# Patient Record
Sex: FEMALE | Race: WHITE | Employment: UNEMPLOYED | URBAN - METROPOLITAN AREA
[De-identification: names, ages, dates, MRNs, and addresses within clinical notes are randomized per-mention and may not be internally consistent; named-entity substitution may affect disease eponyms.]

---

## 2017-11-12 ENCOUNTER — ED HISTORICAL/CONVERTED ENCOUNTER (OUTPATIENT)
Dept: OTHER | Age: 52
End: 2017-11-12

## 2019-12-27 ENCOUNTER — ED HISTORICAL/CONVERTED ENCOUNTER (OUTPATIENT)
Dept: OTHER | Age: 54
End: 2019-12-27

## 2020-01-07 ENCOUNTER — OP HISTORICAL/CONVERTED ENCOUNTER (OUTPATIENT)
Dept: OTHER | Age: 55
End: 2020-01-07

## 2020-03-11 ENCOUNTER — OP HISTORICAL/CONVERTED ENCOUNTER (OUTPATIENT)
Dept: OTHER | Age: 55
End: 2020-03-11

## 2020-07-24 ENCOUNTER — IP HISTORICAL/CONVERTED ENCOUNTER (OUTPATIENT)
Dept: OTHER | Age: 55
End: 2020-07-24

## 2020-07-28 ENCOUNTER — IP HISTORICAL/CONVERTED ENCOUNTER (OUTPATIENT)
Dept: OTHER | Age: 55
End: 2020-07-28

## 2020-09-17 ENCOUNTER — HOSPITAL ENCOUNTER (OUTPATIENT)
Dept: NUCLEAR MEDICINE | Age: 55
Discharge: HOME | End: 2020-09-17
Attending: INTERNAL MEDICINE
Payer: COMMERCIAL

## 2020-09-17 ENCOUNTER — HOSPITAL ENCOUNTER (OUTPATIENT)
Dept: NON INVASIVE DIAGNOSTICS | Age: 55
Discharge: HOME | End: 2020-09-17
Attending: INTERNAL MEDICINE
Payer: COMMERCIAL

## 2020-09-17 VITALS
BODY MASS INDEX: 20.02 KG/M2 | HEIGHT: 65 IN | SYSTOLIC BLOOD PRESSURE: 184 MMHG | WEIGHT: 120.15 LBS | DIASTOLIC BLOOD PRESSURE: 111 MMHG

## 2020-09-17 DIAGNOSIS — I34.89 MYXOID TRANSFORMATION OF MITRAL VALVE: ICD-10-CM

## 2020-09-17 LAB
STRESS BASELINE DIAS BP: 111 MMHG
STRESS BASELINE HR: 80 BPM
STRESS BASELINE SYS BP: 184 MMHG
STRESS PERCENT HR ACHIEVED: 85 %
STRESS POST PEAK HR: 141 BPM
STRESS ST DEPRESSION: 0 MM
STRESS ST ELEVATION: 0 MM
STRESS STAGE 1 DURATION: 1 MIN:SEC
STRESS STAGE 1 HR: 88 BPM
STRESS STAGE 2 BP: NORMAL MMHG
STRESS STAGE 2 DURATION: 2 MIN:SEC
STRESS STAGE 2 HR: 101 BPM
STRESS STAGE 3 BP: NORMAL MMHG
STRESS STAGE 3 COMMENTS: NORMAL
STRESS STAGE 3 DURATION: 3 MIN:SEC
STRESS STAGE 3 HR: 104 BPM
STRESS STAGE 4 BP: NORMAL MMHG
STRESS STAGE 4 DURATION: 4 MIN:SEC
STRESS STAGE 4 HR: 103 BPM
STRESS STAGE 5 BP: NORMAL MMHG
STRESS STAGE 5 COMMENTS: NORMAL
STRESS STAGE 5 DURATION: 5 MIN:SEC
STRESS STAGE 5 HR: 98 BPM
STRESS TARGET HR: 166 BPM

## 2020-09-17 PROCEDURE — 74011250636 HC RX REV CODE- 250/636: Performed by: INTERNAL MEDICINE

## 2020-09-17 PROCEDURE — 78452 HT MUSCLE IMAGE SPECT MULT: CPT

## 2020-09-17 RX ORDER — NIFEDIPINE 60 MG/1
60 TABLET, EXTENDED RELEASE ORAL DAILY
COMMUNITY
End: 2020-10-02

## 2020-09-17 RX ORDER — ISOSORBIDE DINITRATE 20 MG/1
40 TABLET ORAL 2 TIMES DAILY
COMMUNITY
End: 2020-10-02

## 2020-09-17 RX ORDER — POTASSIUM CHLORIDE 20 MEQ/1
20 TABLET, EXTENDED RELEASE ORAL 2 TIMES DAILY
COMMUNITY
End: 2020-10-27

## 2020-09-17 RX ORDER — LANOLIN ALCOHOL/MO/W.PET/CERES
400 CREAM (GRAM) TOPICAL DAILY
COMMUNITY
End: 2021-04-14 | Stop reason: DRUGHIGH

## 2020-09-17 RX ORDER — ASPIRIN 81 MG/1
81 TABLET ORAL DAILY
COMMUNITY

## 2020-09-17 RX ORDER — AMINOPHYLLINE 25 MG/ML
125 INJECTION, SOLUTION INTRAVENOUS ONCE
Status: COMPLETED | OUTPATIENT
Start: 2020-09-17 | End: 2020-09-17

## 2020-09-17 RX ORDER — METOPROLOL SUCCINATE 100 MG/1
150 TABLET, EXTENDED RELEASE ORAL DAILY
COMMUNITY
End: 2020-10-02

## 2020-09-17 RX ORDER — LABETALOL 200 MG/1
TABLET, FILM COATED ORAL 2 TIMES DAILY
COMMUNITY
End: 2020-10-02

## 2020-09-17 RX ADMIN — REGADENOSON 0.4 MG: 0.08 INJECTION, SOLUTION INTRAVENOUS at 10:03

## 2020-09-17 RX ADMIN — AMINOPHYLLINE 125 MG: 25 INJECTION, SOLUTION INTRAVENOUS at 10:12

## 2020-09-21 ENCOUNTER — APPOINTMENT (OUTPATIENT)
Dept: CT IMAGING | Age: 55
DRG: 194 | End: 2020-09-21
Attending: HOSPITALIST
Payer: COMMERCIAL

## 2020-09-21 ENCOUNTER — HOSPITAL ENCOUNTER (INPATIENT)
Age: 55
LOS: 11 days | Discharge: HOME HEALTH | DRG: 194 | End: 2020-10-02
Attending: EMERGENCY MEDICINE | Admitting: HOSPITALIST
Payer: COMMERCIAL

## 2020-09-21 ENCOUNTER — APPOINTMENT (OUTPATIENT)
Dept: GENERAL RADIOLOGY | Age: 55
DRG: 194 | End: 2020-09-21
Attending: EMERGENCY MEDICINE
Payer: COMMERCIAL

## 2020-09-21 ENCOUNTER — ANESTHESIA EVENT (OUTPATIENT)
Dept: EMERGENCY DEPT | Age: 55
DRG: 194 | End: 2020-09-21
Payer: COMMERCIAL

## 2020-09-21 ENCOUNTER — ANESTHESIA (OUTPATIENT)
Dept: EMERGENCY DEPT | Age: 55
DRG: 194 | End: 2020-09-21
Payer: COMMERCIAL

## 2020-09-21 DIAGNOSIS — J96.02 ACUTE HYPERCAPNIC RESPIRATORY FAILURE (HCC): Primary | ICD-10-CM

## 2020-09-21 DIAGNOSIS — I50.9 ACUTE CONGESTIVE HEART FAILURE, UNSPECIFIED HEART FAILURE TYPE (HCC): ICD-10-CM

## 2020-09-21 DIAGNOSIS — J44.1 COPD EXACERBATION (HCC): ICD-10-CM

## 2020-09-21 PROBLEM — I10 HYPERTENSION: Status: ACTIVE | Noted: 2020-09-21

## 2020-09-21 PROBLEM — I70.1 RENAL ARTERY STENOSIS (HCC): Status: ACTIVE | Noted: 2020-09-21

## 2020-09-21 PROBLEM — J96.00 ACUTE RESPIRATORY FAILURE (HCC): Status: ACTIVE | Noted: 2020-09-21

## 2020-09-21 LAB
ALBUMIN SERPL-MCNC: 3.3 G/DL (ref 3.5–5)
ALBUMIN/GLOB SERPL: 0.8 {RATIO} (ref 1.1–2.2)
ALP SERPL-CCNC: 114 U/L (ref 45–117)
ALT SERPL-CCNC: 48 U/L (ref 12–78)
ANION GAP SERPL CALC-SCNC: 7 MMOL/L (ref 5–15)
AST SERPL W P-5'-P-CCNC: 54 U/L (ref 15–37)
BASE DEFICIT BLDV-SCNC: 3.3 MMOL/L (ref 0–2)
BASOPHILS # BLD: 0 K/UL (ref 0–0.1)
BASOPHILS NFR BLD: 0 % (ref 0–1)
BILIRUB SERPL-MCNC: 0.3 MG/DL (ref 0.2–1)
BNP SERPL-MCNC: 9742 PG/ML
BUN SERPL-MCNC: 21 MG/DL (ref 6–20)
BUN/CREAT SERPL: 18 (ref 12–20)
CA-I BLD-MCNC: 9.8 MG/DL (ref 8.5–10.1)
CHLORIDE SERPL-SCNC: 101 MMOL/L (ref 97–108)
CO2 SERPL-SCNC: 27 MMOL/L (ref 21–32)
CREAT SERPL-MCNC: 1.15 MG/DL (ref 0.55–1.02)
DIFFERENTIAL METHOD BLD: ABNORMAL
EOSINOPHIL # BLD: 0 K/UL (ref 0–0.4)
EOSINOPHIL NFR BLD: 0 % (ref 0–7)
ERYTHROCYTE [DISTWIDTH] IN BLOOD BY AUTOMATED COUNT: 14.8 % (ref 11.5–14.5)
GLOBULIN SER CALC-MCNC: 4.4 G/DL (ref 2–4)
GLUCOSE SERPL-MCNC: 110 MG/DL (ref 65–100)
HCO3 BLDV-SCNC: 22 MMOL/L (ref 22–26)
HCT VFR BLD AUTO: 30.9 % (ref 35–47)
HGB BLD-MCNC: 9.9 % (ref 11.5–16)
IMM GRANULOCYTES # BLD AUTO: 0 K/UL (ref 0–0.04)
IMM GRANULOCYTES NFR BLD AUTO: 0 % (ref 0–0.5)
LYMPHOCYTES # BLD: 0.6 K/UL (ref 0.8–3.5)
LYMPHOCYTES NFR BLD: 7 % (ref 12–49)
MCH RBC QN AUTO: 32.5 PG (ref 26–34)
MCHC RBC AUTO-ENTMCNC: 32 G/DL (ref 30–36.5)
MCV RBC AUTO: 101.3 FL (ref 80–99)
MONOCYTES # BLD: 0.2 K/UL (ref 0–1)
MONOCYTES NFR BLD: 3 % (ref 5–13)
NEUTS SEG # BLD: 7.2 K/UL (ref 1.8–8)
NEUTS SEG NFR BLD: 90 % (ref 32–75)
PCO2 BLDV: 63 MMHG (ref 40–50)
PH BLDV: 7.21 [PH] (ref 7.31–7.41)
PLATELET # BLD AUTO: 352 K/UL (ref 150–400)
PMV BLD AUTO: 9.4 FL (ref 8.9–12.9)
PO2 BLDV: 78 MMHG (ref 36–42)
POTASSIUM SERPL-SCNC: 4.5 MMOL/L (ref 3.5–5.1)
PROT SERPL-MCNC: 7.7 G/DL (ref 6.4–8.2)
RBC # BLD AUTO: 3.05 M/UL (ref 3.8–5.2)
SAO2 % BLDV: 92 % (ref 60–80)
SERVICE CMNT-IMP: ABNORMAL
SODIUM SERPL-SCNC: 135 MMOL/L (ref 136–145)
TROPONIN I SERPL-MCNC: <0.05 NG/ML
WBC # BLD AUTO: 8 K/UL (ref 3.6–11)

## 2020-09-21 PROCEDURE — 99285 EMERGENCY DEPT VISIT HI MDM: CPT

## 2020-09-21 PROCEDURE — 74011250636 HC RX REV CODE- 250/636: Performed by: EMERGENCY MEDICINE

## 2020-09-21 PROCEDURE — 36415 COLL VENOUS BLD VENIPUNCTURE: CPT

## 2020-09-21 PROCEDURE — 71045 X-RAY EXAM CHEST 1 VIEW: CPT

## 2020-09-21 PROCEDURE — 83880 ASSAY OF NATRIURETIC PEPTIDE: CPT

## 2020-09-21 PROCEDURE — 94002 VENT MGMT INPAT INIT DAY: CPT

## 2020-09-21 PROCEDURE — 94640 AIRWAY INHALATION TREATMENT: CPT

## 2020-09-21 PROCEDURE — 94762 N-INVAS EAR/PLS OXIMTRY CONT: CPT

## 2020-09-21 PROCEDURE — 85025 COMPLETE CBC W/AUTO DIFF WBC: CPT

## 2020-09-21 PROCEDURE — 80053 COMPREHEN METABOLIC PANEL: CPT

## 2020-09-21 PROCEDURE — 74011000250 HC RX REV CODE- 250: Performed by: HOSPITALIST

## 2020-09-21 PROCEDURE — 93005 ELECTROCARDIOGRAM TRACING: CPT

## 2020-09-21 PROCEDURE — 82803 BLOOD GASES ANY COMBINATION: CPT

## 2020-09-21 PROCEDURE — 74011000250 HC RX REV CODE- 250: Performed by: EMERGENCY MEDICINE

## 2020-09-21 PROCEDURE — 65270000029 HC RM PRIVATE

## 2020-09-21 PROCEDURE — 74011250637 HC RX REV CODE- 250/637: Performed by: HOSPITALIST

## 2020-09-21 PROCEDURE — 84484 ASSAY OF TROPONIN QUANT: CPT

## 2020-09-21 PROCEDURE — 74011250636 HC RX REV CODE- 250/636: Performed by: HOSPITALIST

## 2020-09-21 PROCEDURE — 74011250636 HC RX REV CODE- 250/636

## 2020-09-21 PROCEDURE — 0BH17EZ INSERTION OF ENDOTRACHEAL AIRWAY INTO TRACHEA, VIA NATURAL OR ARTIFICIAL OPENING: ICD-10-PCS | Performed by: INTERNAL MEDICINE

## 2020-09-21 PROCEDURE — 65610000006 HC RM INTENSIVE CARE

## 2020-09-21 PROCEDURE — 5A1945Z RESPIRATORY VENTILATION, 24-96 CONSECUTIVE HOURS: ICD-10-PCS | Performed by: INTERNAL MEDICINE

## 2020-09-21 RX ORDER — CARBAMAZEPINE 200 MG/1
200 TABLET ORAL 2 TIMES DAILY
Status: DISCONTINUED | OUTPATIENT
Start: 2020-09-21 | End: 2020-10-02 | Stop reason: HOSPADM

## 2020-09-21 RX ORDER — LABETALOL 100 MG/1
100 TABLET, FILM COATED ORAL 2 TIMES DAILY
Status: DISCONTINUED | OUTPATIENT
Start: 2020-09-21 | End: 2020-09-23

## 2020-09-21 RX ORDER — ACETAMINOPHEN 650 MG/1
650 SUPPOSITORY RECTAL
Status: DISCONTINUED | OUTPATIENT
Start: 2020-09-21 | End: 2020-10-02 | Stop reason: HOSPADM

## 2020-09-21 RX ORDER — FOLIC ACID 1 MG/1
1 TABLET ORAL DAILY
COMMUNITY
End: 2022-03-20

## 2020-09-21 RX ORDER — ISOSORBIDE DINITRATE 20 MG/1
40 TABLET ORAL 2 TIMES DAILY
Status: DISCONTINUED | OUTPATIENT
Start: 2020-09-21 | End: 2020-09-29

## 2020-09-21 RX ORDER — POTASSIUM CHLORIDE 20 MEQ/1
20 TABLET, EXTENDED RELEASE ORAL 2 TIMES DAILY
Status: DISCONTINUED | OUTPATIENT
Start: 2020-09-21 | End: 2020-09-21

## 2020-09-21 RX ORDER — METOPROLOL TARTRATE 5 MG/5ML
5 INJECTION INTRAVENOUS EVERY 4 HOURS
Status: DISCONTINUED | OUTPATIENT
Start: 2020-09-21 | End: 2020-09-23

## 2020-09-21 RX ORDER — SODIUM CHLORIDE 0.9 % (FLUSH) 0.9 %
5-40 SYRINGE (ML) INJECTION AS NEEDED
Status: DISCONTINUED | OUTPATIENT
Start: 2020-09-21 | End: 2020-10-02 | Stop reason: HOSPADM

## 2020-09-21 RX ORDER — PREDNISONE 5 MG/1
5 TABLET ORAL DAILY
COMMUNITY
End: 2020-10-27

## 2020-09-21 RX ORDER — ENOXAPARIN SODIUM 100 MG/ML
40 INJECTION SUBCUTANEOUS DAILY
Status: DISCONTINUED | OUTPATIENT
Start: 2020-09-22 | End: 2020-10-02 | Stop reason: HOSPADM

## 2020-09-21 RX ORDER — ETHACRYNIC ACID 25 MG/1
50 TABLET ORAL 2 TIMES DAILY
COMMUNITY
End: 2020-10-02

## 2020-09-21 RX ORDER — POLYETHYLENE GLYCOL 3350 17 G/17G
17 POWDER, FOR SOLUTION ORAL DAILY PRN
Status: DISCONTINUED | OUTPATIENT
Start: 2020-09-21 | End: 2020-10-02 | Stop reason: HOSPADM

## 2020-09-21 RX ORDER — LANOLIN ALCOHOL/MO/W.PET/CERES
325 CREAM (GRAM) TOPICAL
Status: DISCONTINUED | OUTPATIENT
Start: 2020-09-22 | End: 2020-10-02 | Stop reason: HOSPADM

## 2020-09-21 RX ORDER — SERTRALINE HYDROCHLORIDE 50 MG/1
25 TABLET, FILM COATED ORAL DAILY
Status: DISCONTINUED | OUTPATIENT
Start: 2020-09-22 | End: 2020-10-02 | Stop reason: HOSPADM

## 2020-09-21 RX ORDER — HYDRALAZINE HYDROCHLORIDE 20 MG/ML
20 INJECTION INTRAMUSCULAR; INTRAVENOUS EVERY 6 HOURS
Status: DISCONTINUED | OUTPATIENT
Start: 2020-09-21 | End: 2020-09-22

## 2020-09-21 RX ORDER — SUCCINYLCHOLINE CHLORIDE 20 MG/ML INJECTION SOLUTION
100 SOLUTION
Status: COMPLETED | OUTPATIENT
Start: 2020-09-21 | End: 2020-09-21

## 2020-09-21 RX ORDER — METOPROLOL SUCCINATE 50 MG/1
150 TABLET, EXTENDED RELEASE ORAL DAILY
Status: DISCONTINUED | OUTPATIENT
Start: 2020-09-22 | End: 2020-09-24

## 2020-09-21 RX ORDER — ALPRAZOLAM 0.25 MG/1
0.25 TABLET ORAL
Status: DISCONTINUED | OUTPATIENT
Start: 2020-09-21 | End: 2020-10-02 | Stop reason: HOSPADM

## 2020-09-21 RX ORDER — NIFEDIPINE 60 MG/1
60 TABLET, EXTENDED RELEASE ORAL DAILY
Status: DISCONTINUED | OUTPATIENT
Start: 2020-09-22 | End: 2020-09-25

## 2020-09-21 RX ORDER — SODIUM CHLORIDE 0.9 % (FLUSH) 0.9 %
5-40 SYRINGE (ML) INJECTION EVERY 8 HOURS
Status: DISCONTINUED | OUTPATIENT
Start: 2020-09-21 | End: 2020-10-02 | Stop reason: HOSPADM

## 2020-09-21 RX ORDER — SPIRONOLACTONE 25 MG/1
100 TABLET ORAL DAILY
COMMUNITY
End: 2020-10-27

## 2020-09-21 RX ORDER — LEVOTHYROXINE SODIUM 25 UG/1
25 TABLET ORAL
Status: DISCONTINUED | OUTPATIENT
Start: 2020-09-22 | End: 2020-10-02 | Stop reason: HOSPADM

## 2020-09-21 RX ORDER — PROPOFOL 10 MG/ML
INJECTION, EMULSION INTRAVENOUS
Status: COMPLETED
Start: 2020-09-21 | End: 2020-09-21

## 2020-09-21 RX ORDER — LANOLIN ALCOHOL/MO/W.PET/CERES
325 CREAM (GRAM) TOPICAL
COMMUNITY

## 2020-09-21 RX ORDER — HYDRALAZINE HYDROCHLORIDE 50 MG/1
100 TABLET, FILM COATED ORAL 3 TIMES DAILY
Status: ON HOLD | COMMUNITY
End: 2020-11-19 | Stop reason: ALTCHOICE

## 2020-09-21 RX ORDER — ONDANSETRON 2 MG/ML
4 INJECTION INTRAMUSCULAR; INTRAVENOUS
Status: DISCONTINUED | OUTPATIENT
Start: 2020-09-21 | End: 2020-10-02 | Stop reason: HOSPADM

## 2020-09-21 RX ORDER — KETAMINE HYDROCHLORIDE 50 MG/ML
80 INJECTION, SOLUTION INTRAMUSCULAR; INTRAVENOUS
Status: COMPLETED | OUTPATIENT
Start: 2020-09-21 | End: 2020-09-21

## 2020-09-21 RX ORDER — SERTRALINE HYDROCHLORIDE 25 MG/1
50 TABLET, FILM COATED ORAL DAILY
COMMUNITY
End: 2021-04-14 | Stop reason: DRUGHIGH

## 2020-09-21 RX ORDER — FUROSEMIDE 10 MG/ML
60 INJECTION INTRAMUSCULAR; INTRAVENOUS ONCE
Status: DISCONTINUED | OUTPATIENT
Start: 2020-09-21 | End: 2020-09-21

## 2020-09-21 RX ORDER — ALPRAZOLAM 0.25 MG/1
0.25 TABLET ORAL
COMMUNITY
End: 2022-03-17

## 2020-09-21 RX ORDER — ASPIRIN 81 MG/1
81 TABLET ORAL DAILY
Status: DISCONTINUED | OUTPATIENT
Start: 2020-09-22 | End: 2020-10-02 | Stop reason: HOSPADM

## 2020-09-21 RX ORDER — LEVOTHYROXINE SODIUM 25 UG/1
25 TABLET ORAL
COMMUNITY
End: 2021-03-15 | Stop reason: DRUGHIGH

## 2020-09-21 RX ORDER — LEVETIRACETAM 250 MG/1
500 TABLET ORAL 2 TIMES DAILY
Status: DISCONTINUED | OUTPATIENT
Start: 2020-09-21 | End: 2020-09-22

## 2020-09-21 RX ORDER — IPRATROPIUM BROMIDE AND ALBUTEROL SULFATE 2.5; .5 MG/3ML; MG/3ML
3 SOLUTION RESPIRATORY (INHALATION)
Status: COMPLETED | OUTPATIENT
Start: 2020-09-21 | End: 2020-09-21

## 2020-09-21 RX ORDER — LEVETIRACETAM 5 MG/ML
500 INJECTION INTRAVASCULAR EVERY 12 HOURS
Status: DISCONTINUED | OUTPATIENT
Start: 2020-09-21 | End: 2020-09-22

## 2020-09-21 RX ORDER — KETAMINE HYDROCHLORIDE 50 MG/ML
100 INJECTION, SOLUTION INTRAMUSCULAR; INTRAVENOUS
Status: COMPLETED | OUTPATIENT
Start: 2020-09-21 | End: 2020-09-21

## 2020-09-21 RX ORDER — ETHACRYNIC ACID 25 MG/1
50 TABLET ORAL 2 TIMES DAILY
Status: DISCONTINUED | OUTPATIENT
Start: 2020-09-21 | End: 2020-09-22

## 2020-09-21 RX ORDER — PREDNISONE 5 MG/1
5 TABLET ORAL DAILY
Status: DISCONTINUED | OUTPATIENT
Start: 2020-09-22 | End: 2020-09-21

## 2020-09-21 RX ORDER — LANOLIN ALCOHOL/MO/W.PET/CERES
400 CREAM (GRAM) TOPICAL DAILY
Status: DISCONTINUED | OUTPATIENT
Start: 2020-09-22 | End: 2020-10-02 | Stop reason: HOSPADM

## 2020-09-21 RX ORDER — ACETAMINOPHEN 325 MG/1
650 TABLET ORAL
Status: DISCONTINUED | OUTPATIENT
Start: 2020-09-21 | End: 2020-10-02 | Stop reason: HOSPADM

## 2020-09-21 RX ORDER — FOLIC ACID 1 MG/1
1 TABLET ORAL DAILY
Status: DISCONTINUED | OUTPATIENT
Start: 2020-09-22 | End: 2020-10-02 | Stop reason: HOSPADM

## 2020-09-21 RX ORDER — PROPOFOL 10 MG/ML
10 VIAL (ML) INTRAVENOUS
Status: DISCONTINUED | OUTPATIENT
Start: 2020-09-21 | End: 2020-09-25

## 2020-09-21 RX ORDER — LEVETIRACETAM 500 MG/1
500 TABLET ORAL 2 TIMES DAILY
COMMUNITY
End: 2022-03-20

## 2020-09-21 RX ORDER — HYDRALAZINE HYDROCHLORIDE 50 MG/1
100 TABLET, FILM COATED ORAL 3 TIMES DAILY
Status: DISCONTINUED | OUTPATIENT
Start: 2020-09-21 | End: 2020-09-23

## 2020-09-21 RX ORDER — SPIRONOLACTONE 100 MG/1
100 TABLET, FILM COATED ORAL DAILY
Status: DISCONTINUED | OUTPATIENT
Start: 2020-09-22 | End: 2020-09-23

## 2020-09-21 RX ADMIN — NITROGLYCERIN 0.5 INCH: 20 OINTMENT TOPICAL at 20:54

## 2020-09-21 RX ADMIN — METOPROLOL TARTRATE 5 MG: 5 INJECTION INTRAVENOUS at 20:54

## 2020-09-21 RX ADMIN — PROPOFOL 10 MCG/KG/MIN: 10 INJECTION, EMULSION INTRAVENOUS at 21:30

## 2020-09-21 RX ADMIN — Medication 100 MG: at 21:28

## 2020-09-21 RX ADMIN — POTASSIUM CHLORIDE 20 MEQ: 1500 TABLET, EXTENDED RELEASE ORAL at 19:15

## 2020-09-21 RX ADMIN — Medication 100 MG: at 21:34

## 2020-09-21 RX ADMIN — Medication 10 ML: at 19:18

## 2020-09-21 RX ADMIN — LABETALOL HYDROCHLORIDE 100 MG: 100 TABLET, FILM COATED ORAL at 19:15

## 2020-09-21 RX ADMIN — KETAMINE HYDROCHLORIDE 80 MG: 50 INJECTION, SOLUTION INTRAMUSCULAR; INTRAVENOUS at 21:48

## 2020-09-21 RX ADMIN — KETAMINE HYDROCHLORIDE 100 MG: 50 INJECTION, SOLUTION INTRAMUSCULAR; INTRAVENOUS at 21:28

## 2020-09-21 RX ADMIN — IPRATROPIUM BROMIDE AND ALBUTEROL SULFATE 3 ML: .5; 3 SOLUTION RESPIRATORY (INHALATION) at 14:38

## 2020-09-21 RX ADMIN — METHYLPREDNISOLONE SODIUM SUCCINATE 40 MG: 40 INJECTION, POWDER, FOR SOLUTION INTRAMUSCULAR; INTRAVENOUS at 19:15

## 2020-09-21 RX ADMIN — LEVETIRACETAM 500 MG: 250 TABLET ORAL at 19:15

## 2020-09-21 RX ADMIN — Medication 100 MG: at 21:57

## 2020-09-21 NOTE — H&P
Hospitalist Admission Note    NAME: Damian Garcia   :  1965   MRN:  [id number]     Date/Time:  2020 5:52 PM    Patient PCP: Ponce Solis  ________________________________________________________________________     Subjective:   CHIEF COMPLAINT: Shortness of breath    HISTORY OF PRESENT ILLNESS:      47 y.o.  female with past medical history of COPD, chronic respiratory failure on home oxygen, CVA, congestive heart failure, recurrent pleural effusions, mitral valve regurgitation, seizure disorder, hypothyroidism came to the hospital with a complaint of worsening shortness of breath since today morning. Patient states that she checked her oxygen saturations which were in the 80s even though she was using her regular 5 L oxygen at home. On evaluation in the ER patient was found to be hypoxic with oxygen saturation in 58% at 6 L and had to be placed on BiPAP. Patient denies any fever rigors or chills. Key medical history: COPD, chronic respiratory failure on home oxygen, congestive heart failure, renal artery stenosis    Recent admissions: COPD exacerbation on 2020    Pertinent ER work up: BNP level is not 9742, VBG was done in ER. Patient was found to be hypoxic and was placed on BiPAP    We were asked to admit for work up and evaluation of the above problems. Past Medical History:   Diagnosis Date    Chronic obstructive pulmonary disease (HCC)     Chronic respiratory failure (HCC)     Congestive heart failure (CHF) (HCC)     Hypertension     Hypertension     Renal artery stenosis (HCC)     Seizure disorder (HCC)       No past surgical history on file.   Social History     Tobacco Use    Smoking status: Heavy Tobacco Smoker     Types: Cigarettes    Tobacco comment: former quit only 1 month ago during recent admission to the hospital   Substance Use Topics    Alcohol use: Not Currently      Family History   Problem Relation Age of Onset    Hypertension Mother     Stroke Mother      Allergies   Allergen Reactions    Sulfa (Sulfonamide Antibiotics) Anaphylaxis        Prior to Admission medications    Medication Sig Start Date End Date Taking? Authorizing Provider   ALPRAZolam Pipo Lash) 0.25 mg tablet Take 0.25 mg by mouth three (3) times daily as needed for Anxiety. Yes Provider, Historical   ethacrynic acid (EDECRIN) 25 mg tablet Take 50 mg by mouth two (2) times a day. Yes Provider, Historical   predniSONE (DELTASONE) 5 mg tablet Take 5 mg by mouth daily. Yes Provider, Historical   sertraline (Zoloft) 25 mg tablet Take 25 mg by mouth daily. Yes Provider, Historical   hydrALAZINE (APRESOLINE) 50 mg tablet Take 100 mg by mouth three (3) times daily. Yes Provider, Historical   levothyroxine (SYNTHROID) 25 mcg tablet Take 25 mcg by mouth Daily (before breakfast). Yes Provider, Historical   levETIRAcetam (Keppra) 500 mg tablet Take 500 mg by mouth two (2) times a day. Yes Provider, Historical   carBAMazepine, mood stabiliz, 200 mg CM12 Take 200 mg by mouth two (2) times a day. Yes Provider, Historical   ferrous sulfate 325 mg (65 mg iron) tablet Take 325 mg by mouth Daily (before breakfast). Yes Provider, Historical   folic acid (FOLVITE) 1 mg tablet Take 1 mg by mouth daily. Yes Provider, Historical   spironolactone (ALDACTONE) 25 mg tablet Take 100 mg by mouth daily. Yes Provider, Historical   metoprolol succinate (TOPROL-XL) 100 mg tablet Take 150 mg by mouth daily. Provider, Historical   NIFEdipine ER (PROCARDIA XL) 60 mg ER tablet Take 60 mg by mouth daily. Provider, Historical   aspirin delayed-release 81 mg tablet Take  by mouth daily. Provider, Historical   isosorbide dinitrate (ISORDIL) 20 mg tablet Take 40 mg by mouth two (2) times a day. Provider, Historical   labetaloL (NORMODYNE) 200 mg tablet Take  by mouth two (2) times a day.     Provider, Historical   magnesium oxide (MAG-OX) 400 mg tablet Take 400 mg by mouth daily. Provider, Historical   potassium chloride (K-DUR, KLOR-CON) 20 mEq tablet Take 20 mEq by mouth two (2) times a day. Provider, Historical     REVIEW OF SYSTEMS:    General: negative for fever, chills, sweats, weakness  Eyes: negative for blurred vision, eye pain, loss of vision, diplopia  Ear Nose and Throat: negative for rhinorrhea, pharyngitis, otalgia, tinnitus, speech or swallowing difficulties  Respiratory:  negative for cough, sputum production, +++SOB, ++++wheezing,++++ TRAN, pleuritic pain  Cardiology:  negative for chest pain, palpitations, orthopnea, PND, edema, syncope   Gastrointestinal: negative for abdominal pain, N/V, dysphagia, change in bowel habits, bleeding  Genitourinary: negative for frequency, urgency, dysuria, hematuria, incontinence  Muskuloskeletal : negative for arthralgia, myalgia  Hematology: negative for easy bruising, bleeding, lymphadenopathy  Dermatological: negative for rash, ulceration, mole change, new lesion  Endocrine: negative for hot flashes or polydipsia  Neurological: negative for headache, dizziness, confusion, focal weakness, paresthesia, memory loss, gait disturbance  Psychological: negative for anxiety, depression, agitation    Objective:   VITALS:    Visit Vitals  BP (!) 168/94 (BP Patient Position: At rest)   Resp (!) 34   Ht 5' 5\" (1.651 m)   Wt 56.7 kg (125 lb)   SpO2 94%   BMI 20.80 kg/m²     PHYSICAL EXAM:  General: A&O x3  Skin: Extremities and face reveal no rashes. No martines erythema. No telangiectasias on the chest wall. HEENT: Sclerae anicteric. Extra-occular muscles are intact. No oral ulcers. No ENT discharge. The neck is supple. Cardiovascular: Regular rate and rhythm. No murmurs, gallops, or rubs. PMI nondisplaced. Carotids without bruits. Respiratory: Bilateral coarse crackles are present, expiratory wheezing is present, patient is on BiPAP. GI: Abdomen nondistended, soft, and nontender. Normal active bowel sounds.   Rectal: Deferred Musculoskeletal: No pitting edema of the lower legs. Extremities have good range of motion. No costovertebral tenderness. Neurological: Gross memory appears intact. Patient is alert and oriented. Power 5/5, Cranial nerves II-XII grossly intact  Psychiatric: Mood appears appropriate with judgement intact. Lymphatic: No cervical or supraclavicular adenopathy. Assessment :    Plan: Active Problems:    Acute respiratory failure (HCC) (9/21/2020)      COPD exacerbation (HCC) (9/21/2020)      CHF (congestive heart failure) ([facility] [address] (9/21/2020)      Hypertension (9/21/2020)      Renal artery stenosis ([facility] [address] (9/21/2020)       59-year-old female came to the hospital with a complaint of worsening shortness of breath  1. Acute on chronic hypoxic respiratory failure: Continue patient on BiPAP. Pulmonary evaluation of the patient has been requested. Will give DuoNeb nebulizers to the patient. We will give steroids. No obvious signs of infection noted at this time, hold off on restarting antibiotics for now. 2.  Congestive heart failure exacerbation: Patient is allergic to sulfa drugs, will continue patient on ethacrynic acid and spironolactone. Monitor patient's intake and output. 3.  Hypertension: Continue patient's current antihypertensive medications. 4.  Anxiety disorder: Continue patient on Xanax  5. Seizure disorder: Continue patient on Keppra and carbamazepine. 6.  Iron deficiency anemia: Continue patient on iron supplement  7. Renal artery stenosis: Cardiology closely following the patient will follow up with them for further recommendations.    Code Status: Full code  Surrogate decision maker-   DVT Prophylaxis: Lovenox  GI Prophylaxis: pepcid       ________________________________________________________________________  Care Plan discussed with:    Comments   Patient x    Family  x    RN x    Care Manager                    Consultant:  jhoana Benavides, card ________________________________________________________________________  Recommended Disposition:   Home with Family    HH/PT/OT/RN x   SNF/LTC    WHIT[facility]    ________________________________________________________________________  Code Status:  Full Code x   DNR/DNI    ________________________________________________________________________  TOTAL TIME:  70    Comments   >50% of visit spent in counseling and coordination of care       ________________________________________________________________________  Pastor Garcia MD      Procedures: see electronic medical records for all procedures/Xrays and details which were not copied into this note but were reviewed prior to creation of Plan. LAB DATA REVIEWED:    Recent Results (from the past 24 hour(s))   VENOUS BLOOD GAS    Collection Time: 09/21/20  2:45 PM   Result Value Ref Range    VENOUS PH 7.21 (L) 7.31 - 7.41      VENOUS PCO2 63 (H) 40 - 50 mmHg    VENOUS PO2 78 (H) 36 - 42 mmHg    VENOUS O2 SATURATION 92 (H) 60 - 80 %    VENOUS BICARBONATE 22 22 - 26 mmol/L    VENOUS BASE DEFICIT 3.3 (H) 0 - 2 mmol/L    Critical value read back [facility] JAYCEE WILLIAMSON CRT    TROPONIN I    Collection Time: 09/21/20  2:45 PM   Result Value Ref Range    Troponin-I, Qt. <0.05 <0.05 ng/mL   CBC WITH AUTOMATED DIFF    Collection Time: 09/21/20  2:45 PM   Result Value Ref Range    WBC 8.0 3.6 - 11.0 K/uL    RBC 3.05 (L) 3.80 - 5.20 M/uL    HGB 9.9 (L) 11.5 - 16.0 %    HCT 30.9 (L) 35.0 - 47.0 %    .3 (H) 80.0 - 99.0 FL    MCH 32.5 26.0 - 34.0 PG    MCHC 32.0 30.0 - 36.5 g/dL    RDW 14.8 (H) 11.5 - 14.5 %    PLATELET 025 026 - 800 K/uL    MPV 9.4 8.9 - 12.9 FL    NEUTROPHILS 90 (H) 32 - 75 %    LYMPHOCYTES 7 (L) 12 - 49 %    MONOCYTES 3 (L) 5 - 13 %    EOSINOPHILS 0 0 - 7 %    BASOPHILS 0 0 - 1 %    IMMATURE GRANULOCYTES 0 0.0 - 0.5 %    ABS. NEUTROPHILS 7.2 1.8 - 8.0 K/UL    ABS. LYMPHOCYTES 0.6 (L) 0.8 - 3.5 K/UL    ABS. MONOCYTES 0.2 0.0 - 1.0 K/UL    ABS.  EOSINOPHILS 0.0 0.0 - 0.4 K/UL    ABS. BASOPHILS 0.0 0.0 - 0.1 K/UL    ABS. IMM. GRANS. 0.0 0.00 - 0.04 K/UL    DF AUTOMATED     METABOLIC PANEL, COMPREHENSIVE    Collection Time: 09/21/20  2:45 PM   Result Value Ref Range    Sodium 135 (L) 136 - 145 mmol/L    Potassium 4.5 3.5 - 5.1 mmol/L    Chloride 101 97 - 108 mmol/L    CO2 27 21 - 32 mmol/L    Anion gap 7 5 - 15 mmol/L    Glucose 110 (H) 65 - 100 mg/dL    BUN 21 (H) 6 - 20 mg/dL    Creatinine 1.15 (H) 0.55 - 1.02 mg/dL    BUN/Creatinine ratio 18 12 - 20      GFR est AA 60 (L) >60 ml/min/1.73m2    GFR est non-AA 49 (L) >60 ml/min/1.73m2    Calcium 9.8 8.5 - 10.1 mg/dL    Bilirubin, total 0.3 0.2 - 1.0 mg/dL    AST (SGOT) 54 (H) 15 - 37 U/L    ALT (SGPT) 48 12 - 78 U/L    Alk. phosphatase 114 45 - 117 U/L    Protein, total 7.7 6.4 - 8.2 g/dL    Albumin 3.3 (L) 3.5 - 5.0 g/dL    Globulin 4.4 (H) 2.0 - 4.0 g/dL    A-G Ratio 0.8 (L) 1.1 - 2.2     BNP    Collection Time: 09/21/20  2:45 PM   Result Value Ref Range    NT pro-BNP 9,742 (H) <125 pg/mL     XR CHEST SNGL V   Final Result   Impression: Congestive heart failure with interstitial and alveolar pulmonary   edema with bilateral pleural effusions.

## 2020-09-21 NOTE — ED PROVIDER NOTES
HPI   Chief Complaint   Patient presents with    Respiratory Distress     54yoF presents with SOB. She is unable to give clear history or complete ROS due to SOB and being on BIPAP. Per EMS she was found to desat to 58% on 6L and she has been out of her home meds for 3 weeks. En route she was given neb, 10 decadron, and  2gm mag. Pt denies sick contacts. Past Medical History:   Diagnosis Date    Chronic obstructive pulmonary disease ([facility] [address]     Hypertension        No past surgical history on file. No family history on file.     Social History     Socioeconomic History    Marital status: SINGLE     Spouse name: Not on file    Number of children: Not on file    Years of education: Not on file    Highest education level: Not on file   Occupational History    Not on file   Social Needs    Financial resource strain: Not on file    Food insecurity     Worry: Not on file     Inability: Not on file    Transportation needs     Medical: Not on file     Non-medical: Not on file   Tobacco Use    Smoking status: Not on file    Tobacco comment: former   Substance and Sexual Activity    Alcohol use: Not on file    Drug use: Not on file    Sexual activity: Not on file   Lifestyle    Physical activity     Days per week: Not on file     Minutes per session: Not on file    Stress: Not on file   Relationships    Social connections     Talks on phone: Not on file     Gets together: Not on file     Attends Mandaeism service: Not on file     Active member of club or organization: Not on file     Attends meetings of clubs or organizations: Not on file     Relationship status: Not on file    Intimate partner violence     Fear of current or ex partner: Not on file     Emotionally abused: Not on file     Physically abused: Not on file     Forced sexual activity: Not on file   Other Topics Concern    Not on file   Social History Narrative    Not on file         ALLERGIES: Sulfa (sulfonamide antibiotics)    Review of Systems   Unable to perform ROS: Acuity of condition       Vitals:    09/21/20 1435 09/21/20 1443 09/21/20 1519   BP: (!) 168/94     Resp: (!) 34     SpO2: (!) 67% 90% 94%   Weight: 56.7 kg (125 lb)     Height: 5' 5\" (1.651 m)              Physical Exam   Patient Vitals for the past 8 hrs:   Resp BP SpO2   09/21/20 1519   94 %   09/21/20 1443   90 %   09/21/20 1435 (!) 34 (!) 168/94 (!) 67 %        Nursing note and vitals reviewed. Constitutional: Severe distress. Chronically ill appearing. Head: Normocephalic and atraumatic. Mouth/Throat: Airway patent. Moist mucous membranes. Eyes: EOMI. No scleral icterus. Neck: Neck supple. Cardiovascular: Normal rate, regular rhythm. Very distant heart sounds. Good pulses throughout. Pulmonary/Chest: Severe respiratory distress, tachypneic, tripoding. Poor air entry b/l with wheezing. Abdominal/GI: BS normal, Soft, non-tender, non-distended. No rebound or guarding. Musculoskeletal: No gross injuries or deformities. 3+ pitting symmetric leg edema up to b/l knee. Neurological: Alert and oriented to person, place, and time. Moving all extremities. Psych: Unable to assess due to acuity. Skin: Skin is warm and dry. No rash noted. MDM   DDx = flash pulmonary edema, CHF exacerbation, COPD exacerbation, CAP, ACS, PE, lower suspicion covid-19. Placed on BIPAP immediately upon ED arrival. On re-assessment on BIPAP pt appears improving. VBG showing acute respiratory acidosis. CXR showing pulmonary edema. Given duoneb and lasix 60mg IV. Admitting to hospitalist ICU. While bed boarding in ED around 9pm pt appearing to tire out on BIPAP, sat 90% with 100% FiO2. Had discussion with pt about intubation, she would like to be intubated. ED ran out of Glidescope stylet which would be my first choice for intubating this pt. Attempted DL twice without success.  Called anesthesia who attempted DL once without success, anesthesia then brought Glidescope stylet and was able to intubate the pt (total 4 tries between two providers). Labs Reviewed   VENOUS BLOOD GAS - Abnormal; Notable for the following components:       Result Value    VENOUS PH 7.21 (*)     VENOUS PCO2 63 (*)     VENOUS PO2 78 (*)     VENOUS O2 SATURATION 92 (*)     VENOUS BASE DEFICIT 3.3 (*)     All other components within normal limits   CBC WITH AUTOMATED DIFF - Abnormal; Notable for the following components:    RBC 3.05 (*)     HGB 9.9 (*)     HCT 30.9 (*)     .3 (*)     RDW 14.8 (*)     NEUTROPHILS 90 (*)     LYMPHOCYTES 7 (*)     MONOCYTES 3 (*)     ABS. LYMPHOCYTES 0.6 (*)     All other components within normal limits   METABOLIC PANEL, COMPREHENSIVE - Abnormal; Notable for the following components:    Sodium 135 (*)     Glucose 110 (*)     BUN 21 (*)     Creatinine 1.15 (*)     GFR est AA 60 (*)     GFR est non-AA 49 (*)     AST (SGOT) 54 (*)     Albumin 3.3 (*)     Globulin 4.4 (*)     A-G Ratio 0.8 (*)     All other components within normal limits   BNP - Abnormal; Notable for the following components:    NT pro-BNP 9,742 (*)     All other components within normal limits   TROPONIN I     XR CHEST SNGL V   Final Result   Impression: Congestive heart failure with interstitial and alveolar pulmonary   edema with bilateral pleural effusions. Medications   furosemide (LASIX) injection 60 mg (has no administration in time range)   albuterol-ipratropium (DUO-NEB) 2.5 MG-0.5 MG/3 ML (3 mL Nebulization Given 9/21/20 6568)            Critical Care  Performed by: Lora Piedra MD  Authorized by:  Lora Piedra MD     Critical care provider statement:     Critical care time (minutes):  45    Critical care was necessary to treat or prevent imminent or life-threatening deterioration of the following conditions:  Respiratory failure    Critical care was time spent personally by me on the following activities:  Discussions with consultants, pulse oximetry, re-evaluation of patient's condition, ordering and review of radiographic studies, ordering and review of laboratory studies, ordering and performing treatments and interventions, development of treatment plan with patient or surrogate, evaluation of patient's response to treatment, examination of patient and obtaining history from patient or surrogate (interpreting VBG, managing BIPAP)    Intubation    Date/Time: 9/21/2020 9:59 PM  Performed by: Lora Piedra MD  Authorized by: Lora Piedra MD     Consent:     Consent obtained:  Verbal and emergent situation  Pre-procedure details:     Pretreatment meds: ketamine. Paralytics:  Succinylcholine  Procedure details:     Preoxygenation:  Bag valve mask    Laryngoscope blade: Mac 4    Tube size (mm):  7.0    Number of attempts:  2    Cricoid pressure: yes      Cords visualized: Grade 2b view. Comments:      Unable to achieve intubation. Anesthesia was called who was also not able to achieve intubation with DL, eventually obtained Glidescope stylet from anesthesia and was able to intubate.

## 2020-09-22 ENCOUNTER — APPOINTMENT (OUTPATIENT)
Dept: CT IMAGING | Age: 55
DRG: 194 | End: 2020-09-22
Attending: HOSPITALIST
Payer: COMMERCIAL

## 2020-09-22 LAB
ANION GAP SERPL CALC-SCNC: 11 MMOL/L (ref 5–15)
ARTERIAL PATENCY WRIST A: ABNORMAL
ARTERIAL PATENCY WRIST A: POSITIVE
BASE DEFICIT BLDA-SCNC: 1.9 MMOL/L (ref 0–2)
BASE DEFICIT BLDA-SCNC: 4.7 MMOL/L (ref 0–2)
BASOPHILS # BLD: 0 K/UL (ref 0–0.1)
BASOPHILS NFR BLD: 0 % (ref 0–1)
BDY SITE: ABNORMAL
BDY SITE: ABNORMAL
BUN SERPL-MCNC: 21 MG/DL (ref 6–20)
BUN/CREAT SERPL: 23 (ref 12–20)
CA-I BLD-MCNC: 8.5 MG/DL (ref 8.5–10.1)
CHLORIDE SERPL-SCNC: 106 MMOL/L (ref 97–108)
CO2 SERPL-SCNC: 22 MMOL/L (ref 21–32)
COHGB MFR BLD: 0.8 % (ref 0–3)
CREAT SERPL-MCNC: 0.92 MG/DL (ref 0.55–1.02)
CULTURE,CULT: NORMAL
DIFFERENTIAL METHOD BLD: ABNORMAL
EOSINOPHIL # BLD: 0 K/UL (ref 0–0.4)
EOSINOPHIL NFR BLD: 0 % (ref 0–7)
EPAP/CPAP/PEEP, PAPEEP: 0
ERYTHROCYTE [DISTWIDTH] IN BLOOD BY AUTOMATED COUNT: 14.7 % (ref 11.5–14.5)
FIO2 ON VENT: 100 %
FIO2 ON VENT: 100 %
GAS FLOW.O2 SETTING OXYMISER: 16 L/MIN
GLUCOSE SERPL-MCNC: 101 MG/DL (ref 65–100)
HCO3 BLDA-SCNC: 21 MMOL/L (ref 22–26)
HCO3 BLDA-SCNC: 23 MMOL/L (ref 22–26)
HCT VFR BLD AUTO: 28.8 % (ref 35–47)
HGB BLD OXIMETRY-MCNC: 12.2 G/DL (ref 12–16)
HGB BLD-MCNC: 9.4 % (ref 11.5–16)
IMM GRANULOCYTES # BLD AUTO: 0 K/UL
IMM GRANULOCYTES NFR BLD AUTO: 0 %
IPAP/PIP, IPAPIP: 21
LYMPHOCYTES # BLD: 0.5 K/UL (ref 0.8–3.5)
LYMPHOCYTES NFR BLD: 6 % (ref 12–49)
MCH RBC QN AUTO: 33 PG (ref 26–34)
MCHC RBC AUTO-ENTMCNC: 32.6 G/DL (ref 30–36.5)
MCV RBC AUTO: 101.1 FL (ref 80–99)
METHGB MFR BLD: 0.5 % (ref 0–3)
MONOCYTES # BLD: 0.5 K/UL (ref 0–1)
MONOCYTES NFR BLD: 6 % (ref 5–13)
NEUTS SEG # BLD: 6.9 K/UL (ref 1.8–8)
NEUTS SEG NFR BLD: 88 % (ref 32–75)
OXYHGB MFR BLD: 97.2 % (ref 0–3)
PCO2 BLDA: 37 MMHG (ref 35–45)
PCO2 BLDA: 62 MMHG (ref 35–45)
PH BLDA: 7.2 [PH] (ref 7.35–7.45)
PH BLDA: 7.39 [PH] (ref 7.35–7.45)
PLATELET # BLD AUTO: 294 K/UL (ref 150–400)
PMV BLD AUTO: 10 FL (ref 8.9–12.9)
PO2 BLDA: 101 MMHG (ref 75–100)
PO2 BLDA: 83 MMHG (ref 75–100)
POTASSIUM SERPL-SCNC: 3.7 MMOL/L (ref 3.5–5.1)
RBC # BLD AUTO: 2.85 M/UL (ref 3.8–5.2)
RBC MORPH BLD: ABNORMAL
SAO2 % BLD: 94 %
SAO2 % BLD: 99 %
SAO2% DEVICE SAO2% SENSOR NAME: ABNORMAL
SERVICE CMNT-IMP: ABNORMAL
SODIUM SERPL-SCNC: 139 MMOL/L (ref 136–145)
SPECIAL REQUESTS,SREQ: NORMAL
SPECIMEN SITE: ABNORMAL
VENTILATION MODE VENT: ABNORMAL
WBC # BLD AUTO: 7.9 K/UL (ref 3.6–11)

## 2020-09-22 PROCEDURE — 77010033678 HC OXYGEN DAILY

## 2020-09-22 PROCEDURE — 74011000250 HC RX REV CODE- 250: Performed by: HOSPITALIST

## 2020-09-22 PROCEDURE — 94400 HC END TIDAL CO2 RESPONSE CURVE: CPT

## 2020-09-22 PROCEDURE — 74011250637 HC RX REV CODE- 250/637: Performed by: HOSPITALIST

## 2020-09-22 PROCEDURE — 85025 COMPLETE CBC W/AUTO DIFF WBC: CPT

## 2020-09-22 PROCEDURE — 74011250636 HC RX REV CODE- 250/636: Performed by: INTERNAL MEDICINE

## 2020-09-22 PROCEDURE — 94640 AIRWAY INHALATION TREATMENT: CPT

## 2020-09-22 PROCEDURE — 74011250636 HC RX REV CODE- 250/636: Performed by: HOSPITALIST

## 2020-09-22 PROCEDURE — 65610000006 HC RM INTENSIVE CARE

## 2020-09-22 PROCEDURE — 74011000250 HC RX REV CODE- 250: Performed by: INTERNAL MEDICINE

## 2020-09-22 PROCEDURE — 74011250636 HC RX REV CODE- 250/636: Performed by: EMERGENCY MEDICINE

## 2020-09-22 PROCEDURE — 94003 VENT MGMT INPAT SUBQ DAY: CPT

## 2020-09-22 PROCEDURE — 36600 WITHDRAWAL OF ARTERIAL BLOOD: CPT

## 2020-09-22 PROCEDURE — 74011250637 HC RX REV CODE- 250/637: Performed by: INTERNAL MEDICINE

## 2020-09-22 PROCEDURE — 82803 BLOOD GASES ANY COMBINATION: CPT

## 2020-09-22 PROCEDURE — 74011250636 HC RX REV CODE- 250/636

## 2020-09-22 PROCEDURE — 80048 BASIC METABOLIC PNL TOTAL CA: CPT

## 2020-09-22 PROCEDURE — 74011000258 HC RX REV CODE- 258: Performed by: HOSPITALIST

## 2020-09-22 PROCEDURE — 74011000250 HC RX REV CODE- 250

## 2020-09-22 RX ORDER — HYDRALAZINE HYDROCHLORIDE 20 MG/ML
10 INJECTION INTRAMUSCULAR; INTRAVENOUS
Status: DISCONTINUED | OUTPATIENT
Start: 2020-09-22 | End: 2020-09-22

## 2020-09-22 RX ORDER — LEVETIRACETAM 5 MG/ML
500 INJECTION INTRAVASCULAR EVERY 12 HOURS
Status: DISCONTINUED | OUTPATIENT
Start: 2020-09-22 | End: 2020-10-02 | Stop reason: HOSPADM

## 2020-09-22 RX ORDER — LEVETIRACETAM 500 MG/1
500 TABLET ORAL 2 TIMES DAILY
Status: DISCONTINUED | OUTPATIENT
Start: 2020-09-22 | End: 2020-09-22

## 2020-09-22 RX ORDER — IPRATROPIUM BROMIDE AND ALBUTEROL SULFATE 2.5; .5 MG/3ML; MG/3ML
3 SOLUTION RESPIRATORY (INHALATION)
Status: DISCONTINUED | OUTPATIENT
Start: 2020-09-22 | End: 2020-10-02 | Stop reason: HOSPADM

## 2020-09-22 RX ORDER — ETHACRYNIC ACID 25 MG/1
100 TABLET ORAL 2 TIMES DAILY
Status: DISCONTINUED | OUTPATIENT
Start: 2020-09-22 | End: 2020-09-23

## 2020-09-22 RX ORDER — PROPOFOL 10 MG/ML
INJECTION, EMULSION INTRAVENOUS
Status: COMPLETED
Start: 2020-09-22 | End: 2020-09-22

## 2020-09-22 RX ORDER — BUMETANIDE 0.25 MG/ML
INJECTION INTRAMUSCULAR; INTRAVENOUS
Status: COMPLETED
Start: 2020-09-22 | End: 2020-09-22

## 2020-09-22 RX ORDER — PREDNISONE 5 MG/1
5 TABLET ORAL
Status: DISCONTINUED | OUTPATIENT
Start: 2020-09-23 | End: 2020-09-28

## 2020-09-22 RX ORDER — LABETALOL HCL 20 MG/4 ML
20 SYRINGE (ML) INTRAVENOUS ONCE
Status: COMPLETED | OUTPATIENT
Start: 2020-09-22 | End: 2020-09-22

## 2020-09-22 RX ORDER — BUMETANIDE 0.25 MG/ML
2 INJECTION INTRAMUSCULAR; INTRAVENOUS ONCE
Status: COMPLETED | OUTPATIENT
Start: 2020-09-22 | End: 2020-09-22

## 2020-09-22 RX ORDER — LEVETIRACETAM 500 MG/1
500 TABLET ORAL EVERY 12 HOURS
Status: DISCONTINUED | OUTPATIENT
Start: 2020-09-22 | End: 2020-10-02 | Stop reason: HOSPADM

## 2020-09-22 RX ORDER — LABETALOL HYDROCHLORIDE 5 MG/ML
INJECTION, SOLUTION INTRAVENOUS
Status: COMPLETED
Start: 2020-09-22 | End: 2020-09-22

## 2020-09-22 RX ORDER — MIDAZOLAM HYDROCHLORIDE 1 MG/ML
INJECTION, SOLUTION INTRAMUSCULAR; INTRAVENOUS
Status: COMPLETED
Start: 2020-09-22 | End: 2020-09-22

## 2020-09-22 RX ORDER — KETAMINE HYDROCHLORIDE 10 MG/ML
INJECTION, SOLUTION INTRAMUSCULAR; INTRAVENOUS
Status: DISPENSED
Start: 2020-09-22 | End: 2020-09-22

## 2020-09-22 RX ORDER — SUCCINYLCHOLINE CHLORIDE 20 MG/ML INJECTION SOLUTION
SOLUTION
Status: DISPENSED
Start: 2020-09-22 | End: 2020-09-22

## 2020-09-22 RX ORDER — MIDAZOLAM HYDROCHLORIDE 1 MG/ML
5 INJECTION, SOLUTION INTRAMUSCULAR; INTRAVENOUS ONCE
Status: ACTIVE | OUTPATIENT
Start: 2020-09-22 | End: 2020-09-22

## 2020-09-22 RX ORDER — SUCCINYLCHOLINE CHLORIDE 20 MG/ML
INJECTION INTRAMUSCULAR; INTRAVENOUS
Status: DISPENSED
Start: 2020-09-22 | End: 2020-09-22

## 2020-09-22 RX ADMIN — METHYLPREDNISOLONE SODIUM SUCCINATE 40 MG: 40 INJECTION, POWDER, FOR SOLUTION INTRAMUSCULAR; INTRAVENOUS at 13:40

## 2020-09-22 RX ADMIN — DEXMEDETOMIDINE HYDROCHLORIDE 0.2 MCG/KG/HR: 100 INJECTION, SOLUTION, CONCENTRATE INTRAVENOUS at 02:28

## 2020-09-22 RX ADMIN — PROPOFOL 45 MCG/KG/MIN: 10 INJECTION, EMULSION INTRAVENOUS at 05:30

## 2020-09-22 RX ADMIN — METHYLPREDNISOLONE SODIUM SUCCINATE 40 MG: 40 INJECTION, POWDER, FOR SOLUTION INTRAMUSCULAR; INTRAVENOUS at 06:56

## 2020-09-22 RX ADMIN — BUMETANIDE 2 MG: 0.25 INJECTION INTRAMUSCULAR; INTRAVENOUS at 04:34

## 2020-09-22 RX ADMIN — Medication 20 MG: at 04:50

## 2020-09-22 RX ADMIN — Medication 5 ML: at 05:30

## 2020-09-22 RX ADMIN — MIDAZOLAM 5 MG: 1 INJECTION INTRAMUSCULAR; INTRAVENOUS at 01:20

## 2020-09-22 RX ADMIN — SERTRALINE HYDROCHLORIDE 25 MG: 50 TABLET ORAL at 08:08

## 2020-09-22 RX ADMIN — LABETALOL HYDROCHLORIDE 20 MG: 5 INJECTION INTRAVENOUS at 04:50

## 2020-09-22 RX ADMIN — PROPOFOL 50 MCG/KG/MIN: 10 INJECTION, EMULSION INTRAVENOUS at 11:24

## 2020-09-22 RX ADMIN — PROPOFOL 30 MCG/KG/MIN: 10 INJECTION, EMULSION INTRAVENOUS at 19:20

## 2020-09-22 RX ADMIN — IPRATROPIUM BROMIDE AND ALBUTEROL SULFATE 3 ML: .5; 3 SOLUTION RESPIRATORY (INHALATION) at 19:48

## 2020-09-22 RX ADMIN — NITROGLYCERIN 0.5 INCH: 20 OINTMENT TOPICAL at 06:55

## 2020-09-22 RX ADMIN — PROPOFOL 30 MCG/KG/MIN: 10 INJECTION, EMULSION INTRAVENOUS at 14:41

## 2020-09-22 RX ADMIN — FERROUS SULFATE TAB 325 MG (65 MG ELEMENTAL FE) 325 MG: 325 (65 FE) TAB at 07:53

## 2020-09-22 RX ADMIN — PROPOFOL 50 MCG/KG/MIN: 10 INJECTION, EMULSION INTRAVENOUS at 05:40

## 2020-09-22 RX ADMIN — PROPOFOL 30 MCG/KG/MIN: 10 INJECTION, EMULSION INTRAVENOUS at 02:57

## 2020-09-22 RX ADMIN — Medication 10 ML: at 01:49

## 2020-09-22 RX ADMIN — CARBAMAZEPINE 200 MG: 200 TABLET ORAL at 08:07

## 2020-09-22 RX ADMIN — FAMOTIDINE 20 MG: 10 INJECTION INTRAVENOUS at 21:19

## 2020-09-22 RX ADMIN — DEXMEDETOMIDINE HYDROCHLORIDE 0.7 MCG/KG/HR: 100 INJECTION, SOLUTION, CONCENTRATE INTRAVENOUS at 19:20

## 2020-09-22 RX ADMIN — LEVOTHYROXINE SODIUM 25 MCG: 0.03 TABLET ORAL at 07:54

## 2020-09-22 RX ADMIN — ASPIRIN 81 MG: 81 TABLET, COATED ORAL at 08:08

## 2020-09-22 RX ADMIN — SPIRONOLACTONE 100 MG: 100 TABLET, FILM COATED ORAL at 09:15

## 2020-09-22 RX ADMIN — PROPOFOL 10 MCG/KG/MIN: 10 INJECTION, EMULSION INTRAVENOUS at 22:20

## 2020-09-22 RX ADMIN — METOPROLOL TARTRATE 5 MG: 5 INJECTION INTRAVENOUS at 01:47

## 2020-09-22 RX ADMIN — NITROGLYCERIN 0.5 INCH: 20 OINTMENT TOPICAL at 01:56

## 2020-09-22 RX ADMIN — Medication 400 MG: at 08:07

## 2020-09-22 RX ADMIN — PROPOFOL 30 MCG/KG/MIN: 10 INJECTION, EMULSION INTRAVENOUS at 12:43

## 2020-09-22 RX ADMIN — LEVETIRACETAM 500 MG: 5 INJECTION INTRAVENOUS at 06:50

## 2020-09-22 RX ADMIN — FOLIC ACID 1 MG: 1 TABLET ORAL at 08:07

## 2020-09-22 RX ADMIN — METHYLPREDNISOLONE SODIUM SUCCINATE 40 MG: 40 INJECTION, POWDER, FOR SOLUTION INTRAMUSCULAR; INTRAVENOUS at 01:30

## 2020-09-22 RX ADMIN — CARBAMAZEPINE 200 MG: 200 TABLET ORAL at 17:24

## 2020-09-22 RX ADMIN — Medication 10 ML: at 21:28

## 2020-09-22 RX ADMIN — HYDRALAZINE HYDROCHLORIDE 100 MG: 50 TABLET, FILM COATED ORAL at 21:20

## 2020-09-22 RX ADMIN — LEVETIRACETAM 500 MG: 500 TABLET ORAL at 11:43

## 2020-09-22 RX ADMIN — ETHACRYNIC ACID 50 MG: 25 TABLET ORAL at 09:00

## 2020-09-22 RX ADMIN — FAMOTIDINE 20 MG: 10 INJECTION INTRAVENOUS at 14:59

## 2020-09-22 RX ADMIN — ETHACRYNIC ACID 100 MG: 25 TABLET ORAL at 18:00

## 2020-09-22 RX ADMIN — MIDAZOLAM 1 MG/HR: 5 INJECTION INTRAMUSCULAR; INTRAVENOUS at 01:24

## 2020-09-22 RX ADMIN — ENOXAPARIN SODIUM 40 MG: 40 INJECTION SUBCUTANEOUS at 08:07

## 2020-09-22 NOTE — ANESTHESIA PREPROCEDURE EVALUATION
Relevant Problems   No relevant active problems       Anesthetic History     Increased risk of difficult airway          Review of Systems / Medical History      Pulmonary    COPD: moderate      Shortness of breath         Neuro/Psych   Within defined limits           Cardiovascular    Hypertension: poorly controlled      CHF        Exercise tolerance: <4 METS     GI/Hepatic/Renal  Within defined limits              Endo/Other  Within defined limits           Other Findings                   Anesthesia Plan

## 2020-09-22 NOTE — ROUTINE PROCESS
TRANSFER - OUT REPORT: 
 
Verbal report given to Adia on Umesh Ramesh  being transferred to ICU Report consisted of patients Situation, Background, Assessment and  
Recommendations(SBAR). Lines:  
Peripheral IV 09/21/20 Left Antecubital (Active) Site Assessment Clean, dry, & intact 09/21/20 1440 Phlebitis Assessment 0 09/21/20 1440 Dressing Status Clean, dry, & intact 09/21/20 1440 Alcohol Cap Used Yes 09/21/20 1440 Peripheral IV 09/21/20 Right Forearm (Active) Site Assessment Clean, dry, & intact 09/21/20 2146 Phlebitis Assessment 0 09/21/20 2146 Infiltration Assessment 0 09/21/20 2146 Dressing Status Clean, dry, & intact 09/21/20 2146 Dressing Type Transparent 09/21/20 2146 Hub Color/Line Status Green 09/21/20 2146 Opportunity for questions and clarification was provided. Patient transported with: 
Respiratory and monitor

## 2020-09-22 NOTE — PROGRESS NOTES
Comprehensive Nutrition Assessment    Type and Reason for Visit: Initial    Nutrition Recommendations/Plan:  Rec'd initiation of TF via NG of Promote to goal of 35mL/hr  Add 2 pkt Prosource daily  Flush with 125mL free water q4hr  Goal feeds provide 960kcal, 83g protein, 1455mL fluid (meeting 71%kcal, 112%pro, 100% fluid)     Propofol providing additional 452kcal (105% kcal needs with TF)      Nutrition Assessment:  SOB on BiPAP, intubated yesterday. Remains intubated, sedated. No pressors. Cardiac diet ordered on admit; no intakes. NG in place. Labs: . Meds: Propofol, Precedex, Midazolam, solumedrol, spironlactone, FeSu, folic acid, MgOx. Malnutrition Assessment:  Malnutrition Status: Moderate malnutrition    Context:  Acute illness     Findings of the 6 clinical characteristics of malnutrition:   Energy Intake:  Unable to assess  Weight Loss:  Unable to assess     Body Fat Loss:  1 - Mild body fat loss, Triceps   Muscle Mass Loss:  1 - Mild muscle mass loss, Temples (temporalis), Scapula (trapezius), Thigh (quadraceps)  Fluid Accumulation:  Unable to assess      Estimated Daily Nutrient Needs:  Energy (kcal):  1350kcal/day  Protein (g):  74g/day (1.3g/kg)       Fluid (ml/day):  1425mL/day (24mL/kg)    Nutrition Related Findings:  NFPE finding mild muscle, mild fat wasting. Adequate dentition noted. No BM documented since admit, +BS. No edema. Wounds:    None       Current Nutrition Therapies:   DIET NPO  Current Tube Feeding (TF) Orders:   · Feeding Route: Nasogastric  · Goal TF & Flush Orders Provides: Rec'd initiation of Promote to goal of 35mL/hr, 2 pkt Prosource daily, Flush with 125mL free water q4hr; providing 960kcal, 83g protein, 1455mL fluid. Anthropometric Measures:  · Height:  5' 5\" (165.1 cm)  · Current Body Wt:  57.1 kg (125 lb 14.1 oz)   · Ideal Body Wt:  125 lbs:  100.7 %   · Usual BW: NAA  · BMI Category:  Normal weight (BMI 18.5-24. 9)       Nutrition Diagnosis:   · Increased nutrient needs related to impaired respiratory function as evidenced by intubation      Nutrition Interventions:   Food and/or Nutrient Delivery: Continue NPO, Start tube feeding  Nutrition Education and Counseling: No recommendations at this time  Coordination of Nutrition Care: No recommendation at this time    Goals:  Meet >75% EENs via TF vs PO, Wt maintenance +/- 0.5kg per week, Lytes wnl. Nutrition Monitoring and Evaluation:   Behavioral-Environmental Outcomes:    Food/Nutrient Intake Outcomes: Enteral nutrition intake/tolerance  Physical Signs/Symptoms Outcomes:      Discharge Planning:     Too soon to determine     Electronically signed by Jorge A Flores on 9/22/2020 at 10:25 AM    Contact:

## 2020-09-22 NOTE — BH NOTES
Admission packet created to send to [employer]. [facility] not set up in computer. Will send clinicals when [employer] set up.   ME

## 2020-09-22 NOTE — PROGRESS NOTES
Problem: Ventilator Management  Goal: *Adequate oxygenation and ventilation  9/22/2020 0727 by Deejay Parker RN  Outcome: Not Progressing Towards Goal  9/22/2020 0726 by Deejay Parker RN  Outcome: Progressing Towards Goal

## 2020-09-22 NOTE — CONSULTS
[facility] CARDIOLOGY  CARDIOLOGY CONSULTATION    REASON FOR CONSULT:CHF exacerbation    REQUESTING PROVIDER: Dr. Obdulio Jimenez: Shortness of breath     HISTORY OF PRESENT ILLNESS:  Aby Salazar is a 47y.o. year-old female with past medical history significant for hypertension, tobacco use, COPD, GERD, moderate mitral valve regurgitation, and seziure disorder who was evaluated today due to shortness of breath. Presented to the ED with dyspnea and was found to be hypoxia. Was trialed on BIPAP, but failed after a couple of hours so she was intubated. CXR on admission shows pulmonary edema. Records from hospital admission course thus far reviewed. Telemetry reviewed. No events overnight. INPATIENT MEDICATIONS:  Home medications reviewed.     Current Facility-Administered Medications:     midazolam (VERSED) 100 mg in 0.9% sodium chloride 100 mL infusion, 0-10 mg/hr, IntraVENous, TITRATE, Merlin Gate, MD, Stopped at 09/22/20 0051    midazolam (PF) (VERSED) injection 5 mg, 5 mg, IntraVENous, ONCE, Merlin Gate, MD, Stopped at 09/22/20 0051    dexmedeTOMidine (PRECEDEX) 400 mcg in 0.9% sodium chloride 104 mL infusion, 0.2-0.7 mcg/kg/hr, IntraVENous, TITRATE, Merlin Gate, MD, Last Rate: 10.3 mL/hr at 09/22/20 0500, 0.7 mcg/kg/hr at 09/22/20 0500    midazolam (VERSED) 50 mg in 0.9% sodium chloride 50 mL infusion, 0-10 mg/hr, IntraVENous, TITRATE, Merlin Gate, MD, Last Rate: 1 mL/hr at 09/22/20 0124, 1 mg/hr at 09/22/20 0124    ketamine (KETALAR) 10 mg/mL injection, , , ,     succinylcholine chloride 200 mg/10 mL (20 mg/mL) 200 mg/10 ml syringe, , , ,     succinylcholine (ANECTINE) 20 mg/mL injection, , , ,     levETIRAcetam (KEPPRA) 500 mg in saline (iso-osm) 100 mL IVPB (premix), 500 mg, IntraVENous, Q12H **OR** levETIRAcetam (KEPPRA) tablet 500 mg, 500 mg, Oral, Q12H, Yadiel Tatum MD    metoprolol succinate (TOPROL-XL) XL tablet 150 mg, 150 mg, Oral, DAILY, Barrera De La Cruz MD    NIFEdipine ER (PROCARDIA XL) tablet 60 mg, 60 mg, Oral, DAILY, Barrera De La Cruz MD    aspirin delayed-release tablet 81 mg, 81 mg, Oral, DAILY, Barrera De La Cruz MD, 81 mg at 09/22/20 0808    isosorbide dinitrate (ISORDIL) tablet 40 mg, 40 mg, Oral, BID, Barrera De La Cruz MD, Stopped at 09/21/20 1837    labetaloL (NORMODYNE) tablet 100 mg, 100 mg, Oral, BID, Barrera De La Cruz MD, 100 mg at 09/21/20 1915    magnesium oxide (MAG-OX) tablet 400 mg, 400 mg, Oral, DAILY, Barrera De La Cruz MD, 400 mg at 09/22/20 5796    ALPRAZolam (XANAX) tablet 0.25 mg, 0.25 mg, Oral, TID PRN, Barrera De La Cruz MD    ethacrynic acid (EDECRIN) tablet 50 mg, 50 mg, Oral, BID, Barrera De La Cruz MD, Stopped at 09/21/20 1837    sertraline (ZOLOFT) tablet 25 mg, 25 mg, Oral, DAILY, Barrera De La Cruz MD, 25 mg at 09/22/20 3850    hydrALAZINE (APRESOLINE) tablet 100 mg, 100 mg, Oral, TID, Barrera De La Cruz MD, Stopped at 09/21/20 2200    levothyroxine (SYNTHROID) tablet 25 mcg, 25 mcg, Oral, ACB, Barrera De La Cruz MD, 25 mcg at 09/22/20 0754    carBAMazepine (TEGretol) tablet 200 mg, 200 mg, Oral, BID, Barrera De La Cruz MD, 200 mg at 09/22/20 8524    ferrous sulfate tablet 325 mg, 325 mg, Oral, ACB, Barrera De La Cruz MD, 325 mg at 13/13/66 4310    folic acid (FOLVITE) tablet 1 mg, 1 mg, Oral, DAILY, Barrera De La Cruz MD, 1 mg at 09/22/20 0807    spironolactone (ALDACTONE) tablet 100 mg, 100 mg, Oral, DAILY, Barrera De La Cruz MD, 100 mg at 09/22/20 0915    methylPREDNISolone (PF) (SOLU-MEDROL) injection 40 mg, 40 mg, IntraVENous, Q8H, Barrera De La Cruz MD, 40 mg at 09/22/20 0656    sodium chloride (NS) flush 5-40 mL, 5-40 mL, IntraVENous, Q8H, Barrera De La Cruz MD, 5 mL at 09/22/20 0530    sodium chloride (NS) flush 5-40 mL, 5-40 mL, IntraVENous, PRN, Barrera De La Cruz MD    acetaminophen (TYLENOL) tablet 650 mg, 650 mg, Oral, Q6H PRN **OR** acetaminophen (TYLENOL) suppository 650 mg, 650 mg, Rectal, Q6H PRN, Barrera De La Cruz MD  [address] [place] [address] polyethylene glycol (MIRALAX) packet 17 g, 17 g, Oral, DAILY PRN, Elie Ridley MD    enoxaparin (LOVENOX) injection 40 mg, 40 mg, SubCUTAneous, DAILY, Elie Ridley MD, 40 mg at 09/22/20 0807    ondansetron (ZOFRAN) injection 4 mg, 4 mg, IntraVENous, Q6H PRN, Elie Ridley MD    metoprolol (LOPRESSOR) injection 5 mg, 5 mg, IntraVENous, Q4H, Pia Melvin MD, Stopped at 09/22/20 0400    nitroglycerin (NITROBID) 2 % ointment 0.5 Inch, 0.5 Inch, Topical, Q6H, Pia Melvin MD, 0.5 Inch at 09/22/20 0655    hydrALAZINE (APRESOLINE) 20 mg/mL injection 20 mg, 20 mg, IntraVENous, Q6H, Pia Melvin MD, Stopped at 09/21/20 2001    propofol (DIPRIVAN) 10 mg/mL infusion, 10 mcg/kg/min, IntraVENous, TITRATE, Gregorio Oneill MD, Last Rate: 17 mL/hr at 09/22/20 0540, 50 mcg/kg/min at 09/22/20 0540     ALLERGIES:  Allergies reviewed with the patient,  Allergies   Allergen Reactions    Sulfa (Sulfonamide Antibiotics) Anaphylaxis    . FAMILY HISTORY:  Family history reviewed. SOCIAL HISTORY:  Notable for heavy  tobacco use, but quit as of last hospital admission, no heavy alcohol or illicit drug use. REVIEW OF SYSTEMS:  Complete review of systems performed, pertinents noted above, all other systems are negative. PHYSICAL EXAMINATION:  Vital sign assessment reveal a blood pressure of  120/71  and pulse rate of 84. Cardiovascular exam has a heart with a regular rate and rhythm, normal S1 and S2. Soft  murmur present. There are no rubs or gallops. Good peripheral pulses. No jugular venous distension. No carotid bruits are present. Respiratory exam reveals clear but diminished lung fields, ventilated. Gastrointestinal exam has soft, nontender abdomen with normal bowel sounds. Lymphatic exam reveals mild edema, +1 pitting to the right leg and no varicosities. No notable skin changes. Neurologic exam is limited due to patient being intubated and sedated. Recent labs results and imaging reviewed. Notable findings include   Lab Results   Component Value Date/Time    WBC 7.9 09/22/2020 05:35 AM    HGB 9.4 (L) 09/22/2020 05:35 AM    HCT 28.8 (L) 09/22/2020 05:35 AM    PLATELET 987 37/56/1828 05:35 AM    .1 (H) 09/22/2020 05:35 AM     No results found for: TSH, TSH2, TSH3, TSHP, TSHELE, TSHEXT, TT3, T3U, T3UP, FRT3, FT3, FT4, FT4P, T4, T4P, FT4T, TT7, TSHEXT   Lab Results   Component Value Date/Time    Troponin-I, Qt. <0.05 09/21/2020 02:45 PM      Lab Results   Component Value Date/Time    NT pro-BNP 9,742 (H) 09/21/2020 02:45 PM      Lab Results   Component Value Date/Time    Sodium 139 09/22/2020 05:35 AM    Potassium 3.7 09/22/2020 05:35 AM    Chloride 106 09/22/2020 05:35 AM    CO2 22 09/22/2020 05:35 AM    Anion gap 11 09/22/2020 05:35 AM    Glucose 101 (H) 09/22/2020 05:35 AM    BUN 21 (H) 09/22/2020 05:35 AM    Creatinine 0.92 09/22/2020 05:35 AM    BUN/Creatinine ratio 23 (H) 09/22/2020 05:35 AM    GFR est AA >60 09/22/2020 05:35 AM    GFR est non-AA >60 09/22/2020 05:35 AM    Calcium 8.5 09/22/2020 05:35 AM      Lab Results   Component Value Date/Time    Sodium 139 09/22/2020 05:35 AM    Potassium 3.7 09/22/2020 05:35 AM    Chloride 106 09/22/2020 05:35 AM    CO2 22 09/22/2020 05:35 AM    Anion gap 11 09/22/2020 05:35 AM    Glucose 101 (H) 09/22/2020 05:35 AM    BUN 21 (H) 09/22/2020 05:35 AM    Creatinine 0.92 09/22/2020 05:35 AM    BUN/Creatinine ratio 23 (H) 09/22/2020 05:35 AM    GFR est AA >60 09/22/2020 05:35 AM    GFR est non-AA >60 09/22/2020 05:35 AM    Calcium 8.5 09/22/2020 05:35 AM    Bilirubin, total 0.3 09/21/2020 02:45 PM    ALT (SGPT) 48 09/21/2020 02:45 PM    Alk.  phosphatase 114 09/21/2020 02:45 PM    Protein, total 7.7 09/21/2020 02:45 PM    Albumin 3.3 (L) 09/21/2020 02:45 PM    Globulin 4.4 (H) 09/21/2020 02:45 PM    A-G Ratio 0.8 (L) 09/21/2020 02:45 PM     .       Discussed case with Dr. Ayoub Jurist and our impression and recommendations are as follows:  1. Mitral valve regurgitation: Last admission showing  Moderate to severe on [facility] admission with repeat echo showing improvement to mild to moderate following aggressive diuresis. Patient did well on ethacrynic acid 50mg BID while in hospital.Continue diuresis. 2. Heart failure with preserved EF: BNP on admission 9742. CXR shows moderate bilateral pleural effusions. On previous admission; CT 8/18 with increased pleural effusions requiring thoracentesis. Allergy to sulfa drug (Bactrium), last admission refused trying Bumex/Furosemide with benedryl. Ended up responding well to ethacrynic acid. At outpatient follow up insurance did not cover ethracrynic acid even though allergy documented. Would continue spironolactone 100mg daily. Please document strict I&Os and obtain daily weight. 3. Hypertension: Blood pressure improved since in the ED. Continue current therapy for now and monitor as sedation is decreased. Can increase labetalol if needed was on 400mg TID at home. Renal duplex positive for renal artery stenosis; she was seen by Dr. Michelle Hickman. 4.Acute on chronic hypoxic respiratory failure: With COPD. Pulmonary consulted. Thank you for involving us in the care of this patient. Please do not hesitate to call me or Dr. Ce Mcgill if additional questions arise.

## 2020-09-22 NOTE — PROGRESS NOTES
Patient's nurse called stated unable to give any oral medications as she is desaturating into 50s when tried to take off the BiPAP even for the brief time. So she is unable to give her oral medications and blood pressure is uncontrolled. Change her Keppra to IV, added hydralazine, metoprolol IV scheduled until she is able to take oral medications. Also added Nitropaste instead of oral nitro. Called and explained to the pharmacy that this IV medication and topical nitro are until she is able to take by mouth. Patient is with a significant pleural effusion recently required thoracentesis last month with 1200 mL of fluid removed. , and moderate mitral valve regurgitation and advanced COPD. Ordered for a CT of the chest if there is any new pleural effusion at this time. Currently she is not doing very well, prognosis appears guarded.

## 2020-09-22 NOTE — CONSULTS
Pulmonology and Critical Care Consult    Subjective:   Consult Note: 9/22/2020 2:30 PM    Chief Complaint:   Chief Complaint   Patient presents with    Respiratory Distress        This patient has been seen and evaluated at the request of Dr. Be Pitts. Patient is a 49-year-old  female with history of diastolic CHF, hypertension, emphysema, CVA, renal artery stenosis, mitral valve regurgitation, and chronic hypoxemic respiratory failure currently on 5 L nasal cannula at home who presented back to [facility] on 9/21/2020 with increasing respiratory distress and hypoxia. History was obtained from chart review as well as the  at the bedside. Apparently she has been noticing some increasing lower extremity swelling and increasing shortness of breath over the past 3 days at home. He denies that she had any fever/chills, sick contacts, cough. She had otherwise been doing well and recovering from her recent 30+ day hospital admission to [facility] in August for pneumonia/COPD exacerbation. Her blood pressure on admission here was significantly elevated up to 205/113 and her tachypnea/hypoxia was only improved when being placed on bilevel noninvasive positive pressure ventilation. Patient was then sent to the ICU for further management and was started on her home oral ethacrynic acid given that she has an apparent anaphylactic reaction to sulfa drugs. Additionally she was started on Solu-Medrol 40 mg IV every 8, no antibiotics were started given low suspicion for infection. While in the ICU, she could not swallow her oral pills as she would desat significantly into the 50s when the mask was removed. Eventually, the decision was made to intubate the patient last night and she is currently on assist control/pressure control 21/16/100%/8 and doing fairly well with this but she is agitated due to the fact that her propofol had run out.   Currently on propofol at 50, Precedex at 0.7, Versed at 2. ABG on admission 7.2/62/83 which is improved to 7.39/37/101 today. Rest of her laboratory findings were otherwise fairly unremarkable. Chest x-ray showing extensive interstitial and alveolar airspace disease as well as bilateral pleural effusions consistent with volume overload. Additionally, she was given 2 mg IV Bumex this morning and apparently has not had any significant hives or hypotension after receiving this medication. This is difficult to assess allergic reaction, however, given that she is intubated. I discussed the case in depth with respiratory therapy, the bedside nurse, and the patient's ; all questions/concerns were addressed today at the bedside. Past Medical History:   Diagnosis Date    Chronic obstructive pulmonary disease (HCC)     Chronic respiratory failure (HCC)     Congestive heart failure (CHF) (HCC)     Hypertension     Hypertension     Renal artery stenosis (HCC)     Seizure disorder (HCC)      No past surgical history on file.    Family History   Problem Relation Age of Onset    Hypertension Mother     Stroke Mother      Social History     Tobacco Use    Smoking status: Heavy Tobacco Smoker     Types: Cigarettes    Tobacco comment: former quit only 1 month ago during recent admission to the hospital   Substance Use Topics    Alcohol use: Not Currently      Current Facility-Administered Medications   Medication Dose Route Frequency Provider Last Rate Last Dose    dexmedeTOMidine (PRECEDEX) 400 mcg in 0.9% sodium chloride 104 mL infusion  0.2-0.7 mcg/kg/hr IntraVENous TITRATE Norris Howard MD 10.3 mL/hr at 09/22/20 0500 0.7 mcg/kg/hr at 09/22/20 0500    ketamine (KETALAR) 10 mg/mL injection             succinylcholine chloride 200 mg/10 mL (20 mg/mL) 200 mg/10 ml syringe             succinylcholine (ANECTINE) 20 mg/mL injection             levETIRAcetam (KEPPRA) 500 mg in saline (iso-osm) 100 mL IVPB (premix)  500 mg IntraVENous Q12H Eliezer De Anda MD        Or    levETIRAcetam (KEPPRA) tablet 500 mg  500 mg Oral Q12H Eliezer De Anda MD   500 mg at 09/22/20 1143    hydrALAZINE (APRESOLINE) 20 mg/mL injection 10 mg  10 mg IntraVENous Q6H PRN Vlad Obando DO        [START ON 9/23/2020] predniSONE (DELTASONE) tablet 5 mg  5 mg Oral DAILY WITH BREAKFAST Vlad Boateng DO        ethacrynic acid (EDECRIN) tablet 100 mg  100 mg Oral BID Vlad Obando DO        metoprolol succinate (TOPROL-XL) XL tablet 150 mg  150 mg Oral DAILY Deja Means MD        NIFEdipine ER (PROCARDIA XL) tablet 60 mg  60 mg Oral DAILY Deja Means MD        aspirin delayed-release tablet 81 mg  81 mg Oral DAILY Deja Means MD   81 mg at 09/22/20 2928    isosorbide dinitrate (ISORDIL) tablet 40 mg  40 mg Oral BID Deja Means MD   Stopped at 09/21/20 1837    labetaloL (NORMODYNE) tablet 100 mg  100 mg Oral BID Deja Means MD   100 mg at 09/21/20 1915    magnesium oxide (MAG-OX) tablet 400 mg  400 mg Oral DAILY Deja Means MD   400 mg at 09/22/20 8873    ALPRAZolam (XANAX) tablet 0.25 mg  0.25 mg Oral TID PRN Deja Means MD        sertraline (ZOLOFT) tablet 25 mg  25 mg Oral DAILY Deja Means MD   25 mg at 09/22/20 0626    hydrALAZINE (APRESOLINE) tablet 100 mg  100 mg Oral TID Deja Means MD   Stopped at 09/21/20 2200    levothyroxine (SYNTHROID) tablet 25 mcg  25 mcg Oral ACB Deja Means MD   25 mcg at 09/22/20 0754    carBAMazepine (TEGretol) tablet 200 mg  200 mg Oral BID Deja Means MD   200 mg at 09/22/20 3000    ferrous sulfate tablet 325 mg  325 mg Oral ACB Deja Means MD   325 mg at 47/37/08 6493    folic acid (FOLVITE) tablet 1 mg  1 mg Oral DAILY Deja Means MD   1 mg at 09/22/20 0294    spironolactone (ALDACTONE) tablet 100 mg  100 mg Oral DAILY Deja Means MD   100 mg at 09/22/20 0915    sodium chloride (NS) flush 5-40 mL  5-40 mL IntraVENous Q8H Deja Means MD 5 mL at 09/22/20 0530    sodium chloride (NS) flush 5-40 mL  5-40 mL IntraVENous PRN eRy Pelaez MD        acetaminophen (TYLENOL) tablet 650 mg  650 mg Oral Q6H PRN Rey Pelaez MD        Or   Alisha Douglas acetaminophen (TYLENOL) suppository 650 mg  650 mg Rectal Q6H PRN Rey Pelaez MD        polyethylene glycol (MIRALAX) packet 17 g  17 g Oral DAILY PRN Rey Pelaez MD        enoxaparin (LOVENOX) injection 40 mg  40 mg SubCUTAneous DAILY Rey Pelaez MD   40 mg at 09/22/20 0807    ondansetron (ZOFRAN) injection 4 mg  4 mg IntraVENous Q6H PRN Rey Pelaez MD        metoprolol (LOPRESSOR) injection 5 mg  5 mg IntraVENous Q4H Maldonado Aguilera MD   Stopped at 09/22/20 0400    nitroglycerin (NITROBID) 2 % ointment 0.5 Inch  0.5 Inch Topical Q6H Maldonado Aguilera MD   0.5 Inch at 09/22/20 0655    propofol (DIPRIVAN) 10 mg/mL infusion  10 mcg/kg/min IntraVENous TITRATE Nanette Dumont MD 10.2 mL/hr at 09/22/20 1243 30 mcg/kg/min at 09/22/20 1243        Home Meds:  Prior to Admission medications    Medication Sig Start Date End Date Taking?  Authorizing Provider   ALPRAZolam Faith Ramirez) 0.25 mg tablet Take 0.25 mg by mouth three (3) times daily as needed for Anxiety.     Yes Provider, Historical   ethacrynic acid (EDECRIN) 25 mg tablet Take 50 mg by mouth two (2) times a day.     Yes Provider, Historical   predniSONE (DELTASONE) 5 mg tablet Take 5 mg by mouth daily.     Yes Provider, Historical   sertraline (Zoloft) 25 mg tablet Take 25 mg by mouth daily.     Yes Provider, Historical   hydrALAZINE (APRESOLINE) 50 mg tablet Take 100 mg by mouth three (3) times daily.     Yes Provider, Historical   levothyroxine (SYNTHROID) 25 mcg tablet Take 25 mcg by mouth Daily (before breakfast).     Yes Provider, Historical   levETIRAcetam (Keppra) 500 mg tablet Take 500 mg by mouth two (2) times a day.     Yes Provider, Historical   carBAMazepine, mood stabiliz, 200 mg CM12 Take 200 mg by mouth two (2) times a day.     Yes Provider, Historical   ferrous sulfate 325 mg (65 mg iron) tablet Take 325 mg by mouth Daily (before breakfast).     Yes Provider, Historical   folic acid (FOLVITE) 1 mg tablet Take 1 mg by mouth daily.     Yes Provider, Historical   spironolactone (ALDACTONE) 25 mg tablet Take 100 mg by mouth daily.     Yes Provider, Historical   metoprolol succinate (TOPROL-XL) 100 mg tablet Take 150 mg by mouth daily.       Provider, Historical   NIFEdipine ER (PROCARDIA XL) 60 mg ER tablet Take 60 mg by mouth daily.       Provider, Historical   aspirin delayed-release 81 mg tablet Take  by mouth daily.       Provider, Historical   isosorbide dinitrate (ISORDIL) 20 mg tablet Take 40 mg by mouth two (2) times a day.       Provider, Historical   labetaloL (NORMODYNE) 200 mg tablet Take  by mouth two (2) times a day.       Provider, Historical   magnesium oxide (MAG-OX) 400 mg tablet Take 400 mg by mouth daily.       Provider, Historical   potassium chloride (K-DUR, KLOR-CON) 20 mEq tablet Take 20 mEq by mouth two (2) times a day.       Provider, Historical         Allergies   Allergen Reactions    Sulfa (Sulfonamide Antibiotics) Anaphylaxis       Review of Systems:  Review of systems not obtained due to patient factors. Objective:     Blood pressure 122/73, pulse 87, temperature 98.2 °F (36.8 °C), resp. rate 17, height 5' 5\" (1.651 m), weight 57.1 kg (125 lb 14.1 oz), SpO2 97 %. Temp (24hrs), Av.9 °F (36.6 °C), Min:97.6 °F (36.4 °C), Max:98.2 °F (36.8 °C)      Intake and Output:  Current Shift: No intake/output data recorded. Last 3 Shifts: No intake/output data recorded. Physical Exam: General appearance: delirious, mild distress, appears older than stated age, confused  Head: Normocephalic, without obvious abnormality, atraumatic  Eyes: conjunctivae/corneas clear. PERRL, EOM's intact.  Fundi benign  Throat: Lips, mucosa, and tongue normal. Teeth and gums normal  Neck: supple, symmetrical, trachea midline, no adenopathy, thyroid: not enlarged, symmetric, no tenderness/mass/nodules and no carotid bruit  Lungs: clear to auscultation bilaterally, intubated on mechanical ventilation  Heart: regular rate and rhythm, S1, S2 normal, no murmur, click, rub or gallop  Abdomen: soft, non-tender. Bowel sounds normal. No masses,  no organomegaly  Extremities: edema +2 bilaterally, otherwise atraumatic  Skin: Skin color, texture, turgor normal. No rashes or lesions  Lymph nodes: Cervical, supraclavicular, and axillary nodes normal.  Neurologic: Grossly normal, not following commands while sedated    Additional comments:None    Lab/Data Review: All lab results for the last 24 hours reviewed. Recent Results (from the past 24 hour(s))   VENOUS BLOOD GAS    Collection Time: 09/21/20  2:45 PM   Result Value Ref Range    VENOUS PH 7.21 (L) 7.31 - 7.41      VENOUS PCO2 63 (H) 40 - 50 mmHg    VENOUS PO2 78 (H) 36 - 42 mmHg    VENOUS O2 SATURATION 92 (H) 60 - 80 %    VENOUS BICARBONATE 22 22 - 26 mmol/L    VENOUS BASE DEFICIT 3.3 (H) 0 - 2 mmol/L    Critical value read back [facility] JAYCEE WILLIAMSON CRT    TROPONIN I    Collection Time: 09/21/20  2:45 PM   Result Value Ref Range    Troponin-I, Qt. <0.05 <0.05 ng/mL   CBC WITH AUTOMATED DIFF    Collection Time: 09/21/20  2:45 PM   Result Value Ref Range    WBC 8.0 3.6 - 11.0 K/uL    RBC 3.05 (L) 3.80 - 5.20 M/uL    HGB 9.9 (L) 11.5 - 16.0 %    HCT 30.9 (L) 35.0 - 47.0 %    .3 (H) 80.0 - 99.0 FL    MCH 32.5 26.0 - 34.0 PG    MCHC 32.0 30.0 - 36.5 g/dL    RDW 14.8 (H) 11.5 - 14.5 %    PLATELET 972 987 - 041 K/uL    MPV 9.4 8.9 - 12.9 FL    NEUTROPHILS 90 (H) 32 - 75 %    LYMPHOCYTES 7 (L) 12 - 49 %    MONOCYTES 3 (L) 5 - 13 %    EOSINOPHILS 0 0 - 7 %    BASOPHILS 0 0 - 1 %    IMMATURE GRANULOCYTES 0 0.0 - 0.5 %    ABS. NEUTROPHILS 7.2 1.8 - 8.0 K/UL    ABS. LYMPHOCYTES 0.6 (L) 0.8 - 3.5 K/UL    ABS. MONOCYTES 0.2 0.0 - 1.0 K/UL    ABS. EOSINOPHILS 0.0 0.0 - 0.4 K/UL    ABS.  BASOPHILS 0.0 0.0 - 0.1 K/UL    ABS. IMM. GRANS. 0.0 0.00 - 0.04 K/UL    DF AUTOMATED     METABOLIC PANEL, COMPREHENSIVE    Collection Time: 09/21/20  2:45 PM   Result Value Ref Range    Sodium 135 (L) 136 - 145 mmol/L    Potassium 4.5 3.5 - 5.1 mmol/L    Chloride 101 97 - 108 mmol/L    CO2 27 21 - 32 mmol/L    Anion gap 7 5 - 15 mmol/L    Glucose 110 (H) 65 - 100 mg/dL    BUN 21 (H) 6 - 20 mg/dL    Creatinine 1.15 (H) 0.55 - 1.02 mg/dL    BUN/Creatinine ratio 18 12 - 20      GFR est AA 60 (L) >60 ml/min/1.73m2    GFR est non-AA 49 (L) >60 ml/min/1.73m2    Calcium 9.8 8.5 - 10.1 mg/dL    Bilirubin, total 0.3 0.2 - 1.0 mg/dL    AST (SGOT) 54 (H) 15 - 37 U/L    ALT (SGPT) 48 12 - 78 U/L    Alk.  phosphatase 114 45 - 117 U/L    Protein, total 7.7 6.4 - 8.2 g/dL    Albumin 3.3 (L) 3.5 - 5.0 g/dL    Globulin 4.4 (H) 2.0 - 4.0 g/dL    A-G Ratio 0.8 (L) 1.1 - 2.2     BNP    Collection Time: 09/21/20  2:45 PM   Result Value Ref Range    NT pro-BNP 9,742 (H) <125 pg/mL   BLOOD GAS, ARTERIAL    Collection Time: 09/22/20 12:15 AM   Result Value Ref Range    pH 7.20 (LL) 7.35 - 7.45      PCO2 62 (H) 35 - 45 mmHg    PO2 83 75 - 100 mmHg    O2 SAT 94 (L) >95 %    BICARBONATE 21 (L) 22 - 26 mmol/L    BASE DEFICIT 4.7 (H) 0 - 2 mmol/L    FIO2 100 %    Sample source VENT      SITE Right Radial      ANGEL'S TEST Positive      Critical value read back [facility] CALLED TO KONRAD STEELE RN BY ALISON RHOADES RRT    CULTURE, MRSA    Collection Time: 09/22/20  4:55 AM    Specimen: Nares; Nasal   Result Value Ref Range    Special Requests: No Special Requests      Culture result: MRSA not present     CBC WITH AUTOMATED DIFF    Collection Time: 09/22/20  5:35 AM   Result Value Ref Range    WBC 7.9 3.6 - 11.0 K/uL    RBC 2.85 (L) 3.80 - 5.20 M/uL    HGB 9.4 (L) 11.5 - 16.0 %    HCT 28.8 (L) 35.0 - 47.0 %    .1 (H) 80.0 - 99.0 FL    MCH 33.0 26.0 - 34.0 PG    MCHC 32.6 30.0 - 36.5 g/dL    RDW 14.7 (H) 11.5 - 14.5 %    PLATELET 274 292 - 068 K/uL    MPV 10.0 8.9 - 12.9 FL    NEUTROPHILS 88 (H) 32 - 75 %    LYMPHOCYTES 6 (L) 12 - 49 %    MONOCYTES 6 5 - 13 %    EOSINOPHILS 0 0 - 7 %    BASOPHILS 0 0 - 1 %    IMMATURE GRANULOCYTES 0 %    ABS. NEUTROPHILS 6.9 1.8 - 8.0 K/UL    ABS. LYMPHOCYTES 0.5 (L) 0.8 - 3.5 K/UL    ABS. MONOCYTES 0.5 0.0 - 1.0 K/UL    ABS. EOSINOPHILS 0.0 0.0 - 0.4 K/UL    ABS. BASOPHILS 0.0 0.0 - 0.1 K/UL    ABS. IMM. GRANS. 0.0 K/UL    DF Smear Scanned      RBC COMMENTS Normocytic, Normochromic     METABOLIC PANEL, BASIC    Collection Time: 09/22/20  5:35 AM   Result Value Ref Range    Sodium 139 136 - 145 mmol/L    Potassium 3.7 3.5 - 5.1 mmol/L    Chloride 106 97 - 108 mmol/L    CO2 22 21 - 32 mmol/L    Anion gap 11 5 - 15 mmol/L    Glucose 101 (H) 65 - 100 mg/dL    BUN 21 (H) 6 - 20 mg/dL    Creatinine 0.92 0.55 - 1.02 mg/dL    BUN/Creatinine ratio 23 (H) 12 - 20      GFR est AA >60 >60 ml/min/1.73m2    GFR est non-AA >60 >60 ml/min/1.73m2    Calcium 8.5 8.5 - 10.1 mg/dL   BLOOD GAS, ARTERIAL    Collection Time: 09/22/20 10:00 AM   Result Value Ref Range    pH 7.395 7.35 - 7.45      PCO2 37 35 - 45 mmHg    PO2 101 (H) 75 - 100 mmHg    O2 SAT 99 >95 %    BICARBONATE 23 22 - 26 mmol/L    BASE DEFICIT 1.9 0 - 2 mmol/L    O2 METHOD VENT      FIO2 100.0 %    MODE AssistControl/Pressure Control      SET RATE 16      IPAP/PIP 21      EPAP/CPAP/PEEP 0      SITE Right Radial      ANGEL'S TEST PASS      Carboxy-Hgb 0.8 0 - 3 %    Methemoglobin 0.5 0 - 3 %    tHb 12.2 12.0 - 16.0 g/dL    Oxyhemoglobin 97.2 (H) 0 - 3 %         Chest X-Ray:   XR CHEST SNGL V   Final Result   Findings/impression:      Moderate bilateral pleural effusion present. There is extensive interstitial and   airspace disease seen throughout both lungs, including dense airspace disease   involving left lower lobe with air bronchograms. No interval improvement. No   pneumothorax. Endotracheal tube 5 cm above pilo. Nasogastric tube below   diaphragm. No suspicious osseous lesions.       XR CHEST SNGL V   Final Result   Impression: Congestive heart failure with interstitial and alveolar pulmonary   edema with bilateral pleural effusions. CT CHEST WO CONT    (Results Pending)   XR CHEST PORT    (Results Pending)       CT imaging:  CT Results  (Last 48 hours)    None          PFTs:   PFT Results  (Last 3 results in the past 10 years)    None            Assessment:     Patient is a 51-year-old  female with history of diastolic CHF, hypertension, emphysema, CVA, renal artery stenosis, mitral valve regurgitation, and chronic hypoxemic respiratory failure currently on 5 L nasal cannula at home who presented back to [facility] on 9/21/2020 with increasing respiratory distress and hypoxia. Initially placed on BiPAP given significant volume overload present on chest imaging, however she has since been intubated and remains in the ICU. Plan:     1.)  Acute on chronic hypoxic respiratory failure  -Secondary to acute heart failure and flash pulmonary edema. Blood pressures much better controlled now; increasing diuretics. -Currently on AC/PC 21/14/40 percent/8  -Plan for SAT/SBT in the morning  -Currently on propofol at 50, Precedex 0.7, Versed 2. Long discussion with the nurse at the bedside, first plan would be to come off of the Versed completely and then start to wean the propofol. I asked her not to titrate down the Precedex as she can be extubated on this medication if necessary.  -Continue aggressive diuresis with oral ethacrynic acid, unfortunately we do not have IV ethacrynic acid here and she has a sulfa allergy. -Very low suspicion for COPD exacerbation or pneumonia. Agree with holding antibiotics for now and I will stop IV steroids and switch to her home prednisone 5 mg daily. Continue scheduled bronchodilators.   -Repeat chest x-ray and ABG in the morning.    2.)  Acute on chronic diastolic heart failure  -Significant pulmonary edema and bilateral pleural effusions. Likely worsened by poorly controlled blood pressure, renal artery stenosis, poor nutrition.  -We have been able to wean her FiO2 from 100% to 40% this morning. Plan for SAT/SBT in the morning.  -Cardiology has been consulted. Most recent transthoracic echocardiogram 8/2020 with normal EF and mild to moderate mitral valve regurgitation.  -We will increase oral ethacrynic acid to 100 mg twice daily to assist with diuresis. Apparently this was successful the last time she was admitted in August; patient has an anaphylactic reaction to sulfa drugs. -Was apparently given IV Bumex early this morning around 4 AM and had no apparent side effects. It is quite possible that this could be an alternative for her, however, it is difficult to assess any respiratory allergic reaction while she is intubated. 3.)  Accelerated hypertension-   -Blood pressure in the 200s on admission, likely leading to flash pulmonary edema. -Appears much better controlled on continuous IV sedation while mechanically ventilated.  -All of patient's home oral antihypertensive agents have been continued.  -Cardiology has been consulted. -Diuresis as above. 4.)  Emphysema  -Per review of her records, she has never follow-up with us in the clinic and supposedly has not had PFTs. -Does not appear to be on any maintenance inhalers at home.  -She missed her follow-up appointment with us today, I will reschedule her in our office on 10/13/2020 at 10:30 AM.  -Patient is a clear lung exam, very low suspicion for acute exacerbation of COPD. -Stop high-dose systemic steroids, switch back to scheduled prednisone 5 mg daily which apparently is a chronic medication.  -Agree with q6 scheduled duo nebs. 5.)  Renal artery stenosis  -Renal artery duplex from 8/24/2020 showing right renal artery stenosis.  -Likely at least contributing to her poorly controlled hypertension. Cardiology has been consulted.     6.)  Seizure disorder  -Seems to have occurred after her CVA, continue on home Keppra/carbamazepine.  -No evidence of seizure activity at this time. Thank you for the kind consult, I will continue to follow along with her while she is admitted. CODE STATUS: Full Code    Lines/Tubes: ET tube, Sanchez catheter, NG tube, 2 peripheral IVs    Prophylaxis:  Stress Ulcer Prophylaxis: Famotidine IV  Deep Vein Thrombosis Prophylaxis: Lovenox  Nutrition: Currently on tube feeds  Activity: Bedrest while intubated      Disposition and Family: Unchanged. Updated Family at bedside  Per review of the patient's records in [place] [id number], at one point she was a DNR/DNI. This will need to be clarified with the patient's  prior to extubation. Total time spent with patient: 65 minutes of critical care time was spent with the patient reviewing extensive labs and chest imaging, discussing in detail with the bedside nurse, respiratory therapist, patient's  the care plan, multiple checks at the bedside to evaluate the patient's blood pressure as well as sedation, active ventilator management at the bedside.       Patient missed her appointment 9/22/20 in our office, will reschedule on 10/13/2020 at 10:30 AM      Aubrey Terry DO  [facility]  9/22/2020  2:30 PM

## 2020-09-22 NOTE — PROGRESS NOTES
Skin:   Four eye skin assessment completed with Jackie Toney RN. Skin is dry and intact. Metpalex to jorge alberto for prevention.

## 2020-09-22 NOTE — ANESTHESIA PROCEDURE NOTES
Emergent Intubation  Performed by: Martin Perez CRNA  Authorized by: Martin Perez CRNA     Emergent Intubation:   Location:  ED  Date/Time:  9/21/2020 9:51 PM  Indications:  Airway compromise    Spontaneous Ventilation: absent    Level of Consciousness: unresponsive  Preoxygenated:  Yes      Airway Documentation:   Airway:  ETT - Cuffed  Technique:  Direct laryngoscopy  Advanced Technique:  Glide scope  Blade Type:  Lyubov  Blade Size:  3  ETT Line Juan Manuel:  Lips  ETT Insertion depth (cm):  22

## 2020-09-22 NOTE — ED NOTES
Patient sats were dropping on BiPAP, patient could not maintain above 89%-91% spoke with patient about intubation and her wishes, she states that it is ok to intubate her she just does not want to feel pain or discomfort. Called and spoke with male partner, he was here with patient earlier in shift. He has lived with her for the last ten years and made decisions with her, patient gave permission to speak with him regarding care. He states that she has made it clear that if she was to die she does not want to be brought back. He states that if it will help her distress he feels that intubation is fine and to move forward with it.

## 2020-09-22 NOTE — PROGRESS NOTES
Skin:   Four eye skin assessment completed with Jackie Toney RN. Skin is dry and intact. Metpalex to coccyx for prevention.       Agree with above skin assessment

## 2020-09-23 ENCOUNTER — APPOINTMENT (OUTPATIENT)
Dept: GENERAL RADIOLOGY | Age: 55
DRG: 194 | End: 2020-09-23
Attending: INTERNAL MEDICINE
Payer: COMMERCIAL

## 2020-09-23 ENCOUNTER — APPOINTMENT (OUTPATIENT)
Dept: CT IMAGING | Age: 55
DRG: 194 | End: 2020-09-23
Attending: HOSPITALIST
Payer: COMMERCIAL

## 2020-09-23 LAB
ANION GAP SERPL CALC-SCNC: 11 MMOL/L (ref 5–15)
ARTERIAL PATENCY WRIST A: ABNORMAL
ATRIAL RATE: 81 BPM
BASE EXCESS BLDA CALC-SCNC: 2.5 MMOL/L (ref 0–2)
BASOPHILS # BLD: 0 K/UL (ref 0–0.1)
BASOPHILS NFR BLD: 0 % (ref 0–1)
BDY SITE: ABNORMAL
BUN SERPL-MCNC: 41 MG/DL (ref 6–20)
BUN/CREAT SERPL: 24 (ref 12–20)
CA-I BLD-MCNC: 8.8 MG/DL (ref 8.5–10.1)
CALCULATED P AXIS, ECG09: 49 DEGREES
CALCULATED R AXIS, ECG10: 56 DEGREES
CALCULATED T AXIS, ECG11: 59 DEGREES
CHLORIDE SERPL-SCNC: 100 MMOL/L (ref 97–108)
CO2 SERPL-SCNC: 24 MMOL/L (ref 21–32)
CREAT SERPL-MCNC: 1.73 MG/DL (ref 0.55–1.02)
DIAGNOSIS, 93000: NORMAL
DIFFERENTIAL METHOD BLD: ABNORMAL
EOSINOPHIL # BLD: 0 K/UL (ref 0–0.4)
EOSINOPHIL NFR BLD: 0 % (ref 0–7)
EPAP/CPAP/PEEP, PAPEEP: 8
ERYTHROCYTE [DISTWIDTH] IN BLOOD BY AUTOMATED COUNT: 15.6 % (ref 11.5–14.5)
FIO2 ON VENT: 40 %
GAS FLOW.O2 SETTING OXYMISER: 14 L/MIN
GLUCOSE SERPL-MCNC: 86 MG/DL (ref 65–100)
HCO3 BLDA-SCNC: 27 MMOL/L (ref 22–26)
HCT VFR BLD AUTO: 23.8 % (ref 35–47)
HGB BLD-MCNC: 7.9 % (ref 11.5–16)
IMM GRANULOCYTES # BLD AUTO: 0 K/UL (ref 0–0.04)
IMM GRANULOCYTES NFR BLD AUTO: 0 % (ref 0–0.5)
IPAP/PIP, IPAPIP: 21
LYMPHOCYTES # BLD: 1.2 K/UL (ref 0.8–3.5)
LYMPHOCYTES NFR BLD: 17 % (ref 12–49)
MCH RBC QN AUTO: 32.4 PG (ref 26–34)
MCHC RBC AUTO-ENTMCNC: 33.2 G/DL (ref 30–36.5)
MCV RBC AUTO: 97.5 FL (ref 80–99)
MONOCYTES # BLD: 0.5 K/UL (ref 0–1)
MONOCYTES NFR BLD: 7 % (ref 5–13)
NEUTS SEG # BLD: 5.5 K/UL (ref 1.8–8)
NEUTS SEG NFR BLD: 76 % (ref 32–75)
P-R INTERVAL, ECG05: 126 MS
PCO2 BLDA: 27 MMHG (ref 35–45)
PH BLDA: 7.56 [PH] (ref 7.35–7.45)
PLATELET # BLD AUTO: 251 K/UL (ref 150–400)
PMV BLD AUTO: 10.1 FL (ref 8.9–12.9)
PO2 BLDA: 94 MMHG (ref 75–100)
POTASSIUM SERPL-SCNC: 3.3 MMOL/L (ref 3.5–5.1)
Q-T INTERVAL, ECG07: 416 MS
QRS DURATION, ECG06: 88 MS
QTC CALCULATION (BEZET), ECG08: 483 MS
RBC # BLD AUTO: 2.44 M/UL (ref 3.8–5.2)
SAO2 % BLD: 99 %
SAO2% DEVICE SAO2% SENSOR NAME: ABNORMAL
SODIUM SERPL-SCNC: 135 MMOL/L (ref 136–145)
VENTILATION MODE VENT: ABNORMAL
VENTRICULAR RATE, ECG03: 81 BPM
WBC # BLD AUTO: 7.2 K/UL (ref 3.6–11)

## 2020-09-23 PROCEDURE — 74011636637 HC RX REV CODE- 636/637: Performed by: INTERNAL MEDICINE

## 2020-09-23 PROCEDURE — 94003 VENT MGMT INPAT SUBQ DAY: CPT

## 2020-09-23 PROCEDURE — 74011250636 HC RX REV CODE- 250/636: Performed by: HOSPITALIST

## 2020-09-23 PROCEDURE — 74011250637 HC RX REV CODE- 250/637: Performed by: HOSPITALIST

## 2020-09-23 PROCEDURE — 94640 AIRWAY INHALATION TREATMENT: CPT

## 2020-09-23 PROCEDURE — 74011250636 HC RX REV CODE- 250/636

## 2020-09-23 PROCEDURE — 77010033678 HC OXYGEN DAILY

## 2020-09-23 PROCEDURE — 74011000258 HC RX REV CODE- 258: Performed by: HOSPITALIST

## 2020-09-23 PROCEDURE — 85025 COMPLETE CBC W/AUTO DIFF WBC: CPT

## 2020-09-23 PROCEDURE — 74011250637 HC RX REV CODE- 250/637: Performed by: INTERNAL MEDICINE

## 2020-09-23 PROCEDURE — 36415 COLL VENOUS BLD VENIPUNCTURE: CPT

## 2020-09-23 PROCEDURE — 74011250636 HC RX REV CODE- 250/636: Performed by: INTERNAL MEDICINE

## 2020-09-23 PROCEDURE — 74011000250 HC RX REV CODE- 250: Performed by: HOSPITALIST

## 2020-09-23 PROCEDURE — 71045 X-RAY EXAM CHEST 1 VIEW: CPT

## 2020-09-23 PROCEDURE — 94400 HC END TIDAL CO2 RESPONSE CURVE: CPT

## 2020-09-23 PROCEDURE — 80048 BASIC METABOLIC PNL TOTAL CA: CPT

## 2020-09-23 PROCEDURE — 74011000250 HC RX REV CODE- 250: Performed by: INTERNAL MEDICINE

## 2020-09-23 PROCEDURE — 65610000006 HC RM INTENSIVE CARE

## 2020-09-23 PROCEDURE — 74011250636 HC RX REV CODE- 250/636: Performed by: EMERGENCY MEDICINE

## 2020-09-23 PROCEDURE — 74011250637 HC RX REV CODE- 250/637

## 2020-09-23 PROCEDURE — 82803 BLOOD GASES ANY COMBINATION: CPT

## 2020-09-23 PROCEDURE — 74011000250 HC RX REV CODE- 250: Performed by: NURSE PRACTITIONER

## 2020-09-23 PROCEDURE — 36600 WITHDRAWAL OF ARTERIAL BLOOD: CPT

## 2020-09-23 RX ORDER — POTASSIUM CHLORIDE 1.5 G/1.77G
40 POWDER, FOR SOLUTION ORAL
Status: COMPLETED | OUTPATIENT
Start: 2020-09-23 | End: 2020-09-23

## 2020-09-23 RX ORDER — POTASSIUM CHLORIDE 1.5 G/1.77G
POWDER, FOR SOLUTION ORAL
Status: COMPLETED
Start: 2020-09-23 | End: 2020-09-23

## 2020-09-23 RX ORDER — PROPOFOL 10 MG/ML
INJECTION, EMULSION INTRAVENOUS
Status: COMPLETED
Start: 2020-09-23 | End: 2020-09-23

## 2020-09-23 RX ORDER — BUMETANIDE 0.25 MG/ML
2 INJECTION INTRAMUSCULAR; INTRAVENOUS
Status: COMPLETED | OUTPATIENT
Start: 2020-09-23 | End: 2020-09-23

## 2020-09-23 RX ADMIN — FOLIC ACID 1 MG: 1 TABLET ORAL at 08:19

## 2020-09-23 RX ADMIN — FERROUS SULFATE TAB 325 MG (65 MG ELEMENTAL FE) 325 MG: 325 (65 FE) TAB at 07:30

## 2020-09-23 RX ADMIN — Medication 10 ML: at 14:18

## 2020-09-23 RX ADMIN — Medication 10 ML: at 21:16

## 2020-09-23 RX ADMIN — FAMOTIDINE 20 MG: 10 INJECTION INTRAVENOUS at 21:16

## 2020-09-23 RX ADMIN — Medication 10 ML: at 05:58

## 2020-09-23 RX ADMIN — DEXMEDETOMIDINE HYDROCHLORIDE 0.7 MCG/KG/HR: 100 INJECTION, SOLUTION, CONCENTRATE INTRAVENOUS at 05:51

## 2020-09-23 RX ADMIN — NITROGLYCERIN 0.5 INCH: 20 OINTMENT TOPICAL at 05:47

## 2020-09-23 RX ADMIN — IPRATROPIUM BROMIDE AND ALBUTEROL SULFATE 3 ML: .5; 3 SOLUTION RESPIRATORY (INHALATION) at 19:40

## 2020-09-23 RX ADMIN — CARBAMAZEPINE 200 MG: 200 TABLET ORAL at 08:19

## 2020-09-23 RX ADMIN — NITROGLYCERIN 0.5 INCH: 20 OINTMENT TOPICAL at 17:50

## 2020-09-23 RX ADMIN — Medication 400 MG: at 08:19

## 2020-09-23 RX ADMIN — DEXMEDETOMIDINE HYDROCHLORIDE 0.7 MCG/KG/HR: 100 INJECTION, SOLUTION, CONCENTRATE INTRAVENOUS at 19:00

## 2020-09-23 RX ADMIN — POTASSIUM CHLORIDE 40 MEQ: 1.5 POWDER, FOR SOLUTION ORAL at 10:33

## 2020-09-23 RX ADMIN — SPIRONOLACTONE 100 MG: 100 TABLET, FILM COATED ORAL at 08:18

## 2020-09-23 RX ADMIN — ISOSORBIDE DINITRATE 40 MG: 20 TABLET ORAL at 08:18

## 2020-09-23 RX ADMIN — FERROUS SULFATE TAB 325 MG (65 MG ELEMENTAL FE) 325 MG: 325 (65 FE) TAB at 05:47

## 2020-09-23 RX ADMIN — FAMOTIDINE 20 MG: 10 INJECTION INTRAVENOUS at 12:48

## 2020-09-23 RX ADMIN — LEVETIRACETAM 500 MG: 5 INJECTION INTRAVENOUS at 12:48

## 2020-09-23 RX ADMIN — PROPOFOL 10 MCG/KG/MIN: 10 INJECTION, EMULSION INTRAVENOUS at 00:36

## 2020-09-23 RX ADMIN — METOPROLOL SUCCINATE 150 MG: 50 TABLET, EXTENDED RELEASE ORAL at 08:19

## 2020-09-23 RX ADMIN — DEXMEDETOMIDINE HYDROCHLORIDE 0.1 MCG/KG/HR: 100 INJECTION, SOLUTION, CONCENTRATE INTRAVENOUS at 18:00

## 2020-09-23 RX ADMIN — PROPOFOL 40 MCG/KG/MIN: 10 INJECTION, EMULSION INTRAVENOUS at 08:46

## 2020-09-23 RX ADMIN — ASPIRIN 81 MG: 81 TABLET, COATED ORAL at 08:19

## 2020-09-23 RX ADMIN — ETHACRYNIC ACID 100 MG: 25 TABLET ORAL at 09:00

## 2020-09-23 RX ADMIN — HYDRALAZINE HYDROCHLORIDE 100 MG: 50 TABLET, FILM COATED ORAL at 09:48

## 2020-09-23 RX ADMIN — IPRATROPIUM BROMIDE AND ALBUTEROL SULFATE 3 ML: .5; 3 SOLUTION RESPIRATORY (INHALATION) at 07:41

## 2020-09-23 RX ADMIN — IPRATROPIUM BROMIDE AND ALBUTEROL SULFATE 3 ML: .5; 3 SOLUTION RESPIRATORY (INHALATION) at 13:29

## 2020-09-23 RX ADMIN — BUMETANIDE 2 MG: 0.25 INJECTION INTRAMUSCULAR; INTRAVENOUS at 13:44

## 2020-09-23 RX ADMIN — PREDNISONE 5 MG: 5 TABLET ORAL at 08:20

## 2020-09-23 RX ADMIN — PROPOFOL 30 MCG/KG/MIN: 10 INJECTION, EMULSION INTRAVENOUS at 19:20

## 2020-09-23 RX ADMIN — NITROGLYCERIN 0.5 INCH: 20 OINTMENT TOPICAL at 12:58

## 2020-09-23 RX ADMIN — NITROGLYCERIN 0.5 INCH: 20 OINTMENT TOPICAL at 00:15

## 2020-09-23 RX ADMIN — LEVETIRACETAM 500 MG: 5 INJECTION INTRAVENOUS at 00:14

## 2020-09-23 RX ADMIN — METOPROLOL TARTRATE 5 MG: 5 INJECTION INTRAVENOUS at 03:19

## 2020-09-23 RX ADMIN — LEVOTHYROXINE SODIUM 25 MCG: 0.03 TABLET ORAL at 07:30

## 2020-09-23 RX ADMIN — CARBAMAZEPINE 200 MG: 200 TABLET ORAL at 17:50

## 2020-09-23 RX ADMIN — PROPOFOL 10 MCG/KG/MIN: 10 INJECTION, EMULSION INTRAVENOUS at 05:47

## 2020-09-23 RX ADMIN — ENOXAPARIN SODIUM 40 MG: 40 INJECTION SUBCUTANEOUS at 08:18

## 2020-09-23 RX ADMIN — NIFEDIPINE 60 MG: 60 TABLET, EXTENDED RELEASE ORAL at 09:48

## 2020-09-23 RX ADMIN — ISOSORBIDE DINITRATE 40 MG: 20 TABLET ORAL at 17:49

## 2020-09-23 RX ADMIN — IPRATROPIUM BROMIDE AND ALBUTEROL SULFATE 3 ML: .5; 3 SOLUTION RESPIRATORY (INHALATION) at 01:54

## 2020-09-23 RX ADMIN — PROPOFOL 40 MCG/KG/MIN: 10 INJECTION, EMULSION INTRAVENOUS at 07:10

## 2020-09-23 RX ADMIN — SERTRALINE HYDROCHLORIDE 25 MG: 50 TABLET ORAL at 08:19

## 2020-09-23 RX ADMIN — LEVOTHYROXINE SODIUM 25 MCG: 0.03 TABLET ORAL at 05:46

## 2020-09-23 RX ADMIN — PROPOFOL 10 MCG/KG/MIN: 10 INJECTION, EMULSION INTRAVENOUS at 03:16

## 2020-09-23 NOTE — PROGRESS NOTES
Pulmonology and Critical Care Progress Note    Subjective:     Chief Complaint:   Chief Complaint   Patient presents with    Respiratory Distress        This patient has been seen and evaluated at the request of Dr. Debra Marquez. Patient seen and examined in her room this morning, no acute events overnight. I discussed the case in detail with the bedside nurse as well as respiratory therapist.  She remains on assist control/pressure control 21/14/40%/8 and her ABG this morning is 7.56/27/94. Currently being sedated with Precedex at 0.7 and propofol at 35, Versed has been turned off. She is not diuresing very well on the ethacrynic acid. I discussed the case in detail with the nurse practitioner from cardiology and we will attempt a trial of Bumex while she is continued to be intubated. The patient's  was not at the bedside this morning. Past Medical History:   Diagnosis Date    Chronic obstructive pulmonary disease (HCC)     Chronic respiratory failure (HCC)     Congestive heart failure (CHF) (HCC)     Hypertension     Hypertension     Renal artery stenosis (HCC)     Seizure disorder (HCC)      No past surgical history on file.    Family History   Problem Relation Age of Onset    Hypertension Mother     Stroke Mother      Social History     Tobacco Use    Smoking status: Heavy Tobacco Smoker     Types: Cigarettes    Tobacco comment: former quit only 1 month ago during recent admission to the hospital   Substance Use Topics    Alcohol use: Not Currently      Current Facility-Administered Medications   Medication Dose Route Frequency Provider Last Rate Last Dose    levETIRAcetam (KEPPRA) 500 mg in saline (iso-osm) 100 mL IVPB (premix)  500 mg IntraVENous Q12H Diaz Vale MD   500 mg at 09/23/20 0014    Or    levETIRAcetam (KEPPRA) tablet 500 mg  500 mg Oral Q12H Diaz Vale MD   Stopped at 09/23/20 0041    predniSONE (DELTASONE) tablet 5 mg  5 mg Oral DAILY WITH Yayo Wiseman [facility], DO   5 mg at 09/23/20 0820    ethacrynic acid (EDECRIN) tablet 100 mg  100 mg Oral BID Vlad Obando DO   100 mg at 09/23/20 0900    famotidine (PF) (PEPCID) 20 mg in 0.9% sodium chloride 10 mL injection  20 mg IntraVENous Q12H Vlad Obando DO   20 mg at 09/22/20 2119    albuterol-ipratropium (DUO-NEB) 2.5 MG-0.5 MG/3 ML  3 mL Nebulization Q6H RT Vlad Obando DO   3 mL at 09/23/20 0741    dexmedeTOMidine (PRECEDEX) 400 mcg in 0.9% sodium chloride 100 mL infusion  0.2-0.7 mcg/kg/hr IntraVENous TITRATE Landen Will MD 10 mL/hr at 09/23/20 0551 0.7 mcg/kg/hr at 09/23/20 0551    metoprolol succinate (TOPROL-XL) XL tablet 150 mg  150 mg Oral DAILY Lety Melendez MD   150 mg at 09/23/20 0819    NIFEdipine ER (PROCARDIA XL) tablet 60 mg  60 mg Oral DAILY Lety Melendez MD   60 mg at 09/23/20 9438    aspirin delayed-release tablet 81 mg  81 mg Oral DAILY Lety Melendez MD   81 mg at 09/23/20 7925    isosorbide dinitrate (ISORDIL) tablet 40 mg  40 mg Oral BID Lety Melendez MD   40 mg at 09/23/20 0818    magnesium oxide (MAG-OX) tablet 400 mg  400 mg Oral DAILY Lety Melendez MD   400 mg at 09/23/20 8769    ALPRAZolam (XANAX) tablet 0.25 mg  0.25 mg Oral TID PRN Lety Melendez MD        sertraline (ZOLOFT) tablet 25 mg  25 mg Oral DAILY Lety Melendez MD   25 mg at 09/23/20 8335    levothyroxine (SYNTHROID) tablet 25 mcg  25 mcg Oral ACB Lety Melendez MD   25 mcg at 09/23/20 0730    carBAMazepine (TEGretol) tablet 200 mg  200 mg Oral BID Lety Melendez MD   200 mg at 09/23/20 6598    ferrous sulfate tablet 325 mg  325 mg Oral ACB Lety Melendez MD   325 mg at 52/31/83 7688    folic acid (FOLVITE) tablet 1 mg  1 mg Oral DAILY Lety Melendez MD   1 mg at 09/23/20 2329    spironolactone (ALDACTONE) tablet 100 mg  100 mg Oral DAILY Lety Melendez MD   100 mg at 09/23/20 0818    sodium chloride (NS) flush 5-40 mL  5-40 mL IntraVENous Q8H Lety Melendez MD   10 mL at 09/23/20 0558    sodium chloride (NS) flush 5-40 mL  5-40 mL IntraVENous PRN Garfield Nash MD        acetaminophen (TYLENOL) tablet 650 mg  650 mg Oral Q6H PRN Garfield Nash MD        Or   [place] acetaminophen (TYLENOL) suppository 650 mg  650 mg Rectal Q6H PRN Garfield Nash MD        polyethylene glycol (MIRALAX) packet 17 g  17 g Oral DAILY PRN Garfield Nash MD        enoxaparin (LOVENOX) injection 40 mg  40 mg SubCUTAneous DAILY Garfield Nash MD   40 mg at 09/23/20 0818    ondansetron (ZOFRAN) injection 4 mg  4 mg IntraVENous Q6H PRN Garfield Nash MD        nitroglycerin (NITROBID) 2 % ointment 0.5 Inch  0.5 Inch Topical Q6H Cain Castillo MD   0.5 Inch at 09/23/20 0547    propofol (DIPRIVAN) 10 mg/mL infusion  10 mcg/kg/min IntraVENous TITRATE Bonny Patton MD 11.9 mL/hr at 09/23/20 0924 35 mcg/kg/min at 09/23/20 0924        Home Meds:  Prior to Admission medications    Medication Sig Start Date End Date Taking? Authorizing Provider   ALPRAZolam Hanny Rivera) 0.25 mg tablet Take 0.25 mg by mouth three (3) times daily as needed for Anxiety.     Yes Provider, Historical   ethacrynic acid (EDECRIN) 25 mg tablet Take 50 mg by mouth two (2) times a day.     Yes Provider, Historical   predniSONE (DELTASONE) 5 mg tablet Take 5 mg by mouth daily.     Yes Provider, Historical   sertraline (Zoloft) 25 mg tablet Take 25 mg by mouth daily.     Yes Provider, Historical   hydrALAZINE (APRESOLINE) 50 mg tablet Take 100 mg by mouth three (3) times daily.     Yes Provider, Historical   levothyroxine (SYNTHROID) 25 mcg tablet Take 25 mcg by mouth Daily (before breakfast).     Yes Provider, Historical   levETIRAcetam (Keppra) 500 mg tablet Take 500 mg by mouth two (2) times a day.     Yes Provider, Historical   carBAMazepine, mood stabiliz, 200 mg CM12 Take 200 mg by mouth two (2) times a day.     Yes Provider, Historical   ferrous sulfate 325 mg (65 mg iron) tablet Take 325 mg by mouth Daily (before breakfast).      Yes Provider, Historical   folic acid (FOLVITE) 1 mg tablet Take 1 mg by mouth daily.     Yes Provider, Historical   spironolactone (ALDACTONE) 25 mg tablet Take 100 mg by mouth daily.     Yes Provider, Historical   metoprolol succinate (TOPROL-XL) 100 mg tablet Take 150 mg by mouth daily.       Provider, Historical   NIFEdipine ER (PROCARDIA XL) 60 mg ER tablet Take 60 mg by mouth daily.       Provider, Historical   aspirin delayed-release 81 mg tablet Take  by mouth daily.       Provider, Historical   isosorbide dinitrate (ISORDIL) 20 mg tablet Take 40 mg by mouth two (2) times a day.       Provider, Historical   labetaloL (NORMODYNE) 200 mg tablet Take  by mouth two (2) times a day.       Provider, Historical   magnesium oxide (MAG-OX) 400 mg tablet Take 400 mg by mouth daily.       Provider, Historical   potassium chloride (K-DUR, KLOR-CON) 20 mEq tablet Take 20 mEq by mouth two (2) times a day.       Provider, Historical         Allergies   Allergen Reactions    Sulfa (Sulfonamide Antibiotics) Anaphylaxis       Review of Systems:  Review of systems not obtained due to patient factors. Objective:     Blood pressure 129/69, pulse 82, temperature 99.7 °F (37.6 °C), resp. rate 15, height 5' 5\" (1.651 m), weight 58.9 kg (129 lb 13.6 oz), SpO2 100 %. Temp (24hrs), Av.8 °F (37.1 °C), Min:98.4 °F (36.9 °C), Max:99.7 °F (37.6 °C)      Intake and Output:  Current Shift: 701 - 1900  In: -   Out: 100 [Urine:100]  Last 3 Shifts: 1901 -  07  In: 768 [I.V.:768]  Out: 56 [Urine:340]    Physical Exam:   General appearance: Sedated on the ventilator, no acute distress, not waking up to verbal stimulation  Head: Normocephalic, without obvious abnormality, atraumatic  Eyes: conjunctivae/corneas clear. PERRL, EOM's intact.  Fundi benign  Throat: Lips, mucosa, and tongue normal. Teeth and gums normal  Neck: supple, symmetrical, trachea midline, no adenopathy, thyroid: not enlarged, symmetric, no tenderness/mass/nodules and no carotid bruit  Lungs: clear to auscultation bilaterally but does have some mild rhonchi in the bases bilaterally, intubated on mechanical ventilation  Heart: regular rate and rhythm, S1, S2 normal, no murmur, click, rub or gallop  Abdomen: soft, non-tender. Bowel sounds normal. No masses,  no organomegaly  Extremities: edema +2 bilaterally, otherwise atraumatic  Skin: Skin color, texture, turgor normal. No rashes or lesions  Lymph nodes: Cervical, supraclavicular, and axillary nodes normal.  Neurologic: Grossly normal, not following commands while sedated    Additional comments:None    Lab/Data Review: All lab results for the last 24 hours reviewed. Recent Results (from the past 24 hour(s))   CBC WITH AUTOMATED DIFF    Collection Time: 09/23/20  5:00 AM   Result Value Ref Range    WBC 7.2 3.6 - 11.0 K/uL    RBC 2.44 (L) 3.80 - 5.20 M/uL    HGB 7.9 (L) 11.5 - 16.0 %    HCT 23.8 (L) 35.0 - 47.0 %    MCV 97.5 80.0 - 99.0 FL    MCH 32.4 26.0 - 34.0 PG    MCHC 33.2 30.0 - 36.5 g/dL    RDW 15.6 (H) 11.5 - 14.5 %    PLATELET 212 099 - 074 K/uL    MPV 10.1 8.9 - 12.9 FL    NEUTROPHILS 76 (H) 32 - 75 %    LYMPHOCYTES 17 12 - 49 %    MONOCYTES 7 5 - 13 %    EOSINOPHILS 0 0 - 7 %    BASOPHILS 0 0 - 1 %    IMMATURE GRANULOCYTES 0 0.0 - 0.5 %    ABS. NEUTROPHILS 5.5 1.8 - 8.0 K/UL    ABS. LYMPHOCYTES 1.2 0.8 - 3.5 K/UL    ABS. MONOCYTES 0.5 0.0 - 1.0 K/UL    ABS. EOSINOPHILS 0.0 0.0 - 0.4 K/UL    ABS. BASOPHILS 0.0 0.0 - 0.1 K/UL    ABS. IMM.  GRANS. 0.0 0.00 - 0.04 K/UL    DF AUTOMATED     METABOLIC PANEL, BASIC    Collection Time: 09/23/20  5:00 AM   Result Value Ref Range    Sodium 135 (L) 136 - 145 mmol/L    Potassium 3.3 (L) 3.5 - 5.1 mmol/L    Chloride 100 97 - 108 mmol/L    CO2 24 21 - 32 mmol/L    Anion gap 11 5 - 15 mmol/L    Glucose 86 65 - 100 mg/dL    BUN 41 (H) 6 - 20 mg/dL    Creatinine 1.73 (H) 0.55 - 1.02 mg/dL    BUN/Creatinine ratio 24 (H) 12 - 20      GFR est AA 37 (L) >60 ml/min/1.73m2    GFR est non-AA 31 (L) >60 ml/min/1.73m2    Calcium 8.8 8.5 - 10.1 mg/dL   BLOOD GAS, ARTERIAL    Collection Time: 09/23/20  5:00 AM   Result Value Ref Range    pH 7.56 (HH) 7.35 - 7.45      PCO2 27 (L) 35 - 45 mmHg    PO2 94 75 - 100 mmHg    O2 SAT 99 >95 %    BICARBONATE 27 (H) 22 - 26 mmol/L    BASE EXCESS 2.5 (H) 0 - 2 mmol/L    O2 METHOD VENT      FIO2 40 %    MODE AssistControl/Pressure Control      SET RATE 14      IPAP/PIP 21      EPAP/CPAP/PEEP 8      SITE Right Brachial      ANGEL'S TEST Not Performed           Chest X-Ray:   XR CHEST PORT   Final Result   IMPRESSION: Improving congestive heart failure pattern. XR CHEST SNGL V   Final Result   Findings/impression:      Moderate bilateral pleural effusion present. There is extensive interstitial and   airspace disease seen throughout both lungs, including dense airspace disease   involving left lower lobe with air bronchograms. No interval improvement. No   pneumothorax. Endotracheal tube 5 cm above pilo. Nasogastric tube below   diaphragm. No suspicious osseous lesions. XR CHEST SNGL V   Final Result   Impression: Congestive heart failure with interstitial and alveolar pulmonary   edema with bilateral pleural effusions. CT CHEST WO CONT    (Results Pending)   XR CHEST PORT    (Results Pending)       CT imaging:  CT Results  (Last 48 hours)    None          PFTs:   PFT Results  (Last 3 results in the past 10 years)    None            Assessment:     Patient is a 77-year-old  female with history of diastolic CHF, hypertension, emphysema, CVA, renal artery stenosis, mitral valve regurgitation, and chronic hypoxemic respiratory failure currently on 5 L nasal cannula at home who presented back to [facility] on 9/21/2020 with increasing respiratory distress and hypoxia.   Initially placed on BiPAP given significant volume overload present on chest imaging, however she has since been intubated and remains in the ICU. Plan:     1.)  Acute on chronic hypoxic respiratory failure  -Secondary to acute heart failure and flash pulmonary edema. Blood pressures much better controlled now; changing diuretics. -Currently on AC/PC 21/14/40%/8, will reduce the settings to AC/PC 18/10/40%/5 given alkalosis on ABG this morning.  -Plan for SAT/SBT today, we are weaning sedation now. Likely looking to extubate as early as tomorrow.  -Currently on propofol at 35, Precedex 0.7, Versed off. Long discussion with the nurse at the bedside,  I asked her not to titrate down the Precedex as she can be extubated on this medication if necessary. Titrate down the Propofol today in order to perform adequate spontaneous ventilation.  -Continue aggressive diuresis, will trial switching from ethacrynic acid to IV Bumex per cardiology. We will keep her intubated today in case she does have an allergic reaction, but she did apparently tolerate Bumex yesterday. -Very low suspicion for COPD exacerbation or pneumonia. Agree with holding antibiotics for now, continue home prednisone 5 mg daily. Continue scheduled bronchodilators. -Repeat chest x-ray and ABG in the morning.    2.)  Acute on chronic diastolic heart failure  -Significant pulmonary edema and bilateral pleural effusions. Likely worsened by poorly controlled blood pressure, renal artery stenosis, poor nutrition.  -Plan for SAT/SBT this morning.  -Cardiology has been consulted. Most recent transthoracic echocardiogram 8/2020 with normal EF and mild to moderate mitral valve regurgitation.  -After discussion with cardiology, her allergy to sulfa was apparently discovered after taking Bactrim. Supposedly, she has never had Lasix/Bumex prior to this admission. Now that she is intubated, I would feel that it is prudent to trial her on Bumex given that she did not have a bad response to this medication yesterday and she apparently cannot afford ethacrynic acid as an outpatient. Dosing of Bumex per cardiology. 3.)  Accelerated hypertension-   -Blood pressure in the 200s on admission, likely leading to flash pulmonary edema. -Appears much better controlled on continuous IV sedation while mechanically ventilated.  -All of patient's home oral antihypertensive agents have been continued.  -Cardiology has been consulted, appreciate their recommendations. Adjustments to be made to her oral antihypertensive regimen today given that she is having some soft blood pressures and the bedside nurse is having to hold some of her meds.  -Diuresis as above. 4.)  Emphysema  -Per review of her records, she has never follow-up with us in the clinic and supposedly has not had PFTs. -Does not appear to be on any maintenance inhalers at home.  -She missed her follow-up appointment with us today, I will reschedule her in our office on 10/13/2020 at 10:30 AM.  -Patient has a relatively clear lung exam, very low suspicion for acute exacerbation of COPD. -Switch back to scheduled prednisone 5 mg daily which apparently is a chronic medication.  -Agree with q6 scheduled duo nebs. 5.)  Renal artery stenosis  -Renal artery duplex from 8/24/2020 showing right renal artery stenosis.  -Likely at least contributing to her poorly controlled hypertension. Cardiology has been consulted. 6.)  Seizure disorder  -Seems to have occurred after her CVA, continue on home Keppra/carbamazepine.  -No evidence of seizure activity at this time. I will continue to follow along with her while she is admitted. CODE STATUS: Full Code    Lines/Tubes: ET tube, Sanchez catheter, NG tube, 2 peripheral IVs    Prophylaxis:  Stress Ulcer Prophylaxis: Famotidine IV  Deep Vein Thrombosis Prophylaxis: Lovenox  Nutrition: Currently on tube feeds  Activity: Bedrest while intubated      Disposition and Family: Unchanged.   Updated Family at bedside  Per review of the patient's records in [facility], at one point she was a DNR/DNI. This will need to be clarified with the patient's  prior to extubation. Total time spent with patient: 60 minutes of critical care time was spent with the patient reviewing extensive labs and chest imaging, discussing in detail with the bedside nurse, respiratory therapist, patient's  the care plan, multiple checks at the bedside to evaluate the patient's blood pressure as well as sedation, active ventilator management at the bedside.       Patient missed her appointment 9/22/20 in our office, will reschedule on 10/13/2020 at 10:30 AM      Jj Torres DO  [facility]  9/23/2020  2:30 PM

## 2020-09-23 NOTE — PROGRESS NOTES
Progress Note    Patient: Abdulaziz Lay MRN: [id number]  SSN: [id number]    YOB: 1965  Age: 47 y.o. Sex: female      Admit Date: 9/21/2020    LOS: 2 days     Subjective:     CC: shortness of breath     54F, h/o COPD with acute on chronic respiratory failure on 2L NC, with shortness of breath s/t [id number]     Objective:     Vitals:    09/23/20 1329 09/23/20 1400 09/23/20 1500 09/23/20 1632   BP:  (!) 142/84 (!) 140/80 139/79   Pulse:  90 81 85   Resp:  12 13 13   Temp:   98.2 °F (36.8 °C)    SpO2: 97% 97% 99% 100%   Weight:       Height:            Intake and Output:  Current Shift: 09/23 0701 - 09/23 1900  In: -   Out: 1200 [Urine:1200]  Last three shifts: 09/21 1901 - 09/23 0700  In: 768 [I.V.:768]  Out: 490 [Urine:340]    Physical Exam:   General: A&O x3  Skin: Extremities and face reveal no rashes. No martines erythema. No telangiectasias on the chest wall. HEENT: Sclerae anicteric. Extra-occular muscles are intact. No oral ulcers. No ENT discharge. The neck is supple. Cardiovascular: Regular rate and rhythm. No murmurs, gallops, or rubs. PMI nondisplaced. Carotids without bruits. Respiratory: Bilateral coarse crackles are present, expiratory wheezing is present, patient is on BiPAP. GI: Abdomen nondistended, soft, and nontender. Normal active bowel sounds. Rectal: Deferred   Musculoskeletal: No pitting edema of the lower legs. Extremities have good range of motion. No costovertebral tenderness. Neurological: Gross memory appears intact. Patient is alert and oriented. Power 5/5, Cranial nerves II-XII grossly intact  Psychiatric: Mood appears appropriate with judgement intact.    Lymphatic: No cervical or supraclavicular adenopathy.       Lab/Data Review:  Recent Results (from the past 24 hour(s))   CBC WITH AUTOMATED DIFF    Collection Time: 09/23/20  5:00 AM   Result Value Ref Range    WBC 7.2 3.6 - 11.0 K/uL    RBC 2.44 (L) 3.80 - 5.20 M/uL    HGB 7.9 (L) 11.5 - 16.0 %    HCT 23.8 (L) 35.0 - 47.0 %    MCV 97.5 80.0 - 99.0 FL    MCH 32.4 26.0 - 34.0 PG    MCHC 33.2 30.0 - 36.5 g/dL    RDW 15.6 (H) 11.5 - 14.5 %    PLATELET 045 022 - 557 K/uL    MPV 10.1 8.9 - 12.9 FL    NEUTROPHILS 76 (H) 32 - 75 %    LYMPHOCYTES 17 12 - 49 %    MONOCYTES 7 5 - 13 %    EOSINOPHILS 0 0 - 7 %    BASOPHILS 0 0 - 1 %    IMMATURE GRANULOCYTES 0 0.0 - 0.5 %    ABS. NEUTROPHILS 5.5 1.8 - 8.0 K/UL    ABS. LYMPHOCYTES 1.2 0.8 - 3.5 K/UL    ABS. MONOCYTES 0.5 0.0 - 1.0 K/UL    ABS. EOSINOPHILS 0.0 0.0 - 0.4 K/UL    ABS. BASOPHILS 0.0 0.0 - 0.1 K/UL    ABS. IMM. GRANS. 0.0 0.00 - 0.04 K/UL    DF AUTOMATED     METABOLIC PANEL, BASIC    Collection Time: 09/23/20  5:00 AM   Result Value Ref Range    Sodium 135 (L) 136 - 145 mmol/L    Potassium 3.3 (L) 3.5 - 5.1 mmol/L    Chloride 100 97 - 108 mmol/L    CO2 24 21 - 32 mmol/L    Anion gap 11 5 - 15 mmol/L    Glucose 86 65 - 100 mg/dL    BUN 41 (H) 6 - 20 mg/dL    Creatinine 1.73 (H) 0.55 - 1.02 mg/dL    BUN/Creatinine ratio 24 (H) 12 - 20      GFR est AA 37 (L) >60 ml/min/1.73m2    GFR est non-AA 31 (L) >60 ml/min/1.73m2    Calcium 8.8 8.5 - 10.1 mg/dL   BLOOD GAS, ARTERIAL    Collection Time: 09/23/20  5:00 AM   Result Value Ref Range    pH 7.56 (HH) 7.35 - 7.45      PCO2 27 (L) 35 - 45 mmHg    PO2 94 75 - 100 mmHg    O2 SAT 99 >95 %    BICARBONATE 27 (H) 22 - 26 mmol/L    BASE EXCESS 2.5 (H) 0 - 2 mmol/L    O2 METHOD VENT      FIO2 40 %    MODE AssistControl/Pressure Control      SET RATE 14      IPAP/PIP 21      EPAP/CPAP/PEEP 8      SITE Right Brachial      ANGEL'S TEST Not Performed         Assessment:     1. Acute on chronic respiratory failure:PC. SAT/SB today. Continue diuresis  2. Congestive heart failure exacerbation: Patient is allergic to sulfa drugs, will continue patient on ethacrynic acid and spironolactone. Monitor patient's intake and output. 3.  Hypertension: Continue patient's current antihypertensive medications.   4.  Anxiety disorder: Continue patient on Xanax  5. Seizure disorder: Continue patient on Keppra and carbamazepine. 6.  Iron deficiency anemia: Continue patient on iron supplement  7. Renal artery stenosis: Cardiology closely following the patient will follow up with them for further recommendations.    Code Status: Full code  Surrogate decision maker-   DVT Prophylaxis: Lovenox  GI Prophylaxis: pepcid         Signed By: Sarabjit Larry MD     September 23, 2020

## 2020-09-23 NOTE — PROGRESS NOTES
CARDIOLOGY PROGRESS NOTE - NP    Patient seen and examined. This is a patient who is followed for acute on cronich heart failure with reduced ejection fraction. She is intubated and sedated, reacts to pain but does not follow commands. In no distress. Urine output is not impressive. Discussed plan with nursing and pulmonary. No other complaints reported. Telemetry reviewed, there were no events noted in the past 24 hours. Sinus rhythm 70-80s with PACs. Pertinent review of systems items noted above, all other systems are negative. Current medications reviewed. Physical Examination  Vital signs are stable. Blood pressure 129/69, Pulse 82  Intubated and sedated No apparent distress. Heart is regular, rate and rhythm. Normal S1, S2, no murmurs are appreciated. Lungs are clear bilaterally, ventilated. Abdomen is soft, nontender, normal bowel sounds. Extremities have trace +1 edema bilaterally. Sanchez catheter in place drain clear yellow urine. Labs reviewed:  Recent Results (from the past 12 hour(s))   CBC WITH AUTOMATED DIFF    Collection Time: 09/23/20  5:00 AM   Result Value Ref Range    WBC 7.2 3.6 - 11.0 K/uL    RBC 2.44 (L) 3.80 - 5.20 M/uL    HGB 7.9 (L) 11.5 - 16.0 %    HCT 23.8 (L) 35.0 - 47.0 %    MCV 97.5 80.0 - 99.0 FL    MCH 32.4 26.0 - 34.0 PG    MCHC 33.2 30.0 - 36.5 g/dL    RDW 15.6 (H) 11.5 - 14.5 %    PLATELET 860 734 - 220 K/uL    MPV 10.1 8.9 - 12.9 FL    NEUTROPHILS 76 (H) 32 - 75 %    LYMPHOCYTES 17 12 - 49 %    MONOCYTES 7 5 - 13 %    EOSINOPHILS 0 0 - 7 %    BASOPHILS 0 0 - 1 %    IMMATURE GRANULOCYTES 0 0.0 - 0.5 %    ABS. NEUTROPHILS 5.5 1.8 - 8.0 K/UL    ABS. LYMPHOCYTES 1.2 0.8 - 3.5 K/UL    ABS. MONOCYTES 0.5 0.0 - 1.0 K/UL    ABS. EOSINOPHILS 0.0 0.0 - 0.4 K/UL    ABS. BASOPHILS 0.0 0.0 - 0.1 K/UL    ABS. IMM.  GRANS. 0.0 0.00 - 0.04 K/UL    DF AUTOMATED     METABOLIC PANEL, BASIC    Collection Time: 09/23/20  5:00 AM   Result Value Ref Range    Sodium 135 (L) 136 - 145 mmol/L    Potassium 3.3 (L) 3.5 - 5.1 mmol/L    Chloride 100 97 - 108 mmol/L    CO2 24 21 - 32 mmol/L    Anion gap 11 5 - 15 mmol/L    Glucose 86 65 - 100 mg/dL    BUN 41 (H) 6 - 20 mg/dL    Creatinine 1.73 (H) 0.55 - 1.02 mg/dL    BUN/Creatinine ratio 24 (H) 12 - 20      GFR est AA 37 (L) >60 ml/min/1.73m2    GFR est non-AA 31 (L) >60 ml/min/1.73m2    Calcium 8.8 8.5 - 10.1 mg/dL   BLOOD GAS, ARTERIAL    Collection Time: 09/23/20  5:00 AM   Result Value Ref Range    pH 7.56 (HH) 7.35 - 7.45      PCO2 27 (L) 35 - 45 mmHg    PO2 94 75 - 100 mmHg    O2 SAT 99 >95 %    BICARBONATE 27 (H) 22 - 26 mmol/L    BASE EXCESS 2.5 (H) 0 - 2 mmol/L    O2 METHOD VENT      FIO2 40 %    MODE AssistControl/Pressure Control      SET RATE 14      IPAP/PIP 21      EPAP/CPAP/PEEP 8      SITE Right Brachial      ANGEL'S TEST Not Performed         Case discussed with Dr. Karthikeyan Gay and our impression and recommendations are as follows:  1. Acute on Chronic Heart failure with preserved EF: BNP on admission 9742. CXR shows moderate bilateral pleural effusions. On previous admission; CT 8/18 with increased pleural effusions requiring thoracentesis. Allergy to sulfa drug. At outpatient follow up insurance did not cover ethracrynic acid even though allergy documented. Spironolactone was not enough to prevent decompensation. Currently diuresis with ethacrynic acid is not impressive, urine output under a liter. Discussed with Pulmonary, who will keep her intubated and can trial 2 mg IV Bumex and will assess for any anaphylaxis or other allergic reaction. Please document strict I&Os and obtain daily weight. 2.  Mitral valve regurgitation: Last admission showing  Moderate to severe on [facility] admission with repeat echo showing improvement to mild to moderate following aggressive diuresis. Continue diuresis as stated above.      3. Hypertension:  Blood pressure controlled.  Will discontinue hydralazine, labetalol and IV metoprolol for now will assess for resumption as sedation is decreased. Renal duplex positive for renal artery stenosis; she was seen by Dr. Maricarmen Black.     4.Acute on chronic hypoxic respiratory failure: Has COPD. Pulmonary consulted. 5. Hypokalemia: K 3.3, repletion ordered. Please keep potassium between 4-5. Please do not hesitate to call me or Dr. Jeannie Alan if additional questions arise.

## 2020-09-23 NOTE — PROGRESS NOTES
Reason for Admission:   Acute Respiratory Failure                  RUR Score:    20% Moderate             PCP: First and Last name:  Gen Ingram   Name of Practice: [facility]   Are you a current patient: Yes/No: Yes   Approximate date of last visit: 2 weeks ago   Can you participate in a virtual visit if needed: Yes    Do you (patient/family) have any concerns for transition/discharge? No concerns were voiced during assessment. Plan for utilizing home health:  No prior HH in the past.       Current Advanced Directive/Advance Care Plan:  FULL Code. No POA or Advanced Medical Directive. The emergency contact listed is the patient's shar Portillo.  SW asked if he has POA paperwork \"no, I make all the decisions for her. \"  Further asked if patient has any adult children Nirmal Salgado has a daughter; but I do everything for her- I'm her primary caregiver. \"  SW acknowledged understanding regarding him being her primary caregiver. Asked Mr. Debbie Hanley, for the daughter's name and contact information. Writer informed Mr. Debbie Hanley, legally b/c they're not  he can't make legal decisions, not having any POA paperwork,  legally all decisions are to be made by the patient's daughter in the event she's unable to make decisions for herself as it's [place]. Further explained unless the patient gives verbal consent for him to make decisions on her behalf. Mr. Debbie Hanley, verbalized \"well I've been making decisions for her. She was in the hospital last time the doctors and nurses all called me for all consents. \"  SW asked , for the daughter's information again and this information wasn't provided. Transition of Care Plan:          -d/c home vs home w/HH  -PCP follow up      Lives in a ranch style home w/her finance' w/three steps to enter the front door. Last saw PCP x 2 weeks ago. [address] ([address]).   Independent w/all ADL's & IADL's. Home O2 5L continuous. No other DME. Patient doesn't drive, and relies on finance' for transportation. No prior HH or IRF in the past.      SW to continue to follow re: discharge needs and disposition. Care Management Interventions  PCP Verified by CM: Yes(2 weeks ago)  Mode of Transport at Discharge: Other (see comment)(Finance')  Transition of Care Consult (CM Consult): Discharge Planning  Discharge Durable Medical Equipment: (O2 5L continuous.   No other DME)  Current Support Network: Own Home(Lives in ranch style home w/three steps)  Confirm Follow Up Transport: Family  Discharge Location  Discharge Placement: (TBD)      BETHANY Schmidt

## 2020-09-23 NOTE — PROGRESS NOTES
4 EYE SKIN ASSESSMENT- Weekly  Skin dry. Small scab noted to left arm. Mepilex to sacrum. Pt with generalized dependent edema.   Done with Tawnya Borges RN

## 2020-09-24 ENCOUNTER — HOSPITAL ENCOUNTER (OUTPATIENT)
Dept: CT IMAGING | Age: 55
Discharge: HOME | End: 2020-09-24
Attending: INTERNAL MEDICINE

## 2020-09-24 ENCOUNTER — APPOINTMENT (OUTPATIENT)
Dept: CT IMAGING | Age: 55
DRG: 194 | End: 2020-09-24
Attending: HOSPITALIST
Payer: COMMERCIAL

## 2020-09-24 ENCOUNTER — APPOINTMENT (OUTPATIENT)
Dept: GENERAL RADIOLOGY | Age: 55
DRG: 194 | End: 2020-09-24
Attending: INTERNAL MEDICINE
Payer: COMMERCIAL

## 2020-09-24 LAB
ANION GAP SERPL CALC-SCNC: 9 MMOL/L (ref 5–15)
ARTERIAL PATENCY WRIST A: POSITIVE
BASE EXCESS BLDA CALC-SCNC: 2.4 MMOL/L (ref 0–2)
BASOPHILS # BLD: 0 K/UL (ref 0–0.1)
BASOPHILS NFR BLD: 0 % (ref 0–1)
BDY SITE: ABNORMAL
BUN SERPL-MCNC: 38 MG/DL (ref 6–20)
BUN/CREAT SERPL: 30 (ref 12–20)
CA-I BLD-MCNC: 8.8 MG/DL (ref 8.5–10.1)
CHLORIDE SERPL-SCNC: 102 MMOL/L (ref 97–108)
CO2 SERPL-SCNC: 27 MMOL/L (ref 21–32)
CREAT SERPL-MCNC: 1.27 MG/DL (ref 0.55–1.02)
DIFFERENTIAL METHOD BLD: ABNORMAL
EOSINOPHIL # BLD: 0.1 K/UL (ref 0–0.4)
EOSINOPHIL NFR BLD: 1 % (ref 0–7)
ERYTHROCYTE [DISTWIDTH] IN BLOOD BY AUTOMATED COUNT: 15.8 % (ref 11.5–14.5)
FIO2 ON VENT: 50 %
GAS FLOW.O2 O2 DELIVERY SYS: 15 L/MIN
GLUCOSE SERPL-MCNC: 79 MG/DL (ref 65–100)
HCO3 BLDA-SCNC: 26 MMOL/L (ref 22–26)
HCT VFR BLD AUTO: 24.1 % (ref 35–47)
HGB BLD-MCNC: 7.9 % (ref 11.5–16)
IMM GRANULOCYTES # BLD AUTO: 0 K/UL (ref 0–0.04)
IMM GRANULOCYTES NFR BLD AUTO: 0 % (ref 0–0.5)
LYMPHOCYTES # BLD: 1 K/UL (ref 0.8–3.5)
LYMPHOCYTES NFR BLD: 10 % (ref 12–49)
MAGNESIUM SERPL-MCNC: 2 MG/DL (ref 1.6–2.4)
MCH RBC QN AUTO: 32.2 PG (ref 26–34)
MCHC RBC AUTO-ENTMCNC: 32.8 G/DL (ref 30–36.5)
MCV RBC AUTO: 98.4 FL (ref 80–99)
MONOCYTES # BLD: 0.9 K/UL (ref 0–1)
MONOCYTES NFR BLD: 9 % (ref 5–13)
NEUTS SEG # BLD: 8.1 K/UL (ref 1.8–8)
NEUTS SEG NFR BLD: 80 % (ref 32–75)
PCO2 BLDA: 36 MMHG (ref 35–45)
PH BLDA: 7.47 [PH] (ref 7.35–7.45)
PHOSPHATE SERPL-MCNC: 3.4 MG/DL (ref 2.6–4.7)
PLATELET # BLD AUTO: 242 K/UL (ref 150–400)
PMV BLD AUTO: 10.1 FL (ref 8.9–12.9)
PO2 BLDA: 54 MMHG (ref 75–100)
POTASSIUM SERPL-SCNC: 3.1 MMOL/L (ref 3.5–5.1)
RBC # BLD AUTO: 2.45 M/UL (ref 3.8–5.2)
SAO2 % BLD: 88 %
SODIUM SERPL-SCNC: 138 MMOL/L (ref 136–145)
WBC # BLD AUTO: 10.1 K/UL (ref 3.6–11)

## 2020-09-24 PROCEDURE — 77010033678 HC OXYGEN DAILY

## 2020-09-24 PROCEDURE — 80048 BASIC METABOLIC PNL TOTAL CA: CPT

## 2020-09-24 PROCEDURE — 71045 X-RAY EXAM CHEST 1 VIEW: CPT

## 2020-09-24 PROCEDURE — 74011250636 HC RX REV CODE- 250/636: Performed by: HOSPITALIST

## 2020-09-24 PROCEDURE — 74011250637 HC RX REV CODE- 250/637: Performed by: HOSPITALIST

## 2020-09-24 PROCEDURE — 82803 BLOOD GASES ANY COMBINATION: CPT

## 2020-09-24 PROCEDURE — 74011000258 HC RX REV CODE- 258: Performed by: HOSPITALIST

## 2020-09-24 PROCEDURE — 65610000006 HC RM INTENSIVE CARE

## 2020-09-24 PROCEDURE — 74011250636 HC RX REV CODE- 250/636: Performed by: INTERNAL MEDICINE

## 2020-09-24 PROCEDURE — 74011000250 HC RX REV CODE- 250: Performed by: NURSE PRACTITIONER

## 2020-09-24 PROCEDURE — 74011000250 HC RX REV CODE- 250: Performed by: HOSPITALIST

## 2020-09-24 PROCEDURE — 85025 COMPLETE CBC W/AUTO DIFF WBC: CPT

## 2020-09-24 PROCEDURE — 83735 ASSAY OF MAGNESIUM: CPT

## 2020-09-24 PROCEDURE — 84100 ASSAY OF PHOSPHORUS: CPT

## 2020-09-24 PROCEDURE — 94400 HC END TIDAL CO2 RESPONSE CURVE: CPT

## 2020-09-24 PROCEDURE — 36415 COLL VENOUS BLD VENIPUNCTURE: CPT

## 2020-09-24 PROCEDURE — 74011250636 HC RX REV CODE- 250/636: Performed by: EMERGENCY MEDICINE

## 2020-09-24 PROCEDURE — 94640 AIRWAY INHALATION TREATMENT: CPT

## 2020-09-24 PROCEDURE — 74011250637 HC RX REV CODE- 250/637: Performed by: NURSE PRACTITIONER

## 2020-09-24 PROCEDURE — 74011000250 HC RX REV CODE- 250: Performed by: INTERNAL MEDICINE

## 2020-09-24 PROCEDURE — 74011636637 HC RX REV CODE- 636/637: Performed by: INTERNAL MEDICINE

## 2020-09-24 PROCEDURE — 94003 VENT MGMT INPAT SUBQ DAY: CPT

## 2020-09-24 RX ORDER — LABETALOL 100 MG/1
100 TABLET, FILM COATED ORAL EVERY 12 HOURS
Status: DISCONTINUED | OUTPATIENT
Start: 2020-09-24 | End: 2020-09-25

## 2020-09-24 RX ORDER — BUMETANIDE 0.25 MG/ML
1 INJECTION INTRAMUSCULAR; INTRAVENOUS EVERY 12 HOURS
Status: DISCONTINUED | OUTPATIENT
Start: 2020-09-24 | End: 2020-09-25

## 2020-09-24 RX ORDER — METOPROLOL TARTRATE 5 MG/5ML
5 INJECTION INTRAVENOUS
Status: DISCONTINUED | OUTPATIENT
Start: 2020-09-24 | End: 2020-09-25

## 2020-09-24 RX ORDER — BUMETANIDE 0.25 MG/ML
2 INJECTION INTRAMUSCULAR; INTRAVENOUS ONCE
Status: COMPLETED | OUTPATIENT
Start: 2020-09-24 | End: 2020-09-24

## 2020-09-24 RX ORDER — SPIRONOLACTONE 25 MG/1
25 TABLET ORAL DAILY
Status: DISCONTINUED | OUTPATIENT
Start: 2020-09-25 | End: 2020-09-28

## 2020-09-24 RX ORDER — POTASSIUM CHLORIDE 1.5 G/1.77G
40 POWDER, FOR SOLUTION ORAL 2 TIMES DAILY WITH MEALS
Status: DISPENSED | OUTPATIENT
Start: 2020-09-24 | End: 2020-09-25

## 2020-09-24 RX ADMIN — PREDNISONE 5 MG: 5 TABLET ORAL at 08:35

## 2020-09-24 RX ADMIN — SERTRALINE HYDROCHLORIDE 25 MG: 50 TABLET ORAL at 08:35

## 2020-09-24 RX ADMIN — ENOXAPARIN SODIUM 40 MG: 40 INJECTION SUBCUTANEOUS at 08:36

## 2020-09-24 RX ADMIN — LABETALOL HYDROCHLORIDE 100 MG: 100 TABLET, FILM COATED ORAL at 11:13

## 2020-09-24 RX ADMIN — DEXMEDETOMIDINE HYDROCHLORIDE 0.7 MCG/KG/HR: 100 INJECTION, SOLUTION, CONCENTRATE INTRAVENOUS at 02:37

## 2020-09-24 RX ADMIN — FOLIC ACID 1 MG: 1 TABLET ORAL at 08:35

## 2020-09-24 RX ADMIN — BUMETANIDE 2 MG: 0.25 INJECTION INTRAMUSCULAR; INTRAVENOUS at 18:15

## 2020-09-24 RX ADMIN — IPRATROPIUM BROMIDE AND ALBUTEROL SULFATE 3 ML: .5; 3 SOLUTION RESPIRATORY (INHALATION) at 08:18

## 2020-09-24 RX ADMIN — NITROGLYCERIN 0.5 INCH: 20 OINTMENT TOPICAL at 23:28

## 2020-09-24 RX ADMIN — Medication 10 ML: at 06:11

## 2020-09-24 RX ADMIN — LABETALOL HYDROCHLORIDE 100 MG: 100 TABLET, FILM COATED ORAL at 20:49

## 2020-09-24 RX ADMIN — Medication 10 ML: at 22:33

## 2020-09-24 RX ADMIN — NIFEDIPINE 60 MG: 60 TABLET, EXTENDED RELEASE ORAL at 08:35

## 2020-09-24 RX ADMIN — CARBAMAZEPINE 200 MG: 200 TABLET ORAL at 08:35

## 2020-09-24 RX ADMIN — NITROGLYCERIN 0.5 INCH: 20 OINTMENT TOPICAL at 11:13

## 2020-09-24 RX ADMIN — IPRATROPIUM BROMIDE AND ALBUTEROL SULFATE 3 ML: .5; 3 SOLUTION RESPIRATORY (INHALATION) at 00:42

## 2020-09-24 RX ADMIN — PROPOFOL 10 MCG/KG/MIN: 10 INJECTION, EMULSION INTRAVENOUS at 02:37

## 2020-09-24 RX ADMIN — NITROGLYCERIN 0.5 INCH: 20 OINTMENT TOPICAL at 00:29

## 2020-09-24 RX ADMIN — FERROUS SULFATE TAB 325 MG (65 MG ELEMENTAL FE) 325 MG: 325 (65 FE) TAB at 08:35

## 2020-09-24 RX ADMIN — PROPOFOL 10 MCG/KG/MIN: 10 INJECTION, EMULSION INTRAVENOUS at 06:26

## 2020-09-24 RX ADMIN — IPRATROPIUM BROMIDE AND ALBUTEROL SULFATE 3 ML: .5; 3 SOLUTION RESPIRATORY (INHALATION) at 13:17

## 2020-09-24 RX ADMIN — FAMOTIDINE 20 MG: 10 INJECTION INTRAVENOUS at 20:49

## 2020-09-24 RX ADMIN — POTASSIUM CHLORIDE 40 MEQ: 1.5 POWDER, FOR SOLUTION ORAL at 10:08

## 2020-09-24 RX ADMIN — BUMETANIDE 1 MG: 0.25 INJECTION INTRAMUSCULAR; INTRAVENOUS at 11:13

## 2020-09-24 RX ADMIN — NITROGLYCERIN 0.5 INCH: 20 OINTMENT TOPICAL at 06:11

## 2020-09-24 RX ADMIN — LEVOTHYROXINE SODIUM 25 MCG: 0.03 TABLET ORAL at 08:35

## 2020-09-24 RX ADMIN — LEVETIRACETAM 500 MG: 5 INJECTION INTRAVENOUS at 11:13

## 2020-09-24 RX ADMIN — NITROGLYCERIN 0.5 INCH: 20 OINTMENT TOPICAL at 17:36

## 2020-09-24 RX ADMIN — ISOSORBIDE DINITRATE 40 MG: 20 TABLET ORAL at 08:35

## 2020-09-24 RX ADMIN — Medication 10 ML: at 13:04

## 2020-09-24 RX ADMIN — Medication 400 MG: at 08:35

## 2020-09-24 RX ADMIN — FAMOTIDINE 20 MG: 10 INJECTION INTRAVENOUS at 08:35

## 2020-09-24 RX ADMIN — LEVETIRACETAM 500 MG: 5 INJECTION INTRAVENOUS at 23:28

## 2020-09-24 RX ADMIN — METOPROLOL TARTRATE 5 MG: 5 INJECTION INTRAVENOUS at 22:26

## 2020-09-24 RX ADMIN — IPRATROPIUM BROMIDE AND ALBUTEROL SULFATE 3 ML: .5; 3 SOLUTION RESPIRATORY (INHALATION) at 19:22

## 2020-09-24 RX ADMIN — LEVETIRACETAM 500 MG: 5 INJECTION INTRAVENOUS at 00:28

## 2020-09-24 NOTE — PROGRESS NOTES
CARDIOLOGY PROGRESS NOTE - NP    Patient seen and examined. This is a patient who is followed for acute on chronic heart failure with reduced ejection fraction. She is intubated and sedated at time of exam, reacts to pain but does not follow commands. In no distress. Per nursing plans for SBT later this morning. Had a good urine output response to IV Bumex, and no anaphylaxis noted. No other complaints reported. Telemetry reviewed, there were no events noted in the past 24 hours. Sinus rhythm 70-80s with PACs. Pertinent review of systems items noted above, all other systems are negative. Current medications reviewed. Physical Examination  Vital signs are stable. Blood pressure 165/89, Pulse 92  Intubated and sedated No apparent distress. Heart is regular, rate and rhythm. Normal S1, S2, no murmurs are appreciated. Lungs are clear bilaterally, ventilated. Abdomen is soft, nontender, normal bowel sounds. Extremities have trace +1 edema bilaterally. Sanchez catheter in place drain clear yellow urine. Labs reviewed:  Recent Results (from the past 12 hour(s))   CBC WITH AUTOMATED DIFF    Collection Time: 09/24/20  5:30 AM   Result Value Ref Range    WBC 10.1 3.6 - 11.0 K/uL    RBC 2.45 (L) 3.80 - 5.20 M/uL    HGB 7.9 (L) 11.5 - 16.0 %    HCT 24.1 (L) 35.0 - 47.0 %    MCV 98.4 80.0 - 99.0 FL    MCH 32.2 26.0 - 34.0 PG    MCHC 32.8 30.0 - 36.5 g/dL    RDW 15.8 (H) 11.5 - 14.5 %    PLATELET 599 834 - 797 K/uL    MPV 10.1 8.9 - 12.9 FL    NEUTROPHILS 80 (H) 32 - 75 %    LYMPHOCYTES 10 (L) 12 - 49 %    MONOCYTES 9 5 - 13 %    EOSINOPHILS 1 0 - 7 %    BASOPHILS 0 0 - 1 %    IMMATURE GRANULOCYTES 0 0.0 - 0.5 %    ABS. NEUTROPHILS 8.1 (H) 1.8 - 8.0 K/UL    ABS. LYMPHOCYTES 1.0 0.8 - 3.5 K/UL    ABS. MONOCYTES 0.9 0.0 - 1.0 K/UL    ABS. EOSINOPHILS 0.1 0.0 - 0.4 K/UL    ABS. BASOPHILS 0.0 0.0 - 0.1 K/UL    ABS. IMM.  GRANS. 0.0 0.00 - 0.04 K/UL    DF AUTOMATED     METABOLIC PANEL, BASIC    Collection Time: 09/24/20  5:30 AM   Result Value Ref Range    Sodium 138 136 - 145 mmol/L    Potassium 3.1 (L) 3.5 - 5.1 mmol/L    Chloride 102 97 - 108 mmol/L    CO2 27 21 - 32 mmol/L    Anion gap 9 5 - 15 mmol/L    Glucose 79 65 - 100 mg/dL    BUN 38 (H) 6 - 20 mg/dL    Creatinine 1.27 (H) 0.55 - 1.02 mg/dL    BUN/Creatinine ratio 30 (H) 12 - 20      GFR est AA 53 (L) >60 ml/min/1.73m2    GFR est non-AA 44 (L) >60 ml/min/1.73m2    Calcium 8.8 8.5 - 10.1 mg/dL   MAGNESIUM    Collection Time: 09/24/20  5:30 AM   Result Value Ref Range    Magnesium 2.0 1.6 - 2.4 mg/dL   PHOSPHORUS    Collection Time: 09/24/20  5:30 AM   Result Value Ref Range    Phosphorus 3.4 2.6 - 4.7 mg/dL       Case discussed with Dr. Osman Escalona and our impression and recommendations are as follows:  1. Acute on Chronic Heart failure with preserved EF: BNP on admission 9742. CXR shows moderate bilateral pleural effusions. Allergy to sulfa drug. At outpatient follow up insurance did not cover ethracrynic acid even though allergy documented. Spironolactone was not enough to prevent decompensation. Good urine output response to IV Bumex she was negative 2 liters per I&Os and no anaphylaxis or other allergic reaction noted. Agree to continue 1mg IV Bumex and will start spironolactone 25mg daily. Please document strict I&Os and obtain daily weight. 2.  Mitral valve regurgitation: Last admission showing  Moderate to severe on [facility] admission with repeat echo showing improvement to mild to moderate following aggressive diuresis. Continue diuresis as stated above.      3. Hypertension:  Blood pressure controlled. Will discontinue hydralazine,and metoprolol for now will assess for resumption as sedation is decreased. Renal duplex positive for renal artery stenosis; she was seen by Dr. Norm Marie.     4.Acute on chronic hypoxic respiratory failure: Has COPD. Pulmonary consulted. 5. Hypokalemia: K 3.1, repletion ordered. Please keep potassium between 4-5.      Please do not hesitate to call me or Dr. Sabrina Muniz if additional questions arise.

## 2020-09-24 NOTE — PROGRESS NOTES
Pulmonology and Critical Care Progress Note    Subjective:     Chief Complaint:   Chief Complaint   Patient presents with    Respiratory Distress        This patient is being seen and evaluated at the request of Dr. Shalini Cooper. Patient seen and examined in her room this morning, no acute events overnight. Patient doing very well on the ventilator. Precedex currently at 0.7 and propofol at 30. Placed on spontaneous breathing trial this morning and her RSBI was 46 breathing comfortably, calm. She is answering questions and following commands. Significant improvement in her chest x-ray and her oxygenation after 1 dose of IV Bumex last night which she tolerated well without evidence of anaphylaxis. Past Medical History:   Diagnosis Date    Chronic obstructive pulmonary disease (HCC)     Chronic respiratory failure (HCC)     Congestive heart failure (CHF) (HCC)     Hypertension     Hypertension     Renal artery stenosis (HCC)     Seizure disorder (HCC)      No past surgical history on file.    Family History   Problem Relation Age of Onset    Hypertension Mother     Stroke Mother      Social History     Tobacco Use    Smoking status: Heavy Tobacco Smoker     Types: Cigarettes    Tobacco comment: former quit only 1 month ago during recent admission to the hospital   Substance Use Topics    Alcohol use: Not Currently      Current Facility-Administered Medications   Medication Dose Route Frequency Provider Last Rate Last Dose    potassium chloride (KLOR-CON) packet for solution 40 mEq  40 mEq Oral BID WITH MEALS Jaja Liriano NP   40 mEq at 09/24/20 1008    bumetanide (BUMEX) injection 1 mg  1 mg IntraVENous Q12H Vlad Obando DO   1 mg at 09/24/20 1113    labetaloL (NORMODYNE) tablet 100 mg  100 mg Oral Q12H Lula Ko NP   100 mg at 09/24/20 1113    dexmedeTOMidine (PRECEDEX) 400 mcg in 0.9% sodium chloride 104 mL infusion  0.2-0.7 mcg/kg/hr IntraVENous TITRATE Izabel Hurd MD Stopped at 09/24/20 1011    levETIRAcetam (KEPPRA) 500 mg in saline (iso-osm) 100 mL IVPB (premix)  500 mg IntraVENous Q12H Amrik Eric  mL/hr at 09/24/20 0028 500 mg at 09/24/20 1113    Or    levETIRAcetam (KEPPRA) tablet 500 mg  500 mg Oral Q12H Amrik Eric MD   Stopped at 09/23/20 0041    predniSONE (DELTASONE) tablet 5 mg  5 mg Oral DAILY WITH BREAKFAST Vlad Obando DO   5 mg at 09/24/20 0835    famotidine (PF) (PEPCID) 20 mg in 0.9% sodium chloride 10 mL injection  20 mg IntraVENous Q12H Vlad Obando DO   20 mg at 09/24/20 0835    albuterol-ipratropium (DUO-NEB) 2.5 MG-0.5 MG/3 ML  3 mL Nebulization Q6H RT Vlad Obando DO   3 mL at 09/24/20 0818    NIFEdipine ER (PROCARDIA XL) tablet 60 mg  60 mg Oral DAILY Meghan Wilkins MD   60 mg at 09/24/20 1165    aspirin delayed-release tablet 81 mg  81 mg Oral DAILY Meghan Wilkins MD   Stopped at 09/24/20 0900    isosorbide dinitrate (ISORDIL) tablet 40 mg  40 mg Oral BID Meghan Wilkins MD   40 mg at 09/24/20 0835    magnesium oxide (MAG-OX) tablet 400 mg  400 mg Oral DAILY Meghan Wilkins MD   400 mg at 09/24/20 6493    ALPRAZolam (XANAX) tablet 0.25 mg  0.25 mg Oral TID PRN Meghan Wilkins MD        sertraline (ZOLOFT) tablet 25 mg  25 mg Oral DAILY Meghan Wilkins MD   25 mg at 09/24/20 8605    levothyroxine (SYNTHROID) tablet 25 mcg  25 mcg Oral ROSE Wilkins MD   25 mcg at 09/24/20 0835    carBAMazepine (TEGretol) tablet 200 mg  200 mg Oral BID Meghan Wilkins MD   200 mg at 09/24/20 8538    ferrous sulfate tablet 325 mg  325 mg Oral ACB Meghan Wilkins MD   325 mg at 89/76/52 9115    folic acid (FOLVITE) tablet 1 mg  1 mg Oral DAILY Meghan Wilkins MD   1 mg at 09/24/20 0835    sodium chloride (NS) flush 5-40 mL  5-40 mL IntraVENous Q8H Meghan Wilkins MD   10 mL at 09/24/20 0611    sodium chloride (NS) flush 5-40 mL  5-40 mL IntraVENous PRN Meghan Wilkins MD        acetaminophen (TYLENOL) tablet 650 mg 650 mg Oral Q6H PRN Jhonathan Florence MD        Or   [facility] acetaminophen (TYLENOL) suppository 650 mg  650 mg Rectal Q6H PRN Jhonathan Florence MD        polyethylene glycol (MIRALAX) packet 17 g  17 g Oral DAILY PRN Jhonathan Florence MD        enoxaparin (LOVENOX) injection 40 mg  40 mg SubCUTAneous DAILY Jhonathan Florence MD   40 mg at 09/24/20 0836    ondansetron (ZOFRAN) injection 4 mg  4 mg IntraVENous Q6H PRN Jhonathan Florence MD        nitroglycerin (NITROBID) 2 % ointment 0.5 Inch  0.5 Inch Topical Q6H Devin Vincent MD   0.5 Inch at 09/24/20 1113    propofol (DIPRIVAN) 10 mg/mL infusion  10 mcg/kg/min IntraVENous TITRATE Jeremias Solis MD   Stopped at 09/24/20 0955        Home Meds:  Prior to Admission medications    Medication Sig Start Date End Date Taking?  Authorizing Provider   ALPRAZolam Glemichael Born) 0.25 mg tablet Take 0.25 mg by mouth three (3) times daily as needed for Anxiety.     Yes Provider, Historical   ethacrynic acid (EDECRIN) 25 mg tablet Take 50 mg by mouth two (2) times a day.     Yes Provider, Historical   predniSONE (DELTASONE) 5 mg tablet Take 5 mg by mouth daily.     Yes Provider, Historical   sertraline (Zoloft) 25 mg tablet Take 25 mg by mouth daily.     Yes Provider, Historical   hydrALAZINE (APRESOLINE) 50 mg tablet Take 100 mg by mouth three (3) times daily.     Yes Provider, Historical   levothyroxine (SYNTHROID) 25 mcg tablet Take 25 mcg by mouth Daily (before breakfast).     Yes Provider, Historical   levETIRAcetam (Keppra) 500 mg tablet Take 500 mg by mouth two (2) times a day.     Yes Provider, Historical   carBAMazepine, mood stabiliz, 200 mg CM12 Take 200 mg by mouth two (2) times a day.     Yes Provider, Historical   ferrous sulfate 325 mg (65 mg iron) tablet Take 325 mg by mouth Daily (before breakfast).     Yes Provider, Historical   folic acid (FOLVITE) 1 mg tablet Take 1 mg by mouth daily.     Yes Provider, Historical   spironolactone (ALDACTONE) 25 mg tablet Take 100 mg by mouth daily.     Yes Provider, Historical   metoprolol succinate (TOPROL-XL) 100 mg tablet Take 150 mg by mouth daily.       Provider, Historical   NIFEdipine ER (PROCARDIA XL) 60 mg ER tablet Take 60 mg by mouth daily.       Provider, Historical   aspirin delayed-release 81 mg tablet Take  by mouth daily.       Provider, Historical   isosorbide dinitrate (ISORDIL) 20 mg tablet Take 40 mg by mouth two (2) times a day.       Provider, Historical   labetaloL (NORMODYNE) 200 mg tablet Take  by mouth two (2) times a day.       Provider, Historical   magnesium oxide (MAG-OX) 400 mg tablet Take 400 mg by mouth daily.       Provider, Historical   potassium chloride (K-DUR, KLOR-CON) 20 mEq tablet Take 20 mEq by mouth two (2) times a day.       Provider, Historical         Allergies   Allergen Reactions    Sulfa (Sulfonamide Antibiotics) Anaphylaxis       Review of Systems:  Review of systems not obtained due to patient factors. Objective:     Blood pressure 138/73, pulse (!) 101, temperature 97.4 °F (36.3 °C), resp. rate 22, height 5' 5\" (1.651 m), weight 57.3 kg (126 lb 5.2 oz), SpO2 97 %. Temp (24hrs), Av.4 °F (36.9 °C), Min:97.4 °F (36.3 °C), Max:99.2 °F (37.3 °C)      Intake and Output:  Current Shift: No intake/output data recorded. Last 3 Shifts:  1901 -  0700  In: 764 [I.V.:764]  Out: 3675 [Urine:3525]    Physical Exam:   General appearance: More awake on the ventilator, no acute distress, following commands  Head: Normocephalic, without obvious abnormality, atraumatic  Eyes: conjunctivae/corneas clear. PERRL, EOM's intact.  Fundi benign  Throat: Lips, mucosa, and tongue normal. Teeth and gums normal  Neck: supple, symmetrical, trachea midline, no adenopathy, thyroid: not enlarged, symmetric, no tenderness/mass/nodules and no carotid bruit  Lungs: clear to auscultation bilaterally but does have some mild rhonchi in the bases bilaterally, intubated on mechanical ventilation  Heart: regular rate and rhythm, S1, S2 normal, no murmur, click, rub or gallop  Abdomen: soft, non-tender. Bowel sounds normal. No masses,  no organomegaly  Extremities: edema +2 bilaterally, otherwise atraumatic  Skin: Skin color, texture, turgor normal. No rashes or lesions  Lymph nodes: Cervical, supraclavicular, and axillary nodes normal.  Neurologic: Grossly normal, following commands, calm    Additional comments:None    Lab/Data Review: All lab results for the last 24 hours reviewed. Recent Results (from the past 24 hour(s))   CBC WITH AUTOMATED DIFF    Collection Time: 09/24/20  5:30 AM   Result Value Ref Range    WBC 10.1 3.6 - 11.0 K/uL    RBC 2.45 (L) 3.80 - 5.20 M/uL    HGB 7.9 (L) 11.5 - 16.0 %    HCT 24.1 (L) 35.0 - 47.0 %    MCV 98.4 80.0 - 99.0 FL    MCH 32.2 26.0 - 34.0 PG    MCHC 32.8 30.0 - 36.5 g/dL    RDW 15.8 (H) 11.5 - 14.5 %    PLATELET 221 072 - 933 K/uL    MPV 10.1 8.9 - 12.9 FL    NEUTROPHILS 80 (H) 32 - 75 %    LYMPHOCYTES 10 (L) 12 - 49 %    MONOCYTES 9 5 - 13 %    EOSINOPHILS 1 0 - 7 %    BASOPHILS 0 0 - 1 %    IMMATURE GRANULOCYTES 0 0.0 - 0.5 %    ABS. NEUTROPHILS 8.1 (H) 1.8 - 8.0 K/UL    ABS. LYMPHOCYTES 1.0 0.8 - 3.5 K/UL    ABS. MONOCYTES 0.9 0.0 - 1.0 K/UL    ABS. EOSINOPHILS 0.1 0.0 - 0.4 K/UL    ABS. BASOPHILS 0.0 0.0 - 0.1 K/UL    ABS. IMM.  GRANS. 0.0 0.00 - 0.04 K/UL    DF AUTOMATED     METABOLIC PANEL, BASIC    Collection Time: 09/24/20  5:30 AM   Result Value Ref Range    Sodium 138 136 - 145 mmol/L    Potassium 3.1 (L) 3.5 - 5.1 mmol/L    Chloride 102 97 - 108 mmol/L    CO2 27 21 - 32 mmol/L    Anion gap 9 5 - 15 mmol/L    Glucose 79 65 - 100 mg/dL    BUN 38 (H) 6 - 20 mg/dL    Creatinine 1.27 (H) 0.55 - 1.02 mg/dL    BUN/Creatinine ratio 30 (H) 12 - 20      GFR est AA 53 (L) >60 ml/min/1.73m2    GFR est non-AA 44 (L) >60 ml/min/1.73m2    Calcium 8.8 8.5 - 10.1 mg/dL   MAGNESIUM    Collection Time: 09/24/20  5:30 AM   Result Value Ref Range    Magnesium 2.0 1.6 - 2.4 mg/dL   PHOSPHORUS Collection Time: 09/24/20  5:30 AM   Result Value Ref Range    Phosphorus 3.4 2.6 - 4.7 mg/dL         Chest X-Ray:   XR CHEST PORT   Final Result   IMPRESSION: Persistent bibasilar lung findings previously described as CHF and   probably with effusion complicating visualization of the right lung base. Continued surveillance recommended      XR CHEST PORT   Final Result   IMPRESSION: Improving congestive heart failure pattern. XR CHEST SNGL V   Final Result   Findings/impression:      Moderate bilateral pleural effusion present. There is extensive interstitial and   airspace disease seen throughout both lungs, including dense airspace disease   involving left lower lobe with air bronchograms. No interval improvement. No   pneumothorax. Endotracheal tube 5 cm above pilo. Nasogastric tube below   diaphragm. No suspicious osseous lesions. XR CHEST SNGL V   Final Result   Impression: Congestive heart failure with interstitial and alveolar pulmonary   edema with bilateral pleural effusions. XR CHEST PORT    (Results Pending)       CT imaging:  CT Results  (Last 48 hours)    None          PFTs:   PFT Results  (Last 3 results in the past 10 years)    None            Assessment:     Patient is a 63-year-old  female with history of diastolic CHF, hypertension, emphysema, CVA, renal artery stenosis, mitral valve regurgitation, and chronic hypoxemic respiratory failure currently on 5 L nasal cannula at home who presented back to [facility] on 9/21/2020 with increasing respiratory distress and hypoxia. Initially placed on BiPAP given significant volume overload present on chest imaging, however she was intubated and remains in the ICU. Plan:     1.)  Acute on chronic hypoxic respiratory failure  -Secondary to acute heart failure and flash pulmonary edema.   Blood pressures much better controlled now; changing diuretics as below.  -Patient tolerated spontaneous breathing trial for 60 minutes this morning, now successfully extubated to 5 L nasal cannula. -Sedation stopped. -Chest x-ray this morning reviewed and showing improvement in bilateral airspace disease as well as pleural effusions. Secondary to great diuresis with 1 dose of Bumex 2 mg IV yesterday. She tolerated this without any evidence of anaphylaxis.  -We will switch her ethacrynic acid to Bumex 1 mg IV every 12 and continue to monitor strict I/O's.  -Very low suspicion for COPD exacerbation or pneumonia. Agree with holding antibiotics for now, continue home prednisone 5 mg daily. Continue scheduled bronchodilators. - Likely transfer to the floor tomorrow. 2.)  Acute on chronic diastolic heart failure  -Significant pulmonary edema and bilateral pleural effusions. Likely worsened by poorly controlled blood pressure, renal artery stenosis, poor nutrition.  -Extubated today successfully.  -Cardiology is following, appreciate their recommendations. Most recent transthoracic echocardiogram 8/2020 with normal EF and mild to moderate mitral valve regurgitation.  -After discussion with cardiology, her allergy to sulfa was apparently discovered after taking Bactrim. Supposedly, she has never had Lasix/Bumex prior to this admission. Patient tolerated a second dose of Bumex without evidence of anaphylaxis.  -We will start the patient on Bumex 1 mg IV every 12.  -Likely can continue on Aldactone as well, but ultimately defer to cardiology. 3.)  Accelerated hypertension-   -Blood pressure in the 200s on admission, likely leading to flash pulmonary edema. -Much better controlled today on current regimen. Management per cardiology.  -Diuresis as above. 4.)  Emphysema  -Per review of her records, she has never follow-up with us in the clinic and supposedly has not had PFTs.   -Does not appear to be on any maintenance inhalers at home.  -She missed her follow-up appointment with us this week, I will reschedule her in our office on 10/13/2020 at 10:30 AM.  -Patient has a relatively clear lung exam, very low suspicion for acute exacerbation of COPD. -Switch back to scheduled prednisone 5 mg daily which apparently is a chronic medication.  -Agree with q6 scheduled duo nebs. 5.)  Renal artery stenosis  -Renal artery duplex from 8/24/2020 showing right renal artery stenosis.  -Likely at least contributing to her poorly controlled hypertension. Cardiology has been consulted. 6.)  Seizure disorder  -Seems to have occurred after her CVA, continue on home Keppra/carbamazepine.  -No evidence of seizure activity at this time. I will continue to follow along with her while she is admitted. CODE STATUS: Full Code    Lines/Tubes: ET tube, Sanchez catheter, NG tube, 2 peripheral IVs    Prophylaxis:  Stress Ulcer Prophylaxis: Famotidine IV  Deep Vein Thrombosis Prophylaxis: Lovenox  Nutrition: Currently on tube feeds  Activity: Bedrest while intubated      Disposition and Family: Keep in the ICU today, likely transfer to the floor tomorrow. Total time spent with patient: 60 minutes of critical care time was spent with the patient reviewing extensive labs and chest imaging, discussing in detail with the bedside nurse, respiratory therapist, patient's  the care plan, multiple checks at the bedside to evaluate the patient's blood pressure as well as sedation, active ventilator management at the bedside.       Patient missed her appointment 9/22/20 in our office, will reschedule on 10/13/2020 at 10:30 AM      Suzy Dunn DO  [facility]  9/24/2020  2:30 PM

## 2020-09-24 NOTE — PROGRESS NOTES
Progress Note    Patient: Makenzie Eden MRN: [id number]  SSN: [id number]    YOB: 1965  Age: 47 y.o. Sex: female      Admit Date: 9/21/2020    LOS: 3 days     Subjective:   CC: shortness of breath      54F, h/o COPD with acute on chronic respiratory failure on 2L NC, with shortness of breath s/t [id number] was intubated s/t acute hypoxic respiratory failure. Extubated today, continue aggressive IV diuresis with IV bumex. On 5l NC. Objective:     Vitals:    09/24/20 0814 09/24/20 1000 09/24/20 1100 09/24/20 1119   BP:  111/66 138/73    Pulse: 72 85 (!) 101    Resp: 10 12 22    Temp:   98.3 °F (36.8 °C)    SpO2: 100% 100% 100% 97%   Weight:       Height:            Intake and Output:  Current Shift: No intake/output data recorded. Last three shifts: 09/22 1901 - 09/24 0700  In: 764 [I.V.:764]  Out: 3675 [Urine:3525]    Physical Exam:   General: A&O x3  Skin: Extremities and face reveal no rashes. No martines erythema. No telangiectasias on the chest wall. HEENT: Sclerae anicteric. Extra-occular muscles are intact. No oral ulcers. No ENT discharge. The neck is supple. Cardiovascular: Regular rate and rhythm. No murmurs, gallops, or rubs. PMI nondisplaced. Carotids without bruits. Respiratory: Bilateral coarse crackles are present, expiratory wheezing is present, patient is on BiPAP. GI: Abdomen nondistended, soft, and nontender. Normal active bowel sounds. Rectal: Deferred   Musculoskeletal: No pitting edema of the lower legs. Extremities have good range of motion. No costovertebral tenderness. Neurological: Gross memory appears intact. Patient is alert and oriented. Power 5/5, Cranial nerves II-XII grossly intact  Psychiatric: Mood appears appropriate with judgement intact. Lymphatic: No cervical or supraclavicular adenopathy    Lab/Data Review:        Assessment:     1.  Acute on chronic respiratory failure: extubated today.  On 5L NC    2.  Congestive heart failure exacerbation: Patient is allergic to sulfa drugs, will continue patient on ethacrynic acid and spironolactone.  Monitor patient's intake and output. 3.  Hypertension: Continue patient's current antihypertensive medications. 4.  Anxiety disorder: Continue patient on Xanax    5.  Seizure disorder: Continue patient on Keppra and carbamazepine. 6.  Iron deficiency anemia: Continue patient on iron supplement    7.  Renal artery stenosis: Cardiology closely following the patient will follow up with them for further recommendations.      Code Status: Full code  Surrogate decision maker-   DVT Prophylaxis: Lovenox  GI Prophylaxis: pepcid           Signed By: Dorota Otero MD     September 24, 2020

## 2020-09-24 NOTE — PROGRESS NOTES
Patient extubated after 100% oxygen and suction without complications and placed on oxygen at 5 lpm nc per MD order.

## 2020-09-25 ENCOUNTER — APPOINTMENT (OUTPATIENT)
Dept: GENERAL RADIOLOGY | Age: 55
DRG: 194 | End: 2020-09-25
Attending: HOSPITALIST
Payer: COMMERCIAL

## 2020-09-25 ENCOUNTER — APPOINTMENT (OUTPATIENT)
Dept: GENERAL RADIOLOGY | Age: 55
DRG: 194 | End: 2020-09-25
Attending: INTERNAL MEDICINE
Payer: COMMERCIAL

## 2020-09-25 LAB
ANION GAP SERPL CALC-SCNC: 9 MMOL/L (ref 5–15)
BASOPHILS # BLD: 0 K/UL (ref 0–0.1)
BASOPHILS NFR BLD: 0 % (ref 0–1)
BUN SERPL-MCNC: 29 MG/DL (ref 6–20)
BUN/CREAT SERPL: 30 (ref 12–20)
CA-I BLD-MCNC: 9.1 MG/DL (ref 8.5–10.1)
CHLORIDE SERPL-SCNC: 103 MMOL/L (ref 97–108)
CO2 SERPL-SCNC: 28 MMOL/L (ref 21–32)
CREAT SERPL-MCNC: 0.98 MG/DL (ref 0.55–1.02)
DIFFERENTIAL METHOD BLD: ABNORMAL
EOSINOPHIL # BLD: 0.1 K/UL (ref 0–0.4)
EOSINOPHIL NFR BLD: 1 % (ref 0–7)
ERYTHROCYTE [DISTWIDTH] IN BLOOD BY AUTOMATED COUNT: 15.4 % (ref 11.5–14.5)
GLUCOSE SERPL-MCNC: 70 MG/DL (ref 65–100)
HCT VFR BLD AUTO: 23.5 % (ref 35–47)
HGB BLD-MCNC: 7.8 % (ref 11.5–16)
IMM GRANULOCYTES # BLD AUTO: 0 K/UL (ref 0–0.04)
IMM GRANULOCYTES NFR BLD AUTO: 0 % (ref 0–0.5)
LYMPHOCYTES # BLD: 1.1 K/UL (ref 0.8–3.5)
LYMPHOCYTES NFR BLD: 10 % (ref 12–49)
MCH RBC QN AUTO: 33.2 PG (ref 26–34)
MCHC RBC AUTO-ENTMCNC: 33.2 G/DL (ref 30–36.5)
MCV RBC AUTO: 100 FL (ref 80–99)
MONOCYTES # BLD: 1.2 K/UL (ref 0–1)
MONOCYTES NFR BLD: 10 % (ref 5–13)
NEUTS SEG # BLD: 8.8 K/UL (ref 1.8–8)
NEUTS SEG NFR BLD: 79 % (ref 32–75)
PLATELET # BLD AUTO: 276 K/UL (ref 150–400)
PMV BLD AUTO: 10.1 FL (ref 8.9–12.9)
POTASSIUM SERPL-SCNC: 3.1 MMOL/L (ref 3.5–5.1)
RBC # BLD AUTO: 2.35 M/UL (ref 3.8–5.2)
SODIUM SERPL-SCNC: 140 MMOL/L (ref 136–145)
WBC # BLD AUTO: 11.2 K/UL (ref 3.6–11)

## 2020-09-25 PROCEDURE — 74011000250 HC RX REV CODE- 250: Performed by: HOSPITALIST

## 2020-09-25 PROCEDURE — 74011250637 HC RX REV CODE- 250/637: Performed by: HOSPITALIST

## 2020-09-25 PROCEDURE — 92611 MOTION FLUOROSCOPY/SWALLOW: CPT

## 2020-09-25 PROCEDURE — 74011000255 HC RX REV CODE- 255: Performed by: HOSPITALIST

## 2020-09-25 PROCEDURE — 80048 BASIC METABOLIC PNL TOTAL CA: CPT

## 2020-09-25 PROCEDURE — 74011000250 HC RX REV CODE- 250: Performed by: INTERNAL MEDICINE

## 2020-09-25 PROCEDURE — 36415 COLL VENOUS BLD VENIPUNCTURE: CPT

## 2020-09-25 PROCEDURE — 74011250636 HC RX REV CODE- 250/636: Performed by: INTERNAL MEDICINE

## 2020-09-25 PROCEDURE — 94640 AIRWAY INHALATION TREATMENT: CPT

## 2020-09-25 PROCEDURE — 74011000258 HC RX REV CODE- 258: Performed by: HOSPITALIST

## 2020-09-25 PROCEDURE — 92610 EVALUATE SWALLOWING FUNCTION: CPT

## 2020-09-25 PROCEDURE — 74011000250 HC RX REV CODE- 250: Performed by: NURSE PRACTITIONER

## 2020-09-25 PROCEDURE — 74230 X-RAY XM SWLNG FUNCJ C+: CPT

## 2020-09-25 PROCEDURE — 85025 COMPLETE CBC W/AUTO DIFF WBC: CPT

## 2020-09-25 PROCEDURE — 74011250636 HC RX REV CODE- 250/636: Performed by: HOSPITALIST

## 2020-09-25 PROCEDURE — 65610000006 HC RM INTENSIVE CARE

## 2020-09-25 PROCEDURE — 71045 X-RAY EXAM CHEST 1 VIEW: CPT

## 2020-09-25 PROCEDURE — 77010033678 HC OXYGEN DAILY

## 2020-09-25 RX ORDER — POTASSIUM CHLORIDE 20 MEQ/1
40 TABLET, EXTENDED RELEASE ORAL 2 TIMES DAILY
Status: DISCONTINUED | OUTPATIENT
Start: 2020-09-25 | End: 2020-09-26

## 2020-09-25 RX ORDER — LORAZEPAM 2 MG/ML
0.5 INJECTION INTRAMUSCULAR
Status: COMPLETED | OUTPATIENT
Start: 2020-09-25 | End: 2020-09-25

## 2020-09-25 RX ORDER — LABETALOL 200 MG/1
400 TABLET, FILM COATED ORAL EVERY 12 HOURS
Status: DISCONTINUED | OUTPATIENT
Start: 2020-09-25 | End: 2020-10-01

## 2020-09-25 RX ORDER — HYDRALAZINE HYDROCHLORIDE 50 MG/1
50 TABLET, FILM COATED ORAL 3 TIMES DAILY
Status: DISCONTINUED | OUTPATIENT
Start: 2020-09-25 | End: 2020-09-30

## 2020-09-25 RX ORDER — LABETALOL HCL 20 MG/4 ML
20 SYRINGE (ML) INTRAVENOUS
Status: DISCONTINUED | OUTPATIENT
Start: 2020-09-25 | End: 2020-10-02 | Stop reason: HOSPADM

## 2020-09-25 RX ORDER — HYDRALAZINE HYDROCHLORIDE 20 MG/ML
10 INJECTION INTRAMUSCULAR; INTRAVENOUS
Status: DISCONTINUED | OUTPATIENT
Start: 2020-09-25 | End: 2020-10-02 | Stop reason: HOSPADM

## 2020-09-25 RX ORDER — HYDRALAZINE HYDROCHLORIDE 50 MG/1
50 TABLET, FILM COATED ORAL 2 TIMES DAILY
Status: DISCONTINUED | OUTPATIENT
Start: 2020-09-25 | End: 2020-09-25

## 2020-09-25 RX ORDER — METOPROLOL TARTRATE 5 MG/5ML
5 INJECTION INTRAVENOUS
Status: DISCONTINUED | OUTPATIENT
Start: 2020-09-25 | End: 2020-09-25

## 2020-09-25 RX ORDER — BUMETANIDE 0.25 MG/ML
1 INJECTION INTRAMUSCULAR; INTRAVENOUS EVERY 8 HOURS
Status: DISCONTINUED | OUTPATIENT
Start: 2020-09-25 | End: 2020-09-29

## 2020-09-25 RX ORDER — NIFEDIPINE 60 MG/1
60 TABLET, EXTENDED RELEASE ORAL 2 TIMES DAILY
Status: DISCONTINUED | OUTPATIENT
Start: 2020-09-25 | End: 2020-10-02 | Stop reason: HOSPADM

## 2020-09-25 RX ORDER — POTASSIUM CHLORIDE 7.45 MG/ML
10 INJECTION INTRAVENOUS ONCE
Status: COMPLETED | OUTPATIENT
Start: 2020-09-25 | End: 2020-09-25

## 2020-09-25 RX ADMIN — DEXMEDETOMIDINE HYDROCHLORIDE 0.4 MCG/KG/HR: 100 INJECTION, SOLUTION, CONCENTRATE INTRAVENOUS at 01:00

## 2020-09-25 RX ADMIN — LABETALOL HYDROCHLORIDE 20 MG: 5 INJECTION, SOLUTION INTRAVENOUS at 12:59

## 2020-09-25 RX ADMIN — IPRATROPIUM BROMIDE AND ALBUTEROL SULFATE 3 ML: .5; 3 SOLUTION RESPIRATORY (INHALATION) at 13:08

## 2020-09-25 RX ADMIN — NITROGLYCERIN 0.5 INCH: 20 OINTMENT TOPICAL at 23:41

## 2020-09-25 RX ADMIN — DEXMEDETOMIDINE HYDROCHLORIDE 0.7 MCG/KG/HR: 100 INJECTION, SOLUTION, CONCENTRATE INTRAVENOUS at 16:38

## 2020-09-25 RX ADMIN — Medication 10 ML: at 05:42

## 2020-09-25 RX ADMIN — NITROGLYCERIN 0.5 INCH: 20 OINTMENT TOPICAL at 18:25

## 2020-09-25 RX ADMIN — LORAZEPAM 0.5 MG: 2 INJECTION, SOLUTION INTRAMUSCULAR; INTRAVENOUS at 12:28

## 2020-09-25 RX ADMIN — BARIUM SULFATE 25 ML: 400 SUSPENSION ORAL at 11:00

## 2020-09-25 RX ADMIN — NITROGLYCERIN 0.5 INCH: 20 OINTMENT TOPICAL at 13:00

## 2020-09-25 RX ADMIN — ENOXAPARIN SODIUM 40 MG: 40 INJECTION SUBCUTANEOUS at 12:59

## 2020-09-25 RX ADMIN — BUMETANIDE 1 MG: 0.25 INJECTION INTRAMUSCULAR; INTRAVENOUS at 23:34

## 2020-09-25 RX ADMIN — LEVETIRACETAM 500 MG: 5 INJECTION INTRAVENOUS at 23:36

## 2020-09-25 RX ADMIN — METOPROLOL TARTRATE 5 MG: 5 INJECTION INTRAVENOUS at 03:32

## 2020-09-25 RX ADMIN — BARIUM SULFATE 25 ML: 400 PASTE ORAL at 11:00

## 2020-09-25 RX ADMIN — BUMETANIDE 1 MG: 0.25 INJECTION INTRAMUSCULAR; INTRAVENOUS at 13:00

## 2020-09-25 RX ADMIN — Medication 10 ML: at 23:43

## 2020-09-25 RX ADMIN — HYDRALAZINE HYDROCHLORIDE 10 MG: 20 INJECTION INTRAMUSCULAR; INTRAVENOUS at 18:25

## 2020-09-25 RX ADMIN — LEVETIRACETAM 500 MG: 5 INJECTION INTRAVENOUS at 12:28

## 2020-09-25 RX ADMIN — FAMOTIDINE 20 MG: 10 INJECTION INTRAVENOUS at 23:29

## 2020-09-25 RX ADMIN — BARIUM SULFATE 25 ML: 0.81 POWDER, FOR SUSPENSION ORAL at 11:00

## 2020-09-25 RX ADMIN — Medication 10 ML: at 13:13

## 2020-09-25 RX ADMIN — IPRATROPIUM BROMIDE AND ALBUTEROL SULFATE 3 ML: .5; 3 SOLUTION RESPIRATORY (INHALATION) at 08:04

## 2020-09-25 RX ADMIN — IPRATROPIUM BROMIDE AND ALBUTEROL SULFATE 3 ML: .5; 3 SOLUTION RESPIRATORY (INHALATION) at 00:16

## 2020-09-25 RX ADMIN — IPRATROPIUM BROMIDE AND ALBUTEROL SULFATE 3 ML: .5; 3 SOLUTION RESPIRATORY (INHALATION) at 20:18

## 2020-09-25 RX ADMIN — NITROGLYCERIN 0.5 INCH: 20 OINTMENT TOPICAL at 05:42

## 2020-09-25 RX ADMIN — BARIUM SULFATE 50 ML: 400 SUSPENSION ORAL at 11:00

## 2020-09-25 RX ADMIN — POTASSIUM CHLORIDE 10 MEQ: 7.46 INJECTION, SOLUTION INTRAVENOUS at 13:09

## 2020-09-25 NOTE — PROGRESS NOTES
CARDIOLOGY PROGRESS NOTE - NP    Patient seen and examined. This is a patient who is followed for acute on chronic heart failure with reduced ejection fraction. Extubated yesterday afternoon and had good urine output response to IV Bumex, still feels short of breath. She is anxious and tearful this morning. Failed her swallow screening, NGT already removed. She is agreeable to Dobbhoff tube. No chest pain. No other complaints reported. Telemetry reviewed, there were no events noted in the past 24 hours. Sinus rhythm 70-80s with PACs. Pertinent review of systems items noted above, all other systems are negative. Current medications reviewed. Physical Examination  Vital signs are stable. Blood pressure 172/86, Pulse 86  Intubated and sedated No apparent distress. Heart is regular, rate and rhythm. Normal S1, S2, no murmurs are appreciated. Lungs are coarse bilaterally  Abdomen is soft, nontender, normal bowel sounds. Extremities have trace +1 edema bilaterally. Sanchez catheter in place drain clear yellow urine. Labs reviewed:  K 3.1 , Crt .98      Case discussed with Dr. Renetta Shirley and our impression and recommendations are as follows:  1. Acute on Chronic Heart failure with preserved EF: BNP on admission 9742. CXR still shows bilateral pleural effusions. Allergy to sulfa drug, but tolerated Bumex without reaction. Good urine output response to IV Bumex she was negative per I&Os. Continue Bumex 1mg TID and spironolactone 25mg daily. Please document strict I&Os and obtain daily weight. 2.  Mitral valve regurgitation: Last admission showing  Moderate to severe on [facility] admission with repeat echo showing improvement to mild to moderate following aggressive diuresis. Continue diuresis as stated above. Repeat echo pending to assess valve and EF.      3. Hypertension:  Blood pressure elevated now that she is extubated. Resumed home medications but unable to take PO medications, pending NGT placement. Utilize PRN IV labetolol until then. Renal duplex positive for renal artery stenosis; she was seen by Dr. Abrahan Briones. a     4. Acute on chronic hypoxic respiratory failure: Has COPD. Pulmonary consulted. 5. Hypokalemia: K 3.1, repletion ordered. Please keep potassium between 4-5. Please do not hesitate to call me or Dr. Carissa Emery if additional questions arise.

## 2020-09-25 NOTE — PROGRESS NOTES
Patient extubated 9/24/20 and continues to have a strong cough but needs a 50% venti mask in order to keep oxygen saturations above 90%.

## 2020-09-25 NOTE — PROGRESS NOTES
Progress Note    Patient: Arthurine Saint MRN: [id number]  SSN: [id number]    YOB: 1965  Age: 47 y.o. Sex: female      Admit Date: 9/21/2020    LOS: 4 days     Subjective:     CC: shortness of breath      54F, h/o COPD with acute on chronic respiratory failure on 2L NC, with shortness of breath s/t AECHFpEF was intubated s/t acute hypoxic respiratory failure.     Extubated today, continue aggressive IV diuresis with IV bumex. On venti mask. Aspiration precautions. Review of Systems:  Constitutional:  denies weight loss, no fever, no chills, no night sweats  Eye: No recent visual disturbances, no discharge, no double vision  Ear/nose/mouth/throat : No hearing disturbance, no ear pain, no nasal congestion, no sore throat, no trouble swallowing. Respiratory : No trouble breathing, no cough, no shortness of breath, no hemoptysis, no wheezing  Cardiovascular : No chest pain, no palpitation, no racing of heart, no orthopnea, no paroxysmal nocturnal dyspnea, no peripheral edema  Gastrointestinal : No nausea, no vomiting, no diarrhea, constipation, heartburn, abdominal pain  Genitourinary : No dysuria, no hematuria, no increased frequency, incontinence,  Lymphatics : No swollen glands -Neck, axillary, inguinal  Endocrine : No excessive thirst, no polyuria no cold intolerance, no heat intolerance. Immunologic : No hives, urticaria, no seasonal allergies,   Musculoskeletal : Left upper back pain.   No joint swelling, pain, effusion,  no neck pain,   Integumentary : No rash, no pruritus, no ecchymosis  Hematology : No petechiae, No easy bruising,  No tendency to bleed easy  Neurology : Denies change in mental status, no abnormal balance, no headache, no confusion, numbness, tingling,  Psychiatric : No mood swings, no anxiety, depression      Objective:     Vitals:    09/25/20 1100 09/25/20 1200 09/25/20 1211 09/25/20 1307   BP:  (!) 172/86     Pulse: 74      Resp:  13     Temp: 98.6 °F (37 °C) SpO2:  95%  100%   Weight:       Height:   5' 5\" (1.651 m)         Intake and Output:  Current Shift: No intake/output data recorded. Last three shifts: 09/23 1901 - 09/25 0700  In: 380 [I.V.:380]  Out: 5925 [Urine:5925]      Physical Exam:   General: A&O x3  Skin: Extremities and face reveal no rashes. No martines erythema. No telangiectasias on the chest wall. HEENT: Sclerae anicteric. Extra-occular muscles are intact. No oral ulcers. No ENT discharge. The neck is supple. Cardiovascular: Regular rate and rhythm. No murmurs, gallops, or rubs. PMI nondisplaced. Carotids without bruits. Respiratory: Bilateral coarse crackles are present, expiratory wheezing is present, patient is on BiPAP. GI: Abdomen nondistended, soft, and nontender. Normal active bowel sounds. Rectal: Deferred   Musculoskeletal: No pitting edema of the lower legs. Extremities have good range of motion. No costovertebral tenderness. Neurological: Gross memory appears intact. Patient is alert and oriented. Power 5/5, Cranial nerves II-XII grossly intact  Psychiatric: Mood appears appropriate with judgement intact. Lymphatic: No cervical or supraclavicular adenopathy      Lab/Data Review:  Recent Results (from the past 24 hour(s))   CBC WITH AUTOMATED DIFF    Collection Time: 09/25/20  5:10 AM   Result Value Ref Range    WBC 11.2 (H) 3.6 - 11.0 K/uL    RBC 2.35 (L) 3.80 - 5.20 M/uL    HGB 7.8 (L) 11.5 - 16.0 %    HCT 23.5 (L) 35.0 - 47.0 %    .0 (H) 80.0 - 99.0 FL    MCH 33.2 26.0 - 34.0 PG    MCHC 33.2 30.0 - 36.5 g/dL    RDW 15.4 (H) 11.5 - 14.5 %    PLATELET 026 557 - 190 K/uL    MPV 10.1 8.9 - 12.9 FL    NEUTROPHILS 79 (H) 32 - 75 %    LYMPHOCYTES 10 (L) 12 - 49 %    MONOCYTES 10 5 - 13 %    EOSINOPHILS 1 0 - 7 %    BASOPHILS 0 0 - 1 %    IMMATURE GRANULOCYTES 0 0.0 - 0.5 %    ABS. NEUTROPHILS 8.8 (H) 1.8 - 8.0 K/UL    ABS. LYMPHOCYTES 1.1 0.8 - 3.5 K/UL    ABS. MONOCYTES 1.2 (H) 0.0 - 1.0 K/UL    ABS.  EOSINOPHILS 0.1 0.0 - 0.4 K/UL    ABS. BASOPHILS 0.0 0.0 - 0.1 K/UL    ABS. IMM. GRANS. 0.0 0.00 - 0.04 K/UL    DF AUTOMATED     METABOLIC PANEL, BASIC    Collection Time: 09/25/20  5:10 AM   Result Value Ref Range    Sodium 140 136 - 145 mmol/L    Potassium 3.1 (L) 3.5 - 5.1 mmol/L    Chloride 103 97 - 108 mmol/L    CO2 28 21 - 32 mmol/L    Anion gap 9 5 - 15 mmol/L    Glucose 70 65 - 100 mg/dL    BUN 29 (H) 6 - 20 mg/dL    Creatinine 0.98 0.55 - 1.02 mg/dL    BUN/Creatinine ratio 30 (H) 12 - 20      GFR est AA >60 >60 ml/min/1.73m2    GFR est non-AA 59 (L) >60 ml/min/1.73m2    Calcium 9.1 8.5 - 10.1 mg/dL         Assessment and plan:      1.  Acute on chronic respiratory failure: extubated today.  On 5L NC     2.  Congestive heart failure exacerbation: Patient is allergic to sulfa drugs, will continue patient on ethacrynic acid and spironolactone.  Monitor patient's intake and output.     3.  Hypertension: Continue patient's current antihypertensive medications.     4.  Anxiety disorder: Continue patient on Xanax     5.  Seizure disorder: Continue patient on Keppra and carbamazepine.     6.  Iron deficiency anemia: Continue patient on iron supplement     7.  Renal artery stenosis: Cardiology closely following the patient will follow up with them for further recommendations.      Code Status: Full code  Surrogate decision maker-   DVT Prophylaxis: Lovenox  GI Prophylaxis: pepcid           Signed By: Yamileth Potter MD     September 25, 2020

## 2020-09-25 NOTE — PROGRESS NOTES
SPEECH LANGUAGE PATHOLOGY BEDSIDE SWALLOW EVALUATIONS  Patient: Anthony Corey  (40 y.o. )  Date: 9/25/2020  Primary Diagnosis:   Acute respiratory failure ([facility] [address]  Precautions: Aspiration    ASSESSMENT :  Patient presents w/ mild to moderate oropharyngeal dysphagia. Oral phase c/b reduced lingual strength w/ delayed A-P transit. Pharyngeal phase c/b mild swallow delay and HLE is reduced upon palpation. Multiple effortful swallows w/ report of pharyngeal globus sensation. Overt s/s of pen/asp w/ all consistencies c/b throat clear. Patient will benefit from skilled intervention to address the above impairments. Patients rehabilitation potential is considered to be Good     PLAN :  Recommendations and Planned Interventions:  Rec NPO w/ exception of meds w/ MBS to follow to r/o aspiration and further assess oropharyngeal phase of swallow. Frequency/Duration: Patient will be followed by speech-language pathology daily to address goals. Discharge Recommendations: Inpatient Rehab, will benefit from intensity of tx     SUBJECTIVE:   Patient reports pharyngeal and esophageal globus sensation w/ weight loss and difficulty swallowing. Patient reports seen by GI does not recall findings. She is currently on venti mask w/ O2: 100%. OBJECTIVE:     Past Medical History:   Diagnosis Date    Chronic obstructive pulmonary disease ([facility] [address]     Chronic respiratory failure (HCC)     Congestive heart failure (CHF) (HCC)     Hypertension     Hypertension     Renal artery stenosis (HCC)     Seizure disorder (HCC)        CXR Results  (Last 48 hours)                 09/25/20 0429  XR CHEST PORT Final result    Impression:  IMPRESSION:   1. Overall findings are supportive of pulmonary edema with bilateral pleural   effusions. The perihilar opacities appear slightly progressed on the right from   one day prior.              09/24/20 1740  XR CHEST PORT Final result    Impression:  IMPRESSION: Endotracheal tube and gastric tube removed. Bilateral pleural   effusions. Improved aeration of the lower lung zones. Mild prominence of the   interstitium. 09/24/20 0300  XR CHEST PORT Final result    Impression:  IMPRESSION: Persistent bibasilar lung findings previously described as CHF and   probably with effusion complicating visualization of the right lung base. Continued surveillance recommended            Diet prior to admission: Soft/thin  Current Diet:  DIET NPO     Cognitive and Communication Status:  Neurologic State: Alert  Orientation Level: Oriented X4  Cognition: Follows commands    Swallowing Evaluation:   Oral Assessment:  Oral Assessment  Labial: No impairment  Dentition: Poor  Lingual: Decreased strength  Velum: No impairment  Mandible: No impairment  P.O. Trials:  Patient Position: upright  Vocal quality prior to P.O.: Breathy;Hoarse  Consistency Presented: Honey thick liquid; Ice chips; Nectar thick liquid;Pudding; Thin liquid  How Presented: Self-fed/presented;SLP-fed/presented;Spoon     Bolus Acceptance: No impairment  Bolus Formation/Control: No impairment     Propulsion: Delayed (# of seconds)  Oral Residue: None  Initiation of Swallow: Delayed (# of seconds)  Laryngeal Elevation: Decreased  Aspiration Signs/Symptoms: Clear throat  Pharyngeal Phase Characteristics: Double swallow;Effortful swallow; Feeling of discomfort; Foreign body sensation     Oral Phase Severity: Mild  Pharyngeal Phase Severity : Mild-moderate  Voice:  Vocal Quality: Breathy;Hoarse    Pain:  Pain Scale 1: FLACC  Pain Intensity 1: 3(C/O pain in throat)    After treatment:   Patient left in no apparent distress in bed, Call bell within reach, and Nursing notified    COMMUNICATION/EDUCATION:   Patient receptive of education regarding s/s aspiration, aspiration precautions, diet recs, and ST POC. She demonstrated Good understanding as evidenced by engagement.     The patient's plan of care including recommendations, planned interventions, and recommended diet changes were discussed with: Registered nurse and Physician. Patient/family agree to work toward stated goals and plan of care. Problem: Dysphagia (Adult)  Goal: *Acute Goals and Plan of Care (Insert Text)  Description: Speech Therapy Swallow Goals  Initiated 9/25/2020       -Patient will tolerate PO trials without clinical indicators of aspiration given minimal cues within 7 day(s). -Patient will participate in modified barium swallow study within 7 day(s). -Patient will perform BOT and laryngeal strengthening exercises with minimal cues within 10 day(s). -Patient will demonstrate understanding of swallow safety precautions and aspiration precautions, diet recs with minimal cues within 7 day(s).           Thank you for this referral.  Rajendra Lim M.S., M.Ed., CCC-SLP  Time Calculation: 27 mins

## 2020-09-25 NOTE — PROGRESS NOTES
SPEECH PATHOLOGY MODIFIED BARIUM SWALLOW STUDY  Patient: Umesh Ramesh (79 y.o. female)  Date: 9/25/2020  Primary Diagnosis: Acute respiratory failure ([facility] [address] [J96.00]  Precautions: Aspiration    ASSESSMENT :  Based on the objective data described below, the patient presents with moderate oropharyngeal dysphagia. Oral phase c/b reduced lingual strength w/ decreased A-P transit. All consistencies initiate at the level of the vallecula. Overall, mild swallow delay appreciated. Moderate reduction in BOT retraction, HLE, and pharyngeal constriction. Reduced pressure drive noted. Epiglottis w/ limited inversion. There was some improvement of inversion noted during chin tuck maneuver. Consistent penetration w/ subsequent aspiration below the VFs w/ all consistencies. Independent immediate throat clear, eliminates aspiration but not penetration. W/ chin tuck there is elimination of pen/asp w/ pudding on all trials and HTL 2/3 trials. Chin tuck does not eliminate w/ thin and NTL trials. Moderate pharyngeal residue that reduces but does not fully clear w/ double swallow and liquid wash. Reduced relaxation and duration of relaxation of UES. Slow movement of bolus w/ residual through proximal esophagus noted. Concerns for esophageal dysfunction present. Patient will benefit from skilled intervention to address the above impairments. Patients rehabilitation potential is considered to be Fair     PLAN :  Recommendations and Planned Interventions:  Rec NPO w/ alternative means of nutrition as medically appropriate. Discussed w/ Dr. Estee Seay, plans for NGT through weekend w/ re-assessment early next week will rec dysphagia diet as medically appropriate. Given weight loss concerns for adequate nutritional intake are present. Rec introduce swallow strengthening exercises during tx session. Frequency/Duration: Patient will be followed by speech-language pathology daily to address goals.   Discharge Recommendations: Inpatient Rehab     SUBJECTIVE:   Patient stated I want to know what is wrong. OBJECTIVE:     Past Medical History:   Diagnosis Date    Chronic obstructive pulmonary disease ([facility] [address]     Chronic respiratory failure (HCC)     Congestive heart failure (CHF) (HCC)     Hypertension     Hypertension     Renal artery stenosis (HCC)     Seizure disorder (HCC)    No past surgical history on file. CXR Results  (Last 48 hours)               09/25/20 0429  XR CHEST PORT Final result    Impression:  IMPRESSION:   1. Overall findings are supportive of pulmonary edema with bilateral pleural   effusions. The perihilar opacities appear slightly progressed on the right from   one day prior. 09/24/20 1740  XR CHEST PORT Final result    Impression:  IMPRESSION: Endotracheal tube and gastric tube removed. Bilateral pleural   effusions. Improved aeration of the lower lung zones. Mild prominence of the   interstitium. 09/24/20 0300  XR CHEST PORT Final result    Impression:  IMPRESSION: Persistent bibasilar lung findings previously described as CHF and   probably with effusion complicating visualization of the right lung base. Continued surveillance recommended                   Diet prior to admission: Soft/thin  Current Diet:  DIET NPO   Radiologist:    Film Views: Lateral  Patient Position: upright    Video Flouroscopic Procedures  [x] Lateral View   [] A-P View [] Scanned to level of Sternum    [] Seated at 90 deg.   [] Other:    Presentation:   [x] Spoon   [x] Cup   [] Straw   [] Syringe   [] Consecutive Swallows  [] Other:    Consistencies:   [x] Ba+ liquid   [x] Ba+ liquid (nectar)   [x] Ba+ liquid (honey)     [x] Ba+ pudding   [] Ba+ crunched cookie   [] Ba+ cookie   [] Other:       Treatment Techniques Attempted     [] Head Turn: [] Right [] Left     [] Head Tilt: [] Right [] Left     [x] Chin Down:  [] Thermal Sensitization:  [] Supraglottic Swallow:  [] Mendelson's Maneuver:  [] Other:    Results  Dysphagia Present:    [x] Yes  [] No    Ratings of Dysphagia:    [] Mild  [x] Moderate [] Severe    Stages of Breakdown:   [x] Oral  [x] Pharyngeal   [x] Esophageal    Aspiration: [x] Yes    [] No  [x] At Risk  [x] Trace (<10%) [] Significant (>10%):     %  [] Penetration:  Level of:   Cough/Throat clear: [x] Yes      [] No    Consistency Aspirated:  [x] Thin Liquid   [x] Nectar   [x] Honey   [x] Pureed     [] Mech-soft  [] Solid    Motility Problems with:  [] Lip Closure:   [] Sucking:   [] Mastication:   [] Bolus Formation:   [] Bolus Control:  [x] A-P Transport:  [x] Posterior Tongue Elevation:  [x] Swallow Response (delayed):  [] Velopharyngeal Closure:  [x] Laryngeal Elevation:  [x] Laryngeal Adduction:  [x] Cricopharyngeal Relaxation:  [x] Esophageal Peristalsis:  [] Reflux:  [] Other:    Timing of Aspiration:  [] Before Swallow:  [x] During Swallow:  [] After Swallow:    Transit Time Delay:  [x] >1 Second  Oral  [x] >1 Second Pharyngeal  [] >20 Second Esophageal     Residuals:  [] Buccal Cavity   [x] Velum/posterior pharyngeal wall  [x] Valleculae  [x] Pyriforms    Decreased Tongue Base Retraction?: Yes  Laryngeal Elevation: Inadequate epiglottic inversion; Incomplete laryngeal closure;Minimal movement of larynx/epiglottis; Reduced excursion with laryngeal vestibule gap  Aspiration/Penetration Score: 6 (Aspiration-Contrast passes below the cords/glottis, and is cleared)      Pharyngeal-Esophageal Segment: Suspected esophageal dysphagia  Pharyngeal Dysfunction: Decreased tongue base retraction;Decreased strength;Decreased elevation/closure;Decreased pharyngeal wall constriction    Oral Phase Severity: Mild  Pharyngeal Phase Severity: Moderate      COMMUNICATION/EDUCATION:   Patient receptive of education regarding s/s aspiration, aspiration precautions, diet recs, and ST POC. She demonstrated Good understanding as evidenced by engagement.     The patients plan of care including findings from MBS, recommendations, planned interventions, and recommended diet changes were discussed with: Registered nurse and Physician. Patient/family agree to work toward stated goals and plan of care. Problem: Dysphagia (Adult)  Goal: *Acute Goals and Plan of Care (Insert Text)  Description: Speech Therapy Swallow Goals  Initiated 9/25/2020       -Patient will tolerate PO trials without clinical indicators of aspiration given minimal cues within 7 day(s). -Patient will participate in modified barium swallow study within 7 day(s). GOAL MET          -Patient will perform BOT and laryngeal strengthening exercises with minimal cues within 10 day(s). -Patient will demonstrate understanding of swallow safety precautions and aspiration precautions, diet recs with minimal cues within 7 day(s).     Thank you for this referral.  Rito Overton M.S., M.Ed., CCC-SLP  Time Calculation: 27 mins

## 2020-09-25 NOTE — PROGRESS NOTES
Comprehensive Nutrition Assessment    Type and Reason for Visit: Reassess    Nutrition Recommendations/Plan:   Rec'd Dobhoff placement for initiation of TF  As available for use, initiate TF of Jevity 1.5 at 20mL/hr  Increase by 20mL q4hr to goal of 45mL/hr, continuous  Flush with 135mL free water q4hr  Goal feeds provide 1620kcal, 69g protein, 1631mL fluid (100%kcal, 130%pro, 100% fluid needs)    If unable to place/ maintain Dobhoff, initiate D5 or D10 to provide protein-sparing kcal      Nutrition Assessment:  Extubated 9/24. NG noted as d/c 9/22, but RN reports removal yesterday with extubation. Stated TF not running recently; unsure why. Limited nutrition during intubation. Pt failed swallow eval. Pt reports following dysphagia diet (soft with 'thickened' lq, unable to provide specifics) pta- reports worsening dysphagia and decreased intakes pta. SLP following. NG replaced by RN today, however pt requested removal. Pt agreeable to dobhoff placement after discussion with MD. SHAHID to provide TF recs. Labs: K 3.1. Meds: Precedex, prednisone, MgOx. Malnutrition Assessment:  Malnutrition Status: Moderate malnutrition        Estimated Daily Nutrient Needs:  Energy (kcal):  1600kcal (25kcal/kg)  Protein (g):  53g/day (1g/kg)       Fluid (ml/day):  1600mL (1mL/kcal)    Nutrition Related Findings:  RN reports BM yesterday. No edema. Wounds:    None       Current Nutrition Therapies:   DIET NPO      Anthropometric Measures:  · Height:  5' 5\" (165.1 cm)  · Current Body Wt:  53 kg (116 lb 13.5 oz)   · Admission Body Wt:       · Usual Body Wt:        · Ideal Body Wt:  125 lbs:  93.5 %   · Adjusted Body Weight:   ; Weight Adjustment for: No adjustment   · Adjusted BMI:       · BMI Category:  Normal weight (BMI 18.5-24. 9)       Nutrition Diagnosis:   · Increased nutrient needs related to impaired respiratory function as evidenced by intubation      Nutrition Interventions:   Food and/or Nutrient Delivery: Continue NPO, Start tube feeding  Nutrition Education and Counseling: No recommendations at this time  Coordination of Nutrition Care: No recommendation at this time    Goals:  Meet >75% EENs via TF vs PO, Wt maintenance +/- 0.5kg per week, Lytes wnl       Nutrition Monitoring and Evaluation:   Behavioral-Environmental Outcomes:   N/A  Food/Nutrient Intake Outcomes: Enteral nutrition intake/tolerance  Physical Signs/Symptoms Outcomes: Weight    Discharge Planning:     Too soon to determine     Electronically signed by Juanita Fagan on 9/25/2020 at 12:30 PM    Contact: MERCED

## 2020-09-25 NOTE — PROGRESS NOTES
Pulmonology and Critical Care Progress Note    Subjective:     Chief Complaint:   Chief Complaint   Patient presents with    Respiratory Distress        This patient is being seen and evaluated at the request of Dr. Be Pitts. Patient seen and examined in her room this morning; successful extubation yesterday to 5L NC. Did well during the day, but then had an episode of respiratory distress in the early evening which improved with a 2 mg IV bolus of Bumex. She was placed on Ventimask 15L and an ABG confirmed hypoxemia 7.47/36/54, a CXR at that time actually showed improved b/l edema pattern. She then had a relatively quite night. Today, she was evaluated by speech therapy and they ordered an MBS given aspiration concerns. Per the bedside nurse, she apparently aspirated on multiple consistencies, however the final report is still pending. The nurse did have to start her back on Precedex at 0.7 for agitation/anxiety. She is currently resting in bed and does report improvement in her overall SOB compared to admission. Past Medical History:   Diagnosis Date    Chronic obstructive pulmonary disease (HCC)     Chronic respiratory failure (HCC)     Congestive heart failure (CHF) (HCC)     Hypertension     Hypertension     Renal artery stenosis (HCC)     Seizure disorder (HCC)      No past surgical history on file.    Family History   Problem Relation Age of Onset    Hypertension Mother     Stroke Mother      Social History     Tobacco Use    Smoking status: Heavy Tobacco Smoker     Types: Cigarettes    Tobacco comment: former quit only 1 month ago during recent admission to the hospital   Substance Use Topics    Alcohol use: Not Currently      Current Facility-Administered Medications   Medication Dose Route Frequency Provider Last Rate Last Dose    NIFEdipine ER (PROCARDIA XL) tablet 60 mg  60 mg Oral BID Odalys Gallagher NP        labetaloL (NORMODYNE) tablet 400 mg  400 mg Oral Q12H Odalys Gallagher NP  bumetanide (BUMEX) injection 1 mg  1 mg IntraVENous Q8H Vlad Obando DO        hydrALAZINE (APRESOLINE) tablet 50 mg  50 mg Oral TID Vlad Obando DO        labetaloL (NORMODYNE;TRANDATE) 20 mg/4 mL (5 mg/mL) injection 20 mg  20 mg IntraVENous Q6H PRN Vlad Obando DO        potassium chloride (K-DUR, KLOR-CON) SR tablet 40 mEq  40 mEq Oral BID Vlda Obando DO        potassium chloride 10 mEq in 100 ml IVPB  10 mEq IntraVENous ONCE Vlad Obando DO        spironolactone (ALDACTONE) tablet 25 mg  25 mg Oral DAILY Windell Ahumada, NP        dexmedeTOMidine (PRECEDEX) 400 mcg in 0.9% sodium chloride 104 mL infusion  0.2-0.7 mcg/kg/hr IntraVENous TITRATE Annemarie Cardenas MD 6.1 mL/hr at 09/25/20 0100 0.4 mcg/kg/hr at 09/25/20 0100    levETIRAcetam (KEPPRA) 500 mg in saline (iso-osm) 100 mL IVPB (premix)  500 mg IntraVENous Q12H Franki Partida  mL/hr at 09/24/20 2328 500 mg at 09/24/20 2328    Or    levETIRAcetam (KEPPRA) tablet 500 mg  500 mg Oral Q12H Franki Partida MD   Stopped at 09/23/20 0041    predniSONE (DELTASONE) tablet 5 mg  5 mg Oral DAILY WITH BREAKFAST Vlad Obando DO   5 mg at 09/24/20 0835    famotidine (PF) (PEPCID) 20 mg in 0.9% sodium chloride 10 mL injection  20 mg IntraVENous Q12H Vlad Obando DO   20 mg at 09/24/20 2049    albuterol-ipratropium (DUO-NEB) 2.5 MG-0.5 MG/3 ML  3 mL Nebulization Q6H RT Vlad Obando DO   3 mL at 09/25/20 0804    aspirin delayed-release tablet 81 mg  81 mg Oral DAILY Israel Meraz MD   Stopped at 09/24/20 0900    isosorbide dinitrate (ISORDIL) tablet 40 mg  40 mg Oral BID Israel Meraz MD   Stopped at 09/24/20 1800    magnesium oxide (MAG-OX) tablet 400 mg  400 mg Oral DAILY Israel Meraz MD   400 mg at 09/24/20 0170    ALPRAZolam Thamas Gaines) tablet 0.25 mg  0.25 mg Oral TID PRN Israel Meraz MD        sertraline (ZOLOFT) tablet 25 mg  25 mg Oral DAILY Israel Meraz MD   25 mg at 09/24/20 0835    levothyroxine (SYNTHROID) tablet 25 mcg  25 mcg Oral ACB Jennifer Weber MD   25 mcg at 09/24/20 0835    carBAMazepine (TEGretol) tablet 200 mg  200 mg Oral BID Jennifer Weber MD   Stopped at 09/24/20 1800    ferrous sulfate tablet 325 mg  325 mg Oral ACB Jennifer Weber MD   325 mg at 28/38/73 1603    folic acid (FOLVITE) tablet 1 mg  1 mg Oral DAILY Jennifer Weber MD   1 mg at 09/24/20 0835    sodium chloride (NS) flush 5-40 mL  5-40 mL IntraVENous Q8H Jennifer Weber MD   10 mL at 09/25/20 0542    sodium chloride (NS) flush 5-40 mL  5-40 mL IntraVENous PRN Jennifer Weber MD        acetaminophen (TYLENOL) tablet 650 mg  650 mg Oral Q6H PRN Jennifer Weber MD        Or   [facility] acetaminophen (TYLENOL) suppository 650 mg  650 mg Rectal Q6H PRN Jennifer Weber MD        polyethylene glycol (MIRALAX) packet 17 g  17 g Oral DAILY PRN Jennifer Weber MD        enoxaparin (LOVENOX) injection 40 mg  40 mg SubCUTAneous DAILY Jennifer Weber MD   40 mg at 09/24/20 0836    ondansetron (ZOFRAN) injection 4 mg  4 mg IntraVENous Q6H PRN Jennifer Weber MD        nitroglycerin (NITROBID) 2 % ointment 0.5 Inch  0.5 Inch Topical Q6H Migdalia Moran MD   0.5 Inch at 09/25/20 0542        Home Meds:  Prior to Admission medications    Medication Sig Start Date End Date Taking?  Authorizing Provider   ALPRAZolam Kavya Hutchinson) 0.25 mg tablet Take 0.25 mg by mouth three (3) times daily as needed for Anxiety.     Yes Provider, Historical   ethacrynic acid (EDECRIN) 25 mg tablet Take 50 mg by mouth two (2) times a day.     Yes Provider, Historical   predniSONE (DELTASONE) 5 mg tablet Take 5 mg by mouth daily.     Yes Provider, Historical   sertraline (Zoloft) 25 mg tablet Take 25 mg by mouth daily.     Yes Provider, Historical   hydrALAZINE (APRESOLINE) 50 mg tablet Take 100 mg by mouth three (3) times daily.     Yes Provider, Historical   levothyroxine (SYNTHROID) 25 mcg tablet Take 25 mcg by mouth Daily (before breakfast).     Yes Provider, Historical   levETIRAcetam (Keppra) 500 mg tablet Take 500 mg by mouth two (2) times a day.     Yes Provider, Historical   carBAMazepine, mood stabiliz, 200 mg CM12 Take 200 mg by mouth two (2) times a day.     Yes Provider, Historical   ferrous sulfate 325 mg (65 mg iron) tablet Take 325 mg by mouth Daily (before breakfast).     Yes Provider, Historical   folic acid (FOLVITE) 1 mg tablet Take 1 mg by mouth daily.     Yes Provider, Historical   spironolactone (ALDACTONE) 25 mg tablet Take 100 mg by mouth daily.     Yes Provider, Historical   metoprolol succinate (TOPROL-XL) 100 mg tablet Take 150 mg by mouth daily.       Provider, Historical   NIFEdipine ER (PROCARDIA XL) 60 mg ER tablet Take 60 mg by mouth daily.       Provider, Historical   aspirin delayed-release 81 mg tablet Take  by mouth daily.       Provider, Historical   isosorbide dinitrate (ISORDIL) 20 mg tablet Take 40 mg by mouth two (2) times a day.       Provider, Historical   labetaloL (NORMODYNE) 200 mg tablet Take  by mouth two (2) times a day.       Provider, Historical   magnesium oxide (MAG-OX) 400 mg tablet Take 400 mg by mouth daily.       Provider, Historical   potassium chloride (K-DUR, KLOR-CON) 20 mEq tablet Take 20 mEq by mouth two (2) times a day.       Provider, Historical         Allergies   Allergen Reactions    Sulfa (Sulfonamide Antibiotics) Anaphylaxis       Review of Systems:  Review of systems not obtained due to patient factors. Objective:     Blood pressure (!) 169/90, pulse 79, temperature 98.2 °F (36.8 °C), resp. rate 14, height 5' 5\" (1.651 m), weight 53 kg (116 lb 13.5 oz), SpO2 100 %. Temp (24hrs), Av.4 °F (36.9 °C), Min:97.8 °F (36.6 °C), Max:99.1 °F (37.3 °C)      Intake and Output:  Current Shift: No intake/output data recorded. Last 3 Shifts:  1901 -  0700  In: 380 [I.V.:380]  Out: [address] [Urine:0481]    Physical Exam:   General appearance:  A+Ox3, tired, chronically ill-appearing, 5L NC satt'ing 100%  Head: Normocephalic, without obvious abnormality, atraumatic  Eyes: conjunctivae/corneas clear. PERRL, EOM's intact. Throat: Lips, mucosa, and tongue normal. Poor dentition. Neck: supple, symmetrical, trachea midline, no adenopathy, thyroid: not enlarged, symmetric, no tenderness/mass/nodules and no carotid bruit  Lungs: Relatively clear to auscultation with some rhonchi in the bases bilaterally, No accessory muscle use, 5L NC  Heart: regular rate and rhythm, S1, S2 normal, no murmur, click, rub or gallop  Abdomen: soft, non-tender. Bowel sounds normal. No masses,  no organomegaly  Extremities: edema +2 bilaterally, otherwise atraumatic  Skin: Skin color, texture, turgor normal. No rashes or lesions  Lymph nodes: Cervical, supraclavicular, and axillary nodes normal.  Neurologic: Grossly normal, following commands, calm    Additional comments:None    Lab/Data Review: All lab results for the last 24 hours reviewed. Recent Results (from the past 24 hour(s))   BLOOD GAS, ARTERIAL    Collection Time: 09/24/20  5:30 PM   Result Value Ref Range    pH 7.47 (H) 7.35 - 7.45      PCO2 36 35 - 45 mmHg    PO2 54 (L) 75 - 100 mmHg    O2 SAT 88 (L) >95 %    BICARBONATE 26 22 - 26 mmol/L    BASE EXCESS 2.4 (H) 0 - 2 mmol/L    O2 FLOW RATE 15 L/min    FIO2 50 %    SITE Right Radial      ANGEL'S TEST Positive     CBC WITH AUTOMATED DIFF    Collection Time: 09/25/20  5:10 AM   Result Value Ref Range    WBC 11.2 (H) 3.6 - 11.0 K/uL    RBC 2.35 (L) 3.80 - 5.20 M/uL    HGB 7.8 (L) 11.5 - 16.0 %    HCT 23.5 (L) 35.0 - 47.0 %    .0 (H) 80.0 - 99.0 FL    MCH 33.2 26.0 - 34.0 PG    MCHC 33.2 30.0 - 36.5 g/dL    RDW 15.4 (H) 11.5 - 14.5 %    PLATELET 509 003 - 205 K/uL    MPV 10.1 8.9 - 12.9 FL    NEUTROPHILS 79 (H) 32 - 75 %    LYMPHOCYTES 10 (L) 12 - 49 %    MONOCYTES 10 5 - 13 %    EOSINOPHILS 1 0 - 7 %    BASOPHILS 0 0 - 1 %    IMMATURE GRANULOCYTES 0 0.0 - 0.5 %    ABS. NEUTROPHILS 8.8 (H) 1.8 - 8.0 K/UL    ABS. LYMPHOCYTES 1.1 0.8 - 3.5 K/UL    ABS. MONOCYTES 1.2 (H) 0.0 - 1.0 K/UL    ABS. EOSINOPHILS 0.1 0.0 - 0.4 K/UL    ABS. BASOPHILS 0.0 0.0 - 0.1 K/UL    ABS. IMM. GRANS. 0.0 0.00 - 0.04 K/UL    DF AUTOMATED     METABOLIC PANEL, BASIC    Collection Time: 09/25/20  5:10 AM   Result Value Ref Range    Sodium 140 136 - 145 mmol/L    Potassium 3.1 (L) 3.5 - 5.1 mmol/L    Chloride 103 97 - 108 mmol/L    CO2 28 21 - 32 mmol/L    Anion gap 9 5 - 15 mmol/L    Glucose 70 65 - 100 mg/dL    BUN 29 (H) 6 - 20 mg/dL    Creatinine 0.98 0.55 - 1.02 mg/dL    BUN/Creatinine ratio 30 (H) 12 - 20      GFR est AA >60 >60 ml/min/1.73m2    GFR est non-AA 59 (L) >60 ml/min/1.73m2    Calcium 9.1 8.5 - 10.1 mg/dL         Chest X-Ray:   XR CHEST PORT   Final Result   IMPRESSION:   1. Overall findings are supportive of pulmonary edema with bilateral pleural   effusions. The perihilar opacities appear slightly progressed on the right from   one day prior. XR CHEST PORT   Final Result   IMPRESSION: Endotracheal tube and gastric tube removed. Bilateral pleural   effusions. Improved aeration of the lower lung zones. Mild prominence of the   interstitium. XR CHEST PORT   Final Result   IMPRESSION: Persistent bibasilar lung findings previously described as CHF and   probably with effusion complicating visualization of the right lung base. Continued surveillance recommended      XR CHEST PORT   Final Result   IMPRESSION: Improving congestive heart failure pattern. XR CHEST SNGL V   Final Result   Findings/impression:      Moderate bilateral pleural effusion present. There is extensive interstitial and   airspace disease seen throughout both lungs, including dense airspace disease   involving left lower lobe with air bronchograms. No interval improvement. No   pneumothorax. Endotracheal tube 5 cm above pilo. Nasogastric tube below   diaphragm. No suspicious osseous lesions.       XR CHEST SNGL V   Final Result   Impression: Congestive heart failure with interstitial and alveolar pulmonary   edema with bilateral pleural effusions. XR SWALLOW FUNC VIDEO    (Results Pending)       CT imaging:  CT Results  (Last 48 hours)    None          PFTs:   PFT Results  (Last 3 results in the past 10 years)    None            Assessment:     Patient is a 55-year-old  female with history of diastolic CHF, hypertension, emphysema, CVA, renal artery stenosis, mitral valve regurgitation, and chronic hypoxemic respiratory failure currently on 5 L nasal cannula at home who presented back to [facility] on 9/21/2020 with increasing respiratory distress and hypoxia. Initially placed on BiPAP given significant volume overload present on chest imaging, however she was intubated. Now extubated on 9/24/20. Plan:     1.)  Acute on chronic hypoxic respiratory failure  -Secondary to acute on chronic heart failure and flash pulmonary edema. Blood pressures starting to rise again. Antihypertensive changes as below.  -Now successfully extubated on 9/24; currently on home 5 L nasal cannula, but suspect that she can be weaned down further this admission given that she is satting 100%. -Chest x-ray reviewed and showing improvement in bilateral airspace disease, persistent pleural effusions.   -Will increase her Bumex to 1 mg IV every 8 hours today and continue to monitor strict I/O's and daily weights. -Very low suspicion for COPD exacerbation or pneumonia. Agree with holding antibiotics for now, continue home prednisone 5 mg daily. Continue scheduled bronchodilators. - Likely transfer to the floor tomorrow if off Precedex. - Patient also appears to be chronically aspirating, awaiting final report from [facility] today. Recommend NGT or Dobhoff placement today in order to give PO meds and initiate tube feeds.   - Continue speech therapy evaluations.     2.)  Acute on chronic diastolic heart failure  -Significant pulmonary edema and bilateral pleural effusions. Likely worsened by poorly controlled blood pressure, renal artery stenosis, poor nutrition.  -Extubated 9/24/20 successfully; weaning supplemental O2.  -Cardiology is following, appreciate their recommendations. Most recent transthoracic echocardiogram 8/2020 with normal EF and mild to moderate mitral valve regurgitation.  -After discussion with cardiology, her allergy to sulfa was apparently discovered after taking Bactrim. Supposedly, she had never had Lasix/Bumex prior to this admission. Patient tolerating Bumex without evidence of anaphylaxis.  -Increase Bumex to 1 mg IV every 8 hours.  -Aldactone restarted  -Patient does have persistent b/l pleural effusions which are unlikely to resolve with diuresis alone. She did require thoracentesis during her last admission. I recommend aggressive diuresis over the weekend, repeat CXR on Sunday, and consideration of IR consult for therapeutic thoracentesis (likely start on the right) on Monday. Drainage of her pleural space will allow for better lung expansion and lead to likely better rehab potential.    3.)  Accelerated hypertension-   -Blood pressure in the 200s on admission, likely leading to flash pulmonary edema. Was better controlled, now back into the 160's-180's  -Continue current regimen, but will increase her Hydralazine to 50 mg TID (from BID) today. Also, Bumex dose has been increased as above. -Appreciate assistance from Cardiology. 4.)  Emphysema  -Per review of her records, she has never follow-up with us in the clinic and supposedly has not had PFTs. -Does not appear to be on any maintenance inhalers at home.  -She missed her follow-up appointment with us this week, I will reschedule her in our office on 10/13/2020 at 10:30 AM.  -Low suspicion for acute exacerbation of COPD. -Switch back to scheduled prednisone 5 mg daily which apparently is a chronic medication.  -Agree with q6 scheduled duo nebs.     5.)  Renal artery stenosis  -Renal artery duplex from 8/24/2020 showing right renal artery stenosis.  -Likely at least contributing to her poorly controlled hypertension. Cardiology has been consulted. 6.)  Seizure disorder  -Seems to have occurred after her CVA, continue on home Keppra/carbamazepine.  -No evidence of seizure activity at this time. 7.) Anxiety  - Seems to be poorly controlled; Precedex required restarting at 0.7  - Discussed with the bedside nurse, recommend titrating this to off by tomorrow morning.  - Patient already has PRN Xanax ordered. I will continue to follow along with her while she is admitted. CODE STATUS: Full Code    Lines/Tubes: Sanchez catheter, 2 peripheral IVs    Prophylaxis:  Stress Ulcer Prophylaxis: Famotidine IV  Deep Vein Thrombosis Prophylaxis: Lovenox  Nutrition: Currently on tube feeds  Activity: Bedrest while intubated      Disposition and Family: Keep in the ICU today as she is still on Precedex, wean this to off. Likely out of the ICU tomorrow. Total time spent with patient: 60 minutes of critical care time was spent with the patient reviewing extensive labs and chest imaging, discussing in detail with the bedside nurse, respiratory therapist, patient's  the care plan, multiple checks at the bedside to evaluate the patient's blood pressure as well as sedation, active ventilator management at the bedside.       Patient missed her appointment 9/22/20 in our office, will reschedule on 10/13/2020 at 10:30 AM      Bertha Bergman DO  [facility]  9/25/2020  2:30 PM

## 2020-09-25 NOTE — PROGRESS NOTES
Nasogastric placement ordered d/t failed modified barium swallow per Dr. Je Stevens. Attempted to place 16F x2, patient refused after second try. Spoke with Dr. Raza Going on the unit on concerns and patient refusal to replace. Dr. Luz Marina Quezada spoke with patient and given prn ativan x1 to help with anxiety. Attempted to place 14F after 2, failed due to patient at tube. Spoke with Dr. Je Stevens on multiple attempts, patient will remain NPO until tube can placed. Will continue to monitor.

## 2020-09-26 ENCOUNTER — APPOINTMENT (OUTPATIENT)
Dept: NON INVASIVE DIAGNOSTICS | Age: 55
DRG: 194 | End: 2020-09-26
Attending: INTERNAL MEDICINE
Payer: COMMERCIAL

## 2020-09-26 LAB
ANION GAP SERPL CALC-SCNC: 12 MMOL/L (ref 5–15)
BASOPHILS # BLD: 0 K/UL (ref 0–0.1)
BASOPHILS NFR BLD: 0 % (ref 0–1)
BUN SERPL-MCNC: 22 MG/DL (ref 6–20)
BUN/CREAT SERPL: 21 (ref 12–20)
CA-I BLD-MCNC: 9.2 MG/DL (ref 8.5–10.1)
CHLORIDE SERPL-SCNC: 104 MMOL/L (ref 97–108)
CO2 SERPL-SCNC: 27 MMOL/L (ref 21–32)
CREAT SERPL-MCNC: 1.07 MG/DL (ref 0.55–1.02)
DIFFERENTIAL METHOD BLD: ABNORMAL
EOSINOPHIL # BLD: 0.3 K/UL (ref 0–0.4)
EOSINOPHIL NFR BLD: 3 % (ref 0–7)
ERYTHROCYTE [DISTWIDTH] IN BLOOD BY AUTOMATED COUNT: 15.1 % (ref 11.5–14.5)
GLUCOSE SERPL-MCNC: 64 MG/DL (ref 65–100)
HCT VFR BLD AUTO: 28.1 % (ref 35–47)
HGB BLD-MCNC: 9.1 % (ref 11.5–16)
IMM GRANULOCYTES # BLD AUTO: 0.1 K/UL (ref 0–0.04)
IMM GRANULOCYTES NFR BLD AUTO: 0 % (ref 0–0.5)
LYMPHOCYTES # BLD: 1.4 K/UL (ref 0.8–3.5)
LYMPHOCYTES NFR BLD: 13 % (ref 12–49)
MAGNESIUM SERPL-MCNC: 2 MG/DL (ref 1.6–2.4)
MCH RBC QN AUTO: 33.1 PG (ref 26–34)
MCHC RBC AUTO-ENTMCNC: 32.4 G/DL (ref 30–36.5)
MCV RBC AUTO: 102.2 FL (ref 80–99)
MONOCYTES # BLD: 0.9 K/UL (ref 0–1)
MONOCYTES NFR BLD: 8 % (ref 5–13)
NEUTS SEG # BLD: 8.5 K/UL (ref 1.8–8)
NEUTS SEG NFR BLD: 76 % (ref 32–75)
PLATELET # BLD AUTO: 310 K/UL (ref 150–400)
PMV BLD AUTO: 10.1 FL (ref 8.9–12.9)
POTASSIUM SERPL-SCNC: 3.4 MMOL/L (ref 3.5–5.1)
RBC # BLD AUTO: 2.75 M/UL (ref 3.8–5.2)
SODIUM SERPL-SCNC: 143 MMOL/L (ref 136–145)
WBC # BLD AUTO: 11.1 K/UL (ref 3.6–11)

## 2020-09-26 PROCEDURE — 74011250637 HC RX REV CODE- 250/637: Performed by: HOSPITALIST

## 2020-09-26 PROCEDURE — 36415 COLL VENOUS BLD VENIPUNCTURE: CPT

## 2020-09-26 PROCEDURE — 74011250636 HC RX REV CODE- 250/636: Performed by: HOSPITALIST

## 2020-09-26 PROCEDURE — 77010033678 HC OXYGEN DAILY

## 2020-09-26 PROCEDURE — 94640 AIRWAY INHALATION TREATMENT: CPT

## 2020-09-26 PROCEDURE — 80048 BASIC METABOLIC PNL TOTAL CA: CPT

## 2020-09-26 PROCEDURE — 74011250637 HC RX REV CODE- 250/637: Performed by: INTERNAL MEDICINE

## 2020-09-26 PROCEDURE — 74011250636 HC RX REV CODE- 250/636: Performed by: INTERNAL MEDICINE

## 2020-09-26 PROCEDURE — 83735 ASSAY OF MAGNESIUM: CPT

## 2020-09-26 PROCEDURE — 74011250637 HC RX REV CODE- 250/637: Performed by: NURSE PRACTITIONER

## 2020-09-26 PROCEDURE — 92526 ORAL FUNCTION THERAPY: CPT

## 2020-09-26 PROCEDURE — 74011000258 HC RX REV CODE- 258: Performed by: HOSPITALIST

## 2020-09-26 PROCEDURE — 85025 COMPLETE CBC W/AUTO DIFF WBC: CPT

## 2020-09-26 PROCEDURE — 74011636637 HC RX REV CODE- 636/637: Performed by: INTERNAL MEDICINE

## 2020-09-26 PROCEDURE — 93306 TTE W/DOPPLER COMPLETE: CPT

## 2020-09-26 PROCEDURE — 74011000250 HC RX REV CODE- 250: Performed by: HOSPITALIST

## 2020-09-26 PROCEDURE — 65610000006 HC RM INTENSIVE CARE

## 2020-09-26 PROCEDURE — 74011000250 HC RX REV CODE- 250: Performed by: INTERNAL MEDICINE

## 2020-09-26 RX ORDER — POTASSIUM CHLORIDE 750 MG/1
40 TABLET, FILM COATED, EXTENDED RELEASE ORAL 2 TIMES DAILY
Status: DISCONTINUED | OUTPATIENT
Start: 2020-09-26 | End: 2020-09-30

## 2020-09-26 RX ADMIN — LEVETIRACETAM 500 MG: 500 TABLET ORAL at 12:42

## 2020-09-26 RX ADMIN — FAMOTIDINE 20 MG: 10 INJECTION INTRAVENOUS at 12:30

## 2020-09-26 RX ADMIN — LABETALOL HYDROCHLORIDE 400 MG: 200 TABLET, FILM COATED ORAL at 12:41

## 2020-09-26 RX ADMIN — ENOXAPARIN SODIUM 40 MG: 40 INJECTION SUBCUTANEOUS at 12:43

## 2020-09-26 RX ADMIN — Medication 10 ML: at 23:18

## 2020-09-26 RX ADMIN — POTASSIUM CHLORIDE 40 MEQ: 750 TABLET, FILM COATED, EXTENDED RELEASE ORAL at 23:15

## 2020-09-26 RX ADMIN — LEVETIRACETAM 500 MG: 500 TABLET ORAL at 23:15

## 2020-09-26 RX ADMIN — IPRATROPIUM BROMIDE AND ALBUTEROL SULFATE 3 ML: .5; 3 SOLUTION RESPIRATORY (INHALATION) at 19:28

## 2020-09-26 RX ADMIN — NITROGLYCERIN 0.5 INCH: 20 OINTMENT TOPICAL at 05:43

## 2020-09-26 RX ADMIN — HYDRALAZINE HYDROCHLORIDE 10 MG: 20 INJECTION INTRAMUSCULAR; INTRAVENOUS at 02:26

## 2020-09-26 RX ADMIN — PREDNISONE 5 MG: 5 TABLET ORAL at 12:39

## 2020-09-26 RX ADMIN — LEVOTHYROXINE SODIUM 25 MCG: 0.03 TABLET ORAL at 12:43

## 2020-09-26 RX ADMIN — IPRATROPIUM BROMIDE AND ALBUTEROL SULFATE 3 ML: .5; 3 SOLUTION RESPIRATORY (INHALATION) at 13:45

## 2020-09-26 RX ADMIN — BUMETANIDE 1 MG: 0.25 INJECTION INTRAMUSCULAR; INTRAVENOUS at 14:58

## 2020-09-26 RX ADMIN — Medication 10 ML: at 14:00

## 2020-09-26 RX ADMIN — HYDRALAZINE HYDROCHLORIDE 50 MG: 50 TABLET, FILM COATED ORAL at 21:10

## 2020-09-26 RX ADMIN — ALPRAZOLAM 0.25 MG: 0.25 TABLET ORAL at 12:38

## 2020-09-26 RX ADMIN — IPRATROPIUM BROMIDE AND ALBUTEROL SULFATE 3 ML: .5; 3 SOLUTION RESPIRATORY (INHALATION) at 00:28

## 2020-09-26 RX ADMIN — NIFEDIPINE 60 MG: 60 TABLET, EXTENDED RELEASE ORAL at 21:09

## 2020-09-26 RX ADMIN — ISOSORBIDE DINITRATE 40 MG: 20 TABLET ORAL at 18:42

## 2020-09-26 RX ADMIN — DEXMEDETOMIDINE HYDROCHLORIDE 0.7 MCG/KG/HR: 100 INJECTION, SOLUTION, CONCENTRATE INTRAVENOUS at 16:24

## 2020-09-26 RX ADMIN — CARBAMAZEPINE 200 MG: 200 TABLET ORAL at 12:39

## 2020-09-26 RX ADMIN — DEXMEDETOMIDINE HYDROCHLORIDE 0.65 MCG/KG/HR: 100 INJECTION, SOLUTION, CONCENTRATE INTRAVENOUS at 02:30

## 2020-09-26 RX ADMIN — BUMETANIDE 1 MG: 0.25 INJECTION INTRAMUSCULAR; INTRAVENOUS at 21:08

## 2020-09-26 RX ADMIN — ALPRAZOLAM 0.25 MG: 0.25 TABLET ORAL at 21:10

## 2020-09-26 RX ADMIN — CARBAMAZEPINE 200 MG: 200 TABLET ORAL at 18:42

## 2020-09-26 RX ADMIN — IPRATROPIUM BROMIDE AND ALBUTEROL SULFATE 3 ML: .5; 3 SOLUTION RESPIRATORY (INHALATION) at 07:53

## 2020-09-26 RX ADMIN — BUMETANIDE 1 MG: 0.25 INJECTION INTRAMUSCULAR; INTRAVENOUS at 05:42

## 2020-09-26 RX ADMIN — HYDRALAZINE HYDROCHLORIDE 50 MG: 50 TABLET, FILM COATED ORAL at 12:39

## 2020-09-26 RX ADMIN — SERTRALINE HYDROCHLORIDE 25 MG: 50 TABLET ORAL at 12:41

## 2020-09-26 RX ADMIN — LABETALOL HYDROCHLORIDE 400 MG: 200 TABLET, FILM COATED ORAL at 21:09

## 2020-09-26 NOTE — PROGRESS NOTES
Progress Note    Patient: Umesh Ramesh MRN: [id number]  SSN: [id number]    YOB: 1965  Age: 54 y.o. Sex: female      Admit Date: 9/21/2020    LOS: 5 days     Subjective:     CC: shortness of breath      54F, h/o COPD with acute on chronic respiratory failure on 2L NC, with shortness of breath s/t [id number] was intubated s/t acute hypoxic respiratory failure.     Extubated today, continue aggressive IV diuresis with IV bumex. On venti mask. Aspiration precautions.      Review of Systems:  Constitutional:  denies weight loss, no fever, no chills, no night sweats  Eye: No recent visual disturbances, no discharge, no double vision  Ear/nose/mouth/throat : No hearing disturbance, no ear pain, no nasal congestion, no sore throat, no trouble swallowing. Respiratory : No trouble breathing, no cough, no shortness of breath, no hemoptysis, no wheezing  Cardiovascular : No chest pain, no palpitation, no racing of heart, no orthopnea, no paroxysmal nocturnal dyspnea, no peripheral edema  Gastrointestinal : No nausea, no vomiting, no diarrhea, constipation, heartburn, abdominal pain  Genitourinary : No dysuria, no hematuria, no increased frequency, incontinence,  Lymphatics : No swollen glands -Neck, axillary, inguinal  Endocrine : No excessive thirst, no polyuria no cold intolerance, no heat intolerance.   Immunologic : No hives, urticaria, no seasonal allergies,   Musculoskeletal : Left upper back pain.  No joint swelling, pain, effusion,  no neck pain,   Integumentary : No rash, no pruritus, no ecchymosis  Hematology : No petechiae, No easy bruising,  No tendency to bleed easy  Neurology : Denies change in mental status, no abnormal balance, no headache, no confusion, numbness, tingling,  Psychiatric : No mood swings, no anxiety, depression           Objective:     Vitals:    09/26/20 1000 09/26/20 1239 09/26/20 1346 09/26/20 1458   BP:  (!) 149/83  112/72   Pulse: 82 79  86   Resp: 17      Temp: SpO2: 100%  100%    Weight:       Height:            Intake and Output:  Current Shift: No intake/output data recorded. Last three shifts: 09/24 1901 - 09/26 0700  In: 109.2 [I.V.:109.2]  Out: 4600 [Urine:4600]      Physical Exam:   General appearance: A+Ox3, tired, chronically ill-appearing, 5L NC satt'ing 100%  Head: Normocephalic, without obvious abnormality, atraumatic  Eyes: conjunctivae/corneas clear. PERRL, EOM's intact. Throat: Lips, mucosa, and tongue normal. Poor dentition. Neck: supple, symmetrical, trachea midline, no adenopathy, thyroid: not enlarged, symmetric, no tenderness/mass/nodules and no carotid bruit  Lungs: Relatively clear to auscultation with some rhonchi in the bases bilaterally, No accessory muscle use, 5L NC  Heart: regular rate and rhythm, S1, S2 normal, no murmur, click, rub or gallop  Abdomen: soft, non-tender. Bowel sounds normal. No masses,  no organomegaly  Extremities: edema +2 bilaterally, otherwise atraumatic  Skin: Skin color, texture, turgor normal. No rashes or lesions  Lymph nodes: Cervical, supraclavicular, and axillary nodes normal.  Neurologic: Grossly normal, following commands, calm      Lab/Data Review:  Recent Results (from the past 24 hour(s))   CBC WITH AUTOMATED DIFF    Collection Time: 09/26/20  4:20 AM   Result Value Ref Range    WBC 11.1 (H) 3.6 - 11.0 K/uL    RBC 2.75 (L) 3.80 - 5.20 M/uL    HGB 9.1 (L) 11.5 - 16.0 %    HCT 28.1 (L) 35.0 - 47.0 %    .2 (H) 80.0 - 99.0 FL    MCH 33.1 26.0 - 34.0 PG    MCHC 32.4 30.0 - 36.5 g/dL    RDW 15.1 (H) 11.5 - 14.5 %    PLATELET 328 682 - 574 K/uL    MPV 10.1 8.9 - 12.9 FL    NEUTROPHILS 76 (H) 32 - 75 %    LYMPHOCYTES 13 12 - 49 %    MONOCYTES 8 5 - 13 %    EOSINOPHILS 3 0 - 7 %    BASOPHILS 0 0 - 1 %    IMMATURE GRANULOCYTES 0 0.0 - 0.5 %    ABS. NEUTROPHILS 8.5 (H) 1.8 - 8.0 K/UL    ABS. LYMPHOCYTES 1.4 0.8 - 3.5 K/UL    ABS. MONOCYTES 0.9 0.0 - 1.0 K/UL    ABS. EOSINOPHILS 0.3 0.0 - 0.4 K/UL    ABS. BASOPHILS 0.0 0.0 - 0.1 K/UL    ABS. IMM. GRANS. 0.1 (H) 0.00 - 0.04 K/UL    DF AUTOMATED     METABOLIC PANEL, BASIC    Collection Time: 09/26/20  4:20 AM   Result Value Ref Range    Sodium 143 136 - 145 mmol/L    Potassium 3.4 (L) 3.5 - 5.1 mmol/L    Chloride 104 97 - 108 mmol/L    CO2 27 21 - 32 mmol/L    Anion gap 12 5 - 15 mmol/L    Glucose 64 (L) 65 - 100 mg/dL    BUN 22 (H) 6 - 20 mg/dL    Creatinine 1.07 (H) 0.55 - 1.02 mg/dL    BUN/Creatinine ratio 21 (H) 12 - 20      GFR est AA >60 >60 ml/min/1.73m2    GFR est non-AA 53 (L) >60 ml/min/1.73m2    Calcium 9.2 8.5 - 10.1 mg/dL   MAGNESIUM    Collection Time: 09/26/20  4:20 AM   Result Value Ref Range    Magnesium 2.0 1.6 - 2.4 mg/dL   ECHO ADULT COMPLETE    Collection Time: 09/26/20  3:40 PM   Result Value Ref Range    Pulmonic Regurgitant End Max Velocity 131.00 cm/s    AoV PG 7.00 mmHg    Ao Root D 2.80 cm    IVSd 1.22 (A) 0.6 - 0.9 cm    LVIDd 4.00 3.9 - 5.3 cm    LVIDs 2.81 cm    Pulmonic Regurgitant End Max Velocity 82.10 cm/s    LVOT Peak Gradient 3.00 mmHg    LVPWd 1.20 (A) 0.6 - 0.9 cm    LV E' Septal Velocity 6.08 cm/s    BP EF 65.3 55 - 100 %    E/E' septal 15.39     Mitral Valve E Wave Deceleration Time 0.16 s    MV A Irvin 58.60 cm/s    MV E Irvin 93.60 cm/s    MV E/A 1.60     Pulmonic Regurgitant End Max Velocity 103.00 cm/s    Pulmonic Valve Systolic Peak Instantaneous Gradient 4.00 mmHg    Est. RA Pressure 3.00 mmHg    RVIDd 3.08 cm    RVSP 20.00 mmHg    Tricuspid Valve Max Velocity 208.00 cm/s    Triscuspid Valve Regurgitation Peak Gradient 17.00 mmHg    LV Mass .5 67 - 162 g    LV Mass AL Index 107.5 43 - 95 g/m2         Assessment and plan:      1.  Acute on chronic respiratory failure: extubated today.  On 5L NC     2.  Congestive heart failure exacerbation: Patient is allergic to sulfa drugs, will continue patient on ethacrynic acid and spironolactone.  Monitor patient's intake and output.     3.  Hypertension: Continue patient's current antihypertensive medications.     4.  Anxiety disorder: Continue patient on Xanax     5.  Seizure disorder: Continue patient on Keppra and carbamazepine.     6.  Iron deficiency anemia: Continue patient on iron supplement     7.  Renal artery stenosis: Cardiology closely following the patient will follow up with them for further recommendations.      Code Status: Full code  Surrogate decision maker-   DVT Prophylaxis: Lovenox  GI Prophylaxis: pepcid         Signed By: Yaquelin Georges MD     September 26, 2020

## 2020-09-26 NOTE — PROGRESS NOTES
Pulmonology and Critical Care Progress Note    Subjective:     Chief Complaint:   Chief Complaint   Patient presents with    Respiratory Distress        Patient seen and examined in her room; successful extubation 9/24 to 5L NC. Doing fairly well. On 5 L oxygen. Saturation is 99%. More lucid according to the nurse. Some confusion remain. She has been evaluated by speech therapy and they ordered an MBS given aspiration concerns. Per the bedside nurse, she apparently aspirated on multiple consistencies, however she can take honey thickened consistencies. The nurse did have to start her back on Precedex at 0.7 for agitation/anxiety. She is currently resting in bed and does report improvement in her overall SOB compared to admission. Past Medical History:   Diagnosis Date    Chronic obstructive pulmonary disease (HCC)     Chronic respiratory failure (HCC)     Congestive heart failure (CHF) (HCC)     Hypertension     Hypertension     Renal artery stenosis (HCC)     Seizure disorder (HCC)      No past surgical history on file.    Family History   Problem Relation Age of Onset    Hypertension Mother     Stroke Mother      Social History     Tobacco Use    Smoking status: Heavy Tobacco Smoker     Types: Cigarettes    Tobacco comment: former quit only 1 month ago during recent admission to the hospital   Substance Use Topics    Alcohol use: Not Currently      Current Facility-Administered Medications   Medication Dose Route Frequency Provider Last Rate Last Dose    NIFEdipine ER (PROCARDIA XL) tablet 60 mg  60 mg Oral BID Jonathan Board, NP   Stopped at 09/25/20 2100    labetaloL (NORMODYNE) tablet 400 mg  400 mg Oral Q12H Jonathan Board, NP   400 mg at 09/26/20 1241    bumetanide (BUMEX) injection 1 mg  1 mg IntraVENous Q8H Vlad Obando DO   1 mg at 09/26/20 1458    hydrALAZINE (APRESOLINE) tablet 50 mg  50 mg Oral TID Karen Brito DO   Stopped at 09/26/20 1600    labetaloL (NORMODYNE;TRANDATE) 20 mg/4 mL (5 mg/mL) injection 20 mg  20 mg IntraVENous Q6H PRN Vlad Obando DO   20 mg at 09/25/20 1259    potassium chloride (K-DUR, KLOR-CON) SR tablet 40 mEq  40 mEq Oral BID Zaheer Patel DO   Stopped at 09/25/20 1300    hydrALAZINE (APRESOLINE) 20 mg/mL injection 10 mg  10 mg IntraVENous Q2H PRN Eda Aj MD   10 mg at 09/26/20 0226    spironolactone (ALDACTONE) tablet 25 mg  25 mg Oral DAILY Corey Daniel NP   Stopped at 09/26/20 0900    levETIRAcetam (KEPPRA) 500 mg in saline (iso-osm) 100 mL IVPB (premix)  500 mg IntraVENous Q12H Carolyn Rockwell MD   Stopped at 09/25/20 2337    Or    levETIRAcetam (KEPPRA) tablet 500 mg  500 mg Oral Q12H Carolyn Rockwell MD   500 mg at 09/26/20 1242    predniSONE (DELTASONE) tablet 5 mg  5 mg Oral DAILY WITH BREAKFAST Vlad Obando DO   5 mg at 09/26/20 1239    famotidine (PF) (PEPCID) 20 mg in 0.9% sodium chloride 10 mL injection  20 mg IntraVENous Q12H Vlad Obando DO   20 mg at 09/26/20 1230    albuterol-ipratropium (DUO-NEB) 2.5 MG-0.5 MG/3 ML  3 mL Nebulization Q6H RT Vlad Obando DO   3 mL at 09/26/20 1928    aspirin delayed-release tablet 81 mg  81 mg Oral DAILY Sushant Kim MD   Stopped at 09/24/20 0900    isosorbide dinitrate (ISORDIL) tablet 40 mg  40 mg Oral BID Sushant Kim MD   40 mg at 09/26/20 1842    magnesium oxide (MAG-OX) tablet 400 mg  400 mg Oral DAILY Sushant Kim MD   Stopped at 09/25/20 0900    ALPRAZolam Addison Marrero) tablet 0.25 mg  0.25 mg Oral TID PRN Sushant Kim MD   0.25 mg at 09/26/20 1238    sertraline (ZOLOFT) tablet 25 mg  25 mg Oral DAILY Sushant Kim MD   25 mg at 09/26/20 1241    levothyroxine (SYNTHROID) tablet 25 mcg  25 mcg Oral ACB Sushant Kim MD   25 mcg at 09/26/20 1243    carBAMazepine (TEGretol) tablet 200 mg  200 mg Oral BID Sushant Kim MD   200 mg at 09/26/20 1842    ferrous sulfate tablet 325 mg  325 mg Oral ACB Sushant Kim MD   Stopped at 35/36/26 1791    folic acid (FOLVITE) tablet 1 mg  1 mg Oral DAILY Andrzej Campbell MD   Stopped at 09/25/20 0900    sodium chloride (NS) flush 5-40 mL  5-40 mL IntraVENous Q8H Andrzej Campbell MD   10 mL at 09/26/20 1400    sodium chloride (NS) flush 5-40 mL  5-40 mL IntraVENous PRN Andrzej Campbell MD        acetaminophen (TYLENOL) tablet 650 mg  650 mg Oral Q6H PRN Andrzej Campbell MD        Or   [facility] acetaminophen (TYLENOL) suppository 650 mg  650 mg Rectal Q6H PRN Andrzej Campbell MD        polyethylene glycol (MIRALAX) packet 17 g  17 g Oral DAILY PRN Andrzej Campbell MD        enoxaparin (LOVENOX) injection 40 mg  40 mg SubCUTAneous DAILY Andrzej Campbell MD   40 mg at 09/26/20 1243    ondansetron (ZOFRAN) injection 4 mg  4 mg IntraVENous Q6H PRN Andrzej Campbell MD        nitroglycerin (NITROBID) 2 % ointment 0.5 Inch  0.5 Inch Topical Q6H Roya Wolfe MD   0.5 Inch at 09/26/20 0543        Home Meds:  Prior to Admission medications    Medication Sig Start Date End Date Taking?  Authorizing Provider   ALPRAZolam [place] Pennant) 0.25 mg tablet Take 0.25 mg by mouth three (3) times daily as needed for Anxiety.     Yes Provider, Historical   ethacrynic acid (EDECRIN) 25 mg tablet Take 50 mg by mouth two (2) times a day.     Yes Provider, Historical   predniSONE (DELTASONE) 5 mg tablet Take 5 mg by mouth daily.     Yes Provider, Historical   sertraline (Zoloft) 25 mg tablet Take 25 mg by mouth daily.     Yes Provider, Historical   hydrALAZINE (APRESOLINE) 50 mg tablet Take 100 mg by mouth three (3) times daily.     Yes Provider, Historical   levothyroxine (SYNTHROID) 25 mcg tablet Take 25 mcg by mouth Daily (before breakfast).     Yes Provider, Historical   levETIRAcetam (Keppra) 500 mg tablet Take 500 mg by mouth two (2) times a day.     Yes Provider, Historical   carBAMazepine, mood stabiliz, 200 mg CM12 Take 200 mg by mouth two (2) times a day.     Yes Provider, Historical   ferrous sulfate 325 mg (65 mg iron) tablet Take 325 mg by mouth Daily (before breakfast).     Yes Provider, Historical   folic acid (FOLVITE) 1 mg tablet Take 1 mg by mouth daily.     Yes Provider, Historical   spironolactone (ALDACTONE) 25 mg tablet Take 100 mg by mouth daily.     Yes Provider, Historical   metoprolol succinate (TOPROL-XL) 100 mg tablet Take 150 mg by mouth daily.       Provider, Historical   NIFEdipine ER (PROCARDIA XL) 60 mg ER tablet Take 60 mg by mouth daily.       Provider, Historical   aspirin delayed-release 81 mg tablet Take  by mouth daily.       Provider, Historical   isosorbide dinitrate (ISORDIL) 20 mg tablet Take 40 mg by mouth two (2) times a day.       Provider, Historical   labetaloL (NORMODYNE) 200 mg tablet Take  by mouth two (2) times a day.       Provider, Historical   magnesium oxide (MAG-OX) 400 mg tablet Take 400 mg by mouth daily.       Provider, Historical   potassium chloride (K-DUR, KLOR-CON) 20 mEq tablet Take 20 mEq by mouth two (2) times a day.       Provider, Historical         Allergies   Allergen Reactions    Sulfa (Sulfonamide Antibiotics) Anaphylaxis       Review of Systems:  Review of systems not obtained due to patient factors. Objective:     Blood pressure 132/69, pulse 80, temperature 98.3 °F (36.8 °C), resp. rate 16, height 5' 5\" (1.651 m), weight 51.7 kg (113 lb 15.7 oz), SpO2 99 %. Temp (24hrs), Av.2 °F (36.8 °C), Min:97.9 °F (36.6 °C), Max:98.7 °F (37.1 °C)      Intake and Output:  Current Shift: No intake/output data recorded. Last 3 Shifts: 701 -  1900  In: 69.2 [I.V.:69.2]  Out: 2800 [Urine:2800]    Physical Exam:   General appearance: A+Ox3, tired, chronically ill-appearing, 5L NC satt'ing 100%  Head: Normocephalic, without obvious abnormality, atraumatic  Eyes: conjunctivae/corneas clear. PERRL, EOM's intact. Throat: Lips, mucosa, and tongue normal. Poor dentition.   Neck: supple, symmetrical, trachea midline, no adenopathy, thyroid: not enlarged, symmetric, no tenderness/mass/nodules and no carotid bruit  Lungs: Relatively clear to auscultation with some crackles over the bases bilaterally, No accessory muscle use, 5L NC  Heart: regular rate and rhythm, S1, S2 normal, no murmur, click, rub or gallop  Abdomen: soft, non-tender. Bowel sounds normal. No masses,  no organomegaly  Extremities: edema +2 bilaterally, otherwise atraumatic  Skin: Skin color, texture, turgor normal. No rashes or lesions  Lymph nodes: Cervical, supraclavicular, and axillary nodes normal.  Neurologic: Grossly normal, following commands, calm    Additional comments:None    Lab/Data Review: All lab results for the last 24 hours reviewed. Recent Results (from the past 24 hour(s))   CBC WITH AUTOMATED DIFF    Collection Time: 09/26/20  4:20 AM   Result Value Ref Range    WBC 11.1 (H) 3.6 - 11.0 K/uL    RBC 2.75 (L) 3.80 - 5.20 M/uL    HGB 9.1 (L) 11.5 - 16.0 %    HCT 28.1 (L) 35.0 - 47.0 %    .2 (H) 80.0 - 99.0 FL    MCH 33.1 26.0 - 34.0 PG    MCHC 32.4 30.0 - 36.5 g/dL    RDW 15.1 (H) 11.5 - 14.5 %    PLATELET 902 359 - 707 K/uL    MPV 10.1 8.9 - 12.9 FL    NEUTROPHILS 76 (H) 32 - 75 %    LYMPHOCYTES 13 12 - 49 %    MONOCYTES 8 5 - 13 %    EOSINOPHILS 3 0 - 7 %    BASOPHILS 0 0 - 1 %    IMMATURE GRANULOCYTES 0 0.0 - 0.5 %    ABS. NEUTROPHILS 8.5 (H) 1.8 - 8.0 K/UL    ABS. LYMPHOCYTES 1.4 0.8 - 3.5 K/UL    ABS. MONOCYTES 0.9 0.0 - 1.0 K/UL    ABS. EOSINOPHILS 0.3 0.0 - 0.4 K/UL    ABS. BASOPHILS 0.0 0.0 - 0.1 K/UL    ABS. IMM.  GRANS. 0.1 (H) 0.00 - 0.04 K/UL    DF AUTOMATED     METABOLIC PANEL, BASIC    Collection Time: 09/26/20  4:20 AM   Result Value Ref Range    Sodium 143 136 - 145 mmol/L    Potassium 3.4 (L) 3.5 - 5.1 mmol/L    Chloride 104 97 - 108 mmol/L    CO2 27 21 - 32 mmol/L    Anion gap 12 5 - 15 mmol/L    Glucose 64 (L) 65 - 100 mg/dL    BUN 22 (H) 6 - 20 mg/dL    Creatinine 1.07 (H) 0.55 - 1.02 mg/dL    BUN/Creatinine ratio 21 (H) 12 - 20      GFR est AA >60 >60 ml/min/1.73m2 GFR est non-AA 53 (L) >60 ml/min/1.73m2    Calcium 9.2 8.5 - 10.1 mg/dL   MAGNESIUM    Collection Time: 09/26/20  4:20 AM   Result Value Ref Range    Magnesium 2.0 1.6 - 2.4 mg/dL   ECHO ADULT COMPLETE    Collection Time: 09/26/20  3:40 PM   Result Value Ref Range    Pulmonic Regurgitant End Max Velocity 131.00 cm/s    AoV PG 7.00 mmHg    Ao Root D 2.80 cm    IVSd 1.22 (A) 0.6 - 0.9 cm    LVIDd 4.00 3.9 - 5.3 cm    LVIDs 2.81 cm    Pulmonic Regurgitant End Max Velocity 82.10 cm/s    LVOT Peak Gradient 3.00 mmHg    LVPWd 1.20 (A) 0.6 - 0.9 cm    LV E' Septal Velocity 6.08 cm/s    BP EF 65.3 55 - 100 %    E/E' septal 15.39     Mitral Valve E Wave Deceleration Time 0.16 s    MV A Irvin 58.60 cm/s    MV E Irvin 93.60 cm/s    MV E/A 1.60     Pulmonic Regurgitant End Max Velocity 103.00 cm/s    Pulmonic Valve Systolic Peak Instantaneous Gradient 4.00 mmHg    Est. RA Pressure 3.00 mmHg    RVIDd 3.08 cm    RVSP 20.00 mmHg    Tricuspid Valve Max Velocity 208.00 cm/s    Triscuspid Valve Regurgitation Peak Gradient 17.00 mmHg    LV Mass .5 67 - 162 g    LV Mass AL Index 107.5 43 - 95 g/m2         Chest X-Ray:   XR SWALLOW FUNC VIDEO   Final Result   Impression: Laryngeal penetration with thin, nectar thick, honey thick, and   pudding consistency. No aspiration. Please see recommendation by speech   pathology. Dose: Kerma= 13 mGy. Fluoroscopy time 1 minute and 43 seconds      XR CHEST PORT   Final Result   IMPRESSION:   1. Overall findings are supportive of pulmonary edema with bilateral pleural   effusions. The perihilar opacities appear slightly progressed on the right from   one day prior. XR CHEST PORT   Final Result   IMPRESSION: Endotracheal tube and gastric tube removed. Bilateral pleural   effusions. Improved aeration of the lower lung zones. Mild prominence of the   interstitium.       XR CHEST PORT   Final Result   IMPRESSION: Persistent bibasilar lung findings previously described as CHF and   probably with effusion complicating visualization of the right lung base. Continued surveillance recommended      XR CHEST PORT   Final Result   IMPRESSION: Improving congestive heart failure pattern. XR CHEST SNGL V   Final Result   Findings/impression:      Moderate bilateral pleural effusion present. There is extensive interstitial and   airspace disease seen throughout both lungs, including dense airspace disease   involving left lower lobe with air bronchograms. No interval improvement. No   pneumothorax. Endotracheal tube 5 cm above pilo. Nasogastric tube below   diaphragm. No suspicious osseous lesions. XR CHEST SNGL V   Final Result   Impression: Congestive heart failure with interstitial and alveolar pulmonary   edema with bilateral pleural effusions. XR CHEST PORT    (Results Pending)       CT imaging:  CT Results  (Last 48 hours)    None          PFTs:   PFT Results  (Last 3 results in the past 10 years)    None            Assessment:     Patient is a 51-year-old  female with history of diastolic CHF, hypertension, emphysema, CVA, renal artery stenosis, mitral valve regurgitation, and chronic hypoxemic respiratory failure currently on 5 L nasal cannula at home who presented back to [facility] on 9/21/2020 with increasing respiratory distress and hypoxia. Initially placed on BiPAP given significant volume overload present on chest imaging, however she was intubated. Now extubated on 9/24/20. Plan:     1.)  Acute on chronic hypoxic respiratory failure  -Secondary to acute on chronic heart failure and flash pulmonary edema.   -Now successfully extubated on 9/24; currently on home 5 L nasal cannula, but suspect that she can be weaned down further this admission given that she is satting 100%.   -Chest x-ray reviewed and showing improvement in bilateral airspace disease, persistent pleural effusions.   -Continue with Bumex to 1 mg IV every 8 hours today and continue to monitor strict I/O's and daily weights. -Very low suspicion for COPD exacerbation or pneumonia. Agree with holding antibiotics for now, continue home prednisone 5 mg daily. Continue scheduled bronchodilators. - Likely transfer to the floor tomorrow if off Precedex. - Patient also appears to be chronically aspirating, however modified barium swallow showed laryngeal penetration with thin nectar and honey thickened liquids. No kim aspiration. Started on honey thickened liquids. - Continue speech therapy evaluations. 2.)  Acute on chronic diastolic heart failure  -Significant pulmonary edema and bilateral pleural effusions. Likely worsened by poorly controlled blood pressure, renal artery stenosis, poor nutrition.  -Extubated 9/24/20 successfully; weaning supplemental O2.  -Cardiology is following, appreciate their recommendations. Most recent transthoracic echocardiogram 8/2020 with normal EF and mild to moderate mitral valve regurgitation.  -After discussion with cardiology, her allergy to sulfa was apparently discovered after taking Bactrim. Supposedly, she had never had Lasix/Bumex prior to this admission. Patient tolerating Bumex without evidence of anaphylaxis.  -Increase Bumex to 1 mg IV every 8 hours.  -Aldactone restarted  -Patient does have persistent b/l pleural effusions which are unlikely to resolve with diuresis alone. She did require thoracentesis during her last admission. Will repeat CXR on Sunday, and consideration of IR consult for therapeutic thoracentesis (likely start on the right) on Monday. Drainage of her pleural space will allow for better lung expansion and lead to likely better rehab potential.    3.)  Accelerated hypertension-   -Blood pressure in the 200s on admission, likely leading to flash pulmonary edema. Was better controlled, now back into the 160's-180's  -Continue current regimen, but will increase her Hydralazine to 50 mg TID (from BID) today.  Also, Bumex dose has been increased as above. -Appreciate assistance from Cardiology. 4.)  Emphysema  -Per review of her records, she has never follow-up with us in the clinic and supposedly has not had PFTs. -Does not appear to be on any maintenance inhalers at home.  -She missed her follow-up appointment with us this week, I will reschedule her in our office on 10/13/2020 at 10:30 AM.  -Low suspicion for acute exacerbation of COPD. -Switch back to scheduled prednisone 5 mg daily which apparently is a chronic medication.  -Agree with q6 scheduled duo nebs. 5.)  Renal artery stenosis  -Renal artery duplex from 8/24/2020 showing right renal artery stenosis.  -Likely at least contributing to her poorly controlled hypertension. Cardiology has been consulted. 6.)  Seizure disorder  -Seems to have occurred after her CVA, continue on home Keppra/carbamazepine.  -No evidence of seizure activity at this time. 7.) Anxiety  - Seems to be poorly controlled; Precedex required restarting at 0.7  - Discussed with the bedside nurse, recommend titrating this to off by tomorrow morning.  - Patient already has PRN Xanax ordered. I will continue to follow along with her while she is admitted. CODE STATUS: Full Code    Lines/Tubes: Sanchez catheter, 2 peripheral IVs    Prophylaxis:  Stress Ulcer Prophylaxis: Famotidine IV  Deep Vein Thrombosis Prophylaxis: Lovenox      Disposition and Family: Keep in the ICU today as she is still on Precedex, wean this to off. Likely out of the ICU tomorrow. Total time spent with patient: More than 30 minutes was spent with the patient reviewing extensive labs and chest imaging, discussing in detail with the bedside nurse and documentation.       Ben Aguilar MD  [facility]  9/26/2020

## 2020-09-26 NOTE — PROGRESS NOTES
Problem: Dysphagia (Adult)  Goal: *Acute Goals and Plan of Care (Insert Text)  Description: Speech Therapy Swallow Goals  Initiated 9/25/2020       -Patient will tolerate PO trials without clinical indicators of aspiration given minimal cues within 7 day(s). -Patient will participate in modified barium swallow study within 7 day(s). -Patient will perform BOT and laryngeal strengthening exercises with minimal cues within 10 day(s). -Patient will demonstrate understanding of swallow safety precautions and aspiration precautions, diet recs with minimal cues within 7 day(s). Outcome: Progressing Towards Goal     Problem: Dysphagia (Adult)  Goal: Interventions  Outcome: Progressing Towards Goal        SPEECH LANGUAGE PATHOLOGY DYSPHAGIA TREATMENT  Patient: Jada Prabhakar (26 y.o. female)  Date: 9/26/2020  Diagnosis: Acute respiratory failure ([address] [J96.00]   <principal problem not specified>       Precautions: N/A     ASSESSMENT:  Patient seen for follow-up. Nsg reports improved pt alertness this date and pt requesting po; refused NGT Patient alert, oriented to self, agreeable to po trials. Pt's  present with pt's consent. Pt and  edu regarding results of MBS 09-; pt reports does not remember participating in [facility]. Administered trials HTL and puree. Mild delay in initiation of swallow. Once initated, HLE appears mildly reduced to digital palpation with puree, WFL with HTL. Cough response x1 and wet vocal quality x1. Clinician elicited double swallow with all trials and consistencies. BOT/laryngeal strengthening exercises introduced: effortful swallow x5, dayo. Pt difficulty with dayo. Pt encouraged to perform exercises 10x twice daily. PLAN:  Recommendations and Planned Interventions:  Rec cont NPO with occasional SMALL sips HTL via 1/4 tsp WITH chin tuck and double swallows with EVERY sip.  ST to continue to follow up for pt/caregiver edu, po trials, and skilled ST. Rec pt cont BOT/laryngeal swallow exercises 10x, twice daily. Patient continues to benefit from skilled intervention to address the above impairments. Continue treatment per established plan of care. Discharge Recommendations:  Inpatient Rehab     SUBJECTIVE:   Patient stated I'm hungry and thirsty.  Pt reports throat soreness but feels better overall. OBJECTIVE:   Cognitive and Communication Status:  Neurologic State: Alert, Drowsy  Orientation Level: Oriented to person, Oriented to place, Disoriented to situation, Disoriented to time  Cognition: Appropriate decision making, Follows commands           Dysphagia Treatment:  Oral Assessment:  Oral Assessment  Labial: No impairment  Dentition: Poor  Oral Hygiene: Poor  Lingual: Decreased strength  Velum: No impairment  Mandible: No impairment  P.O. Trials:  Patient Position: upright 90 degrees  Vocal quality prior to P.O.: Hoarse;Breathy(intermittently aphonic)  Consistency Presented: Honey thick liquid;Puree  How Presented: SLP-fed/presented;Spoon     Bolus Acceptance: No impairment  Bolus Formation/Control: No impairment     Propulsion: No impairment  Oral Residue: None  Initiation of Swallow: Delayed (# of seconds)  Laryngeal Elevation: Other (comment)(reduced with puree; appears WFL upon palpation with HTL)  Aspiration Signs/Symptoms: Weak cough; Change vocal quality(wet vocal quality x1)  Pharyngeal Phase Characteristics: Double swallow(clinician elicited)  Effective Modifications: Chin tuck; Double swallow(effortful swallow)  Cues for Modifications: Moderate         After treatment:   Patient left in no apparent distress in bed, Call bell within reach, Nursing notified, and Caregiver / family present    COMMUNICATION/EDUCATION:   Patient was educated regarding aspiration risks, safe swallow precautions, MBS results/recs, BOT/laryngeal strengthening exercises, and ST POC.   She demonstrated Good understanding as evidenced by engagement and demonstration. The patient's plan of care including recommendations, planned interventions, and recommended diet changes were discussed with: Registered nurse, Physician, and pt/pt's  .      Anusha Vieira M.S., CCC-SLP  Time Calculation: 27 mins

## 2020-09-26 NOTE — PROGRESS NOTES
CARDIOLOGY PROGRESS NOTE    Patient seen and examined. This is a patient who is followed for acute on chronic heart failure. Doing better slowly. Tolerating being extubated. Still with dyspnea, but stable. No chest pain. Good urine output noted with IV diuretics. No chest pain. No other complaints reported. Telemetry reviewed, there were no events noted in the past 24 hours. Sinus rhythm 70-80s with PACs. Pertinent review of systems items noted above, all other systems are negative. Current medications reviewed. Physical Examination  Vital signs are stable. Blood pressure 136/87, Pulse 86  Intubated and sedated No apparent distress. Heart is regular, rate and rhythm. Normal S1, S2, no murmurs are appreciated. Lungs are coarse bilaterally  Abdomen is soft, nontender, normal bowel sounds. Extremities have trace +1 edema bilaterally. Sanchez catheter in place drain clear yellow urine. Labs reviewed:    Impression and recommendations  1. Acute on chronic heart failure:  Still with volume overload. Continue IV diuretic therapy. 2. Mitral valve regurgitation: Last admission showing  Moderate to severe on [facility] admission with repeat echo showing improvement to mild to moderate following aggressive diuresis. Continue diuresis as stated above.      3. Hypertension:  Blood pressure is stable, slightly variable due to dyspnea. Continue current therapy. 4. Hypokalemia: Closely monitor and replete as needed. Please do not hesitate to call me if additional questions arise.

## 2020-09-26 NOTE — PROGRESS NOTES
Bedside shift change report given to IRENA Srivastava (oncoming nurse) by Elijah Jesus (offgoing nurse). Report included the following information SBAR.

## 2020-09-27 ENCOUNTER — APPOINTMENT (OUTPATIENT)
Dept: GENERAL RADIOLOGY | Age: 55
DRG: 194 | End: 2020-09-27
Attending: INTERNAL MEDICINE
Payer: COMMERCIAL

## 2020-09-27 LAB
ECHO AO ROOT DIAM: 2.8 CM
ECHO AV PEAK GRADIENT: 7 MMHG
ECHO EST RA PRESSURE: 3 MMHG
ECHO LV E' SEPTAL VELOCITY: 6.08 CM/S
ECHO LV EJECTION FRACTION BIPLANE: 65.3 % (ref 55–100)
ECHO LV INTERNAL DIMENSION DIASTOLIC: 4 CM (ref 3.9–5.3)
ECHO LV INTERNAL DIMENSION SYSTOLIC: 2.81 CM
ECHO LV IVSD: 1.22 CM (ref 0.6–0.9)
ECHO LV MASS 2D: 167.5 G (ref 67–162)
ECHO LV MASS INDEX 2D: 107.5 G/M2 (ref 43–95)
ECHO LV POSTERIOR WALL DIASTOLIC: 1.2 CM (ref 0.6–0.9)
ECHO LVOT PEAK GRADIENT: 3 MMHG
ECHO MV A VELOCITY: 58.6 CM/S
ECHO MV E DECELERATION TIME (DT): 0.16 S
ECHO MV E VELOCITY: 93.6 CM/S
ECHO MV E/A RATIO: 1.6
ECHO MV E/E' SEPTAL: 15.39
ECHO PV PEAK INSTANTANEOUS GRADIENT SYSTOLIC: 4 MMHG
ECHO PV REGURGITANT MAX VELOCITY: 103 CM/S
ECHO PV REGURGITANT MAX VELOCITY: 131 CM/S
ECHO PV REGURGITANT MAX VELOCITY: 82.1 CM/S
ECHO RIGHT VENTRICULAR SYSTOLIC PRESSURE (RVSP): 20 MMHG
ECHO RV INTERNAL DIMENSION: 3.08 CM
ECHO TV MAX VELOCITY: 208 CM/S
ECHO TV REGURGITANT PEAK GRADIENT: 17 MMHG

## 2020-09-27 PROCEDURE — 74011250637 HC RX REV CODE- 250/637: Performed by: HOSPITALIST

## 2020-09-27 PROCEDURE — 74011250636 HC RX REV CODE- 250/636: Performed by: HOSPITALIST

## 2020-09-27 PROCEDURE — 74011000250 HC RX REV CODE- 250: Performed by: FAMILY MEDICINE

## 2020-09-27 PROCEDURE — 94640 AIRWAY INHALATION TREATMENT: CPT

## 2020-09-27 PROCEDURE — 65610000006 HC RM INTENSIVE CARE

## 2020-09-27 PROCEDURE — 74011250637 HC RX REV CODE- 250/637: Performed by: NURSE PRACTITIONER

## 2020-09-27 PROCEDURE — 77010033678 HC OXYGEN DAILY

## 2020-09-27 PROCEDURE — 74011250637 HC RX REV CODE- 250/637: Performed by: INTERNAL MEDICINE

## 2020-09-27 PROCEDURE — 74011000258 HC RX REV CODE- 258: Performed by: FAMILY MEDICINE

## 2020-09-27 PROCEDURE — 71045 X-RAY EXAM CHEST 1 VIEW: CPT

## 2020-09-27 PROCEDURE — 74011636637 HC RX REV CODE- 636/637: Performed by: INTERNAL MEDICINE

## 2020-09-27 PROCEDURE — 74011000250 HC RX REV CODE- 250: Performed by: INTERNAL MEDICINE

## 2020-09-27 RX ADMIN — BUMETANIDE 1 MG: 0.25 INJECTION INTRAMUSCULAR; INTRAVENOUS at 06:42

## 2020-09-27 RX ADMIN — HYDRALAZINE HYDROCHLORIDE 50 MG: 50 TABLET, FILM COATED ORAL at 21:01

## 2020-09-27 RX ADMIN — POTASSIUM CHLORIDE 40 MEQ: 750 TABLET, FILM COATED, EXTENDED RELEASE ORAL at 20:47

## 2020-09-27 RX ADMIN — SERTRALINE HYDROCHLORIDE 25 MG: 50 TABLET ORAL at 10:06

## 2020-09-27 RX ADMIN — CARBAMAZEPINE 200 MG: 200 TABLET ORAL at 19:13

## 2020-09-27 RX ADMIN — ISOSORBIDE DINITRATE 40 MG: 20 TABLET ORAL at 19:13

## 2020-09-27 RX ADMIN — IPRATROPIUM BROMIDE AND ALBUTEROL SULFATE 3 ML: .5; 3 SOLUTION RESPIRATORY (INHALATION) at 00:53

## 2020-09-27 RX ADMIN — IPRATROPIUM BROMIDE AND ALBUTEROL SULFATE 3 ML: .5; 3 SOLUTION RESPIRATORY (INHALATION) at 13:04

## 2020-09-27 RX ADMIN — BUMETANIDE 1 MG: 0.25 INJECTION INTRAMUSCULAR; INTRAVENOUS at 21:00

## 2020-09-27 RX ADMIN — ENOXAPARIN SODIUM 40 MG: 40 INJECTION SUBCUTANEOUS at 10:06

## 2020-09-27 RX ADMIN — FERROUS SULFATE TAB 325 MG (65 MG ELEMENTAL FE) 325 MG: 325 (65 FE) TAB at 07:44

## 2020-09-27 RX ADMIN — DEXMEDETOMIDINE HYDROCHLORIDE 0.5 MCG/KG/HR: 100 INJECTION, SOLUTION, CONCENTRATE INTRAVENOUS at 07:26

## 2020-09-27 RX ADMIN — NIFEDIPINE 60 MG: 60 TABLET, EXTENDED RELEASE ORAL at 20:47

## 2020-09-27 RX ADMIN — IPRATROPIUM BROMIDE AND ALBUTEROL SULFATE 3 ML: .5; 3 SOLUTION RESPIRATORY (INHALATION) at 19:46

## 2020-09-27 RX ADMIN — Medication 10 ML: at 15:01

## 2020-09-27 RX ADMIN — ISOSORBIDE DINITRATE 40 MG: 20 TABLET ORAL at 10:05

## 2020-09-27 RX ADMIN — NIFEDIPINE 60 MG: 60 TABLET, EXTENDED RELEASE ORAL at 10:05

## 2020-09-27 RX ADMIN — HYDRALAZINE HYDROCHLORIDE 50 MG: 50 TABLET, FILM COATED ORAL at 17:18

## 2020-09-27 RX ADMIN — ALPRAZOLAM 0.25 MG: 0.25 TABLET ORAL at 21:01

## 2020-09-27 RX ADMIN — HYDRALAZINE HYDROCHLORIDE 50 MG: 50 TABLET, FILM COATED ORAL at 10:06

## 2020-09-27 RX ADMIN — ALPRAZOLAM 0.25 MG: 0.25 TABLET ORAL at 13:20

## 2020-09-27 RX ADMIN — LEVETIRACETAM 500 MG: 500 TABLET ORAL at 14:58

## 2020-09-27 RX ADMIN — LEVOTHYROXINE SODIUM 25 MCG: 0.03 TABLET ORAL at 07:44

## 2020-09-27 RX ADMIN — LABETALOL HYDROCHLORIDE 400 MG: 200 TABLET, FILM COATED ORAL at 10:04

## 2020-09-27 RX ADMIN — ONDANSETRON 4 MG: 2 INJECTION INTRAMUSCULAR; INTRAVENOUS at 13:20

## 2020-09-27 RX ADMIN — PREDNISONE 5 MG: 5 TABLET ORAL at 07:44

## 2020-09-27 RX ADMIN — Medication 10 ML: at 21:04

## 2020-09-27 RX ADMIN — IPRATROPIUM BROMIDE AND ALBUTEROL SULFATE 3 ML: .5; 3 SOLUTION RESPIRATORY (INHALATION) at 07:13

## 2020-09-27 RX ADMIN — POTASSIUM CHLORIDE 40 MEQ: 750 TABLET, FILM COATED, EXTENDED RELEASE ORAL at 10:04

## 2020-09-27 RX ADMIN — SPIRONOLACTONE 25 MG: 25 TABLET ORAL at 10:06

## 2020-09-27 RX ADMIN — Medication 10 ML: at 06:43

## 2020-09-27 RX ADMIN — Medication 400 MG: at 10:05

## 2020-09-27 RX ADMIN — CARBAMAZEPINE 200 MG: 200 TABLET ORAL at 10:06

## 2020-09-27 RX ADMIN — NITROGLYCERIN 0.5 INCH: 20 OINTMENT TOPICAL at 19:13

## 2020-09-27 RX ADMIN — ASPIRIN 81 MG: 81 TABLET, COATED ORAL at 10:05

## 2020-09-27 RX ADMIN — FOLIC ACID 1 MG: 1 TABLET ORAL at 10:04

## 2020-09-27 RX ADMIN — LABETALOL HYDROCHLORIDE 400 MG: 200 TABLET, FILM COATED ORAL at 20:47

## 2020-09-27 RX ADMIN — BUMETANIDE 1 MG: 0.25 INJECTION INTRAMUSCULAR; INTRAVENOUS at 14:59

## 2020-09-27 NOTE — PROGRESS NOTES
[facility] [facility] CARDIOLOGY  CARDIOLOGY PROGRESS NOTE    Patient seen and examined. This is a patient who is followed for acute on chronic heart failure. He is to be doing about the same. Some mild difficulty with swallowing. Some anxiety as well. Still with dyspnea, but stable. No chest pain. Good urine output noted with IV diuretics. No chest pain. No other complaints reported. Telemetry reviewed, there were no events noted in the past 24 hours. Sinus rhythm 70-80s with PACs. Pertinent review of systems items noted above, all other systems are negative. Current medications reviewed. Physical Examination  Vital signs are stable. Blood pressure 126/87, Pulse 86  Intubated and sedated No apparent distress. Heart is regular, rate and rhythm. Normal S1, S2, no murmurs are appreciated. Lungs are coarse bilaterally  Abdomen is soft, nontender, normal bowel sounds. Extremities have trace +1 edema bilaterally. Sanchez catheter in place drain clear yellow urine. Labs reviewed. Creatinine stable at 1.1. Impression and recommendations  1. Acute on chronic heart failure:  Still with volume overload. Continue IV diuretic therapy. 2. Mitral valve regurgitation: Last admission showing  Moderate to severe on [facility] admission with repeat echo showing improvement to mild to moderate following aggressive diuresis. Continue diuresis as stated above.      3. Hypertension:  Blood pressure is stable, slightly variable due to dyspnea. Continue current therapy. 4. Hypokalemia: Closely monitor and replete as needed. Please do not hesitate to call me if additional questions arise.

## 2020-09-27 NOTE — PROGRESS NOTES
Progress Note    Patient: Jada Prabhakar MRN: [id number]  SSN: [id number]    YOB: 1965  Age: 54 y.o. Sex: female      Admit Date: 9/21/2020    LOS: 6 days     Subjective:     CC: shortness of breath      54F, h/o COPD with acute on chronic respiratory failure on 2L NC, with shortness of breath s/t [id number] was intubated s/t acute hypoxic respiratory failure.     Extubated today, continue aggressive IV diuresis with IV bumex. On venti mask. Aspiration precautions.      Review of Systems:  Constitutional:  denies weight loss, no fever, no chills, no night sweats  Eye: No recent visual disturbances, no discharge, no double vision  Ear/nose/mouth/throat : No hearing disturbance, no ear pain, no nasal congestion, no sore throat, no trouble swallowing. Respiratory : No trouble breathing, no cough, no shortness of breath, no hemoptysis, no wheezing  Cardiovascular : No chest pain, no palpitation, no racing of heart, no orthopnea, no paroxysmal nocturnal dyspnea, no peripheral edema  Gastrointestinal : No nausea, no vomiting, no diarrhea, constipation, heartburn, abdominal pain  Genitourinary : No dysuria, no hematuria, no increased frequency, incontinence,  Lymphatics : No swollen glands -Neck, axillary, inguinal  Endocrine : No excessive thirst, no polyuria no cold intolerance, no heat intolerance.   Immunologic : No hives, urticaria, no seasonal allergies,   Musculoskeletal : Left upper back pain.  No joint swelling, pain, effusion,  no neck pain,   Integumentary : No rash, no pruritus, no ecchymosis  Hematology : No petechiae, No easy bruising,  No tendency to bleed easy  Neurology : Denies change in mental status, no abnormal balance, no headache, no confusion, numbness, tingling,  Psychiatric : No mood swings, no anxiety, depression           Objective:     Vitals:    09/27/20 1123 09/27/20 1200 09/27/20 1306 09/27/20 1326   BP:  (!) 95/49  (!) 114/57   Pulse:       Resp:  15  13   Temp: 97.9 °F (36.6 °C)      SpO2:  95% 97% 95%   Weight:       Height:            Intake and Output:  Current Shift: 09/27 0701 - 09/27 1900  In: -   Out: 900 [Urine:900]  Last three shifts: 09/25 1901 - 09/27 0700  In: -   Out: 2050 [Urine:2050]      Physical Exam:   General appearance: A+Ox3, tired, chronically ill-appearing, 5L NC satt'ing 100%  Head: Normocephalic, without obvious abnormality, atraumatic  Eyes: conjunctivae/corneas clear. PERRL, EOM's intact. Throat: Lips, mucosa, and tongue normal. Poor dentition. Neck: supple, symmetrical, trachea midline, no adenopathy, thyroid: not enlarged, symmetric, no tenderness/mass/nodules and no carotid bruit  Lungs: Relatively clear to auscultation with some rhonchi in the bases bilaterally, No accessory muscle use, 5L NC  Heart: regular rate and rhythm, S1, S2 normal, no murmur, click, rub or gallop  Abdomen: soft, non-tender.  Bowel sounds normal. No masses,  no organomegaly  Extremities: edema +2 bilaterally, otherwise atraumatic  Skin: Skin color, texture, turgor normal. No rashes or lesions  Lymph nodes: Cervical, supraclavicular, and axillary nodes normal.  Neurologic: Grossly normal, following commands, calm      Lab/Data Review:  Recent Results (from the past 24 hour(s))   ECHO ADULT COMPLETE    Collection Time: 09/26/20  3:40 PM   Result Value Ref Range    Pulmonic Regurgitant End Max Velocity 131.00 cm/s    AoV PG 7.00 mmHg    Ao Root D 2.80 cm    IVSd 1.22 (A) 0.6 - 0.9 cm    LVIDd 4.00 3.9 - 5.3 cm    LVIDs 2.81 cm    Pulmonic Regurgitant End Max Velocity 82.10 cm/s    LVOT Peak Gradient 3.00 mmHg    LVPWd 1.20 (A) 0.6 - 0.9 cm    LV E' Septal Velocity 6.08 cm/s    BP EF 65.3 55 - 100 %    E/E' septal 15.39     Mitral Valve E Wave Deceleration Time 0.16 s    MV A Irvin 58.60 cm/s    MV E Irvin 93.60 cm/s    MV E/A 1.60     Pulmonic Regurgitant End Max Velocity 103.00 cm/s    Pulmonic Valve Systolic Peak Instantaneous Gradient 4.00 mmHg    Est. RA Pressure 3.00 mmHg RVIDd 3.08 cm    RVSP 20.00 mmHg    Tricuspid Valve Max Velocity 208.00 cm/s    Triscuspid Valve Regurgitation Peak Gradient 17.00 mmHg    LV Mass .5 67 - 162 g    LV Mass AL Index 107.5 43 - 95 g/m2         Assessment and plan:      1.  Acute on chronic respiratory failure: extubated today.  On 5L NC     2.  Congestive heart failure exacerbation: Patient is allergic to sulfa drugs, will continue patient on ethacrynic acid and spironolactone.  Monitor patient's intake and output.     3.  Hypertension: Continue patient's current antihypertensive medications.     4.  Anxiety disorder: Continue patient on Xanax     5.  Seizure disorder: Continue patient on Keppra and carbamazepine.     6.  Iron deficiency anemia: Continue patient on iron supplement     7.  Renal artery stenosis: Cardiology closely following the patient will follow up with them for further recommendations.      Code Status: Full code  Surrogate decision maker-   DVT Prophylaxis: Lovenox  GI Prophylaxis: pepcid         Signed By: Sid Davis MD     September 27, 2020

## 2020-09-27 NOTE — PROGRESS NOTES
Pulmonology and Critical Care Progress Note    Subjective:     Chief Complaint:   Chief Complaint   Patient presents with    Respiratory Distress        Patient seen and examined in her room; successful extubation 9/24 to 5L NC. Doing fairly well. Now on room air. She is much more lucid today.  at the bedside. Discussed with the nurse at the bedside. She is on Precedex at 0.5 mics. Some confusion remain. She has been evaluated by speech therapy and they ordered an MBS given aspiration concerns. Per the bedside nurse, she apparently aspirated on multiple consistencies, however she can take honey thickened consistencies. She is currently resting in bed and does report improvement in her overall SOB compared to admission. No sputum production. Past Medical History:   Diagnosis Date    Chronic obstructive pulmonary disease (HCC)     Chronic respiratory failure (HCC)     Congestive heart failure (CHF) (HCC)     Hypertension     Hypertension     Renal artery stenosis (HCC)     Seizure disorder (HCC)      No past surgical history on file.    Family History   Problem Relation Age of Onset    Hypertension Mother     Stroke Mother      Social History     Tobacco Use    Smoking status: Heavy Tobacco Smoker     Types: Cigarettes    Tobacco comment: former quit only 1 month ago during recent admission to the hospital   Substance Use Topics    Alcohol use: Not Currently      Current Facility-Administered Medications   Medication Dose Route Frequency Provider Last Rate Last Dose    potassium chloride SR (KLOR-CON 10) tablet 40 mEq  40 mEq Oral BID Vlad Obando DO   40 mEq at 09/27/20 1004    dexmedeTOMidine (PRECEDEX) 400 mcg in 0.9% sodium chloride 104 mL infusion  0.2-0.7 mcg/kg/hr IntraVENous TITRATE Rehana Isidro MD 7.7 mL/hr at 09/27/20 0726 0.5 mcg/kg/hr at 09/27/20 0726    NIFEdipine ER (PROCARDIA XL) tablet 60 mg  60 mg Oral BID Smita Hong, NP   60 mg at 09/27/20 1005    labetaloL (NORMODYNE) tablet 400 mg  400 mg Oral Q12H Brandi Britt, NP   400 mg at 09/27/20 1004    bumetanide (BUMEX) injection 1 mg  1 mg IntraVENous Q8H Vlad Obando DO   1 mg at 09/27/20 1459    hydrALAZINE (APRESOLINE) tablet 50 mg  50 mg Oral TID Meryle Sluder, DO   50 mg at 09/27/20 1718    labetaloL (NORMODYNE;TRANDATE) 20 mg/4 mL (5 mg/mL) injection 20 mg  20 mg IntraVENous Q6H PRN Vlad Obando DO   20 mg at 09/25/20 1259    hydrALAZINE (APRESOLINE) 20 mg/mL injection 10 mg  10 mg IntraVENous Q2H PRN Vincent Menjivar MD   10 mg at 09/26/20 0226    spironolactone (ALDACTONE) tablet 25 mg  25 mg Oral DAILY Brandi Britt, NP   25 mg at 09/27/20 1006    levETIRAcetam (KEPPRA) 500 mg in saline (iso-osm) 100 mL IVPB (premix)  500 mg IntraVENous Q12H Roya Wolfe MD   Stopped at 09/25/20 2337    Or    levETIRAcetam (KEPPRA) tablet 500 mg  500 mg Oral Q12H Roya Wolfe MD   500 mg at 09/27/20 1458    predniSONE (DELTASONE) tablet 5 mg  5 mg Oral DAILY WITH BREAKFAST Vlad Obando DO   5 mg at 09/27/20 0744    famotidine (PF) (PEPCID) 20 mg in 0.9% sodium chloride 10 mL injection  20 mg IntraVENous Q12H Vlad Obando DO   Stopped at 09/26/20 2100    albuterol-ipratropium (DUO-NEB) 2.5 MG-0.5 MG/3 ML  3 mL Nebulization Q6H RT Vlad Obando DO   3 mL at 09/27/20 1304    aspirin delayed-release tablet 81 mg  81 mg Oral DAILY Andrzej Campbell MD   81 mg at 09/27/20 1005    isosorbide dinitrate (ISORDIL) tablet 40 mg  40 mg Oral BID Andrzej Campbell MD   40 mg at 09/27/20 1005    magnesium oxide (MAG-OX) tablet 400 mg  400 mg Oral DAILY Andrzej Campbell MD   400 mg at 09/27/20 1005    ALPRAZolam [place] Pennant) tablet 0.25 mg  0.25 mg Oral TID PRN Andrzej Campbell MD   0.25 mg at 09/27/20 1320    sertraline (ZOLOFT) tablet 25 mg  25 mg Oral DAILY Andrzej Campbell MD   25 mg at 09/27/20 1006    levothyroxine (SYNTHROID) tablet 25 mcg  25 mcg Oral ACB Andrzej Campbell MD   25 mcg at 09/27/20 0781    carBAMazepine (TEGretol) tablet 200 mg  200 mg Oral BID Bao Benavidez MD   200 mg at 09/27/20 1006    ferrous sulfate tablet 325 mg  325 mg Oral ACB Bao Benavidez MD   325 mg at 86/33/73 1150    folic acid (FOLVITE) tablet 1 mg  1 mg Oral DAILY Bao Benavidez MD   1 mg at 09/27/20 1004    sodium chloride (NS) flush 5-40 mL  5-40 mL IntraVENous Q8H Bao Benavidez MD   10 mL at 09/27/20 1501    sodium chloride (NS) flush 5-40 mL  5-40 mL IntraVENous PRN Bao Benavidez MD        acetaminophen (TYLENOL) tablet 650 mg  650 mg Oral Q6H PRN Bao Benavidez MD        Or   Ajax.Costain acetaminophen (TYLENOL) suppository 650 mg  650 mg Rectal Q6H PRN Bao Benavidez MD        polyethylene glycol (MIRALAX) packet 17 g  17 g Oral DAILY PRN Bao Benavidez MD        enoxaparin (LOVENOX) injection 40 mg  40 mg SubCUTAneous DAILY Bao Benavidez MD   40 mg at 09/27/20 1006    ondansetron (ZOFRAN) injection 4 mg  4 mg IntraVENous Q6H PRN Bao Benavidez MD   4 mg at 09/27/20 1320    nitroglycerin (NITROBID) 2 % ointment 0.5 Inch  0.5 Inch Topical Q6H Zehra Naranjo MD   Stopped at 09/27/20 0000        Home Meds:  Prior to Admission medications    Medication Sig Start Date End Date Taking?  Authorizing Provider   ALPRAZolam Siobhan Matilde) 0.25 mg tablet Take 0.25 mg by mouth three (3) times daily as needed for Anxiety.     Yes Provider, Historical   ethacrynic acid (EDECRIN) 25 mg tablet Take 50 mg by mouth two (2) times a day.     Yes Provider, Historical   predniSONE (DELTASONE) 5 mg tablet Take 5 mg by mouth daily.     Yes Provider, Historical   sertraline (Zoloft) 25 mg tablet Take 25 mg by mouth daily.     Yes Provider, Historical   hydrALAZINE (APRESOLINE) 50 mg tablet Take 100 mg by mouth three (3) times daily.     Yes Provider, Historical   levothyroxine (SYNTHROID) 25 mcg tablet Take 25 mcg by mouth Daily (before breakfast).     Yes Provider, Historical   levETIRAcetam (Keppra) 500 mg tablet Take 500 mg by mouth two (2) times a day.     Yes Provider, Historical   carBAMazepine, mood stabiliz, 200 mg CM12 Take 200 mg by mouth two (2) times a day.     Yes Provider, Historical   ferrous sulfate 325 mg (65 mg iron) tablet Take 325 mg by mouth Daily (before breakfast).     Yes Provider, Historical   folic acid (FOLVITE) 1 mg tablet Take 1 mg by mouth daily.     Yes Provider, Historical   spironolactone (ALDACTONE) 25 mg tablet Take 100 mg by mouth daily.     Yes Provider, Historical   metoprolol succinate (TOPROL-XL) 100 mg tablet Take 150 mg by mouth daily.       Provider, Historical   NIFEdipine ER (PROCARDIA XL) 60 mg ER tablet Take 60 mg by mouth daily.       Provider, Historical   aspirin delayed-release 81 mg tablet Take  by mouth daily.       Provider, Historical   isosorbide dinitrate (ISORDIL) 20 mg tablet Take 40 mg by mouth two (2) times a day.       Provider, Historical   labetaloL (NORMODYNE) 200 mg tablet Take  by mouth two (2) times a day.       Provider, Historical   magnesium oxide (MAG-OX) 400 mg tablet Take 400 mg by mouth daily.       Provider, Historical   potassium chloride (K-DUR, KLOR-CON) 20 mEq tablet Take 20 mEq by mouth two (2) times a day.       Provider, Historical         Allergies   Allergen Reactions    Sulfa (Sulfonamide Antibiotics) Anaphylaxis       Review of Systems:  Review of systems not obtained due to patient factors. Objective:     Blood pressure 133/69, pulse 84, temperature 98 °F (36.7 °C), resp. rate 17, height 5' 5\" (1.651 m), weight 51.5 kg (113 lb 8.6 oz), SpO2 97 %. Temp (24hrs), Av.2 °F (36.8 °C), Min:97.7 °F (36.5 °C), Max:98.7 °F (37.1 °C)      Intake and Output:  Current Shift: 701 - 1900  In: -   Out: 900 [Urine:900]  Last 3 Shifts: 1901 - 700  In: -   Out:  [Urine:]    Physical Exam:   General appearance: A+Ox3, chronically ill-appearing, currently on room air.   Head: Normocephalic, without obvious abnormality, atraumatic  Eyes: conjunctivae/corneas clear. PERRL, EOM's intact. Throat: Lips, mucosa, and tongue normal. Poor dentition. Neck: supple, symmetrical, trachea midline, no adenopathy, thyroid: not enlarged, symmetric, no tenderness/mass/nodules and no carotid bruit  Lungs: Diminished in general.  Also diminished breath sounds over the bases with some crackles bilaterally. She is currently on room air. Heart: regular rate and rhythm, S1, S2 normal, no murmur, click, rub or gallop  Abdomen: soft, non-tender. Bowel sounds normal. No masses,  no organomegaly  Extremities: edema +1 bilaterally, otherwise atraumatic  Skin: Skin color, texture, turgor normal. No rashes or lesions  Lymph nodes: Cervical, supraclavicular, and axillary nodes normal.  Neurologic: Grossly normal, following commands, calm    Additional comments:None    Lab/Data Review: All lab results for the last 24 hours reviewed. No results found for this or any previous visit (from the past 24 hour(s)). Chest X-Ray:   XR CHEST PORT   Final Result   Impression: Diminished airspace disease. XR SWALLOW FUNC VIDEO   Final Result   Impression: Laryngeal penetration with thin, nectar thick, honey thick, and   pudding consistency. No aspiration. Please see recommendation by speech   pathology. Dose: Kerma= 13 mGy. Fluoroscopy time 1 minute and 43 seconds      XR CHEST PORT   Final Result   IMPRESSION:   1. Overall findings are supportive of pulmonary edema with bilateral pleural   effusions. The perihilar opacities appear slightly progressed on the right from   one day prior. XR CHEST PORT   Final Result   IMPRESSION: Endotracheal tube and gastric tube removed. Bilateral pleural   effusions. Improved aeration of the lower lung zones. Mild prominence of the   interstitium.       XR CHEST PORT   Final Result   IMPRESSION: Persistent bibasilar lung findings previously described as CHF and   probably with effusion complicating visualization of the right lung base.   Continued surveillance recommended      XR CHEST PORT   Final Result   IMPRESSION: Improving congestive heart failure pattern. XR CHEST SNGL V   Final Result   Findings/impression:      Moderate bilateral pleural effusion present. There is extensive interstitial and   airspace disease seen throughout both lungs, including dense airspace disease   involving left lower lobe with air bronchograms. No interval improvement. No   pneumothorax. Endotracheal tube 5 cm above pilo. Nasogastric tube below   diaphragm. No suspicious osseous lesions. XR CHEST SNGL V   Final Result   Impression: Congestive heart failure with interstitial and alveolar pulmonary   edema with bilateral pleural effusions. CT imaging:  CT Results  (Last 48 hours)    None          PFTs:   PFT Results  (Last 3 results in the past 10 years)    None            Assessment:     Patient is a 49-year-old  female with history of diastolic CHF, hypertension, emphysema, CVA, renal artery stenosis, mitral valve regurgitation, and chronic hypoxemic respiratory failure currently on 5 L nasal cannula at home who presented back to [facility] on 9/21/2020 with increasing respiratory distress and hypoxia. Initially placed on BiPAP given significant volume overload present on chest imaging, however she was intubated. Now extubated on 9/24/20. Plan:     1.)  Acute on chronic hypoxic respiratory failure  -Secondary to acute on chronic heart failure and flash pulmonary edema.   -Now successfully extubated on 9/24; currently on room air. Overall improved. -Chest x-ray reviewed and showing improvement in bilateral airspace disease, persistent pleural effusions.   -Continue with Bumex to 1 mg IV every 8 hours today and continue to monitor strict I/O's and daily weights. -Very low suspicion for COPD exacerbation or pneumonia. Agree with holding antibiotics for now, continue home prednisone 5 mg daily. Continue scheduled bronchodilators. - Likely transfer to the floor tomorrow if off Precedex. - Patient also appears to be chronically aspirating, however modified barium swallow showed laryngeal penetration with thin nectar and honey thickened liquids. No kim aspiration. Started on honey thickened liquids. - Continue speech therapy evaluations. 2.)  Acute on chronic diastolic heart failure  -Significant pulmonary edema and bilateral pleural effusions. Likely worsened by poorly controlled blood pressure, renal artery stenosis, poor nutrition.  -Extubated 9/24/20 successfully; weaned off oxygen.  -Cardiology is following, appreciate their recommendations. Most recent transthoracic echocardiogram 8/2020 with normal EF and mild to moderate mitral valve regurgitation.  -After discussion with cardiology, her allergy to sulfa was apparently discovered after taking Bactrim. Supposedly, she had never had Lasix/Bumex prior to this admission. Patient tolerating Bumex without evidence of anaphylaxis. -Aldactone restarted  -Patient does have persistent b/l pleural effusions which are unlikely to resolve with diuresis alone. She did require thoracentesis during her last admission. May consider  IR consult for therapeutic thoracentesis (likely start on the right) on Monday. Drainage of her pleural space will allow for better lung expansion and lead to likely better rehab potential.    3.)  Accelerated hypertension-   -Blood pressure in the 200s on admission, likely leading to flash pulmonary edema. Was better controlled, now back into the 160's-180's  -Continue current regimen, but will increase her Hydralazine to 50 mg TID (from BID) today. Also, Bumex dose has been increased as above. -Appreciate assistance from Cardiology. 4.)  Emphysema  -Per review of her records, she has never follow-up with us in the clinic and supposedly has not had PFTs.   -Does not appear to be on any maintenance inhalers at home.  -She missed her follow-up appointment with us this week, I will reschedule her in our office on 10/13/2020 at 10:30 AM.  -Low suspicion for acute exacerbation of COPD. -Switch back to scheduled prednisone 5 mg daily which apparently is a chronic medication.  -Agree with q6 scheduled duo nebs. 5.)  Renal artery stenosis  -Renal artery duplex from 8/24/2020 showing right renal artery stenosis.  -Likely at least contributing to her poorly controlled hypertension. Cardiology has been consulted. 6.)  Seizure disorder  -Seems to have occurred after her CVA, continue on home Keppra/carbamazepine.  -No evidence of seizure activity at this time. 7.) Anxiety  - Seems to be poorly controlled; Precedex now 0.5 mics. - Discussed with the bedside nurse, recommend titrating this to off by tomorrow morning.  - Patient already has PRN Xanax ordered. I will continue to follow along with her while she is admitted. CODE STATUS: Full Code    Lines/Tubes: Sanchez catheter, 2 peripheral IVs    Prophylaxis:  Stress Ulcer Prophylaxis: Famotidine IV  Deep Vein Thrombosis Prophylaxis: Lovenox      Disposition and Family: Keep in the ICU today as she is still on Precedex, wean this to off. Likely out of the ICU tomorrow. Total time spent with patient: More than 30 minutes was spent with the patient reviewing extensive labs and chest imaging, discussing in detail with the bedside nurse and documentation.       Kb Meadows MD  [facility]  9/27/2020

## 2020-09-28 LAB
ANION GAP SERPL CALC-SCNC: 10 MMOL/L (ref 5–15)
BUN SERPL-MCNC: 25 MG/DL (ref 6–20)
BUN/CREAT SERPL: 13 (ref 12–20)
CA-I BLD-MCNC: 9.5 MG/DL (ref 8.5–10.1)
CHLORIDE SERPL-SCNC: 99 MMOL/L (ref 97–108)
CO2 SERPL-SCNC: 28 MMOL/L (ref 21–32)
CREAT SERPL-MCNC: 1.9 MG/DL (ref 0.55–1.02)
GLUCOSE SERPL-MCNC: 102 MG/DL (ref 65–100)
MAGNESIUM SERPL-MCNC: 1.8 MG/DL (ref 1.6–2.4)
POTASSIUM SERPL-SCNC: 4.1 MMOL/L (ref 3.5–5.1)
SODIUM SERPL-SCNC: 137 MMOL/L (ref 136–145)

## 2020-09-28 PROCEDURE — 65610000006 HC RM INTENSIVE CARE

## 2020-09-28 PROCEDURE — 74011250637 HC RX REV CODE- 250/637: Performed by: HOSPITALIST

## 2020-09-28 PROCEDURE — 94640 AIRWAY INHALATION TREATMENT: CPT

## 2020-09-28 PROCEDURE — 65270000029 HC RM PRIVATE

## 2020-09-28 PROCEDURE — 74011000250 HC RX REV CODE- 250: Performed by: INTERNAL MEDICINE

## 2020-09-28 PROCEDURE — 74011250637 HC RX REV CODE- 250/637: Performed by: INTERNAL MEDICINE

## 2020-09-28 PROCEDURE — 74011250637 HC RX REV CODE- 250/637: Performed by: NURSE PRACTITIONER

## 2020-09-28 PROCEDURE — 36415 COLL VENOUS BLD VENIPUNCTURE: CPT

## 2020-09-28 PROCEDURE — 74011250636 HC RX REV CODE- 250/636: Performed by: HOSPITALIST

## 2020-09-28 PROCEDURE — 80048 BASIC METABOLIC PNL TOTAL CA: CPT

## 2020-09-28 PROCEDURE — 74011636637 HC RX REV CODE- 636/637: Performed by: INTERNAL MEDICINE

## 2020-09-28 PROCEDURE — 83735 ASSAY OF MAGNESIUM: CPT

## 2020-09-28 PROCEDURE — 77010033678 HC OXYGEN DAILY

## 2020-09-28 PROCEDURE — 92526 ORAL FUNCTION THERAPY: CPT

## 2020-09-28 RX ADMIN — CARBAMAZEPINE 200 MG: 200 TABLET ORAL at 18:21

## 2020-09-28 RX ADMIN — ISOSORBIDE DINITRATE 40 MG: 20 TABLET ORAL at 18:21

## 2020-09-28 RX ADMIN — Medication 10 ML: at 05:42

## 2020-09-28 RX ADMIN — LEVETIRACETAM 500 MG: 5 INJECTION INTRAVENOUS at 23:21

## 2020-09-28 RX ADMIN — IPRATROPIUM BROMIDE AND ALBUTEROL SULFATE 3 ML: .5; 3 SOLUTION RESPIRATORY (INHALATION) at 07:34

## 2020-09-28 RX ADMIN — Medication 400 MG: at 09:55

## 2020-09-28 RX ADMIN — ISOSORBIDE DINITRATE 40 MG: 20 TABLET ORAL at 09:54

## 2020-09-28 RX ADMIN — PREDNISONE 5 MG: 5 TABLET ORAL at 09:54

## 2020-09-28 RX ADMIN — SERTRALINE HYDROCHLORIDE 25 MG: 50 TABLET ORAL at 09:54

## 2020-09-28 RX ADMIN — LABETALOL HYDROCHLORIDE 400 MG: 200 TABLET, FILM COATED ORAL at 09:53

## 2020-09-28 RX ADMIN — BUMETANIDE 1 MG: 0.25 INJECTION INTRAMUSCULAR; INTRAVENOUS at 15:54

## 2020-09-28 RX ADMIN — IPRATROPIUM BROMIDE AND ALBUTEROL SULFATE 3 ML: .5; 3 SOLUTION RESPIRATORY (INHALATION) at 21:16

## 2020-09-28 RX ADMIN — BUMETANIDE 1 MG: 0.25 INJECTION INTRAMUSCULAR; INTRAVENOUS at 05:31

## 2020-09-28 RX ADMIN — ALPRAZOLAM 0.25 MG: 0.25 TABLET ORAL at 20:14

## 2020-09-28 RX ADMIN — POTASSIUM CHLORIDE 40 MEQ: 750 TABLET, FILM COATED, EXTENDED RELEASE ORAL at 09:55

## 2020-09-28 RX ADMIN — LEVOTHYROXINE SODIUM 25 MCG: 0.03 TABLET ORAL at 06:39

## 2020-09-28 RX ADMIN — BUMETANIDE 1 MG: 0.25 INJECTION INTRAMUSCULAR; INTRAVENOUS at 22:22

## 2020-09-28 RX ADMIN — HYDRALAZINE HYDROCHLORIDE 50 MG: 50 TABLET, FILM COATED ORAL at 09:57

## 2020-09-28 RX ADMIN — POLYETHYLENE GLYCOL 3350 17 G: 17 POWDER, FOR SOLUTION ORAL at 18:30

## 2020-09-28 RX ADMIN — HYDRALAZINE HYDROCHLORIDE 50 MG: 50 TABLET, FILM COATED ORAL at 22:22

## 2020-09-28 RX ADMIN — IPRATROPIUM BROMIDE AND ALBUTEROL SULFATE 3 ML: .5; 3 SOLUTION RESPIRATORY (INHALATION) at 02:00

## 2020-09-28 RX ADMIN — LEVETIRACETAM 500 MG: 5 INJECTION INTRAVENOUS at 00:07

## 2020-09-28 RX ADMIN — NIFEDIPINE 60 MG: 60 TABLET, EXTENDED RELEASE ORAL at 20:14

## 2020-09-28 RX ADMIN — NITROGLYCERIN 0.5 INCH: 20 OINTMENT TOPICAL at 00:07

## 2020-09-28 RX ADMIN — LEVETIRACETAM 500 MG: 5 INJECTION INTRAVENOUS at 12:53

## 2020-09-28 RX ADMIN — POTASSIUM CHLORIDE 40 MEQ: 750 TABLET, FILM COATED, EXTENDED RELEASE ORAL at 20:14

## 2020-09-28 RX ADMIN — NIFEDIPINE 60 MG: 60 TABLET, EXTENDED RELEASE ORAL at 09:54

## 2020-09-28 RX ADMIN — ASPIRIN 81 MG: 81 TABLET, COATED ORAL at 09:55

## 2020-09-28 RX ADMIN — NITROGLYCERIN 0.5 INCH: 20 OINTMENT TOPICAL at 05:31

## 2020-09-28 RX ADMIN — ALPRAZOLAM 0.25 MG: 0.25 TABLET ORAL at 09:54

## 2020-09-28 RX ADMIN — FERROUS SULFATE TAB 325 MG (65 MG ELEMENTAL FE) 325 MG: 325 (65 FE) TAB at 06:39

## 2020-09-28 RX ADMIN — LABETALOL HYDROCHLORIDE 400 MG: 200 TABLET, FILM COATED ORAL at 20:15

## 2020-09-28 RX ADMIN — CARBAMAZEPINE 200 MG: 200 TABLET ORAL at 09:55

## 2020-09-28 RX ADMIN — FOLIC ACID 1 MG: 1 TABLET ORAL at 09:55

## 2020-09-28 RX ADMIN — Medication 10 ML: at 14:00

## 2020-09-28 RX ADMIN — ENOXAPARIN SODIUM 40 MG: 40 INJECTION SUBCUTANEOUS at 09:55

## 2020-09-28 RX ADMIN — IPRATROPIUM BROMIDE AND ALBUTEROL SULFATE 3 ML: .5; 3 SOLUTION RESPIRATORY (INHALATION) at 13:26

## 2020-09-28 RX ADMIN — HYDRALAZINE HYDROCHLORIDE 50 MG: 50 TABLET, FILM COATED ORAL at 15:56

## 2020-09-28 NOTE — PROGRESS NOTES
Pulmonology and Critical Care Progress Note    Subjective:     Chief Complaint:   Chief Complaint   Patient presents with    Respiratory Distress        Patient seen and examined in her room  Overnight events noted     successful extubation 9/24 to 5L NC. Doing fairly well. Now on room air. She is much more lucid today  No acute distress      Past Medical History:   Diagnosis Date    Chronic obstructive pulmonary disease (HCC)     Chronic respiratory failure (HCC)     Congestive heart failure (CHF) (HCC)     Hypertension     Hypertension     Renal artery stenosis (HCC)     Seizure disorder (HCC)      No past surgical history on file.    Family History   Problem Relation Age of Onset    Hypertension Mother     Stroke Mother      Social History     Tobacco Use    Smoking status: Heavy Tobacco Smoker     Types: Cigarettes    Tobacco comment: former quit only 1 month ago during recent admission to the hospital   Substance Use Topics    Alcohol use: Not Currently      Current Facility-Administered Medications   Medication Dose Route Frequency Provider Last Rate Last Dose    potassium chloride SR (KLOR-CON 10) tablet 40 mEq  40 mEq Oral BID Vlad Obando DO   40 mEq at 09/28/20 0955    dexmedeTOMidine (PRECEDEX) 400 mcg in 0.9% sodium chloride 104 mL infusion  0.2-0.7 mcg/kg/hr IntraVENous TITRATE Antoine Iglesias MD   Stopped at 09/28/20 0012    NIFEdipine ER (PROCARDIA XL) tablet 60 mg  60 mg Oral BID Corey Daniel NP   60 mg at 09/28/20 0954    labetaloL (NORMODYNE) tablet 400 mg  400 mg Oral Q12H Corey Daniel NP   400 mg at 09/28/20 0953    bumetanide (BUMEX) injection 1 mg  1 mg IntraVENous Q8H Vlad Obando DO   1 mg at 09/28/20 0531    hydrALAZINE (APRESOLINE) tablet 50 mg  50 mg Oral TID Zaheer Patel DO   50 mg at 09/28/20 0957    labetaloL (NORMODYNE;TRANDATE) 20 mg/4 mL (5 mg/mL) injection 20 mg  20 mg IntraVENous Q6H PRN Vlad Obando DO   20 mg at 09/25/20 1259    hydrALAZINE (APRESOLINE) 20 mg/mL injection 10 mg  10 mg IntraVENous Q2H PRN Mi Masterson MD   10 mg at 09/26/20 0226    levETIRAcetam (KEPPRA) 500 mg in saline (iso-osm) 100 mL IVPB (premix)  500 mg IntraVENous Q12H Joel Kelly  mL/hr at 09/24/20 2328 500 mg at 09/28/20 0007    Or    levETIRAcetam (KEPPRA) tablet 500 mg  500 mg Oral Q12H Joel Kelly MD   500 mg at 09/27/20 1458    predniSONE (DELTASONE) tablet 5 mg  5 mg Oral DAILY WITH BREAKFAST Vlad Obando DO   5 mg at 09/28/20 0954    famotidine (PF) (PEPCID) 20 mg in 0.9% sodium chloride 10 mL injection  20 mg IntraVENous Q12H Vlad Obando DO   Stopped at 09/26/20 2100    albuterol-ipratropium (DUO-NEB) 2.5 MG-0.5 MG/3 ML  3 mL Nebulization Q6H RT Vlad Obando DO   3 mL at 09/28/20 0734    aspirin delayed-release tablet 81 mg  81 mg Oral DAILY Vidal Boudreaux MD   81 mg at 09/28/20 0955    isosorbide dinitrate (ISORDIL) tablet 40 mg  40 mg Oral BID Vidal Boudreaux MD   40 mg at 09/28/20 0954    magnesium oxide (MAG-OX) tablet 400 mg  400 mg Oral DAILY Vidal Boudreaux MD   400 mg at 09/28/20 0955    ALPRAZolam (XANAX) tablet 0.25 mg  0.25 mg Oral TID PRN Vidal Boudreaux MD   0.25 mg at 09/28/20 0954    sertraline (ZOLOFT) tablet 25 mg  25 mg Oral DAILY Vidal Boudreaux MD   25 mg at 09/28/20 0954    levothyroxine (SYNTHROID) tablet 25 mcg  25 mcg Oral ROSE Boudreaux MD   25 mcg at 09/28/20 6284    carBAMazepine (TEGretol) tablet 200 mg  200 mg Oral BID Vidal Boudreaux MD   200 mg at 09/28/20 3697    ferrous sulfate tablet 325 mg  325 mg Oral ACB Vidal Boudreaux MD   325 mg at 73/52/20 0557    folic acid (FOLVITE) tablet 1 mg  1 mg Oral DAILY Vidal Boudreaux MD   1 mg at 09/28/20 0955    sodium chloride (NS) flush 5-40 mL  5-40 mL IntraVENous Q8H Vidal Boudreaux MD   10 mL at 09/28/20 0542    sodium chloride (NS) flush 5-40 mL  5-40 mL IntraVENous PRN Vidal Boudreaux MD        acetaminophen (TYLENOL) tablet 650 mg  650 mg Oral Q6H PRN MD Stuart Campuzano.Putty acetaminophen (TYLENOL) suppository 650 mg  650 mg Rectal Q6H PRN Sushant Kim MD        polyethylene glycol [facility]) packet 17 g  17 g Oral DAILY PRN Sushant Kim MD        enoxaparin (LOVENOX) injection 40 mg  40 mg SubCUTAneous DAILY Sushant Kim MD   40 mg at 09/28/20 0955    ondansetron (ZOFRAN) injection 4 mg  4 mg IntraVENous Q6H PRN Sushant Kim MD   4 mg at 09/27/20 1320    nitroglycerin (NITROBID) 2 % ointment 0.5 Inch  0.5 Inch Topical Q6H Carolyn Rockwell MD   0.5 Inch at 09/28/20 0531        Home Meds:  Prior to Admission medications    Medication Sig Start Date End Date Taking?  Authorizing Provider   ALPRAZolam Addison Marrero) 0.25 mg tablet Take 0.25 mg by mouth three (3) times daily as needed for Anxiety.     Yes Provider, Historical   ethacrynic acid (EDECRIN) 25 mg tablet Take 50 mg by mouth two (2) times a day.     Yes Provider, Historical   predniSONE (DELTASONE) 5 mg tablet Take 5 mg by mouth daily.     Yes Provider, Historical   sertraline (Zoloft) 25 mg tablet Take 25 mg by mouth daily.     Yes Provider, Historical   hydrALAZINE (APRESOLINE) 50 mg tablet Take 100 mg by mouth three (3) times daily.     Yes Provider, Historical   levothyroxine (SYNTHROID) 25 mcg tablet Take 25 mcg by mouth Daily (before breakfast).     Yes Provider, Historical   levETIRAcetam (Keppra) 500 mg tablet Take 500 mg by mouth two (2) times a day.     Yes Provider, Historical   carBAMazepine, mood stabiliz, 200 mg CM12 Take 200 mg by mouth two (2) times a day.     Yes Provider, Historical   ferrous sulfate 325 mg (65 mg iron) tablet Take 325 mg by mouth Daily (before breakfast).     Yes Provider, Historical   folic acid (FOLVITE) 1 mg tablet Take 1 mg by mouth daily.     Yes Provider, Historical   spironolactone (ALDACTONE) 25 mg tablet Take 100 mg by mouth daily.     Yes Provider, Historical   metoprolol succinate (TOPROL-XL) 100 mg tablet Take 150 mg by mouth daily.       Provider, Historical   NIFEdipine ER (PROCARDIA XL) 60 mg ER tablet Take 60 mg by mouth daily.       Provider, Historical   aspirin delayed-release 81 mg tablet Take  by mouth daily.       Provider, Historical   isosorbide dinitrate (ISORDIL) 20 mg tablet Take 40 mg by mouth two (2) times a day.       Provider, Historical   labetaloL (NORMODYNE) 200 mg tablet Take  by mouth two (2) times a day.       Provider, Historical   magnesium oxide (MAG-OX) 400 mg tablet Take 400 mg by mouth daily.       Provider, Historical   potassium chloride (K-DUR, KLOR-CON) 20 mEq tablet Take 20 mEq by mouth two (2) times a day.       Provider, Historical         Allergies   Allergen Reactions    Sulfa (Sulfonamide Antibiotics) Anaphylaxis       Review of Systems:  Review of systems not obtained due to patient factors. Objective:     Blood pressure (!) 93/59, pulse 90, temperature 98.3 °F (36.8 °C), resp. rate 17, height 5' 5\" (1.651 m), weight 51.5 kg (113 lb 8.6 oz), SpO2 96 %. Temp (24hrs), Av.5 °F (36.9 °C), Min:98 °F (36.7 °C), Max:99.1 °F (37.3 °C)      Intake and Output:  Current Shift:  07 - 0  In: -   Out: 900 [Urine:900]  Last 3 Shifts: 1901 -  0700  In: -   Out: 3015 [[id number]]    Physical Exam:   General appearance: A+Ox3, chronically ill-appearing, currently on room air. Head: Normocephalic, without obvious abnormality, atraumatic  Eyes: conjunctivae/corneas clear. PERRL, EOM's intact. Throat: Lips, mucosa, and tongue normal. Poor dentition. Neck: supple, symmetrical, trachea midline, no adenopathy, thyroid: not enlarged, symmetric, no tenderness/mass/nodules and no carotid bruit  Lungs: Diminished in general.  Also diminished breath sounds over the bases with some crackles bilaterally. She is currently on room air. Heart: regular rate and rhythm, S1, S2 normal, no murmur, click, rub or gallop  Abdomen: soft, non-tender.  Bowel sounds normal. No masses,  no organomegaly  Extremities: edema +1 bilaterally, otherwise atraumatic  Skin: Skin color, texture, turgor normal. No rashes or lesions  Lymph nodes: Cervical, supraclavicular, and axillary nodes normal.  Neurologic: Grossly normal, following commands, calm    Additional comments:None    Lab/Data Review: All lab results for the last 24 hours reviewed. Recent Results (from the past 24 hour(s))   METABOLIC PANEL, BASIC    Collection Time: 09/28/20  5:40 AM   Result Value Ref Range    Sodium 137 136 - 145 mmol/L    Potassium 4.1 3.5 - 5.1 mmol/L    Chloride 99 97 - 108 mmol/L    CO2 28 21 - 32 mmol/L    Anion gap 10 5 - 15 mmol/L    Glucose 102 (H) 65 - 100 mg/dL    BUN 25 (H) 6 - 20 mg/dL    Creatinine 1.90 (H) 0.55 - 1.02 mg/dL    BUN/Creatinine ratio 13 12 - 20      GFR est AA 33 (L) >60 ml/min/1.73m2    GFR est non-AA 27 (L) >60 ml/min/1.73m2    Calcium 9.5 8.5 - 10.1 mg/dL   MAGNESIUM    Collection Time: 09/28/20  5:40 AM   Result Value Ref Range    Magnesium 1.8 1.6 - 2.4 mg/dL         Chest X-Ray:   XR CHEST PORT   Final Result   Impression: Diminished airspace disease. XR SWALLOW FUNC VIDEO   Final Result   Impression: Laryngeal penetration with thin, nectar thick, honey thick, and   pudding consistency. No aspiration. Please see recommendation by speech   pathology. Dose: Kerma= 13 mGy. Fluoroscopy time 1 minute and 43 seconds      XR CHEST PORT   Final Result   IMPRESSION:   1. Overall findings are supportive of pulmonary edema with bilateral pleural   effusions. The perihilar opacities appear slightly progressed on the right from   one day prior. XR CHEST PORT   Final Result   IMPRESSION: Endotracheal tube and gastric tube removed. Bilateral pleural   effusions. Improved aeration of the lower lung zones. Mild prominence of the   interstitium.       XR CHEST PORT   Final Result   IMPRESSION: Persistent bibasilar lung findings previously described as CHF and   probably with effusion complicating visualization of the right lung base. Continued surveillance recommended      XR CHEST PORT   Final Result   IMPRESSION: Improving congestive heart failure pattern. XR CHEST SNGL V   Final Result   Findings/impression:      Moderate bilateral pleural effusion present. There is extensive interstitial and   airspace disease seen throughout both lungs, including dense airspace disease   involving left lower lobe with air bronchograms. No interval improvement. No   pneumothorax. Endotracheal tube 5 cm above pilo. Nasogastric tube below   diaphragm. No suspicious osseous lesions. XR CHEST SNGL V   Final Result   Impression: Congestive heart failure with interstitial and alveolar pulmonary   edema with bilateral pleural effusions. CT imaging:  CT Results  (Last 48 hours)    None          PFTs:   PFT Results  (Last 3 results in the past 10 years)    None            Assessment:     Patient is a 59-year-old  female with history of diastolic CHF, hypertension, emphysema, CVA, renal artery stenosis, mitral valve regurgitation, and chronic hypoxemic respiratory failure currently on 5 L nasal cannula at home who presented back to [facility] on [facility]/2020 with increasing respiratory distress and hypoxia. Initially placed on BiPAP given significant volume overload present on chest imaging, however she was intubated. Now extubated on 9/24/20. Plan:     1.)  Acute on chronic hypoxic respiratory failure  -Secondary to acute on chronic heart failure and flash pulmonary edema.   -Now successfully extubated on 9/24; currently on room air. Overall improved. -Chest x-ray reviewed and showing improvement in bilateral airspace disease, persistent pleural effusions.   -Continue with Bumex to 1 mg IV every 8 hours today and continue to monitor strict I/O's and daily weights. -Very low suspicion for COPD exacerbation or pneumonia.   Agree with holding antibiotics for now, continue home prednisone 5 mg daily. Continue scheduled bronchodilators. - Likely transfer to the floor tomorrow if off Precedex. - Patient also appears to be chronically aspirating, however modified barium swallow showed laryngeal penetration with thin nectar and honey thickened liquids. No kim aspiration. Started on honey thickened liquids. - Continue speech therapy evaluations. 2.)  Acute on chronic diastolic heart failure  -Significant pulmonary edema and bilateral pleural effusions. Likely worsened by poorly controlled blood pressure, renal artery stenosis, poor nutrition.  -Extubated 9/24/20 successfully; weaned off oxygen.  -Cardiology is following, appreciate their recommendations. Most recent transthoracic echocardiogram 8/2020 with normal EF and mild to moderate mitral valve regurgitation.  -After discussion with cardiology, her allergy to sulfa was apparently discovered after taking Bactrim. Supposedly, she had never had Lasix/Bumex prior to this admission. Patient tolerating Bumex without evidence of anaphylaxis. -Aldactone restarted    -Patient does have persistent b/l pleural effusions which are unlikely to resolve with diuresis alone. She did require thoracentesis during her last admission. May consider  IR consult for therapeutic thoracentesis (likely start on the right). Drainage of her pleural space will allow for better lung expansion and lead to likely better rehab potential.    3.)  Accelerated hypertension-   -Blood pressure in the 200s on admission, likely leading to flash pulmonary edema. Was better controlled, now back into the 160's-180's  -Continue current regimen, but will increase her Hydralazine to 50 mg TID (from BID) today. Also, Bumex dose has been increased as above. -Appreciate assistance from Cardiology. 4.)  Emphysema  -Per review of her records, she has never follow-up with us in the clinic and supposedly has not had PFTs.   -Does not appear to be on any maintenance inhalers at home.  -She missed her follow-up appointment with us this week  -Low suspicion for acute exacerbation of COPD. -Switch back to scheduled prednisone 5 mg daily which apparently is a chronic medication.  -Agree with q6 scheduled duo nebs. 5.)  Renal artery stenosis  -Renal artery duplex from 8/24/2020 showing right renal artery stenosis.  -Likely at least contributing to her poorly controlled hypertension. Cardiology has been consulted. 6.)  Seizure disorder  -Seems to have occurred after her CVA, continue on home Keppra/carbamazepine.  -No evidence of seizure activity at this time. 7.) Anxiety  - Seems to be poorly controlled; Precedex now 0.5 mics. - Discussed with the bedside nurse, recommend titrating this to off by tomorrow morning.  - Patient already has PRN Xanax ordered. I will continue to follow along with her while she is admitted. Discussed in detail with RN, RT, and care team      CODE STATUS: Full Code    Lines/Tubes: Sanchez catheter, 2 peripheral IVs    Prophylaxis:  Stress Ulcer Prophylaxis: Famotidine IV  Deep Vein Thrombosis Prophylaxis: Lovenox    Total time spent with patient: More than 30 minutes was spent with the patient reviewing extensive labs and chest imaging, discussing in detail with the bedside nurse and documentation.       Dany Lay MD  [facility]  9/28/2020

## 2020-09-28 NOTE — ROUTINE PROCESS
PT WAS TRYING TO GET OUT OF BED TO GO TO THE BATHROOM WHEN CNA ENTERED ROOM. PT INSTRUCTED NOT TO GET OUT OF BED. BED EXIT ALARM TURNED ON. PT VERBALIZED UNDERSTANDING.  
 
REPORT GIVEN TO Nick Bates RN.

## 2020-09-28 NOTE — PROGRESS NOTES
Spiritual Care Assessment/Progress Note  [facility]      NAME: Eliceo Conklin      MRN: [id number]  AGE: 54 y.o. SEX: female  Mormon Affiliation: Mosque   Language: English     9/28/2020     Total Time (in minutes): 15     Spiritual Assessment begun in [address] ICU through conversation with:         [x]Patient        [] Family    [] Friend(s)        Reason for Consult: Initial/Spiritual assessment, patient floor     Spiritual beliefs: (Please include comment if needed)     [x] Identifies with a jeremi tradition:         [x] Supported by a jeremi community:            [] Claims no spiritual orientation:           [] Seeking spiritual identity:                [] Adheres to an individual form of spirituality:           [] Not able to assess:                           Identified resources for coping:      [x] Prayer                               [] Music                  [x] Guided Imagery     [x] Family/friends                 [] Pet visits     [] Devotional reading                         [] Unknown     [] Other:                                              Interventions offered during this visit: (See comments for more details)    Patient Interventions: Affirmation of jeremi, Catharsis/review of pertinent events in supportive environment, Coping skills reviewed/reinforced, Guided imagery, Iconic (affirming the presence of God/Higher Power), Initial/Spiritual assessment, Critical care, Normalization of emotional/spiritual concerns, Prayer (assurance of), Mormon beliefs/image of God discussed     Family/Friend(s):  Affirmation of jeremi, Catharsis/review of pertinent events in supportive environment, Coping skills reviewed/reinforced, Guided imagery, Iconic (affirming the presence of God/Higher Power), Initial Assessment, Normalization of emotional/spiritual concerns, Mormon beliefs/image of God discussed     Plan of Care:     [] Support spiritual and/or cultural needs    [] Support AMD and/or advance care planning process      [] Support grieving process   [] Coordinate Rites and/or Rituals    [] Coordination with community clergy   [] No spiritual needs identified at this time   [] Detailed Plan of Care below (See Comments)  [] Make referral to Music Therapy  [] Make referral to Pet Therapy     [] Make referral to Addiction services  [] Make referral to [facility]  [] Make referral to Spiritual Care Partner  [] No future visits requested        [x] Follow up visits as needed     Comments: The purpose of the visit was to do a spiritual assessment on the patient. The patient was being visited by her  however, she welcomed the visit. The patient shared that she has been fighting for her life. She shared that she feels that God has been with her and the her relationship with God has been her strength. The patient's  mentioned how his wife has been praying for his wife. They both shared how their jeremi in God has made a difference in the patient's overall wellness  emotionally and spiritually. The  facilitated story-telling while listening reflectively and empathically. [address] Jadon Ramírez.    can be reached by calling the  at [facility]  [phone]

## 2020-09-28 NOTE — PROGRESS NOTES
Spiritual Care Assessment/Progress Note  [facility]      NAME: Colin Quinteros      MRN: [id number]  AGE: 54 y.o.  SEX: female  Presybeterian Affiliation: Baptist   Language: English     9/28/2020     Total Time (in minutes): 12     Spiritual Assessment begun in [address] [place] [address] ICU through conversation with:   [x]Patient        [] Family    [] Friend(s)        Reason for Consult: Initial/Spiritual assessment, critical care     Spiritual beliefs: (Please include comment if needed)     [x] Identifies with a jeremi tradition:    Baptist     [x] Supported by a jeremi community:            [] Claims no spiritual orientation:           [] Seeking spiritual identity:                [] Adheres to an individual form of spirituality:           [] Not able to assess:                           Identified resources for coping:      [x] Prayer                               [] Music                  [] Guided Imagery     [] Family/friends                 [] Pet visits     [] Devotional reading                         [] Unknown     [] Other:                                            Interventions offered during this visit: (See comments for more details)    Patient Interventions: Affirmation of emotions/emotional suffering, Affirmation of jeremi, Catharsis/review of pertinent events in supportive environment, Bridging, Initial/Spiritual assessment, Critical care, Normalization of emotional/spiritual concerns, Prayer (actual)           Plan of Care:     [] Support spiritual and/or cultural needs    [] Support AMD and/or advance care planning process      [] Support grieving process   [] Coordinate Rites and/or Rituals    [] Coordination with community clergy   [] No spiritual needs identified at this time   [] Detailed Plan of Care below (See Comments)  [] Make referral to Music Therapy  [] Make referral to Pet Therapy     [] Make referral to Addiction services  [] Make referral to [facility]  [] Make referral to Spiritual Care Partner  [] No future visits requested        [x] Follow up visits as needed     Comments:  visited for routine [address].  and patient reflected on patient's recent hospitalization journey and now she has been readmitted.  and patient reflected on feelings of hopefulness towards healing, being able to eat, return home and regain strength and health.  provided words of caring and support, provided prayer as requested, assured patient of chaplains availability as needed.      Visited by aLnce Godfrey, [facility]

## 2020-09-28 NOTE — PROGRESS NOTES
Progress Note    Patient: Jordi Monge MRN: [id number]  SSN: [id number]    YOB: 1965  Age: 54 y.o. Sex: female      Admit Date: 9/21/2020    LOS: 7 days     Subjective:     CC: shortness of breath      54F, h/o COPD with acute on chronic respiratory failure on 2L NC, with shortness of breath s/t AECHFpEF was intubated s/t acute hypoxic respiratory failure s.t flash pulmonary edema s.t AECHFpEF.     Extubated on 9/22 remained on bipap and now weaned to 2L NC  , continue aggressive IV diuresis with IV bumex. On venti mask. Aspiration precautions. Transfer to floor today, off precedex.          Review of Systems:  Constitutional:  denies weight loss, no fever, no chills, no night sweats  Eye: No recent visual disturbances, no discharge, no double vision  Ear/nose/mouth/throat : No hearing disturbance, no ear pain, no nasal congestion, no sore throat, no trouble swallowing. Respiratory : No trouble breathing, no cough, no shortness of breath, no hemoptysis, no wheezing  Cardiovascular : No chest pain, no palpitation, no racing of heart, no orthopnea, no paroxysmal nocturnal dyspnea, no peripheral edema  Gastrointestinal : No nausea, no vomiting, no diarrhea, constipation, heartburn, abdominal pain  Genitourinary : No dysuria, no hematuria, no increased frequency, incontinence,  Lymphatics : No swollen glands -Neck, axillary, inguinal  Endocrine : No excessive thirst, no polyuria no cold intolerance, no heat intolerance.   Immunologic : No hives, urticaria, no seasonal allergies,   Musculoskeletal : Left upper back pain.  No joint swelling, pain, effusion,  no neck pain,   Integumentary : No rash, no pruritus, no ecchymosis  Hematology : No petechiae, No easy bruising,  No tendency to bleed easy  Neurology : Denies change in mental status, no abnormal balance, no headache, no confusion, numbness, tingling,  Psychiatric : No mood swings, no anxiety, depression           Objective:     Vitals: 09/28/20 0600 09/28/20 0700 09/28/20 0733 09/28/20 1100   BP: (!) 159/86   (!) 93/59   Pulse: (!) 102   90   Resp:    17   Temp:  99.1 °F (37.3 °C)  98.3 °F (36.8 °C)   SpO2: 98%  100% 96%   Weight:       Height:            Intake and Output:  Current Shift: 09/28 0701 - 09/28 1900  In: -   Out: 900 [Urine:900]  Last three shifts: 09/26 1901 - 09/28 0700  In: -   Out: 3300 [Urine:3300]      Physical Exam:   General appearance: A+Ox3, tired, chronically ill-appearing, 5L NC satt'ing 100%  Head: Normocephalic, without obvious abnormality, atraumatic  Eyes: conjunctivae/corneas clear. PERRL, EOM's intact. Throat: Lips, mucosa, and tongue normal. Poor dentition. Neck: supple, symmetrical, trachea midline, no adenopathy, thyroid: not enlarged, symmetric, no tenderness/mass/nodules and no carotid bruit  Lungs: Relatively clear to auscultation with some rhonchi in the bases bilaterally, No accessory muscle use, 5L NC  Heart: regular rate and rhythm, S1, S2 normal, no murmur, click, rub or gallop  Abdomen: soft, non-tender.  Bowel sounds normal. No masses,  no organomegaly  Extremities: edema +2 bilaterally, otherwise atraumatic  Skin: Skin color, texture, turgor normal. No rashes or lesions  Lymph nodes: Cervical, supraclavicular, and axillary nodes normal.  Neurologic: Grossly normal, following commands, calm      Lab/Data Review:  Recent Results (from the past 24 hour(s))   METABOLIC PANEL, BASIC    Collection Time: 09/28/20  5:40 AM   Result Value Ref Range    Sodium 137 136 - 145 mmol/L    Potassium 4.1 3.5 - 5.1 mmol/L    Chloride 99 97 - 108 mmol/L    CO2 28 21 - 32 mmol/L    Anion gap 10 5 - 15 mmol/L    Glucose 102 (H) 65 - 100 mg/dL    BUN 25 (H) 6 - 20 mg/dL    Creatinine 1.90 (H) 0.55 - 1.02 mg/dL    BUN/Creatinine ratio 13 12 - 20      GFR est AA 33 (L) >60 ml/min/1.73m2    GFR est non-AA 27 (L) >60 ml/min/1.73m2    Calcium 9.5 8.5 - 10.1 mg/dL   MAGNESIUM    Collection Time: 09/28/20  5:40 AM   Result Value Ref Range    Magnesium 1.8 1.6 - 2.4 mg/dL         Assessment and plan:      1. Acute on chronic respiratory failure: extubated today. On 2L NC     2.  Congestive heart failure with preserved exacerbation: Patient is allergic to sulfa drugs, on IV bumen Monitor patient's intake and output.     3.  Hypertension: Continue patient's current antihypertensive medications.     4.  Anxiety disorder:  Xanax 0.25 PRN.(seroquel added) Weaned off precedex     5.  Seizure disorder:  on Keppra and carbamazepine.     6.  Iron deficiency anemia: Continue patient on iron supplement     7.  Renal artery stenosis: Cardiology closely following the patient will follow up with them for further recommendations. DISPO: she is off bipap and precedex, PT for disposition. She may be discharged tomorrow.        Signed By: Bentley Abbott MD     September 28, 2020

## 2020-09-28 NOTE — PROGRESS NOTES
[facility] CARDIOLOGY  CARDIOLOGY PROGRESS NOTE     Patient seen and examined. This is a patient who is followed for acute on chronic heart failure. She was extubated over the weekend and has weaned from supplemental O2 therapy, now on room air. She reports feeling better than she has in some time. Diuresing well with Bumex 1mg TID. No chest pain. No other complaints reported. Telemetry reviewed, there were no events noted in the past 24 hours. Remains in normal sinus rhythm. Pertinent review of systems items noted above, all other systems are negative. Current medications reviewed. Physical Examination  Vital signs are stable. Blood pressure 159/86, Pulse 95  No apparent distress resting comfortably in bed. Heart is regular, rate and rhythm. Normal S1, S2, no murmurs are appreciated. Lungs are significantly diminished to bases. No rales, rhonci, wheezing. Abdomen is soft, nontender, normal bowel sounds. Extremities have no edema. Sanchez catheter in place drain clear yellow urine. Labs reviewed. Creatinine elevated at 1.9 from 1.07 yesterday. Discussed case with Dr. Libby Hernandez and our impression and recommendations are as follows:  1. Acute on chronic heart failure:  Volume status has improved with IV diuresing, however, remains with pleural effusions that per pulmonary may require thoracentesis as have previously. Continue Bumex 1mg IV TID for now and will wean as able. Strict I&Os. 2. Mitral valve regurgitation: Last admission showing  Moderate to severe on [facility] admission with repeat echo showing improvement to mild to moderate following aggressive diuresis. Continue diuresis as stated above. Limited symptoms as this has improved. 3. Hypertension:   Blood pressure dropped this morning to 90s/60s after receiving medications. Removed nitropaste and discontinued order and spoke with nurse. After reviewing MAR she was inconsistently receiving medications over the weekend. Will follow trend of BP and wean medication as necessary. 4. Hypokalemia: This has resolved. 5.  PEDRO PABLO:  Creatinine 1.9 today up from 1.07. Will hold spironolactone and continue to follow. Will wean diuretic if needed pending trend of labs. Renal duplex during last admission showing possible right renal arterial stenosis. Will consider CTA pending trend.        Please do not hesitate to call me if additional questions arise

## 2020-09-28 NOTE — PROGRESS NOTES
Bedside shift change report given to [facility] (oncoming nurse) by Lauren DUEÑAS (offgoing nurse). Report included the following information SBAR.

## 2020-09-28 NOTE — PROGRESS NOTES
SPEECH LANGUAGE PATHOLOGY DYSPHAGIA TREATMENT  Patient: Greer Mccarty (83 y.o. female)  Date: 9/28/2020  Diagnosis: Acute respiratory failure ([facility] [address] [J96.00]   <principal problem not specified>       Precautions: Aspiration      ASSESSMENT:  Administered nectar thick and thin liquids to trial. Patient self ingested. Verbal cues to use chin tuck strategy. Swallow initiated w/ minimal delay. HLE and protraction reduced, but functional to palpation. No overt s/sx of aspiration noted w/ all thin and nectar thick trials. O2 saturations remained 93% throughout trials. Mastication slow, but functional for hard solid cracker. No pharyngeal difficulty noted. Patient appears appropriate for diet advancement at this time. PLAN:  Recommendations and Planned Interventions:  Recommend NDD2 ( ground ), nectar thick liquids. Encourage chin tuck. P.o. trials w/ SLP of thin and advanced solids. Patient continues to benefit from skilled intervention to address the above impairments. Continue treatment per established plan of care. Discharge Recommendations:  Home Health     SUBJECTIVE:   Patient seen at bedside. She is alert. Nursing reports improvement and asking for possible   Diet upgrade. NG was never placed over the weekend due to patient refusal. MBS results reviewed. OBJECTIVE:   Cognitive and Communication Status:  Neurologic State: Alert  Orientation Level: Oriented X4  Cognition: Other (comment)(Intermittent confusion- A&Ox's 3-4)     Pain:  Pain Scale 1: Numeric (0 - 10)  Pain Intensity 1: 0       After treatment:   Patient left in no apparent distress in bed    COMMUNICATION/EDUCATION:   Patient was educated regarding her deficit(s) of swallowing as this relates to her diagnosis of acute respiratory failure. She demonstrated Excellent understanding as evidenced by verbal understanding.     The patient's plan of care including recommendations, planned interventions, and recommended diet changes were discussed with: Registered nurse. Itz Montez M.S. CCC-SLP  Time Calculation: 24 mins        Problem: Dysphagia (Adult)  Goal: *Acute Goals and Plan of Care (Insert Text)  Description: Speech Therapy Swallow Goals  Initiated 9/25/2020       -Patient will tolerate PO trials without clinical indicators of aspiration given minimal cues within 7 day(s). [ progressing ]         -Patient will participate in modified barium swallow study within 7 day(s). Cristobal.Kathrinease ]          -Patient will perform BOT and laryngeal strengthening exercises with minimal cues within 10 day(s). [progressing]    -Patient will demonstrate understanding of swallow safety precautions and aspiration precautions, diet recs with minimal cues within 7 day(s). [progressing ]    -patient will tolerate ground diet and nectar thick liquids w/o overt s/sx of aspiration within 5-7 days.              Outcome: Progressing Towards Goal

## 2020-09-29 ENCOUNTER — APPOINTMENT (OUTPATIENT)
Dept: GENERAL RADIOLOGY | Age: 55
DRG: 194 | End: 2020-09-29
Attending: INTERNAL MEDICINE
Payer: COMMERCIAL

## 2020-09-29 LAB
ANION GAP SERPL CALC-SCNC: 6 MMOL/L (ref 5–15)
BASOPHILS # BLD: 0 K/UL (ref 0–0.1)
BASOPHILS NFR BLD: 0 % (ref 0–1)
BUN SERPL-MCNC: 25 MG/DL (ref 6–20)
BUN/CREAT SERPL: 14 (ref 12–20)
CA-I BLD-MCNC: 9.5 MG/DL (ref 8.5–10.1)
CHLORIDE SERPL-SCNC: 95 MMOL/L (ref 97–108)
CO2 SERPL-SCNC: 34 MMOL/L (ref 21–32)
CREAT SERPL-MCNC: 1.81 MG/DL (ref 0.55–1.02)
DIFFERENTIAL METHOD BLD: ABNORMAL
EOSINOPHIL # BLD: 0.3 K/UL (ref 0–0.4)
EOSINOPHIL NFR BLD: 3 % (ref 0–7)
ERYTHROCYTE [DISTWIDTH] IN BLOOD BY AUTOMATED COUNT: 14.2 % (ref 11.5–14.5)
GLUCOSE SERPL-MCNC: 109 MG/DL (ref 65–100)
HCT VFR BLD AUTO: 27.5 % (ref 35–47)
HGB BLD-MCNC: 9 % (ref 11.5–16)
IMM GRANULOCYTES # BLD AUTO: 0.1 K/UL (ref 0–0.04)
IMM GRANULOCYTES NFR BLD AUTO: 1 % (ref 0–0.5)
LYMPHOCYTES # BLD: 1.5 K/UL (ref 0.8–3.5)
LYMPHOCYTES NFR BLD: 16 % (ref 12–49)
MAGNESIUM SERPL-MCNC: 1.8 MG/DL (ref 1.6–2.4)
MCH RBC QN AUTO: 32.5 PG (ref 26–34)
MCHC RBC AUTO-ENTMCNC: 32.7 G/DL (ref 30–36.5)
MCV RBC AUTO: 99.3 FL (ref 80–99)
MONOCYTES # BLD: 0.9 K/UL (ref 0–1)
MONOCYTES NFR BLD: 10 % (ref 5–13)
NEUTS SEG # BLD: 6.7 K/UL (ref 1.8–8)
NEUTS SEG NFR BLD: 70 % (ref 32–75)
PLATELET # BLD AUTO: 375 K/UL (ref 150–400)
PMV BLD AUTO: 9.5 FL (ref 8.9–12.9)
POTASSIUM SERPL-SCNC: 3.2 MMOL/L (ref 3.5–5.1)
RBC # BLD AUTO: 2.77 M/UL (ref 3.8–5.2)
SODIUM SERPL-SCNC: 135 MMOL/L (ref 136–145)
WBC # BLD AUTO: 9.4 K/UL (ref 3.6–11)

## 2020-09-29 PROCEDURE — 74011250637 HC RX REV CODE- 250/637: Performed by: NURSE PRACTITIONER

## 2020-09-29 PROCEDURE — 83735 ASSAY OF MAGNESIUM: CPT

## 2020-09-29 PROCEDURE — 97165 OT EVAL LOW COMPLEX 30 MIN: CPT

## 2020-09-29 PROCEDURE — 94640 AIRWAY INHALATION TREATMENT: CPT

## 2020-09-29 PROCEDURE — 80048 BASIC METABOLIC PNL TOTAL CA: CPT

## 2020-09-29 PROCEDURE — 74011250636 HC RX REV CODE- 250/636: Performed by: INTERNAL MEDICINE

## 2020-09-29 PROCEDURE — 74011000250 HC RX REV CODE- 250: Performed by: NURSE PRACTITIONER

## 2020-09-29 PROCEDURE — 97161 PT EVAL LOW COMPLEX 20 MIN: CPT

## 2020-09-29 PROCEDURE — 36415 COLL VENOUS BLD VENIPUNCTURE: CPT

## 2020-09-29 PROCEDURE — 74011250637 HC RX REV CODE- 250/637: Performed by: HOSPITALIST

## 2020-09-29 PROCEDURE — 74011250637 HC RX REV CODE- 250/637: Performed by: INTERNAL MEDICINE

## 2020-09-29 PROCEDURE — 97116 GAIT TRAINING THERAPY: CPT

## 2020-09-29 PROCEDURE — 65270000029 HC RM PRIVATE

## 2020-09-29 PROCEDURE — 71045 X-RAY EXAM CHEST 1 VIEW: CPT

## 2020-09-29 PROCEDURE — 74011250636 HC RX REV CODE- 250/636: Performed by: HOSPITALIST

## 2020-09-29 PROCEDURE — 92526 ORAL FUNCTION THERAPY: CPT

## 2020-09-29 PROCEDURE — 97530 THERAPEUTIC ACTIVITIES: CPT

## 2020-09-29 PROCEDURE — 74011000250 HC RX REV CODE- 250: Performed by: INTERNAL MEDICINE

## 2020-09-29 PROCEDURE — 85025 COMPLETE CBC W/AUTO DIFF WBC: CPT

## 2020-09-29 RX ORDER — BUMETANIDE 0.25 MG/ML
1 INJECTION INTRAMUSCULAR; INTRAVENOUS 2 TIMES DAILY
Status: DISCONTINUED | OUTPATIENT
Start: 2020-09-29 | End: 2020-09-30

## 2020-09-29 RX ORDER — ISOSORBIDE DINITRATE 20 MG/1
40 TABLET ORAL 3 TIMES DAILY
Status: DISCONTINUED | OUTPATIENT
Start: 2020-09-29 | End: 2020-10-02 | Stop reason: HOSPADM

## 2020-09-29 RX ADMIN — FAMOTIDINE 20 MG: 10 INJECTION INTRAVENOUS at 08:14

## 2020-09-29 RX ADMIN — LABETALOL HYDROCHLORIDE 400 MG: 200 TABLET, FILM COATED ORAL at 19:29

## 2020-09-29 RX ADMIN — Medication 10 ML: at 20:00

## 2020-09-29 RX ADMIN — IPRATROPIUM BROMIDE AND ALBUTEROL SULFATE 3 ML: .5; 3 SOLUTION RESPIRATORY (INHALATION) at 01:50

## 2020-09-29 RX ADMIN — BUMETANIDE 1 MG: 0.25 INJECTION INTRAMUSCULAR; INTRAVENOUS at 05:53

## 2020-09-29 RX ADMIN — LEVETIRACETAM 500 MG: 500 TABLET ORAL at 22:30

## 2020-09-29 RX ADMIN — FAMOTIDINE 20 MG: 10 INJECTION INTRAVENOUS at 19:29

## 2020-09-29 RX ADMIN — Medication 10 ML: at 06:34

## 2020-09-29 RX ADMIN — Medication 10 ML: at 19:30

## 2020-09-29 RX ADMIN — NIFEDIPINE 60 MG: 60 TABLET, EXTENDED RELEASE ORAL at 08:14

## 2020-09-29 RX ADMIN — ISOSORBIDE DINITRATE 40 MG: 20 TABLET ORAL at 16:40

## 2020-09-29 RX ADMIN — ISOSORBIDE DINITRATE 40 MG: 20 TABLET ORAL at 08:15

## 2020-09-29 RX ADMIN — IPRATROPIUM BROMIDE AND ALBUTEROL SULFATE 3 ML: .5; 3 SOLUTION RESPIRATORY (INHALATION) at 13:39

## 2020-09-29 RX ADMIN — FOLIC ACID 1 MG: 1 TABLET ORAL at 08:16

## 2020-09-29 RX ADMIN — CARBAMAZEPINE 200 MG: 200 TABLET ORAL at 18:03

## 2020-09-29 RX ADMIN — IPRATROPIUM BROMIDE AND ALBUTEROL SULFATE 3 ML: .5; 3 SOLUTION RESPIRATORY (INHALATION) at 20:09

## 2020-09-29 RX ADMIN — IPRATROPIUM BROMIDE AND ALBUTEROL SULFATE 3 ML: .5; 3 SOLUTION RESPIRATORY (INHALATION) at 08:23

## 2020-09-29 RX ADMIN — HYDRALAZINE HYDROCHLORIDE 50 MG: 50 TABLET, FILM COATED ORAL at 16:40

## 2020-09-29 RX ADMIN — Medication 400 MG: at 08:15

## 2020-09-29 RX ADMIN — POTASSIUM CHLORIDE 40 MEQ: 750 TABLET, FILM COATED, EXTENDED RELEASE ORAL at 08:15

## 2020-09-29 RX ADMIN — CARBAMAZEPINE 200 MG: 200 TABLET ORAL at 08:16

## 2020-09-29 RX ADMIN — NIFEDIPINE 60 MG: 60 TABLET, EXTENDED RELEASE ORAL at 19:29

## 2020-09-29 RX ADMIN — SERTRALINE HYDROCHLORIDE 25 MG: 50 TABLET ORAL at 08:15

## 2020-09-29 RX ADMIN — LABETALOL HYDROCHLORIDE 400 MG: 200 TABLET, FILM COATED ORAL at 08:15

## 2020-09-29 RX ADMIN — LEVETIRACETAM 500 MG: 500 TABLET ORAL at 11:46

## 2020-09-29 RX ADMIN — FERROUS SULFATE TAB 325 MG (65 MG ELEMENTAL FE) 325 MG: 325 (65 FE) TAB at 06:35

## 2020-09-29 RX ADMIN — POTASSIUM CHLORIDE 40 MEQ: 750 TABLET, FILM COATED, EXTENDED RELEASE ORAL at 19:29

## 2020-09-29 RX ADMIN — HYDRALAZINE HYDROCHLORIDE 50 MG: 50 TABLET, FILM COATED ORAL at 19:29

## 2020-09-29 RX ADMIN — ALPRAZOLAM 0.25 MG: 0.25 TABLET ORAL at 18:06

## 2020-09-29 RX ADMIN — ISOSORBIDE DINITRATE 40 MG: 20 TABLET ORAL at 19:29

## 2020-09-29 RX ADMIN — LEVOTHYROXINE SODIUM 25 MCG: 0.03 TABLET ORAL at 06:35

## 2020-09-29 RX ADMIN — BUMETANIDE 1 MG: 0.25 INJECTION INTRAMUSCULAR; INTRAVENOUS at 19:29

## 2020-09-29 RX ADMIN — ASPIRIN 81 MG: 81 TABLET, COATED ORAL at 08:15

## 2020-09-29 RX ADMIN — HYDRALAZINE HYDROCHLORIDE 50 MG: 50 TABLET, FILM COATED ORAL at 08:15

## 2020-09-29 RX ADMIN — ENOXAPARIN SODIUM 40 MG: 40 INJECTION SUBCUTANEOUS at 08:14

## 2020-09-29 NOTE — PROGRESS NOTES
Comprehensive Nutrition Assessment    Type and Reason for Visit: Reassess    Nutrition Recommendations/Plan:   Continue Soft Solid with NTL  Continue Ensure Enlive (NTL) TID  Continue Magic Cup daily    Nutrition Assessment:  Transfer to floor pending. SLP continuing to advance diet; now advanced to Chopped/ Soft with NTL. Receiving and consuming supplementation regularly. Reports consuming ~50% of trays, with early satiety. Labs: Na 135, BUN 25, Cr 1.81, , K 3.2. Meds: precedex, FuSu, folic acid, MgOx, KCl    Malnutrition Assessment:  Malnutrition Status: Moderate malnutrition    Context:  Acute illness     Findings of the 6 clinical characteristics of malnutrition:   Energy Intake:  Unable to assess  Weight Loss:  1.00 - 7.5% over 3 months     Body Fat Loss:  1 - Mild body fat loss, Triceps   Muscle Mass Loss:  1 - Mild muscle mass loss, Temples (temporalis), Thigh (quadraceps), Scapula (trapezius)  Fluid Accumulation:  Unable to assess,        Estimated Daily Nutrient Needs:  Energy (kcal):  1525kcal (32kcal/kg)  Protein (g):  62g/day (1.3g/kg)       Fluid (ml/day):  1525mL (1mL/kcal)    Nutrition Related Findings:  NFPE mild muscle, mild fat wasting. Pt reports ~20lb wt loss x1 year with BW 140lb 1x year ago, and BM 120lb pta. Pt reports hx constipation. Last BM yesterday; pt reports some current constipation. No edema. Wounds:    None       Current Nutrition Therapies:  DIET NUTRITIONAL SUPPLEMENTS Breakfast, Lunch, Dinner; [employer] (NTL)  DIET NUTRITIONAL SUPPLEMENTS Lunch; Magic Cup  DIET CARDIAC Soft Solids; 2 Nectar/2 Mildly Thick    Anthropometric Measures:  · Height:  5' 5\" (165.1 cm)  · Current Body Wt:  47.7 kg (105 lb 2.6 oz)   · Usual Body Wt:  120 lb(pta, unsure when.  Reports BW 140lb 1x year ago)     · Ideal Body Wt:  125 lbs:  84.1 %  · BMI Category:  Underweight (BMI less than 22) age over 72         Nutrition Diagnosis:   · Increased nutrient needs related to impaired respiratory function as evidenced by intubation      Nutrition Interventions:   Food and/or Nutrient Delivery: Continue NPO, Start tube feeding  Nutrition Education and Counseling: No recommendations at this time  Coordination of Nutrition Care: No recommendation at this time    Goals:  Meet >75% EENs via PO  Wt maintenance +/- 0.5kg per week  Lytes wnl       Nutrition Monitoring and Evaluation:   Behavioral-Environmental Outcomes:    Food/Nutrient Intake Outcomes: Enteral nutrition intake/tolerance  Physical Signs/Symptoms Outcomes: Weight    Discharge Planning:     Too soon to determine     Electronically signed by Mayuri Briseno on 9/29/2020 at 3:39 PM    Contact:

## 2020-09-29 NOTE — PROGRESS NOTES
Pulmonology and Critical Care Progress Note    Subjective:     Chief Complaint:   Chief Complaint   Patient presents with    Respiratory Distress        Patient seen and examined in her room  Overnight events noted     successful extubation 9/24 to 5L NC. Doing fairly well. Now on room air. Awake and alert  No acute distress  Answering questions appropriately      Past Medical History:   Diagnosis Date    Chronic obstructive pulmonary disease ([facility] [address]     Chronic respiratory failure (HCC)     Congestive heart failure (CHF) (HCC)     Hypertension     Hypertension     Renal artery stenosis (HCC)     Seizure disorder (HCC)      No past surgical history on file.    Family History   Problem Relation Age of Onset    Hypertension Mother     Stroke Mother      Social History     Tobacco Use    Smoking status: Heavy Tobacco Smoker     Types: Cigarettes    Tobacco comment: former quit only 1 month ago during recent admission to the hospital   Substance Use Topics    Alcohol use: Not Currently      Current Facility-Administered Medications   Medication Dose Route Frequency Provider Last Rate Last Dose    potassium chloride SR (KLOR-CON 10) tablet 40 mEq  40 mEq Oral BID Vlad Obando, DO   40 mEq at 09/29/20 0815    dexmedeTOMidine (PRECEDEX) 400 mcg in 0.9% sodium chloride 104 mL infusion  0.2-0.7 mcg/kg/hr IntraVENous TITRATE Shira Jean MD   Stopped at 09/28/20 0012    NIFEdipine ER (PROCARDIA XL) tablet 60 mg  60 mg Oral BID Windell Ahumada, NP   60 mg at 09/29/20 0814    labetaloL (NORMODYNE) tablet 400 mg  400 mg Oral Q12H Windell Ahumada, NP   400 mg at 09/29/20 0815    bumetanide (BUMEX) injection 1 mg  1 mg IntraVENous Q8H Vlad Obando DO   1 mg at 09/29/20 0553    hydrALAZINE (APRESOLINE) tablet 50 mg  50 mg Oral TID Tavo Orn, DO   50 mg at 09/29/20 0815    labetaloL (NORMODYNE;TRANDATE) 20 mg/4 mL (5 mg/mL) injection 20 mg  20 mg IntraVENous Q6H PRN Vlad Obando, DO   20 mg at 09/25/20 1259    hydrALAZINE (APRESOLINE) 20 mg/mL injection 10 mg  10 mg IntraVENous Q2H PRN Donzetta Boeck, MD   10 mg at 09/26/20 0226    levETIRAcetam (KEPPRA) 500 mg in saline (iso-osm) 100 mL IVPB (premix)  500 mg IntraVENous Q12H Nina Neil  mL/hr at 09/24/20 2328 500 mg at 09/28/20 2321    Or    levETIRAcetam (KEPPRA) tablet 500 mg  500 mg Oral Q12H Nina Neil MD   500 mg at 09/27/20 1458    famotidine (PF) (PEPCID) 20 mg in 0.9% sodium chloride 10 mL injection  20 mg IntraVENous Q12H Vlad Obando DO   20 mg at 09/29/20 0814    albuterol-ipratropium (DUO-NEB) 2.5 MG-0.5 MG/3 ML  3 mL Nebulization Q6H RT Vlad Obando DO   3 mL at 09/29/20 3896    aspirin delayed-release tablet 81 mg  81 mg Oral DAILY Chloe Ariza MD   81 mg at 09/29/20 0815    isosorbide dinitrate (ISORDIL) tablet 40 mg  40 mg Oral BID Chloe Ariza MD   40 mg at 09/29/20 0815    magnesium oxide (MAG-OX) tablet 400 mg  400 mg Oral DAILY Chloe Ariza MD   400 mg at 09/29/20 0815    ALPRAZolam [place]) tablet 0.25 mg  0.25 mg Oral TID PRN Chloe Ariza MD   0.25 mg at 09/28/20 2014    sertraline (ZOLOFT) tablet 25 mg  25 mg Oral DAILY Chloe Ariza MD   25 mg at 09/29/20 0815    levothyroxine (SYNTHROID) tablet 25 mcg  25 mcg Oral ACB Chloe Ariza MD   25 mcg at 09/29/20 5099    carBAMazepine (TEGretol) tablet 200 mg  200 mg Oral BID Chloe Ariza MD   200 mg at 09/29/20 4513    ferrous sulfate tablet 325 mg  325 mg Oral ACB Chloe Ariza MD   325 mg at 90/86/51 8803    folic acid (FOLVITE) tablet 1 mg  1 mg Oral DAILY Chloe Ariza MD   1 mg at 09/29/20 0816    sodium chloride (NS) flush 5-40 mL  5-40 mL IntraVENous Q8H Chloe Ariza MD   10 mL at 09/29/20 0634    sodium chloride (NS) flush 5-40 mL  5-40 mL IntraVENous PRN Chloe Ariza MD   10 mL at 09/29/20 0634    acetaminophen (TYLENOL) tablet 650 mg  650 mg Oral Q6H PRN Chloe Ariza MD        Or    acetaminophen (TYLENOL) suppository 650 mg  650 mg Rectal Q6H PRN Andrzej Campbell MD        polyethylene glycol [facility]) packet 17 g  17 g Oral DAILY PRN Andrzej Campbell MD   17 g at 09/28/20 1830    enoxaparin (LOVENOX) injection 40 mg  40 mg SubCUTAneous DAILY Andrzej Campbell MD   40 mg at 09/29/20 0814    ondansetron (ZOFRAN) injection 4 mg  4 mg IntraVENous Q6H PRN Andrzej Campbell MD   4 mg at 09/27/20 1320        Home Meds:  Prior to Admission medications    Medication Sig Start Date End Date Taking?  Authorizing Provider   ALPRAZolam [place]) 0.25 mg tablet Take 0.25 mg by mouth three (3) times daily as needed for Anxiety.     Yes Provider, Historical   ethacrynic acid (EDECRIN) 25 mg tablet Take 50 mg by mouth two (2) times a day.     Yes Provider, Historical   predniSONE (DELTASONE) 5 mg tablet Take 5 mg by mouth daily.     Yes Provider, Historical   sertraline (Zoloft) 25 mg tablet Take 25 mg by mouth daily.     Yes Provider, Historical   hydrALAZINE (APRESOLINE) 50 mg tablet Take 100 mg by mouth three (3) times daily.     Yes Provider, Historical   levothyroxine (SYNTHROID) 25 mcg tablet Take 25 mcg by mouth Daily (before breakfast).     Yes Provider, Historical   levETIRAcetam (Keppra) 500 mg tablet Take 500 mg by mouth two (2) times a day.     Yes Provider, Historical   carBAMazepine, mood stabiliz, 200 mg CM12 Take 200 mg by mouth two (2) times a day.     Yes Provider, Historical   ferrous sulfate 325 mg (65 mg iron) tablet Take 325 mg by mouth Daily (before breakfast).     Yes Provider, Historical   folic acid (FOLVITE) 1 mg tablet Take 1 mg by mouth daily.     Yes Provider, Historical   spironolactone (ALDACTONE) 25 mg tablet Take 100 mg by mouth daily.     Yes Provider, Historical   metoprolol succinate (TOPROL-XL) 100 mg tablet Take 150 mg by mouth daily.       Provider, Historical   NIFEdipine ER (PROCARDIA XL) 60 mg ER tablet Take 60 mg by mouth daily.       Provider, Historical   aspirin delayed-release 81 mg tablet Take  by mouth daily.       Provider, Historical   isosorbide dinitrate (ISORDIL) 20 mg tablet Take 40 mg by mouth two (2) times a day.       Provider, Historical   labetaloL (NORMODYNE) 200 mg tablet Take  by mouth two (2) times a day.       Provider, Historical   magnesium oxide (MAG-OX) 400 mg tablet Take 400 mg by mouth daily.       Provider, Historical   potassium chloride (K-DUR, KLOR-CON) 20 mEq tablet Take 20 mEq by mouth two (2) times a day.       Provider, Historical         Allergies   Allergen Reactions    Sulfa (Sulfonamide Antibiotics) Anaphylaxis       Review of Systems:  Review of systems not obtained due to patient factors. Objective:     Blood pressure (!) 165/96, pulse 93, temperature 98.3 °F (36.8 °C), resp. rate 17, height 5' 5\" (1.651 m), weight 47.7 kg (105 lb 2.6 oz), SpO2 94 %. Temp (24hrs), Av.4 °F (36.9 °C), Min:98.2 °F (36.8 °C), Max:98.5 °F (36.9 °C)      Intake and Output:  Current Shift:  07 - 1900  In: -   Out: 600 [Urine:600]  Last 3 Shifts: 1901 -  0700  In: -   Out: 2675 [Urine:4050]    Physical Exam:   General appearance: A+Ox3, chronically ill-appearing, currently on room air. Head: Normocephalic, without obvious abnormality, atraumatic  Eyes: conjunctivae/corneas clear. PERRL, EOM's intact. Throat: Lips, mucosa, and tongue normal. Poor dentition. Neck: supple, symmetrical, trachea midline, no adenopathy, thyroid: not enlarged, symmetric, no tenderness/mass/nodules and no carotid bruit  Lungs: Diminished in general.  Also diminished breath sounds over the bases with some crackles bilaterally. She is currently on room air. Heart: regular rate and rhythm, S1, S2 normal, no murmur, click, rub or gallop  Abdomen: soft, non-tender.  Bowel sounds normal. No masses,  no organomegaly  Extremities: edema +1 bilaterally, otherwise atraumatic  Skin: Skin color, texture, turgor normal. No rashes or lesions  Lymph nodes: Cervical, supraclavicular, and axillary nodes normal.  Neurologic: Grossly normal, following commands, calm    Additional comments:None    Lab/Data Review: All lab results for the last 24 hours reviewed. Recent Results (from the past 24 hour(s))   METABOLIC PANEL, BASIC    Collection Time: 09/29/20  3:47 AM   Result Value Ref Range    Sodium 135 (L) 136 - 145 mmol/L    Potassium 3.2 (L) 3.5 - 5.1 mmol/L    Chloride 95 (L) 97 - 108 mmol/L    CO2 34 (H) 21 - 32 mmol/L    Anion gap 6 5 - 15 mmol/L    Glucose 109 (H) 65 - 100 mg/dL    BUN 25 (H) 6 - 20 mg/dL    Creatinine 1.81 (H) 0.55 - 1.02 mg/dL    BUN/Creatinine ratio 14 12 - 20      GFR est AA 35 (L) >60 ml/min/1.73m2    GFR est non-AA 29 (L) >60 ml/min/1.73m2    Calcium 9.5 8.5 - 10.1 mg/dL   CBC WITH AUTOMATED DIFF    Collection Time: 09/29/20  3:47 AM   Result Value Ref Range    WBC 9.4 3.6 - 11.0 K/uL    RBC 2.77 (L) 3.80 - 5.20 M/uL    HGB 9.0 (L) 11.5 - 16.0 %    HCT 27.5 (L) 35.0 - 47.0 %    MCV 99.3 (H) 80.0 - 99.0 FL    MCH 32.5 26.0 - 34.0 PG    MCHC 32.7 30.0 - 36.5 g/dL    RDW 14.2 11.5 - 14.5 %    PLATELET 066 003 - 426 K/uL    MPV 9.5 8.9 - 12.9 FL    NEUTROPHILS 70 32 - 75 %    LYMPHOCYTES 16 12 - 49 %    MONOCYTES 10 5 - 13 %    EOSINOPHILS 3 0 - 7 %    BASOPHILS 0 0 - 1 %    IMMATURE GRANULOCYTES 1 (H) 0.0 - 0.5 %    ABS. NEUTROPHILS 6.7 1.8 - 8.0 K/UL    ABS. LYMPHOCYTES 1.5 0.8 - 3.5 K/UL    ABS. MONOCYTES 0.9 0.0 - 1.0 K/UL    ABS. EOSINOPHILS 0.3 0.0 - 0.4 K/UL    ABS. BASOPHILS 0.0 0.0 - 0.1 K/UL    ABS. IMM. GRANS. 0.1 (H) 0.00 - 0.04 K/UL    DF AUTOMATED     MAGNESIUM    Collection Time: 09/29/20  3:47 AM   Result Value Ref Range    Magnesium 1.8 1.6 - 2.4 mg/dL         Chest X-Ray:   XR CHEST PORT   Final Result   Impression: No significant interval change. XR CHEST PORT   Final Result   Impression: Diminished airspace disease.       XR SWALLOW FUNC VIDEO   Final Result   Impression: Laryngeal penetration with thin, nectar thick, honey thick, and   pudding consistency. No aspiration. Please see recommendation by speech   pathology. Dose: Kerma= 13 mGy. Fluoroscopy time 1 minute and 43 seconds      XR CHEST PORT   Final Result   IMPRESSION:   1. Overall findings are supportive of pulmonary edema with bilateral pleural   effusions. The perihilar opacities appear slightly progressed on the right from   one day prior. XR CHEST PORT   Final Result   IMPRESSION: Endotracheal tube and gastric tube removed. Bilateral pleural   effusions. Improved aeration of the lower lung zones. Mild prominence of the   interstitium. XR CHEST PORT   Final Result   IMPRESSION: Persistent bibasilar lung findings previously described as CHF and   probably with effusion complicating visualization of the right lung base. Continued surveillance recommended      XR CHEST PORT   Final Result   IMPRESSION: Improving congestive heart failure pattern. XR CHEST SNGL V   Final Result   Findings/impression:      Moderate bilateral pleural effusion present. There is extensive interstitial and   airspace disease seen throughout both lungs, including dense airspace disease   involving left lower lobe with air bronchograms. No interval improvement. No   pneumothorax. Endotracheal tube 5 cm above pilo. Nasogastric tube below   diaphragm. No suspicious osseous lesions. XR CHEST SNGL V   Final Result   Impression: Congestive heart failure with interstitial and alveolar pulmonary   edema with bilateral pleural effusions.           CT imaging:  CT Results  (Last 48 hours)    None          PFTs:   PFT Results  (Last 3 results in the past 10 years)    None            Assessment:     Patient is a 51-year-old  female with history of diastolic CHF, hypertension, emphysema, CVA, renal artery stenosis, mitral valve regurgitation, and chronic hypoxemic respiratory failure currently on 5 L nasal cannula at home who presented back to [facility] on 9/21/2020 with increasing respiratory distress and hypoxia. Initially placed on BiPAP given significant volume overload present on chest imaging, however she was intubated. Now extubated on 9/24/20. Plan:     1.)  Acute on chronic hypoxic respiratory failure  -Secondary to acute on chronic heart failure and flash pulmonary edema.   -Now successfully extubated on 9/24; currently on room air. Overall improved. -Chest x-ray reviewed and showing improvement in bilateral airspace disease, persistent pleural effusions.   -Continue with Bumex to 1 mg IV every 8 hours today and continue to monitor strict I/O's and daily weights. -Very low suspicion for COPD exacerbation or pneumonia. Agree with holding antibiotics for now, continue home prednisone 5 mg daily. Continue scheduled bronchodilators. - Patient also appears to be chronically aspirating, however modified barium swallow showed laryngeal penetration with thin nectar and honey thickened liquids. No kim aspiration. Started on honey thickened liquids. - Continue speech therapy evaluations. 2.)  Acute on chronic diastolic heart failure  -Significant pulmonary edema and bilateral pleural effusions. Likely worsened by poorly controlled blood pressure, renal artery stenosis, poor nutrition.  -Extubated 9/24/20 successfully; weaned off oxygen.  -Cardiology is following, appreciate their recommendations. Most recent transthoracic echocardiogram 8/2020 with normal EF and mild to moderate mitral valve regurgitation.  -After discussion with cardiology, her allergy to sulfa was apparently discovered after taking Bactrim. Supposedly, she had never had Lasix/Bumex prior to this admission. Patient tolerating Bumex without evidence of anaphylaxis. -Aldactone restarted    Will repeat chest x-ray in a.m.    -Patient does have persistent b/l pleural effusions which are unlikely to resolve with diuresis alone.  She did require thoracentesis during her last admission. May consider  IR consult for therapeutic thoracentesis (likely start on the right). Drainage of her pleural space will allow for better lung expansion and lead to likely better rehab potential.    3.)  Accelerated hypertension-   -Blood pressure in the 200s on admission, likely leading to flash pulmonary edema. Was better controlled, now back into the 160's-180's  -Continue current regimen, but will increase her Hydralazine to 50 mg TID (from BID) today. Also, Bumex dose has been increased as above. -Appreciate assistance from Cardiology. 4.)  Emphysema  -Per review of her records, she has never follow-up with us in the clinic and supposedly has not had PFTs. -Does not appear to be on any maintenance inhalers at home.  -She missed her follow-up appointment with us this week  -Low suspicion for acute exacerbation of COPD. -Switch back to scheduled prednisone 5 mg daily which apparently is a chronic medication.  -Agree with q6 scheduled duo nebs. 5.)  Renal artery stenosis  -Renal artery duplex from 8/24/2020 showing right renal artery stenosis.  -Likely at least contributing to her poorly controlled hypertension. Cardiology has been consulted. 6.)  Seizure disorder  -Seems to have occurred after her CVA, continue on home Keppra/carbamazepine.  -No evidence of seizure activity at this time. 7.) Anxiety  - Seems to be poorly controlled; Precedex now 0.5 mics. - Discussed with the bedside nurse, recommend titrating this to off by tomorrow morning.  - Patient already has PRN Xanax ordered. I will continue to follow along with her while she is admitted.   Discussed in detail with RN, RT, and care team      CODE STATUS: Full Code    Lines/Tubes: Sanchez catheter, 2 peripheral IVs    Prophylaxis:  Stress Ulcer Prophylaxis: Famotidine IV  Deep Vein Thrombosis Prophylaxis: Lovenox    Total time spent with patient: More than 30 minutes was spent with the patient reviewing extensive labs and chest imaging, discussing in detail with the bedside nurse and documentation.       Antonia Reed MD  [facility]  9/29/2020

## 2020-09-29 NOTE — PROGRESS NOTES
{[facility] BEDSIDE_VERBAL_Bedside shift change report given to [address] (oncoming nurse) by Mickey Childress (offgoing nurse).  Report included SBAR

## 2020-09-29 NOTE — PROGRESS NOTES
Transfer Skin Assessment:       Performed two person skin assessment with Max Garnica RN. Skin is clean, dry, and intact. Prophylactic mepilex on sacrum.

## 2020-09-29 NOTE — PROGRESS NOTES
[facility] CARDIOLOGY  CARDIOLOGY PROGRESS NOTE     Patient seen and examined. This is a patient who is followed for acute on chronic heart failure. She continues to report improvement in shortness of breath. Mild cough. No chest pain. Continues to diurese well. No other complaints reported. Telemetry reviewed, there were no events noted in the past 24 hours. Remains in normal sinus rhythm. Pertinent review of systems items noted above, all other systems are negative. Current medications reviewed. Physical Examination  Vital signs are stable. Blood pressure 166/95, Pulse 100  No apparent distress resting comfortably in bed. Heart is regular, rate and rhythm. Normal S1, S2, no murmurs are appreciated. Lungs are significantly diminished to bases. No rales, rhonci, wheezing. Abdomen is soft, nontender, normal bowel sounds. Extremities have no edema. Sanchez catheter in place drain clear yellow urine. Labs reviewed. Creatinine elevated at 1.8, 1.9 yesterday. Discussed case with Dr. Jensen Shaw and our impression and recommendations are as follows:  1. Acute on chronic heart failure: Volume status is improving. Will decrease Bumex 1mg to IV twice daily. Strict I&Os. 2. Mitral valve regurgitation: This has improvement to mild to moderate following aggressive diuresis. Continue diuresis as stated above. Limited symptoms at rest with plans to work with PT today. 3. Hypertension:  Remains elevated after a brief episode of hypotension yesterday. Will increase Isordil to TID dosing. 4. Hypokalemia: This has resolved. 5.  PEDRO PABLO:  Remains elevated. Decreasing bumex frequency and holding spironolactone. Renal duplex during last admission showing possible right renal arterial stenosis. Will consider CTA pending trend.         Please do not hesitate to call me if additional questions arise

## 2020-09-29 NOTE — PROGRESS NOTES
Pt transferred to room 586 via wheelchair with member of nursing staff, pt belongings, paper chart, and dinner tray. Pt  made aware of room transfer. Telemetry monitor applied to pt. Vital signs WNL. Report called to Jaimee John RN.

## 2020-09-29 NOTE — PROGRESS NOTES
Problem: Mobility Impaired (Adult and Pediatric)  Goal: *Acute Goals and Plan of Care (Insert Text)  Description: Physical Therapy Goals  Initiated 9/29/2020  1)  I with HEP in 7 days to improve overall functional mobility. 2)  Bed mobility and transfers I in 7 days to prevent skin breakdown. 3)  Pt to amb 200ft with LRAD and sup in 7 days    Pt. Goal:  Pt to be able to walk safely without falls. Outcome: Not Met   PHYSICAL THERAPY EVALUATION  Patient: Abdulaziz Lay (57 y.o. female)  Date: 9/29/2020  Primary Diagnosis: Acute respiratory failure ([address] [J96.00]        Precautions: unsteady with gait, fall risk     ASSESSMENT  Based on the objective data described below, the patient presents with decreased LE strength, difficulty with bed mobility and transfers requiring CGA, impaired balance, difficulty with gait requiring min A for 1 HHA due to unsteady gait. Pt with narrow PEDRO with scissoring gait at times. Pt reports she lives at home with fiance who is gone during the day but is home every night. Pt reports that she was I prior to admission and not using AD for ambulation; however, she does have a RW at home. Pt would benefit from using RW once returning home due to unsteadiness. Other factors to consider for discharge: unsteady gait, lack of family support during the day     Patient will benefit from skilled therapy intervention to address the above noted impairments. PLAN :  Recommendations and Planned Interventions: bed mobility training, transfer training, gait training, patient and family training/education, and therapeutic activities      Frequency/Duration: Patient will be followed by physical therapy:  5 times a week to address goals. Recommendation for discharge: (in order for the patient to meet his/her long term goals)  Home with home health PT and needs to use RW  that she has at home for safety.     This discharge recommendation:  Has been made in collaboration with the attending provider and/or case management    IF patient discharges home will need the following DME: none         SUBJECTIVE:   Patient stated I am feeling good today.   Pt agreeable to work with PT/OT. OBJECTIVE DATA SUMMARY:   HISTORY:    Past Medical History:   Diagnosis Date    Chronic obstructive pulmonary disease ([address]     Chronic respiratory failure (HCC)     Congestive heart failure (CHF) (HCC)     Hypertension     Hypertension     Renal artery stenosis (HCC)     Seizure disorder (HCC)    No past surgical history on file. Personal factors and/or comorbidities impacting plan of care: impaired mobility, unsteady gait    Home Situation  Home Environment: Private residence  # Steps to Enter: 4  Rails to Enter: Yes  Hand Rails : Bilateral  One/Two Story Residence: One story  Living Alone: No  Support Systems: Spouse/Significant Other/Partner  Patient Expects to be Discharged to[de-identified] Private residence  Current DME Used/Available at Home: Shower chair, Walker, rolling  Tub or Shower Type: Tub/Shower combination    PLOF: Pt I for ADLS/IADLS, I with mobility prior to admission. EXAMINATION/PRESENTATION/DECISION MAKING:   Critical Behavior:  Neurologic State: Alert  Orientation Level: Oriented X4  Cognition: Follows commands     Range Of Motion:  AROM: Within functional limits                       Strength:    Strength: Within functional limits      Overall 3+/5 throughout B LE's. Functional Mobility:  Bed Mobility:  Rolling: Stand-by assistance  Supine to Sit: Stand-by assistance     Scooting: Stand-by assistance  Transfers:  Sit to Stand: Contact guard assistance  Stand to Sit: Contact guard assistance                       Balance:   Sitting: Intact; Without support  Standing: Intact; With support  Ambulation/Gait Training:  Distance (ft): 40 Feet (ft)  Assistive Device: Gait belt(1HHA)  Ambulation - Level of Assistance: Minimal assistance     Gait Description (WDL): Exceptions to [facility] Speed/Dariana: Shuffled  Step Length: Left shortened;Right shortened    Pt demos narrow PEDRO with slight scissoring gait. Functional Measure:  [address] [address] AM-PAC 6 Clicks         Basic Mobility Inpatient Short Form  How much difficulty does the patient currently have. .. Unable A Lot A Little None   1. Turning over in bed (including adjusting bedclothes, sheets and blankets)? [] 1   [] 2   [] 3   [x] 4   2. Sitting down on and standing up from a chair with arms ( e.g., wheelchair, bedside commode, etc.)   [] 1   [] 2   [] 3   [x] 4   3. Moving from lying on back to sitting on the side of the bed? [] 1   [] 2   [] 3   [x] 4          How much help from another person does the patient currently need. .. Total A Lot A Little None   4. Moving to and from a bed to a chair (including a wheelchair)? [] 1   [] 2   [x] 3   [] 4   5. Need to walk in hospital room? [] 1   [] 2   [x] 3   [] 4   6. Climbing 3-5 steps with a railing? [] 1   [] 2   [x] 3   [] 4   © , Trustees of [address] [address], under license to [employer]. All rights reserved     Score:  Initial: 21 Most Recent: X (Date: 20)   Interpretation of Tool:  Represents activities that are increasingly more difficult (i.e. Bed mobility, Transfers, Gait).   Score 24 23 22-20 19-15 14-10 9-7 6   Modifier CH CI CJ CK CL CM CN         Physical Therapy Evaluation Charge Determination   History Examination Presentation Decision-Making   MEDIUM  Complexity : 1-2 comorbidities / personal factors will impact the outcome/ POC  MEDIUM Complexity : 3 Standardized tests and measures addressing body structure, function, activity limitation and / or participation in recreation  LOW Complexity : Stable, uncomplicated  LOW Complexity : FOTO score of       Based on the above components, the patient evaluation is determined to be of the following complexity level: LOW     Pain Ratin/10    Activity Tolerance: Good  Please refer to the flowsheet for vital signs taken during this treatment. After treatment patient left in no apparent distress:   Sitting in chair and Call bell within reach    COMMUNICATION/EDUCATION:   The patients plan of care was discussed with: Physical therapy assistant, Registered nurse, and Case management. Patient/family agree to work toward stated goals and plan of care. PT/OT sessions occurred together for increased safety of pt and clinician.      Thank you for this referral.  Jose Vides   Time Calculation: 24 mins

## 2020-09-29 NOTE — PROGRESS NOTES
Patient: Umesh Ramesh (29 y.o. female)  Date: 9/29/2020  Diagnosis: Acute respiratory failure ([facility] [address] [J96.00]   <principal problem not specified>       Precautions:  Aspiration    ASSESSMENT :  Patient presents w/ mild oropharyngeal dysphagia. Oral phase c/b mild reduction in mastication. Pharyngeal phase c/b mild swallow delay w/ reduced HLE. Multiple swallows noted w/ all consistencies. Overt s/s of pen/asp c/b throat clear x1 w/ solids. Patient will benefit from skilled intervention to address the above impairments. Patients rehabilitation potential is considered to be Good     PLAN :  Recommendations and Planned Interventions:  Rec diet upgrade to chopped/NTL w/ free water protocol b/w meals following oral care. Frequency/Duration: Patient will be followed by speech-language pathology 5 times a week to address goals. Discharge Recommendations: Skilled Nursing Facility     SUBJECTIVE:   Patient reports improved tolerance of PO intake however continues concerns given weight loss, poor appetite and dysphagia. OBJECTIVE:     Past Medical History:   Diagnosis Date    Chronic obstructive pulmonary disease ([facility] [address]     Chronic respiratory failure (HCC)     Congestive heart failure (CHF) (HCC)     Hypertension     Hypertension     Renal artery stenosis (HCC)     Seizure disorder (HCC)        CXR Results  (Last 48 hours)                 09/29/20 0219  XR CHEST PORT Final result    Impression:  Impression: No significant interval change. Narrative:  Chest, frontal view, 9/29/2020       History: Shortness of breath. Comparison: Including chest 9/27/2020. Findings: The cardiac silhouette is at the upper limits of normal.   The lungs   are adequately expanded. No hydrostatic edema is present. Small to moderate   appearing pleural effusions and associated bibasilar atelectasis are not   significantly changed. No pneumothorax is identified. The osseous structures   are stable. Diet prior to admission: Regular  Current Diet:  DIET NUTRITIONAL SUPPLEMENTS  DIET NUTRITIONAL SUPPLEMENTS  DIET CARDIAC     Cognitive and Communication Status:  Neurologic State: Alert  Orientation Level: Oriented X4  Cognition: Follows commands     Dysphagia Treatment:  Oral Assessment:  Oral Assessment  Labial: No impairment  Lingual: No impairment  Velum: No impairment  Mandible: No impairment  P.O. Trials:  Patient Position: upright  Vocal quality prior to P.O.: Breathy;Hoarse  Consistency Presented: Ice chips; Solid; Thin liquid  How Presented: Self-fed/presented;Cup/sip     Bolus Acceptance: No impairment  Bolus Formation/Control: Impaired  Type of Impairment: Mastication  Propulsion: No impairment  Oral Residue: None  Initiation of Swallow: Delayed (# of seconds)  Laryngeal Elevation: Decreased;Weak  Aspiration Signs/Symptoms: Clear throat    Pain:  Pain Scale 1: Numeric (0 - 10)  Pain Intensity 1: 0       After treatment:   Patient left in no apparent distress in bed, Call bell within reach, and Nursing notified    COMMUNICATION/EDUCATION:   Patient receptive of education regarding dysphagia, diet recs, s/s aspiration and aspiration precuations. She demonstrated Good understanding as evidenced by engagement and questions. The patient's plan of care including recommendations, planned interventions, and recommended diet changes were discussed with: Registered nurse. Patient/family have participated as able in goal setting and plan of care. Patient/family agree to work toward stated goals and plan of care. Problem: Dysphagia (Adult)  Goal: *Acute Goals and Plan of Care (Insert Text)  Description: Speech Therapy Swallow Goals  Initiated 9/25/2020       -Patient will tolerate PO trials without clinical indicators of aspiration given minimal cues within 7 day(s). [ progressing ]         -Patient will participate in modified barium swallow study within 7 day(s).  Zac ]          -Patient will perform BOT and laryngeal strengthening exercises with minimal cues within 10 day(s). [progressing]    -Patient will demonstrate understanding of swallow safety precautions and aspiration precautions, diet recs with minimal cues within 7 day(s). [progressing ]  -patient will tolerate ground diet and nectar thick liquids w/o overt s/sx of aspiration within 5-7 days.              Outcome: Progressing Towards Goal     Thank you for this referral.  Kim Ritchie M.S., M.Ed., CCC-SLP  Time Calculation: 18 mins

## 2020-09-29 NOTE — PROGRESS NOTES
Progress Note    Patient: Jada Prabhakar MRN: [id number]  SSN: [id number]    YOB: 1965  Age: 54 y.o. Sex: female      Admit Date: 9/21/2020    LOS: 8 days     Subjective:     CC: shortness of breath      54F, h/o COPD with acute on chronic respiratory failure on 2L NC, with shortness of breath s/t AECHFpEF was intubated s/t acute hypoxic respiratory failure s.t flash pulmonary edema s.t AECHFpEF.     Extubated on 9/22  In wheelchair pending transfer out of icu  On ra, no distress and states feels nuch improved           Review of Systems:  Constitutional:  denies weight loss, no fever, no chills, no night sweats  Eye: No recent visual disturbances, no discharge, no double vision  Ear/nose/mouth/throat : No hearing disturbance, no ear pain, no nasal congestion, no sore throat, no trouble swallowing. Respiratory : No trouble breathing, no cough, no shortness of breath, no hemoptysis, no wheezing  Cardiovascular : No chest pain, no palpitation, no racing of heart, no orthopnea, no paroxysmal nocturnal dyspnea, no peripheral edema  Gastrointestinal : No nausea, no vomiting, no diarrhea, constipation, heartburn, abdominal pain  Genitourinary : No dysuria, no hematuria, no increased frequency, incontinence,  Lymphatics : No swollen glands -Neck, axillary, inguinal  Endocrine : No excessive thirst, no polyuria no cold intolerance, no heat intolerance. Immunologic : No hives, urticaria, no seasonal allergies,   Musculoskeletal : Left upper back pain.  No joint swelling, pain, effusion,  no neck pain,   Integumentary : No rash, no pruritus, no ecchymosis  Hematology : No petechiae, No easy bruising,  No tendency to bleed easy  Neurology : Denies change in mental status, no abnormal balance, no headache, no confusion, numbness, tingling,  Psychiatric : No mood swings, no anxiety, depression      Prior to Admission Medications   Prescriptions Last Dose Informant Patient Reported? Taking?    ALPRAZolam Lisa Moralesbes) 0.25 mg tablet   Yes Yes   Sig: Take 0.25 mg by mouth three (3) times daily as needed for Anxiety. NIFEdipine ER (PROCARDIA XL) 60 mg ER tablet   Yes No   Sig: Take 60 mg by mouth daily. aspirin delayed-release 81 mg tablet   Yes No   Sig: Take  by mouth daily. carBAMazepine, mood stabiliz, 200 mg CM12   Yes Yes   Sig: Take 200 mg by mouth two (2) times a day.   ethacrynic acid (EDECRIN) 25 mg tablet   Yes Yes   Sig: Take 50 mg by mouth two (2) times a day. ferrous sulfate 325 mg (65 mg iron) tablet   Yes Yes   Sig: Take 325 mg by mouth Daily (before breakfast). folic acid (FOLVITE) 1 mg tablet   Yes Yes   Sig: Take 1 mg by mouth daily. hydrALAZINE (APRESOLINE) 50 mg tablet   Yes Yes   Sig: Take 100 mg by mouth three (3) times daily. isosorbide dinitrate (ISORDIL) 20 mg tablet   Yes No   Sig: Take 40 mg by mouth two (2) times a day. labetaloL (NORMODYNE) 200 mg tablet   Yes No   Sig: Take  by mouth two (2) times a day. levETIRAcetam (Keppra) 500 mg tablet   Yes Yes   Sig: Take 500 mg by mouth two (2) times a day. levothyroxine (SYNTHROID) 25 mcg tablet   Yes Yes   Sig: Take 25 mcg by mouth Daily (before breakfast). magnesium oxide (MAG-OX) 400 mg tablet   Yes No   Sig: Take 400 mg by mouth daily. metoprolol succinate (TOPROL-XL) 100 mg tablet   Yes No   Sig: Take 150 mg by mouth daily. potassium chloride (K-DUR, KLOR-CON) 20 mEq tablet   Yes No   Sig: Take 20 mEq by mouth two (2) times a day. predniSONE (DELTASONE) 5 mg tablet   Yes Yes   Sig: Take 5 mg by mouth daily. sertraline (Zoloft) 25 mg tablet   Yes Yes   Sig: Take 25 mg by mouth daily. spironolactone (ALDACTONE) 25 mg tablet   Yes Yes   Sig: Take 100 mg by mouth daily.       Facility-Administered Medications: None       Current Facility-Administered Medications:     bumetanide (BUMEX) injection 1 mg, 1 mg, IntraVENous, BID, Katy Connor NP    isosorbide dinitrate (ISORDIL) tablet 40 mg, 40 mg, Oral, TID, Geeta, Katy COTTON NP, 40 mg at 09/29/20 1640    potassium chloride SR (KLOR-CON 10) tablet 40 mEq, 40 mEq, Oral, BID, Vlad Obando DO, 40 mEq at 09/29/20 0815    dexmedeTOMidine (PRECEDEX) 400 mcg in 0.9% sodium chloride 104 mL infusion, 0.2-0.7 mcg/kg/hr, IntraVENous, TITRATE, Sushma Perkins MD, Stopped at 09/28/20 0012    NIFEdipine ER (PROCARDIA XL) tablet 60 mg, 60 mg, Oral, BID, Madie Wise NP, 60 mg at 09/29/20 0814    labetaloL (NORMODYNE) tablet 400 mg, 400 mg, Oral, Q12H, Madie Wise NP, 400 mg at 09/29/20 0815    hydrALAZINE (APRESOLINE) tablet 50 mg, 50 mg, Oral, TID, Vlad bOando DO, 50 mg at 09/29/20 1640    labetaloL (NORMODYNE;TRANDATE) 20 mg/4 mL (5 mg/mL) injection 20 mg, 20 mg, IntraVENous, Q6H PRN, Vlad Obando DO, 20 mg at 09/25/20 1259    hydrALAZINE (APRESOLINE) 20 mg/mL injection 10 mg, 10 mg, IntraVENous, Q2H PRN, Dixie Langston MD, 10 mg at 09/26/20 0226    levETIRAcetam (KEPPRA) 500 mg in saline (iso-osm) 100 mL IVPB (premix), 500 mg, IntraVENous, Q12H, Last Rate: 400 mL/hr at 09/24/20 2328, 500 mg at 09/28/20 2321 **OR** levETIRAcetam (KEPPRA) tablet 500 mg, 500 mg, Oral, Q12H, Rufus Pierce MD, 500 mg at 09/29/20 1146    famotidine (PF) (PEPCID) 20 mg in 0.9% sodium chloride 10 mL injection, 20 mg, IntraVENous, Q12H, Vlad Obando DO, 20 mg at 09/29/20 0814    albuterol-ipratropium (DUO-NEB) 2.5 MG-0.5 MG/3 ML, 3 mL, Nebulization, Q6H RT, Vlad Obando, DO, 3 mL at 09/29/20 1339    aspirin delayed-release tablet 81 mg, 81 mg, Oral, DAILY, Mary Maloney MD, 81 mg at 09/29/20 0815    magnesium oxide (MAG-OX) tablet 400 mg, 400 mg, Oral, DAILY, Mary Maloney MD, 400 mg at 09/29/20 0815    [facility]am [facility] tablet 0.25 mg, 0.25 mg, Oral, TID PRN, Mary Maloney MD, 0.25 mg at 09/28/20 2014    sertraline (ZOLOFT) tablet 25 mg, 25 mg, Oral, DAILY, Mary Maloney MD, 25 mg at 09/29/20 0815    levothyroxine (SYNTHROID) tablet 25 mcg, 25 mcg, Oral, ACB, Martin Loots, Lieutenant Steff MD, 25 mcg at 09/29/20 1776    carBAMazepine (TEGretol) tablet 200 mg, 200 mg, Oral, BID, Vidal Boudreaux MD, 200 mg at 09/29/20 1590    ferrous sulfate tablet 325 mg, 325 mg, Oral, ACB, Vidal Boudreaux MD, 325 mg at 46/24/67 0307    folic acid (FOLVITE) tablet 1 mg, 1 mg, Oral, DAILY, Vidal Boudreaux MD, 1 mg at 09/29/20 0816    sodium chloride (NS) flush 5-40 mL, 5-40 mL, IntraVENous, Q8H, Vidal Boudreaux MD, 10 mL at 09/29/20 0634    sodium chloride (NS) flush 5-40 mL, 5-40 mL, IntraVENous, PRN, Vidal Boudreaux MD, 10 mL at 09/29/20 0634    acetaminophen (TYLENOL) tablet 650 mg, 650 mg, Oral, Q6H PRN **OR** acetaminophen (TYLENOL) suppository 650 mg, 650 mg, Rectal, Q6H PRN, Vidal Boudreaux MD    polyethylene glycol (MIRALAX) packet 17 g, 17 g, Oral, DAILY PRN, Vidal Boudreaux MD, 17 g at 09/28/20 1830    enoxaparin (LOVENOX) injection 40 mg, 40 mg, SubCUTAneous, DAILY, Vidal Boudreaux MD, 40 mg at 09/29/20 0814    ondansetron [facility]) injection 4 mg, 4 mg, IntraVENous, Q6H PRN, Vidal Boudreaux MD, 4 mg at 09/27/20 1320    Objective:     Vitals:    09/29/20 1100 09/29/20 1338 09/29/20 1500 09/29/20 1514   BP: (!) 166/95  (!) 175/92    Pulse: 100      Resp: 15  17    Temp: 98.7 °F (37.1 °C)  98.7 °F (37.1 °C)    SpO2: 96% 94% 94%    Weight:       Height:    5' 5\" (1.651 m)        Intake and Output:  Current Shift: 09/29 0701 - 09/29 1900  In: 400 [P.O.:400]  Out: 1500 [Urine:1500]  Last three shifts: 09/27 1901 - 09/29 0700  In: -   Out: 4406 [Urine:4050]      Physical Exam:   General appearance: A+Ox3,  chronically ill-appearing, nad  Head: Normocephalic, without obvious abnormality, atraumatic  Eyes: conjunctivae/corneas clear. EOMi  Throat: moist mucosa, poor dentition. Neck: supple, symmetrical, trachea midline, no adenopathy,   Lungs: scattered rhonchi, fair ae, non laboredHeart: regular rate and rhythm, S1, S2 normal, no murmur, click, rub or gallop  Abdomen: soft, non-tender.  Bowel sounds normal. No masses,  no organomegaly  Extremities: no edema distally, from x 4  Skin: Skin color, texture, turgor normal. No rashes or lesions  Lymph nodes: Cervical, supraclavicular, and axillary nodes normal.  Neurologic: Grossly normal, following commands, calm      Lab/Data Review:  Recent Results (from the past 24 hour(s))   METABOLIC PANEL, BASIC    Collection Time: 09/29/20  3:47 AM   Result Value Ref Range    Sodium 135 (L) 136 - 145 mmol/L    Potassium 3.2 (L) 3.5 - 5.1 mmol/L    Chloride 95 (L) 97 - 108 mmol/L    CO2 34 (H) 21 - 32 mmol/L    Anion gap 6 5 - 15 mmol/L    Glucose 109 (H) 65 - 100 mg/dL    BUN 25 (H) 6 - 20 mg/dL    Creatinine 1.81 (H) 0.55 - 1.02 mg/dL    BUN/Creatinine ratio 14 12 - 20      GFR est AA 35 (L) >60 ml/min/1.73m2    GFR est non-AA 29 (L) >60 ml/min/1.73m2    Calcium 9.5 8.5 - 10.1 mg/dL   CBC WITH AUTOMATED DIFF    Collection Time: 09/29/20  3:47 AM   Result Value Ref Range    WBC 9.4 3.6 - 11.0 K/uL    RBC 2.77 (L) 3.80 - 5.20 M/uL    HGB 9.0 (L) 11.5 - 16.0 %    HCT 27.5 (L) 35.0 - 47.0 %    MCV 99.3 (H) 80.0 - 99.0 FL    MCH 32.5 26.0 - 34.0 PG    MCHC 32.7 30.0 - 36.5 g/dL    RDW 14.2 11.5 - 14.5 %    PLATELET 017 240 - 915 K/uL    MPV 9.5 8.9 - 12.9 FL    NEUTROPHILS 70 32 - 75 %    LYMPHOCYTES 16 12 - 49 %    MONOCYTES 10 5 - 13 %    EOSINOPHILS 3 0 - 7 %    BASOPHILS 0 0 - 1 %    IMMATURE GRANULOCYTES 1 (H) 0.0 - 0.5 %    ABS. NEUTROPHILS 6.7 1.8 - 8.0 K/UL    ABS. LYMPHOCYTES 1.5 0.8 - 3.5 K/UL    ABS. MONOCYTES 0.9 0.0 - 1.0 K/UL    ABS. EOSINOPHILS 0.3 0.0 - 0.4 K/UL    ABS. BASOPHILS 0.0 0.0 - 0.1 K/UL    ABS. IMM. GRANS. 0.1 (H) 0.00 - 0.04 K/UL    DF AUTOMATED     MAGNESIUM    Collection Time: 09/29/20  3:47 AM   Result Value Ref Range    Magnesium 1.8 1.6 - 2.4 mg/dL         Assessment and plan:      1. Acute on chronic respiratory failure: extubated today.  On room air @ 94%     2.  Congestive heart failure with preserved exacerbation: Patient is allergic to sulfa drugs(bactrim) IV bumex tolerating, I&o's and daily weights, switch to oral am     3.  Hypertension: Continue Nifedipine, labetalol, hydralazine, titrate     4.  Anxiety disorder:  Xanax 0.25 PRN. Cont Sertraline. Weaned off precedex     5.  Seizure disorder:  on Keppra and carbamazepine.     6.  Iron deficiency anemia: Continue patient on iron supplement     7.  Renal artery stenosis: Cardiology closely following the patient will follow up with them for further recommendations. DISPO: Transfer out of icu.    Pt/ot [facility] pt/ot rec 1-2 d    Signed By: Ernesto David MD     September 29, 2020

## 2020-09-29 NOTE — PROGRESS NOTES
Problem: Self Care Deficits Care Plan (Adult)  Goal: *Acute Goals and Plan of Care (Insert Text)  Description: Pt will be MI sup<->sit in prep for EOB ADL's  Pt will be MI LB dressing sitting/standing   Pt will be MI sit<-> prep for toilet transfer  Pt will be MI toilet transfer with LRAD  Pt will be MI  toileting/cloth mgmt LRAD  Pt will be MI grooming standing sink  Pt will be MI bathing sitting/standing sink LRAD      Outcome: Not Met     OCCUPATIONAL THERAPY EVALUATION  Patient: Susan Neil (72 y.o. female)  Date: 9/29/2020  Primary Diagnosis: Acute respiratory failure ([facility] [address] [J96.00]        Precautions:      ASSESSMENT  Pt received semi supine in bed agreeable for OT/PT eval/tx. Per pt report, pt was on 5L O2 at home currently on room air with SPO2 maintaining in 95 and above at rest and with activity. Per pt report, pt is alone during day as fiance works however has been required some assist for self care and was independent functional transfers/mobility (has RW if needed and shower chair). Pt currently presents close to baseline for self care (SBA seated self care tasks, CGA standing tasks) and increased need for assist with functional transfers/mobility of CGA for toilet transfer with gait belt. Pt would benefit from continued skilled OT services while at [facility] in order to increase safety and independence with self care and functional transfers/mobility. Recommend discharge to home with [facility] when medically appropriate. Other factors to consider for discharge: pt alone during day as fiance works during the day       PLAN :  Recommendations and Planned Interventions: self care training, functional mobility training, therapeutic exercise, balance training, endurance activities, patient education, and home safety training    Frequency/Duration: Patient will be followed by occupational therapy 5 times a week to address goals.     Recommendation for discharge: (in order for the patient to meet his/her long term goals)  Home with [address] [place] [address]    This discharge recommendation:  Has been made in collaboration with the attending provider and/or case management           SUBJECTIVE:   Patient stated I am ready to get up out of bed.     OBJECTIVE DATA SUMMARY:   HISTORY:   Past Medical History:   Diagnosis Date    Chronic obstructive pulmonary disease (HCC)     Chronic respiratory failure (HCC)     Congestive heart failure (CHF) (HCC)     Hypertension     Hypertension     Renal artery stenosis (HCC)     Seizure disorder (HCC)    No past surgical history on file. Expanded or extensive additional review of patient history:     Home Situation  Home Environment: Private residence  # Steps to Enter: 4  Rails to Enter: Yes  Hand Rails : Bilateral  One/Two Story Residence: One story  Living Alone: No  Support Systems: Spouse/Significant Other/Partner  Patient Expects to be Discharged to[de-identified] Private residence  Current DME Used/Available at Home: Shower chair, Walker, rolling  Tub or Shower Type: Tub/Shower combination    PLOF: Per pt report, pt is alone during day as [employer] works however has been required some assist for self care and was independent functional transfers/mobility (has RW if needed and shower chair)    EXAMINATION OF PERFORMANCE DEFICITS:  Cognitive/Behavioral Status:  Neurologic State: Alert  Orientation Level: Oriented X4  Cognition: Follows commands    Hearing: Auditory  Auditory Impairment: None    Range of Motion:  AROM: Within functional limits     Strength:    Strength: Within functional limits     RUE Strength  Observation: grossly 4+/5     LUE Strength  Observation: grossly 4+/5    Balance:  Sitting: Intact; Without support  Standing: Intact; With support    Functional Mobility and Transfers for ADLs:  Bed Mobility:  Rolling: Stand-by assistance  Supine to Sit: Stand-by assistance  Scooting: Stand-by assistance    Transfers:  Sit to Stand: Contact guard assistance  Stand to Sit: Contact guard assistance  Toilet Transfer : Contact guard assistance    ADL Assessment:     Oral Facial Hygiene/Grooming: Stand-by assistance;Setup    Lower Body Dressing: Stand-by assistance    Toileting: Contact guard assistance     ADL Intervention and task modifications:     Grooming  Grooming Assistance: Stand-by assistance  Position Performed: Seated edge of bed    Lower Body Dressing Assistance  Socks: Stand-by assistance  Position Performed: Seated edge of bed    Toileting  Toileting Assistance: Contact guard assistance(simulated)      [address] [place] [address] AM-PACTM \"6 Clicks\"                                                       Daily Activity Inpatient Short Form  How much help from another person does the patient currently need. .. Total; A Lot A Little None   1. Putting on and taking off regular lower body clothing? []  1 []  2 [x]  3 []  4   2. Bathing (including washing, rinsing, drying)? []  1 []  2 [x]  3 []  4   3. Toileting, which includes using toilet, bedpan or urinal? [] 1 []  2 [x]  3 []  4   4. Putting on and taking off regular upper body clothing? []  1 []  2 []  3 [x]  4   5. Taking care of personal grooming such as brushing teeth? []  1 []  2 [x]  3 []  4   6. Eating meals? []  1 []  2 []  3 [x]  4   © 2007, Trustees of [address] [place] [address], under license to [employer]. All rights reserved     Score: 20/24     Interpretation of Tool:  Represents clinically-significant functional categories (i.e. Activities of daily living).   Percentage of Impairment CH    0%   CI    1-19% CJ    20-39% CK    40-59% CL    60-79% CM    80-99% CN     100%   [facility]  Score 6-24 24 23 20-22 15-19 10-14 7-9 6         Occupational Therapy Evaluation Charge Determination   History Examination Decision-Making   LOW Complexity : Brief history review  LOW Complexity : 1-3 performance deficits relating to physical, cognitive , or psychosocial skils that result in activity limitations and / or participation restrictions  MEDIUM Complexity : Patient may present with comorbidities that affect occupational performnce. Miniml to moderate modification of tasks or assistance (eg, physical or verbal ) with assesment(s) is necessary to enable patient to complete evaluation       Based on the above components, the patient evaluation is determined to be of the following complexity level: LOW   Pain Rating:  No reported pain    Activity Tolerance:   Good and tolerates ADLs without rest breaks  Please refer to the flowsheet for vital signs taken during this treatment. After treatment patient left in no apparent distress:    Sitting in chair and Call bell within reach    COMMUNICATION/EDUCATION:   The patients plan of care was discussed with: Physical therapist and Registered nurse. PT/OT sessions occurred together for increased safety of pt and clinician. Home safety education was provided and the patient/caregiver indicated understanding. and Patient/family have participated as able in goal setting and plan of care. This patients plan of care is appropriate for delegation to [facility].     Thank you for this referral.  Lucio Sensor  Time Calculation: 24 mins

## 2020-09-30 ENCOUNTER — APPOINTMENT (OUTPATIENT)
Dept: GENERAL RADIOLOGY | Age: 55
DRG: 194 | End: 2020-09-30
Attending: INTERNAL MEDICINE
Payer: COMMERCIAL

## 2020-09-30 LAB
ANION GAP SERPL CALC-SCNC: 7 MMOL/L (ref 5–15)
BASOPHILS # BLD: 0 K/UL (ref 0–0.1)
BASOPHILS NFR BLD: 0 % (ref 0–1)
BUN SERPL-MCNC: 20 MG/DL (ref 6–20)
BUN/CREAT SERPL: 15 (ref 12–20)
CA-I BLD-MCNC: 10 MG/DL (ref 8.5–10.1)
CHLORIDE SERPL-SCNC: 95 MMOL/L (ref 97–108)
CO2 SERPL-SCNC: 34 MMOL/L (ref 21–32)
CREAT SERPL-MCNC: 1.3 MG/DL (ref 0.55–1.02)
DIFFERENTIAL METHOD BLD: ABNORMAL
EOSINOPHIL # BLD: 0.3 K/UL (ref 0–0.4)
EOSINOPHIL NFR BLD: 3 % (ref 0–7)
ERYTHROCYTE [DISTWIDTH] IN BLOOD BY AUTOMATED COUNT: 14.2 % (ref 11.5–14.5)
GLUCOSE SERPL-MCNC: 100 MG/DL (ref 65–100)
HCT VFR BLD AUTO: 29.5 % (ref 35–47)
HGB BLD-MCNC: 9.8 % (ref 11.5–16)
IMM GRANULOCYTES # BLD AUTO: 0.1 K/UL (ref 0–0.04)
IMM GRANULOCYTES NFR BLD AUTO: 1 % (ref 0–0.5)
LYMPHOCYTES # BLD: 1.6 K/UL (ref 0.8–3.5)
LYMPHOCYTES NFR BLD: 15 % (ref 12–49)
MAGNESIUM SERPL-MCNC: 2.1 MG/DL (ref 1.6–2.4)
MCH RBC QN AUTO: 32.8 PG (ref 26–34)
MCHC RBC AUTO-ENTMCNC: 33.2 G/DL (ref 30–36.5)
MCV RBC AUTO: 98.7 FL (ref 80–99)
MONOCYTES # BLD: 0.9 K/UL (ref 0–1)
MONOCYTES NFR BLD: 8 % (ref 5–13)
NEUTS SEG # BLD: 7.9 K/UL (ref 1.8–8)
NEUTS SEG NFR BLD: 73 % (ref 32–75)
PLATELET # BLD AUTO: 413 K/UL (ref 150–400)
PMV BLD AUTO: 10 FL (ref 8.9–12.9)
POTASSIUM SERPL-SCNC: 3 MMOL/L (ref 3.5–5.1)
RBC # BLD AUTO: 2.99 M/UL (ref 3.8–5.2)
SODIUM SERPL-SCNC: 136 MMOL/L (ref 136–145)
WBC # BLD AUTO: 10.7 K/UL (ref 3.6–11)

## 2020-09-30 PROCEDURE — 83735 ASSAY OF MAGNESIUM: CPT

## 2020-09-30 PROCEDURE — 65270000029 HC RM PRIVATE

## 2020-09-30 PROCEDURE — 74011250637 HC RX REV CODE- 250/637: Performed by: NURSE PRACTITIONER

## 2020-09-30 PROCEDURE — 36415 COLL VENOUS BLD VENIPUNCTURE: CPT

## 2020-09-30 PROCEDURE — 92526 ORAL FUNCTION THERAPY: CPT

## 2020-09-30 PROCEDURE — 74011250636 HC RX REV CODE- 250/636: Performed by: HOSPITALIST

## 2020-09-30 PROCEDURE — 74011250637 HC RX REV CODE- 250/637: Performed by: INTERNAL MEDICINE

## 2020-09-30 PROCEDURE — 77010033678 HC OXYGEN DAILY

## 2020-09-30 PROCEDURE — 85025 COMPLETE CBC W/AUTO DIFF WBC: CPT

## 2020-09-30 PROCEDURE — 94640 AIRWAY INHALATION TREATMENT: CPT

## 2020-09-30 PROCEDURE — 74011250637 HC RX REV CODE- 250/637: Performed by: HOSPITALIST

## 2020-09-30 PROCEDURE — 80048 BASIC METABOLIC PNL TOTAL CA: CPT

## 2020-09-30 PROCEDURE — 71045 X-RAY EXAM CHEST 1 VIEW: CPT

## 2020-09-30 PROCEDURE — 74011250636 HC RX REV CODE- 250/636: Performed by: INTERNAL MEDICINE

## 2020-09-30 PROCEDURE — 94760 N-INVAS EAR/PLS OXIMETRY 1: CPT

## 2020-09-30 PROCEDURE — 74011000250 HC RX REV CODE- 250: Performed by: INTERNAL MEDICINE

## 2020-09-30 RX ORDER — HYDRALAZINE HYDROCHLORIDE 50 MG/1
100 TABLET, FILM COATED ORAL 3 TIMES DAILY
Status: DISCONTINUED | OUTPATIENT
Start: 2020-09-30 | End: 2020-10-02 | Stop reason: HOSPADM

## 2020-09-30 RX ORDER — POTASSIUM CHLORIDE 750 MG/1
40 TABLET, FILM COATED, EXTENDED RELEASE ORAL
Status: COMPLETED | OUTPATIENT
Start: 2020-09-30 | End: 2020-09-30

## 2020-09-30 RX ORDER — BUMETANIDE 1 MG/1
1 TABLET ORAL DAILY
Status: DISCONTINUED | OUTPATIENT
Start: 2020-09-30 | End: 2020-10-02 | Stop reason: HOSPADM

## 2020-09-30 RX ORDER — POTASSIUM CHLORIDE 750 MG/1
40 TABLET, FILM COATED, EXTENDED RELEASE ORAL 2 TIMES DAILY
Status: COMPLETED | OUTPATIENT
Start: 2020-10-01 | End: 2020-10-01

## 2020-09-30 RX ADMIN — ACETAMINOPHEN 650 MG: 325 TABLET, FILM COATED ORAL at 09:16

## 2020-09-30 RX ADMIN — IPRATROPIUM BROMIDE AND ALBUTEROL SULFATE 3 ML: .5; 3 SOLUTION RESPIRATORY (INHALATION) at 21:34

## 2020-09-30 RX ADMIN — IPRATROPIUM BROMIDE AND ALBUTEROL SULFATE 3 ML: .5; 3 SOLUTION RESPIRATORY (INHALATION) at 13:01

## 2020-09-30 RX ADMIN — LABETALOL HYDROCHLORIDE 400 MG: 200 TABLET, FILM COATED ORAL at 20:16

## 2020-09-30 RX ADMIN — CARBAMAZEPINE 200 MG: 200 TABLET ORAL at 20:21

## 2020-09-30 RX ADMIN — FAMOTIDINE 20 MG: 10 INJECTION INTRAVENOUS at 20:20

## 2020-09-30 RX ADMIN — ISOSORBIDE DINITRATE 40 MG: 20 TABLET ORAL at 09:12

## 2020-09-30 RX ADMIN — IPRATROPIUM BROMIDE AND ALBUTEROL SULFATE 3 ML: .5; 3 SOLUTION RESPIRATORY (INHALATION) at 02:10

## 2020-09-30 RX ADMIN — POTASSIUM CHLORIDE 40 MEQ: 750 TABLET, FILM COATED, EXTENDED RELEASE ORAL at 16:22

## 2020-09-30 RX ADMIN — ALPRAZOLAM 0.25 MG: 0.25 TABLET ORAL at 09:16

## 2020-09-30 RX ADMIN — IPRATROPIUM BROMIDE AND ALBUTEROL SULFATE 3 ML: .5; 3 SOLUTION RESPIRATORY (INHALATION) at 07:47

## 2020-09-30 RX ADMIN — Medication 400 MG: at 09:12

## 2020-09-30 RX ADMIN — ISOSORBIDE DINITRATE 40 MG: 20 TABLET ORAL at 16:21

## 2020-09-30 RX ADMIN — CARBAMAZEPINE 200 MG: 200 TABLET ORAL at 09:12

## 2020-09-30 RX ADMIN — SERTRALINE HYDROCHLORIDE 25 MG: 50 TABLET ORAL at 09:12

## 2020-09-30 RX ADMIN — LABETALOL HYDROCHLORIDE 400 MG: 200 TABLET, FILM COATED ORAL at 09:12

## 2020-09-30 RX ADMIN — ENOXAPARIN SODIUM 40 MG: 40 INJECTION SUBCUTANEOUS at 09:12

## 2020-09-30 RX ADMIN — HYDRALAZINE HYDROCHLORIDE 100 MG: 50 TABLET, FILM COATED ORAL at 16:21

## 2020-09-30 RX ADMIN — NIFEDIPINE 60 MG: 60 TABLET, EXTENDED RELEASE ORAL at 09:24

## 2020-09-30 RX ADMIN — LEVOTHYROXINE SODIUM 25 MCG: 0.03 TABLET ORAL at 05:12

## 2020-09-30 RX ADMIN — ASPIRIN 81 MG: 81 TABLET, COATED ORAL at 09:12

## 2020-09-30 RX ADMIN — Medication 10 ML: at 04:20

## 2020-09-30 RX ADMIN — ALPRAZOLAM 0.25 MG: 0.25 TABLET ORAL at 20:16

## 2020-09-30 RX ADMIN — FAMOTIDINE 20 MG: 10 INJECTION INTRAVENOUS at 09:11

## 2020-09-30 RX ADMIN — NIFEDIPINE 60 MG: 60 TABLET, EXTENDED RELEASE ORAL at 20:14

## 2020-09-30 RX ADMIN — Medication 10 ML: at 20:25

## 2020-09-30 RX ADMIN — FOLIC ACID 1 MG: 1 TABLET ORAL at 09:12

## 2020-09-30 RX ADMIN — LEVETIRACETAM 500 MG: 500 TABLET ORAL at 13:32

## 2020-09-30 RX ADMIN — POTASSIUM CHLORIDE 40 MEQ: 750 TABLET, FILM COATED, EXTENDED RELEASE ORAL at 14:14

## 2020-09-30 RX ADMIN — BUMETANIDE 1 MG: 1 TABLET ORAL at 12:33

## 2020-09-30 RX ADMIN — HYDRALAZINE HYDROCHLORIDE 50 MG: 50 TABLET, FILM COATED ORAL at 09:12

## 2020-09-30 RX ADMIN — POTASSIUM CHLORIDE 40 MEQ: 750 TABLET, FILM COATED, EXTENDED RELEASE ORAL at 12:33

## 2020-09-30 RX ADMIN — FERROUS SULFATE TAB 325 MG (65 MG ELEMENTAL FE) 325 MG: 325 (65 FE) TAB at 05:12

## 2020-09-30 RX ADMIN — Medication 10 ML: at 14:24

## 2020-09-30 NOTE — PROGRESS NOTES
Pt chart reviewed and PT treatment session attempted at [phone]. Pt declined PT services despite max encouragement and education on importance of mobility. Pt stating \"I am getting over a small stomach flu, come back tomorrow and I'll work with you then. \" Will continue to monitor and attempt at later date.

## 2020-09-30 NOTE — PROGRESS NOTES
Progress Note    Patient: Eliceo Conklin MRN: [id number]  SSN: [id number]    YOB: 1965  Age: 54 y.o. Sex: female      Admit Date: 9/21/2020    LOS: 9 days     Subjective:     CC: shortness of breath      54F, h/o COPD with acute on chronic respiratory failure on 2L NC, with shortness of breath s/t AECHFpEF was intubated s/t acute hypoxic respiratory failure s.t flash pulmonary edema s.t AECHFpEF.     Extubated on 9/22  Transferred out of icu 9/29. On ra, no distress and states feels nuch improved           Review of Systems:  Constitutional:  denies weight loss, no fever, no chills, no night sweats  Eye: No recent visual disturbances, no discharge, no double vision  Ear/nose/mouth/throat : No hearing disturbance, no ear pain, no nasal congestion, no sore throat, no trouble swallowing. Respiratory : No trouble breathing, no cough, no shortness of breath, no hemoptysis, no wheezing  Cardiovascular : No chest pain, no palpitation, no racing of heart, no orthopnea, no paroxysmal nocturnal dyspnea, no peripheral edema  Gastrointestinal : No nausea, no vomiting, no diarrhea, constipation, heartburn, abdominal pain  Genitourinary : No dysuria, no hematuria, no increased frequency, incontinence,  Lymphatics : No swollen glands -Neck, axillary, inguinal  Endocrine : No excessive thirst, no polyuria no cold intolerance, no heat intolerance. Immunologic : No hives, urticaria, no seasonal allergies,   Musculoskeletal : Left upper back pain.  No joint swelling, pain, effusion,  no neck pain,   Integumentary : No rash, no pruritus, no ecchymosis  Hematology : No petechiae, No easy bruising,  No tendency to bleed easy  Neurology : Denies change in mental status, no abnormal balance, no headache, no confusion, numbness, tingling,  Psychiatric : No mood swings, no anxiety, depression      Prior to Admission Medications   Prescriptions Last Dose Informant Patient Reported? Taking?    ALPRAZolam (XANAX) 0.25 mg tablet   Yes Yes   Sig: Take 0.25 mg by mouth three (3) times daily as needed for Anxiety. NIFEdipine ER (PROCARDIA XL) 60 mg ER tablet   Yes No   Sig: Take 60 mg by mouth daily. aspirin delayed-release 81 mg tablet   Yes No   Sig: Take  by mouth daily. carBAMazepine, mood stabiliz, 200 mg CM12   Yes Yes   Sig: Take 200 mg by mouth two (2) times a day.   ethacrynic acid (EDECRIN) 25 mg tablet   Yes Yes   Sig: Take 50 mg by mouth two (2) times a day. ferrous sulfate 325 mg (65 mg iron) tablet   Yes Yes   Sig: Take 325 mg by mouth Daily (before breakfast). folic acid (FOLVITE) 1 mg tablet   Yes Yes   Sig: Take 1 mg by mouth daily. hydrALAZINE (APRESOLINE) 50 mg tablet   Yes Yes   Sig: Take 100 mg by mouth three (3) times daily. isosorbide dinitrate (ISORDIL) 20 mg tablet   Yes No   Sig: Take 40 mg by mouth two (2) times a day. labetaloL (NORMODYNE) 200 mg tablet   Yes No   Sig: Take  by mouth two (2) times a day. levETIRAcetam (Keppra) 500 mg tablet   Yes Yes   Sig: Take 500 mg by mouth two (2) times a day. levothyroxine (SYNTHROID) 25 mcg tablet   Yes Yes   Sig: Take 25 mcg by mouth Daily (before breakfast). magnesium oxide (MAG-OX) 400 mg tablet   Yes No   Sig: Take 400 mg by mouth daily. metoprolol succinate (TOPROL-XL) 100 mg tablet   Yes No   Sig: Take 150 mg by mouth daily. potassium chloride (K-DUR, KLOR-CON) 20 mEq tablet   Yes No   Sig: Take 20 mEq by mouth two (2) times a day. predniSONE (DELTASONE) 5 mg tablet   Yes Yes   Sig: Take 5 mg by mouth daily. sertraline (Zoloft) 25 mg tablet   Yes Yes   Sig: Take 25 mg by mouth daily. spironolactone (ALDACTONE) 25 mg tablet   Yes Yes   Sig: Take 100 mg by mouth daily.       Facility-Administered Medications: None       Current Facility-Administered Medications:     bumetanide (BUMEX) tablet 1 mg, 1 mg, Oral, DAILY, Moiz Harvey MD    potassium chloride SR (KLOR-CON 10) tablet 40 mEq, 40 mEq, Oral, Q2H, Moiz Harvey MD Kaveh Garza ON 10/1/2020] potassium chloride SR (KLOR-CON 10) tablet 40 mEq, 40 mEq, Oral, BID, Moiz Sullivan MD    isosorbide dinitrate (ISORDIL) tablet 40 mg, 40 mg, Oral, TID, Katy Connor NP, 40 mg at 09/30/20 0912    NIFEdipine ER (PROCARDIA XL) tablet 60 mg, 60 mg, Oral, BID, Cipriano Meza NP, 60 mg at 09/30/20 0924    labetaloL (NORMODYNE) tablet 400 mg, 400 mg, Oral, Q12H, Cipriano Meza NP, 400 mg at 09/30/20 0912    hydrALAZINE (APRESOLINE) tablet 50 mg, 50 mg, Oral, TID, lVad Obando, , 50 mg at 09/30/20 0912    labetaloL (NORMODYNE;TRANDATE) 20 mg/4 mL (5 mg/mL) injection 20 mg, 20 mg, IntraVENous, Q6H PRN, Vlad Obando DO, 20 mg at 09/25/20 1259    hydrALAZINE (APRESOLINE) 20 mg/mL injection 10 mg, 10 mg, IntraVENous, Q2H PRN, Denver Hester MD, 10 mg at 09/26/20 0226    levETIRAcetam (KEPPRA) 500 mg in saline (iso-osm) 100 mL IVPB (premix), 500 mg, IntraVENous, Q12H, Last Rate: 400 mL/hr at 09/24/20 2328, 500 mg at 09/28/20 2321 **OR** levETIRAcetam (KEPPRA) tablet 500 mg, 500 mg, Oral, Q12H, Keegan Weston MD, 500 mg at 09/29/20 2230    famotidine (PF) (PEPCID) 20 mg in 0.9% sodium chloride 10 mL injection, 20 mg, IntraVENous, Q12H, Vlad Obando DO, 20 mg at 09/30/20 0911    albuterol-ipratropium (DUO-NEB) 2.5 MG-0.5 MG/3 ML, 3 mL, Nebulization, Q6H RT, Vlad Obando DO, 3 mL at 09/30/20 0747    aspirin delayed-release tablet 81 mg, 81 mg, Oral, DAILY, Darci Dubin, MD, 81 mg at 09/30/20 0912    magnesium oxide (MAG-OX) tablet 400 mg, 400 mg, Oral, DAILY, Darci Dubin, MD, 400 mg at 09/30/20 0912    ALPRAZolam Randal Lat) tablet 0.25 mg, 0.25 mg, Oral, TID PRN, Darci Dubin, MD, 0.25 mg at 09/30/20 0916    sertraline (ZOLOFT) tablet 25 mg, 25 mg, Oral, DAILY, Darci Dubin, MD, 25 mg at 09/30/20 0912    levothyroxine (SYNTHROID) tablet 25 mcg, 25 mcg, Oral, ACB, Darci Dubin, MD, 25 mcg at 09/30/20 7514    carBAMazepine (TEGretol) tablet 200 mg, 200 mg, Oral, BID, Elie Ridley MD, 200 mg at 09/30/20 0983    ferrous sulfate tablet 325 mg, 325 mg, Oral, ACB, Elie Ridley MD, 325 mg at 65/60/35 6098    folic acid (FOLVITE) tablet 1 mg, 1 mg, Oral, DAILY, Elie Ridley MD, 1 mg at 09/30/20 0912    sodium chloride (NS) flush 5-40 mL, 5-40 mL, IntraVENous, Q8H, Elie Ridley MD, 10 mL at 09/30/20 0420    sodium chloride (NS) flush 5-40 mL, 5-40 mL, IntraVENous, PRN, Elie Ridley MD, 10 mL at 09/29/20 0634    acetaminophen (TYLENOL) tablet 650 mg, 650 mg, Oral, Q6H PRN, 650 mg at 09/30/20 0916 **OR** acetaminophen (TYLENOL) suppository 650 mg, 650 mg, Rectal, Q6H PRN, Elie Ridley MD    polyethylene glycol (MIRALAX) packet 17 g, 17 g, Oral, DAILY PRN, Elie Ridley MD, 17 g at 09/28/20 1830    enoxaparin (LOVENOX) injection 40 mg, 40 mg, SubCUTAneous, DAILY, Elie Ridley MD, 40 mg at 09/30/20 0912    ondansetron [facility]) injection 4 mg, 4 mg, IntraVENous, Q6H PRN, Elie Ridley MD, 4 mg at 09/27/20 1320    Objective:     Vitals:    09/29/20 2010 09/30/20 0130 09/30/20 0747 09/30/20 1038   BP:  (!) 157/79 (!) 152/88    Pulse:  98 (!) 119    Resp:  18 18    Temp:  98 °F (36.7 °C) 98 °F (36.7 °C)    SpO2: 93% 97% 93% 93%   Weight:       Height:            Intake and Output:  Current Shift: No intake/output data recorded. Last three shifts: 09/28 1901 - 09/30 0700  In: 600 [P.O.:600]  Out: 3800 [Urine:3800]      Physical Exam:   General appearance: A+Ox3,  chronically ill-appearing, nad  Head: Normocephalic, without obvious abnormality, atraumatic  Eyes: conjunctivae/corneas clear. EOMi  Throat: moist mucosa, poor dentition. Neck: supple, symmetrical, trachea midline, no adenopathy,   Lungs: scattered rhonchi, fair ae, non laboredHeart: regular rate and rhythm, S1, S2 normal, no murmur, click, rub or gallop  Abdomen: soft, non-tender.  Bowel sounds normal. No masses,  no organomegaly  Extremities: no edema distally, from x 4  Skin: Skin color, texture, turgor normal. No rashes or lesions  Lymph nodes: Cervical, supraclavicular, and axillary nodes normal.  Neurologic: Grossly normal, following commands, calm      Lab/Data Review:  Recent Results (from the past 24 hour(s))   CBC WITH AUTOMATED DIFF    Collection Time: 09/30/20  5:06 AM   Result Value Ref Range    WBC 10.7 3.6 - 11.0 K/uL    RBC 2.99 (L) 3.80 - 5.20 M/uL    HGB 9.8 (L) 11.5 - 16.0 %    HCT 29.5 (L) 35.0 - 47.0 %    MCV 98.7 80.0 - 99.0 FL    MCH 32.8 26.0 - 34.0 PG    MCHC 33.2 30.0 - 36.5 g/dL    RDW 14.2 11.5 - 14.5 %    PLATELET 515 (H) 039 - 400 K/uL    MPV 10.0 8.9 - 12.9 FL    NEUTROPHILS 73 32 - 75 %    LYMPHOCYTES 15 12 - 49 %    MONOCYTES 8 5 - 13 %    EOSINOPHILS 3 0 - 7 %    BASOPHILS 0 0 - 1 %    IMMATURE GRANULOCYTES 1 (H) 0.0 - 0.5 %    ABS. NEUTROPHILS 7.9 1.8 - 8.0 K/UL    ABS. LYMPHOCYTES 1.6 0.8 - 3.5 K/UL    ABS. MONOCYTES 0.9 0.0 - 1.0 K/UL    ABS. EOSINOPHILS 0.3 0.0 - 0.4 K/UL    ABS. BASOPHILS 0.0 0.0 - 0.1 K/UL    ABS. IMM. GRANS. 0.1 (H) 0.00 - 0.04 K/UL    DF AUTOMATED     MAGNESIUM    Collection Time: 09/30/20  5:06 AM   Result Value Ref Range    Magnesium 2.1 1.6 - 2.4 mg/dL   METABOLIC PANEL, BASIC    Collection Time: 09/30/20  5:06 AM   Result Value Ref Range    Sodium 136 136 - 145 mmol/L    Potassium 3.0 (L) 3.5 - 5.1 mmol/L    Chloride 95 (L) 97 - 108 mmol/L    CO2 34 (H) 21 - 32 mmol/L    Anion gap 7 5 - 15 mmol/L    Glucose 100 65 - 100 mg/dL    BUN 20 6 - 20 mg/dL    Creatinine 1.30 (H) 0.55 - 1.02 mg/dL    BUN/Creatinine ratio 15 12 - 20      GFR est AA 52 (L) >60 ml/min/1.73m2    GFR est non-AA 43 (L) >60 ml/min/1.73m2    Calcium 10.0 8.5 - 10.1 mg/dL         Assessment and plan:      1. Acute on chronic respiratory failure: extubated today.  On room air @ 96%     2.  Congestive heart failure with preserved exacerbation: Patient is allergic to sulfa drugs(bactrim) IV bumex tolerating, I&o's and daily weights     3.  Hypertension: Continue Nifedipine, labetalol, hydralazine, titrate     4.  Anxiety disorder:  Xanax 0.25 PRN. Cont Sertraline. Weaned off precedex     5.  Seizure disorder:  on Keppra and carbamazepine.     6.  Iron deficiency anemia: Continue patient on iron supplement     7.  Renal artery stenosis: Cardiology closely following the patient will follow up with them for further recommendations.     DISPO: Pending course   Pt/ot [facility] pt/ot rec 1-2 d    Signed By: Juan F Oneill MD     September 30, 2020

## 2020-09-30 NOTE — PROGRESS NOTES
CM attempted to meet with patient at bedside. Currently SLP is treating the patient. Patient was asked about choice for home health and she stated she had one and the  Name was home health care. After asking about the name of the company again, patient was hesitant to answer and could not think of the name at this time. CM will provide patient with a list and see if she can determine the name of the company.

## 2020-09-30 NOTE — PROGRESS NOTES
SPEECH LANGUAGE PATHOLOGY DYSPHAGIA TREATMENT  Patient: Jordi Monge (25 y.o. female)  Date: 9/30/2020  Diagnosis: Acute respiratory failure ([facility] [address] [J96.00]   <principal problem not specified>       Precautions: aspiration, fall    ASSESSMENT:  Administered thin liquids via straw. Educated patient to use of chin tuck. Swallow initiated w/ mild delay. HLE and protraction slow and reduced. Delayed recoil. No overt s/sx of aspiration noted w/ ALL thin liquid trials from straw and use of chin tuck. Patient denied solid trials at this time s/t nausea. PLAN:  Recommendations and Planned Interventions:  Continue w/ current diet orders of Chopped, nectar. Free water protocol and use of chin tuck. Repeat MBS as indicated and when patient is more lucid. Patient continues to benefit from skilled intervention to address the above impairments. Continue treatment per established plan of care. Discharge Recommendations:  Home Health     SUBJECTIVE:   Patient seen at bedside. She continues w/ intermittent confusion and difficulty putting thoughts into words. She reports pelvis pain, nausea and discomfort w/ catheter? This was addressed w/ NP. She was unable   To consume breakfast this am due to nausea. OBJECTIVE:   Cognitive and Communication Status:  Neurologic State: Alert  Orientation Level: Oriented X4  Cognition: Appropriate decision making     After treatment:   Patient left in no apparent distress in bed    COMMUNICATION/EDUCATION:   Patient was educated regarding her deficit(s) of swallow as this relates to general malaise. She demonstrated Good understanding as evidenced by verbal understanding. The patient's plan of care including recommendations, planned interventions, and recommended diet changes were discussed with: Physician.      Jeri Grace M.S. CCC-SLP  Time Calculation: 11 mins      Problem: Dysphagia (Adult)  Goal: *Acute Goals and Plan of Care (Insert Text)  Description: Speech Therapy Swallow Goals  Initiated 9/25/2020       -Patient will tolerate PO trials without clinical indicators of aspiration given minimal cues within 7 day(s). [ progressing ]         -Patient will participate in modified barium swallow study within 7 day(s). Remus.[place] ]          -Patient will perform BOT and laryngeal strengthening exercises with minimal cues within 10 day(s). [progressing]    -Patient will demonstrate understanding of swallow safety precautions and aspiration precautions, diet recs with minimal cues within 7 day(s). [progressing ]    -patient will tolerate chopped diet and nectar thick liquids w/o overt s/sx of aspiration within 5-7 days.  Darron ]             Outcome: Progressing Towards Goal

## 2020-09-30 NOTE — PROGRESS NOTES
Pulmonology and Critical Care Progress Note    Subjective:     Chief Complaint:   Chief Complaint   Patient presents with    Respiratory Distress        Patient seen and examined in her room  Overnight events noted     successful extubation 9/24 to 5L NC. Transferred out of ICU  Doing fairly well. Now on room air. Awake and alert  No acute distress  Answering questions appropriately      Past Medical History:   Diagnosis Date    Chronic obstructive pulmonary disease ([facility] [address]     Chronic respiratory failure (HCC)     Congestive heart failure (CHF) (HCC)     Hypertension     Hypertension     Renal artery stenosis (HCC)     Seizure disorder (HCC)      No past surgical history on file.    Family History   Problem Relation Age of Onset    Hypertension Mother     Stroke Mother      Social History     Tobacco Use    Smoking status: Heavy Tobacco Smoker     Types: Cigarettes    Tobacco comment: former quit only 1 month ago during recent admission to the hospital   Substance Use Topics    Alcohol use: Not Currently      Current Facility-Administered Medications   Medication Dose Route Frequency Provider Last Rate Last Dose    bumetanide (BUMEX) tablet 1 mg  1 mg Oral DAILY Moiz Morton MD        potassium chloride SR (KLOR-CON 10) tablet 40 mEq  40 mEq Oral Q2H Moiz Neely MD        [START ON 10/1/2020] potassium chloride SR (KLOR-CON 10) tablet 40 mEq  40 mEq Oral BID Javier KELLY MD        isosorbide dinitrate (ISORDIL) tablet 40 mg  40 mg Oral TID Katy Connor NP   40 mg at 09/30/20 0912    NIFEdipine ER (PROCARDIA XL) tablet 60 mg  60 mg Oral BID Everette Hopson NP   60 mg at 09/30/20 0924    labetaloL (NORMODYNE) tablet 400 mg  400 mg Oral Q12H Everette Hoposn NP   400 mg at 09/30/20 0912    hydrALAZINE (APRESOLINE) tablet 50 mg  50 mg Oral TID Satnam Denny DO   50 mg at 09/30/20 0912    labetaloL (NORMODYNE;TRANDATE) 20 mg/4 mL (5 mg/mL) injection 20 mg  20 mg IntraVENous Q6H PRN Myles Romero DO   20 mg at 09/25/20 1259    hydrALAZINE (APRESOLINE) 20 mg/mL injection 10 mg  10 mg IntraVENous Q2H PRN Katherine Gatica MD   10 mg at 09/26/20 0226    levETIRAcetam (KEPPRA) 500 mg in saline (iso-osm) 100 mL IVPB (premix)  500 mg IntraVENous Q12H Luz Maria Bolden  mL/hr at 09/24/20 2328 500 mg at 09/28/20 2321    Or    levETIRAcetam (KEPPRA) tablet 500 mg  500 mg Oral Q12H Luz Maria Bolden MD   500 mg at 09/29/20 2230    famotidine (PF) (PEPCID) 20 mg in 0.9% sodium chloride 10 mL injection  20 mg IntraVENous Q12H Vlad Obando,    20 mg at 09/30/20 0911    albuterol-ipratropium (DUO-NEB) 2.5 MG-0.5 MG/3 ML  3 mL Nebulization Q6H RT Vlad Obando DO   3 mL at 09/30/20 0747    aspirin delayed-release tablet 81 mg  81 mg Oral DAILY Derrell Whitfield MD   81 mg at 09/30/20 0912    magnesium oxide (MAG-OX) tablet 400 mg  400 mg Oral DAILY Derrell Whitfield MD   400 mg at 09/30/20 0912    ALPRAZolam Shelbie Old) tablet 0.25 mg  0.25 mg Oral TID PRN Derrell Whitfield MD   0.25 mg at 09/30/20 6279    sertraline (ZOLOFT) tablet 25 mg  25 mg Oral DAILY Derrell Whitfield MD   25 mg at 09/30/20 0912    levothyroxine (SYNTHROID) tablet 25 mcg  25 mcg Oral ACB Derrell Whitfield MD   25 mcg at 09/30/20 9905    carBAMazepine (TEGretol) tablet 200 mg  200 mg Oral BID Derrell Whitfield MD   200 mg at 09/30/20 5855    ferrous sulfate tablet 325 mg  325 mg Oral ACB Derrell Whitfield MD   325 mg at 95/14/70 5604    folic acid (FOLVITE) tablet 1 mg  1 mg Oral DAILY Derrell Whitfield MD   1 mg at 09/30/20 0912    sodium chloride (NS) flush 5-40 mL  5-40 mL IntraVENous Q8H Derrell Whitfield MD   10 mL at 09/30/20 0420    sodium chloride (NS) flush 5-40 mL  5-40 mL IntraVENous PRN Derrell Whitfield MD   10 mL at 09/29/20 0634    acetaminophen (TYLENOL) tablet 650 mg  650 mg Oral Q6H PRN Derrell Whitfield MD   650 mg at 09/30/20 0916    Or    acetaminophen (TYLENOL) suppository 650 mg  650 mg Rectal Q6H PRN Barrera De La Cruz MD        polyethylene glycol [facility]) packet 17 g  17 g Oral DAILY PRN Barrera De La Cruz MD   17 g at 09/28/20 1830    enoxaparin (LOVENOX) injection 40 mg  40 mg SubCUTAneous DAILY Barrera De La Cruz MD   40 mg at 09/30/20 0912    ondansetron [facility]) injection 4 mg  4 mg IntraVENous Q6H PRN Barrera De La Cruz MD   4 mg at 09/27/20 1320        Home Meds:  Prior to Admission medications    Medication Sig Start Date End Date Taking?  Authorizing Provider   ALPRAZolam [facility]) 0.25 mg tablet Take 0.25 mg by mouth three (3) times daily as needed for Anxiety.     Yes Provider, Historical   ethacrynic acid (EDECRIN) 25 mg tablet Take 50 mg by mouth two (2) times a day.     Yes Provider, Historical   predniSONE (DELTASONE) 5 mg tablet Take 5 mg by mouth daily.     Yes Provider, Historical   sertraline (Zoloft) 25 mg tablet Take 25 mg by mouth daily.     Yes Provider, Historical   hydrALAZINE (APRESOLINE) 50 mg tablet Take 100 mg by mouth three (3) times daily.     Yes Provider, Historical   levothyroxine (SYNTHROID) 25 mcg tablet Take 25 mcg by mouth Daily (before breakfast).     Yes Provider, Historical   levETIRAcetam (Keppra) 500 mg tablet Take 500 mg by mouth two (2) times a day.     Yes Provider, Historical   carBAMazepine, mood stabiliz, 200 mg CM12 Take 200 mg by mouth two (2) times a day.     Yes Provider, Historical   ferrous sulfate 325 mg (65 mg iron) tablet Take 325 mg by mouth Daily (before breakfast).     Yes Provider, Historical   folic acid (FOLVITE) 1 mg tablet Take 1 mg by mouth daily.     Yes Provider, Historical   spironolactone (ALDACTONE) 25 mg tablet Take 100 mg by mouth daily.     Yes Provider, Historical   metoprolol succinate (TOPROL-XL) 100 mg tablet Take 150 mg by mouth daily.       Provider, Historical   NIFEdipine ER (PROCARDIA XL) 60 mg ER tablet Take 60 mg by mouth daily.       Provider, Historical   aspirin delayed-release 81 mg tablet Take  by mouth daily.       Provider, Historical   isosorbide dinitrate (ISORDIL) 20 mg tablet Take 40 mg by mouth two (2) times a day.       Provider, Historical   labetaloL (NORMODYNE) 200 mg tablet Take  by mouth two (2) times a day.       Provider, Historical   magnesium oxide (MAG-OX) 400 mg tablet Take 400 mg by mouth daily.       Provider, Historical   potassium chloride (K-DUR, KLOR-CON) 20 mEq tablet Take 20 mEq by mouth two (2) times a day.       Provider, Historical         Allergies   Allergen Reactions    Sulfa (Sulfonamide Antibiotics) Anaphylaxis       Review of Systems:  Review of systems not obtained due to patient factors. Objective:     Blood pressure (!) 152/88, pulse (!) 119, temperature 98 °F (36.7 °C), resp. rate 18, height 5' 5\" (1.651 m), weight 47.7 kg (105 lb 2.6 oz), SpO2 93 %. Temp (24hrs), Av.2 °F (36.8 °C), Min:98 °F (36.7 °C), Max:98.7 °F (37.1 °C)      Intake and Output:  Current Shift: No intake/output data recorded. Last 3 Shifts: 1 -  0700  In: 600 [P.O.:600]  Out: 3800 [Urine:3800]    Physical Exam:   General appearance: A+Ox3, chronically ill-appearing, currently on room air. Head: Normocephalic, without obvious abnormality, atraumatic  Eyes: conjunctivae/corneas clear. PERRL, EOM's intact. Throat: Lips, mucosa, and tongue normal. Poor dentition. Neck: supple, symmetrical, trachea midline, no adenopathy, thyroid: not enlarged, symmetric, no tenderness/mass/nodules and no carotid bruit  Lungs: Diminished in general.  Also diminished breath sounds over the bases with some crackles bilaterally. She is currently on room air. Heart: regular rate and rhythm, S1, S2 normal, no murmur, click, rub or gallop  Abdomen: soft, non-tender.  Bowel sounds normal. No masses,  no organomegaly  Extremities: edema +1 bilaterally, otherwise atraumatic  Skin: Skin color, texture, turgor normal. No rashes or lesions  Lymph nodes: Cervical, supraclavicular, and axillary nodes normal.  Neurologic: Grossly normal, following commands, calm    Additional comments:None    Lab/Data Review: All lab results for the last 24 hours reviewed. Recent Results (from the past 24 hour(s))   CBC WITH AUTOMATED DIFF    Collection Time: 09/30/20  5:06 AM   Result Value Ref Range    WBC 10.7 3.6 - 11.0 K/uL    RBC 2.99 (L) 3.80 - 5.20 M/uL    HGB 9.8 (L) 11.5 - 16.0 %    HCT 29.5 (L) 35.0 - 47.0 %    MCV 98.7 80.0 - 99.0 FL    MCH 32.8 26.0 - 34.0 PG    MCHC 33.2 30.0 - 36.5 g/dL    RDW 14.2 11.5 - 14.5 %    PLATELET 026 (H) 673 - 400 K/uL    MPV 10.0 8.9 - 12.9 FL    NEUTROPHILS 73 32 - 75 %    LYMPHOCYTES 15 12 - 49 %    MONOCYTES 8 5 - 13 %    EOSINOPHILS 3 0 - 7 %    BASOPHILS 0 0 - 1 %    IMMATURE GRANULOCYTES 1 (H) 0.0 - 0.5 %    ABS. NEUTROPHILS 7.9 1.8 - 8.0 K/UL    ABS. LYMPHOCYTES 1.6 0.8 - 3.5 K/UL    ABS. MONOCYTES 0.9 0.0 - 1.0 K/UL    ABS. EOSINOPHILS 0.3 0.0 - 0.4 K/UL    ABS. BASOPHILS 0.0 0.0 - 0.1 K/UL    ABS. IMM. GRANS. 0.1 (H) 0.00 - 0.04 K/UL    DF AUTOMATED     MAGNESIUM    Collection Time: 09/30/20  5:06 AM   Result Value Ref Range    Magnesium 2.1 1.6 - 2.4 mg/dL   METABOLIC PANEL, BASIC    Collection Time: 09/30/20  5:06 AM   Result Value Ref Range    Sodium 136 136 - 145 mmol/L    Potassium 3.0 (L) 3.5 - 5.1 mmol/L    Chloride 95 (L) 97 - 108 mmol/L    CO2 34 (H) 21 - 32 mmol/L    Anion gap 7 5 - 15 mmol/L    Glucose 100 65 - 100 mg/dL    BUN 20 6 - 20 mg/dL    Creatinine 1.30 (H) 0.55 - 1.02 mg/dL    BUN/Creatinine ratio 15 12 - 20      GFR est AA 52 (L) >60 ml/min/1.73m2    GFR est non-AA 43 (L) >60 ml/min/1.73m2    Calcium 10.0 8.5 - 10.1 mg/dL         Chest X-Ray:   XR CHEST PORT   Final Result   IMPRESSION:   Bilateral pleural effusion with probable associated left basilar atelectasis. No   significant interval change. XR CHEST PORT   Final Result   Impression: No significant interval change. XR CHEST PORT   Final Result   Impression: Diminished airspace disease.       XR SWALLOW [facility] VIDEO   Final Result   Impression: Laryngeal penetration with thin, nectar thick, honey thick, and   pudding consistency. No aspiration. Please see recommendation by speech   pathology. Dose: Kerma= 13 mGy. Fluoroscopy time 1 minute and 43 seconds      XR CHEST PORT   Final Result   IMPRESSION:   1. Overall findings are supportive of pulmonary edema with bilateral pleural   effusions. The perihilar opacities appear slightly progressed on the right from   one day prior. XR CHEST PORT   Final Result   IMPRESSION: Endotracheal tube and gastric tube removed. Bilateral pleural   effusions. Improved aeration of the lower lung zones. Mild prominence of the   interstitium. XR CHEST PORT   Final Result   IMPRESSION: Persistent bibasilar lung findings previously described as CHF and   probably with effusion complicating visualization of the right lung base. Continued surveillance recommended      XR CHEST PORT   Final Result   IMPRESSION: Improving congestive heart failure pattern. XR CHEST SNGL V   Final Result   Findings/impression:      Moderate bilateral pleural effusion present. There is extensive interstitial and   airspace disease seen throughout both lungs, including dense airspace disease   involving left lower lobe with air bronchograms. No interval improvement. No   pneumothorax. Endotracheal tube 5 cm above pilo. Nasogastric tube below   diaphragm. No suspicious osseous lesions. XR CHEST SNGL V   Final Result   Impression: Congestive heart failure with interstitial and alveolar pulmonary   edema with bilateral pleural effusions.           CT imaging:  CT Results  (Last 48 hours)    None          PFTs:   PFT Results  (Last 3 results in the past 10 years)    None            Assessment:     Patient is a 59-year-old  female with history of diastolic CHF, hypertension, emphysema, CVA, renal artery stenosis, mitral valve regurgitation, and chronic hypoxemic respiratory failure currently on 5 L nasal cannula at home who presented back to [facility] on 9/21/2020 with increasing respiratory distress and hypoxia. Initially placed on BiPAP given significant volume overload present on chest imaging, however she was intubated. Now extubated on 9/24/20. Plan:     1.)  Acute on chronic hypoxic respiratory failure  -Secondary to acute on chronic heart failure and flash pulmonary edema.   -Now successfully extubated on 9/24; currently on room air. Overall improved. -Chest x-ray reviewed and showing improvement in bilateral airspace disease, persistent pleural effusions.   -Continue with Bumex to 1 mg IV every 8 hours today and continue to monitor strict I/O's and daily weights. -Very low suspicion for COPD exacerbation or pneumonia. Agree with holding antibiotics for now, continue home prednisone 5 mg daily. Continue scheduled bronchodilators. - Patient also appears to be chronically aspirating, however modified barium swallow showed laryngeal penetration with thin nectar and honey thickened liquids. No kim aspiration. Started on honey thickened liquids. - Continue speech therapy evaluations. 2.)  Acute on chronic diastolic heart failure  -Significant pulmonary edema and bilateral pleural effusions. Likely worsened by poorly controlled blood pressure, renal artery stenosis, poor nutrition.  -Extubated 9/24/20 successfully; weaned off oxygen.  -Cardiology is following, appreciate their recommendations. Most recent transthoracic echocardiogram 8/2020 with normal EF and mild to moderate mitral valve regurgitation.  -After discussion with cardiology, her allergy to sulfa was apparently discovered after taking Bactrim. Supposedly, she had never had Lasix/Bumex prior to this admission. Patient tolerating Bumex without evidence of anaphylaxis.   -Aldactone restarted    Chest x-ray 9/30 reviewed personally and compared to prior chest x-ray  Show significant improvement in bilateral pleural effusions  There is some persistent left basilar atelectasis  I do not believe thoracentesis is indicated at this time    3.)  Accelerated hypertension-   -Blood pressure in the 200s on admission, likely leading to flash pulmonary edema. Was better controlled, now back into the 160's-180's  -Continue current regimen, but will increase her Hydralazine to 50 mg TID (from BID) today. Also, Bumex dose has been increased as above. -Appreciate assistance from Cardiology. 4.)  Emphysema  -Does not appear to be on any maintenance inhalers at home.  -She missed her follow-up appointment with us this week  -Low suspicion for acute exacerbation of COPD. -Switch back to scheduled prednisone 5 mg daily which apparently is a chronic medication.  -Agree with q6 scheduled duo nebs. 5.)  Renal artery stenosis  -Renal artery duplex from 8/24/2020 showing right renal artery stenosis.  -Likely at least contributing to her poorly controlled hypertension. Cardiology has been consulted. 6.)  Seizure disorder  -Seems to have occurred after her CVA, continue on home Keppra/carbamazepine.  -No evidence of seizure activity at this time. 7.) Anxiety    I will continue to follow along with her while she is admitted. Discussed in detail with RN, RT, and care team      CODE STATUS: Full Code    Lines/Tubes: Sanchez catheter, 2 peripheral IVs    Prophylaxis:  Stress Ulcer Prophylaxis: Famotidine IV  Deep Vein Thrombosis Prophylaxis: Lovenox    Total time spent with patient: More than 30 minutes was spent with the patient reviewing extensive labs and chest imaging, discussing in detail with the bedside nurse and documentation.       Chinmay Centeno MD  [facility] [address]  9/30/2020 no

## 2020-09-30 NOTE — PROGRESS NOTES
[address] [facility] CARDIOLOGY  CARDIOLOGY PROGRESS NOTE     Patient seen and examined. This is a patient who is followed for acute on chronic heart failure. She continues to report improvement in shortness of breath. Moved to floor from ICU yesterday. Mild cough. No chest pain. She does complain of some general malaise and pelvic discomfort. No other complaints reported. Telemetry reviewed, there were no events noted in the past 24 hours. Remains in normal sinus rhythm to occasional sinus tachycardia. Pertinent review of systems items noted above, all other systems are negative. Current medications reviewed. Physical Examination  Vital signs are stable. Blood pressure 152/88, Pulse 100  No apparent distress resting comfortably in bed. Heart is regular, rate and rhythm. Normal S1, S2, no murmurs are appreciated. Lungs are significantly diminished to bases. No rales, rhonci, wheezing. Abdomen is soft, nontender, normal bowel sounds. Extremities have no edema. Sanchez catheter in place drain clear but mara urine. Labs reviewed. Creatinine elevated at 1.3, 1.8 yesterday. Discussed case with Dr. Norma Potter and our impression and recommendations are as follows:  1. Acute on chronic heart failure: Volume status continues to improve. Continue Bumex 1mg twice daily. This can be transitioned to PO for discharge. Strict I&Os. 2. Mitral valve regurgitation: This has improvement to mild to moderate following aggressive diuresis. Continue diuresis as stated above. Limited symptoms at rest with plans to work with PT today. 3. Hypertension:  Remains elevated. Will increase hydralazine to 100mg three times daily. Continue present regimen. 4. Hypokalemia: This has resolved. 5.  PEDRO PABLO:  This is improving. Continue with Bumex 1mg twice daily and as stated can be transitioned to PO in anticipation of discharge. Renal duplex during last admission showing possible right renal arterial stenosis. Will consider CTA pending trend or creatinine, potentially as outpatient.         Please do not hesitate to call me if additional questions arise

## 2020-10-01 LAB
ANION GAP SERPL CALC-SCNC: 5 MMOL/L (ref 5–15)
BUN SERPL-MCNC: 27 MG/DL (ref 6–20)
BUN/CREAT SERPL: 15 (ref 12–20)
CA-I BLD-MCNC: 9.6 MG/DL (ref 8.5–10.1)
CHLORIDE SERPL-SCNC: 96 MMOL/L (ref 97–108)
CO2 SERPL-SCNC: 32 MMOL/L (ref 21–32)
CREAT SERPL-MCNC: 1.84 MG/DL (ref 0.55–1.02)
GLUCOSE SERPL-MCNC: 94 MG/DL (ref 65–100)
POTASSIUM SERPL-SCNC: 4.1 MMOL/L (ref 3.5–5.1)
SODIUM SERPL-SCNC: 133 MMOL/L (ref 136–145)

## 2020-10-01 PROCEDURE — 74011000250 HC RX REV CODE- 250: Performed by: INTERNAL MEDICINE

## 2020-10-01 PROCEDURE — 97110 THERAPEUTIC EXERCISES: CPT

## 2020-10-01 PROCEDURE — 74011250637 HC RX REV CODE- 250/637: Performed by: HOSPITALIST

## 2020-10-01 PROCEDURE — 74011250637 HC RX REV CODE- 250/637: Performed by: INTERNAL MEDICINE

## 2020-10-01 PROCEDURE — 97116 GAIT TRAINING THERAPY: CPT

## 2020-10-01 PROCEDURE — 74011250637 HC RX REV CODE- 250/637: Performed by: NURSE PRACTITIONER

## 2020-10-01 PROCEDURE — 36415 COLL VENOUS BLD VENIPUNCTURE: CPT

## 2020-10-01 PROCEDURE — 74011250636 HC RX REV CODE- 250/636: Performed by: HOSPITALIST

## 2020-10-01 PROCEDURE — 74011250636 HC RX REV CODE- 250/636: Performed by: INTERNAL MEDICINE

## 2020-10-01 PROCEDURE — 94640 AIRWAY INHALATION TREATMENT: CPT

## 2020-10-01 PROCEDURE — 65270000029 HC RM PRIVATE

## 2020-10-01 PROCEDURE — 80048 BASIC METABOLIC PNL TOTAL CA: CPT

## 2020-10-01 PROCEDURE — 92526 ORAL FUNCTION THERAPY: CPT

## 2020-10-01 RX ORDER — LABETALOL 200 MG/1
400 TABLET, FILM COATED ORAL 3 TIMES DAILY
Status: DISCONTINUED | OUTPATIENT
Start: 2020-10-01 | End: 2020-10-02 | Stop reason: HOSPADM

## 2020-10-01 RX ADMIN — IPRATROPIUM BROMIDE AND ALBUTEROL SULFATE 3 ML: .5; 3 SOLUTION RESPIRATORY (INHALATION) at 20:57

## 2020-10-01 RX ADMIN — Medication 10 ML: at 14:00

## 2020-10-01 RX ADMIN — Medication 10 ML: at 05:57

## 2020-10-01 RX ADMIN — HYDRALAZINE HYDROCHLORIDE 100 MG: 50 TABLET, FILM COATED ORAL at 09:36

## 2020-10-01 RX ADMIN — LEVOTHYROXINE SODIUM 25 MCG: 0.03 TABLET ORAL at 05:51

## 2020-10-01 RX ADMIN — ISOSORBIDE DINITRATE 40 MG: 20 TABLET ORAL at 09:36

## 2020-10-01 RX ADMIN — ALPRAZOLAM 0.25 MG: 0.25 TABLET ORAL at 18:29

## 2020-10-01 RX ADMIN — IPRATROPIUM BROMIDE AND ALBUTEROL SULFATE 3 ML: .5; 3 SOLUTION RESPIRATORY (INHALATION) at 13:11

## 2020-10-01 RX ADMIN — HYDRALAZINE HYDROCHLORIDE 100 MG: 50 TABLET, FILM COATED ORAL at 15:39

## 2020-10-01 RX ADMIN — POTASSIUM CHLORIDE 40 MEQ: 750 TABLET, FILM COATED, EXTENDED RELEASE ORAL at 20:33

## 2020-10-01 RX ADMIN — LEVETIRACETAM 500 MG: 500 TABLET ORAL at 12:45

## 2020-10-01 RX ADMIN — Medication 10 ML: at 20:34

## 2020-10-01 RX ADMIN — BUMETANIDE 1 MG: 1 TABLET ORAL at 09:36

## 2020-10-01 RX ADMIN — CARBAMAZEPINE 200 MG: 200 TABLET ORAL at 09:36

## 2020-10-01 RX ADMIN — NIFEDIPINE 60 MG: 60 TABLET, EXTENDED RELEASE ORAL at 09:36

## 2020-10-01 RX ADMIN — LABETALOL HYDROCHLORIDE 400 MG: 200 TABLET, FILM COATED ORAL at 15:39

## 2020-10-01 RX ADMIN — IPRATROPIUM BROMIDE AND ALBUTEROL SULFATE 3 ML: .5; 3 SOLUTION RESPIRATORY (INHALATION) at 08:04

## 2020-10-01 RX ADMIN — FAMOTIDINE 20 MG: 10 INJECTION INTRAVENOUS at 09:35

## 2020-10-01 RX ADMIN — CARBAMAZEPINE 200 MG: 200 TABLET ORAL at 18:28

## 2020-10-01 RX ADMIN — HYDRALAZINE HYDROCHLORIDE 100 MG: 50 TABLET, FILM COATED ORAL at 00:27

## 2020-10-01 RX ADMIN — Medication 400 MG: at 09:37

## 2020-10-01 RX ADMIN — LEVETIRACETAM 500 MG: 500 TABLET ORAL at 00:27

## 2020-10-01 RX ADMIN — FERROUS SULFATE TAB 325 MG (65 MG ELEMENTAL FE) 325 MG: 325 (65 FE) TAB at 05:51

## 2020-10-01 RX ADMIN — FOLIC ACID 1 MG: 1 TABLET ORAL at 09:35

## 2020-10-01 RX ADMIN — LABETALOL HYDROCHLORIDE 400 MG: 200 TABLET, FILM COATED ORAL at 09:36

## 2020-10-01 RX ADMIN — ASPIRIN 81 MG: 81 TABLET, COATED ORAL at 09:36

## 2020-10-01 RX ADMIN — ISOSORBIDE DINITRATE 40 MG: 20 TABLET ORAL at 00:27

## 2020-10-01 RX ADMIN — NIFEDIPINE 60 MG: 60 TABLET, EXTENDED RELEASE ORAL at 20:33

## 2020-10-01 RX ADMIN — ENOXAPARIN SODIUM 40 MG: 40 INJECTION SUBCUTANEOUS at 09:35

## 2020-10-01 RX ADMIN — ISOSORBIDE DINITRATE 40 MG: 20 TABLET ORAL at 15:39

## 2020-10-01 RX ADMIN — POTASSIUM CHLORIDE 40 MEQ: 750 TABLET, FILM COATED, EXTENDED RELEASE ORAL at 09:35

## 2020-10-01 RX ADMIN — SERTRALINE HYDROCHLORIDE 25 MG: 50 TABLET ORAL at 09:35

## 2020-10-01 NOTE — PROGRESS NOTES
Pulmonology and Critical Care Progress Note    Subjective:     Chief Complaint:   Chief Complaint   Patient presents with    Respiratory Distress        Patient seen and examined in her room  Overnight events noted     successful extubation 9/24 to 5L NC. Doing fairly well. Now on room air. Awake and alert  No acute distress  Answering questions appropriately      Past Medical History:   Diagnosis Date    Chronic obstructive pulmonary disease ([facility]     Chronic respiratory failure (HCC)     Congestive heart failure (CHF) (HCC)     Hypertension     Hypertension     Renal artery stenosis (HCC)     Seizure disorder (HCC)      No past surgical history on file.    Family History   Problem Relation Age of Onset    Hypertension Mother     Stroke Mother      Social History     Tobacco Use    Smoking status: Heavy Tobacco Smoker     Types: Cigarettes    Tobacco comment: former quit only 1 month ago during recent admission to the hospital   Substance Use Topics    Alcohol use: Not Currently      Current Facility-Administered Medications   Medication Dose Route Frequency Provider Last Rate Last Dose    bumetanide (BUMEX) tablet 1 mg  1 mg Oral DAILY Moiz Reeves MD   1 mg at 10/01/20 0936    potassium chloride SR (KLOR-CON 10) tablet 40 mEq  40 mEq Oral BID Kavita KELLY MD   40 mEq at 10/01/20 0935    hydrALAZINE (APRESOLINE) tablet 100 mg  100 mg Oral TID Mark Whitt E, NP   100 mg at 10/01/20 0936    isosorbide dinitrate (ISORDIL) tablet 40 mg  40 mg Oral TID Mark Whitt E, NP   40 mg at 10/01/20 0936    NIFEdipine ER (PROCARDIA XL) tablet 60 mg  60 mg Oral BID Gwenyth Bud, NP   60 mg at 10/01/20 0936    labetaloL (NORMODYNE) tablet 400 mg  400 mg Oral Q12H Gwenyth Bud, NP   400 mg at 10/01/20 0936    labetaloL (NORMODYNE;TRANDATE) 20 mg/4 mL (5 mg/mL) injection 20 mg  20 mg IntraVENous Q6H PRN Vlad Obando DO   20 mg at 09/25/20 1259    hydrALAZINE (APRESOLINE) 20 mg/mL injection 10 mg  10 mg IntraVENous Q2H PRN Mayuri Solorzano MD   10 mg at 09/26/20 0226    levETIRAcetam (KEPPRA) 500 mg in saline (iso-osm) 100 mL IVPB (premix)  500 mg IntraVENous Q12H Eric Riley  mL/hr at 09/24/20 2328 500 mg at 09/28/20 2321    Or    levETIRAcetam (KEPPRA) tablet 500 mg  500 mg Oral Q12H Eric Riley MD   500 mg at 10/01/20 0027    famotidine (PF) (PEPCID) 20 mg in 0.9% sodium chloride 10 mL injection  20 mg IntraVENous Q12H Vlad Obando DO   20 mg at 10/01/20 0935    albuterol-ipratropium (DUO-NEB) 2.5 MG-0.5 MG/3 ML  3 mL Nebulization Q6H RT Vlad Obando DO   3 mL at 10/01/20 0804    aspirin delayed-release tablet 81 mg  81 mg Oral DAILY Jenn Spangler MD   81 mg at 10/01/20 0936    magnesium oxide (MAG-OX) tablet 400 mg  400 mg Oral DAILY Jenn Spangler MD   400 mg at 10/01/20 1566    ALPRAZolam (XANAX) tablet 0.25 mg  0.25 mg Oral TID PRN Jenn Spangler MD   0.25 mg at 09/30/20 2016    sertraline (ZOLOFT) tablet 25 mg  25 mg Oral DAILY Jenn Spangler MD   50 mg at 10/01/20 0935    levothyroxine (SYNTHROID) tablet 25 mcg  25 mcg Oral ACB Jenn Spangler MD   25 mcg at 10/01/20 0551    carBAMazepine (TEGretol) tablet 200 mg  200 mg Oral BID Jenn Spangler MD   200 mg at 10/01/20 6088    ferrous sulfate tablet 325 mg  325 mg Oral ACB Jenn Spangler MD   325 mg at 76/02/30 6755    folic acid (FOLVITE) tablet 1 mg  1 mg Oral DAILY Jenn Spangler MD   1 mg at 10/01/20 0935    sodium chloride (NS) flush 5-40 mL  5-40 mL IntraVENous Q8H Jenn Spangler MD   10 mL at 10/01/20 0557    sodium chloride (NS) flush 5-40 mL  5-40 mL IntraVENous PRN Jenn Spangler MD   10 mL at 09/29/20 0634    acetaminophen (TYLENOL) tablet 650 mg  650 mg Oral Q6H PRN Jenn Spangler MD   650 mg at 09/30/20 7350    Or    acetaminophen (TYLENOL) suppository 650 mg  650 mg Rectal Q6H PRN Jenn Spangler MD        polyethylene glycol (MIRALAX) packet 17 g  17 g Oral DAILY PRMARIO Dickson MD   17 g at 09/28/20 1830    enoxaparin (LOVENOX) injection 40 mg  40 mg SubCUTAneous DAILY Rach Dickson MD   40 mg at 10/01/20 0935    ondansetron [facility]) injection 4 mg  4 mg IntraVENous Q6H PRN Rach Dickson MD   4 mg at 09/27/20 1320        Home Meds:  Prior to Admission medications    Medication Sig Start Date End Date Taking?  Authorizing Provider   ALPRAZolam Cedrick Medici) 0.25 mg tablet Take 0.25 mg by mouth three (3) times daily as needed for Anxiety.     Yes Provider, Historical   ethacrynic acid (EDECRIN) 25 mg tablet Take 50 mg by mouth two (2) times a day.     Yes Provider, Historical   predniSONE (DELTASONE) 5 mg tablet Take 5 mg by mouth daily.     Yes Provider, Historical   sertraline (Zoloft) 25 mg tablet Take 25 mg by mouth daily.     Yes Provider, Historical   hydrALAZINE (APRESOLINE) 50 mg tablet Take 100 mg by mouth three (3) times daily.     Yes Provider, Historical   levothyroxine (SYNTHROID) 25 mcg tablet Take 25 mcg by mouth Daily (before breakfast).     Yes Provider, Historical   levETIRAcetam (Keppra) 500 mg tablet Take 500 mg by mouth two (2) times a day.     Yes Provider, Historical   carBAMazepine, mood stabiliz, 200 mg CM12 Take 200 mg by mouth two (2) times a day.     Yes Provider, Historical   ferrous sulfate 325 mg (65 mg iron) tablet Take 325 mg by mouth Daily (before breakfast).     Yes Provider, Historical   folic acid (FOLVITE) 1 mg tablet Take 1 mg by mouth daily.     Yes Provider, Historical   spironolactone (ALDACTONE) 25 mg tablet Take 100 mg by mouth daily.     Yes Provider, Historical   metoprolol succinate (TOPROL-XL) 100 mg tablet Take 150 mg by mouth daily.       Provider, Historical   NIFEdipine ER (PROCARDIA XL) 60 mg ER tablet Take 60 mg by mouth daily.       Provider, Historical   aspirin delayed-release 81 mg tablet Take  by mouth daily.       Provider, Historical   isosorbide dinitrate (ISORDIL) 20 mg tablet Take 40 mg by mouth two (2) times a day.       Provider, Historical   labetaloL (NORMODYNE) 200 mg tablet Take  by mouth two (2) times a day.       Provider, Historical   magnesium oxide (MAG-OX) 400 mg tablet Take 400 mg by mouth daily.       Provider, Historical   potassium chloride (K-DUR, KLOR-CON) 20 mEq tablet Take 20 mEq by mouth two (2) times a day.       Provider, Historical         Allergies   Allergen Reactions    Sulfa (Sulfonamide Antibiotics) Anaphylaxis       Review of Systems:  Review of systems not obtained due to patient factors. Objective:     Blood pressure (!) 153/82, pulse 97, temperature 99.9 °F (37.7 °C), resp. rate 18, height 5' 5\" (1.651 m), weight 46.3 kg (102 lb 1.2 oz), SpO2 95 %. Temp (24hrs), Av.7 °F (37.1 °C), Min:98.2 °F (36.8 °C), Max:99.9 °F (37.7 °C)      Intake and Output:  Current Shift: 10/01 0701 - 10/01 1900  In: -   Out: 900 [Urine:900]  Last 3 Shifts: 1901 - 10/01 0700  In: 500 [P.O.:500]  Out: 1300 [Urine:1300]    Physical Exam:   General appearance: A+Ox3, chronically ill-appearing, currently on room air. Head: Normocephalic, without obvious abnormality, atraumatic  Eyes: conjunctivae/corneas clear. PERRL, EOM's intact. Throat: Lips, mucosa, and tongue normal. Poor dentition. Neck: supple, symmetrical, trachea midline, no adenopathy, thyroid: not enlarged, symmetric, no tenderness/mass/nodules and no carotid bruit  Lungs: Diminished in general.  Also diminished breath sounds over the bases with some crackles bilaterally. She is currently on room air. Heart: regular rate and rhythm, S1, S2 normal, no murmur, click, rub or gallop  Abdomen: soft, non-tender.  Bowel sounds normal. No masses,  no organomegaly  Extremities: edema +1 bilaterally, otherwise atraumatic  Skin: Skin color, texture, turgor normal. No rashes or lesions  Lymph nodes: Cervical, supraclavicular, and axillary nodes normal.  Neurologic: Grossly normal, following commands, calm    Additional comments:None    Lab/Data Review: All lab results for the last 24 hours reviewed. No results found for this or any previous visit (from the past 24 hour(s)). Chest X-Ray:   XR CHEST PORT   Final Result   IMPRESSION:   Bilateral pleural effusion with probable associated left basilar atelectasis. No   significant interval change. XR CHEST PORT   Final Result   Impression: No significant interval change. XR CHEST PORT   Final Result   Impression: Diminished airspace disease. XR SWALLOW FUNC VIDEO   Final Result   Impression: Laryngeal penetration with thin, nectar thick, honey thick, and   pudding consistency. No aspiration. Please see recommendation by speech   pathology. Dose: Kerma= 13 mGy. Fluoroscopy time 1 minute and 43 seconds      XR CHEST PORT   Final Result   IMPRESSION:   1. Overall findings are supportive of pulmonary edema with bilateral pleural   effusions. The perihilar opacities appear slightly progressed on the right from   one day prior. XR CHEST PORT   Final Result   IMPRESSION: Endotracheal tube and gastric tube removed. Bilateral pleural   effusions. Improved aeration of the lower lung zones. Mild prominence of the   interstitium. XR CHEST PORT   Final Result   IMPRESSION: Persistent bibasilar lung findings previously described as CHF and   probably with effusion complicating visualization of the right lung base. Continued surveillance recommended      XR CHEST PORT   Final Result   IMPRESSION: Improving congestive heart failure pattern. XR CHEST SNGL V   Final Result   Findings/impression:      Moderate bilateral pleural effusion present. There is extensive interstitial and   airspace disease seen throughout both lungs, including dense airspace disease   involving left lower lobe with air bronchograms. No interval improvement. No   pneumothorax. Endotracheal tube 5 cm above pilo. Nasogastric tube below   diaphragm. No suspicious osseous lesions.       XR CHEST SNGL V Final Result   Impression: Congestive heart failure with interstitial and alveolar pulmonary   edema with bilateral pleural effusions. CT imaging:  CT Results  (Last 48 hours)    None          PFTs:   PFT Results  (Last 3 results in the past 10 years)    None            Assessment:     Patient is a 54-year-old  female with history of diastolic CHF, hypertension, emphysema, CVA, renal artery stenosis, mitral valve regurgitation, and chronic hypoxemic respiratory failure currently on 5 L nasal cannula at home who presented back to [facility] on 9/21/2020 with increasing respiratory distress and hypoxia. Initially placed on BiPAP given significant volume overload present on chest imaging, however she was intubated. Now extubated on 9/24/20. Plan:     1.)  Acute on chronic hypoxic respiratory failure  Secondary to acute on chronic heart failure and flash pulmonary edema. Extubated 9/24  Now on room air    2.)  Acute on chronic diastolic heart failure  -Significant pulmonary edema and bilateral pleural effusions. Likely worsened by poorly controlled blood pressure, renal artery stenosis, poor nutrition. Chest x-ray 9/30 reviewed personally and compared to prior chest x-ray  Show significant improvement in bilateral pleural effusions  There is some persistent left basilar atelectasis  I do not believe thoracentesis is indicated at this time    3.)  Accelerated hypertension-   -Blood pressure in the 200s on admission, likely leading to flash pulmonary edema. Was better controlled, now back into the 160's-180's  -Continue current regimen, but will increase her Hydralazine to 50 mg TID (from BID) today. Also, Bumex dose has been increased as above. -Appreciate assistance from Cardiology.     4.)  Emphysema  -Does not appear to be on any maintenance inhalers at home.  -She missed her follow-up appointment with us this week  -Low suspicion for acute exacerbation of COPD.  -Switch back to scheduled prednisone 5 mg daily which apparently is a chronic medication.  -Agree with q6 scheduled duo nebs. 5.)  Renal artery stenosis  -Renal artery duplex from 8/24/2020 showing right renal artery stenosis.  -Likely at least contributing to her poorly controlled hypertension. Cardiology has been consulted. 6.)  Seizure disorder  -Seems to have occurred after her CVA, continue on home Keppra/carbamazepine.  -No evidence of seizure activity at this time. 7.) Anxiety    PT OT evaluation  Ambulate with support  Out of bed to chair    I will continue to follow along with her while she is admitted. Discussed in detail with RN, RT, and care team    CODE STATUS: Full Code    Total time spent with patient: More than 30 minutes was spent with the patient reviewing extensive labs and chest imaging, discussing in detail with the bedside nurse and documentation.       Denver Campoverde MD  [facility]  10/1/2020

## 2020-10-01 NOTE — PROGRESS NOTES
Patient was current with [address]. She will need Resumption of care order for SN, PT and OT. CM notified Patient and Attending. Current Discharge Plan: Home with [address].

## 2020-10-01 NOTE — PROGRESS NOTES
PHYSICAL THERAPY TREATMENT  Patient: Anthony Corey (56 y.o. female)  Date: 10/1/2020  Diagnosis[facility] [J96.00]   <principal problem not specified>       Precautions:    Chart, physical therapy assessment, plan of care and goals were reviewed. ASSESSMENT  Patient continues with skilled PT services and is progressing towards goals. Pt. Received in semi-supine and agreeable for therapy . Gains in strength b/l Le and balance have improved pt's ability to perform transfers and ambulation with decreased level of assist . Pt. Is mod I for bed mobility , SBA for sit <>stand , SBA/CGA for SPT . Increased ambulatory distance of 150' without AD , CGA with no presence of scissoring gait today , demonstrates decreased step length b/l Le . Pt. Gets distracted easily during ambulation and gets unsteady , needs assist and cues for safety . Recommend d/c to HHPT and use of RW for ambulation . Current Level of Function Impacting Discharge (mobility/balance): Requires SBA/CGA for functional mobility . Other factors to consider for discharge: none          PLAN :  Patient continues to benefit from skilled intervention to address the above impairments. Continue treatment per established plan of care. to address goals. Recommendation for discharge: (in order for the patient to meet his/her long term goals) \  Home with HHPT and use of RW for ambulation. This discharge recommendation:  Has been made in collaboration with the attending provider and/or case management    IF patient discharges home will need the following DME: none ( pt. Has RW )        SUBJECTIVE:   Patient stated I am feeling better .     OBJECTIVE DATA SUMMARY:   Critical Behavior:  Neurologic State: Alert  Orientation Level: Oriented to person, Oriented to place, Oriented to time  Cognition: Follows commands     Functional Mobility Training:  Bed Mobility:  Rolling: Modified independent  Supine to Sit: Modified independent    Scooting: Modified independent    Transfers:  Sit to Stand: Stand-by assistance  Stand to Sit: Stand-by assistance    Balance:  Sitting: Intact; With support  Standing: Intact; With support  Ambulation/Gait Training:  Distance (ft): 150 Feet (ft)  Assistive Device: Gait belt  Ambulation - Level of Assistance: Contact guard assistance     Speed/Dariana: Slow  Step Length: Right shortened;Left shortened    Therapeutic Exercises:         EXERCISE   Sets   Reps   Active Active Assist   Passive Self ROM   Comments   Ankle Pumps  20 [x] [] [] []    Quad Sets/Glut Sets   [] [] [] []    Hamstring Sets   [] [] [] []    Short Arc Quads   [] [] [] []    Heel Slides   [] [] [] []    Straight Leg Raises   [] [] [] []    Hip abd/add   [] [] [] []    Long Arc Quads  12 [x] [] [] []    Marching  12 [x] [] [] []       [] [] [] []       Pain Ratin/10     Activity Tolerance: WNL  Please refer to the flowsheet for vital signs taken during this treatment. After treatment patient left in no apparent distress:   Sitting in chair and Call bell within reach . Nsg. Made aware. COMMUNICATION/COLLABORATION:   The patients plan of care was discussed with: Registered nurse.      Tanisha Hernandez   Time Calculation: 26 mins

## 2020-10-01 NOTE — PROGRESS NOTES
[facility] CARDIOLOGY  CARDIOLOGY PROGRESS NOTE     Patient seen and examined. This is a patient who is followed for acute on chronic heart failure. Seen resting, sitting up in bed. Feels good, breathing is stable. Does not report any shortness of breath or cough. No chest pain. No other complaints reported. Telemetry reviewed, there were no events noted in the past 24 hours. Remains in normal sinus rhythm to occasional sinus tachycardia. Pertinent review of systems items noted above, all other systems are negative. Current medications reviewed. Physical Examination  Vital signs are stable. Blood pressure 153/82, Pulse 100  No apparent distress resting comfortably in bed. Heart is regular, rate and rhythm. Normal S1, S2, no murmurs are appreciated. Lungs are significantly diminished to bases. No rales, rhonci, wheezing. Abdomen is soft, nontender, normal bowel sounds. Extremities have no edema. Labs reviewed. Creatinine elevated at 1.8     Discussed case with Dr. Norma Potter and our impression and recommendations are as follows:  1. Acute on chronic heart failure: Volume status continues to improve. Bumex transitioned from IV 1mg BID to 1mg PO daily. Will assess need for any increase. Please document Strict I&Os. 2. Mitral valve regurgitation: This has improvement to mild to moderate following aggressive diuresis. Continue diuresis as stated above. Limited symptoms at rest. Will plan for outpaitent follow up with [facility] to further assess valve. 3. Hypertension:  Remains elevated. Hydralazine increased to 100mg three times daily yesterday. Will increase labetolol to 400mg TID. Continue present regimen. 4. Hypokalemia: This has resolved. 5.  PEDRO PABLO:  This is improving. Renal duplex during last admission showing possible right renal arterial stenosis. Will consider CTA pending trend or creatinine, potentially as outpatient.         Please do not hesitate to call me if additional questions arise

## 2020-10-01 NOTE — PROGRESS NOTES
Patient: Gin Lemos (14 y.o. female)  Date: 10/1/2020  Diagnosis: [facility] [J96.00]   <principal problem not specified>       Precautions:  Aspiration and fall    ASSESSMENT :  Swallow strengthening exercises introduced. BOT and laryngeal strengthening exercises via Shaker w/ 10 sec hold x10, effortful swallow x25, and Candi (2/5). Patient easily fatigues but appears motivated and engaged during tx session. She tolerates thin via small cups sips w/ reduced HLE upon palpation. Overt s/s of pen/asp x1 c/b coughing. Patient will benefit from skilled intervention to address the above impairments. Patients rehabilitation potential is considered to be Good     PLAN :  Recommendations and Planned Interventions:  Rec cont chopped/NTL w/ free water protocol b/w meals following oral care. Cont swallow strengthening exercises. Frequency/Duration: Patient will be followed by speech-language pathology daily to address goals. Discharge Recommendations: Skilled Nursing Facility     SUBJECTIVE:   Patient reports she is ready to leave. OBJECTIVE:     Past Medical History:   Diagnosis Date    [facility]     Chronic respiratory failure (HCC)     Congestive heart failure (CHF) (HCC)     Hypertension     Hypertension     Renal artery stenosis (HCC)     Seizure disorder (HCC)        CXR Results  (Last 48 hours)                 09/30/20 0900  XR CHEST PORT Final result    Impression:  IMPRESSION:   Bilateral pleural effusion with probable associated left basilar atelectasis. No   significant interval change. Narrative:  Portable chest, one view       INDICATION: Pleural effusion       COMPARISON: 9/29/2020       FINDINGS:   External cardiac leads project over the chest. Small-to-moderate bilateral   pleural effusions are present. Associated left basilar atelectasis is suspected. Upper lungs remain clear without new consolidation.  Atherosclerotic calcification of the aortic arch. There is no pneumothorax. Diet prior to admission: regular  Current Diet:  DIET NUTRITIONAL SUPPLEMENTS  DIET NUTRITIONAL SUPPLEMENTS  DIET CARDIAC     Cognitive and Communication Status:  Neurologic State: Alert, Confused  Orientation Level: Oriented to person, Oriented to place, Oriented to time  Cognition: Follows commands, Impaired decision making       Dysphagia Treatment:  Oral Assessment:     P.O. Trials:  Patient Position: upright in bed  Vocal quality prior to P.O.:    Consistency Presented: Thin liquid    Pain:  Pain Scale 1: Numeric (0 - 10)  Pain Intensity 1: 0       After treatment:   Patient left in no apparent distress in bed, Call bell within reach, and Nursing notified    COMMUNICATION/EDUCATION:   Patient receptive of education regarding ST POC and aspiration precautions. She demonstrated Good understanding as evidenced by engagement and appropriate questions. The patient's plan of care including recommendations, planned interventions, and recommended diet changes were discussed with: Registered nurse. Patient/family have participated as able in goal setting and plan of care. Patient/family agree to work toward stated goals and plan of care. Problem: Dysphagia (Adult)  Goal: *Acute Goals and Plan of Care (Insert Text)  Description: Speech Therapy Swallow Goals  Initiated 9/25/2020       -Patient will tolerate PO trials without clinical indicators of aspiration given minimal cues within 7 day(s). [ progressing ]         -Patient will participate in modified barium swallow study within 7 day(s). Allegra.Taylor ]          -Patient will perform BOT and laryngeal strengthening exercises with minimal cues within 10 day(s). [progressing]    -Patient will demonstrate understanding of swallow safety precautions and aspiration precautions, diet recs with minimal cues within 7 day(s).  [progressing ]  -patient will tolerate ground diet and nectar thick liquids w/o overt s/sx of aspiration within 5-7 days.           Outcome: Progressing Towards Goal    Thank you for this referral.  Rc Soriano M.S., M.Ed., CCC-SLP  Time Calculation: 15 mins

## 2020-10-01 NOTE — PROGRESS NOTES
Progress Note    Patient: Shanika Washington MRN: [id number]  SSN: [id number]    YOB: 1965  Age: 54 y.o. Sex: female      Admit Date: 9/21/2020    LOS: 10 days     Subjective:     CC: shortness of breath      54F, h/o COPD with acute on chronic respiratory failure on 2L NC, with shortness of breath s/t AECHFpEF was intubated s/t acute hypoxic respiratory failure s.t flash pulmonary edema s.t AECHFpEF.     Extubated on 9/22  Transferred out of icu 9/29. On ra, no distress and states feels nuch improved  Cardio/pulm appreciated.           Review of Systems:  Constitutional:  denies weight loss, no fever, no chills, no night sweats  Eye: No recent visual disturbances, no discharge, no double vision  Ear/nose/mouth/throat : No hearing disturbance, no ear pain, no nasal congestion, no sore throat, no trouble swallowing. Respiratory : No trouble breathing, no cough, no shortness of breath, no hemoptysis, no wheezing  Cardiovascular : No chest pain, no palpitation, no racing of heart, no orthopnea, no paroxysmal nocturnal dyspnea, no peripheral edema  Gastrointestinal : No nausea, no vomiting, no diarrhea, constipation, heartburn, abdominal pain  Genitourinary : No dysuria, no hematuria, no increased frequency, incontinence,  Lymphatics : No swollen glands -Neck, axillary, inguinal  Endocrine : No excessive thirst, no polyuria no cold intolerance, no heat intolerance. Immunologic : No hives, urticaria, no seasonal allergies,   Musculoskeletal : Left upper back pain.  No joint swelling, pain, effusion,  no neck pain,   Integumentary : No rash, no pruritus, no ecchymosis  Hematology : No petechiae, No easy bruising,  No tendency to bleed easy  Neurology : Denies change in mental status, no abnormal balance, no headache, no confusion, numbness, tingling,  Psychiatric : No mood swings, no anxiety, depression      Prior to Admission Medications   Prescriptions Last Dose Informant Patient Reported? Taking? ALPRAZolam (XANAX) 0.25 mg tablet   Yes Yes   Sig: Take 0.25 mg by mouth three (3) times daily as needed for Anxiety. NIFEdipine ER (PROCARDIA XL) 60 mg ER tablet   Yes No   Sig: Take 60 mg by mouth daily. aspirin delayed-release 81 mg tablet   Yes No   Sig: Take  by mouth daily. carBAMazepine, mood stabiliz, 200 mg CM12   Yes Yes   Sig: Take 200 mg by mouth two (2) times a day.   ethacrynic acid (EDECRIN) 25 mg tablet   Yes Yes   Sig: Take 50 mg by mouth two (2) times a day. ferrous sulfate 325 mg (65 mg iron) tablet   Yes Yes   Sig: Take 325 mg by mouth Daily (before breakfast). folic acid (FOLVITE) 1 mg tablet   Yes Yes   Sig: Take 1 mg by mouth daily. hydrALAZINE (APRESOLINE) 50 mg tablet   Yes Yes   Sig: Take 100 mg by mouth three (3) times daily. isosorbide dinitrate (ISORDIL) 20 mg tablet   Yes No   Sig: Take 40 mg by mouth two (2) times a day. labetaloL (NORMODYNE) 200 mg tablet   Yes No   Sig: Take  by mouth two (2) times a day. levETIRAcetam (Keppra) 500 mg tablet   Yes Yes   Sig: Take 500 mg by mouth two (2) times a day. levothyroxine (SYNTHROID) 25 mcg tablet   Yes Yes   Sig: Take 25 mcg by mouth Daily (before breakfast). magnesium oxide (MAG-OX) 400 mg tablet   Yes No   Sig: Take 400 mg by mouth daily. metoprolol succinate (TOPROL-XL) 100 mg tablet   Yes No   Sig: Take 150 mg by mouth daily. potassium chloride (K-DUR, KLOR-CON) 20 mEq tablet   Yes No   Sig: Take 20 mEq by mouth two (2) times a day. predniSONE (DELTASONE) 5 mg tablet   Yes Yes   Sig: Take 5 mg by mouth daily. sertraline (Zoloft) 25 mg tablet   Yes Yes   Sig: Take 25 mg by mouth daily. spironolactone (ALDACTONE) 25 mg tablet   Yes Yes   Sig: Take 100 mg by mouth daily.       Facility-Administered Medications: None       Current Facility-Administered Medications:     labetaloL (NORMODYNE) tablet 400 mg, 400 mg, Oral, TID, Gera COTTON NP, 400 mg at 10/01/20 1539    bumetanide (BUMEX) tablet 1 mg, 1 mg, Oral, DAILY, Dee Diego MD, 1 mg at 10/01/20 4234    potassium chloride SR (KLOR-CON 10) tablet 40 mEq, 40 mEq, Oral, BID, Daniel KELLY MD, 40 mEq at 10/01/20 0935    hydrALAZINE (APRESOLINE) tablet 100 mg, 100 mg, Oral, TID, Katy Connor, NP, 100 mg at 10/01/20 1539    isosorbide dinitrate (ISORDIL) tablet 40 mg, 40 mg, Oral, TID, Katy Connor, NP, 40 mg at 10/01/20 1539    NIFEdipine ER (PROCARDIA XL) tablet 60 mg, 60 mg, Oral, BID, Baljeet Mae NP, 60 mg at 10/01/20 0936    labetaloL (NORMODYNE;TRANDATE) 20 mg/4 mL (5 mg/mL) injection 20 mg, 20 mg, IntraVENous, Q6H PRN, Vlad Obando DO, 20 mg at 09/25/20 1259    hydrALAZINE (APRESOLINE) 20 mg/mL injection 10 mg, 10 mg, IntraVENous, Q2H PRN, Ruthy Casas MD, 10 mg at 09/26/20 0226    levETIRAcetam (KEPPRA) 500 mg in saline (iso-osm) 100 mL IVPB (premix), 500 mg, IntraVENous, Q12H, Last Rate: 400 mL/hr at 09/24/20 2328, 500 mg at 09/28/20 2321 **OR** levETIRAcetam (KEPPRA) tablet 500 mg, 500 mg, Oral, Q12H, Shirley Wilcox MD, 500 mg at 10/01/20 1245    famotidine (PF) (PEPCID) 20 mg in 0.9% sodium chloride 10 mL injection, 20 mg, IntraVENous, Q12H, Vlad Obando DO, 20 mg at 10/01/20 0935    albuterol-ipratropium (DUO-NEB) 2.5 MG-0.5 MG/3 ML, 3 mL, Nebulization, Q6H RT, Vlad Obando DO, 3 mL at 10/01/20 1311    aspirin delayed-release tablet 81 mg, 81 mg, Oral, DAILY, Bertha Meza MD, 81 mg at 10/01/20 0936    magnesium oxide (MAG-OX) tablet 400 mg, 400 mg, Oral, DAILY, Bertha Meza MD, 400 mg at 10/01/20 4756    ALPRAZolam Georganna Glory) tablet 0.25 mg, 0.25 mg, Oral, TID PRN, Bertha Meza MD, 0.25 mg at 10/01/20 1829    sertraline (ZOLOFT) tablet 25 mg, 25 mg, Oral, DAILY, Bertha Meza MD, 25 mg at 10/01/20 0935    levothyroxine (SYNTHROID) tablet 25 mcg, 25 mcg, Oral, ACB, Bertha Meza MD, 25 mcg at 10/01/20 0551    carBAMazepine (TEGretol) tablet 200 mg, 200 mg, Oral, BID, Bertha Meza MD, 200 mg at 10/01/20 1828    ferrous sulfate tablet 325 mg, 325 mg, Oral, ACB, Trixie Hernandez MD, 325 mg at 04/82/15 7707    folic acid (FOLVITE) tablet 1 mg, 1 mg, Oral, DAILY, Trixie Hernandez MD, 1 mg at 10/01/20 0935    sodium chloride (NS) flush 5-40 mL, 5-40 mL, IntraVENous, Q8H, Trixie Hernandez MD, 10 mL at 10/01/20 1400    sodium chloride (NS) flush 5-40 mL, 5-40 mL, IntraVENous, PRN, Trixie Hernandez MD, 10 mL at 09/29/20 0634    acetaminophen (TYLENOL) tablet 650 mg, 650 mg, Oral, Q6H PRN, 650 mg at 09/30/20 0916 **OR** acetaminophen (TYLENOL) suppository 650 mg, 650 mg, Rectal, Q6H PRN, Trixie Hernandez MD    polyethylene glycol (MIRALAX) packet 17 g, 17 g, Oral, DAILY PRN, Trixie Hernandez MD, 17 g at 09/28/20 1830    enoxaparin (LOVENOX) injection 40 mg, 40 mg, SubCUTAneous, DAILY, Trixie Hernandez MD, 40 mg at 10/01/20 0935    ondansetron [facility]) injection 4 mg, 4 mg, IntraVENous, Q6H PRN, Trixie Hernandez MD, 4 mg at 09/27/20 1320    Objective:     Vitals:    10/01/20 0548 10/01/20 0749 10/01/20 0806 10/01/20 1513   BP: (!) 150/83 (!) 153/82  124/77   Pulse: 100 97  97   Resp: 18 18  18   Temp: 98.7 °F (37.1 °C) 99.9 °F (37.7 °C)  99.2 °F (37.3 °C)   SpO2: 94% 93% 95% 95%   Weight:       Height:            Intake and Output:  Current Shift: 10/01 0701 - 10/01 1900  In: -   Out: 900 [Urine:900]  Last three shifts: 09/29 1901 - 10/01 0700  In: 500 [P.O.:500]  Out: 1300 [Urine:1300]      Physical Exam:   General appearance: A+Ox3,  chronically ill-appearing, nad  Head: Normocephalic, without obvious abnormality, atraumatic  Eyes: conjunctivae/corneas clear. EOMi  Throat: moist mucosa, poor dentition. Neck: supple, symmetrical, trachea midline, no adenopathy,   Lungs: scattered rhonchi, fair ae, non laboredHeart: regular rate and rhythm, S1, S2 normal, no murmur, click, rub or gallop  Abdomen: soft, non-tender.  Bowel sounds normal. No masses,  no organomegaly  Extremities: no edema distally, from x 4  Skin: Skin color, texture, turgor normal. No rashes or lesions  Lymph nodes: Cervical, supraclavicular, and axillary nodes normal.  Neurologic: Grossly normal, following commands, calm      Lab/Data Review:  Recent Results (from the past 24 hour(s))   METABOLIC PANEL, BASIC    Collection Time: 10/01/20  4:00 AM   Result Value Ref Range    Sodium 133 (L) 136 - 145 mmol/L    Potassium 4.1 3.5 - 5.1 mmol/L    Chloride 96 (L) 97 - 108 mmol/L    CO2 32 21 - 32 mmol/L    Anion gap 5 5 - 15 mmol/L    Glucose 94 65 - 100 mg/dL    BUN 27 (H) 6 - 20 mg/dL    Creatinine 1.84 (H) 0.55 - 1.02 mg/dL    BUN/Creatinine ratio 15 12 - 20      GFR est AA 35 (L) >60 ml/min/1.73m2    GFR est non-AA 28 (L) >60 ml/min/1.73m2    Calcium 9.6 8.5 - 10.1 mg/dL         Assessment and plan:      1. Acute on chronic respiratory failure: extubated  On room air @ 96%. Resolved.     2.  Congestive heart failure with preserved exacerbation: Patient is allergic to sulfa drugs(bactrim) po bumex tolerating, I&o's and daily weights     3.  Hypertension: Continue Nifedipine, labetalol, hydralazine, titrate     4.  Anxiety disorder:  Xanax 0.25 PRN. Cont Sertraline. Weaned off precedex.      5.  Seizure disorder:  on Keppra and carbamazepine.     6.  Iron deficiency anemia: Continue patient on iron supplement     7.  Renal artery stenosis: Cardiology closely following the patient will follow up with them for further recommendations.     DISPO: Pending course   Pt/ot [facility] pt/ot rec anticipate home am,    Signed By: Truman Luna MD     October 1, 2020

## 2020-10-02 VITALS
SYSTOLIC BLOOD PRESSURE: 134 MMHG | WEIGHT: 101.85 LBS | RESPIRATION RATE: 18 BRPM | BODY MASS INDEX: 16.97 KG/M2 | HEIGHT: 65 IN | TEMPERATURE: 97.9 F | DIASTOLIC BLOOD PRESSURE: 75 MMHG | HEART RATE: 97 BPM | OXYGEN SATURATION: 97 %

## 2020-10-02 PROBLEM — E03.9 ACQUIRED HYPOTHYROIDISM: Chronic | Status: ACTIVE | Noted: 2020-10-02

## 2020-10-02 PROBLEM — F41.9 ANXIETY: Chronic | Status: ACTIVE | Noted: 2020-10-02

## 2020-10-02 PROBLEM — I34.0 MITRAL VALVE REGURGITATION: Status: ACTIVE | Noted: 2020-10-02

## 2020-10-02 PROBLEM — D50.9 IRON DEFICIENCY ANEMIA: Chronic | Status: ACTIVE | Noted: 2020-10-02

## 2020-10-02 PROBLEM — I50.9 CHF (CONGESTIVE HEART FAILURE) (HCC): Status: ACTIVE | Noted: 2020-10-02

## 2020-10-02 PROBLEM — G40.909 SEIZURE DISORDER (HCC): Chronic | Status: ACTIVE | Noted: 2020-10-02

## 2020-10-02 PROBLEM — R13.10 DYSPHAGIA: Status: ACTIVE | Noted: 2020-10-02

## 2020-10-02 PROBLEM — J43.9 COPD (CHRONIC OBSTRUCTIVE PULMONARY DISEASE) WITH EMPHYSEMA (HCC): Chronic | Status: ACTIVE | Noted: 2020-10-02

## 2020-10-02 PROBLEM — N17.9 AKI (ACUTE KIDNEY INJURY) (HCC): Status: ACTIVE | Noted: 2020-10-02

## 2020-10-02 PROBLEM — E87.6 HYPOKALEMIA: Status: ACTIVE | Noted: 2020-10-02

## 2020-10-02 PROCEDURE — 74011250637 HC RX REV CODE- 250/637: Performed by: HOSPITALIST

## 2020-10-02 PROCEDURE — 74011250637 HC RX REV CODE- 250/637: Performed by: NURSE PRACTITIONER

## 2020-10-02 PROCEDURE — 77010033678 HC OXYGEN DAILY

## 2020-10-02 PROCEDURE — 74011250636 HC RX REV CODE- 250/636: Performed by: HOSPITALIST

## 2020-10-02 PROCEDURE — 94640 AIRWAY INHALATION TREATMENT: CPT

## 2020-10-02 PROCEDURE — 74011000250 HC RX REV CODE- 250: Performed by: INTERNAL MEDICINE

## 2020-10-02 PROCEDURE — 74011250637 HC RX REV CODE- 250/637: Performed by: INTERNAL MEDICINE

## 2020-10-02 PROCEDURE — 94760 N-INVAS EAR/PLS OXIMETRY 1: CPT

## 2020-10-02 PROCEDURE — 74011250636 HC RX REV CODE- 250/636: Performed by: INTERNAL MEDICINE

## 2020-10-02 RX ORDER — NIFEDIPINE 60 MG/1
60 TABLET, EXTENDED RELEASE ORAL 2 TIMES DAILY
Qty: 60 TAB | Refills: 0 | Status: SHIPPED | OUTPATIENT
Start: 2020-10-02 | End: 2020-10-27

## 2020-10-02 RX ORDER — IPRATROPIUM BROMIDE AND ALBUTEROL SULFATE 2.5; .5 MG/3ML; MG/3ML
3 SOLUTION RESPIRATORY (INHALATION) EVERY 6 HOURS
Qty: 120 NEBULE | Refills: 0 | Status: SHIPPED | OUTPATIENT
Start: 2020-10-02 | End: 2020-10-02

## 2020-10-02 RX ORDER — LABETALOL 200 MG/1
400 TABLET, FILM COATED ORAL 3 TIMES DAILY
Qty: 180 TAB | Refills: 0 | Status: SHIPPED | OUTPATIENT
Start: 2020-10-02 | End: 2021-01-23

## 2020-10-02 RX ORDER — BUMETANIDE 1 MG/1
1 TABLET ORAL DAILY
Qty: 30 TAB | Refills: 0 | Status: SHIPPED | OUTPATIENT
Start: 2020-10-02 | End: 2020-10-02

## 2020-10-02 RX ORDER — BUMETANIDE 1 MG/1
1 TABLET ORAL DAILY
Qty: 30 TAB | Refills: 0 | Status: SHIPPED | OUTPATIENT
Start: 2020-10-02 | End: 2020-11-04

## 2020-10-02 RX ORDER — IPRATROPIUM BROMIDE AND ALBUTEROL SULFATE 2.5; .5 MG/3ML; MG/3ML
3 SOLUTION RESPIRATORY (INHALATION) EVERY 6 HOURS
Qty: 120 NEBULE | Refills: 0 | Status: SHIPPED | OUTPATIENT
Start: 2020-10-02

## 2020-10-02 RX ORDER — ISOSORBIDE DINITRATE 40 MG/1
40 TABLET ORAL 3 TIMES DAILY
Qty: 90 TAB | Refills: 0 | Status: SHIPPED | OUTPATIENT
Start: 2020-10-02 | End: 2021-01-23

## 2020-10-02 RX ORDER — NIFEDIPINE 60 MG/1
60 TABLET, EXTENDED RELEASE ORAL 2 TIMES DAILY
Qty: 60 TAB | Refills: 0 | Status: SHIPPED | OUTPATIENT
Start: 2020-10-02 | End: 2020-10-02

## 2020-10-02 RX ORDER — ISOSORBIDE DINITRATE 40 MG/1
40 TABLET ORAL 3 TIMES DAILY
Qty: 90 TAB | Refills: 0 | Status: SHIPPED | OUTPATIENT
Start: 2020-10-02 | End: 2020-10-02

## 2020-10-02 RX ORDER — LABETALOL 200 MG/1
400 TABLET, FILM COATED ORAL 3 TIMES DAILY
Qty: 180 TAB | Refills: 0 | Status: SHIPPED | OUTPATIENT
Start: 2020-10-02 | End: 2020-10-02

## 2020-10-02 RX ADMIN — NIFEDIPINE 60 MG: 60 TABLET, EXTENDED RELEASE ORAL at 09:50

## 2020-10-02 RX ADMIN — Medication 400 MG: at 09:51

## 2020-10-02 RX ADMIN — ASPIRIN 81 MG: 81 TABLET, COATED ORAL at 09:51

## 2020-10-02 RX ADMIN — IPRATROPIUM BROMIDE AND ALBUTEROL SULFATE 3 ML: .5; 3 SOLUTION RESPIRATORY (INHALATION) at 08:35

## 2020-10-02 RX ADMIN — SERTRALINE HYDROCHLORIDE 25 MG: 50 TABLET ORAL at 09:51

## 2020-10-02 RX ADMIN — HYDRALAZINE HYDROCHLORIDE 100 MG: 50 TABLET, FILM COATED ORAL at 00:16

## 2020-10-02 RX ADMIN — IPRATROPIUM BROMIDE AND ALBUTEROL SULFATE 3 ML: .5; 3 SOLUTION RESPIRATORY (INHALATION) at 01:37

## 2020-10-02 RX ADMIN — Medication 10 ML: at 05:48

## 2020-10-02 RX ADMIN — LEVETIRACETAM 500 MG: 500 TABLET ORAL at 00:16

## 2020-10-02 RX ADMIN — CARBAMAZEPINE 200 MG: 200 TABLET ORAL at 09:51

## 2020-10-02 RX ADMIN — BUMETANIDE 1 MG: 1 TABLET ORAL at 09:51

## 2020-10-02 RX ADMIN — LABETALOL HYDROCHLORIDE 400 MG: 200 TABLET, FILM COATED ORAL at 09:50

## 2020-10-02 RX ADMIN — FOLIC ACID 1 MG: 1 TABLET ORAL at 09:50

## 2020-10-02 RX ADMIN — ENOXAPARIN SODIUM 40 MG: 40 INJECTION SUBCUTANEOUS at 09:51

## 2020-10-02 RX ADMIN — LEVOTHYROXINE SODIUM 25 MCG: 0.03 TABLET ORAL at 10:10

## 2020-10-02 RX ADMIN — FAMOTIDINE 20 MG: 10 INJECTION INTRAVENOUS at 09:51

## 2020-10-02 RX ADMIN — LABETALOL HYDROCHLORIDE 400 MG: 200 TABLET, FILM COATED ORAL at 00:16

## 2020-10-02 RX ADMIN — ISOSORBIDE DINITRATE 40 MG: 20 TABLET ORAL at 00:16

## 2020-10-02 RX ADMIN — ALPRAZOLAM 0.25 MG: 0.25 TABLET ORAL at 10:09

## 2020-10-02 RX ADMIN — ISOSORBIDE DINITRATE 40 MG: 20 TABLET ORAL at 09:50

## 2020-10-02 RX ADMIN — HYDRALAZINE HYDROCHLORIDE 100 MG: 50 TABLET, FILM COATED ORAL at 09:50

## 2020-10-02 RX ADMIN — FERROUS SULFATE TAB 325 MG (65 MG ELEMENTAL FE) 325 MG: 325 (65 FE) TAB at 10:09

## 2020-10-02 NOTE — DISCHARGE SUMMARY
Physician Discharge Summary     Patient ID:    Damian Garcia  [id number]  89 y.o.  1965    Admit date: 9/21/2020    Discharge date : 10/2/2020    Chronic Diagnoses:    Problem List as of 10/2/2020 Date Reviewed: 10/2/2020          Codes Class Noted - Resolved    Renal artery stenosis ([facility] ICD-10-CM: I70.1  ICD-9-CM: 440.1  9/21/2020 - Present        CHF (congestive heart failure) ([facility] ICD-10-CM: I50.9  ICD-9-CM: 428.0  10/2/2020 - Present    Overview Signed 10/2/2020  8:40 AM by Janes Avila MD     With preserved lvef, diastolic dysfunction, mitral valve regurgitation moderate.               Resistant hypertension ICD-10-CM: I10  ICD-9-CM: 401.9  9/21/2020 - Present        PEDRO PABLO (acute kidney injury) ([place] [address].) ICD-10-CM: N17.9  ICD-9-CM: 584.9  10/2/2020 - Present        Mitral valve regurgitation ICD-10-CM: I34.0  ICD-9-CM: 424.0  10/2/2020 - Present        * (Principal) Acute respiratory failure ([facility] ICD-10-CM: J96.00  ICD-9-CM: 518.81  9/21/2020 - Present        Anxiety (Chronic) ICD-10-CM: F41.9  ICD-9-CM: 300.00  10/2/2020 - Present        Seizure disorder (HCC) (Chronic) ICD-10-CM: G40.909  ICD-9-CM: 345.90  10/2/2020 - Present        COPD (chronic obstructive pulmonary disease) with emphysema (HCC) (Chronic) ICD-10-CM: J43.9  ICD-9-CM: 492.8  10/2/2020 - Present        Dysphagia ICD-10-CM: R13.10  ICD-9-CM: 787.20  10/2/2020 - Present        Iron deficiency anemia (Chronic) ICD-10-CM: D50.9  ICD-9-CM: 280.9  10/2/2020 - Present        Hypokalemia ICD-10-CM: E87.6  ICD-9-CM: 276.8  10/2/2020 - Present        Acquired hypothyroidism (Chronic) ICD-10-CM: E03.9  ICD-9-CM: 244.9  10/2/2020 - Present          22    Final Diagnoses:   Acute respiratory failure ([facility] [J96.00]   Aechf  Mitrlal valve regurgitation, pedro pablo, hypokalemia,resistant hypetension    Reason for Hospitalization:  Acute respiratory failure, aechf, possible chf exacerbation        Hospital Course:   48F, h/o COPD with acute on chronic respiratory failure on 2L NC, with shortness of breath s/t AECHFpEF , failed bipap & was intubated s/t acute hypoxic respiratory failure s.t flash pulmonary edema s.t AECHFpEF. Extubated 9/24. Followed by cardio and Pulmonology. Extubated on 9/24  Transferred out of icu 9/29. Uncontrolled BP and perhaps MV regurgitation contributed. 1. Acute on chronic respiratory failure: 2/2 aechf, extubated , weaned to ra. Resolved. On room air @ 96%. Resolved.     2.  Congestive heart failure with exacerbation acute on chronic: preserved lvef. Bilateral pleural effusions and pulmonary edema significantly improved. Volume status improved. Patient is allergic to sulfa drugs(bactrim) transitioned from iv to po bumex tolerating. 3. Mitral valve regurgitation: This has improved mid to mild to moderate following aggressive diuresis. Continue diuresis with Bumex as above. Limited symptoms at rest.  Plan on cardiology follow-up outpatient with CLAUDIA to further assess valve. 3.  Hypertension/resistant: Has been difficult to manage, medications titrated, will continue on discharge with nifedipine ER 60 mg twice daily, labetalol 400 mg 3 times daily, isosorbide dinitrate 40 mg 3 times daily, hydralazine 100 mg 3 times daily. Close outpatient follow-up, report orthostatic symptoms. Spironolactone resumed.     4.  Anxiety disorder:  Xanax 0.25 PRN(pta). Cont Sertraline(pta).      5.  Seizure disorder:  on Keppra and carbamazepine. No activity. Maintain usual OP follow up.     6.  Iron deficiency anemia: Continue patient on iron supplement(pta)     7.  Cam improving/stable. Possible helen(renal art duplex 9/24 suggested possible helen)  and will follow op with cardiology and considering cta to assess further pending cr trend. PCP follow up for bmp next week. 8. Hypokalemia: repleted, continue po supplements (continue 40 meq daily) and close op follow up with repeat bmp 3-5 days.  Spironolactone resumed. 9. COPD/emphysema: low suspicion for exacerbation. Not on maintenance inhalers at home. Needs op follow up. Will continue pta prednisone 5mg daily as well as scheduled duonebs q6.     10. Dysphagia, followed by slp, remains on restricted diet, chopped + ntl, st with [facility]. Aspration precautions. TTE: 9/26/20  · LV: Estimated LVEF is 55 - 60%. Normal cavity size, systolic function (ejection fraction normal) and diastolic function. Mild concentric hypertrophy. Wall motion: normal.  · MV: Mild mitral valve regurgitation is present. · TV: Mild tricuspid valve regurgitation is present. · Pericardium: Small circumferential pericardial effusion. There is a large left pleural effusion. Discharge Medications:   Current Discharge Medication List      START taking these medications    Details   albuterol-ipratropium (DUO-NEB) 2.5 mg-0.5 mg/3 ml nebu 3 mL by Nebulization route every six (6) hours. Qty: 120 Nebule, Refills: 0      bumetanide (BUMEX) 1 mg tablet Take 1 Tab by mouth daily. Qty: 30 Tab, Refills: 0         CONTINUE these medications which have CHANGED    Details   isosorbide dinitrate (ISORDIL) 40 mg tablet Take 1 Tab by mouth three (3) times daily. Qty: 90 Tab, Refills: 0      labetaloL (NORMODYNE) 200 mg tablet Take 2 Tabs by mouth three (3) times daily. Qty: 180 Tab, Refills: 0      NIFEdipine ER (PROCARDIA XL) 60 mg ER tablet Take 1 Tab by mouth two (2) times a day. Qty: 60 Tab, Refills: 0         CONTINUE these medications which have NOT CHANGED    Details   ALPRAZolam (XANAX) 0.25 mg tablet Take 0.25 mg by mouth three (3) times daily as needed for Anxiety. predniSONE (DELTASONE) 5 mg tablet Take 5 mg by mouth daily. sertraline (Zoloft) 25 mg tablet Take 25 mg by mouth daily. hydrALAZINE (APRESOLINE) 50 mg tablet Take 100 mg by mouth three (3) times daily. levothyroxine (SYNTHROID) 25 mcg tablet Take 25 mcg by mouth Daily (before breakfast). levETIRAcetam (Keppra) 500 mg tablet Take 500 mg by mouth two (2) times a day. carBAMazepine, mood stabiliz, 200 mg CM12 Take 200 mg by mouth two (2) times a day. ferrous sulfate 325 mg (65 mg iron) tablet Take 325 mg by mouth Daily (before breakfast). folic acid (FOLVITE) 1 mg tablet Take 1 mg by mouth daily. spironolactone (ALDACTONE) 25 mg tablet Take 100 mg by mouth daily. aspirin delayed-release 81 mg tablet Take  by mouth daily. magnesium oxide (MAG-OX) 400 mg tablet Take 400 mg by mouth daily. potassium chloride (K-DUR, KLOR-CON) 20 mEq tablet Take 20 mEq by mouth two (2) times a day. STOP taking these medications       ethacrynic acid (EDECRIN) 25 mg tablet Comments:   Reason for Stopping:         metoprolol succinate (TOPROL-XL) 100 mg tablet Comments:   Reason for Stopping: Follow up Care:    1. Martell Cherry in 3-5 deays, repeat bmp, assess bp, renal fxn electrolytes, bp rx titration + assess apprpriate kcl supplement. 2. Cardiology Dr Carlos Newman in 1 week. 3. Pulmonology Dr Michael Toribio 1-2 weeks  4. Nephrology Dr Preston Georges, call for appt 1-2 weeks    Diet:  Cardiac Diet. SLP diet recs: Rec cont chopped/NTL w/ free water protocol b/w meals following oral care. Cont swallow strengthening exercises. SLP to follow    Destination:  Home and C, PT/OT/ST. Acitvity:.RW for ambulation, HHPT    Advanced Directive:   FULL X   DNR      Discharge Exam:  General appearance: A+Ox3,  chronically ill-appearing, nad  Head: Normocephalic, without obvious abnormality, atraumatic  Eyes: conjunctivae/corneas clear. EOMi  Throat: moist mucosa, poor dentition. Neck: supple, symmetrical, trachea midline, no adenopathy,   Lungs: scattered rhonchi, fair ae, non laboredHeart: regular rate and rhythm, S1, S2 normal, no murmur, click, rub or gallop  Abdomen: soft, non-tender.  Bowel sounds normal. No masses,  no organomegaly  Extremities: no edema distally, from x 4  Skin: Skin color, texture, turgor normal. No rashes or lesions  Lymph nodes: Cervical, supraclavicular, and axillary nodes normal.  Neurologic: Grossly normal, following commands, calm       CONSULTATIONS: Cardiology and Pulmonary/Intensive care    Significant Diagnostic Studies:   9/21/2020: BUN 21 mg/dL* (Ref range: 6 - 20 mg/dL); Calcium 9.8 mg/dL (Ref range: 8.5 - 10.1 mg/dL); CO2 27 mmol/L (Ref range: 21 - 32 mmol/L); Creatinine 1.15 mg/dL* (Ref range: 0.55 - 1.02 mg/dL); Glucose 110 mg/dL* (Ref range: 65 - 100 mg/dL); HCT 30.9 %* (Ref range: 35.0 - 47.0 %); HGB 9.9 %* (Ref range: 11.5 - 16.0 %); Potassium 4.5 mmol/L (Ref range: 3.5 - 5.1 mmol/L); Sodium 135 mmol/L* (Ref range: 136 - 145 mmol/L)  9/22/2020: BUN 21 mg/dL* (Ref range: 6 - 20 mg/dL); Calcium 8.5 mg/dL (Ref range: 8.5 - 10.1 mg/dL); CO2 22 mmol/L (Ref range: 21 - 32 mmol/L); Creatinine 0.92 mg/dL (Ref range: 0.55 - 1.02 mg/dL); Glucose 101 mg/dL* (Ref range: 65 - 100 mg/dL); HCT 28.8 %* (Ref range: 35.0 - 47.0 %); HGB 9.4 %* (Ref range: 11.5 - 16.0 %); Potassium 3.7 mmol/L (Ref range: 3.5 - 5.1 mmol/L); Sodium 139 mmol/L (Ref range: 136 - 145 mmol/L)  Recent Labs     09/30/20  0506   WBC 10.7   HGB 9.8*   HCT 29.5*   *     Recent Labs     10/01/20  0400 09/30/20  0506   * 136   K 4.1 3.0*   CL 96* 95*   CO2 32 34*   BUN 27* 20   CREA 1.84* 1.30*   GLU 94 100   CA 9.6 10.0   MG  --  2.1     No results for input(s): ALT, AP, TBIL, TBILI, TP, ALB, GLOB, GGT, AML, LPSE in the last 72 hours. No lab exists for component: SGOT, GPT, AMYP, HLPSE  No results for input(s): INR, PTP, APTT, INREXT in the last 72 hours. No results for input(s): FE, TIBC, PSAT, FERR in the last 72 hours. No results for input(s): PH, PCO2, PO2 in the last 72 hours. No results for input(s): CPK, CKMB in the last 72 hours.     No lab exists for component: TROPONINI  No results found for: GLUCPOC        Signed:  Juan F Oneill MD  10/2/2020  8:08 AM    Time spent on dc process 50 minutes

## 2020-10-02 NOTE — PROGRESS NOTES
[address] [facility] CARDIOLOGY  CARDIOLOGY PROGRESS NOTE     Patient seen and examined. This is a patient who is followed for acute on chronic heart failure. Seen resting, sitting up in bed. Breathing is stable. Plans for discharge today. Does not report any shortness of breath or cough. No chest pain. No other complaints reported. Telemetry reviewed, there were no events noted in the past 24 hours. Remains in normal sinus rhythm to occasional sinus tachycardia. Pertinent review of systems items noted above, all other systems are negative. Current medications reviewed. Physical Examination  Vital signs are stable. Blood pressure 134/75, Pulse 97  No apparent distress resting comfortably in bed. Heart is regular, rate and rhythm. Normal S1, S2, no murmurs are appreciated. Lungs are significantly diminished to bases. No rales, rhonci, wheezing. Abdomen is soft, nontender, normal bowel sounds. Extremities have no edema. Labs reviewed. Creatinine elevated at 1.8     Discussed case with Dr. Renetta Shirley and our impression and recommendations are as follows:  1. Acute on chronic heart failure: Volume status continues to improve. Bumex transitioned from IV 1mg BID to 1mg PO daily. Will assess need for any increase as outpatient. Please document Strict I&Os. 2. Mitral valve regurgitation: This has improvement to mild to moderate following aggressive diuresis. Continue diuresis as stated above. Limited symptoms at rest. Will plan for outpaitent follow up with CLAUDIA to further assess valve. 3. Hypertension:  Remains elevated. Hydralazine increased to 100mg three times daily and  labetolol to 400mg TID yesterday. Blood pressure has improved. Continue present regimen. 4. Hypokalemia: This has resolved. 5.  PEDRO PABLO:  This is improving. Renal duplex during last admission showing possible right renal arterial stenosis. Will consider CTA pending trend or creatinine, potentially as outpatient. Please do not hesitate to call me if additional questions arise

## 2020-10-02 NOTE — PROGRESS NOTES
Paient discharged home on room air, A&Ox4, no distress noted. Patient and  were given discharge instructions and both verbalized understanding. Discharged home via wheelchair with .

## 2020-10-02 NOTE — PROGRESS NOTES
Pulmonology and Critical Care Progress Note    Subjective:     Chief Complaint:   Chief Complaint   Patient presents with    Respiratory Distress        Patient seen and examined in her room  Overnight events noted     Successfully extubated 9/24  Awake and alert  No acute distress  On room air  Answering questions appropriately      Past Medical History:   Diagnosis Date    Acquired hypothyroidism 10/2/2020    PEDRO PABLO (acute kidney injury) ([facility] 10/2/2020    Anxiety 10/2/2020    CHF (congestive heart failure) ([facility] 10/2/2020    With preserved lvef, diastolic dysfunction, mitral valve regurgitation moderate.  Chronic obstructive pulmonary disease (HCC)     Chronic respiratory failure (HCC)     [facility]) [facility])     COPD (chronic obstructive pulmonary disease) with emphysema ([facility] 10/2/2020    Dysphagia 10/2/2020    Hypertension     Hypertension     Iron deficiency anemia 10/2/2020    Mitral valve regurgitation 10/2/2020    Renal artery stenosis (HCC)     Seizure disorder ([facility])      No past surgical history on file.    Family History   Problem Relation Age of Onset    Hypertension Mother     Stroke Mother      Social History     Tobacco Use    Smoking status: Heavy Tobacco Smoker     Types: Cigarettes    Tobacco comment: former quit only 1 month ago during recent admission to the hospital   Substance Use Topics    Alcohol use: Not Currently      Current Facility-Administered Medications   Medication Dose Route Frequency Provider Last Rate Last Dose    labetaloL (NORMODYNE) tablet 400 mg  400 mg Oral TID Stefanie Herrera NP   400 mg at 10/02/20 0950    bumetanide (BUMEX) tablet 1 mg  1 mg Oral DAILY Kavita KELLY MD   1 mg at 10/02/20 0951    hydrALAZINE (APRESOLINE) tablet 100 mg  100 mg Oral TID Mark Whitt E, NP   100 mg at 10/02/20 0950    isosorbide dinitrate (ISORDIL) tablet 40 mg  40 mg Oral TID Mark Whitt E, NP   40 mg at 10/02/20 0950    NIFEdipine ER (PROCARDIA XL) tablet 60 mg  60 mg Oral BID Corey DanielNANDO   60 mg at 10/02/20 0950    labetaloL (NORMODYNE;TRANDATE) 20 mg/4 mL (5 mg/mL) injection 20 mg  20 mg IntraVENous Q6H PRN Vlad Obando DO   20 mg at 09/25/20 1259    hydrALAZINE (APRESOLINE) 20 mg/mL injection 10 mg  10 mg IntraVENous Q2H PRN Eda Aj MD   10 mg at 09/26/20 0226    levETIRAcetam (KEPPRA) 500 mg in saline (iso-osm) 100 mL IVPB (premix)  500 mg IntraVENous Q12H Carolyn Rockwell  mL/hr at 09/24/20 2328 500 mg at 09/28/20 2321    Or    levETIRAcetam (KEPPRA) tablet 500 mg  500 mg Oral Q12H Carolyn Rockwell MD   500 mg at 10/02/20 0016    famotidine (PF) (PEPCID) 20 mg in 0.9% sodium chloride 10 mL injection  20 mg IntraVENous Q12H Vlad Obando DO   20 mg at 10/02/20 0951    albuterol-ipratropium (DUO-NEB) 2.5 MG-0.5 MG/3 ML  3 mL Nebulization Q6H RT Vlad Obando DO   3 mL at 10/02/20 0835    aspirin delayed-release tablet 81 mg  81 mg Oral DAILY Sushant Kim MD   81 mg at 10/02/20 0951    magnesium oxide (MAG-OX) tablet 400 mg  400 mg Oral DAILY Sushant Kim MD   400 mg at 10/02/20 5422    ALPRAZolam (XANAX) tablet 0.25 mg  0.25 mg Oral TID PRN Sushant Kim MD   0.25 mg at 10/02/20 1009    sertraline (ZOLOFT) tablet 25 mg  25 mg Oral DAILY Sushant Kim MD   25 mg at 10/02/20 9022    levothyroxine (SYNTHROID) tablet 25 mcg  25 mcg Oral ACB Sushant Kim MD   25 mcg at 10/02/20 1010    carBAMazepine (TEGretol) tablet 200 mg  200 mg Oral BID Sushant Kim MD   200 mg at 10/02/20 4862    ferrous sulfate tablet 325 mg  325 mg Oral ACB Sushant Kim MD   325 mg at 51/23/38 9446    folic acid (FOLVITE) tablet 1 mg  1 mg Oral DAILY Sushant Kim MD   1 mg at 10/02/20 0950    sodium chloride (NS) flush 5-40 mL  5-40 mL IntraVENous Q8H Sushant Kim MD   10 mL at 10/02/20 0548    sodium chloride (NS) flush 5-40 mL  5-40 mL IntraVENous PRN Sushant Kim MD   10 mL at 09/29/20 5732  acetaminophen (TYLENOL) tablet 650 mg  650 mg Oral Q6H PRN Garfield Nash MD   650 mg at 09/30/20 9139    Or    acetaminophen (TYLENOL) suppository 650 mg  650 mg Rectal Q6H PRN Garfield Nash MD        polyethylene glycol [facility]) packet 17 g  17 g Oral DAILY PRN Garfield Nash MD   17 g at 09/28/20 1830    enoxaparin (LOVENOX) injection 40 mg  40 mg SubCUTAneous DAILY Garfield Nash MD   40 mg at 10/02/20 0951    ondansetron (ZOFRAN) injection 4 mg  4 mg IntraVENous Q6H PRN Garfield Nash MD   4 mg at 09/27/20 1320        Home Meds:  Prior to Admission medications    Medication Sig Start Date End Date Taking?  Authorizing Provider   ALPRAZolam Hanny Rivera) 0.25 mg tablet Take 0.25 mg by mouth three (3) times daily as needed for Anxiety.     Yes Provider, Historical   ethacrynic acid (EDECRIN) 25 mg tablet Take 50 mg by mouth two (2) times a day.     Yes Provider, Historical   predniSONE (DELTASONE) 5 mg tablet Take 5 mg by mouth daily.     Yes Provider, Historical   sertraline (Zoloft) 25 mg tablet Take 25 mg by mouth daily.     Yes Provider, Historical   hydrALAZINE (APRESOLINE) 50 mg tablet Take 100 mg by mouth three (3) times daily.     Yes Provider, Historical   levothyroxine (SYNTHROID) 25 mcg tablet Take 25 mcg by mouth Daily (before breakfast).     Yes Provider, Historical   levETIRAcetam (Keppra) 500 mg tablet Take 500 mg by mouth two (2) times a day.     Yes Provider, Historical   carBAMazepine, mood stabiliz, 200 mg CM12 Take 200 mg by mouth two (2) times a day.     Yes Provider, Historical   ferrous sulfate 325 mg (65 mg iron) tablet Take 325 mg by mouth Daily (before breakfast).     Yes Provider, Historical   folic acid (FOLVITE) 1 mg tablet Take 1 mg by mouth daily.     Yes Provider, Historical   spironolactone (ALDACTONE) 25 mg tablet Take 100 mg by mouth daily.     Yes Provider, Historical   metoprolol succinate (TOPROL-XL) 100 mg tablet Take 150 mg by mouth daily.       Provider, Historical NIFEdipine ER (PROCARDIA XL) 60 mg ER tablet Take 60 mg by mouth daily.       Provider, Historical   aspirin delayed-release 81 mg tablet Take  by mouth daily.       Provider, Historical   isosorbide dinitrate (ISORDIL) 20 mg tablet Take 40 mg by mouth two (2) times a day.       Provider, Historical   labetaloL (NORMODYNE) 200 mg tablet Take  by mouth two (2) times a day.       Provider, Historical   magnesium oxide (MAG-OX) 400 mg tablet Take 400 mg by mouth daily.       Provider, Historical   potassium chloride (K-DUR, KLOR-CON) 20 mEq tablet Take 20 mEq by mouth two (2) times a day.       Provider, Historical         Allergies   Allergen Reactions    Sulfa (Sulfonamide Antibiotics) Anaphylaxis       Review of Systems:  Review of systems not obtained due to patient factors. Objective:     Blood pressure 134/75, pulse 97, temperature 97.9 °F (36.6 °C), resp. rate 18, height 5' 5\" (1.651 m), weight 46.2 kg (101 lb 13.6 oz), SpO2 97 %. Temp (24hrs), Av.4 °F (36.9 °C), Min:97.9 °F (36.6 °C), Max:99.2 °F (37.3 °C)      Intake and Output:  Current Shift: No intake/output data recorded. Last 3 Shifts: 1901 - 10/02 0700  In: 640 [P.O.:640]  Out: 1400 [Urine:1400]    Physical Exam:   General appearance: Awake and alert, on room air, no acute distress  Head: Normocephalic, without obvious abnormality, atraumatic  Eyes: conjunctivae/corneas clear. PERRL, EOM's intact. Throat: Lips, mucosa, and tongue normal. Poor dentition. Neck: supple, symmetrical, trachea midline, no adenopathy, thyroid: not enlarged, symmetric, no tenderness/mass/nodules and no carotid bruit  Lungs: Reduced air entry bilaterally with prolonged exhalation. Occasional crackles. No wheezing. Heart: regular rate and rhythm, S1, S2 normal, no murmur, click, rub or gallop  Abdomen: soft, non-tender.  Bowel sounds normal. No masses,  no organomegaly  Extremities: edema +1 bilaterally, otherwise atraumatic  Skin: Skin color, texture, turgor normal. No rashes or lesions  Lymph nodes: Cervical, supraclavicular, and axillary nodes normal.  Neurologic: Grossly normal, following commands, calm    Additional comments:None    Lab/Data Review: All lab results for the last 24 hours reviewed. No results found for this or any previous visit (from the past 24 hour(s)). Chest X-Ray:   XR CHEST PORT   Final Result   IMPRESSION:   Bilateral pleural effusion with probable associated left basilar atelectasis. No   significant interval change. XR CHEST PORT   Final Result   Impression: No significant interval change. XR CHEST PORT   Final Result   Impression: Diminished airspace disease. XR SWALLOW FUNC VIDEO   Final Result   Impression: Laryngeal penetration with thin, nectar thick, honey thick, and   pudding consistency. No aspiration. Please see recommendation by speech   pathology. Dose: Kerma= 13 mGy. Fluoroscopy time 1 minute and 43 seconds      XR CHEST PORT   Final Result   IMPRESSION:   1. Overall findings are supportive of pulmonary edema with bilateral pleural   effusions. The perihilar opacities appear slightly progressed on the right from   one day prior. XR CHEST PORT   Final Result   IMPRESSION: Endotracheal tube and gastric tube removed. Bilateral pleural   effusions. Improved aeration of the lower lung zones. Mild prominence of the   interstitium. XR CHEST PORT   Final Result   IMPRESSION: Persistent bibasilar lung findings previously described as CHF and   probably with effusion complicating visualization of the right lung base. Continued surveillance recommended      XR CHEST PORT   Final Result   IMPRESSION: Improving congestive heart failure pattern. XR CHEST SNGL V   Final Result   Findings/impression:      Moderate bilateral pleural effusion present.  There is extensive interstitial and   airspace disease seen throughout both lungs, including dense airspace disease   involving left lower lobe with air bronchograms. No interval improvement. No   pneumothorax. Endotracheal tube 5 cm above pilo. Nasogastric tube below   diaphragm. No suspicious osseous lesions. XR CHEST SNGL V   Final Result   Impression: Congestive heart failure with interstitial and alveolar pulmonary   edema with bilateral pleural effusions. CT imaging:  CT Results  (Last 48 hours)    None          PFTs:   PFT Results  (Last 3 results in the past 10 years)    None            Assessment:     Patient is a 71-year-old  female with history of diastolic CHF, hypertension, emphysema, CVA, renal artery stenosis, mitral valve regurgitation, and chronic hypoxemic respiratory failure currently on 5 L nasal cannula at home who presented back to [facility] on 9/21/2020 with increasing respiratory distress and hypoxia. Initially placed on BiPAP given significant volume overload present on chest imaging, however she was intubated. Now extubated on 9/24/20. Plan:     1.)  Acute on chronic hypoxic respiratory failure  Secondary to acute on chronic heart failure and flash pulmonary edema. Extubated 9/24  Now on room air    2.)  Acute on chronic diastolic heart failure  Improved with diuresis  Pleural effusion improved    3.)  Accelerated hypertension-   -Blood pressure in the 200s on admission, likely leading to flash pulmonary edema. Was better controlled, now back into the 160's-180's  -Continue current regimen, but will increase her Hydralazine to 50 mg TID (from BID) today. Also, Bumex dose has been increased as above. -Appreciate assistance from Cardiology. 4.)  Emphysema  -Does not appear to be on any maintenance inhalers at home.  -She missed her follow-up appointment with us this week  -Low suspicion for acute exacerbation of COPD. -Switch back to scheduled prednisone 5 mg daily which apparently is a chronic medication.  -Agree with q6 scheduled duo nebs.     5.)  Renal artery stenosis  -Renal artery duplex from 8/24/2020 showing right renal artery stenosis.  -Likely at least contributing to her poorly controlled hypertension. Cardiology has been consulted. 6.)  Seizure disorder  -Seems to have occurred after her CVA, continue on home Keppra/carbamazepine.  -No evidence of seizure activity at this time. 7.) Anxiety    PT OT evaluation  Ambulate with support  Out of bed to chair    Will need to follow with me in OPD in 2 to 3 weeks for PFTs and oximetry    I will continue to follow along with her while she is admitted. Discussed in detail with RN, RT, and care team    CODE STATUS: Full Code    Total time spent with patient: More than 30 minutes was spent with the patient reviewing extensive labs and chest imaging, discussing in detail with the bedside nurse and documentation.       Jr Carrasquillo MD  [facility]  10/2/2020

## 2020-10-16 ENCOUNTER — HOSPITAL ENCOUNTER (EMERGENCY)
Age: 55
Discharge: HOME | End: 2020-10-16
Payer: COMMERCIAL

## 2020-10-16 ENCOUNTER — APPOINTMENT (OUTPATIENT)
Dept: GENERAL RADIOLOGY | Age: 55
End: 2020-10-16
Attending: EMERGENCY MEDICINE
Payer: COMMERCIAL

## 2020-10-16 VITALS
DIASTOLIC BLOOD PRESSURE: 69 MMHG | HEIGHT: 65 IN | SYSTOLIC BLOOD PRESSURE: 112 MMHG | TEMPERATURE: 98.1 F | RESPIRATION RATE: 16 BRPM | HEART RATE: 83 BPM | BODY MASS INDEX: 17.99 KG/M2 | OXYGEN SATURATION: 97 % | WEIGHT: 108 LBS

## 2020-10-16 DIAGNOSIS — J44.1 ACUTE EXACERBATION OF CHRONIC OBSTRUCTIVE PULMONARY DISEASE (COPD) (HCC): Primary | ICD-10-CM

## 2020-10-16 PROCEDURE — 71046 X-RAY EXAM CHEST 2 VIEWS: CPT

## 2020-10-16 PROCEDURE — 93005 ELECTROCARDIOGRAM TRACING: CPT

## 2020-10-16 PROCEDURE — 99282 EMERGENCY DEPT VISIT SF MDM: CPT

## 2020-10-16 NOTE — ED TRIAGE NOTES
Pt with hx of copd. Started with sob yesterday progressively getting worse. Using oxygen more than she had been.

## 2020-10-19 LAB
ATRIAL RATE: 83 BPM
CALCULATED P AXIS, ECG09: 32 DEGREES
CALCULATED R AXIS, ECG10: 29 DEGREES
CALCULATED T AXIS, ECG11: 57 DEGREES
DIAGNOSIS, 93000: NORMAL
P-R INTERVAL, ECG05: 108 MS
Q-T INTERVAL, ECG07: 418 MS
QRS DURATION, ECG06: 88 MS
QTC CALCULATION (BEZET), ECG08: 491 MS
VENTRICULAR RATE, ECG03: 83 BPM

## 2020-10-21 NOTE — ED PROVIDER NOTES
EMERGENCY DEPARTMENT HISTORY AND PHYSICAL EXAM      Date: (Not on file)  Patient Name: Gin Lemos      History of Presenting Illness     Chief Complaint   Patient presents with    Shortness of Breath       History Provided By: Patient    HPI: Gin Lemos, 54 y.o. female with a past medical history significant hypertension, hyperlipidemia and COPD presents to the ED with cc of breath going on for the past couple of days. No exacerbating or relieving factors she is having increased oxygen requirement at this point. She has not treated with anything more than increased oxygen. Her symptoms are mild to moderate in nature. There are no other complaints, changes, or physical findings at this time. PCP: Saleem Delong    Current Outpatient Medications   Medication Sig Dispense Refill    Oxygen 5 Devices as needed. Pt wears 5LPM as needed- states she uses it after an axiety attack      isosorbide dinitrate (ISORDIL) 40 mg tablet Take 1 Tab by mouth three (3) times daily. 90 Tab 0    albuterol-ipratropium (DUO-NEB) 2.5 mg-0.5 mg/3 ml nebu 3 mL by Nebulization route every six (6) hours. 120 Nebule 0    bumetanide (BUMEX) 1 mg tablet Take 1 Tab by mouth daily. 30 Tab 0    NIFEdipine ER (PROCARDIA XL) 60 mg ER tablet Take 1 Tab by mouth two (2) times a day. 60 Tab 0    labetaloL (NORMODYNE) 200 mg tablet Take 2 Tabs by mouth three (3) times daily. 180 Tab 0    ALPRAZolam (XANAX) 0.25 mg tablet Take 0.25 mg by mouth three (3) times daily as needed for Anxiety.  predniSONE (DELTASONE) 5 mg tablet Take 5 mg by mouth daily.  sertraline (Zoloft) 25 mg tablet Take 25 mg by mouth daily.  hydrALAZINE (APRESOLINE) 50 mg tablet Take 100 mg by mouth three (3) times daily.  levothyroxine (SYNTHROID) 25 mcg tablet Take 25 mcg by mouth Daily (before breakfast).  levETIRAcetam (Keppra) 500 mg tablet Take 500 mg by mouth two (2) times a day.       carBAMazepine, mood stabiliz, 200 mg CM12 Take 200 mg by mouth two (2) times a day.  ferrous sulfate 325 mg (65 mg iron) tablet Take 325 mg by mouth Daily (before breakfast).  folic acid (FOLVITE) 1 mg tablet Take 1 mg by mouth daily.  spironolactone (ALDACTONE) 25 mg tablet Take 100 mg by mouth daily.  aspirin delayed-release 81 mg tablet Take  by mouth daily.  magnesium oxide (MAG-OX) 400 mg tablet Take 400 mg by mouth daily.  potassium chloride (K-DUR, KLOR-CON) 20 mEq tablet Take 20 mEq by mouth two (2) times a day. Past History     Past Medical History:  Past Medical History:   Diagnosis Date    PEDRO PABLO (acute kidney injury) ([facility] 10/2/2020    Anxiety 10/2/2020    Anxiety     CHF (congestive heart failure) ([facility] 10/2/2020    With preserved lvef, diastolic dysfunction, mitral valve regurgitation moderate.  Chronic obstructive pulmonary disease (HCC)     Chronic respiratory failure (HCC)     Congestive heart failure (CHF) (HCC)     COPD (chronic obstructive pulmonary disease) with emphysema ([facility] 10/2/2020    Dysphagia 10/2/2020    GERD (gastroesophageal reflux disease)     Hypertension     Hypertension     Iron deficiency anemia 10/2/2020    Mitral valve regurgitation 10/2/2020    Psychiatric disorder     Renal artery stenosis (HCC)     Seizure disorder ([facility]     Thyroid disease        Past Surgical History:  Past Surgical History:   Procedure Laterality Date    HX ROTATOR CUFF REPAIR Right     HX TUBAL LIGATION         Family History:  Family History   Problem Relation Age of Onset    Hypertension Mother     Stroke Mother     Stroke Father        Social History:  Social History     Tobacco Use    Smoking status: Former Smoker     Types: Cigarettes    Smokeless tobacco: Never Used    Tobacco comment: quit july 2020   Substance Use Topics    Alcohol use: Not Currently    Drug use: Never       Allergies:   Allergies   Allergen Reactions    Sulfa (Sulfonamide Antibiotics) Anaphylaxis Review of Systems     Review of Systems   Constitutional: Negative. Negative for activity change, appetite change, chills, fatigue, fever and unexpected weight change. HENT: Negative. Negative for congestion, hearing loss, rhinorrhea, sneezing and voice change. Eyes: Negative. Negative for pain and visual disturbance. Respiratory: Positive for shortness of breath. Negative for apnea, cough, choking and chest tightness. Cardiovascular: Negative. Negative for chest pain and palpitations. Gastrointestinal: Negative. Negative for abdominal distention, abdominal pain, blood in stool, diarrhea, nausea and vomiting. Genitourinary: Negative. Negative for difficulty urinating, flank pain, frequency and urgency. No discharge   Musculoskeletal: Negative. Negative for arthralgias, back pain, myalgias and neck stiffness. Skin: Negative. Negative for color change and rash. Allergic/Immunologic: Negative for immunocompromised state. Neurological: Negative. Negative for dizziness, seizures, syncope, speech difficulty, weakness, numbness and headaches. Hematological: Negative for adenopathy. Psychiatric/Behavioral: Negative. Negative for agitation, behavioral problems, dysphoric mood and suicidal ideas. The patient is not nervous/anxious. Physical Exam     Physical Exam  Vitals signs and nursing note reviewed. Constitutional:       General: She is not in acute distress. Appearance: She is well-developed. HENT:      Head: Normocephalic and atraumatic. Nose: Nose normal.      Mouth/Throat:      Mouth: Mucous membranes are moist.      Pharynx: Oropharynx is clear. No oropharyngeal exudate. Eyes:      General:         Right eye: No discharge. Left eye: No discharge. Conjunctiva/sclera: Conjunctivae normal.      Pupils: Pupils are equal, round, and reactive to light. Neck:      Musculoskeletal: Normal range of motion and neck supple.    Cardiovascular: Rate and Rhythm: Normal rate and regular rhythm. Chest Wall: PMI is not displaced. No thrill. Heart sounds: Normal heart sounds. No murmur. No friction rub. No gallop. Pulmonary:      Effort: Pulmonary effort is normal. No respiratory distress. Breath sounds: Normal breath sounds. No wheezing or rales. Chest:      Chest wall: No tenderness. Abdominal:      General: Bowel sounds are normal. There is no distension. Palpations: Abdomen is soft. There is no mass. Tenderness: There is no abdominal tenderness. There is no guarding or rebound. Musculoskeletal: Normal range of motion. Lymphadenopathy:      Cervical: No cervical adenopathy. Skin:     General: Skin is warm and dry. Capillary Refill: Capillary refill takes less than 2 seconds. Findings: No erythema or rash. Neurological:      Mental Status: She is alert and oriented to person, place, and time. Cranial Nerves: No cranial nerve deficit. Coordination: Coordination normal.   Psychiatric:         Mood and Affect: Mood normal.         Behavior: Behavior normal.         Diagnostic Study Results     Labs -   No results found for this or any previous visit (from the past 12 hour(s)). Radiologic Studies -   [unfilled]  CT Results  (Last 48 hours)    None        CXR Results  (Last 48 hours)    None          Medical Decision Making and ED Course     Vital Signs-Reviewed the patient's vital signs. No data found. Records Reviewed: Nursing Notes and Old Medical Records    The patient presents with shortness of breath with a differential diagnosis of cOPD, pneumonia, Covid. Provider Notes (Medical Decision Making):   Basic labs and x-ray and reevaluate. [facility]       ED Course:     - I am the first and primary provider for this patient. - I reviewed the vital signs, available nursing notes, past medical history, past surgical history, family history and social history.   Initial assessment performed. The patients presenting problems have been discussed, and they are in agreement with the care plan formulated and outlined with them. I have encouraged them to ask questions as they arise throughout their visit. CONSULTATIONS:          Procedures         Disposition           Remove if not discharged  DISCHARGE PLAN:  1. Cannot display discharge medications since this patient is not currently admitted. 2.   Follow-up Information     Follow up With Specialties Details Why [address], [address]   Jasvir Brown [address]  [address] [place] [address]  [phone]          3. Return to ED if worse   4. Discharge Medication List as of 10/16/2020 10:16 PM          Diagnosis     Clinical Impression:   1. Acute exacerbation of chronic obstructive pulmonary disease (COPD) ([facility]        Attestations:    Mango Cramer MD    Please note that this dictation was completed with Mazoom, the computer voice recognition software. Quite often unanticipated grammatical, syntax, homophones, and other interpretive errors are inadvertently transcribed by the computer software. Please disregard these errors. Please excuse any errors that have escaped final proofreading. Thank you.

## 2020-10-23 ENCOUNTER — HOSPITAL ENCOUNTER (OUTPATIENT)
Dept: PREADMISSION TESTING | Age: 55
Discharge: HOME | End: 2020-10-23
Payer: COMMERCIAL

## 2020-10-23 LAB — SARS-COV-2, COV2: NORMAL

## 2020-10-23 PROCEDURE — 87635 SARS-COV-2 COVID-19 AMP PRB: CPT

## 2020-10-25 LAB — SARS-COV-2, COV2NT: NOT DETECTED

## 2020-10-27 ENCOUNTER — ANESTHESIA (OUTPATIENT)
Dept: NON INVASIVE DIAGNOSTICS | Age: 55
End: 2020-10-27
Payer: COMMERCIAL

## 2020-10-27 ENCOUNTER — ANESTHESIA EVENT (OUTPATIENT)
Dept: NON INVASIVE DIAGNOSTICS | Age: 55
End: 2020-10-27
Payer: COMMERCIAL

## 2020-10-27 ENCOUNTER — HOSPITAL ENCOUNTER (OUTPATIENT)
Dept: NON INVASIVE DIAGNOSTICS | Age: 55
Discharge: HOME | End: 2020-10-27
Attending: INTERNAL MEDICINE | Admitting: INTERNAL MEDICINE
Payer: COMMERCIAL

## 2020-10-27 VITALS
TEMPERATURE: 98.2 F | WEIGHT: 108 LBS | DIASTOLIC BLOOD PRESSURE: 78 MMHG | RESPIRATION RATE: 16 BRPM | BODY MASS INDEX: 17.99 KG/M2 | OXYGEN SATURATION: 96 % | SYSTOLIC BLOOD PRESSURE: 147 MMHG | HEIGHT: 65 IN | HEART RATE: 92 BPM

## 2020-10-27 DIAGNOSIS — I34.0 MITRAL VALVE INSUFFICIENCY, UNSPECIFIED ETIOLOGY: ICD-10-CM

## 2020-10-27 LAB — POTASSIUM SERPL-SCNC: 3.2 MMOL/L (ref 3.5–5.1)

## 2020-10-27 PROCEDURE — 36415 COLL VENOUS BLD VENIPUNCTURE: CPT

## 2020-10-27 PROCEDURE — 74011250636 HC RX REV CODE- 250/636: Performed by: NURSE ANESTHETIST, CERTIFIED REGISTERED

## 2020-10-27 PROCEDURE — 84132 ASSAY OF SERUM POTASSIUM: CPT

## 2020-10-27 PROCEDURE — 93325 DOPPLER ECHO COLOR FLOW MAPG: CPT

## 2020-10-27 PROCEDURE — 74011250636 HC RX REV CODE- 250/636: Performed by: INTERNAL MEDICINE

## 2020-10-27 RX ORDER — PREDNISONE 5 MG/1
5 TABLET ORAL DAILY
COMMUNITY
End: 2020-11-04

## 2020-10-27 RX ORDER — SODIUM CHLORIDE 9 MG/ML
20 INJECTION, SOLUTION INTRAVENOUS CONTINUOUS
Status: DISCONTINUED | OUTPATIENT
Start: 2020-10-27 | End: 2020-11-05 | Stop reason: HOSPADM

## 2020-10-27 RX ORDER — SODIUM CHLORIDE 9 MG/ML
INJECTION, SOLUTION INTRAVENOUS
Status: DISCONTINUED | OUTPATIENT
Start: 2020-10-27 | End: 2020-10-27 | Stop reason: HOSPADM

## 2020-10-27 RX ORDER — PROPOFOL 10 MG/ML
INJECTION, EMULSION INTRAVENOUS AS NEEDED
Status: DISCONTINUED | OUTPATIENT
Start: 2020-10-27 | End: 2020-10-27 | Stop reason: HOSPADM

## 2020-10-27 RX ORDER — NIFEDIPINE 60 MG/1
1 TABLET, EXTENDED RELEASE ORAL DAILY
COMMUNITY
End: 2021-01-23

## 2020-10-27 RX ADMIN — SODIUM CHLORIDE: 9 INJECTION, SOLUTION INTRAVENOUS at 12:03

## 2020-10-27 RX ADMIN — SODIUM CHLORIDE 20 ML/HR: 9 INJECTION, SOLUTION INTRAVENOUS at 09:56

## 2020-10-27 RX ADMIN — PROPOFOL 80 MG: 10 INJECTION, EMULSION INTRAVENOUS at 12:08

## 2020-10-27 RX ADMIN — PROPOFOL 100 MG: 10 INJECTION, EMULSION INTRAVENOUS at 12:04

## 2020-10-27 NOTE — ANESTHESIA POSTPROCEDURE EVALUATION
* No procedures listed *.    general, total IV anesthesia    Anesthesia Post Evaluation      Multimodal analgesia: multimodal analgesia not used between 6 hours prior to anesthesia start to PACU discharge  Patient location during evaluation: bedside (Endoscopy suite)  Patient participation: complete - patient cannot participate  Level of consciousness: sleepy but conscious  Pain score: 0  Pain management: adequate  Airway patency: patent  Anesthetic complications: no  Cardiovascular status: acceptable  Respiratory status: acceptable and nasal cannula  Hydration status: acceptable  Comments: This patient remained on the stretcher. The patient was handed off to the endoscopy nursing team.  All questions regarding pre-, intra-, and postoperative care were answered.   Post anesthesia nausea and vomiting:  none      INITIAL Post-op Vital signs:   Vitals Value Taken Time   /85 10/27/2020 12:12 PM   Temp 36.8 °C (98.2 °F) 10/27/2020 12:12 PM   Pulse 88 10/27/2020 12:12 PM   Resp 15 10/27/2020 12:12 PM   SpO2 99 % 10/27/2020 12:12 PM

## 2020-11-02 ENCOUNTER — HOSPITAL ENCOUNTER (OUTPATIENT)
Dept: CT IMAGING | Age: 55
Discharge: HOME | End: 2020-11-02
Attending: INTERNAL MEDICINE

## 2020-11-02 ENCOUNTER — APPOINTMENT (OUTPATIENT)
Dept: GENERAL RADIOLOGY | Age: 55
DRG: 194 | End: 2020-11-02
Attending: EMERGENCY MEDICINE
Payer: COMMERCIAL

## 2020-11-02 ENCOUNTER — HOSPITAL ENCOUNTER (INPATIENT)
Age: 55
LOS: 2 days | Discharge: HOME HEALTH | DRG: 194 | End: 2020-11-04
Attending: EMERGENCY MEDICINE | Admitting: HOSPITALIST
Payer: COMMERCIAL

## 2020-11-02 DIAGNOSIS — I50.21 ACUTE SYSTOLIC CONGESTIVE HEART FAILURE (HCC): Primary | ICD-10-CM

## 2020-11-02 DIAGNOSIS — J44.1 CHRONIC OBSTRUCTIVE PULMONARY DISEASE WITH ACUTE EXACERBATION (HCC): ICD-10-CM

## 2020-11-02 DIAGNOSIS — R09.02 HYPOXEMIA: ICD-10-CM

## 2020-11-02 DIAGNOSIS — I10 HYPERTENSION, UNSPECIFIED TYPE: ICD-10-CM

## 2020-11-02 PROBLEM — I50.9 CHF EXACERBATION (HCC): Status: ACTIVE | Noted: 2020-11-02

## 2020-11-02 LAB
ALBUMIN SERPL-MCNC: 3.4 G/DL (ref 3.5–5)
ALBUMIN/GLOB SERPL: 0.7 {RATIO} (ref 1.1–2.2)
ALP SERPL-CCNC: 100 U/L (ref 45–117)
ALT SERPL-CCNC: 10 U/L (ref 12–78)
ANION GAP SERPL CALC-SCNC: 5 MMOL/L (ref 5–15)
ARTERIAL PATENCY WRIST A: ABNORMAL
AST SERPL W P-5'-P-CCNC: 12 U/L (ref 15–37)
ATRIAL RATE: 96 BPM
BASE EXCESS BLDA CALC-SCNC: 3.9 MMOL/L (ref 0–2)
BASOPHILS # BLD: 0 K/UL (ref 0–0.1)
BASOPHILS NFR BLD: 0 % (ref 0–1)
BDY SITE: ABNORMAL
BILIRUB SERPL-MCNC: 0.3 MG/DL (ref 0.2–1)
BNP SERPL-MCNC: 7769 PG/ML
BUN SERPL-MCNC: 12 MG/DL (ref 6–20)
BUN/CREAT SERPL: 9 (ref 12–20)
CA-I BLD-MCNC: 9.6 MG/DL (ref 8.5–10.1)
CALCULATED P AXIS, ECG09: 35 DEGREES
CALCULATED R AXIS, ECG10: 44 DEGREES
CALCULATED T AXIS, ECG11: 66 DEGREES
CHLORIDE SERPL-SCNC: 100 MMOL/L (ref 97–108)
CO2 SERPL-SCNC: 31 MMOL/L (ref 21–32)
CREAT SERPL-MCNC: 1.29 MG/DL (ref 0.55–1.02)
DIAGNOSIS, 93000: NORMAL
DIFFERENTIAL METHOD BLD: ABNORMAL
EOSINOPHIL # BLD: 0.1 K/UL (ref 0–0.4)
EOSINOPHIL NFR BLD: 2 % (ref 0–7)
ERYTHROCYTE [DISTWIDTH] IN BLOOD BY AUTOMATED COUNT: 14 % (ref 11.5–14.5)
FIO2 ON VENT: 100 %
GAS FLOW.O2 O2 DELIVERY SYS: 15 L/MIN
GLOBULIN SER CALC-MCNC: 4.8 G/DL (ref 2–4)
GLUCOSE SERPL-MCNC: 89 MG/DL (ref 65–100)
HCO3 BLDA-SCNC: 28 MMOL/L (ref 22–26)
HCT VFR BLD AUTO: 29.7 % (ref 35–47)
HGB BLD-MCNC: 9.6 G/DL (ref 11.5–16)
IMM GRANULOCYTES # BLD AUTO: 0 K/UL (ref 0–0.04)
IMM GRANULOCYTES NFR BLD AUTO: 0 % (ref 0–0.5)
LACTATE SERPL-SCNC: 0.9 MMOL/L (ref 0.4–2)
LYMPHOCYTES # BLD: 1.4 K/UL (ref 0.8–3.5)
LYMPHOCYTES NFR BLD: 15 % (ref 12–49)
MCH RBC QN AUTO: 32.5 PG (ref 26–34)
MCHC RBC AUTO-ENTMCNC: 32.3 G/DL (ref 30–36.5)
MCV RBC AUTO: 100.7 FL (ref 80–99)
MONOCYTES # BLD: 0.3 K/UL (ref 0–1)
MONOCYTES NFR BLD: 3 % (ref 5–13)
NEUTS SEG # BLD: 7.3 K/UL (ref 1.8–8)
NEUTS SEG NFR BLD: 80 % (ref 32–75)
P-R INTERVAL, ECG05: 114 MS
PCO2 BLDA: 41 MMHG (ref 35–45)
PH BLDA: 7.45 [PH] (ref 7.35–7.45)
PLATELET # BLD AUTO: 365 K/UL (ref 150–400)
PMV BLD AUTO: 9.3 FL (ref 8.9–12.9)
PO2 BLDA: 81 MMHG (ref 75–100)
POTASSIUM SERPL-SCNC: 3.4 MMOL/L (ref 3.5–5.1)
PROT SERPL-MCNC: 8.2 G/DL (ref 6.4–8.2)
Q-T INTERVAL, ECG07: 386 MS
QRS DURATION, ECG06: 86 MS
QTC CALCULATION (BEZET), ECG08: 487 MS
RBC # BLD AUTO: 2.95 M/UL (ref 3.8–5.2)
SAO2 % BLD: 97 %
SAO2% DEVICE SAO2% SENSOR NAME: ABNORMAL
SODIUM SERPL-SCNC: 136 MMOL/L (ref 136–145)
TROPONIN I SERPL-MCNC: <0.05 NG/ML
VENTRICULAR RATE, ECG03: 96 BPM
WBC # BLD AUTO: 9 K/UL (ref 3.6–11)

## 2020-11-02 PROCEDURE — 96374 THER/PROPH/DIAG INJ IV PUSH: CPT

## 2020-11-02 PROCEDURE — 96375 TX/PRO/DX INJ NEW DRUG ADDON: CPT

## 2020-11-02 PROCEDURE — 93005 ELECTROCARDIOGRAM TRACING: CPT

## 2020-11-02 PROCEDURE — 99285 EMERGENCY DEPT VISIT HI MDM: CPT

## 2020-11-02 PROCEDURE — 83880 ASSAY OF NATRIURETIC PEPTIDE: CPT

## 2020-11-02 PROCEDURE — 84484 ASSAY OF TROPONIN QUANT: CPT

## 2020-11-02 PROCEDURE — 82803 BLOOD GASES ANY COMBINATION: CPT

## 2020-11-02 PROCEDURE — 65270000029 HC RM PRIVATE

## 2020-11-02 PROCEDURE — 80053 COMPREHEN METABOLIC PANEL: CPT

## 2020-11-02 PROCEDURE — 85025 COMPLETE CBC W/AUTO DIFF WBC: CPT

## 2020-11-02 PROCEDURE — 74011250637 HC RX REV CODE- 250/637: Performed by: HOSPITALIST

## 2020-11-02 PROCEDURE — 71046 X-RAY EXAM CHEST 2 VIEWS: CPT

## 2020-11-02 PROCEDURE — 36415 COLL VENOUS BLD VENIPUNCTURE: CPT

## 2020-11-02 PROCEDURE — 94640 AIRWAY INHALATION TREATMENT: CPT

## 2020-11-02 PROCEDURE — 74011250636 HC RX REV CODE- 250/636: Performed by: EMERGENCY MEDICINE

## 2020-11-02 PROCEDURE — 74011000250 HC RX REV CODE- 250: Performed by: HOSPITALIST

## 2020-11-02 PROCEDURE — 74011250637 HC RX REV CODE- 250/637: Performed by: EMERGENCY MEDICINE

## 2020-11-02 PROCEDURE — 74011250636 HC RX REV CODE- 250/636: Performed by: HOSPITALIST

## 2020-11-02 PROCEDURE — 83605 ASSAY OF LACTIC ACID: CPT

## 2020-11-02 RX ORDER — ACETAMINOPHEN 325 MG/1
650 TABLET ORAL
Status: DISCONTINUED | OUTPATIENT
Start: 2020-11-02 | End: 2020-11-04 | Stop reason: HOSPADM

## 2020-11-02 RX ORDER — PROMETHAZINE HYDROCHLORIDE 25 MG/1
12.5 TABLET ORAL
Status: DISCONTINUED | OUTPATIENT
Start: 2020-11-02 | End: 2020-11-04 | Stop reason: HOSPADM

## 2020-11-02 RX ORDER — FUROSEMIDE 10 MG/ML
60 INJECTION INTRAMUSCULAR; INTRAVENOUS ONCE
Status: COMPLETED | OUTPATIENT
Start: 2020-11-02 | End: 2020-11-02

## 2020-11-02 RX ORDER — LANOLIN ALCOHOL/MO/W.PET/CERES
400 CREAM (GRAM) TOPICAL DAILY
Status: DISCONTINUED | OUTPATIENT
Start: 2020-11-03 | End: 2020-11-04 | Stop reason: HOSPADM

## 2020-11-02 RX ORDER — IPRATROPIUM BROMIDE AND ALBUTEROL SULFATE 2.5; .5 MG/3ML; MG/3ML
3 SOLUTION RESPIRATORY (INHALATION) EVERY 6 HOURS
Status: DISCONTINUED | OUTPATIENT
Start: 2020-11-02 | End: 2020-11-02

## 2020-11-02 RX ORDER — POLYETHYLENE GLYCOL 3350 17 G/17G
17 POWDER, FOR SOLUTION ORAL DAILY PRN
Status: DISCONTINUED | OUTPATIENT
Start: 2020-11-02 | End: 2020-11-04 | Stop reason: HOSPADM

## 2020-11-02 RX ORDER — ISOSORBIDE DINITRATE 10 MG/1
40 TABLET ORAL 3 TIMES DAILY
Status: DISCONTINUED | OUTPATIENT
Start: 2020-11-02 | End: 2020-11-04 | Stop reason: HOSPADM

## 2020-11-02 RX ORDER — LANOLIN ALCOHOL/MO/W.PET/CERES
325 CREAM (GRAM) TOPICAL
Status: DISCONTINUED | OUTPATIENT
Start: 2020-11-03 | End: 2020-11-04 | Stop reason: HOSPADM

## 2020-11-02 RX ORDER — LEVOTHYROXINE SODIUM 25 UG/1
25 TABLET ORAL
Status: DISCONTINUED | OUTPATIENT
Start: 2020-11-03 | End: 2020-11-04 | Stop reason: HOSPADM

## 2020-11-02 RX ORDER — POTASSIUM CHLORIDE 20 MEQ/1
20 TABLET, EXTENDED RELEASE ORAL DAILY
Status: DISCONTINUED | OUTPATIENT
Start: 2020-11-03 | End: 2020-11-04 | Stop reason: HOSPADM

## 2020-11-02 RX ORDER — FOLIC ACID 1 MG/1
1 TABLET ORAL DAILY
Status: DISCONTINUED | OUTPATIENT
Start: 2020-11-03 | End: 2020-11-04 | Stop reason: HOSPADM

## 2020-11-02 RX ORDER — LABETALOL 200 MG/1
400 TABLET, FILM COATED ORAL 3 TIMES DAILY
Status: DISCONTINUED | OUTPATIENT
Start: 2020-11-02 | End: 2020-11-04 | Stop reason: HOSPADM

## 2020-11-02 RX ORDER — ACETAMINOPHEN 650 MG/1
650 SUPPOSITORY RECTAL
Status: DISCONTINUED | OUTPATIENT
Start: 2020-11-02 | End: 2020-11-04 | Stop reason: HOSPADM

## 2020-11-02 RX ORDER — SERTRALINE HYDROCHLORIDE 50 MG/1
25 TABLET, FILM COATED ORAL DAILY
Status: DISCONTINUED | OUTPATIENT
Start: 2020-11-03 | End: 2020-11-04 | Stop reason: HOSPADM

## 2020-11-02 RX ORDER — FAMOTIDINE 20 MG/1
20 TABLET, FILM COATED ORAL 2 TIMES DAILY
Status: DISCONTINUED | OUTPATIENT
Start: 2020-11-02 | End: 2020-11-04 | Stop reason: HOSPADM

## 2020-11-02 RX ORDER — CARBAMAZEPINE 100 MG/1
100 TABLET, CHEWABLE ORAL 4 TIMES DAILY
Status: DISCONTINUED | OUTPATIENT
Start: 2020-11-02 | End: 2020-11-04 | Stop reason: HOSPADM

## 2020-11-02 RX ORDER — SODIUM CHLORIDE 0.9 % (FLUSH) 0.9 %
5-40 SYRINGE (ML) INJECTION EVERY 8 HOURS
Status: DISCONTINUED | OUTPATIENT
Start: 2020-11-02 | End: 2020-11-04 | Stop reason: HOSPADM

## 2020-11-02 RX ORDER — NIFEDIPINE 60 MG/1
60 TABLET, EXTENDED RELEASE ORAL DAILY
Status: DISCONTINUED | OUTPATIENT
Start: 2020-11-03 | End: 2020-11-04 | Stop reason: HOSPADM

## 2020-11-02 RX ORDER — ASPIRIN 81 MG/1
81 TABLET ORAL DAILY
Status: DISCONTINUED | OUTPATIENT
Start: 2020-11-03 | End: 2020-11-04 | Stop reason: HOSPADM

## 2020-11-02 RX ORDER — FUROSEMIDE 10 MG/ML
40 INJECTION INTRAMUSCULAR; INTRAVENOUS EVERY 12 HOURS
Status: DISCONTINUED | OUTPATIENT
Start: 2020-11-02 | End: 2020-11-04

## 2020-11-02 RX ORDER — ENOXAPARIN SODIUM 100 MG/ML
40 INJECTION SUBCUTANEOUS DAILY
Status: DISCONTINUED | OUTPATIENT
Start: 2020-11-03 | End: 2020-11-04 | Stop reason: HOSPADM

## 2020-11-02 RX ORDER — LEVETIRACETAM 500 MG/1
500 TABLET ORAL 2 TIMES DAILY
Status: DISCONTINUED | OUTPATIENT
Start: 2020-11-02 | End: 2020-11-04 | Stop reason: HOSPADM

## 2020-11-02 RX ORDER — IPRATROPIUM BROMIDE AND ALBUTEROL SULFATE 2.5; .5 MG/3ML; MG/3ML
3 SOLUTION RESPIRATORY (INHALATION)
Status: DISCONTINUED | OUTPATIENT
Start: 2020-11-02 | End: 2020-11-04 | Stop reason: HOSPADM

## 2020-11-02 RX ORDER — ONDANSETRON 2 MG/ML
4 INJECTION INTRAMUSCULAR; INTRAVENOUS
Status: DISCONTINUED | OUTPATIENT
Start: 2020-11-02 | End: 2020-11-04 | Stop reason: HOSPADM

## 2020-11-02 RX ORDER — HYDRALAZINE HYDROCHLORIDE 50 MG/1
100 TABLET, FILM COATED ORAL 3 TIMES DAILY
Status: DISCONTINUED | OUTPATIENT
Start: 2020-11-02 | End: 2020-11-04 | Stop reason: HOSPADM

## 2020-11-02 RX ORDER — ONDANSETRON 2 MG/ML
4 INJECTION INTRAMUSCULAR; INTRAVENOUS
Status: COMPLETED | OUTPATIENT
Start: 2020-11-02 | End: 2020-11-02

## 2020-11-02 RX ORDER — MORPHINE SULFATE 2 MG/ML
2 INJECTION, SOLUTION INTRAMUSCULAR; INTRAVENOUS ONCE
Status: COMPLETED | OUTPATIENT
Start: 2020-11-02 | End: 2020-11-02

## 2020-11-02 RX ORDER — ALPRAZOLAM 0.5 MG/1
0.25 TABLET ORAL
Status: DISCONTINUED | OUTPATIENT
Start: 2020-11-02 | End: 2020-11-04 | Stop reason: HOSPADM

## 2020-11-02 RX ORDER — SODIUM CHLORIDE 0.9 % (FLUSH) 0.9 %
5-40 SYRINGE (ML) INJECTION AS NEEDED
Status: DISCONTINUED | OUTPATIENT
Start: 2020-11-02 | End: 2020-11-04 | Stop reason: HOSPADM

## 2020-11-02 RX ADMIN — ISOSORBIDE DINITRATE 40 MG: 10 TABLET ORAL at 22:00

## 2020-11-02 RX ADMIN — FUROSEMIDE 60 MG: 10 INJECTION, SOLUTION INTRAMUSCULAR; INTRAVENOUS at 17:04

## 2020-11-02 RX ADMIN — METHYLPREDNISOLONE SODIUM SUCCINATE 125 MG: 125 INJECTION, POWDER, FOR SOLUTION INTRAMUSCULAR; INTRAVENOUS at 17:03

## 2020-11-02 RX ADMIN — FAMOTIDINE 20 MG: 20 TABLET ORAL at 22:18

## 2020-11-02 RX ADMIN — IPRATROPIUM BROMIDE AND ALBUTEROL SULFATE 3 ML: .5; 3 SOLUTION RESPIRATORY (INHALATION) at 19:20

## 2020-11-02 RX ADMIN — HYDRALAZINE HYDROCHLORIDE 100 MG: 50 TABLET, FILM COATED ORAL at 22:17

## 2020-11-02 RX ADMIN — ALPRAZOLAM 0.25 MG: 0.5 TABLET ORAL at 19:58

## 2020-11-02 RX ADMIN — FUROSEMIDE 40 MG: 10 INJECTION, SOLUTION INTRAMUSCULAR; INTRAVENOUS at 22:14

## 2020-11-02 RX ADMIN — MORPHINE SULFATE 2 MG: 2 INJECTION, SOLUTION INTRAMUSCULAR; INTRAVENOUS at 17:03

## 2020-11-02 RX ADMIN — Medication 10 ML: at 17:48

## 2020-11-02 RX ADMIN — LEVETIRACETAM 500 MG: 500 TABLET ORAL at 22:22

## 2020-11-02 RX ADMIN — NITROGLYCERIN 1 INCH: 20 OINTMENT TOPICAL at 17:03

## 2020-11-02 RX ADMIN — LABETALOL HYDROCHLORIDE 400 MG: 200 TABLET, FILM COATED ORAL at 22:00

## 2020-11-02 RX ADMIN — ONDANSETRON 4 MG: 2 INJECTION INTRAMUSCULAR; INTRAVENOUS at 17:17

## 2020-11-02 RX ADMIN — Medication 10 ML: at 22:00

## 2020-11-02 RX ADMIN — CARBAMAZEPINE 100 MG: 100 TABLET, CHEWABLE ORAL at 22:16

## 2020-11-02 RX ADMIN — ISOSORBIDE DINITRATE 40 MG: 10 TABLET ORAL at 20:40

## 2020-11-02 NOTE — H&P
History and Physical    Patient: Elton Vásquez MRN: [id number]  SSN: [id number]    YOB: 1965  Age: 54 y.o. Sex: female      Subjective:      Chief Complaint: Shortness of breath    HPI: Elton Vásquez is a 54 y.o. female who has history of congestive heart failure, mitral valve regurgitation, resistant hypertension with renal artery stenosis and seizure disorder came to the hospital with a complaint of worsening shortness of breath since last few days. Patient was checking her oxygen at home and noticed that her oxygen was dropping to 70s. She started using oxygen at home. Patient visited ER about 2 days ago and was treated and advised discharge home however patient continues to have shortness of breath. Patient states that she also ran out of her diuretics. Today patient was noticed to have oxygen saturation in the 80s upon arrival along with a cough. She denies any sputum production fever or chills. Hospitalist service was requested to admit the patient for further work-up and management. Here you go    Past Medical History:   Diagnosis Date    PEDRO PABLO (acute kidney injury) ([facility] [address] 10/2/2020    Anxiety 10/2/2020    Anxiety     CHF (congestive heart failure) ([facility] [address] 10/2/2020    With preserved lvef, diastolic dysfunction, mitral valve regurgitation moderate.      Chronic obstructive pulmonary disease (HCC)     Chronic respiratory failure (HCC)     Congestive heart failure (CHF) (HCC)     COPD (chronic obstructive pulmonary disease) with emphysema ([address] 10/2/2020    Dysphagia 10/2/2020    GERD (gastroesophageal reflux disease)     Hypertension     Hypertension     Iron deficiency anemia 10/2/2020    Mitral valve regurgitation 10/2/2020    Psychiatric disorder     Renal artery stenosis (HCC)     Seizure disorder ([facility] [address]     Thyroid disease      Past Surgical History:   Procedure Laterality Date    HX ROTATOR CUFF REPAIR Right     HX TUBAL LIGATION        Family History   Problem Relation Age of Onset    Hypertension Mother     Stroke Mother     Stroke Father      Social History     Tobacco Use    Smoking status: Former Smoker     Types: Cigarettes    Smokeless tobacco: Never Used    Tobacco comment: quit july 2020   Substance Use Topics    Alcohol use: Not Currently      Prior to Admission medications    Medication Sig Start Date End Date Taking? Authorizing Provider   NIFEdipine ER (PROCARDIA XL) 60 mg ER tablet Take 1 Tab by mouth daily. Provider, Historical   predniSONE (DELTASONE) 5 mg tablet Take 5 mg by mouth daily. Provider, Historical   Oxygen 5 Devices as needed. Pt wears 5LPM as needed- states she uses it after an axiety attack    Provider, Historical   isosorbide dinitrate (ISORDIL) 40 mg tablet Take 1 Tab by mouth three (3) times daily. 10/2/20   Tony Galeano MD   albuterol-ipratropium (DUO-NEB) 2.5 mg-0.5 mg/3 ml nebu 3 mL by Nebulization route every six (6) hours. 10/2/20   Tony Galeano MD   bumetanide (BUMEX) 1 mg tablet Take 1 Tab by mouth daily. 10/2/20   Tony Galeano MD   labetaloL (NORMODYNE) 200 mg tablet Take 2 Tabs by mouth three (3) times daily. 10/2/20   Tony Galeano MD   ALPRAZolam Astrid Fletcher) 0.25 mg tablet Take 0.25 mg by mouth three (3) times daily as needed for Anxiety. Provider, Historical   sertraline (Zoloft) 25 mg tablet Take 25 mg by mouth daily. Provider, Historical   hydrALAZINE (APRESOLINE) 50 mg tablet Take 100 mg by mouth three (3) times daily. Provider, Historical   levothyroxine (SYNTHROID) 25 mcg tablet Take 25 mcg by mouth Daily (before breakfast). Provider, Historical   levETIRAcetam (Keppra) 500 mg tablet Take 500 mg by mouth two (2) times a day. Provider, Historical   carBAMazepine, mood stabiliz, 200 mg CM12 Take 200 mg by mouth two (2) times a day. Provider, Historical   ferrous sulfate 325 mg (65 mg iron) tablet Take 325 mg by mouth Daily (before breakfast).     Provider, Historical   folic acid (FOLVITE) 1 mg tablet Take 1 mg by mouth daily. Provider, Historical   aspirin delayed-release 81 mg tablet Take  by mouth daily. Provider, Historical   magnesium oxide (MAG-OX) 400 mg tablet Take 400 mg by mouth daily. Provider, Historical        Allergies   Allergen Reactions    Sulfa (Sulfonamide Antibiotics) Anaphylaxis       Review of Systems:  Constitutional: No fevers, No chills, No fatigue, No weakness  Eyes: No visual disturbance  Ears, Nose, Mouth, Throat, and Face: No nasal congestion, No sore throat  Respiratory: ++ Positive for all cough, No sputum, No wheezing, ++++ SOB  Cardiovascular: No chest pain, No lower extremity edema, No Palpitations   Gastrointestinal: No nausea, No vomiting, No diarrhea, No constipation, No abdominal pain  Genitourinary: No frequency, No dysuria, No hematuria  Integument/Breast: No rash, No skin lesion(s), No dryness  Musculoskeletal: No arthralgias, No neck pain, No back pain  Neurological: No headaches, No dizziness, No confusion,  No seizures  Behavioral/Psychiatric: Is also very anxious patient's++ anxiety, No depression      Objective:     Vitals:    11/02/20 1523 11/02/20 1552 11/02/20 1705 11/02/20 1724   BP: (!) 183/84   (!) 182/105   Pulse: 96   (!) 102   Resp: 11   16   Temp:       SpO2: 95% 98% 98% 90%   Weight:       Height:            Physical Exam:  General: Alert and Oriented x 3. Cooperative and friendly. No acute distress. Patient is on partially related able mask at this time and is saturating 91 to 92%. HEAD: Normocephalic, atraumatic. Eye: PERRLA, EOMI. Throat and Neck: normal and no erythema or exudates noted. No mass   Lung: Clear to auscultation bilaterally without wheezes, rhonchi. Good air movement bilaterally. Heart: Regular rate and rhythm. Normal S1/S2. NO appreciated murmurs, rubs or gallops. Abdomen: soft, non-tender. Bowel sounds present in all four quadrants. No masses appreciated.   Extremities:  able to move all extremities normal, atraumatic  Skin: Clean, dry and intact without appreciated lesions. Neurologic: AOx3. Cranial nerves 2-12 and sensation grossly intact. Psychiatric: non focal, normal affect, normal thought process    CBC:   Lab Results   Component Value Date/Time    WBC 9.0 11/02/2020 03:10 PM    RBC 2.95 (L) 11/02/2020 03:10 PM    HGB 9.6 (L) 11/02/2020 03:10 PM    HCT 29.7 (L) 11/02/2020 03:10 PM    PLATELET 012 91/67/9929 03:10 PM     CMP:   Lab Results   Component Value Date/Time    Glucose 89 11/02/2020 03:10 PM    Sodium 136 11/02/2020 03:10 PM    Potassium 3.4 (L) 11/02/2020 03:10 PM    Chloride 100 11/02/2020 03:10 PM    CO2 31 11/02/2020 03:10 PM    BUN 12 11/02/2020 03:10 PM    Creatinine 1.29 (H) 11/02/2020 03:10 PM    Calcium 9.6 11/02/2020 03:10 PM    Anion gap 5 11/02/2020 03:10 PM    BUN/Creatinine ratio 9 (L) 11/02/2020 03:10 PM    Alk. phosphatase 100 11/02/2020 03:10 PM    Protein, total 8.2 11/02/2020 03:10 PM    Albumin 3.4 (L) 11/02/2020 03:10 PM    Globulin 4.8 (H) 11/02/2020 03:10 PM    A-G Ratio 0.7 (L) 11/02/2020 03:10 PM     Coagulation: No results found for: PTP, INR, APTT, PTTT, INREXT  ABGs:   Lab Results   Component Value Date/Time    pH 7.448 11/02/2020 03:30 PM    PO2 81 11/02/2020 03:30 PM    PCO2 41 11/02/2020 03:30 PM    BICARBONATE 28 (H) 11/02/2020 03:30 PM    BASE DEFICIT 1.9 09/22/2020 10:00 AM    BASE EXCESS 3.9 (H) 11/02/2020 03:30 PM     Radiology review:   XR CHEST PA LAT   Final Result   Impression: Bilateral pulmonary vascular congestion and bilateral pleural   effusions/CHF. Assessment:     Principal Problem:    CHF exacerbation ([facility] [address] (11/2/2020)    Active Problems:    Acute respiratory failure ([facility] (9/21/2020)      Resistant hypertension (9/21/2020)      Renal artery stenosis (HCC) (9/21/2020)      CHF (congestive heart failure) ([facility] (10/2/2020)      Overview: With preserved lvef, diastolic dysfunction, mitral valve regurgitation       moderate. Mitral valve regurgitation (10/2/2020)      PEDRO PABLO (acute kidney injury) ([facility] [address] (10/2/2020)      Seizure disorder ([facility] [address] (10/2/2020)      COPD (chronic obstructive pulmonary disease) with emphysema ([facility] [address] (10/2/2020)      Iron deficiency anemia (10/2/2020)      Hypokalemia (10/2/2020)      Acquired hypothyroidism (10/2/2020)       Plan:     Admit to tele  Start IV diuresis  Start neb treatments  Continue home antihypertensives  O2 by non rebreathable  Cardiology and pulmn consultation  Will replace K    Patient is a full code  Patient's  is her surrogate decision-maker  Patient's home medications were reviewed and reconciled  Further plan of management will depend upon patient's clinical course in the hospital      Signed By: Suma Garcia MD     November 2, 2020

## 2020-11-02 NOTE — ED TRIAGE NOTES
Sob per ems for several days , o2 sat 80's upon their arrival. + cough pneumonia x 2 this year dc early October

## 2020-11-02 NOTE — ED PROVIDER NOTES
EMERGENCY DEPARTMENT HISTORY AND PHYSICAL EXAM      Date: 11/2/2020  Patient Name: Aby Salazar    History of Presenting Illness     Chief Complaint   Patient presents with    Shortness of Breath       History Provided By: Patient    HPI: Aby Salazar, 54 y.o. female presents to the ED with cc of   Chief Complaint   Patient presents with    Shortness of Breath   Sob per ems for several days , o2 sat 80's upon their arrival. + cough pneumonia x 2 this year dc early October   With history of COPD and CHF had multiple admissions for the same denies any fever denies any exposure to COVID-19 continue having chest tightness and shortness of breath  Chest pain located mid sternal, moderate severity, no known exacerbating or relieving factors. Quality of pain is dull pressure with no radiation. Sometimes associated with shortness of breath and palpitations. There are no other complaints, changes, or physical findings at this time. PCP: Jackie Delgado    Current Facility-Administered Medications on File Prior to Encounter   Medication Dose Route Frequency Provider Last Rate Last Dose    0.9% sodium chloride infusion  20 mL/hr IntraVENous CONTINUOUS hCao Chambers MD 20 mL/hr at 10/27/20 0956 20 mL/hr at 10/27/20 7794     Current Outpatient Medications on File Prior to Encounter   Medication Sig Dispense Refill    NIFEdipine ER (PROCARDIA XL) 60 mg ER tablet Take 1 Tab by mouth daily.  predniSONE (DELTASONE) 5 mg tablet Take 5 mg by mouth daily.  Oxygen 5 Devices as needed. Pt wears 5LPM as needed- states she uses it after an axiety attack      isosorbide dinitrate (ISORDIL) 40 mg tablet Take 1 Tab by mouth three (3) times daily. 90 Tab 0    albuterol-ipratropium (DUO-NEB) 2.5 mg-0.5 mg/3 ml nebu 3 mL by Nebulization route every six (6) hours. 120 Nebule 0    bumetanide (BUMEX) 1 mg tablet Take 1 Tab by mouth daily.  30 Tab 0    labetaloL (NORMODYNE) 200 mg tablet Take 2 Tabs by mouth three (3) times daily. 180 Tab 0    ALPRAZolam (XANAX) 0.25 mg tablet Take 0.25 mg by mouth three (3) times daily as needed for Anxiety.  sertraline (Zoloft) 25 mg tablet Take 25 mg by mouth daily.  hydrALAZINE (APRESOLINE) 50 mg tablet Take 100 mg by mouth three (3) times daily.  levothyroxine (SYNTHROID) 25 mcg tablet Take 25 mcg by mouth Daily (before breakfast).  levETIRAcetam (Keppra) 500 mg tablet Take 500 mg by mouth two (2) times a day.  carBAMazepine, mood stabiliz, 200 mg CM12 Take 200 mg by mouth two (2) times a day.  ferrous sulfate 325 mg (65 mg iron) tablet Take 325 mg by mouth Daily (before breakfast).  folic acid (FOLVITE) 1 mg tablet Take 1 mg by mouth daily.  aspirin delayed-release 81 mg tablet Take  by mouth daily.  magnesium oxide (MAG-OX) 400 mg tablet Take 400 mg by mouth daily. Past History     Past Medical History:  Past Medical History:   Diagnosis Date    [facility]ABLO [facility] 10/2/2020    Anxiety 10/2/2020    Anxiety     CHF (congestive heart failure) ([facility] 10/2/2020    With preserved lvef, diastolic dysfunction, mitral valve regurgitation moderate.      Chronic obstructive pulmonary disease (HCC)     Chronic respiratory failure (HCC)     [facility]     COPD (chronic obstructive pulmonary disease) with emphysema ([facility] [address] 10/2/2020    Dysphagia 10/2/2020    GERD (gastroesophageal reflux disease)     Hypertension     Hypertension     Iron deficiency anemia 10/2/2020    Mitral valve regurgitation 10/2/2020    Psychiatric disorder     Renal artery stenosis (HCC)     Seizure disorder ([facility]     Thyroid disease        Past Surgical History:  Past Surgical History:   Procedure Laterality Date    HX ROTATOR CUFF REPAIR Right     HX TUBAL LIGATION         Family History:  Family History   Problem Relation Age of Onset    Hypertension Mother     Stroke Mother     Stroke Father        Social History:  Social History     Tobacco Use    Smoking status: Former Smoker     Types: Cigarettes    Smokeless tobacco: Never Used    Tobacco comment: quit july 2020   Substance Use Topics    Alcohol use: Not Currently    Drug use: Never       Allergies: Allergies   Allergen Reactions    Sulfa (Sulfonamide Antibiotics) Anaphylaxis         Review of Systems   Review of Systems   Constitutional: Negative. HENT: Negative for congestion, nosebleeds, sinus pressure and sinus pain. Eyes: Negative. Negative for photophobia. Respiratory: Positive for cough, chest tightness, shortness of breath and wheezing. Negative for apnea, choking and stridor. Cardiovascular: Positive for chest pain and palpitations. Gastrointestinal: Negative. Genitourinary: Negative. Musculoskeletal: Negative. Negative for back pain and gait problem. Skin: Negative. Allergic/Immunologic: Negative. Neurological: Negative. Negative for weakness, light-headedness and numbness. Hematological: Negative. Psychiatric/Behavioral: Negative. Physical Exam   Physical Exam  Vitals signs and nursing note reviewed. Constitutional:       Appearance: Normal appearance. HENT:      Head: Normocephalic and atraumatic. Eyes:      Extraocular Movements: Extraocular movements intact. Pupils: Pupils are equal, round, and reactive to light. Neck:      Musculoskeletal: Normal range of motion and neck supple. Cardiovascular:      Rate and Rhythm: Regular rhythm. Pulses: Normal pulses. Heart sounds: Normal heart sounds. Pulmonary:      Effort: Tachypnea and respiratory distress present. Breath sounds: Examination of the right-middle field reveals wheezing. Examination of the left-middle field reveals wheezing. Examination of the right-lower field reveals rales. Examination of the left-lower field reveals rales. Wheezing and rales present. Chest:      Chest wall: No deformity or crepitus. Abdominal:      General: Abdomen is flat. Bowel sounds are normal.      Palpations: Abdomen is soft. Musculoskeletal: Normal range of motion. Right lower leg: She exhibits no tenderness. Edema present. Left lower leg: She exhibits no tenderness. Edema present. Skin:     General: Skin is warm and dry. Neurological:      General: No focal deficit present. Mental Status: She is alert and oriented to person, place, and time. Psychiatric:         Mood and Affect: Mood is anxious. Behavior: Behavior normal.         Diagnostic Study Results     Labs -     Recent Results (from the past 12 hour(s))   CBC WITH AUTOMATED DIFF    Collection Time: 11/02/20  3:10 PM   Result Value Ref Range    WBC 9.0 3.6 - 11.0 K/uL    RBC 2.95 (L) 3.80 - 5.20 M/uL    HGB 9.6 (L) 11.5 - 16.0 g/dL    HCT 29.7 (L) 35.0 - 47.0 %    .7 (H) 80.0 - 99.0 FL    MCH 32.5 26.0 - 34.0 PG    MCHC 32.3 30.0 - 36.5 g/dL    RDW 14.0 11.5 - 14.5 %    PLATELET 930 643 - 486 K/uL    MPV 9.3 8.9 - 12.9 FL    NEUTROPHILS 80 (H) 32 - 75 %    LYMPHOCYTES 15 12 - 49 %    MONOCYTES 3 (L) 5 - 13 %    EOSINOPHILS 2 0 - 7 %    BASOPHILS 0 0 - 1 %    IMMATURE GRANULOCYTES 0 0.0 - 0.5 %    ABS. NEUTROPHILS 7.3 1.8 - 8.0 K/UL    ABS. LYMPHOCYTES 1.4 0.8 - 3.5 K/UL    ABS. MONOCYTES 0.3 0.0 - 1.0 K/UL    ABS. EOSINOPHILS 0.1 0.0 - 0.4 K/UL    ABS. BASOPHILS 0.0 0.0 - 0.1 K/UL    ABS. IMM.  GRANS. 0.0 0.00 - 0.04 K/UL    DF AUTOMATED     METABOLIC PANEL, COMPREHENSIVE    Collection Time: 11/02/20  3:10 PM   Result Value Ref Range    Sodium 136 136 - 145 mmol/L    Potassium 3.4 (L) 3.5 - 5.1 mmol/L    Chloride 100 97 - 108 mmol/L    CO2 31 21 - 32 mmol/L    Anion gap 5 5 - 15 mmol/L    Glucose 89 65 - 100 mg/dL    BUN 12 6 - 20 mg/dL    Creatinine 1.29 (H) 0.55 - 1.02 mg/dL    BUN/Creatinine ratio 9 (L) 12 - 20      GFR est AA 52 (L) >60 ml/min/1.73m2    GFR est non-AA 43 (L) >60 ml/min/1.73m2    Calcium 9.6 8.5 - 10.1 mg/dL    Bilirubin, total 0.3 0.2 - 1.0 mg/dL    AST (SGOT) 12 (L) 15 - 37 U/L    ALT (SGPT) 10 (L) 12 - 78 U/L    Alk. phosphatase 100 45 - 117 U/L    Protein, total 8.2 6.4 - 8.2 g/dL    Albumin 3.4 (L) 3.5 - 5.0 g/dL    Globulin 4.8 (H) 2.0 - 4.0 g/dL    A-G Ratio 0.7 (L) 1.1 - 2.2     TROPONIN I    Collection Time: 11/02/20  3:10 PM   Result Value Ref Range    Troponin-I, Qt. <0.05 <0.05 ng/mL   BNP    Collection Time: 11/02/20  3:10 PM   Result Value Ref Range    NT pro-BNP 7,769 (H) <125 pg/mL   LACTIC ACID    Collection Time: 11/02/20  3:10 PM   Result Value Ref Range    Lactic acid 0.9 0.4 - 2.0 mmol/L   BLOOD GAS, ARTERIAL    Collection Time: 11/02/20  3:30 PM   Result Value Ref Range    pH 7.448 7.35 - 7.45      PCO2 41 35 - 45 mmHg    PO2 81 75 - 100 mmHg    O2 SAT 97 >95 %    BICARBONATE 28 (H) 22 - 26 mmol/L    BASE EXCESS 3.9 (H) 0 - 2 mmol/L    O2 METHOD NRB mask      O2 FLOW RATE 15 L/min    FIO2 100.0 %    SITE Right Radial      ANGEL'S TEST PASS         Labs reviewed by me    Radiologic Studies -   XR CHEST PA LAT   Final Result   Impression: Bilateral pulmonary vascular congestion and bilateral pleural   effusions/CHF. CT Results  (Last 48 hours)    None        CXR Results  (Last 48 hours)               11/02/20 1524  XR CHEST PA LAT Final result    Impression:  Impression: Bilateral pulmonary vascular congestion and bilateral pleural   effusions/CHF. Narrative:  2 views chest:       History: SOB       COMPARISON: Chest 10/16/2020       There is congested pulmonary vasculature. There is density of both lung bases   obscuring the diaphragm shadows and heart borders. .                    Medical Decision Making     I am the first provider for this patient. I reviewed the vital signs, available nursing notes, past medical history, past surgical history, family history and social history. RADIOLOGY report and LABS reviewed by me    Vital Signs-Reviewed the patient's vital signs.   Patient Vitals for the past 12 hrs:   Temp Pulse Resp BP SpO2   11/02/20 1552     98 %   11/02/20 1523  96 11 (!) 183/84 95 %   11/02/20 1433 97.9 °F (36.6 °C) (!) 103 24 (!) 186/96 92 %       EKG interpretation: (Preliminary)  EKG done at 1451 shows normal sinus rhythm with normal axis, ventricular rate of 96, no ectopy and nonspecific ST changes          Records Reviewed: Nurse's note. Provider Notes (Medical Decision Making):    Patient presents with DIFF DX : Congestive heart failure, COPD exacerbation, hypoxemia, pneumonia, acute coronary syndrome        ED Course:   Initial assessment performed. The patients presenting problems have been discussed, and they are in agreement with the care plan formulated and outlined with them. I have encouraged them to ask questions as they arise throughout their visit. CRITICAL CARE NOTE :  4:49 PM  Amount of Critical Care Time: 30____(minutes)__    IMPENDING DETERIORATION -Respiratory and Cardiovascular  ASSOCIATED RISK FACTORS - Hypoxia and Dysrhythmia  MANAGEMENT- Bedside Assessment  INTERPRETATION -  ECG and Blood Pressure  INTERVENTIONS - vascular control  CASE REVIEW - Hospitalist and Nursing  TREATMENT RESPONSE -Improved  PERFORMED BY - Self    NOTES   :  I have spent critical care time involved in lab review, consultations with specialist, family decision- making, bedside attention and documentation. This time excludes time spent in any separate billed procedures. During this entire length of time I was immediately available to the patient . Bobby Chahal MD                TREATMENT RESPONSE -Stable          Bobby Chahal MD      Disposition:     Diagnostic tests were reviewed and questions answered. Diagnosis, care plan and treatment options were discussed. The patient understand instructions and will follow up as directed. Condition stable    Admitting Provider:  No admitting provider for patient encounter.    Patient admitted to Dr. Hardik Wilson Provider:  No ref. provider found       DISCHARGE PLAN:  1. Current Discharge Medication List        2. Follow-up Information    None       3. Return to ED if worse     Diagnosis     Clinical Impression:     ICD-10-CM ICD-9-CM    1. Acute systolic congestive heart failure (HCC)  I50.21 428.21      428.0    2. Chronic obstructive pulmonary disease with acute exacerbation (HCC)  J44.1 491.21    3. Hypoxemia  R09.02 799.02    4. Hypertension, unspecified type  I10 401.9         Attestations:    Esperanza Hoover MD    Please note that this dictation was completed with [employer], the [employer] voice recognition software. Quite often unanticipated grammatical, syntax, homophones, and other interpretive errors are inadvertently transcribed by the computer software. Please disregard these errors. Please excuse any errors that have escaped final proofreading. Thank you.

## 2020-11-03 LAB
ALBUMIN SERPL-MCNC: 3 G/DL (ref 3.5–5)
ALBUMIN/GLOB SERPL: 0.7 {RATIO} (ref 1.1–2.2)
ALP SERPL-CCNC: 92 U/L (ref 45–117)
ALT SERPL-CCNC: 9 U/L (ref 12–78)
ANION GAP SERPL CALC-SCNC: 10 MMOL/L (ref 5–15)
AST SERPL W P-5'-P-CCNC: 8 U/L (ref 15–37)
BASOPHILS # BLD: 0 K/UL (ref 0–0.1)
BASOPHILS NFR BLD: 0 % (ref 0–1)
BILIRUB SERPL-MCNC: 0.3 MG/DL (ref 0.2–1)
BUN SERPL-MCNC: 17 MG/DL (ref 6–20)
BUN/CREAT SERPL: 13 (ref 12–20)
CA-I BLD-MCNC: 8.8 MG/DL (ref 8.5–10.1)
CHLORIDE SERPL-SCNC: 97 MMOL/L (ref 97–108)
CO2 SERPL-SCNC: 30 MMOL/L (ref 21–32)
CREAT SERPL-MCNC: 1.32 MG/DL (ref 0.55–1.02)
DIFFERENTIAL METHOD BLD: ABNORMAL
EOSINOPHIL # BLD: 0 K/UL (ref 0–0.4)
EOSINOPHIL NFR BLD: 0 % (ref 0–7)
ERYTHROCYTE [DISTWIDTH] IN BLOOD BY AUTOMATED COUNT: 14 % (ref 11.5–14.5)
GLOBULIN SER CALC-MCNC: 4.1 G/DL (ref 2–4)
GLUCOSE SERPL-MCNC: 176 MG/DL (ref 65–100)
HCT VFR BLD AUTO: 27.3 % (ref 35–47)
HGB BLD-MCNC: 8.9 G/DL (ref 11.5–16)
IMM GRANULOCYTES # BLD AUTO: 0 K/UL (ref 0–0.04)
IMM GRANULOCYTES NFR BLD AUTO: 0 % (ref 0–0.5)
LYMPHOCYTES # BLD: 0.6 K/UL (ref 0.8–3.5)
LYMPHOCYTES NFR BLD: 8 % (ref 12–49)
MCH RBC QN AUTO: 32.2 PG (ref 26–34)
MCHC RBC AUTO-ENTMCNC: 32.6 G/DL (ref 30–36.5)
MCV RBC AUTO: 98.9 FL (ref 80–99)
MONOCYTES # BLD: 0.2 K/UL (ref 0–1)
MONOCYTES NFR BLD: 2 % (ref 5–13)
NEUTS SEG # BLD: 7.2 K/UL (ref 1.8–8)
NEUTS SEG NFR BLD: 90 % (ref 32–75)
PLATELET # BLD AUTO: 353 K/UL (ref 150–400)
PMV BLD AUTO: 9.4 FL (ref 8.9–12.9)
POTASSIUM SERPL-SCNC: 3 MMOL/L (ref 3.5–5.1)
PROT SERPL-MCNC: 7.1 G/DL (ref 6.4–8.2)
RBC # BLD AUTO: 2.76 M/UL (ref 3.8–5.2)
SODIUM SERPL-SCNC: 137 MMOL/L (ref 136–145)
WBC # BLD AUTO: 8 K/UL (ref 3.6–11)

## 2020-11-03 PROCEDURE — 80053 COMPREHEN METABOLIC PANEL: CPT

## 2020-11-03 PROCEDURE — 94760 N-INVAS EAR/PLS OXIMETRY 1: CPT

## 2020-11-03 PROCEDURE — 94640 AIRWAY INHALATION TREATMENT: CPT

## 2020-11-03 PROCEDURE — 36415 COLL VENOUS BLD VENIPUNCTURE: CPT

## 2020-11-03 PROCEDURE — 85025 COMPLETE CBC W/AUTO DIFF WBC: CPT

## 2020-11-03 PROCEDURE — 74011000250 HC RX REV CODE- 250: Performed by: HOSPITALIST

## 2020-11-03 PROCEDURE — 65270000029 HC RM PRIVATE

## 2020-11-03 PROCEDURE — 74011250637 HC RX REV CODE- 250/637: Performed by: NURSE PRACTITIONER

## 2020-11-03 PROCEDURE — 74011250637 HC RX REV CODE- 250/637: Performed by: HOSPITALIST

## 2020-11-03 PROCEDURE — 74011250636 HC RX REV CODE- 250/636: Performed by: HOSPITALIST

## 2020-11-03 RX ORDER — POTASSIUM CHLORIDE 750 MG/1
40 TABLET, FILM COATED, EXTENDED RELEASE ORAL
Status: COMPLETED | OUTPATIENT
Start: 2020-11-03 | End: 2020-11-03

## 2020-11-03 RX ORDER — POTASSIUM CHLORIDE 20 MEQ/1
80 TABLET, EXTENDED RELEASE ORAL
Status: ACTIVE | OUTPATIENT
Start: 2020-11-03 | End: 2020-11-03

## 2020-11-03 RX ORDER — LABETALOL HCL 20 MG/4 ML
10 SYRINGE (ML) INTRAVENOUS
Status: DISCONTINUED | OUTPATIENT
Start: 2020-11-03 | End: 2020-11-04 | Stop reason: HOSPADM

## 2020-11-03 RX ADMIN — FOLIC ACID 1 MG: 1 TABLET ORAL at 10:33

## 2020-11-03 RX ADMIN — POTASSIUM CHLORIDE 40 MEQ: 750 TABLET, FILM COATED, EXTENDED RELEASE ORAL at 10:31

## 2020-11-03 RX ADMIN — LABETALOL HYDROCHLORIDE 400 MG: 200 TABLET, FILM COATED ORAL at 22:04

## 2020-11-03 RX ADMIN — HYDRALAZINE HYDROCHLORIDE 100 MG: 50 TABLET, FILM COATED ORAL at 22:04

## 2020-11-03 RX ADMIN — LABETALOL HYDROCHLORIDE 400 MG: 200 TABLET, FILM COATED ORAL at 10:32

## 2020-11-03 RX ADMIN — NIFEDIPINE 60 MG: 60 TABLET, FILM COATED, EXTENDED RELEASE ORAL at 10:32

## 2020-11-03 RX ADMIN — IPRATROPIUM BROMIDE AND ALBUTEROL SULFATE 3 ML: .5; 3 SOLUTION RESPIRATORY (INHALATION) at 15:15

## 2020-11-03 RX ADMIN — ISOSORBIDE DINITRATE 40 MG: 10 TABLET ORAL at 10:45

## 2020-11-03 RX ADMIN — FERROUS SULFATE TAB 325 MG (65 MG ELEMENTAL FE) 325 MG: 325 (65 FE) TAB at 10:32

## 2020-11-03 RX ADMIN — IPRATROPIUM BROMIDE AND ALBUTEROL SULFATE 3 ML: .5; 3 SOLUTION RESPIRATORY (INHALATION) at 02:45

## 2020-11-03 RX ADMIN — ENOXAPARIN SODIUM 40 MG: 40 INJECTION SUBCUTANEOUS at 10:30

## 2020-11-03 RX ADMIN — ALPRAZOLAM 0.25 MG: 0.5 TABLET ORAL at 10:42

## 2020-11-03 RX ADMIN — ALPRAZOLAM 0.25 MG: 0.5 TABLET ORAL at 17:33

## 2020-11-03 RX ADMIN — FUROSEMIDE 40 MG: 10 INJECTION, SOLUTION INTRAMUSCULAR; INTRAVENOUS at 22:06

## 2020-11-03 RX ADMIN — LEVETIRACETAM 500 MG: 500 TABLET ORAL at 22:05

## 2020-11-03 RX ADMIN — LEVOTHYROXINE SODIUM 25 MCG: 0.03 TABLET ORAL at 10:45

## 2020-11-03 RX ADMIN — CARBAMAZEPINE 100 MG: 100 TABLET, CHEWABLE ORAL at 22:04

## 2020-11-03 RX ADMIN — IPRATROPIUM BROMIDE AND ALBUTEROL SULFATE 3 ML: .5; 3 SOLUTION RESPIRATORY (INHALATION) at 19:37

## 2020-11-03 RX ADMIN — LABETALOL HYDROCHLORIDE 400 MG: 200 TABLET, FILM COATED ORAL at 15:20

## 2020-11-03 RX ADMIN — FUROSEMIDE 40 MG: 10 INJECTION, SOLUTION INTRAMUSCULAR; INTRAVENOUS at 10:31

## 2020-11-03 RX ADMIN — Medication 10 ML: at 22:08

## 2020-11-03 RX ADMIN — HYDRALAZINE HYDROCHLORIDE 100 MG: 50 TABLET, FILM COATED ORAL at 15:19

## 2020-11-03 RX ADMIN — CARBAMAZEPINE 100 MG: 100 TABLET, CHEWABLE ORAL at 15:19

## 2020-11-03 RX ADMIN — FAMOTIDINE 20 MG: 20 TABLET ORAL at 22:04

## 2020-11-03 RX ADMIN — CARBAMAZEPINE 100 MG: 100 TABLET, CHEWABLE ORAL at 10:46

## 2020-11-03 RX ADMIN — ASPIRIN 81 MG: 81 TABLET, COATED ORAL at 10:33

## 2020-11-03 RX ADMIN — HYDRALAZINE HYDROCHLORIDE 100 MG: 50 TABLET, FILM COATED ORAL at 10:32

## 2020-11-03 RX ADMIN — SERTRALINE HYDROCHLORIDE 25 MG: 50 TABLET ORAL at 10:46

## 2020-11-03 RX ADMIN — ISOSORBIDE DINITRATE 40 MG: 10 TABLET ORAL at 15:19

## 2020-11-03 RX ADMIN — IPRATROPIUM BROMIDE AND ALBUTEROL SULFATE 3 ML: .5; 3 SOLUTION RESPIRATORY (INHALATION) at 07:08

## 2020-11-03 RX ADMIN — MAGNESIUM OXIDE TAB 400 MG (241.3 MG ELEMENTAL MG) 400 MG: 400 (241.3 MG) TAB at 10:45

## 2020-11-03 RX ADMIN — Medication 10 ML: at 06:00

## 2020-11-03 RX ADMIN — ISOSORBIDE DINITRATE 40 MG: 10 TABLET ORAL at 22:04

## 2020-11-03 RX ADMIN — LEVETIRACETAM 500 MG: 500 TABLET ORAL at 10:33

## 2020-11-03 RX ADMIN — FAMOTIDINE 20 MG: 20 TABLET ORAL at 10:32

## 2020-11-03 RX ADMIN — Medication 10 ML: at 14:00

## 2020-11-03 NOTE — CONSULTS
Full consultation dictated. [id number]. Acute hypoxic respiratory failure mainly due to decompensated congestive heart failure. He does have underlying COPD but does not appear to be in an acute exacerbation. Continue with diuresis. She has improved over the course of the night. She is now on 3 L oxygen. Will obtain follow-up chest x-ray and blood gas in the morning. Will use on the BiPAP if needed. Thank you.

## 2020-11-03 NOTE — PROGRESS NOTES
Hospitalist Progress Note    NAME: Umesh Ramesh   :  1965   MRN:  [id number]             Subjective:     Chief Complaint / Reason for Physician Visit  Patient seen and evaluated at bedside, of note patient states her shortness of breath has improved significantly from yesterday, patient is currently on 3 L nasal cannula. Discussed with RN events overnight. Review of Systems:  Symptom Y/N Comments  Symptom Y/N Comments   Fever/Chills N   Chest Pain N    Poor Appetite N   Edema Y    Cough N   Abdominal Pain N    Sputum N   Joint Pain N    SOB/TRAN Y   Pruritis/Rash N    Nausea/vomit N   Tolerating PT/OT NA    Diarrhea N   Tolerating Diet Y     Constipation N   Other       Could NOT obtain due to:    patient denies any fevers chills nausea vomiting lightheadedness dizzinessChest pain palpitations headache focal weakness loss of sensation auditory or visual symptoms abdominal stool or urinary complaints or any other associated symptoms  Objective:     VITALS:   Last 24hrs VS reviewed since prior progress note.  Most recent are:  Patient Vitals for the past 24 hrs:   Temp Pulse Resp BP SpO2   20 0710     97 %   20 0649 98.8 °F (37.1 °C) 92      20 0544  94 18 (!) 175/95    20 0255    (!) 175/100    20 0253  92 22 (!) 175/10 99 %   20 2319  (!) 101 24 (!) 152/81    20 2214  93  (!) 167/94    20 2108  96 22 (!) 181/96 100 %   20 2040  96  (!) 193/109    20     95 %   20 1954  93 24 (!) 185/95 99 %   20 1952     99 %   20 1930  96 24 (!) 189/100 100 %   20 1929     97 %   20 1849     96 %   20 1816  99 22 (!) 179/94 98 %   20 1724  (!) 102 16 (!) 182/105 90 %   20 1705     98 %   20 1552     98 %   20 1523  96 11 (!) 183/84 95 %   20 1433 97.9 °F (36.6 °C) (!) 103 24 (!) 186/96 92 %     No intake or output data in the 24 hours ending 11/03/20 0951     PHYSICAL EXAM:  General: Patient does not appear to be in any acute distress, alert and oriented x3, comfortable on 3 L nasal cannula   EENT:  EOMI. Anicteric sclerae. MMM  Resp:  Decreased air entry bilaterally with appreciable bilateral bibasilar crackles  CV:  Regular  rhythm, S1 plus S2, no murmurs rubs or gallops  No edema  GI:  Soft, Non distended, Non tender. +Bowel sounds  Neurologic:  Alert and oriented X 3, normal speech,   Psych:   Good insight. Not anxious nor agitated  Skin:  No rashes. No jaundice, 1+ lower extremity edema bilaterally    Procedures: see electronic medical records for all procedures/Xrays and details which were not copied into this note but were reviewed prior to creation of Plan. LABS:  I reviewed today's most current labs and imaging studies. Pertinent labs include:  Recent Labs     11/03/20 0224 11/02/20  1510   WBC 8.0 9.0   HGB 8.9* 9.6*   HCT 27.3* 29.7*    365     Recent Labs     11/03/20 0224 11/02/20  1510    136   K 3.0* 3.4*   CL 97 100   CO2 30 31   * 89   BUN 17 12   CREA 1.32* 1.29*   CA 8.8 9.6   ALB 3.0* 3.4*   TBILI 0.3 0.3   ALT 9* 10*       Signed: Alexandrea Ware MD      Xr chest:  IMPRESSION  Impression: Bilateral pulmonary vascular congestion and bilateral pleural  effusions/CHF.     Reviewed most current lab test results and cultures  YES  Reviewed most current radiology test results   YES  Review and summation of old records today    NO  Reviewed patient's current orders and MAR    YES  PMH/SH reviewed - no change compared to H&P    Assessment / Plan:  Acute hypoxic respiratory failure/CHF exacerbationpatient presents with above-mentioned symptomatology was found to be in acute hypoxic respiratory failure requiring nonrebreather for ventilatory support, currently patient is on 3 L nasal cannula and based on patient's clinical presentation secondary to acute on chronic diastolic heart failure, currently clinical status is improved  Daily weights  Frequent intake and output monitoring  Continue Lasix 40 mg IV twice daily  Pulmonology recommendations appreciated, will continue to follow  Cardiology recommendations appreciated, will continue to follow    Hypokalemiareplete potassium and recheck potassium    Hypertensioncontinue home antihypertensive medications, labetalol as needed for systolic blood pressure of over 160    Chronic kidney disease stage IIIserum creatinine currently at baseline  Continue to trend serum creatinine    Hypothyroidismcontinue home Synthroid    ProphylaxisLovenox  FENcardiac diet with fluid restriction, replete potassium and magnesium  Full code, will clarify about surrogate decision-maker  [id number] [facility] clinical improvement, Home likely tomorrow      less than 18.5 Underweight / Body mass index is 16.64 kg/m². Code status: Full  Prophylaxis: Lovenox  Recommended Disposition: Home w/Family     ________________________________________________________________________  Care Plan discussed with:    Comments   Patient X    Family      RN X    Care Manager X    Consultant  X                     X Multidiciplinary team rounds were held today with , nursing, pharmacist and clinical coordinator. Patient's plan of care was discussed; medications were reviewed and discharge planning was addressed.      ________________________________________________________________________  Total NON critical care TIME:  35   Minutes        Comments   >50% of visit spent in counseling and coordination of care X    ________________________________________________________________________  Sivakumar Mobley MD

## 2020-11-03 NOTE — CONSULTS
[facility] CARDIOLOGY  CARDIOLOGY CONSULTATION    REASON FOR CONSULT: CHF    REQUESTING PROVIDER: Dr. Topher Thao:  Shortness of breath    HISTORY OF PRESENT ILLNESS:  Radha Joyce is a 54y.o. year-old female with past medical history significant for HFpEF, COPD, hypertension, mitral regurgitation, who was evaluated today due to shortness of breath. She presented to the ED yesterday for worsening dyspnea. She states it started Thursday, and progressively got worse despite increasing her Bumex or her new inhaler. She could barely state full sentences due to her dyspnea. At time of interview her breathing has improved following IV lasix given. Records from hospital admission course thus far reviewed. Telemetry reviewed. No events overnight. Sinus rhythm  . INPATIENT MEDICATIONS:  Home medications reviewed.     Current Facility-Administered Medications:     potassium chloride (K-DUR, KLOR-CON) SR tablet 80 mEq, 80 mEq, Oral, NOW, Milagro Nix MD, Stopped at 11/03/20 0839    labetaloL (NORMODYNE;TRANDATE) 20 mg/4 mL (5 mg/mL) injection 10 mg, 10 mg, IntraVENous, Q4H PRN, Milagro Nix MD    ALPRAZolam Shawn Fowler) tablet 0.25 mg, 0.25 mg, Oral, TID PRN, Raymond Kruse MD, 0.25 mg at 11/03/20 1042    aspirin delayed-release tablet 81 mg, 81 mg, Oral, DAILY, Raymond Kruse MD, 81 mg at 11/03/20 1033    carBAMazepine (TEGretol) chewable tablet 100 mg, 100 mg, Oral, QID, Raymond Kruse MD, 100 mg at 11/03/20 1046    ferrous sulfate tablet 325 mg, 325 mg, Oral, ACB, Raymond rKuse MD, 325 mg at 62/04/80 3114    folic acid (FOLVITE) tablet 1 mg, 1 mg, Oral, DAILY, Raymond Kruse MD, 1 mg at 11/03/20 1033    hydrALAZINE (APRESOLINE) tablet 100 mg, 100 mg, Oral, TID, Raymond Kruse MD, 100 mg at 11/03/20 1032    isosorbide dinitrate (ISORDIL) tablet 40 mg, 40 mg, Oral, TID, Raymond Kruse MD, 40 mg at 11/03/20 1045    labetaloL (NORMODYNE) tablet 400 mg, 400 mg, Oral, TID, Bassam Roy MD, 400 mg at 11/03/20 1032    levETIRAcetam (KEPPRA) tablet 500 mg, 500 mg, Oral, BID, Bassam Roy MD, 500 mg at 11/03/20 1033    levothyroxine (SYNTHROID) tablet 25 mcg, 25 mcg, Oral, ACB, Bassam Roy MD, 25 mcg at 11/03/20 1045    magnesium oxide (MAG-OX) tablet 400 mg, 400 mg, Oral, DAILY, Bassam Roy MD, 400 mg at 11/03/20 1045    NIFEdipine ER (PROCARDIA XL) tablet 60 mg, 60 mg, Oral, DAILY, Bassam Roy MD, 60 mg at 11/03/20 1032    sertraline (ZOLOFT) tablet 25 mg, 25 mg, Oral, DAILY, Bassam Roy MD, 25 mg at 11/03/20 1046    albuterol-ipratropium (DUO-NEB) 2.5 MG-0.5 MG/3 ML, 3 mL, Nebulization, Q6H RT, Bassam Roy MD, 3 mL at 11/03/20 0708    furosemide (LASIX) injection 40 mg, 40 mg, IntraVENous, Q12H, Bassam Roy MD, 40 mg at 11/03/20 1031    potassium chloride (K-DUR, KLOR-CON) SR tablet 20 mEq, 20 mEq, Oral, DAILY, Bassam Roy MD    sodium chloride (NS) flush 5-40 mL, 5-40 mL, IntraVENous, Q8H, Bassam Roy MD    sodium chloride (NS) flush 5-40 mL, 5-40 mL, IntraVENous, PRN, Bassam Roy MD    acetaminophen (TYLENOL) tablet 650 mg, 650 mg, Oral, Q6H PRN **OR** acetaminophen (TYLENOL) suppository 650 mg, 650 mg, Rectal, Q6H PRN, Bassam Roy MD    polyethylene glycol (MIRALAX) packet 17 g, 17 g, Oral, DAILY PRN, Bassam Roy MD    promethazine (PHENERGAN) tablet 12.5 mg, 12.5 mg, Oral, Q6H PRN **OR** ondansetron (ZOFRAN) injection 4 mg, 4 mg, IntraVENous, Q6H PRN, Bassam Roy MD    enoxaparin (LOVENOX) injection 40 mg, 40 mg, SubCUTAneous, DAILY, Bassam Roy MD, 40 mg at 11/03/20 1030    famotidine (PEPCID) tablet 20 mg, 20 mg, Oral, BID, Bassam Roy MD, 20 mg at 11/03/20 1032    Current Outpatient Medications:     NIFEdipine ER (PROCARDIA XL) 60 mg ER tablet, Take 1 Tab by mouth daily. , Disp: , Rfl:     predniSONE (DELTASONE) 5 mg tablet, Take 5 mg by mouth daily. , Disp: , Rfl:     Oxygen, 5 Devices as needed. Pt wears 5LPM as needed- states she uses it after an axiety attack, Disp: , Rfl:     isosorbide dinitrate (ISORDIL) 40 mg tablet, Take 1 Tab by mouth three (3) times daily. , Disp: 90 Tab, Rfl: 0    albuterol-ipratropium (DUO-NEB) 2.5 mg-0.5 mg/3 ml nebu, 3 mL by Nebulization route every six (6) hours. , Disp: 120 Nebule, Rfl: 0    bumetanide (BUMEX) 1 mg tablet, Take 1 Tab by mouth daily. , Disp: 30 Tab, Rfl: 0    labetaloL (NORMODYNE) 200 mg tablet, Take 2 Tabs by mouth three (3) times daily. , Disp: 180 Tab, Rfl: 0    ALPRAZolam (XANAX) 0.25 mg tablet, Take 0.25 mg by mouth three (3) times daily as needed for Anxiety. , Disp: , Rfl:     sertraline (Zoloft) 25 mg tablet, Take 25 mg by mouth daily. , Disp: , Rfl:     hydrALAZINE (APRESOLINE) 50 mg tablet, Take 100 mg by mouth three (3) times daily. , Disp: , Rfl:     levothyroxine (SYNTHROID) 25 mcg tablet, Take 25 mcg by mouth Daily (before breakfast). , Disp: , Rfl:     levETIRAcetam (Keppra) 500 mg tablet, Take 500 mg by mouth two (2) times a day., Disp: , Rfl:     carBAMazepine, mood stabiliz, 200 mg CM12, Take 200 mg by mouth two (2) times a day., Disp: , Rfl:     ferrous sulfate 325 mg (65 mg iron) tablet, Take 325 mg by mouth Daily (before breakfast). , Disp: , Rfl:     folic acid (FOLVITE) 1 mg tablet, Take 1 mg by mouth daily. , Disp: , Rfl:     aspirin delayed-release 81 mg tablet, Take  by mouth daily. , Disp: , Rfl:     magnesium oxide (MAG-OX) 400 mg tablet, Take 400 mg by mouth daily. , Disp: , Rfl:     Facility-Administered Medications Ordered in Other Encounters:     0.9% sodium chloride infusion, 20 mL/hr, IntraVENous, CONTINUOUS, Carly Benites MD, Last Rate: 20 mL/hr at 10/27/20 0956, 20 mL/hr at 10/27/20 0956     ALLERGIES:  Allergies reviewed with the patient,  Allergies   Allergen Reactions    Sulfa (Sulfonamide Antibiotics) Anaphylaxis    . FAMILY HISTORY:  Family history reviewed.        SOCIAL HISTORY:  Notable for former  tobacco use, no heavy alcohol or illicit drug use. REVIEW OF SYSTEMS:  Complete review of systems performed, pertinents noted above, all other systems are negative. PHYSICAL EXAMINATION:  Vital sign assessment reveal a blood pressure of  175/95  and pulse rate of 94. Cardiovascular exam has a heart with a regular rate and rhythm, normal S1 and S2. No murmur present. There are no rubs or gallops. Good peripheral pulses. No jugular venous distension. no carotid bruits are present. Respiratory exam reveals clear to crackles bilaterally. Gastrointestinal exam has soft, nontender abdomen with normal bowel sounds. Lymphatic exam reveals no edema and mild varicosities. No notable skin changes. Neurologic exam is nonfocal.         Recent labs results and imaging reviewed.   Notable findings include   Lab Results   Component Value Date/Time    WBC 8.0 11/03/2020 02:24 AM    HGB 8.9 (L) 11/03/2020 02:24 AM    HCT 27.3 (L) 11/03/2020 02:24 AM    PLATELET 303 27/56/8199 02:24 AM    MCV 98.9 11/03/2020 02:24 AM     Lab Results   Component Value Date/Time    Cholesterol, total 264 (H) 04/20/2009 10:00 AM    HDL Cholesterol 75 (H) 04/20/2009 10:00 AM    LDL, calculated 163.6 (H) 04/20/2009 10:00 AM    Triglyceride 127 04/20/2009 10:00 AM    CHOL/HDL Ratio 3.5 04/20/2009 10:00 AM     Lab Results   Component Value Date/Time    Troponin-I, Qt. <0.05 11/02/2020 03:10 PM      Lab Results   Component Value Date/Time    NT pro-BNP 7,769 (H) 11/02/2020 03:10 PM    NT pro-BNP 9,742 (H) 09/21/2020 02:45 PM      Lab Results   Component Value Date/Time    Sodium 137 11/03/2020 02:24 AM    Potassium 3.0 (L) 11/03/2020 02:24 AM    Chloride 97 11/03/2020 02:24 AM    CO2 30 11/03/2020 02:24 AM    Anion gap 10 11/03/2020 02:24 AM    Glucose 176 (H) 11/03/2020 02:24 AM    BUN 17 11/03/2020 02:24 AM    Creatinine 1.32 (H) 11/03/2020 02:24 AM    BUN/Creatinine ratio 13 11/03/2020 02:24 AM    GFR est AA 51 (L) 11/03/2020 02:24 AM    GFR est non-AA 42 (L) 11/03/2020 02:24 AM    Calcium 8.8 11/03/2020 02:24 AM    Bilirubin, total 0.3 11/03/2020 02:24 AM    ALT (SGPT) 9 (L) 11/03/2020 02:24 AM    Alk. phosphatase 92 11/03/2020 02:24 AM    Protein, total 7.1 11/03/2020 02:24 AM    Albumin 3.0 (L) 11/03/2020 02:24 AM    Globulin 4.1 (H) 11/03/2020 02:24 AM    A-G Ratio 0.7 (L) 11/03/2020 02:24 AM     . Echocardiogram 10/27/2020:  · LV: Estimated LVEF is 55 - 60%. Normal cavity size, wall thickness and systolic function (ejection fraction normal). · MV: Mild to moderate mitral valve regurgitation is present. · AV: Trivial aortic valve regurgitation is present. · Tiny PFO present. Discussed case with Dr. Leydi Dhillon and our impression and recommendations are as follows:  1. Acute heart failure with preserved ejection fraction: Troponin negative. Initial EKG is nonischemic. Pro-BNP is 7769. Chest xray completed in the ER indicates vascular congestion with bilateral pleural efffusions. Agree with diuresing with Lasix 40 mg IV q12h. Please obtain daily weights, strict I&Os, and 1500 ml fluid restrictions. Continue telemetry monitoring. Recommend to keep serum potassium between 4-5 and serum magnesium > 2. Repletion ordered. Recent CLAUDIA on 10/27 shows preserved EF. 2. Mitral regurgitation: Mild to moderate per CLAUDIA on 10/27. Most likely contributing to her symptoms. Continue diuresis as stated above. 3. Hypertension: Elevated. Resume home medications of labetalol, nifedipine, isosorbide, and hydralazine. Previous renal duplex positive for renal artery stenosis which she sees nephrology for. 4. Hypokalemia: K 3.0, repletion ordered. 5. Acute on chronic hypoxic respiratory failure: With COPD. Pulmonary consulted. Thank you for involving us in the care of this patient. Please do not hesitate to call me or Dr. Leydi Dhillon if additional questions arise.

## 2020-11-03 NOTE — ED NOTES
Pt urinated on self, pt was cleaned dried, given new clean linens and turned. Pt was repositioned and is now resting comfortably. Pt rounding complete, pt is awake alert and oriented x4, pt is resting on the stretcher, vitals are stable. Pt call bell within reach, and belongings are at bedside, pt updated on the plan of care at this time.

## 2020-11-03 NOTE — CONSULTS
[address] [place] [address]    Name:  Saroj Fuentes  MR#:  [id number]  :  1965  ACCOUNT #:  [de-identified]  DATE OF SERVICE:  2020    ATTENDING PHYSICIAN:  Dr. Wilberto Mejia. REASON FOR CONSULTATION:  Hypoxic respiratory failure. HISTORY OF PRESENTING ILLNESS:  The patient is a 80-year-old  female. She is well-known to myself. She has a history of very extensive tobacco abuse starting when she was young. She was up to one and a half to two packs a day. She quit in the summer of  when she had a prolonged hospitalization. The patient has multiple other medical problems including congestive heart failure with preserved ejection fraction and mitral regurgitation. She tells me that she had a procedure done recently by a cardiologist.  It appears from her description that she had CLAUDIA done. The patient states that she ran out of her fluid pills. She has been noticing more edema of her feet. She was starting to get more and more short of breath. She was checking her oxygen levels. It was dropping down into the 80s and 70s. She would get more dyspneic. No fever or chills. Some clear to whitish sputum production. Vague left-sided chest pains. She then decided to come over to the emergency room. Chest x-ray showed pulmonary edema and effusions. BNP was severely elevated. I was called last evening by respiratory therapist.  Blood gases were discussed. She was placed on BiPAP. She tolerated BiPAP for a few hours only. Now, she is on 3 L oxygen and is feeling much better. She has diuresed very well. Discussed with the emergency room nurse. It should be mentioned that initially she was placed on nonrebreather mask and then switched to 50% Ventimask before putting her on BiPAP. PAST MEDICAL HISTORY:  Significant for COPD. She is using Trelegy inhaler at home and uses oxygen p.r.n.   Congestive heart failure with preserved ejection fraction, mitral regurgitation, hypertension which is difficult to control, renal artery stenosis, and seizure disorder. There is also history of dysphagia and reflux disease and hypothyroidism. PAST SURGICAL HISTORY:  Significant for rotator cuff repair and tubal ligation. ALLERGIES:  SULFA. MEDICATIONS:  Currently, the patient is started on nebulized DuoNebs q.6 hourly, aspirin 81 mg daily, Tegretol 100 mg p.o. q.i.d., Lovenox 40 mg subcutaneously daily, Pepcid 20 mg p.o. b.i.d., ferrous sulfate 325 mg p.o. daily, folic acid 1 mg p.o. daily, Lasix 40 mg IV q.12 hourly, hydralazine 100 mg p.o. t.i.d., isosorbide 40 mg p.o. t.i.d., labetalol 400 mg p.o. t.i.d., Keppra 500 mg p.o. b.i.d., levothyroxine 25 mcg daily, magnesium 400 mg p.o. daily, nifedipine 60 mg p.o. daily, potassium chloride 20 mEq p.o. daily, Zoloft 25 mg p.o. daily, and other p.r.n. medications. SOCIAL HISTORY:  She lives with a roommate. She has grown up children. There is history of extensive tobacco abuse and she quit in the summer of this year. Denies drug or alcohol abuse. OCCUPATIONAL HISTORY:  She has worked in various capacities including food work, warehouse work, and [employer]. FAMILY HISTORY:  Apparently, hypertension runs in the family. Father had stroke. REVIEW OF SYSTEMS:  Complete review of systems was undertaken. Pertinent findings are discussed above. All other systems were reviewed and are negative. PHYSICAL EXAMINATION:  GENERAL:  The patient is a middle-aged-appearing  female who appears to be comfortable. She is frail in general.  She gives a history of weight loss. VITAL SIGNS:  Temperature is 98.8, pulse is 96 per minute, respiratory rate is 18 per minute, and blood pressure is 175/95. Previously recorded blood pressure was 182/105. She is currently on 3 L oxygen with a saturation of 96%. HEENT:  Showed pupils are equal and reactive to light. No pallor or icterus.   Nasal passages are patent. Oropharynx is without any thrush or exudation. NECK:  Supple. Trachea is central.  There is no JVD. She is sitting upright. No lymphadenopathy. She is not using any accessory muscles of respiration. CHEST:  Symmetrical with equal and diminished to fair air entry bilaterally. She has diminished breath sounds over the lung bases due to pleural effusions. Few basilar crackles are noted. No rhonchi or wheezing. No chest wall tenderness. HEART:  Rhythm is regular and she does appear to have a systolic murmur. ABDOMEN:  Soft and benign. Bowel sounds are audible. No masses or organomegaly. EXTREMITIES:  Do not show any cyanosis or clubbing. Trace pitting edema. The patient stated that her feet have gone down. Pulses are palpable. NEUROLOGICAL:  Grossly intact. SKIN:  Warm and dry. GENITOURINARY:  She has a PureWick catheter and has made good urine output, more than a liter over the course of the night. LABORATORY DATA:  Portable chest x-ray done on 11/02/2020 showed congestive heart failure and small pleural effusions. Arterial blood gases done initially on nonrebreather mask showed a pH of 7.44, pCO2 of 41, and paO2 of 81. BNP 7769. WBC count is 8.0 with neutrophils of 90%, hemoglobin is 8.9, platelet count of 949. Electrolytes are within normal limits. BUN is 17, creatinine is 1.32, blood glucose of 176. ASSESSMENT AND PLAN:  1. The patient's overall presentation is compatible with decompensated congestive heart failure. Chest x-ray shows pulmonary edema and pleural effusions. Blood gases are compatible with acute hypoxic respiratory failure due to decompensated congestive heart failure. She has diuresed very well over the night, more than 1 L, according to the emergency room nurse. She is now on 3 L oxygen. Over the course of the night, she tolerated BiPAP for a few hours. Initially, on presentation, she was on nonrebreather mask. Continue with diuresis.   She also has underlying mitral regurgitation. Cardiology is consulted. 2.  Chronic obstructive pulmonary disease. However, I do not feel that she is in exacerbation. Continue with bronchodilator treatments. She does have oxygen at home, but that is probably more of a reflection of her congestive heart failure. I will get records from my office. 3.  Difficult-to-control hypertension. She has renal artery stenosis. 4.  Seizure disorder. 5.  Hypothyroidism. 6.  Reflux disease. 7.  For deep venous thrombosis prophylaxis, she is started on Lovenox subcutaneously. Thank you for allowing me to participate in the care of this patient. I will follow the patient closely with you. I will check a followup chest x-ray tomorrow and a blood gas in the morning as well. For now, we will only use BiPAP if needed. Continue with nasal cannula and keep saturation more than 90%.       Felix Negro MD      [id number]  D:  11/03/2020 9:43  T:  11/03/2020 13:25  JOB #:  [id number]

## 2020-11-04 ENCOUNTER — APPOINTMENT (OUTPATIENT)
Dept: GENERAL RADIOLOGY | Age: 55
DRG: 194 | End: 2020-11-04
Attending: INTERNAL MEDICINE
Payer: COMMERCIAL

## 2020-11-04 ENCOUNTER — APPOINTMENT (OUTPATIENT)
Dept: CT IMAGING | Age: 55
DRG: 194 | End: 2020-11-04
Attending: INTERNAL MEDICINE
Payer: COMMERCIAL

## 2020-11-04 VITALS
HEIGHT: 65 IN | DIASTOLIC BLOOD PRESSURE: 96 MMHG | HEART RATE: 101 BPM | OXYGEN SATURATION: 92 % | SYSTOLIC BLOOD PRESSURE: 175 MMHG | BODY MASS INDEX: 16.66 KG/M2 | WEIGHT: 100 LBS | RESPIRATION RATE: 20 BRPM | TEMPERATURE: 98.9 F

## 2020-11-04 LAB
ANION GAP SERPL CALC-SCNC: 7 MMOL/L (ref 5–15)
ANION GAP SERPL CALC-SCNC: 9 MMOL/L (ref 5–15)
ARTERIAL PATENCY WRIST A: ABNORMAL
BASE EXCESS BLDA CALC-SCNC: 11.3 MMOL/L (ref 0–2)
BDY SITE: ABNORMAL
BUN SERPL-MCNC: 20 MG/DL (ref 6–20)
BUN SERPL-MCNC: 22 MG/DL (ref 6–20)
BUN/CREAT SERPL: 14 (ref 12–20)
BUN/CREAT SERPL: 14 (ref 12–20)
CA-I BLD-MCNC: 9.2 MG/DL (ref 8.5–10.1)
CA-I BLD-MCNC: 9.5 MG/DL (ref 8.5–10.1)
CHLORIDE SERPL-SCNC: 94 MMOL/L (ref 97–108)
CHLORIDE SERPL-SCNC: 94 MMOL/L (ref 97–108)
CO2 SERPL-SCNC: 33 MMOL/L (ref 21–32)
CO2 SERPL-SCNC: 34 MMOL/L (ref 21–32)
CREAT SERPL-MCNC: 1.45 MG/DL (ref 0.55–1.02)
CREAT SERPL-MCNC: 1.57 MG/DL (ref 0.55–1.02)
ERYTHROCYTE [DISTWIDTH] IN BLOOD BY AUTOMATED COUNT: 14.2 % (ref 11.5–14.5)
GAS FLOW.O2 O2 DELIVERY SYS: 2 L/MIN
GLUCOSE BLD STRIP.AUTO-MCNC: 166 MG/DL (ref 65–100)
GLUCOSE SERPL-MCNC: 89 MG/DL (ref 65–100)
GLUCOSE SERPL-MCNC: 90 MG/DL (ref 65–100)
HCO3 BLDA-SCNC: 35 MMOL/L (ref 22–26)
HCT VFR BLD AUTO: 26.7 % (ref 35–47)
HGB BLD-MCNC: 8.6 G/DL (ref 11.5–16)
MCH RBC QN AUTO: 32.3 PG (ref 26–34)
MCHC RBC AUTO-ENTMCNC: 32.2 G/DL (ref 30–36.5)
MCV RBC AUTO: 100.4 FL (ref 80–99)
NRBC # BLD: 0 K/UL (ref 0–0.01)
NRBC BLD-RTO: 0 PER 100 WBC
PCO2 BLDA: 48 MMHG (ref 35–45)
PERFORMED BY, TECHID: ABNORMAL
PH BLDA: 7.49 [PH] (ref 7.35–7.45)
PLATELET # BLD AUTO: 343 K/UL (ref 150–400)
PMV BLD AUTO: 9.4 FL (ref 8.9–12.9)
PO2 BLDA: 78 MMHG (ref 75–100)
POTASSIUM SERPL-SCNC: 2.9 MMOL/L (ref 3.5–5.1)
POTASSIUM SERPL-SCNC: 3.7 MMOL/L (ref 3.5–5.1)
RBC # BLD AUTO: 2.66 M/UL (ref 3.8–5.2)
SAO2 % BLD: 96 %
SAO2% DEVICE SAO2% SENSOR NAME: ABNORMAL
SODIUM SERPL-SCNC: 135 MMOL/L (ref 136–145)
SODIUM SERPL-SCNC: 136 MMOL/L (ref 136–145)
WBC # BLD AUTO: 6.9 K/UL (ref 3.6–11)

## 2020-11-04 PROCEDURE — 94640 AIRWAY INHALATION TREATMENT: CPT

## 2020-11-04 PROCEDURE — 74011250637 HC RX REV CODE- 250/637: Performed by: INTERNAL MEDICINE

## 2020-11-04 PROCEDURE — 74011000636 HC RX REV CODE- 636: Performed by: INTERNAL MEDICINE

## 2020-11-04 PROCEDURE — 77010033678 HC OXYGEN DAILY

## 2020-11-04 PROCEDURE — 94760 N-INVAS EAR/PLS OXIMETRY 1: CPT

## 2020-11-04 PROCEDURE — 36415 COLL VENOUS BLD VENIPUNCTURE: CPT

## 2020-11-04 PROCEDURE — 82803 BLOOD GASES ANY COMBINATION: CPT

## 2020-11-04 PROCEDURE — 74011000250 HC RX REV CODE- 250: Performed by: HOSPITALIST

## 2020-11-04 PROCEDURE — 71045 X-RAY EXAM CHEST 1 VIEW: CPT

## 2020-11-04 PROCEDURE — 80048 BASIC METABOLIC PNL TOTAL CA: CPT

## 2020-11-04 PROCEDURE — 85027 COMPLETE CBC AUTOMATED: CPT

## 2020-11-04 PROCEDURE — 82962 GLUCOSE BLOOD TEST: CPT

## 2020-11-04 PROCEDURE — 74160 CT ABDOMEN W/CONTRAST: CPT

## 2020-11-04 PROCEDURE — 74011250636 HC RX REV CODE- 250/636: Performed by: HOSPITALIST

## 2020-11-04 PROCEDURE — 74011250637 HC RX REV CODE- 250/637: Performed by: NURSE PRACTITIONER

## 2020-11-04 PROCEDURE — 74011250637 HC RX REV CODE- 250/637: Performed by: HOSPITALIST

## 2020-11-04 PROCEDURE — 92610 EVALUATE SWALLOWING FUNCTION: CPT

## 2020-11-04 RX ORDER — BUMETANIDE 1 MG/1
1 TABLET ORAL 2 TIMES DAILY
Qty: 60 TAB | Refills: 0 | Status: SHIPPED | OUTPATIENT
Start: 2020-11-04 | End: 2020-11-27

## 2020-11-04 RX ORDER — POTASSIUM CHLORIDE 20 MEQ/1
20 TABLET, EXTENDED RELEASE ORAL DAILY
Qty: 30 TAB | Refills: 0 | Status: ON HOLD | OUTPATIENT
Start: 2020-11-05 | End: 2021-01-12 | Stop reason: SDUPTHER

## 2020-11-04 RX ORDER — BUMETANIDE 1 MG/1
1 TABLET ORAL 2 TIMES DAILY
Status: DISCONTINUED | OUTPATIENT
Start: 2020-11-04 | End: 2020-11-04 | Stop reason: HOSPADM

## 2020-11-04 RX ORDER — POTASSIUM CHLORIDE 20 MEQ/1
80 TABLET, EXTENDED RELEASE ORAL
Status: COMPLETED | OUTPATIENT
Start: 2020-11-04 | End: 2020-11-04

## 2020-11-04 RX ORDER — POTASSIUM CHLORIDE 20 MEQ/1
40 TABLET, EXTENDED RELEASE ORAL ONCE
Status: COMPLETED | OUTPATIENT
Start: 2020-11-04 | End: 2020-11-04

## 2020-11-04 RX ADMIN — ALPRAZOLAM 0.25 MG: 0.5 TABLET ORAL at 07:47

## 2020-11-04 RX ADMIN — POTASSIUM CHLORIDE 40 MEQ: 1500 TABLET, EXTENDED RELEASE ORAL at 14:03

## 2020-11-04 RX ADMIN — LABETALOL HYDROCHLORIDE 400 MG: 200 TABLET, FILM COATED ORAL at 09:08

## 2020-11-04 RX ADMIN — MAGNESIUM OXIDE TAB 400 MG (241.3 MG ELEMENTAL MG) 400 MG: 400 (241.3 MG) TAB at 09:09

## 2020-11-04 RX ADMIN — LABETALOL HYDROCHLORIDE 400 MG: 200 TABLET, FILM COATED ORAL at 17:41

## 2020-11-04 RX ADMIN — SERTRALINE HYDROCHLORIDE 25 MG: 50 TABLET ORAL at 09:08

## 2020-11-04 RX ADMIN — ASPIRIN 81 MG: 81 TABLET, COATED ORAL at 09:09

## 2020-11-04 RX ADMIN — IPRATROPIUM BROMIDE AND ALBUTEROL SULFATE 3 ML: .5; 3 SOLUTION RESPIRATORY (INHALATION) at 13:51

## 2020-11-04 RX ADMIN — Medication 10 ML: at 06:22

## 2020-11-04 RX ADMIN — HYDRALAZINE HYDROCHLORIDE 100 MG: 50 TABLET, FILM COATED ORAL at 09:08

## 2020-11-04 RX ADMIN — HYDRALAZINE HYDROCHLORIDE 100 MG: 50 TABLET, FILM COATED ORAL at 17:40

## 2020-11-04 RX ADMIN — FERROUS SULFATE TAB 325 MG (65 MG ELEMENTAL FE) 325 MG: 325 (65 FE) TAB at 06:22

## 2020-11-04 RX ADMIN — LEVOTHYROXINE SODIUM 25 MCG: 0.03 TABLET ORAL at 06:22

## 2020-11-04 RX ADMIN — FOLIC ACID 1 MG: 1 TABLET ORAL at 09:08

## 2020-11-04 RX ADMIN — CARBAMAZEPINE 100 MG: 100 TABLET, CHEWABLE ORAL at 17:41

## 2020-11-04 RX ADMIN — CARBAMAZEPINE 100 MG: 100 TABLET, CHEWABLE ORAL at 14:03

## 2020-11-04 RX ADMIN — FUROSEMIDE 40 MG: 10 INJECTION, SOLUTION INTRAMUSCULAR; INTRAVENOUS at 09:08

## 2020-11-04 RX ADMIN — NIFEDIPINE 60 MG: 60 TABLET, FILM COATED, EXTENDED RELEASE ORAL at 09:14

## 2020-11-04 RX ADMIN — Medication 10 ML: at 14:00

## 2020-11-04 RX ADMIN — FAMOTIDINE 20 MG: 20 TABLET ORAL at 09:08

## 2020-11-04 RX ADMIN — ISOSORBIDE DINITRATE 40 MG: 10 TABLET ORAL at 17:41

## 2020-11-04 RX ADMIN — IOPAMIDOL 85 ML: 755 INJECTION, SOLUTION INTRAVENOUS at 17:00

## 2020-11-04 RX ADMIN — IPRATROPIUM BROMIDE AND ALBUTEROL SULFATE 3 ML: .5; 3 SOLUTION RESPIRATORY (INHALATION) at 01:20

## 2020-11-04 RX ADMIN — ISOSORBIDE DINITRATE 40 MG: 10 TABLET ORAL at 09:08

## 2020-11-04 RX ADMIN — CARBAMAZEPINE 100 MG: 100 TABLET, CHEWABLE ORAL at 09:08

## 2020-11-04 RX ADMIN — POTASSIUM CHLORIDE 20 MEQ: 1500 TABLET, EXTENDED RELEASE ORAL at 09:08

## 2020-11-04 RX ADMIN — LEVETIRACETAM 500 MG: 500 TABLET ORAL at 09:09

## 2020-11-04 RX ADMIN — POTASSIUM CHLORIDE 80 MEQ: 1500 TABLET, EXTENDED RELEASE ORAL at 11:02

## 2020-11-04 RX ADMIN — BUMETANIDE 1 MG: 1 TABLET ORAL at 12:16

## 2020-11-04 RX ADMIN — ENOXAPARIN SODIUM 40 MG: 40 INJECTION SUBCUTANEOUS at 09:07

## 2020-11-04 RX ADMIN — IPRATROPIUM BROMIDE AND ALBUTEROL SULFATE 3 ML: .5; 3 SOLUTION RESPIRATORY (INHALATION) at 07:26

## 2020-11-04 NOTE — PROGRESS NOTES
TRANSFER - IN REPORT:    Verbal report received from [facility](name) on Tl Pelaez  being received from ED(unit) for routine progression of care      Report consisted of patients Situation, Background, Assessment and   Recommendations(SBAR). Information from the following report(s) SBAR, Kardex, ED Summary, Intake/Output, MAR, Accordion, Recent Results, Med Rec Status and Cardiac Rhythm SR was reviewed with the receiving nurse. Opportunity for questions and clarification was provided. Assessment completed upon patients arrival to unit and care assumed.

## 2020-11-04 NOTE — PROGRESS NOTES
JORGE L plan: Home Health    Spoke with patient at bedside about discharge planning. Per patient, she resides in a one story home with four steps to get into home, with friend, Amberly Portillo. Patient denies any recent falls and denies any assistive device use for ambulation. Patient reports that she is independent with ADLs, but sometimes needs assistance with bathing and friend assists with that. Patient reports that her friend assists with medication management. Patient reports that she uses oxygen (3L) via nasal cannula from [facility]. Patient reports that she also has a nebulizer. Discussed need for home health for CHF education/management and patient reports that she is currently open with home health and would like to resume. Patient could not recall the name of the home health agency, but provided contact information for agency. CM found out the name of the agency, [facility], and referral was sent. Choice letter completed. Discussed discharge plan with attending, Dr. David Cheema, and with Clare Phillips RN. Discharge checklist completed with Clare Phillips RN. Confirmed PCP: Dr. Gen Ingram at [facility]  Patient reports that she has an upcoming appointment with PCP on 11/12/20 at 10am.  Patient reports that friend transports to doctor appointments.

## 2020-11-04 NOTE — PROGRESS NOTES
Pulmonary Progress Note      NAME: Kathryn Goff   :  1965  MRM:  [id number]    Date/Time: 2020  4:28 PM         Subjective:     Patient seen and examined at the bedside. Nurses also at the bedside. The patient is a little confused. She had a visit from the cardiologist.  She was thinking it is her mitral valve but she has been told that it is her uncontrolled hypertension causing congestive heart failure. She feels better. She has made good urine output. On 2 L oxygen. No significant sputum production. Past Medical History reviewed and unchanged from Admission History and Physical       Objective:     Physical Exam     Vitals:      Last 24hrs VS reviewed since prior progress note. Most recent are:    Visit Vitals  BP (!) 146/79 (BP 1 Location: Left arm, BP Patient Position: At rest)   Pulse 91   Temp 98.5 °F (36.9 °C)   Resp 18   Ht 5' 5\" (1.651 m)   Wt 45.4 kg (100 lb)   SpO2 96%   Breastfeeding No   BMI 16.64 kg/m²     SpO2 Readings from Last 6 Encounters:   20 96%   10/27/20 96%   10/02/20 97%    O2 Flow Rate (L/min): 2 l/min       Intake/Output Summary (Last 24 hours) at 2020 1628  Last data filed at 2020 1217  Gross per 24 hour   Intake    Output 800 ml   Net -800 ml      GENERAL:  The patient is a middle-aged-appearing  female who appears to be comfortable. She is frail in general.  She gives a history of weight loss. HEENT:  Showed pupils are equal and reactive to light. No pallor or icterus. Nasal passages are patent. Oropharynx is without any thrush or exudation. NECK:  Supple. Trachea is central.  There is no JVD. She is sitting upright. No lymphadenopathy. She is not using any accessory muscles of respiration. CHEST:  Symmetrical with equal and fair air entry bilaterally. She has diminished breath sounds over the lung bases due to pleural effusions. Few basilar crackles are noted. No rhonchi or wheezing. No chest wall tenderness.   HEART: Rhythm is regular and she does appear to have a systolic murmur. ABDOMEN:  Soft and benign. Bowel sounds are audible. No masses or organomegaly. EXTREMITIES:  Do not show any cyanosis or clubbing. Trace pitting edema. The patient stated that her feet have gone down. Pulses are palpable. NEUROLOGICAL:  Grossly intact. SKIN:  Warm and dry. XR CHEST PORT   Final Result      XR CHEST PA LAT   Final Result   Impression: Bilateral pulmonary vascular congestion and bilateral pleural   effusions/CHF.       CT ABD W CONT    (Results Pending)       Lab Data Reviewed: (see below)      Medications:  Current Facility-Administered Medications   Medication Dose Route Frequency    bumetanide (BUMEX) tablet 1 mg  1 mg Oral BID    labetaloL (NORMODYNE;TRANDATE) 20 mg/4 mL (5 mg/mL) injection 10 mg  10 mg IntraVENous Q4H PRN    ALPRAZolam (XANAX) tablet 0.25 mg  0.25 mg Oral TID PRN    aspirin delayed-release tablet 81 mg  81 mg Oral DAILY    carBAMazepine (TEGretol) chewable tablet 100 mg  100 mg Oral QID    ferrous sulfate tablet 325 mg  325 mg Oral ACB    folic acid (FOLVITE) tablet 1 mg  1 mg Oral DAILY    hydrALAZINE (APRESOLINE) tablet 100 mg  100 mg Oral TID    isosorbide dinitrate (ISORDIL) tablet 40 mg  40 mg Oral TID    labetaloL (NORMODYNE) tablet 400 mg  400 mg Oral TID    levETIRAcetam (KEPPRA) tablet 500 mg  500 mg Oral BID    levothyroxine (SYNTHROID) tablet 25 mcg  25 mcg Oral ACB    magnesium oxide (MAG-OX) tablet 400 mg  400 mg Oral DAILY    NIFEdipine ER (PROCARDIA XL) tablet 60 mg  60 mg Oral DAILY    sertraline (ZOLOFT) tablet 25 mg  25 mg Oral DAILY    albuterol-ipratropium (DUO-NEB) 2.5 MG-0.5 MG/3 ML  3 mL Nebulization Q6H RT    potassium chloride (K-DUR, KLOR-CON) SR tablet 20 mEq  20 mEq Oral DAILY    sodium chloride (NS) flush 5-40 mL  5-40 mL IntraVENous Q8H    sodium chloride (NS) flush 5-40 mL  5-40 mL IntraVENous PRN    acetaminophen (TYLENOL) tablet 650 mg  650 mg Oral Q6H PRN    Or    acetaminophen (TYLENOL) suppository 650 mg  650 mg Rectal Q6H PRN    polyethylene glycol (MIRALAX) packet 17 g  17 g Oral DAILY PRN    promethazine (PHENERGAN) tablet 12.5 mg  12.5 mg Oral Q6H PRN    Or    ondansetron (ZOFRAN) injection 4 mg  4 mg IntraVENous Q6H PRN    enoxaparin (LOVENOX) injection 40 mg  40 mg SubCUTAneous DAILY    famotidine (PEPCID) tablet 20 mg  20 mg Oral BID     Facility-Administered Medications Ordered in Other Encounters   Medication Dose Route Frequency    0.9% sodium chloride infusion  20 mL/hr IntraVENous CONTINUOUS       ______________________________________________________________________      Lab Review:     Recent Labs     11/04/20  0756 11/03/20 0224 11/02/20  1510   WBC 6.9 8.0 9.0   HGB 8.6* 8.9* 9.6*   HCT 26.7* 27.3* 29.7*    353 365     Recent Labs     11/04/20  0756 11/03/20 0224 11/02/20  1510   * 137 136   K 2.9* 3.0* 3.4*   CL 94* 97 100   CO2 34* 30 31   GLU 89 176* 89   BUN 20 17 12   CREA 1.45* 1.32* 1.29*   CA 9.5 8.8 9.6   ALB  --  3.0* 3.4*   ALT  --  9* 10*     No components found for: [place]  Recent Labs     11/04/20  0845 11/02/20  1530   PH 7.486* 7.448   PCO2 48* 41   PO2 78 81   HCO3 35* 28*   FIO2  --  100.0     No results for input(s): INR, INREXT, INREXT in the last 72 hours. Other pertinent lab:          Assessment & Plan:      1. The patient's overall presentation is compatible with decompensated congestive heart failure. Chest x-ray shows pulmonary edema and pleural effusions. Blood gases are compatible with acute hypoxic respiratory failure due to decompensated congestive heart failure. She has diuresed very well and is subjectively feeling much better. She is now on 2 L oxygen. Last night she did not use BiPAP. In the emergency room over the night she was on BiPAP for a few hours. Initially, on presentation, she was on nonrebreather mask. Continue with diuresis. Cardiology input noted.   She had a CLAUDIA done on 10/27 which showed preserved ejection fraction. Only mild mitral regurg and does not need any intervention. It is felt that her uncontrolled hypertension caused her to go into pulmonary edema. I explained this to the patient also. Chest x-ray done today still shows moderate dependent pleural effusion with associated atelectasis although there is overall improvement in pulmonary edema. Compared to 11/2/2020.    2.  Chronic obstructive pulmonary disease. However, I do not feel that she is in exacerbation. Continue with bronchodilator treatments. She does have oxygen at home, but that is probably more of a reflection of her congestive heart failure. 3.  Difficult-to-control hypertension. She has renal artery stenosis. 4.  Seizure disorder. 5.  Hypothyroidism. 6.  Reflux disease. 7.  For deep venous thrombosis prophylaxis, she is started on Lovenox subcutaneously.     Replacing potassium. Thank you for allowing me to participate in the care of this patient. I will follow the patient closely with you. Continue with nasal cannula and keep saturation more than 90%. Discussed with the RN. More than 30 minutes spent with patient care.      Barrett Walker MD

## 2020-11-04 NOTE — PROGRESS NOTES
Comprehensive Nutrition Assessment    Type and Reason for Visit: Initial(low BMI)    Nutrition Recommendations/Plan:   Continue with regular/thins Cardiac diet per SLP   Adding Ensure Enlive BID   Adding Magic Cup daily  Nsg to document wts, intakes, BMs    Nutrition Assessment:  Admitted for CHF exacerbation, PEDRO PABLO. Noted SLP eval at last admit (10/1) rec'd chopped/NTL but pt on regular texture cardiac diet this AM. RD called RN to place SLP consult, adjusted pt to diet at last admit for safety and will defer further texture/consistency changes to SLP. RN also stated pt ate 100% of B and was asking for Ensure, RD added in AM as well. Interviewed pt later, pt endorses excellent appetite, agreeable to Ensure and magic cup as at last assessment. RN also f/u that SLP cleared pt for regular diet, RD to adjust. Labs: Na 135, K+ 2.9, Cr 1.45, H/H 8.6/26.7, BG wnl. Meds:  Xanax, bumex, pepcid, FeSu, B9, keppra, levothyroxine, MgO, antiemetics, KCL, zoloft. Malnutrition Assessment:  Malnutrition Status:  Mild malnutrition    Context:  Acute illness     Findings of the 6 clinical characteristics of malnutrition:   Energy Intake:  No significant decrease in energy intake(within past 1 mo)  Weight Loss:  (pta, wt gain since)     Body Fat Loss:  1 - Mild body fat loss, Triceps   Muscle Mass Loss:  1 - Mild muscle mass loss, Thigh (quadraceps), Scapula (trapezius), Temples (temporalis), Clavicles (pectoralis & deltoids), Calf  Fluid Accumulation:  No significant fluid accumulation,      Estimated Daily Nutrient Needs:  Energy (kcal): 1651kcals (32kcals/kg); Weight Used for Energy Requirements: Current  Protein (g): 62g (1.2g/kg); Weight Used for Protein Requirements: Current  Fluid (ml/day): 1651ml; Weight Used for Fluid Requirements: Current   Needs for wt gain, CHF    Nutrition Related Findings:  NFPE mild-moderate muscle, mild fat wasting. No issues with N/V/D/C, last BM 11/4, no issues with c/s (SLP eval'd today). Wounds:    None       Current Nutrition Therapies:  DIET NUTRITIONAL SUPPLEMENTS Lunch, Breakfast; Ensure Enlive  DIET NUTRITIONAL SUPPLEMENTS Dinner; Magic Cup  DIET CARDIAC Regular    Anthropometric Measures:  · Height:  5' 5\" (165.1 cm)  · Current Body Wt:  51.6 kg (113 lb 12.1 oz)(11/4)   · Admission Body Wt:  100 lb 5 oz(11/2)    · Usual Body Wt:  (tomasz)     · Ideal Body Wt:  125 lbs:  91 %   · BMI Category:  Underweight (BMI less than 22) age over 72     Wt hx: 47.7kg (9/29), 56.7kg (9/21- edema?)  Per RN, pt endorsing 40# wt loss in 1 year, per last assessment 1 month ago pt endorsed 20# wt loss with # 1 year ago. Per new wt today, pt with some wt gain since last admit.      Nutrition Diagnosis:   · Underweight related to inadequate protein-energy intake(Prior to last admit) as evidenced by BMI(19 with new wt)      Nutrition Interventions:   Food and/or Nutrient Delivery: Continue current diet, Start oral nutrition supplement(ensure Enlive, Magic cup)  Nutrition Education and Counseling: No recommendations at this time  Coordination of Nutrition Care: Continue to monitor while inpatient, Swallow evaluation    Goals:  Meet >75% EENs via PO   Wt gain 0.5kg per week   Ines wnl       Nutrition Monitoring and Evaluation:   Behavioral-Environmental Outcomes: None identified  Food/Nutrient Intake Outcomes: Food and nutrient intake, Supplement intake  Physical Signs/Symptoms Outcomes: Weight, Chewing or swallowing    Discharge Planning:    Continue oral nutrition supplement     Electronically signed by Segundo Sapp RD on 11/4/2020 at 8:48 AM    Contact: Ext [phone]

## 2020-11-04 NOTE — PROGRESS NOTES
SPEECH LANGUAGE PATHOLOGY BEDSIDE SWALLOW EVALUATIONS  Patient: Shanika Washington  (72 y.o. )  Date: 11/4/2020  Primary Diagnosis:   CHF exacerbation ([facility] [address]   Precautions:      ASSESSMENT :  Patient presents w/ minimal to mild pharyngeal dysphagia. Oral phase is wfl. Pharyngeal phase c/b timely swallow and HLE is wfl upon palpation. Double swallow w/ thin liquids. Patient noted to independently utilize chin tuck and small sips. No overt s/s of pen/asp observed. Patient will benefit from skilled intervention to address the above impairments. Patients rehabilitation potential is considered to be Good     PLAN :  Recommendations and Planned Interventions:  Rec regular/thin diet w/ strict asp/GERD precautions. Chin tuck w/ liquids, NO straws, slow rate of intake, small bites/sips, double swallow and liquid wash. Given severity of dysfunction during previous admission and improvement at bedside she may benefit from repeat MBS. Frequency/Duration: Patient will be followed by speech-language pathology 4 times a week to address goals. Discharge Recommendations: Home Health     SUBJECTIVE:   Patient reports utilizing chin tuck at home and completing swallowing exercises. OBJECTIVE:   Prior MBS on 9/25/2020 impression: ASSESSMENT :  Based on the objective data described below, the patient presents with moderate oropharyngeal dysphagia. Oral phase c/b reduced lingual strength w/ decreased A-P transit. All consistencies initiate at the level of the vallecula. Overall, mild swallow delay appreciated. Moderate reduction in BOT retraction, HLE, and pharyngeal constriction. Reduced pressure drive noted. Epiglottis w/ limited inversion. There was some improvement of inversion noted during chin tuck maneuver. Consistent penetration w/ subsequent aspiration below the VFs w/ all consistencies. Independent immediate throat clear, eliminates aspiration but not penetration.  W/ chin tuck there is elimination of pen/asp w/ pudding on all trials and HTL 2/3 trials. Chin tuck does not eliminate w/ thin and NTL trials. Moderate pharyngeal residue that reduces but does not fully clear w/ double swallow and liquid wash. Reduced relaxation and duration of relaxation of UES. Slow movement of bolus w/ residual through proximal esophagus noted. Concerns for esophageal dysfunction present. Patient was upgraded to chopped/NTL w/ free water protocol prior to D/C. Past Medical History:   Diagnosis Date    PEDRO PABLO (acute kidney injury) ([facility] [address] 10/2/2020    Anxiety 10/2/2020    Anxiety     CHF (congestive heart failure) ([facility] [address] 10/2/2020    With preserved lvef, diastolic dysfunction, mitral valve regurgitation moderate. Chronic obstructive pulmonary disease (HCC)     Chronic respiratory failure (HCC)     Congestive heart failure (CHF) ([facility])     COPD (chronic obstructive pulmonary disease) with emphysema ([facility] [address] 10/2/2020    Dysphagia 10/2/2020    GERD (gastroesophageal reflux disease)     Hypertension     Hypertension     Iron deficiency anemia 10/2/2020    Mitral valve regurgitation 10/2/2020    Psychiatric disorder     Renal artery stenosis (HCC)     Seizure disorder (HCC)     Thyroid disease        CXR Results  (Last 48 hours)                 11/02/20 1524  XR CHEST PA LAT Final result    Impression:  Impression: Bilateral pulmonary vascular congestion and bilateral pleural   effusions/CHF. Narrative:  2 views chest:       History: SOB       COMPARISON: Chest 10/16/2020       There is congested pulmonary vasculature. There is density of both lung bases   obscuring the diaphragm shadows and heart borders. .                    Diet prior to admission: Regular  Current Diet:  DIET CARDIAC  DIET NUTRITIONAL SUPPLEMENTS  DIET NUTRITIONAL SUPPLEMENTS     Cognitive and Communication Status:  Neurologic State: Alert  Orientation Level: Oriented X4  Cognition: Follows commands           Swallowing Evaluation:   Oral Assessment:  Oral Assessment  Labial: No impairment  Dentition: Intact  Oral Hygiene: caries  Lingual: No impairment  Velum: No impairment  Mandible: No impairment  P.O. Trials:  Patient Position: upright in bed  Vocal quality prior to P.O.: Hoarse  Consistency Presented: Puree; Solid; Thin liquid  How Presented: Self-fed/presented;Cup/sip;Spoon;Straw     Bolus Acceptance: No impairment  Bolus Formation/Control: No impairment     Propulsion: No impairment  Oral Residue: None  Initiation of Swallow: No impairment  Laryngeal Elevation: Functional  Aspiration Signs/Symptoms: None  Pharyngeal Phase Characteristics: Double swallow             Oral Phase Severity: No impairment  Pharyngeal Phase Severity : Mild  Voice:    Vocal Quality: Hoarse      Pain:   0            After treatment:   Patient left in no apparent distress in bed, Call bell within reach, and Nursing notified    COMMUNICATION/EDUCATION:   Patient was educated regarding her deficit(s) of dysphagia as this relates to her diagnosis of CHF. She demonstrated Good understanding as evidenced by engagement and recall. The patient's plan of care including recommendations, planned interventions, and recommended diet changes were discussed with: Registered nurse. Patient/family have participated as able in goal setting and plan of care. Problem: Dysphagia (Adult)  Goal: *Acute Goals and Plan of Care (Insert Text)  Description: Speech Therapy Swallow Goals  Initiated 11/4/2020  -Patient will tolerate Regular diet with thin liquids without clinical indicators of aspiration given no cues within 7 day(s). [ ] Not met  [ ]  MET   [ ] Progressing  [ ] Joseph Edmonds  -Patient will tolerate PO trials without clinical indicators of aspiration given no cues within  day(s). [ ] Not met  [ ]  MET   [ ] Progressing  [ ] Joseph Edmonds  -Patient will participate in modified barium swallow study within 7 day(s).          [ ] Not met  [ ]  MET   [ ] Elia Fajardo  [ ] Discontinue  -Patient will perform laryngeal strengthening exercises with minimal cues within 7 day(s). [ ] Not met  [ ]  MET   [ ] Progressing  [ ] Blair De La O  -Patient will demonstrate understanding of swallow safety precautions and aspiration precautions, diet recs with no cues within 7 day(s).         [ ] Not met  [ ]  MET   [ ] Martín Cervantes  [ ] Discontinue      Outcome: Initial Goals     Thank you for this referral.  Rosette Chandler M.S., M.Ed., CCC-SLP  Time Calculation: 17 mins

## 2020-11-04 NOTE — DISCHARGE INSTRUCTIONS
Patient Education        Chronic Obstructive Pulmonary Disease (COPD): Care Instructions  Your Care Instructions     Chronic obstructive pulmonary disease (COPD) is a general term for a group of lung diseases, including emphysema and chronic bronchitis. People with COPD have decreased airflow in and out of the lungs, which makes it hard to breathe. The airways also can get clogged with thick mucus. Cigarette smoking is a major cause of COPD. Although there is no cure for COPD, you can slow its progress. Following your treatment plan and taking care of yourself can help you feel better and live longer. Follow-up care is a key part of your treatment and safety. Be sure to make and go to all appointments, and call your doctor if you are having problems. It's also a good idea to know your test results and keep a list of the medicines you take. How can you care for yourself at home? Staying healthy    · Do not smoke. This is the most important step you can take to prevent more damage to your lungs. If you need help quitting, talk to your doctor about stop-smoking programs and medicines. These can increase your chances of quitting for good.     · Avoid colds and flu. Get a pneumococcal vaccine shot. If you have had one before, ask your doctor whether you need a second dose. Get the flu vaccine every fall. If you must be around people with colds or the flu, wash your hands often.     · Avoid secondhand smoke, air pollution, and high altitudes. Also avoid cold, dry air and hot, humid air. Stay at home with your windows closed when air pollution is bad. Medicines and oxygen therapy    · Take your medicines exactly as prescribed. Call your doctor if you think you are having a problem with your medicine. You may be taking medicines such as:  ? Bronchodilators. These help open your airways and make breathing easier. They are either short-acting (work for 6 to 9 hours) or long-acting (work for 24 hours).  You inhale most bronchodilators, so they start to act quickly. Always carry your quick-relief inhaler with you in case you need it while you are away from home. ? Corticosteroids (prednisone, budesonide). These reduce airway inflammation. They come in pill or inhaled form. You must take these medicines every day for them to work well.     · Ask your doctor or pharmacist if a spacer is right for you. A spacer may help you get more inhaled medicine to your lungs. If you use one, ask how to use it properly.     · Do not take any vitamins, over-the-counter medicine, or herbal products without talking to your doctor first.     · If your doctor prescribed antibiotics, take them as directed. Do not stop taking them just because you feel better. You need to take the full course of antibiotics.     · If you use oxygen therapy, use the flow rate your doctor has recommended. Don't change it without talking to your doctor first. Oxygen therapy boosts the amount of oxygen in your blood and helps you breathe easier. Activity    · Get regular exercise. Walking is an easy way to get exercise. Start out slowly, and walk a little more each day.     · Pay attention to your breathing. You are exercising too hard if you can't talk while you exercise.     · Take short rest breaks when doing household chores and other activities.     · Learn breathing methods--such as breathing through pursed lips--to help you become less short of breath.     · If your doctor has not set you up with a pulmonary rehabilitation program, ask if rehab is right for you. Rehab includes exercise programs, education about your disease and how to manage it, help with diet and other changes, and emotional support. Diet    · Eat regular, healthy meals. Use bronchodilators about 1 hour before you eat to make it easier to eat. Eat several small meals instead of three large ones.  Drink beverages at the end of the meal. Avoid foods that are hard to chew.     · Eat foods that contain protein so you don't lose muscle mass.     · Talk with your doctor if you gain too much weight or if you lose weight without trying. Mental health    · Talk to your family, friends, or a therapist about your feelings. Some people feel frightened, angry, hopeless, helpless, and even guilty. Talking openly about bad feelings can help you cope. If these feelings last, talk to your doctor. When should you call for help? Call 911 anytime you think you may need emergency care. For example, call if:    · You have severe trouble breathing. Call your doctor now or seek immediate medical care if:    · You have new or worse trouble breathing.     · You cough up blood.     · You have a fever. Watch closely for changes in your health, and be sure to contact your doctor if:    · You cough more deeply or more often, especially if you notice more mucus or a change in the color of your mucus.     · You have new or worse swelling in your legs or belly.     · You are not getting better as expected. Where can you learn more? Go to http://www.gray.com/  Enter R906 in the search box to learn more about \"Chronic Obstructive Pulmonary Disease (COPD): Care Instructions. \"  Current as of: February 24, 2020               Content Version: 12.6  © 1644-0118 Healthwise, Incorporated. Care instructions adapted under license by [employer] (which disclaims liability or warranty for this information). If you have questions about a medical condition or this instruction, always ask your healthcare professional. [place] [address] any warranty or liability for your use of this information. Patient Education        COPD Exacerbation Plan: Care Instructions  Your Care Instructions     If you have chronic obstructive pulmonary disease (COPD), your usual shortness of breath could suddenly get worse. You may start coughing more and have more mucus.  This flare-up is called a COPD exacerbation (say \"[id number]-[id number]\"). A lung infection or air pollution could set off an exacerbation. Sometimes it can happen after a quick change in temperature or being around chemicals. Work with your doctor to make a plan for dealing with an exacerbation. You can better manage it if you plan ahead. Follow-up care is a key part of your treatment and safety. Be sure to make and go to all appointments, and call your doctor if you are having problems. It's also a good idea to know your test results and keep a list of the medicines you take. How can you care for yourself at home? During an exacerbation  · Do not panic if you start to have one. Quick treatment at home may help you prevent serious breathing problems. If you have a COPD exacerbation plan that you developed with your doctor, follow it. · Take your medicines exactly as your doctor tells you.  ? Use your inhaler as directed by your doctor. If your symptoms do not get better after you use your medicine, have someone take you to the emergency room. Call an ambulance if necessary. ? With inhaled medicines, a spacer or a nebulizer may help you get more medicine to your lungs. Ask your doctor or pharmacist how to use them properly. Practice using the spacer in front of a mirror before you have an exacerbation. This may help you get the medicine into your lungs quickly. ? If your doctor has given you steroid pills, take them as directed. ? Your doctor may have given you a prescription for antibiotics, which you can fill if you need it. ? Talk to your doctor if you have any problems with your medicine. And call your doctor if you have to use your antibiotic or steroid pills. Preventing an exacerbation  · Do not smoke. This is the most important step you can take to prevent more damage to your lungs and prevent problems. If you already smoke, it is never too late to stop.  If you need help quitting, talk to your doctor about stop-smoking programs and medicines. These can increase your chances of quitting for good. · Take your daily medicines as prescribed. · Avoid colds and flu. ? Get a pneumococcal vaccine. ? Get a flu vaccine each year, as soon as it is available. Ask those you live or work with to do the same, so they will not get the flu and infect you. ? Try to stay away from people with colds or the flu. ? Wash your hands often. · Avoid secondhand smoke; air pollution; cold, dry air; hot, humid air; and high altitudes. Stay at home with your windows closed when air pollution is bad. · Learn breathing techniques for COPD, such as breathing through pursed lips. These techniques can help you breathe easier during an exacerbation. When should you call for help? Call 911 anytime you think you may need emergency care. For example, call if:    · You have severe trouble breathing.     · You have severe chest pain. Call your doctor now or seek immediate medical care if:    · You have new or worse shortness of breath.     · You develop new chest pain.     · You are coughing more deeply or more often, especially if you notice more mucus or a change in the color of your mucus.     · You cough up blood.     · You have new or increased swelling in your legs or belly.     · You have a fever. Watch closely for changes in your health, and be sure to contact your doctor if:    · You need to use your antibiotic or steroid pills.     · Your symptoms are getting worse. Where can you learn more? Go to http://www.gray.com/  Enter U536 in the search box to learn more about \"COPD Exacerbation Plan: Care Instructions. \"  Current as of: February 24, 2020               Content Version: 12.6  © 5243-8041 Healthwise, Incorporated. Care instructions adapted under license by [employer] (which disclaims liability or warranty for this information).  If you have questions about a medical condition or this instruction, always ask your healthcare professional. [address] any warranty or liability for your use of this information. Patient Education        Limiting Sodium With Heart Failure: Care Instructions  Your Care Instructions     Sodium causes your body to hold on to extra water. This may cause your heart failure symptoms to get worse. Limiting sodium may help you feel better. People get most of their sodium from processed foods. Fast food and restaurant meals also tend to be very high in sodium. Your doctor may suggest that you limit sodium. Your doctor can tell you how much sodium is right for you. An example is less than 3,000 mg a day. This includes all the salt you eat in cooked or packaged foods. Follow-up care is a key part of your treatment and safety. Be sure to make and go to all appointments, and call your doctor if you are having problems. It's also a good idea to know your test results and keep a list of the medicines you take. How can you care for yourself at home? Read food labels  · Read food labels on cans and food packages. The labels tell you how much sodium is in each serving. Make sure that you look at the serving size. If you eat more than the serving size, you have eaten more sodium than is listed for one serving. · Food labels also tell you the Percent Daily Value for sodium. Choose products with low Percent Daily Values for sodium. · Be aware that sodium can come in forms other than salt, including monosodium glutamate (MSG), sodium citrate, and sodium bicarbonate (baking soda). MSG is often added to Asian food. You can sometimes ask for food without MSG or salt. Buy low-sodium foods  · Buy foods that are labeled \"unsalted\" (no salt added), \"sodium-free\" (less than 5 mg of sodium per serving), or \"low-sodium\" (less than 140 mg of sodium per serving). A food labeled \"light sodium\" has less than half of the full-sodium version of that food.  Foods labeled \"reduced-sodium\" may still have too much sodium. · Buy fresh vegetables or plain, frozen vegetables. Buy low-sodium versions of canned vegetables, soups, and other canned goods. Prepare low-sodium meals  · Use less salt each day when cooking. Reducing salt in this way will help you adjust to the taste. Do not add salt after cooking. Take the salt shaker off the table. · Flavor your food with garlic, lemon juice, onion, vinegar, herbs, and spices instead of salt. Do not use soy sauce, steak sauce, onion salt, garlic salt, mustard, or ketchup on your food. · Make your own salad dressings, sauces, and ketchup without adding salt. · Use less salt (or none) when recipes call for it. You can often use half the salt a recipe calls for without losing flavor. Other dishes like rice, pasta, and grains do not need added salt. · Rinse canned vegetables. This removes some--but not all--of the salt. · Avoid water that has a naturally high sodium content or that has been treated with water softeners, which add sodium. Call your local water company to find out the sodium content of your water supply. If you buy bottled water, read the label and choose a sodium-free brand. Avoid high-sodium foods, such as:  · Smoked, cured, salted, and canned meat, fish, and poultry. · Ham, mccall, hot dogs, and luncheon meats. · Regular, hard, and processed cheese and regular peanut butter. · Crackers with salted tops. · Frozen prepared meals. · Canned and dried soups, broths, and bouillon, unless labeled sodium-free or low-sodium. · Canned vegetables, unless labeled sodium-free or low-sodium. · Salted snack foods such as chips and pretzels. · [place] [employer] fries, pizza, tacos, and other fast foods. · Pickles, olives, ketchup, and other condiments, especially soy sauce, unless labeled sodium-free or low-sodium. If you cannot cook for yourself  · Have family members or friends help you, or have someone cook low-sodium meals.   · Check with your local senior nutrition program to find out where meals are served and whether they offer a low-sodium option. You can often find these programs through your local health department or hospital.  · Have meals delivered to your home. Most [facility] have a Meals on [address] [address]. These programs provide one hot meal a day for older adults, delivered to their homes. Ask whether these meals are low-sodium. Let them know that you are on a low-sodium diet. Where can you learn more? Go to http://www.gray.com/  Enter A166 in the search box to learn more about \"Limiting Sodium With Heart Failure: Care Instructions. \"  Current as of: December 16, 2019               Content Version: 12.6  © 1607-0686 [employer]. Care instructions adapted under license by [employer] (which disclaims liability or warranty for this information). If you have questions about a medical condition or this instruction, always ask your healthcare professional. [place] [address] any warranty or liability for your use of this information. Patient Education        Avoiding Triggers With Heart Failure: Care Instructions  Your Care Instructions     Triggers are anything that make your heart failure flare up. A flare-up is also called \"sudden heart failure\" or \"acute heart failure. \" When you have a flare-up, fluid builds up in your lungs, and you have problems breathing. You might need to go to the hospital. By watching for changes in your condition and avoiding triggers, you can prevent heart failure flare-ups. Follow-up care is a key part of your treatment and safety. Be sure to make and go to all appointments, and call your doctor if you are having problems. It's also a good idea to know your test results and keep a list of the medicines you take. How can you care for yourself at home?   Watch for changes in your weight and condition  · Weigh yourself without clothing at the same time each day. Record your weight. Call your doctor if you have sudden weight gain, such as more than 2 to 3 pounds in a day or 5 pounds in a week. (Your doctor may suggest a different range of weight gain.) A sudden weight gain may mean that your heart failure is getting worse. · Keep a daily record of your symptoms. Write down any changes in how you feel, such as new shortness of breath, cough, or problems eating. Also record if your ankles are more swollen than usual and if you feel more tired than usual. Note anything that you ate or did that could have triggered these changes. Limit sodium  Sodium causes your body to hold on to extra water. This may cause your heart failure symptoms to get worse. People get most of their sodium from processed foods. Fast food and restaurant meals also tend to be very high in sodium. · Your doctor may suggest that you limit sodium. Your doctor can tell you how much sodium is right for you. This includes limiting sodium in cooked and packaged foods. · Read food labels on cans and food packages. They tell you how much sodium you get in one serving. Check the serving size. If you eat more than one serving, you are getting more sodium. · Be aware that sodium can come in forms other than salt, including monosodium glutamate (MSG), sodium citrate, and sodium bicarbonate (baking soda). MSG is often added to Asian food. You can sometimes ask for food without MSG or salt. · Slowly reducing salt will help you adjust to the taste. Take the salt shaker off the table. · Flavor your food with garlic, lemon juice, onion, vinegar, herbs, and spices instead of salt. Do not use soy sauce, steak sauce, onion salt, garlic salt, mustard, or ketchup on your food, unless it is labeled \"low-sodium\" or \"low-salt. \"  · Make your own salad dressings, sauces, and ketchup without adding salt. · Use fresh or frozen ingredients, instead of canned ones, whenever you can.  Choose low-sodium canned goods.  · Eat less processed food and food from restaurants, including fast food. Exercise as directed  Moderate, regular exercise is very good for your heart. It improves your blood flow and helps control your weight. But too much exercise can stress your heart and cause a heart failure flare-up. · Check with your doctor before you start an exercise program.  · Walking is an easy way to get exercise. Start out slowly. Gradually increase the length and pace of your walk. Swimming, riding a bike, and using a treadmill are also good forms of exercise. · When you exercise, watch for signs that your heart is working too hard. You are pushing yourself too hard if you cannot talk while you are exercising. If you become short of breath or dizzy or have chest pain, stop, sit down, and rest.  · Do not exercise when you do not feel well. Take medicines correctly  · Take your medicines exactly as prescribed. Call your doctor if you think you are having a problem with your medicine. · Make a list of all the medicines you take. Include those prescribed to you by other doctors and any over-the-counter medicines, vitamins, or supplements you take. Take this list with you when you go to any doctor. · Take your medicines at the same time every day. It may help you to post a list of all the medicines you take every day and what time of day you take them. · Make taking your medicine as simple as you can. Plan times to take your medicines when you are doing other things, such as eating a meal or getting ready for bed. This will make it easier to remember to take your medicines. · Get organized. Use helpful tools, such as daily or weekly pill containers. When should you call for help? Call 911 if you have symptoms of sudden heart failure such as:    · You have severe trouble breathing.     · You cough up pink, foamy mucus.     · You have a new irregular or rapid heartbeat.    Call your doctor now or seek immediate medical care if:    · You have new or increased shortness of breath.     · You are dizzy or lightheaded, or you feel like you may faint.     · You have sudden weight gain, such as more than 2 to 3 pounds in a day or 5 pounds in a week. (Your doctor may suggest a different range of weight gain.)     · You have increased swelling in your legs, ankles, or feet.     · You are suddenly so tired or weak that you cannot do your usual activities. Watch closely for changes in your health, and be sure to contact your doctor if you develop new symptoms. Where can you learn more? Go to http://www.gray.com/  Enter V089 in the search box to learn more about \"Avoiding Triggers With Heart Failure: Care Instructions. \"  Current as of: December 16, 2019               Content Version: 12.6  © 1246-3660 [employer]. Care instructions adapted under license by [employer] (which disclaims liability or warranty for this information). If you have questions about a medical condition or this instruction, always ask your healthcare professional. [address] any warranty or liability for your use of this information.

## 2020-11-04 NOTE — PROGRESS NOTES
CARDIOLOGY PROGRESS NOTE - NP    Patient seen and examined. This is a patient who is followed for congestive heart failure. States her breathing has significantly improved, on less nasal cannular oxygen. Denies any chest pain or dizziness. She does endorses anxiety in regards to her diagnosis and feels that makes her blood pressure worse. No other complaints reported. Telemetry reviewed, there were no events noted in the past 24 hours. Sinus rhythm 60-70    Pertinent review of systems items noted above, all other systems are negative. Current medications reviewed. Physical Examination  Vital signs are stable. Blood pressure 169/99, Pulse 96  No apparent distress. Anxious. Heart is regular, rate and rhythm. Normal S1, S2, no murmurs are appreciated. Lungs are clear to crackles bilaterally. Abdomen is soft, nontender, normal bowel sounds. Extremities have no edema. Labs reviewed:  K 2.9   Crt 1.4    Case discussed with Dr. Israel Cárdenas and our impression and recommendations are as follows:  1. Acute heart failure with preserved ejection fraction: Troponin negative. Pro-BNP is 7769. Chest xray completed in the ER indicates vascular congestion with bilateral pleural efffusions. Has had good urine output; negative fluid balance. Will switch to Bumex 1mg BID for discharge. Can assess BMP at follow up. Recommend to keep serum potassium between 4-5 and serum magnesium > 2.  Repletion ordered. Recent CLAUDIA on 10/27 shows preserved EF. 2. Mitral regurgitation: Mild per CLAUDIA on 10/27. Valve does not require any intervention. After discussion with Dr. Israel Cárdenas uncontrolled hypertension leading to flash pulmonary edema is the issue. 3. Hypertension: Elevated. Continue home medications of labetalol, nifedipine, isosorbide, and hydralazine. Previous renal duplex positive for renal artery stenosis which she sees nephrology for. Will obtain CT of abdomen to further assess renal arteries.  She may require outpatient renal angiogram.     4. Hypokalemia: K 2.9., repletions ordered in addition to 20  Meq daily. 5. Acute on chronic hypoxic respiratory failure: With COPD. Please do not hesitate to call me or Dr. Noreen Cabot if additional questions arise.

## 2020-11-04 NOTE — PROGRESS NOTES
Met with Ms. Kg Godwin today to provide heart failure education. Patient denies previous heart failure education other than being told to weigh daily. She reports running out of her diuretic because she was instructed by ([address]) to double up due to fluid retention. She reports having increasing shortness of breath so she came to the ED. Provided education on worsening symptom recognition (rapid weight gain, sob, edema, cough, activity intolerance, full/bloated feeling, inability to lay flat) and management strategies (daily weight tracking, limiting fluid and sodium intake, medication and follow up appointment compliance, heart healthy eating plan) Ms. Kg Godwin reports she stopped smoking back in the summer (2020) due to lengthy admission for pneumonia. She admits to dietary indiscretion with salt. She frequently eats frozen [employer] dinners. Emphasis placed on importance of reading nutrition labels for sodium content and making heart healthy choices when food shopping. She tells me she does not like to weigh due to unexplained 45 lb weight loss. She questions a cancer diagnosis. She was very engaged and receptive of this education. Provided patient with chf and limiting sodium educational materials to help her be successful.

## 2020-11-04 NOTE — PROGRESS NOTES
Bedside and Verbal shift change report given to Magdy Mcrae (oncoming nurse) by Vidhi Cheema RN (offgoing nurse). Report included the following information SBAR, OR Summary, Procedure Summary, Intake/Output, MAR and Recent Results. 1057: Spoke with Sergo Starks. Patient to be discharged this afternoon following a repeat BNP.     1400: Spoke with Eliezer Foster, patient to be discharged after CT of abdomen today. 1700: d/c instructions given to patient and patient's friend.

## 2020-11-04 NOTE — DISCHARGE SUMMARY
Hospitalist Discharge Summary     Patient ID:  Zhane Lopez  [id number]  66 y.o.  1965 11/2/2020    PCP on record: Edwardwalter Muna date: 11/2/2020  Discharge date and time: 11/4/2020    DISCHARGE DIAGNOSIS:    CHF exacerbation    CONSULTATIONS:  IP CONSULT TO PULMONOLOGY  IP CONSULT TO CARDIOLOGY    Excerpted HPI from H&P of Erika Sow MD:  Zhane Lopez is a 54 y.o. female who has history of congestive heart failure, mitral valve regurgitation, resistant hypertension with renal artery stenosis and seizure disorder came to the hospital with a complaint of worsening shortness of breath since last few days. Patient was checking her oxygen at home and noticed that her oxygen was dropping to 70s. She started using oxygen at home. Patient visited ER about 2 days ago and was treated and advised discharge home however patient continues to have shortness of breath. Patient states that she also ran out of her diuretics. Today patient was noticed to have oxygen saturation in the 80s upon arrival along with a cough. She denies any sputum production fever or chills. Hospitalist service was requested to admit the patient for further work-up and management. Here you go    ______________________________________________________________________  DISCHARGE SUMMARY/HOSPITAL COURSE:  for full details see H&P, daily progress notes, labs, consult notes. Patient was subsequently admitted to [facility] with a diagnosis of acute respiratory failure with hypoxia secondary to CHF exacerbation.   Patient received aggressive IV diuresis, patient was evaluated by pulmonology as well as cardiology, patient's clinical status improved significantly, patient diuresed well, following which patient was placed on p.o. diuretics, of note patient does use home O2, patient ambulated on supplemental oxygen without any issues following which patient was deemed stable for discharge with subsequent outpatient follow-up with cardiology as well as pulmonology as well as primary care physician. Discussed plan with patient who agrees with current management plan.        _______________________________________________________________________  Patient seen and examined by me on discharge day. Pertinent Findings:  Gen:    Not in distress  Chest: Clear lungs  CVS:   Regular rhythm. No edema  Abd:  Soft, not distended, not tender  Neuro:  Alert, Oriented x 4  _______________________________________________________________________  DISCHARGE MEDICATIONS:   Current Discharge Medication List      START taking these medications    Details   potassium chloride (K-DUR, KLOR-CON) 20 mEq tablet Take 1 Tab by mouth daily. Qty: 30 Tab, Refills: 0         CONTINUE these medications which have CHANGED    Details   bumetanide (BUMEX) 1 mg tablet Take 1 Tab by mouth two (2) times a day. Qty: 60 Tab, Refills: 0         CONTINUE these medications which have NOT CHANGED    Details   NIFEdipine ER (PROCARDIA XL) 60 mg ER tablet Take 1 Tab by mouth daily. Oxygen 5 Devices as needed. Pt wears 5LPM as needed- states she uses it after an axiety attack      isosorbide dinitrate (ISORDIL) 40 mg tablet Take 1 Tab by mouth three (3) times daily. Qty: 90 Tab, Refills: 0      albuterol-ipratropium (DUO-NEB) 2.5 mg-0.5 mg/3 ml nebu 3 mL by Nebulization route every six (6) hours. Qty: 120 Nebule, Refills: 0      labetaloL (NORMODYNE) 200 mg tablet Take 2 Tabs by mouth three (3) times daily. Qty: 180 Tab, Refills: 0      ALPRAZolam (XANAX) 0.25 mg tablet Take 0.25 mg by mouth three (3) times daily as needed for Anxiety. sertraline (Zoloft) 25 mg tablet Take 25 mg by mouth daily. hydrALAZINE (APRESOLINE) 50 mg tablet Take 100 mg by mouth three (3) times daily. levothyroxine (SYNTHROID) 25 mcg tablet Take 25 mcg by mouth Daily (before breakfast).       levETIRAcetam (Keppra) 500 mg tablet Take 500 mg by mouth two (2) times a day.      carBAMazepine, mood stabiliz, 200 mg CM12 Take 200 mg by mouth two (2) times a day. ferrous sulfate 325 mg (65 mg iron) tablet Take 325 mg by mouth Daily (before breakfast). folic acid (FOLVITE) 1 mg tablet Take 1 mg by mouth daily. aspirin delayed-release 81 mg tablet Take  by mouth daily. magnesium oxide (MAG-OX) 400 mg tablet Take 400 mg by mouth daily. STOP taking these medications       predniSONE (DELTASONE) 5 mg tablet Comments:   Reason for Stopping:                 Patient Follow Up Instructions: Activity: Activity as tolerated  Diet: Cardiac Diet with fluid restriction  Wound Care: None needed    Follow-up with PCP/Cardiology in 2 weeks. Follow-up tests/labs As per PCP/Cardiology  Follow-up Information     Follow up With Specialties Details Why [address] [facility], [address] [place] [address] In 2 weeks  Blanche Brown 53  [id number] [facility]      Raleigh Carreon MD Cardiology In 2 weeks  Yuval Diaz [address] [facility]  [phone]          ________________________________________________________________    Risk of deterioration: Moderate    Condition at Discharge:  Stable  __________________________________________________________________    Disposition  Home with family, no needs    ____________________________________________________________________    Code Status: Full Code  ___________________________________________________________________      Total time in minutes spent coordinating this discharge (includes going over instructions, follow-up, prescriptions, and preparing report for sign off to her PCP) :  40 minutes    Signed:   Harding Kussmaul, MD

## 2020-11-18 ENCOUNTER — APPOINTMENT (OUTPATIENT)
Dept: CT IMAGING | Age: 55
DRG: 194 | End: 2020-11-18
Attending: FAMILY MEDICINE
Payer: COMMERCIAL

## 2020-11-18 ENCOUNTER — APPOINTMENT (OUTPATIENT)
Dept: GENERAL RADIOLOGY | Age: 55
DRG: 194 | End: 2020-11-18
Attending: EMERGENCY MEDICINE
Payer: COMMERCIAL

## 2020-11-18 ENCOUNTER — HOSPITAL ENCOUNTER (INPATIENT)
Age: 55
LOS: 8 days | Discharge: ANOTHER HOSPITAL | DRG: 194 | End: 2020-11-26
Attending: EMERGENCY MEDICINE | Admitting: FAMILY MEDICINE
Payer: COMMERCIAL

## 2020-11-18 DIAGNOSIS — I50.9 ACUTE ON CHRONIC CONGESTIVE HEART FAILURE, UNSPECIFIED HEART FAILURE TYPE (HCC): ICD-10-CM

## 2020-11-18 DIAGNOSIS — J96.01 ACUTE RESPIRATORY FAILURE WITH HYPOXIA (HCC): Primary | ICD-10-CM

## 2020-11-18 PROBLEM — J96.91 RESPIRATORY FAILURE WITH HYPOXIA (HCC): Status: ACTIVE | Noted: 2020-11-18

## 2020-11-18 LAB
ALBUMIN SERPL-MCNC: 3.3 G/DL (ref 3.5–5)
ALBUMIN/GLOB SERPL: 0.8 {RATIO} (ref 1.1–2.2)
ALP SERPL-CCNC: 100 U/L (ref 45–117)
ALT SERPL-CCNC: 18 U/L (ref 12–78)
ANION GAP SERPL CALC-SCNC: 7 MMOL/L (ref 5–15)
AST SERPL W P-5'-P-CCNC: 18 U/L (ref 15–37)
BASOPHILS # BLD: 0 K/UL (ref 0–0.1)
BASOPHILS NFR BLD: 0 % (ref 0–1)
BILIRUB SERPL-MCNC: 0.3 MG/DL (ref 0.2–1)
BNP SERPL-MCNC: ABNORMAL PG/ML
BUN SERPL-MCNC: 20 MG/DL (ref 6–20)
BUN/CREAT SERPL: 16 (ref 12–20)
CA-I BLD-MCNC: 9.4 MG/DL (ref 8.5–10.1)
CHLORIDE SERPL-SCNC: 100 MMOL/L (ref 97–108)
CO2 SERPL-SCNC: 29 MMOL/L (ref 21–32)
CREAT SERPL-MCNC: 1.26 MG/DL (ref 0.55–1.02)
DIFFERENTIAL METHOD BLD: ABNORMAL
EOSINOPHIL # BLD: 0.2 K/UL (ref 0–0.4)
EOSINOPHIL NFR BLD: 2 % (ref 0–7)
ERYTHROCYTE [DISTWIDTH] IN BLOOD BY AUTOMATED COUNT: 14.5 % (ref 11.5–14.5)
GLOBULIN SER CALC-MCNC: 4.4 G/DL (ref 2–4)
GLUCOSE SERPL-MCNC: 94 MG/DL (ref 65–100)
HCT VFR BLD AUTO: 28.9 % (ref 35–47)
HGB BLD-MCNC: 9.3 G/DL (ref 11.5–16)
IMM GRANULOCYTES # BLD AUTO: 0 K/UL (ref 0–0.04)
IMM GRANULOCYTES NFR BLD AUTO: 0 % (ref 0–0.5)
LYMPHOCYTES # BLD: 1.6 K/UL (ref 0.8–3.5)
LYMPHOCYTES NFR BLD: 18 % (ref 12–49)
MCH RBC QN AUTO: 32.4 PG (ref 26–34)
MCHC RBC AUTO-ENTMCNC: 32.2 G/DL (ref 30–36.5)
MCV RBC AUTO: 100.7 FL (ref 80–99)
MONOCYTES # BLD: 0.3 K/UL (ref 0–1)
MONOCYTES NFR BLD: 4 % (ref 5–13)
NEUTS SEG # BLD: 6.6 K/UL (ref 1.8–8)
NEUTS SEG NFR BLD: 76 % (ref 32–75)
PLATELET # BLD AUTO: 366 K/UL (ref 150–400)
PMV BLD AUTO: 9.2 FL (ref 8.9–12.9)
POTASSIUM SERPL-SCNC: 3.2 MMOL/L (ref 3.5–5.1)
PROT SERPL-MCNC: 7.7 G/DL (ref 6.4–8.2)
RBC # BLD AUTO: 2.87 M/UL (ref 3.8–5.2)
SODIUM SERPL-SCNC: 136 MMOL/L (ref 136–145)
TROPONIN I SERPL-MCNC: <0.05 NG/ML
WBC # BLD AUTO: 8.7 K/UL (ref 3.6–11)

## 2020-11-18 PROCEDURE — 74011250637 HC RX REV CODE- 250/637: Performed by: EMERGENCY MEDICINE

## 2020-11-18 PROCEDURE — 74011250637 HC RX REV CODE- 250/637: Performed by: FAMILY MEDICINE

## 2020-11-18 PROCEDURE — 5A09357 ASSISTANCE WITH RESPIRATORY VENTILATION, LESS THAN 24 CONSECUTIVE HOURS, CONTINUOUS POSITIVE AIRWAY PRESSURE: ICD-10-PCS | Performed by: HOSPITALIST

## 2020-11-18 PROCEDURE — 94660 CPAP INITIATION&MGMT: CPT

## 2020-11-18 PROCEDURE — 84484 ASSAY OF TROPONIN QUANT: CPT

## 2020-11-18 PROCEDURE — 80053 COMPREHEN METABOLIC PANEL: CPT

## 2020-11-18 PROCEDURE — 74011250636 HC RX REV CODE- 250/636: Performed by: EMERGENCY MEDICINE

## 2020-11-18 PROCEDURE — 81001 URINALYSIS AUTO W/SCOPE: CPT

## 2020-11-18 PROCEDURE — 94761 N-INVAS EAR/PLS OXIMETRY MLT: CPT

## 2020-11-18 PROCEDURE — 94640 AIRWAY INHALATION TREATMENT: CPT

## 2020-11-18 PROCEDURE — 83735 ASSAY OF MAGNESIUM: CPT

## 2020-11-18 PROCEDURE — 74011000250 HC RX REV CODE- 250: Performed by: EMERGENCY MEDICINE

## 2020-11-18 PROCEDURE — 71045 X-RAY EXAM CHEST 1 VIEW: CPT

## 2020-11-18 PROCEDURE — 96375 TX/PRO/DX INJ NEW DRUG ADDON: CPT

## 2020-11-18 PROCEDURE — 83880 ASSAY OF NATRIURETIC PEPTIDE: CPT

## 2020-11-18 PROCEDURE — 93005 ELECTROCARDIOGRAM TRACING: CPT

## 2020-11-18 PROCEDURE — 36415 COLL VENOUS BLD VENIPUNCTURE: CPT

## 2020-11-18 PROCEDURE — 96374 THER/PROPH/DIAG INJ IV PUSH: CPT

## 2020-11-18 PROCEDURE — 99285 EMERGENCY DEPT VISIT HI MDM: CPT

## 2020-11-18 PROCEDURE — 65270000029 HC RM PRIVATE

## 2020-11-18 PROCEDURE — 85025 COMPLETE CBC W/AUTO DIFF WBC: CPT

## 2020-11-18 RX ORDER — ACETAMINOPHEN 650 MG/1
650 SUPPOSITORY RECTAL
Status: DISCONTINUED | OUTPATIENT
Start: 2020-11-18 | End: 2020-11-27 | Stop reason: HOSPADM

## 2020-11-18 RX ORDER — IPRATROPIUM BROMIDE AND ALBUTEROL SULFATE 2.5; .5 MG/3ML; MG/3ML
3 SOLUTION RESPIRATORY (INHALATION)
Status: COMPLETED | OUTPATIENT
Start: 2020-11-18 | End: 2020-11-18

## 2020-11-18 RX ORDER — PROMETHAZINE HYDROCHLORIDE 25 MG/1
12.5 TABLET ORAL
Status: DISCONTINUED | OUTPATIENT
Start: 2020-11-18 | End: 2020-11-27 | Stop reason: HOSPADM

## 2020-11-18 RX ORDER — LORAZEPAM 2 MG/ML
1 INJECTION INTRAMUSCULAR
Status: COMPLETED | OUTPATIENT
Start: 2020-11-18 | End: 2020-11-18

## 2020-11-18 RX ORDER — SODIUM CHLORIDE 0.9 % (FLUSH) 0.9 %
5-40 SYRINGE (ML) INJECTION AS NEEDED
Status: DISCONTINUED | OUTPATIENT
Start: 2020-11-18 | End: 2020-11-27 | Stop reason: HOSPADM

## 2020-11-18 RX ORDER — SODIUM CHLORIDE 0.9 % (FLUSH) 0.9 %
5-40 SYRINGE (ML) INJECTION EVERY 8 HOURS
Status: DISCONTINUED | OUTPATIENT
Start: 2020-11-18 | End: 2020-11-27 | Stop reason: HOSPADM

## 2020-11-18 RX ORDER — ACETAMINOPHEN 325 MG/1
650 TABLET ORAL
Status: DISCONTINUED | OUTPATIENT
Start: 2020-11-18 | End: 2020-11-27 | Stop reason: HOSPADM

## 2020-11-18 RX ORDER — POTASSIUM CHLORIDE 750 MG/1
30 TABLET, FILM COATED, EXTENDED RELEASE ORAL
Status: COMPLETED | OUTPATIENT
Start: 2020-11-18 | End: 2020-11-18

## 2020-11-18 RX ORDER — POLYETHYLENE GLYCOL 3350 17 G/17G
17 POWDER, FOR SOLUTION ORAL DAILY PRN
Status: DISCONTINUED | OUTPATIENT
Start: 2020-11-18 | End: 2020-11-27 | Stop reason: HOSPADM

## 2020-11-18 RX ORDER — FUROSEMIDE 10 MG/ML
40 INJECTION INTRAMUSCULAR; INTRAVENOUS
Status: COMPLETED | OUTPATIENT
Start: 2020-11-18 | End: 2020-11-18

## 2020-11-18 RX ORDER — ONDANSETRON 2 MG/ML
4 INJECTION INTRAMUSCULAR; INTRAVENOUS
Status: DISCONTINUED | OUTPATIENT
Start: 2020-11-18 | End: 2020-11-27 | Stop reason: HOSPADM

## 2020-11-18 RX ADMIN — METHYLPREDNISOLONE SODIUM SUCCINATE 125 MG: 125 INJECTION, POWDER, FOR SOLUTION INTRAMUSCULAR; INTRAVENOUS at 20:08

## 2020-11-18 RX ADMIN — NITROGLYCERIN 1 INCH: 20 OINTMENT TOPICAL at 21:41

## 2020-11-18 RX ADMIN — IPRATROPIUM BROMIDE AND ALBUTEROL SULFATE 3 ML: .5; 2.5 SOLUTION RESPIRATORY (INHALATION) at 23:30

## 2020-11-18 RX ADMIN — LORAZEPAM 1 MG: 2 INJECTION, SOLUTION INTRAMUSCULAR; INTRAVENOUS at 23:02

## 2020-11-18 RX ADMIN — Medication 10 ML: at 23:02

## 2020-11-18 RX ADMIN — POTASSIUM CHLORIDE 30 MEQ: 750 TABLET, FILM COATED, EXTENDED RELEASE ORAL at 23:29

## 2020-11-18 RX ADMIN — FUROSEMIDE 40 MG: 10 INJECTION, SOLUTION INTRAMUSCULAR; INTRAVENOUS at 21:41

## 2020-11-18 NOTE — Clinical Note
One step catheter removed, manual pressure held at site, bandaid applied, dry and intact. Total amt drained 800ml.

## 2020-11-18 NOTE — Clinical Note
1% lidocaine administered to R pleural region using ultrasound guidance, one step needle inserted to R pleural space, draining clear serous pleural fluid

## 2020-11-18 NOTE — Clinical Note
1% lidocaine administered to L pleural space using ultrasound guidance, one step needle inserted to L pleural space draining clear yellow serous pleural fluid. Specimens collected, labeled and taken to lab.

## 2020-11-19 ENCOUNTER — APPOINTMENT (OUTPATIENT)
Dept: CT IMAGING | Age: 55
DRG: 194 | End: 2020-11-19
Attending: FAMILY MEDICINE
Payer: COMMERCIAL

## 2020-11-19 LAB
ALBUMIN SERPL-MCNC: 3 G/DL (ref 3.5–5)
ALBUMIN/GLOB SERPL: 0.8 {RATIO} (ref 1.1–2.2)
ALP SERPL-CCNC: 102 U/L (ref 45–117)
ALT SERPL-CCNC: 13 U/L (ref 12–78)
ANION GAP SERPL CALC-SCNC: 9 MMOL/L (ref 5–15)
APPEARANCE UR: ABNORMAL
AST SERPL W P-5'-P-CCNC: 11 U/L (ref 15–37)
ATRIAL RATE: 106 BPM
BACTERIA URNS QL MICRO: NEGATIVE /HPF
BILIRUB SERPL-MCNC: 0.2 MG/DL (ref 0.2–1)
BILIRUB UR QL: NEGATIVE
BUN SERPL-MCNC: 26 MG/DL (ref 6–20)
BUN/CREAT SERPL: 16 (ref 12–20)
CA-I BLD-MCNC: 8.9 MG/DL (ref 8.5–10.1)
CALCULATED P AXIS, ECG09: 58 DEGREES
CALCULATED R AXIS, ECG10: 50 DEGREES
CALCULATED T AXIS, ECG11: 56 DEGREES
CHLORIDE SERPL-SCNC: 98 MMOL/L (ref 97–108)
CO2 SERPL-SCNC: 31 MMOL/L (ref 21–32)
COLOR UR: ABNORMAL
CREAT SERPL-MCNC: 1.64 MG/DL (ref 0.55–1.02)
DIAGNOSIS, 93000: NORMAL
ERYTHROCYTE [DISTWIDTH] IN BLOOD BY AUTOMATED COUNT: 14.7 % (ref 11.5–14.5)
GLOBULIN SER CALC-MCNC: 3.8 G/DL (ref 2–4)
GLUCOSE SERPL-MCNC: 99 MG/DL (ref 65–100)
GLUCOSE UR STRIP.AUTO-MCNC: NEGATIVE MG/DL
HCT VFR BLD AUTO: 23.4 % (ref 35–47)
HGB BLD-MCNC: 7.6 G/DL (ref 11.5–16)
HGB UR QL STRIP: NEGATIVE
KETONES UR QL STRIP.AUTO: NEGATIVE MG/DL
LEUKOCYTE ESTERASE UR QL STRIP.AUTO: NEGATIVE
MAGNESIUM SERPL-MCNC: 2 MG/DL (ref 1.6–2.4)
MAGNESIUM SERPL-MCNC: 2.2 MG/DL (ref 1.6–2.4)
MCH RBC QN AUTO: 32.5 PG (ref 26–34)
MCHC RBC AUTO-ENTMCNC: 32.5 G/DL (ref 30–36.5)
MCV RBC AUTO: 100 FL (ref 80–99)
NITRITE UR QL STRIP.AUTO: NEGATIVE
P-R INTERVAL, ECG05: 132 MS
PH UR STRIP: 7 [PH] (ref 5–8)
PLATELET # BLD AUTO: 307 K/UL (ref 150–400)
PMV BLD AUTO: 9.2 FL (ref 8.9–12.9)
POTASSIUM SERPL-SCNC: 3 MMOL/L (ref 3.5–5.1)
PROT SERPL-MCNC: 6.8 G/DL (ref 6.4–8.2)
PROT UR STRIP-MCNC: 30 MG/DL
Q-T INTERVAL, ECG07: 368 MS
QRS DURATION, ECG06: 84 MS
QTC CALCULATION (BEZET), ECG08: 488 MS
RBC # BLD AUTO: 2.34 M/UL (ref 3.8–5.2)
RBC #/AREA URNS HPF: ABNORMAL /HPF (ref 0–5)
SARS-COV-2, COV2: NORMAL
SARS-COV-2, COV2: NOT DETECTED
SODIUM SERPL-SCNC: 138 MMOL/L (ref 136–145)
SP GR UR REFRACTOMETRY: 1.01 (ref 1–1.03)
SPECIMEN SOURCE: NORMAL
UA: UC IF INDICATED,UAUC: ABNORMAL
UROBILINOGEN UR QL STRIP.AUTO: 0.1 EU/DL (ref 0.1–1)
VENTRICULAR RATE, ECG03: 106 BPM
WBC # BLD AUTO: 7.6 K/UL (ref 3.6–11)
WBC URNS QL MICRO: ABNORMAL /HPF (ref 0–4)

## 2020-11-19 PROCEDURE — 74011000636 HC RX REV CODE- 636: Performed by: FAMILY MEDICINE

## 2020-11-19 PROCEDURE — 94640 AIRWAY INHALATION TREATMENT: CPT

## 2020-11-19 PROCEDURE — 94762 N-INVAS EAR/PLS OXIMTRY CONT: CPT

## 2020-11-19 PROCEDURE — 74011000250 HC RX REV CODE- 250: Performed by: INTERNAL MEDICINE

## 2020-11-19 PROCEDURE — 74011250637 HC RX REV CODE- 250/637: Performed by: PHYSICIAN ASSISTANT

## 2020-11-19 PROCEDURE — 65270000029 HC RM PRIVATE

## 2020-11-19 PROCEDURE — 74011000250 HC RX REV CODE- 250: Performed by: FAMILY MEDICINE

## 2020-11-19 PROCEDURE — 74011250637 HC RX REV CODE- 250/637: Performed by: FAMILY MEDICINE

## 2020-11-19 PROCEDURE — 74011250636 HC RX REV CODE- 250/636: Performed by: PHYSICIAN ASSISTANT

## 2020-11-19 PROCEDURE — 80053 COMPREHEN METABOLIC PANEL: CPT

## 2020-11-19 PROCEDURE — 87635 SARS-COV-2 COVID-19 AMP PRB: CPT

## 2020-11-19 PROCEDURE — 77010033678 HC OXYGEN DAILY

## 2020-11-19 PROCEDURE — 36415 COLL VENOUS BLD VENIPUNCTURE: CPT

## 2020-11-19 PROCEDURE — 71275 CT ANGIOGRAPHY CHEST: CPT

## 2020-11-19 PROCEDURE — 83735 ASSAY OF MAGNESIUM: CPT

## 2020-11-19 PROCEDURE — 85027 COMPLETE CBC AUTOMATED: CPT

## 2020-11-19 RX ORDER — ASPIRIN 81 MG/1
81 TABLET ORAL DAILY
Status: DISCONTINUED | OUTPATIENT
Start: 2020-11-19 | End: 2020-11-20

## 2020-11-19 RX ORDER — FLUTICASONE FUROATE, UMECLIDINIUM BROMIDE AND VILANTEROL TRIFENATATE 100; 62.5; 25 UG/1; UG/1; UG/1
1 POWDER RESPIRATORY (INHALATION) DAILY
COMMUNITY
Start: 2020-11-12 | End: 2022-03-20 | Stop reason: CLARIF

## 2020-11-19 RX ORDER — LEVOTHYROXINE SODIUM 25 UG/1
25 TABLET ORAL
Status: DISCONTINUED | OUTPATIENT
Start: 2020-11-19 | End: 2020-11-27 | Stop reason: HOSPADM

## 2020-11-19 RX ORDER — LANOLIN ALCOHOL/MO/W.PET/CERES
CREAM (GRAM) TOPICAL
Status: ON HOLD | COMMUNITY
Start: 2020-01-04 | End: 2020-11-19 | Stop reason: ALTCHOICE

## 2020-11-19 RX ORDER — IPRATROPIUM BROMIDE AND ALBUTEROL SULFATE 2.5; .5 MG/3ML; MG/3ML
3 SOLUTION RESPIRATORY (INHALATION)
Status: DISCONTINUED | OUTPATIENT
Start: 2020-11-19 | End: 2020-11-19

## 2020-11-19 RX ORDER — SERTRALINE HYDROCHLORIDE 25 MG/1
25 TABLET, FILM COATED ORAL DAILY
Status: DISCONTINUED | OUTPATIENT
Start: 2020-11-19 | End: 2020-11-27 | Stop reason: HOSPADM

## 2020-11-19 RX ORDER — HYDRALAZINE HYDROCHLORIDE 50 MG/1
100 TABLET, FILM COATED ORAL 3 TIMES DAILY
Status: DISCONTINUED | OUTPATIENT
Start: 2020-11-19 | End: 2020-11-27 | Stop reason: HOSPADM

## 2020-11-19 RX ORDER — HEPARIN SODIUM 10000 [USP'U]/ML
5000 INJECTION, SOLUTION INTRAVENOUS; SUBCUTANEOUS EVERY 12 HOURS
Status: DISCONTINUED | OUTPATIENT
Start: 2020-11-19 | End: 2020-11-20

## 2020-11-19 RX ORDER — LORAZEPAM 0.5 MG/1
0.25 TABLET ORAL
Status: DISCONTINUED | OUTPATIENT
Start: 2020-11-19 | End: 2020-11-24

## 2020-11-19 RX ORDER — ISOSORBIDE DINITRATE 20 MG/1
40 TABLET ORAL 3 TIMES DAILY
Status: DISCONTINUED | OUTPATIENT
Start: 2020-11-19 | End: 2020-11-27 | Stop reason: HOSPADM

## 2020-11-19 RX ORDER — FOLIC ACID 1 MG/1
1 TABLET ORAL DAILY
Status: DISCONTINUED | OUTPATIENT
Start: 2020-11-19 | End: 2020-11-27 | Stop reason: HOSPADM

## 2020-11-19 RX ORDER — LEVETIRACETAM 500 MG/1
500 TABLET ORAL 2 TIMES DAILY
Status: DISCONTINUED | OUTPATIENT
Start: 2020-11-19 | End: 2020-11-27 | Stop reason: HOSPADM

## 2020-11-19 RX ORDER — BUMETANIDE 0.25 MG/ML
1 INJECTION INTRAMUSCULAR; INTRAVENOUS ONCE
Status: DISCONTINUED | OUTPATIENT
Start: 2020-11-19 | End: 2020-11-19

## 2020-11-19 RX ORDER — TRAMADOL HYDROCHLORIDE 50 MG/1
50 TABLET ORAL
Status: DISCONTINUED | OUTPATIENT
Start: 2020-11-19 | End: 2020-11-27 | Stop reason: HOSPADM

## 2020-11-19 RX ORDER — CARBAMAZEPINE 200 MG/1
200 TABLET ORAL 2 TIMES DAILY
Status: DISCONTINUED | OUTPATIENT
Start: 2020-11-19 | End: 2020-11-27 | Stop reason: HOSPADM

## 2020-11-19 RX ORDER — NIFEDIPINE 60 MG/1
60 TABLET, EXTENDED RELEASE ORAL DAILY
Status: DISCONTINUED | OUTPATIENT
Start: 2020-11-19 | End: 2020-11-22

## 2020-11-19 RX ORDER — IPRATROPIUM BROMIDE AND ALBUTEROL SULFATE 2.5; .5 MG/3ML; MG/3ML
3 SOLUTION RESPIRATORY (INHALATION)
Status: DISCONTINUED | OUTPATIENT
Start: 2020-11-19 | End: 2020-11-27 | Stop reason: HOSPADM

## 2020-11-19 RX ORDER — BUDESONIDE 0.5 MG/2ML
500 INHALANT ORAL
Status: DISCONTINUED | OUTPATIENT
Start: 2020-11-19 | End: 2020-11-20

## 2020-11-19 RX ORDER — HEPARIN SODIUM 10000 [USP'U]/ML
5000 INJECTION, SOLUTION INTRAVENOUS; SUBCUTANEOUS EVERY 12 HOURS
Status: DISCONTINUED | OUTPATIENT
Start: 2020-11-19 | End: 2020-11-19

## 2020-11-19 RX ORDER — ROSUVASTATIN CALCIUM 10 MG/1
TABLET, COATED ORAL
COMMUNITY
Start: 2020-11-06 | End: 2022-03-20

## 2020-11-19 RX ORDER — LANOLIN ALCOHOL/MO/W.PET/CERES
325 CREAM (GRAM) TOPICAL
Status: DISCONTINUED | OUTPATIENT
Start: 2020-11-19 | End: 2020-11-27 | Stop reason: HOSPADM

## 2020-11-19 RX ORDER — LABETALOL 200 MG/1
400 TABLET, FILM COATED ORAL 3 TIMES DAILY
Status: DISCONTINUED | OUTPATIENT
Start: 2020-11-19 | End: 2020-11-27 | Stop reason: HOSPADM

## 2020-11-19 RX ORDER — ROSUVASTATIN CALCIUM 10 MG/1
10 TABLET, COATED ORAL
Status: DISCONTINUED | OUTPATIENT
Start: 2020-11-19 | End: 2020-11-19

## 2020-11-19 RX ORDER — HYDROCHLOROTHIAZIDE 12.5 MG/1
CAPSULE ORAL
COMMUNITY
End: 2020-11-27

## 2020-11-19 RX ORDER — FUROSEMIDE 10 MG/ML
40 INJECTION INTRAMUSCULAR; INTRAVENOUS EVERY 12 HOURS
Status: DISCONTINUED | OUTPATIENT
Start: 2020-11-19 | End: 2020-11-25

## 2020-11-19 RX ORDER — HYDRALAZINE HYDROCHLORIDE 100 MG/1
100 TABLET, FILM COATED ORAL 3 TIMES DAILY
COMMUNITY
Start: 2020-11-06 | End: 2021-01-23

## 2020-11-19 RX ORDER — ATORVASTATIN CALCIUM 20 MG/1
20 TABLET, FILM COATED ORAL
Status: DISCONTINUED | OUTPATIENT
Start: 2020-11-19 | End: 2020-11-27 | Stop reason: HOSPADM

## 2020-11-19 RX ORDER — ATORVASTATIN CALCIUM 80 MG/1
80 TABLET, FILM COATED ORAL DAILY
Status: ON HOLD | COMMUNITY
Start: 2020-11-17 | End: 2020-11-19 | Stop reason: ALTCHOICE

## 2020-11-19 RX ORDER — LORATADINE 10 MG/1
10 TABLET ORAL DAILY
Status: DISCONTINUED | OUTPATIENT
Start: 2020-11-19 | End: 2020-11-27 | Stop reason: HOSPADM

## 2020-11-19 RX ORDER — ALBUTEROL SULFATE 90 UG/1
2 AEROSOL, METERED RESPIRATORY (INHALATION)
COMMUNITY
Start: 2020-11-05

## 2020-11-19 RX ORDER — CARBAMAZEPINE 200 MG/1
200 TABLET ORAL 3 TIMES DAILY
Status: DISCONTINUED | OUTPATIENT
Start: 2020-11-19 | End: 2020-11-19

## 2020-11-19 RX ADMIN — IPRATROPIUM BROMIDE AND ALBUTEROL SULFATE 3 ML: .5; 3 SOLUTION RESPIRATORY (INHALATION) at 07:47

## 2020-11-19 RX ADMIN — FUROSEMIDE 40 MG: 10 INJECTION, SOLUTION INTRAMUSCULAR; INTRAVENOUS at 11:00

## 2020-11-19 RX ADMIN — ATORVASTATIN CALCIUM 20 MG: 20 TABLET, FILM COATED ORAL at 22:26

## 2020-11-19 RX ADMIN — LABETALOL HYDROCHLORIDE 400 MG: 200 TABLET, FILM COATED ORAL at 16:57

## 2020-11-19 RX ADMIN — ASPIRIN 81 MG: 81 TABLET, COATED ORAL at 11:12

## 2020-11-19 RX ADMIN — LABETALOL HYDROCHLORIDE 400 MG: 200 TABLET, FILM COATED ORAL at 22:25

## 2020-11-19 RX ADMIN — SERTRALINE HYDROCHLORIDE 25 MG: 25 TABLET ORAL at 10:59

## 2020-11-19 RX ADMIN — FOLIC ACID 1 MG: 1 TABLET ORAL at 11:00

## 2020-11-19 RX ADMIN — LEVETIRACETAM 500 MG: 500 TABLET ORAL at 22:26

## 2020-11-19 RX ADMIN — LORAZEPAM 0.25 MG: 0.5 TABLET ORAL at 11:10

## 2020-11-19 RX ADMIN — NIFEDIPINE 60 MG: 60 TABLET, EXTENDED RELEASE ORAL at 07:55

## 2020-11-19 RX ADMIN — LEVETIRACETAM 500 MG: 500 TABLET ORAL at 10:59

## 2020-11-19 RX ADMIN — CARBAMAZEPINE 200 MG: 200 TABLET ORAL at 11:10

## 2020-11-19 RX ADMIN — FUROSEMIDE 40 MG: 10 INJECTION, SOLUTION INTRAMUSCULAR; INTRAVENOUS at 22:26

## 2020-11-19 RX ADMIN — HYDRALAZINE HYDROCHLORIDE 100 MG: 50 TABLET, FILM COATED ORAL at 07:53

## 2020-11-19 RX ADMIN — LABETALOL HYDROCHLORIDE 400 MG: 200 TABLET, FILM COATED ORAL at 07:54

## 2020-11-19 RX ADMIN — BUDESONIDE 500 MCG: 0.5 INHALANT RESPIRATORY (INHALATION) at 19:35

## 2020-11-19 RX ADMIN — HYDRALAZINE HYDROCHLORIDE 100 MG: 50 TABLET, FILM COATED ORAL at 22:25

## 2020-11-19 RX ADMIN — ISOSORBIDE DINITRATE 40 MG: 20 TABLET ORAL at 22:26

## 2020-11-19 RX ADMIN — HYDRALAZINE HYDROCHLORIDE 100 MG: 50 TABLET, FILM COATED ORAL at 16:56

## 2020-11-19 RX ADMIN — ISOSORBIDE DINITRATE 40 MG: 20 TABLET ORAL at 07:55

## 2020-11-19 RX ADMIN — FERROUS SULFATE TAB 325 MG (65 MG ELEMENTAL FE) 325 MG: 325 (65 FE) TAB at 07:53

## 2020-11-19 RX ADMIN — BUDESONIDE 500 MCG: 0.5 INHALANT RESPIRATORY (INHALATION) at 07:47

## 2020-11-19 RX ADMIN — LORATADINE 10 MG: 10 TABLET ORAL at 10:59

## 2020-11-19 RX ADMIN — Medication 10 ML: at 06:17

## 2020-11-19 RX ADMIN — IPRATROPIUM BROMIDE AND ALBUTEROL SULFATE 3 ML: .5; 3 SOLUTION RESPIRATORY (INHALATION) at 11:06

## 2020-11-19 RX ADMIN — ACETAMINOPHEN 650 MG: 325 TABLET, FILM COATED ORAL at 05:54

## 2020-11-19 RX ADMIN — LEVOTHYROXINE SODIUM 25 MCG: 0.03 TABLET ORAL at 07:53

## 2020-11-19 RX ADMIN — IPRATROPIUM BROMIDE AND ALBUTEROL SULFATE 3 ML: .5; 3 SOLUTION RESPIRATORY (INHALATION) at 19:35

## 2020-11-19 RX ADMIN — IOPAMIDOL 100 ML: 755 INJECTION, SOLUTION INTRAVENOUS at 01:12

## 2020-11-19 RX ADMIN — TRAMADOL HYDROCHLORIDE 50 MG: 50 TABLET, COATED ORAL at 22:26

## 2020-11-19 RX ADMIN — TRAMADOL HYDROCHLORIDE 50 MG: 50 TABLET, COATED ORAL at 11:53

## 2020-11-19 RX ADMIN — Medication 10 ML: at 22:00

## 2020-11-19 RX ADMIN — Medication 10 ML: at 16:58

## 2020-11-19 RX ADMIN — CARBAMAZEPINE 200 MG: 200 TABLET ORAL at 22:25

## 2020-11-19 RX ADMIN — ISOSORBIDE DINITRATE 40 MG: 20 TABLET ORAL at 16:56

## 2020-11-19 NOTE — PROGRESS NOTES
Reason for Admission:   Respiratory failure                  RUR Score:    28%              PCP: First and Last name:  Ricco Zavaleta     Name of Practice:      Are you a current patient: Yes/No: yes     Approximate date of last visit: 1 month ago, suppose to see her tues 11/24     Can you participate in a virtual visit if needed:     Do you (patient/family) have any concerns for transition/discharge? No she states that she has personal care aids come to the house twice a week for about an hour. She lives with her friend Karen Myers. Plan for utilizing home health:   Personal care. Patient declined HH. Current Advanced Directive/Advance Care Plan:              Transition of Care Plan:          Patient lives at home with Karen Myers her friend. She states that he does a lot for her. She has personal care aids that come to the house twice a week. She has no DME at home other than an nebulizer and oxygen. She has no POA or AD/LW. She plans on going home with her friend. She does not want HH at discharge. She states \" they won't do anything for me that im not already getting. \"  CM educated on the importance of follow up and managing her health. They could do disease education, medication management and health monitoring. Patient still declined. Recommend Home self care at discharge.

## 2020-11-19 NOTE — ED TRIAGE NOTES
Pt has COPD is 87% on 5 L. Patient complains SOB all day states that she has been in the 80s all day on home 3L.

## 2020-11-19 NOTE — PROGRESS NOTES
Hospitalist Progress Note    Subjective:   Daily Progress Note: 11/19/2020 8:36 AM    Hospital Course: Patient is a 70-year-old female who presented to the ER on 11/18/2020 for increase shortness of breath. Patient has a history of hypertension, COPD, chronic hypoxic respiratory failure, diastolic CHF. She was recently discharged on 11/4/2020 for similar complaints. 2D echo in September 2020 showed normal ejection fraction with mild mitral regurgitation and mild tricuspid regurgitation. Patient started having shortness of breath with a white productive cough for 24 hours. It was associated with wheezing. No fevers. She normally uses 2 L of oxygen at home. In the ED patient's respirations were 25 and she was found to be hypoxic at 87% on 5 L oxygen nasal cannula. Blood pressure elevated 192/106. Chest x-ray showed cardiomegaly with mild bilateral pleural effusions. CTA of the chest confirmed this. [place] [address]. She was placed on BiPAP and given IV Solu-Medrol and duo nebs and IV Lasix. She was started on IV Lasix 40 mg twice daily and cardiology consulted. Subjective: Patient says she is feel a lot better this morning. Able to breath more. Has a mild cough that is nonproductive. Sneezing.  Chest discomfort    Current Facility-Administered Medications   Medication Dose Route Frequency    aspirin delayed-release tablet 81 mg  81 mg Oral DAILY    folic acid (FOLVITE) tablet 1 mg  1 mg Oral DAILY    ferrous sulfate tablet 325 mg  325 mg Oral ACB    hydrALAZINE (APRESOLINE) tablet 100 mg  100 mg Oral TID    isosorbide dinitrate (ISORDIL) tablet 40 mg  40 mg Oral TID    labetaloL (NORMODYNE) tablet 400 mg  400 mg Oral TID    levETIRAcetam (KEPPRA) tablet 500 mg  500 mg Oral BID    levothyroxine (SYNTHROID) tablet 25 mcg  25 mcg Oral ACB    NIFEdipine ER (PROCARDIA XL) tablet 60 mg  60 mg Oral DAILY    sertraline (ZOLOFT) tablet 25 mg  25 mg Oral DAILY    bumetanide (BUMEX) injection 1 mg  1 mg IntraVENous ONCE    atorvastatin (LIPITOR) tablet 20 mg  20 mg Oral QHS    albuterol-ipratropium (DUO-NEB) 2.5 MG-0.5 MG/3 ML  3 mL Nebulization Q6HWA    budesonide (PULMICORT) 500 mcg/2 ml nebulizer suspension  500 mcg Nebulization BID RT    carBAMazepine (TEGretol) tablet 200 mg  200 mg Oral BID    sodium chloride (NS) flush 5-40 mL  5-40 mL IntraVENous Q8H    sodium chloride (NS) flush 5-40 mL  5-40 mL IntraVENous PRN    acetaminophen (TYLENOL) tablet 650 mg  650 mg Oral Q6H PRN    Or    acetaminophen (TYLENOL) suppository 650 mg  650 mg Rectal Q6H PRN    polyethylene glycol (MIRALAX) packet 17 g  17 g Oral DAILY PRN    promethazine (PHENERGAN) tablet 12.5 mg  12.5 mg Oral Q6H PRN    Or    ondansetron (ZOFRAN) injection 4 mg  4 mg IntraVENous Q6H PRN        Review of Systems  Constitutional: No fevers, No chills, No sweats, ++ fatigue, ++ Weakness  Eyes: No redness  Ears, nose, mouth, throat, and face: ++nasal congestion, No sore throat, No voice change  Respiratory: ++ Shortness of Breath, ++cough, No wheezing  Cardiovascular:++ chest pain, No palpitations, No extremity edema  Gastrointestinal: No nausea, No vomiting, No diarrhea, No abdominal pain  Genitourinary: No frequency, No dysuria, No hematuria  Integument/breast: No skin lesion(s)   Neurological: No Confusion, No headaches, No dizziness      Objective:     Visit Vitals  BP (!) 181/104   Pulse (!) 102   Temp 97.4 °F (36.3 °C)   Resp 20   Ht 5' 5\" (1.651 m)   Wt 49 kg (108 lb 0.4 oz)   SpO2 96%   Breastfeeding No   BMI 17.98 kg/m²    O2 Flow Rate (L/min): 4 l/min O2 Device: Nasal cannula    Temp (24hrs), Av.2 °F (36.8 °C), Min:97.4 °F (36.3 °C), Max:98.7 °F (37.1 °C)      No intake/output data recorded. No intake/output data recorded. PHYSICAL EXAM:  Constitutional: No acute distress  Skin: Extremities and face reveal no rashes. HEENT: Sclerae anicteric. Extra-occular muscles are intact. No oral ulcers.  The neck is supple and no masses. Cardiovascular: Regular rate and rhythm. Respiratory:  Nonlabored, diminsihed  GI: Abdomen nondistended, soft, and nontender. Normal active bowel sounds. Musculoskeletal: No pitting edema of the lower legs. Able to move all ext  Neurological:  Patient is alert and oriented. Cranial nerves II-XII grossly intact  Psychiatric: Mood appears appropriate       Data Review    Recent Results (from the past 24 hour(s))   CBC WITH AUTOMATED DIFF    Collection Time: 11/18/20  8:04 PM   Result Value Ref Range    WBC 8.7 3.6 - 11.0 K/uL    RBC 2.87 (L) 3.80 - 5.20 M/uL    HGB 9.3 (L) 11.5 - 16.0 g/dL    HCT 28.9 (L) 35.0 - 47.0 %    .7 (H) 80.0 - 99.0 FL    MCH 32.4 26.0 - 34.0 PG    MCHC 32.2 30.0 - 36.5 g/dL    RDW 14.5 11.5 - 14.5 %    PLATELET 104 826 - 375 K/uL    MPV 9.2 8.9 - 12.9 FL    NEUTROPHILS 76 (H) 32 - 75 %    LYMPHOCYTES 18 12 - 49 %    MONOCYTES 4 (L) 5 - 13 %    EOSINOPHILS 2 0 - 7 %    BASOPHILS 0 0 - 1 %    IMMATURE GRANULOCYTES 0 0.0 - 0.5 %    ABS. NEUTROPHILS 6.6 1.8 - 8.0 K/UL    ABS. LYMPHOCYTES 1.6 0.8 - 3.5 K/UL    ABS. MONOCYTES 0.3 0.0 - 1.0 K/UL    ABS. EOSINOPHILS 0.2 0.0 - 0.4 K/UL    ABS. BASOPHILS 0.0 0.0 - 0.1 K/UL    ABS. IMM. GRANS. 0.0 0.00 - 0.04 K/UL    DF AUTOMATED     METABOLIC PANEL, COMPREHENSIVE    Collection Time: 11/18/20  8:04 PM   Result Value Ref Range    Sodium 136 136 - 145 mmol/L    Potassium 3.2 (L) 3.5 - 5.1 mmol/L    Chloride 100 97 - 108 mmol/L    CO2 29 21 - 32 mmol/L    Anion gap 7 5 - 15 mmol/L    Glucose 94 65 - 100 mg/dL    BUN 20 6 - 20 mg/dL    Creatinine 1.26 (H) 0.55 - 1.02 mg/dL    BUN/Creatinine ratio 16 12 - 20      GFR est AA 53 (L) >60 ml/min/1.73m2    GFR est non-AA 44 (L) >60 ml/min/1.73m2    Calcium 9.4 8.5 - 10.1 mg/dL    Bilirubin, total 0.3 0.2 - 1.0 mg/dL    AST (SGOT) 18 15 - 37 U/L    ALT (SGPT) 18 12 - 78 U/L    Alk.  phosphatase 100 45 - 117 U/L    Protein, total 7.7 6.4 - 8.2 g/dL    Albumin 3.3 (L) 3.5 - 5.0 g/dL    Globulin 4.4 (H) 2.0 - 4.0 g/dL    A-G Ratio 0.8 (L) 1.1 - 2.2     TROPONIN I    Collection Time: 11/18/20  8:04 PM   Result Value Ref Range    Troponin-I, Qt. <0.05 <0.05 ng/mL   BNP    Collection Time: 11/18/20  8:04 PM   Result Value Ref Range    NT pro-BNP 13,748 (H) <125 pg/mL   MAGNESIUM    Collection Time: 11/18/20  8:04 PM   Result Value Ref Range    Magnesium 2.2 1.6 - 2.4 mg/dL   URINALYSIS W/ REFLEX CULTURE    Collection Time: 11/18/20 11:30 PM    Specimen: Urine   Result Value Ref Range    Color Yellow/Straw      Appearance Turbid (A) Clear      Specific gravity 1.010 1.003 - 1.030      pH (UA) 7.0 5.0 - 8.0      Protein 30 (A) Negative mg/dL    Glucose Negative Negative mg/dL    Ketone Negative Negative mg/dL    Bilirubin Negative Negative      Blood Negative Negative      Urobilinogen 0.1 0.1 - 1.0 EU/dL    Nitrites Negative Negative      Leukocyte Esterase Negative Negative      UA:UC IF INDICATED Culture not indicated by UA result Culture not indicated by UA result      WBC 0-4 0 - 4 /hpf    RBC 0-5 0 - 5 /hpf    Bacteria Negative Negative /hpf   SARS-COV-2    Collection Time: 11/19/20  3:17 AM   Result Value Ref Range    SARS-CoV-2 Please find results under separate order         Radiology review: CT chest    Assessment:   1. Acute on chronic hypoxic respiratory failure  2. Acute on chronic diastolic CHF exacerbation  3. Hypertension  4. Seizure disorder  5. COPD  6. Hyperlipidemia  7. Hypothyroidism  8. Depression anxiety    Plan:    1. BNP elevated greater than 13,000. Cardiology consulted. She had a 2D echo 2 months ago which showed normal EF. Will start IV Lasix 40 mg twice daily. Fluid restriction. 2.  Oxygen saturation is within normal limits on 5 L. Will attempt to wean patient down to her normal 2 L. Covid test pending. CTA of the chest shows fluid overload. 3.  Blood pressure is elevated. Was given topical Nitropaste in the emergency room.   Continue home medications of hydralazine, Procardia, labetalol, isordil  4. Continue with Keppra and Tegretol  5. Continue with DuoNeb's. 6.  Continue Lipitor   7. Continue with Synthroid  8. She is on Zoloft. We will add on Ativan as she takes this at home. 7. CBC and BMP int he AM     CODE STATUS full     DVT prophylaxis: heparin  Ulcer prophylaxis: tolearting oral diet    Care Plan discussed with: Patient/Family and     Total time spent with patient: 36 minutes.

## 2020-11-19 NOTE — H&P
History and Physical    Patient: Radha Joyce MRN: [id number]  SSN: [id number]    YOB: 1965  Age: 54 y.o. Sex: female      Subjective:      Chief Complaint: Shortness of Breath    HPI: Radha Joyce is a 54 y.o. female with past medical history hypertension, COPD, seizure disorder and recent hospitalization for acute hypoxic respiratory failure secondary to Diastolic CHF exacerbation. Echocardiogram was obtained in September 2020 and reports normal left ventricular ejection fraction (55 to 60%), mild mitral regurgitation and mild tricuspid regurgitation. This evening patient presents to the ER with complaints of shortness of breath. Symptoms have been present over the past 24 hours. Associated symptoms include productive cough with white mucus, wheezing. Patient denies fever, chills, nasal congestion, rhinorrhea, nausea, vomiting, chest pain, palpitations, lower extremity edema. Patient used her DuoNeb's without improvement in symptoms. Ms. Lisa Armando uses 2 liters of oxygen at home. Today she increased her oxygen to 4 liters without improvement in symptoms. Persistent symptoms prompted emergency room visit this evening. On arrival to the ER, patient had a temperature 98.7 °F, pulse 108, respirations 25, blood pressure 192/106 and oxygen saturation 87% on 5 L of oxygen. Initial chest x-ray has cardiomegaly with mild bilateral pleural effusions. BNP is 13,748. Hemoglobin is 9.3 and at baseline. There is hypokalemia with a potassium value of 3.2. Patient was placed on BiPAP for acute respiratory failure with hypoxia. Topical Nitropaste was administered for hypertensive urgency. Initially patient was given DuoNeb treatments and IV Solu-Medrol with minimal improvement in symptoms. IV Lasix, 40 mg, was administered in the emergency room. Hospital service was asked to admit patient for further treatment and evaluation. Patiently currently lives with a roommate. She is a .   Her daughter, Suresh Meeks, can be reached at [phone]. Patient is a former smoker. She denies alcohol or illicit drug use. Ms. Ovidio Branham is a full code. Past medical history, past surgical history, family history, social history and home medication list was reviewed at the time of admission. Past Medical History:   Diagnosis Date    PEDRO [facility]ABLO [facility] 10/2/2020    Anxiety 10/2/2020    Anxiety     CHF (congestive heart failure) ([facility] 10/2/2020    With preserved lvef, diastolic dysfunction, mitral valve regurgitation moderate.  Chronic obstructive pulmonary disease (HCC)     Chronic respiratory failure (HCC)     Congestive heart failure (CHF) (HCC)     COPD (chronic obstructive pulmonary disease) with emphysema ([facility] 10/2/2020    Dysphagia 10/2/2020    GERD (gastroesophageal reflux disease)     Hypertension     Hypertension     Iron deficiency anemia 10/2/2020    Mitral valve regurgitation 10/2/2020    Psychiatric disorder     Renal artery stenosis (HCC)     Seizure disorder ([facility]     Thyroid disease      Past Surgical History:   Procedure Laterality Date    HX ROTATOR CUFF REPAIR Right     HX TUBAL LIGATION        Family History   Problem Relation Age of Onset    Hypertension Mother     Stroke Mother     Melanoma Mother     Stroke Father     Melanoma Brother     Melanoma Child      Social History     Tobacco Use    Smoking status: Former Smoker     Types: Cigarettes    Smokeless tobacco: Never Used    Tobacco comment: quit july 2020   Substance Use Topics    Alcohol use: Not Currently      Prior to Admission medications    Medication Sig Start Date End Date Taking? Authorizing Provider   Heladio Morales 100-62.5-25 mcg inhaler  11/12/20   Provider, Historical   hydroCHLOROthiazide (MICROZIDE) 12.5 mg capsule hydrochlorothiazide 12.5 mg capsule   Take 1 capsule every day by oral route.     Provider, Historical   albuterol (PROVENTIL HFA, VENTOLIN HFA, PROAIR HFA) 90 mcg/actuation inhaler  11/5/20   Provider, Historical   rosuvastatin (CRESTOR) 10 mg tablet  11/6/20   Provider, Historical   hydrALAZINE (APRESOLINE) 100 mg tablet  11/6/20   Provider, Historical   bumetanide (BUMEX) 1 mg tablet Take 1 Tab by mouth two (2) times a day. 11/4/20   Cameron Lopez MD   potassium chloride (K-DUR, KLOR-CON) 20 mEq tablet Take 1 Tab by mouth daily. 11/5/20   Cameron Lopez MD   NIFEdipine ER (PROCARDIA XL) 60 mg ER tablet Take 1 Tab by mouth daily. Provider, Historical   Oxygen 5 Devices as needed. Pt wears 5LPM as needed- states she uses it after an axiety attack    Provider, Historical   isosorbide dinitrate (ISORDIL) 40 mg tablet Take 1 Tab by mouth three (3) times daily. 10/2/20   Daylin Coles MD   albuterol-ipratropium (DUO-NEB) 2.5 mg-0.5 mg/3 ml nebu 3 mL by Nebulization route every six (6) hours. 10/2/20   aDylin Coles MD   labetaloL (NORMODYNE) 200 mg tablet Take 2 Tabs by mouth three (3) times daily. 10/2/20   Daylin Coles MD   ALPRAZolam Veola [place]) 0.25 mg tablet Take 0.25 mg by mouth three (3) times daily as needed for Anxiety. Provider, Historical   sertraline (Zoloft) 25 mg tablet Take 25 mg by mouth daily. Provider, Historical   levothyroxine (SYNTHROID) 25 mcg tablet Take 25 mcg by mouth Daily (before breakfast). Provider, Historical   levETIRAcetam (Keppra) 500 mg tablet Take 500 mg by mouth two (2) times a day. Provider, Historical   carBAMazepine, mood stabiliz, 200 mg CM12 Take 200 mg by mouth two (2) times a day. Provider, Historical   ferrous sulfate 325 mg (65 mg iron) tablet Take 325 mg by mouth Daily (before breakfast). Provider, Historical   folic acid (FOLVITE) 1 mg tablet Take 1 mg by mouth daily. Provider, Historical   aspirin delayed-release 81 mg tablet Take  by mouth daily. Provider, Historical   magnesium oxide (MAG-OX) 400 mg tablet Take 400 mg by mouth daily.     Provider, Historical Allergies   Allergen Reactions    Sulfa (Sulfonamide Antibiotics) Anaphylaxis       Review of Systems:  Constitutional: Denies fevers, chills, fatigue, weakness, unexplained weight loss, night sweats known sick contacts. Head, Eyes, Ears, Nose, Mouth, Throat: Denies nasal congestion, sore throat, rhinorrhea, earache, ringing of the ears, difficulty hearing, facial pain, facial swelling. Respiratory: Positive for shortness of breath, wheezing, non-productive cough. Denies sputum production, hemoptysis. Denies use of oxygen at home. Cardiovascular: Positive for orthopnea. Denies chest pain, irregular heart beat, racing pulse, lower extremity edema, dizziness, dyspnea on exertion. Gastrointestinal: Denies nausea, vomiting, diarrhea, constipation, abdominal pain, loss of appetite, acid reflux, melena, hematochezia, change in bowel habits. Endocrine: Denies intolerance to heat or cold. Denies polyuria, polydipsia, polyphagia. Denies recent weigh changes. Genitourinary: Denies increased urinary frequency, dysuria, hematuria, urinary incontinence, increased urinary frequency. Integument/Breast: Denies rash, itching or new skin lesions. Musculoskeletal: Denies joint swelling, joint pain, myalgias, neck pain, back pain. Neurological: Denies headaches, dizziness, confusion, tremors, numbness/tingling, paresthesias, weakness, problems with balance, loss of consciousness. Hematologic: Denies easy bleeding, easy bruising, lymphadenopathy. Behavioral/Psychiatric: Denies anxiety, depression, increased irritability, mood swings, delusions, hallucination, SI/HI. Objective:     Vitals:    11/19/20 0712 11/19/20 1059 11/19/20 1547 11/19/20 1656   BP: (!) 181/104 (!) 145/80 (!) 144/82 (!) 144/84   Pulse: (!) 102 86 99 99   Resp: 20  16    Temp: 97.4 °F (36.3 °C)  98.8 °F (37.1 °C)    SpO2: 96%  95%    Weight:       Height:            Physical Exam:  General: Alert and Oriented x 3. Cooperative and friendly.  Mildly tachypneic. Appears chronically ill. Nourished and well developed. Currently on BiPAP. HEAD, EYES: Normocephalic, atraumatic, EOMI, PERRLA. Poor dentition. Nose: Turbinates within normal limits, No drainage. Throat and Neck: Posterior pharynx without erythema or exudate. No masses, JVD, thyromegaly or lymphadenopathy appreciated. Cervical spine has good range of motion without pain. Lungs: Decreased breath sounds in mid and lower lung fields bilaterally. No appreciated wheezing, rhonchi or crackles. Symmetric chest rise with respirations. Heart: Tachycardic. Normal S1/S2. No appreciated murmurs, rubs or gallops. No lower extremity edema. Abdomen: Soft, non-tender, non-distended. Bowel sounds present in all four quadrants. No masses appreciated. Extremities:  Atraumatic. Able to move all extremities symmetrically. No abnormal bony protuberances appreciated. Back: No pain with palpation over spinous processes or paraspinal musculature. No CVA tenderness. Skin: Clean, dry and intact without appreciated lesions. Neurologic: A&Ox3. Cranial nerves 2-12 are grossly intact. Intact sensation. 5/5 motor strength in all 4 extremities. No focal deficits. Psychiatric: Normal affect, normal thought process, good eye contact.      Recent Results (from the past 24 hour(s))   EKG, 12 LEAD, INITIAL    Collection Time: 11/18/20  7:57 PM   Result Value Ref Range    Ventricular Rate 106 BPM    Atrial Rate 106 BPM    P-R Interval 132 ms    QRS Duration 84 ms    Q-T Interval 368 ms    QTC Calculation (Bezet) 488 ms    Calculated P Axis 58 degrees    Calculated R Axis 50 degrees    Calculated T Axis 56 degrees    Diagnosis       Sinus tachycardia  Possible Left atrial enlargement  Borderline ECG  When compared with ECG of 02-NOV-2020 14:51,  No significant change was found  Confirmed by [facility] [email or web address] ([id number]) on 11/19/2020 11:33:33 AM     CBC WITH AUTOMATED DIFF    Collection Time: 11/18/20  8:04 PM   Result Value Ref Range    WBC 8.7 3.6 - 11.0 K/uL    RBC 2.87 (L) 3.80 - 5.20 M/uL    HGB 9.3 (L) 11.5 - 16.0 g/dL    HCT 28.9 (L) 35.0 - 47.0 %    .7 (H) 80.0 - 99.0 FL    MCH 32.4 26.0 - 34.0 PG    MCHC 32.2 30.0 - 36.5 g/dL    RDW 14.5 11.5 - 14.5 %    PLATELET 116 249 - 783 K/uL    MPV 9.2 8.9 - 12.9 FL    NEUTROPHILS 76 (H) 32 - 75 %    LYMPHOCYTES 18 12 - 49 %    MONOCYTES 4 (L) 5 - 13 %    EOSINOPHILS 2 0 - 7 %    BASOPHILS 0 0 - 1 %    IMMATURE GRANULOCYTES 0 0.0 - 0.5 %    ABS. NEUTROPHILS 6.6 1.8 - 8.0 K/UL    ABS. LYMPHOCYTES 1.6 0.8 - 3.5 K/UL    ABS. MONOCYTES 0.3 0.0 - 1.0 K/UL    ABS. EOSINOPHILS 0.2 0.0 - 0.4 K/UL    ABS. BASOPHILS 0.0 0.0 - 0.1 K/UL    ABS. IMM. GRANS. 0.0 0.00 - 0.04 K/UL    DF AUTOMATED     METABOLIC PANEL, COMPREHENSIVE    Collection Time: 11/18/20  8:04 PM   Result Value Ref Range    Sodium 136 136 - 145 mmol/L    Potassium 3.2 (L) 3.5 - 5.1 mmol/L    Chloride 100 97 - 108 mmol/L    CO2 29 21 - 32 mmol/L    Anion gap 7 5 - 15 mmol/L    Glucose 94 65 - 100 mg/dL    BUN 20 6 - 20 mg/dL    Creatinine 1.26 (H) 0.55 - 1.02 mg/dL    BUN/Creatinine ratio 16 12 - 20      GFR est AA 53 (L) >60 ml/min/1.73m2    GFR est non-AA 44 (L) >60 ml/min/1.73m2    Calcium 9.4 8.5 - 10.1 mg/dL    Bilirubin, total 0.3 0.2 - 1.0 mg/dL    AST (SGOT) 18 15 - 37 U/L    ALT (SGPT) 18 12 - 78 U/L    Alk.  phosphatase 100 45 - 117 U/L    Protein, total 7.7 6.4 - 8.2 g/dL    Albumin 3.3 (L) 3.5 - 5.0 g/dL    Globulin 4.4 (H) 2.0 - 4.0 g/dL    A-G Ratio 0.8 (L) 1.1 - 2.2     TROPONIN I    Collection Time: 11/18/20  8:04 PM   Result Value Ref Range    Troponin-I, Qt. <0.05 <0.05 ng/mL   BNP    Collection Time: 11/18/20  8:04 PM   Result Value Ref Range    NT pro-BNP 13,748 (H) <125 pg/mL   MAGNESIUM    Collection Time: 11/18/20  8:04 PM   Result Value Ref Range    Magnesium 2.2 1.6 - 2.4 mg/dL   URINALYSIS W/ REFLEX CULTURE    Collection Time: 11/18/20 11:30 PM    Specimen: Urine   Result Value Ref Range    Color Yellow/Straw      Appearance Turbid (A) Clear      Specific gravity 1.010 1.003 - 1.030      pH (UA) 7.0 5.0 - 8.0      Protein 30 (A) Negative mg/dL    Glucose Negative Negative mg/dL    Ketone Negative Negative mg/dL    Bilirubin Negative Negative      Blood Negative Negative      Urobilinogen 0.1 0.1 - 1.0 EU/dL    Nitrites Negative Negative      Leukocyte Esterase Negative Negative      UA:UC IF INDICATED Culture not indicated by UA result Culture not indicated by UA result      WBC 0-4 0 - 4 /hpf    RBC 0-5 0 - 5 /hpf    Bacteria Negative Negative /hpf   SARS-COV-2    Collection Time: 11/19/20  3:17 AM   Result Value Ref Range    SARS-CoV-2 Please find results under separate order         XR Results (maximum last 3): Results from [place] encounter on 11/18/20   XR CHEST SNGL V    Narrative Single View Chest 11/18/2020    Comparison: November 4, 2020    Indication: Dyspnea. Findings:  Heart size is upper normal to borderline enlarged, stable. Aortic calcification. Increased opacity at both lung bases is consistent with pleural effusions with  airspace disease. This airspace disease appears slightly increased since prior. Pulmonary vascularity prominence, possible mild vascular congestion. No  pneumothorax. Impression Impression[de-identified]   1. Mild bilateral pleural effusions with mild bibasilar airspace disease,  atelectasis versus infection. Possible mild vascular congestion   Results from Hospital Encounter encounter on 11/02/20   XR CHEST PORT    Narrative Chest single view. Comparison chest series November 2, 2020    Moderate volume dependent pleural effusions with associated basilar atelectasis  persist. Cardiac and mediastinal structures unchanged noting thoracic aorta  atherosclerosis. No pneumothorax or sizable pleural effusion. XR CHEST PA LAT    Narrative 2 views chest:    History: SOB    COMPARISON: Chest 10/16/2020    There is congested pulmonary vasculature.  There is density of both lung bases  obscuring the diaphragm shadows and heart borders. .       Impression Impression: Bilateral pulmonary vascular congestion and bilateral pleural  effusions/CHF. CT Results (maximum last 3): Results from [place] encounter on 11/18/20   CTA CHEST W OR W WO CONT    Narrative HISTORY:  Acute hypoxic respiratory failure  Dose reduction technique: All CT scans at this facility are performed using dose reduction optimization  technique as appropriate on the exam including the following: Automated exposure  control, adjustment of the MA and/or KV according to patient size of use of  iterative reconstructive technique. .    TECHNIQUE: Postcontrast CT angiogram of the chest is performed with maximum  intensity projection images (MIPS) generated. 100 cc [id number] injected. COMPARISON: None  LIMITATIONS: None    LINES/TUBES/MEDICAL SUPPORT DEVICES:   None      LUNG PARENCHYMA: Diffuse interstitial septal markings prominence. Bibasilar  consolidation overlies the effusions  TRACHEA/BRONCHI: Diffuse bronchial wall thickening. PULMONARY VESSELS: Normal. No definitive persistent central filling defects  identified on multiple contiguous images to diagnose pulmonary embolism. PLEURA: Large bilateral pleural effusions. MEDIASTINUM: Enlarged mediastinal lymph nodes are noted. HEART: Multichamber cardiomegaly. Dense three-vessel coronary arterial  calcifications. Mild diffuse pericardial effusion. AORTA/GREAT VESSELS: No aortic aneurysm or dissection. Mild diffuse aortic arch  calcifications and mild descending thoracic aortic calcifications. The upper  abdominal aorta demonstrates profound peripheral and endoluminal calcification  at the proximal level of the renal arteries but is only partially visualized. ESOPHAGUS: Normal  AXILLAE: Normal  BONES/TISSUES: No acute abnormality    UPPER ABDOMEN: Negative for acute abnormality.     OTHER: None      Impression IMPRESSION: findings most suggestive of CHF/pulmonary edema with large bilateral  effusions and bibasilar alveolar edema versus compressive atelectasis in the  setting of the edema; the possibility of airspace component being due to  pneumonia could also be considered in the differential.. Axial and pulmonary  parenchymal interstitial edema. Mild diffuse pericardial effusion. Adenopathy in  the mediastinum could be related to increased fluid resorption but is  nonspecific. Three-vessel coronary arterial calcifications with no pulmonary  embolism or aortic aneurysm/dissection. There is however dense calcifications of  the upper abdominal aorta partially visualized including endoluminal component  and likely significantly narrowing the abdominal aorta. Dedicated imaging would  be needed however to completely evaluate. Results from [place] encounter on 11/02/20   CT ABD W CONT    Narrative CT dose reduction was achieved through use of a standardized protocol tailored  for this examination and automatic exposure control for dose modulation. Contrast study maximum intensity projections shows moderate to large pleural  effusions with compressive atelectasis. Moderate pericardial effusion, as  visualized. Liver, spleen, pancreas, are normal. Gallbladder is out. Adrenals and right  kidney are normal. Mild to moderate global atrophy of the left kidney, without  hydronephrosis. Small bowel abnormality, lymphadenopathy or free fluid    Heavily calcified aorta, without aneurysm or dissection. Celiac and SMA are  widely patent. There is severe heavily calcified irregular calcified stenosis of  the renal artery origins, left greater than right flow-limiting on both sides,  left more so than right, as well as likely flow limitation of the aorta at this  same level. There is heavy calcification continuing with high grade  flow-limiting heavily calcified stenosis of the common iliacs. MIGNON is open.  No  abnormality is seen in the upper pelvis on this abdomen only exam. Advanced  degenerative changes in the spine      Impression IMPRESSION: Severe heavily calcified stenoses of the aorta, renal and iliac  arteries. Fluid overload with high-grade lower lobe collapse. Assessment:     Elton Vásquez is a 54 y.o. female who presents to the ER for shortness of breath. Findings are most consistent with acute respiratory failure and hypoxia secondary to acute on chronic diastolic heart failure. Plan:     1. Admit to telemetry bed for acute on chronic heart failure exacerbation and hypertensive urgency. 2. Cardiology consult. Order 1 additional dose of IV Bumex for the morning. Monitor ins and outs and daily weights. 3. Order CTA of the chest.  4. COVID-19 results are pending. Order isolation precautions while results are pending. 5. Hypertensive urgency, patient was given topical Nitropaste in the emergency room. Continue home dose of hydralazine, Procardia, labetalol and Isordil (with holding parameters). 6. History of seizure disorder, continue home dose of Keppra and Tegretol. 7. History of COPD, continue home dose of Trelegy and order albuterol as needed for shortness of breath and wheezing. 8. History of hyperlipidemia, continue home dose of Crestor. GI PPX: Diet ordered with fluid restriction. DVT PPX: SCDs (history of anemia).      Signed By: Sharon Bartlett MD     November 19, 2020

## 2020-11-19 NOTE — ED PROVIDER NOTES
EMERGENCY DEPARTMENT HISTORY AND PHYSICAL EXAM        Date: 11/18/2020  Patient Name: Humberto Avitia    History of Presenting Illness     Chief Complaint   Patient presents with    Shortness of Breath       History Provided By: Patient    HPI: Humberto Avitia, 54 y.o. female with past medical history of hypertension, COPD, hyperlipidemia who presents with difficulty breathing. Symptoms started last night before going to bed. She thinks it was related to turning on the heat. She has had no cough, fevers, or other symptoms. States she tried using her nebulizer treatments and other inhalers however this did not help. She increased her oxygen from 2 L to 4 L and this still did not help with her breathing. States she was tested for COVID-19 multiple times recently and have all been negative. He was recently admitted for COPD exacerbation with similar symptoms. PCP: Martell Cherry    Current Facility-Administered Medications   Medication Dose Route Frequency Provider Last Rate Last Dose    albuterol-ipratropium (DUO-NEB) 2.5 MG-0.5 MG/3 ML  3 mL Nebulization NOW Vlad Blackmon DO        albuterol-ipratropium (DUO-NEB) 2.5 MG-0.5 MG/3 ML  3 mL Nebulization NOW Palu Mccarthy DO         Current Outpatient Medications   Medication Sig Dispense Refill    bumetanide (BUMEX) 1 mg tablet Take 1 Tab by mouth two (2) times a day. 60 Tab 0    potassium chloride (K-DUR, KLOR-CON) 20 mEq tablet Take 1 Tab by mouth daily. 30 Tab 0    NIFEdipine ER (PROCARDIA XL) 60 mg ER tablet Take 1 Tab by mouth daily.  Oxygen 5 Devices as needed. Pt wears 5LPM as needed- states she uses it after an axiety attack      isosorbide dinitrate (ISORDIL) 40 mg tablet Take 1 Tab by mouth three (3) times daily. 90 Tab 0    albuterol-ipratropium (DUO-NEB) 2.5 mg-0.5 mg/3 ml nebu 3 mL by Nebulization route every six (6) hours. 120 Nebule 0    labetaloL (NORMODYNE) 200 mg tablet Take 2 Tabs by mouth three (3) times daily.  180 Tab 0  ALPRAZolam (XANAX) 0.25 mg tablet Take 0.25 mg by mouth three (3) times daily as needed for Anxiety.  sertraline (Zoloft) 25 mg tablet Take 25 mg by mouth daily.  hydrALAZINE (APRESOLINE) 50 mg tablet Take 100 mg by mouth three (3) times daily.  levothyroxine (SYNTHROID) 25 mcg tablet Take 25 mcg by mouth Daily (before breakfast).  levETIRAcetam (Keppra) 500 mg tablet Take 500 mg by mouth two (2) times a day.  carBAMazepine, mood stabiliz, 200 mg CM12 Take 200 mg by mouth two (2) times a day.  ferrous sulfate 325 mg (65 mg iron) tablet Take 325 mg by mouth Daily (before breakfast).  folic acid (FOLVITE) 1 mg tablet Take 1 mg by mouth daily.  aspirin delayed-release 81 mg tablet Take  by mouth daily.  magnesium oxide (MAG-OX) 400 mg tablet Take 400 mg by mouth daily. Past History     Past Medical History:  Past Medical History:   Diagnosis Date    [facility] 10/2/2020    Anxiety 10/2/2020    Anxiety     CHF [facility]congestive heart failure) ([facility] 10/2/2020    With preserved lvef, diastolic dysfunction, mitral valve regurgitation moderate.      Chronic obstructive pulmonary disease (HCC)     Chronic respiratory failure (HCC)     Congestive heart failure (CHF) (HCC)     COPD (chronic obstructive pulmonary disease) with emphysema ([facility] 10/2/2020    Dysphagia 10/2/2020    GERD (gastroesophageal reflux disease)     Hypertension     Hypertension     Iron deficiency anemia 10/2/2020    Mitral valve regurgitation 10/2/2020    Psychiatric disorder     Renal artery stenosis (HCC)     [facility]izure [facility] ([facility]     Thyroid disease        Past Surgical History:  Past Surgical History:   Procedure Laterality Date    HX ROTATOR CUFF REPAIR Right     HX TUBAL LIGATION         Family History:  Family History   Problem Relation Age of Onset    Hypertension Mother     Stroke Mother     Stroke Father        Social History:  Social History Tobacco Use    Smoking status: Former Smoker     Types: Cigarettes    Smokeless tobacco: Never Used    Tobacco comment: quit july 2020   Substance Use Topics    Alcohol use: Not Currently    Drug use: Never       Allergies: Allergies   Allergen Reactions    Sulfa (Sulfonamide Antibiotics) Anaphylaxis     Review of Systems   Review of Systems   Constitutional: Negative for fever. HENT: Negative for congestion. Eyes: Negative for visual disturbance. Respiratory: Positive for shortness of breath. Cardiovascular: Negative for chest pain. Gastrointestinal: Negative for abdominal pain. Genitourinary: Negative for dysuria. Musculoskeletal: Negative for arthralgias. Skin: Negative for rash. Neurological: Negative for headaches. Physical Exam   General: No acute distressed. Well-nourished. Skin: No rash. Head: Normocephalic. Atraumatic. Eye: No proptosis or conjunctival injections. Respiratory: Mild respiratory distress. Diminished breath sounds bilaterally. Gastrointestinal: Nondistended. Musculoskeletal: No obvious bony deformities. Psychiatric: Cooperative. Appropriate mood and affect. Diagnostic Study Results     Labs -     Recent Results (from the past 24 hour(s))   CBC WITH AUTOMATED DIFF    Collection Time: 11/18/20  8:04 PM   Result Value Ref Range    WBC 8.7 3.6 - 11.0 K/uL    RBC 2.87 (L) 3.80 - 5.20 M/uL    HGB 9.3 (L) 11.5 - 16.0 g/dL    HCT 28.9 (L) 35.0 - 47.0 %    .7 (H) 80.0 - 99.0 FL    MCH 32.4 26.0 - 34.0 PG    MCHC 32.2 30.0 - 36.5 g/dL    RDW 14.5 11.5 - 14.5 %    PLATELET 462 367 - 979 K/uL    MPV 9.2 8.9 - 12.9 FL    NEUTROPHILS 76 (H) 32 - 75 %    LYMPHOCYTES 18 12 - 49 %    MONOCYTES 4 (L) 5 - 13 %    EOSINOPHILS 2 0 - 7 %    BASOPHILS 0 0 - 1 %    IMMATURE GRANULOCYTES 0 0.0 - 0.5 %    ABS. NEUTROPHILS 6.6 1.8 - 8.0 K/UL    ABS. LYMPHOCYTES 1.6 0.8 - 3.5 K/UL    ABS. MONOCYTES 0.3 0.0 - 1.0 K/UL    ABS. EOSINOPHILS 0.2 0.0 - 0.4 K/UL    ABS. BASOPHILS 0.0 0.0 - 0.1 K/UL    ABS. IMM. GRANS. 0.0 0.00 - 0.04 K/UL    DF AUTOMATED     METABOLIC PANEL, COMPREHENSIVE    Collection Time: 11/18/20  8:04 PM   Result Value Ref Range    Sodium 136 136 - 145 mmol/L    Potassium 3.2 (L) 3.5 - 5.1 mmol/L    Chloride 100 97 - 108 mmol/L    CO2 29 21 - 32 mmol/L    Anion gap 7 5 - 15 mmol/L    Glucose 94 65 - 100 mg/dL    BUN 20 6 - 20 mg/dL    Creatinine 1.26 (H) 0.55 - 1.02 mg/dL    BUN/Creatinine ratio 16 12 - 20      GFR est AA 53 (L) >60 ml/min/1.73m2    GFR est non-AA 44 (L) >60 ml/min/1.73m2    Calcium 9.4 8.5 - 10.1 mg/dL    Bilirubin, total 0.3 0.2 - 1.0 mg/dL    AST (SGOT) 18 15 - 37 U/L    ALT (SGPT) 18 12 - 78 U/L    Alk. phosphatase 100 45 - 117 U/L    Protein, total 7.7 6.4 - 8.2 g/dL    Albumin 3.3 (L) 3.5 - 5.0 g/dL    Globulin 4.4 (H) 2.0 - 4.0 g/dL    A-G Ratio 0.8 (L) 1.1 - 2.2     TROPONIN I    Collection Time: 11/18/20  8:04 PM   Result Value Ref Range    Troponin-I, Qt. <0.05 <0.05 ng/mL   BNP    Collection Time: 11/18/20  8:04 PM   Result Value Ref Range    NT pro-BNP 13,748 (H) <125 pg/mL       Radiologic Studies -   XR CHEST SNGL V   Final Result   Impression[de-identified]    1. Mild bilateral pleural effusions with mild bibasilar airspace disease,   atelectasis versus infection. Possible mild vascular congestion        CT Results  (Last 48 hours)    None        CXR Results  (Last 48 hours)               11/18/20 2021  XR CHEST SNGL V Final result    Impression:  Impression[de-identified]    1. Mild bilateral pleural effusions with mild bibasilar airspace disease,   atelectasis versus infection. Possible mild vascular congestion       Narrative:  Single View Chest 11/18/2020       Comparison: November 4, 2020       Indication: Dyspnea. Findings:   Heart size is upper normal to borderline enlarged, stable. Aortic calcification. Increased opacity at both lung bases is consistent with pleural effusions with   airspace disease.  This airspace disease appears slightly increased since prior. Pulmonary vascularity prominence, possible mild vascular congestion. No   pneumothorax. Medical Decision Making and ED Course     I reviewed the available vital signs, nursing notes, past medical history, past surgical history, family history, and social history. Vital Signs - Reviewed the patient's vital signs. Patient Vitals for the past 12 hrs:   Temp Pulse Resp BP SpO2   11/18/20 1951 98.7 °F (37.1 °C) (!) 108 25 (!) 192/106 (!) 87 %       EKG interpretation: Sinus tachycardia at 106 bpm.  Normal AK interval and QRS duration. Normal QTc interval.  Normal axis. No ST segment abnormalities. Medical Decision Making:   Presented with dyspnea. The differential diagnosis is CHF exacerbation, COPD exacerbation, pneumonia, acute respiratory failure with hypoxia. Work-up shows elevated BNP. Chest x-ray shows signs of increased fluid in her lungs. Initially I thought this was COPD exacerbation however realized it may be more related to CHF. Patient initially given duo nebs and Solu-Medrol. Now will be given Lasix and Nitropaste for her elevated blood pressure and CHF. Patient did require BiPAP with supplemental oxygenation. Will admit for further evaluation and management. Procedures       Critical Care Note:   7:53 PM  Amount of critical care time: 35 minutes  Impending deterioration: Airway and Respiratory  Associated risk factors: Hypoxia  Management: Bedside Assessment and Supervision of Care  Interpretation: Xrays, ECG and Blood Pressure  Interventions: CPAP, supplemental oxygenation  Case review: Hospitalist, nursing  Treatment response: Improving  Performed by: Self  Notes: I have spent critical care time involved in lab review, decision making, bedside attention, and documentation. This time excludes time spent in any separate billed procedures. During this entire length of time I was immediately available to the patient.   Barby Soto, DO    Disposition     Admitted    DISCHARGE PLAN:  1. Current Discharge Medication List      CONTINUE these medications which have NOT CHANGED    Details   bumetanide (BUMEX) 1 mg tablet Take 1 Tab by mouth two (2) times a day. Qty: 60 Tab, Refills: 0      potassium chloride (K-DUR, KLOR-CON) 20 mEq tablet Take 1 Tab by mouth daily. Qty: 30 Tab, Refills: 0      NIFEdipine ER (PROCARDIA XL) 60 mg ER tablet Take 1 Tab by mouth daily. Oxygen 5 Devices as needed. Pt wears 5LPM as needed- states she uses it after an axiety attack      isosorbide dinitrate (ISORDIL) 40 mg tablet Take 1 Tab by mouth three (3) times daily. Qty: 90 Tab, Refills: 0      albuterol-ipratropium (DUO-NEB) 2.5 mg-0.5 mg/3 ml nebu 3 mL by Nebulization route every six (6) hours. Qty: 120 Nebule, Refills: 0      labetaloL (NORMODYNE) 200 mg tablet Take 2 Tabs by mouth three (3) times daily. Qty: 180 Tab, Refills: 0      ALPRAZolam (XANAX) 0.25 mg tablet Take 0.25 mg by mouth three (3) times daily as needed for Anxiety. sertraline (Zoloft) 25 mg tablet Take 25 mg by mouth daily. hydrALAZINE (APRESOLINE) 50 mg tablet Take 100 mg by mouth three (3) times daily. levothyroxine (SYNTHROID) 25 mcg tablet Take 25 mcg by mouth Daily (before breakfast). levETIRAcetam (Keppra) 500 mg tablet Take 500 mg by mouth two (2) times a day. carBAMazepine, mood stabiliz, 200 mg CM12 Take 200 mg by mouth two (2) times a day. ferrous sulfate 325 mg (65 mg iron) tablet Take 325 mg by mouth Daily (before breakfast). folic acid (FOLVITE) 1 mg tablet Take 1 mg by mouth daily. aspirin delayed-release 81 mg tablet Take  by mouth daily. magnesium oxide (MAG-OX) 400 mg tablet Take 400 mg by mouth daily. 2.   Follow-up Information    None       3. Return to ED if worse     Diagnosis     Clinical impression:   1. Acute respiratory failure with hypoxia ([address]    2.  Acute on chronic congestive heart failure, unspecified heart failure type [facility])       Attestation:  Please note that this dictation was completed with [employer], the computer voice recognition software. Quite often unanticipated grammatical, syntax, homophones, and other interpretive errors are inadvertently transcribed by the computer software. Please disregard these errors. Please excuse any errors that have escaped final proofreading. Thank you.   Chinyere Montero, DO

## 2020-11-19 NOTE — CONSULTS
[facility] CARDIOLOGY  CARDIOLOGY CONSULTATION    REASON FOR CONSULT:  CHF    REQUESTING PROVIDER: Radha Frias    CHIEF COMPLAINT: CHF    HISTORY OF PRESENT ILLNESS:      Mrs. Steffen Leyva a 54 yoa female with a past medical history significant for HFpEF, COPD, HTN, mitral regurgitation who presented to the ED for shortness of breath. She was recently discharged on 11/04/2020 for a similar complaint. Patient states that she has progressively worsened over the past 24 hours. She denies chest pain, palpitations or dizziness. Last echo showed EF 55-60%, with mild to moderate tricuspid regurgitation. BNP [id number]. CTA negative for PE but demonstrated increased vascular congestion. She denies chest pain, palpitations or dizziness. Records from hospital admission course thus far reviewed. Since admission, patient has had routine labs, CTA chest       PAST MEDICAL HISTORY:  Notes above. Relevant cardiac issues include []. [] recent testing. HOME MEDICATIONS:  Home medications reviewed, no side effects. ALLERGIES:  Allergies reviewed with the patient, sulfa. SOCIAL HISTORY:  Notable for former tobacco use (Quit July 2020), no heavy alcohol or illicit drug use. REVIEW OF SYSTEMS:  Complete review of systems performed, pertinents noted above, all other systems are negative. PHYSICAL EXAMINATION:  Vital sign assessment reveal a blood pressure of 145/80 and pulse rate of 86. Body mass index is calculated at 17.98. Cardiovascular exam has a heart with a NRR, normal S1 and S2. No murmurs present. There are no rubs or gallops. Good peripheral pulses. No jugular venous distension. No carotid bruits are present. Respiratory exam reveals diminished breath sounds with wheezing in the upper lobes. Gastrointestinal exam has soft, nontender abdomen with normal bowel sounds. Lymphatic exam reveals no edema and no varicosities. No notable skin changes.   Neurologic exam is nonfocal.  Musculoskeletal exam is notable for a normal gait. Recent labs results and imaging reviewed. Case was discussed with Dr. Aristides Ponce and our impression and recommendations are as follows:     1. Acute on chronic heart failure: BNP 13,000. EF 55-60%. Agree with IV lasix 40 BID. Will assess need for any increase as outpatient. Please document Strict I&Os. Repeat labs in the am.     2. Mitral valve regurgitation: Last echo shoeContinue diuresis as stated above. Limited symptoms at rest.     3. Hypertension: Remains elevated. Continue Hydralazine 100mg three times daily and  labetolol to 400mg TID. Blood pressure continues to be labile. Will obtain vascular consult for renal artery stenosis and aortic stenosis evaluation. Continue present regimen. Will continue to monitor    4. Hyperlipidemia: Continue with statin therapy. Thank you for involving us in the care of this patient. Please do not hesitate to call if additional questions arise.

## 2020-11-19 NOTE — PROGRESS NOTES
2 nurse skin assessment performed by Theo Bowie RN and myself. PT skin is free of breakdown, tears and abrasions. No abnormalities noted at this time.

## 2020-11-20 LAB
ANION GAP SERPL CALC-SCNC: 7 MMOL/L (ref 5–15)
BASOPHILS # BLD: 0 K/UL (ref 0–0.1)
BASOPHILS NFR BLD: 0 % (ref 0–1)
BUN SERPL-MCNC: 25 MG/DL (ref 6–20)
BUN/CREAT SERPL: 15 (ref 12–20)
CA-I BLD-MCNC: 9.2 MG/DL (ref 8.5–10.1)
CHLORIDE SERPL-SCNC: 97 MMOL/L (ref 97–108)
CO2 SERPL-SCNC: 30 MMOL/L (ref 21–32)
CREAT SERPL-MCNC: 1.7 MG/DL (ref 0.55–1.02)
DIFFERENTIAL METHOD BLD: ABNORMAL
EOSINOPHIL # BLD: 0.2 K/UL (ref 0–0.4)
EOSINOPHIL NFR BLD: 2 % (ref 0–7)
ERYTHROCYTE [DISTWIDTH] IN BLOOD BY AUTOMATED COUNT: 14.7 % (ref 11.5–14.5)
GLUCOSE SERPL-MCNC: 100 MG/DL (ref 65–100)
HCT VFR BLD AUTO: 24.1 % (ref 35–47)
HCT VFR BLD AUTO: 25.8 % (ref 35–47)
HGB BLD-MCNC: 7.7 G/DL (ref 11.5–16)
HGB BLD-MCNC: 8.2 G/DL (ref 11.5–16)
IMM GRANULOCYTES # BLD AUTO: 0 K/UL (ref 0–0.04)
IMM GRANULOCYTES NFR BLD AUTO: 0 % (ref 0–0.5)
LYMPHOCYTES # BLD: 1.4 K/UL (ref 0.8–3.5)
LYMPHOCYTES NFR BLD: 19 % (ref 12–49)
MCH RBC QN AUTO: 32.1 PG (ref 26–34)
MCHC RBC AUTO-ENTMCNC: 32 G/DL (ref 30–36.5)
MCV RBC AUTO: 100.4 FL (ref 80–99)
MONOCYTES # BLD: 0.4 K/UL (ref 0–1)
MONOCYTES NFR BLD: 5 % (ref 5–13)
NEUTS SEG # BLD: 5.4 K/UL (ref 1.8–8)
NEUTS SEG NFR BLD: 74 % (ref 32–75)
PLATELET # BLD AUTO: 332 K/UL (ref 150–400)
PMV BLD AUTO: 9.6 FL (ref 8.9–12.9)
POTASSIUM SERPL-SCNC: 2.7 MMOL/L (ref 3.5–5.1)
RBC # BLD AUTO: 2.4 M/UL (ref 3.8–5.2)
SODIUM SERPL-SCNC: 134 MMOL/L (ref 136–145)
WBC # BLD AUTO: 7.3 K/UL (ref 3.6–11)

## 2020-11-20 PROCEDURE — 74011250637 HC RX REV CODE- 250/637: Performed by: PHYSICIAN ASSISTANT

## 2020-11-20 PROCEDURE — 94640 AIRWAY INHALATION TREATMENT: CPT

## 2020-11-20 PROCEDURE — 74011250636 HC RX REV CODE- 250/636: Performed by: PHYSICIAN ASSISTANT

## 2020-11-20 PROCEDURE — 86900 BLOOD TYPING SEROLOGIC ABO: CPT

## 2020-11-20 PROCEDURE — 36415 COLL VENOUS BLD VENIPUNCTURE: CPT

## 2020-11-20 PROCEDURE — 86923 COMPATIBILITY TEST ELECTRIC: CPT

## 2020-11-20 PROCEDURE — 80048 BASIC METABOLIC PNL TOTAL CA: CPT

## 2020-11-20 PROCEDURE — 74011250637 HC RX REV CODE- 250/637: Performed by: INTERNAL MEDICINE

## 2020-11-20 PROCEDURE — 85025 COMPLETE CBC W/AUTO DIFF WBC: CPT

## 2020-11-20 PROCEDURE — 74011250636 HC RX REV CODE- 250/636: Performed by: FAMILY MEDICINE

## 2020-11-20 PROCEDURE — 74011000250 HC RX REV CODE- 250: Performed by: FAMILY MEDICINE

## 2020-11-20 PROCEDURE — 94760 N-INVAS EAR/PLS OXIMETRY 1: CPT

## 2020-11-20 PROCEDURE — 82088 ASSAY OF ALDOSTERONE: CPT

## 2020-11-20 PROCEDURE — 77010033678 HC OXYGEN DAILY

## 2020-11-20 PROCEDURE — 74011000250 HC RX REV CODE- 250: Performed by: INTERNAL MEDICINE

## 2020-11-20 PROCEDURE — 30233N1 TRANSFUSION OF NONAUTOLOGOUS RED BLOOD CELLS INTO PERIPHERAL VEIN, PERCUTANEOUS APPROACH: ICD-10-PCS | Performed by: HOSPITALIST

## 2020-11-20 PROCEDURE — P9016 RBC LEUKOCYTES REDUCED: HCPCS

## 2020-11-20 PROCEDURE — 36430 TRANSFUSION BLD/BLD COMPNT: CPT

## 2020-11-20 PROCEDURE — 85018 HEMOGLOBIN: CPT

## 2020-11-20 PROCEDURE — 74011250637 HC RX REV CODE- 250/637: Performed by: FAMILY MEDICINE

## 2020-11-20 PROCEDURE — 65270000029 HC RM PRIVATE

## 2020-11-20 RX ORDER — POTASSIUM CHLORIDE 750 MG/1
40 TABLET, FILM COATED, EXTENDED RELEASE ORAL 3 TIMES DAILY
Status: DISCONTINUED | OUTPATIENT
Start: 2020-11-20 | End: 2020-11-24

## 2020-11-20 RX ORDER — PANTOPRAZOLE SODIUM 40 MG/1
40 TABLET, DELAYED RELEASE ORAL
Status: DISCONTINUED | OUTPATIENT
Start: 2020-11-20 | End: 2020-11-27 | Stop reason: HOSPADM

## 2020-11-20 RX ORDER — POTASSIUM CHLORIDE 20 MEQ/1
40 TABLET, EXTENDED RELEASE ORAL 3 TIMES DAILY
Status: DISCONTINUED | OUTPATIENT
Start: 2020-11-20 | End: 2020-11-20

## 2020-11-20 RX ORDER — SODIUM CHLORIDE 9 MG/ML
250 INJECTION, SOLUTION INTRAVENOUS AS NEEDED
Status: DISCONTINUED | OUTPATIENT
Start: 2020-11-20 | End: 2020-11-27 | Stop reason: HOSPADM

## 2020-11-20 RX ORDER — BUDESONIDE AND FORMOTEROL FUMARATE DIHYDRATE 160; 4.5 UG/1; UG/1
2 AEROSOL RESPIRATORY (INHALATION)
Status: DISCONTINUED | OUTPATIENT
Start: 2020-11-20 | End: 2020-11-27 | Stop reason: HOSPADM

## 2020-11-20 RX ADMIN — ASPIRIN 81 MG: 81 TABLET, COATED ORAL at 08:26

## 2020-11-20 RX ADMIN — HYDRALAZINE HYDROCHLORIDE 100 MG: 50 TABLET, FILM COATED ORAL at 22:22

## 2020-11-20 RX ADMIN — LABETALOL HYDROCHLORIDE 400 MG: 200 TABLET, FILM COATED ORAL at 22:22

## 2020-11-20 RX ADMIN — ISOSORBIDE DINITRATE 40 MG: 20 TABLET ORAL at 16:01

## 2020-11-20 RX ADMIN — Medication 10 ML: at 06:00

## 2020-11-20 RX ADMIN — BUDESONIDE 500 MCG: 0.5 INHALANT RESPIRATORY (INHALATION) at 08:00

## 2020-11-20 RX ADMIN — PANTOPRAZOLE SODIUM 40 MG: 40 TABLET, DELAYED RELEASE ORAL at 10:00

## 2020-11-20 RX ADMIN — BUDESONIDE AND FORMOTEROL FUMARATE DIHYDRATE 2 PUFF: 160; 4.5 AEROSOL RESPIRATORY (INHALATION) at 20:26

## 2020-11-20 RX ADMIN — FUROSEMIDE 40 MG: 10 INJECTION, SOLUTION INTRAMUSCULAR; INTRAVENOUS at 22:23

## 2020-11-20 RX ADMIN — ISOSORBIDE DINITRATE 40 MG: 20 TABLET ORAL at 08:26

## 2020-11-20 RX ADMIN — LEVETIRACETAM 500 MG: 500 TABLET ORAL at 22:22

## 2020-11-20 RX ADMIN — IPRATROPIUM BROMIDE AND ALBUTEROL SULFATE 3 ML: .5; 3 SOLUTION RESPIRATORY (INHALATION) at 08:05

## 2020-11-20 RX ADMIN — LEVETIRACETAM 500 MG: 500 TABLET ORAL at 08:26

## 2020-11-20 RX ADMIN — ONDANSETRON 4 MG: 2 INJECTION INTRAMUSCULAR; INTRAVENOUS at 10:02

## 2020-11-20 RX ADMIN — LORATADINE 10 MG: 10 TABLET ORAL at 08:26

## 2020-11-20 RX ADMIN — FUROSEMIDE 40 MG: 10 INJECTION, SOLUTION INTRAMUSCULAR; INTRAVENOUS at 08:26

## 2020-11-20 RX ADMIN — IPRATROPIUM BROMIDE AND ALBUTEROL SULFATE 3 ML: .5; 3 SOLUTION RESPIRATORY (INHALATION) at 20:25

## 2020-11-20 RX ADMIN — POTASSIUM CHLORIDE 40 MEQ: 750 TABLET, FILM COATED, EXTENDED RELEASE ORAL at 22:22

## 2020-11-20 RX ADMIN — Medication 10 ML: at 15:27

## 2020-11-20 RX ADMIN — TRAMADOL HYDROCHLORIDE 50 MG: 50 TABLET, COATED ORAL at 10:55

## 2020-11-20 RX ADMIN — SERTRALINE HYDROCHLORIDE 25 MG: 25 TABLET ORAL at 08:25

## 2020-11-20 RX ADMIN — NIFEDIPINE 60 MG: 60 TABLET, EXTENDED RELEASE ORAL at 08:26

## 2020-11-20 RX ADMIN — ATORVASTATIN CALCIUM 20 MG: 20 TABLET, FILM COATED ORAL at 22:22

## 2020-11-20 RX ADMIN — CARBAMAZEPINE 200 MG: 200 TABLET ORAL at 22:23

## 2020-11-20 RX ADMIN — FOLIC ACID 1 MG: 1 TABLET ORAL at 08:26

## 2020-11-20 RX ADMIN — LABETALOL HYDROCHLORIDE 400 MG: 200 TABLET, FILM COATED ORAL at 16:01

## 2020-11-20 RX ADMIN — HYDRALAZINE HYDROCHLORIDE 100 MG: 50 TABLET, FILM COATED ORAL at 08:26

## 2020-11-20 RX ADMIN — IPRATROPIUM BROMIDE AND ALBUTEROL SULFATE 3 ML: .5; 3 SOLUTION RESPIRATORY (INHALATION) at 14:27

## 2020-11-20 RX ADMIN — ISOSORBIDE DINITRATE 40 MG: 20 TABLET ORAL at 22:22

## 2020-11-20 RX ADMIN — POTASSIUM CHLORIDE 40 MEQ: 750 TABLET, FILM COATED, EXTENDED RELEASE ORAL at 16:01

## 2020-11-20 RX ADMIN — CARBAMAZEPINE 200 MG: 200 TABLET ORAL at 08:26

## 2020-11-20 RX ADMIN — TRAMADOL HYDROCHLORIDE 50 MG: 50 TABLET, COATED ORAL at 22:34

## 2020-11-20 RX ADMIN — LABETALOL HYDROCHLORIDE 400 MG: 200 TABLET, FILM COATED ORAL at 08:26

## 2020-11-20 RX ADMIN — Medication 10 ML: at 22:25

## 2020-11-20 RX ADMIN — LEVOTHYROXINE SODIUM 25 MCG: 0.03 TABLET ORAL at 08:26

## 2020-11-20 RX ADMIN — FERROUS SULFATE TAB 325 MG (65 MG ELEMENTAL FE) 325 MG: 325 (65 FE) TAB at 08:26

## 2020-11-20 RX ADMIN — HYDRALAZINE HYDROCHLORIDE 100 MG: 50 TABLET, FILM COATED ORAL at 16:01

## 2020-11-20 NOTE — PROGRESS NOTES
CARDIOLOGY PROGRESS NOTE - NP    Patient seen and examined. Resting comfortably in bed this morning This is a patient who is followed for uncontrolled hypertension. Patient denies chest pain or discomfort. Continues to endorse dyspnea with exertion. No other complaints reported. Telemetry reviewed, there were no events noted in the past 24 hours. Sinus rhythm 75 with no acute ischemia. Pertinent review of systems items noted above, all other systems are negative. Current medications reviewed. Physical Examination  Vital signs are stable. Blood pressure 160/86, Pulse 97  No apparent distress. Heart is regular, rate and rhythm. Normal S1, S2, no murmurs are appreciated. Lungs are diminished in the bases, no wheezing noted. Abdomen is soft, nontender, normal bowel sounds. Extremities have no edema. Labs reviewed:   Hgb 7.6  K 3.0  Cr 1.64  Coronavirus negative    Case discussed with Dr. Puja Lopez and our impression and recommendations are as follows:    1. Acute on chronic heart failure: BNP 13,000. EF 55-60%. Continue IV lasix 40 BID, good diuresis. Continue current medications. Please document Strict I&Os. Repeat labs in the am.      2. Mitral valve regurgitation: Last echo 9/26/20 showed an EF of 55-60%. Continue diuresis as stated above. Limited symptoms at rest.      3. Hypertension: Uncontrolled on multiple BP medications as an outpatient. Continue Hydralazine 100mg three times daily and labetolol to 400mg TID. Blood pressure continues to be labile. Continue present regimen. Will continue to monitor    4. Renal artery stenosis: Dr. Hardik De La O on the case. Renal US pending with further recommendations after results.      4. Hyperlipidemia: Continue with statin therapy. 5. Symptomatic anemia: Hgb 7.6 this am, transfuse 2 units PRBCs. Will stop aspirin. Repeat labs in the am.     Please do not hesitate to call me or Dr. Puja Lopez if additional questions arise.

## 2020-11-20 NOTE — ROUTINE PROCESS
Bedside and Verbal shift change report given to Salvador Desir RN (oncoming nurse) by Halle Styles. (offgoing nurse). Report included the following information SBAR, Intake/Output, MAR, Accordion, Recent Results and Cardiac Rhythm Sinus Rhythm.

## 2020-11-20 NOTE — PROGRESS NOTES
Comprehensive Nutrition Assessment    Type and Reason for Visit: Initial, Positive nutrition screen(low BMI)    Nutrition Recommendations/Plan:   Continue with regular/thins Cardiac diet, 1500ml FR              Adding Ensure Enlive BID              Adding snacks per pt preference  Nsg to document wts, intakes, BMs    Nutrition Assessment:  Admitted for SOB, COVID-19 pending. Placed on BiPaP in ED, now on RA and doing much better. Vascular consulted, noted pt with severe PAD and CAD requiring stent placement, ? If able to complete OP. Per EMR pt with intakes % of meals, noted nausea this AM likely causing worse intakes. Interviewed over phone, pt stated fair appetite and intakes ~50% of meals, stated she loves Ensure and would like to have inpatient, also open to snacks, RD to order chocolate pdg and fruit cups. Pt endorses large barrier to good intakes is breathing and lethargy. Labs: na 134, K+ 2.7, BUN 25, Cr 1.70, H.H 7.7/24.1, AST 11. Meds: Statin, Carbamazepine, FeSu, B9, Lasix, Keppra, Levothyroxine, ativan, antiemetics, PPI, PRN  Miralax, zoloft,     Malnutrition Assessment:  Malnutrition Status: Moderate malnutrition    Context:  Chronic illness     Findings of the 6 clinical characteristics of malnutrition:   Energy Intake:  7 - 75% or less est energy requirements for 1 month or longer(intakes slowly decreasing to ~50%)  Weight Loss:  No significant weight loss     Body Fat Loss:  1 - Mild body fat loss(from 11/4 assessment), Triceps   Muscle Mass Loss:  1 - Mild muscle mass loss(from 11/4 assessment), Thigh (quadraceps), Scapula (trapezius), Temples (temporalis), Clavicles (pectoralis &deltoids), Calf (gastrocnemius)  Fluid Accumulation:  No significant fluid accumulation,      Estimated Daily Nutrient Needs:  Energy (kcal): 1564kcals (32kcals/kg); Weight Used for Energy Requirements: Current  Protein (g): 59g (1.2g/kg);  Weight Used for Protein Requirements: Current  Fluid (ml/day): 1564ml; Method Used for Fluid Requirements: 1 ml/kcal   Needs for COPD    Nutrition Related Findings:  Unable to repeat NFPE at this assessment 2/2 contact precautions, NFPE from ~2 weeks ago found mild muscle and fat wasting. No edema. Endorses +N/V, noted this is chornic, zofran inpatient helping. No issues with D/C last BM 11/19. No issues with c/s, SLP cleared for regular/thins at last admit. Wounds:    None       Current Nutrition Therapies:  DIET CARDIAC Regular; 2 GM NA (House Low NA); FR 1500ML    Anthropometric Measures:  · Height:  5' 5\" (165.1 cm)  · Current Body Wt:  48.9 kg (107 lb 12.9 oz)(11/20)   · Admission Body Wt:  107 lb 12.9 oz(11/20)    · Usual Body Wt:  (tomasz)     · Ideal Body Wt:  125 lbs:  86.2 %   · BMI Category:  Underweight (BMI less than 18.5)     Wt hx: 48.9kg (11/20), 51.6kg (11/4), 47.7kg (9/29), 56.7kg (9/21- edema?)  Per RN, pt endorsing 40# wt loss in 1 year, per last assessment 1 month ago pt endorsed 20# wt loss with # 1 year ago. Per new wt today, pt with some wt gain since last admit.      Nutrition Diagnosis:   · Underweight related to catabolic illness, inadequate protein-energy intake(COPD, per diet hx) as evidenced by BMI, mild muscle loss(17.9)      Nutrition Interventions:   Food and/or Nutrient Delivery: Continue current diet, Start oral nutrition supplement, Snacks (specify)(Ensure Enlive BID, Snacks of pdg and fruit)  Nutrition Education and Counseling: No recommendations at this time  Coordination of Nutrition Care: Continue to monitor while inpatient    Goals:  Meet >75% EENs via PO   Wt gain 0.5kg per week  Lytes wnl          Nutrition Monitoring and Evaluation:   Behavioral-Environmental Outcomes: None identified  Food/Nutrient Intake Outcomes: Food and nutrient intake, Supplement intake  Physical Signs/Symptoms Outcomes: Chewing or swallowing, Weight, Nutrition focused physical findings, Nausea/vomiting    Discharge Planning:    Continue oral nutrition supplement Electronically signed by Tere Landeros RD on 11/20/2020 at 1:33 PM    Contact: Ext [phone]

## 2020-11-20 NOTE — PROGRESS NOTES
Instructed on Fluid restrictions, information placed on white board, patient voice understanding. Pitcher of water given with instructions to be taken throughout the day patient agreed. Instructed on the amount dietary will send with each meal. Voice understanding.

## 2020-11-20 NOTE — PROGRESS NOTES
Hospitalist Progress Note    Subjective:   Daily Progress Note: 11/20/2020 8:36 AM    Hospital Course: Patient is a 51-year-old female who presented to the ER on 11/18/2020 for increase shortness of breath. Patient has a history of hypertension, COPD, chronic hypoxic respiratory failure, diastolic CHF. She was recently discharged on 11/4/2020 for similar complaints. 2D echo in September 2020 showed normal ejection fraction with mild mitral regurgitation and mild tricuspid regurgitation. Patient started having shortness of breath with a white productive cough for 24 hours. It was associated with wheezing. No fevers. She normally uses 2 L of oxygen at home. In the ED patient's respirations were 25 and she was found to be hypoxic at 87% on 5 L oxygen nasal cannula. Blood pressure elevated 192/106. Chest x-ray showed cardiomegaly with mild bilateral pleural effusions. CTA of the chest confirmed this. [place] [address]. She was placed on BiPAP and given IV Solu-Medrol and duo nebs and IV Lasix. She was started on IV Lasix 40 mg twice daily and cardiology consulted. Vascular surgery consulted for  renal artery stenosis work up. HGB is 7.6. Transfuse 1 unit. Subjective: Patient says she is feeling a little better. Still with SOB.      Current Facility-Administered Medications   Medication Dose Route Frequency    pantoprazole (PROTONIX) tablet 40 mg  40 mg Oral ACB    budesonide-formoteroL (SYMBICORT) 160-4.5 mcg/actuation HFA inhaler 2 Puff  2 Puff Inhalation BID RT    0.9% sodium chloride infusion 250 mL  250 mL IntraVENous PRN    aspirin delayed-release tablet 81 mg  81 mg Oral DAILY    folic acid (FOLVITE) tablet 1 mg  1 mg Oral DAILY    ferrous sulfate tablet 325 mg  325 mg Oral ACB    hydrALAZINE (APRESOLINE) tablet 100 mg  100 mg Oral TID    isosorbide dinitrate (ISORDIL) tablet 40 mg  40 mg Oral TID    labetaloL (NORMODYNE) tablet 400 mg  400 mg Oral TID    levETIRAcetam (KEPPRA) tablet 500 mg  500 mg Oral BID    levothyroxine (SYNTHROID) tablet 25 mcg  25 mcg Oral ACB    NIFEdipine ER (PROCARDIA XL) tablet 60 mg  60 mg Oral DAILY    sertraline (ZOLOFT) tablet 25 mg  25 mg Oral DAILY    atorvastatin (LIPITOR) tablet 20 mg  20 mg Oral QHS    budesonide (PULMICORT) 500 mcg/2 ml nebulizer suspension  500 mcg Nebulization BID RT    carBAMazepine (TEGretol) tablet 200 mg  200 mg Oral BID    furosemide (LASIX) injection 40 mg  40 mg IntraVENous Q12H    traMADoL (ULTRAM) tablet 50 mg  50 mg Oral Q6H PRN    LORazepam (ATIVAN) tablet 0.25 mg  0.25 mg Oral TID PRN    loratadine (CLARITIN) tablet 10 mg  10 mg Oral DAILY    albuterol-ipratropium (DUO-NEB) 2.5 MG-0.5 MG/3 ML  3 mL Nebulization Q6HWA RT    sodium chloride (NS) flush 5-40 mL  5-40 mL IntraVENous Q8H    sodium chloride (NS) flush 5-40 mL  5-40 mL IntraVENous PRN    acetaminophen (TYLENOL) tablet 650 mg  650 mg Oral Q6H PRN    Or    acetaminophen (TYLENOL) suppository 650 mg  650 mg Rectal Q6H PRN    polyethylene glycol (MIRALAX) packet 17 g  17 g Oral DAILY PRN    promethazine (PHENERGAN) tablet 12.5 mg  12.5 mg Oral Q6H PRN    Or    ondansetron (ZOFRAN) injection 4 mg  4 mg IntraVENous Q6H PRN        Review of Systems  Constitutional: No fevers, No chills, No sweats, ++ fatigue, ++ Weakness  Eyes: No redness  Ears, nose, mouth, throat, and face: ++nasal congestion, No sore throat, No voice change  Respiratory: ++ Shortness of Breath, ++cough, No wheezing  Cardiovascular:No chest pain, No palpitations, No extremity edema  Gastrointestinal: No nausea, No vomiting, No diarrhea, No abdominal pain  Genitourinary: No frequency, No dysuria, No hematuria  Integument/breast: No skin lesion(s)   Neurological: No Confusion, No headaches, No dizziness      Objective:     Visit Vitals  BP (!) 160/86   Pulse 99   Temp 98.5 °F (36.9 °C)   Resp 18   Ht 5' 5\" (1.651 m)   Wt 48.9 kg (107 lb 12.9 oz)   SpO2 93%   Breastfeeding No BMI 17.94 kg/m²    O2 Flow Rate (L/min): 4 l/min O2 Device: Nasal cannula    Temp (24hrs), Av.6 °F (37 °C), Min:98.4 °F (36.9 °C), Max:98.8 °F (37.1 °C)      No intake/output data recorded.  1901 -  0700  In: 1100 [P.O.:1100]  Out: -     PHYSICAL EXAM:  Constitutional: No acute distress  Skin: Extremities and face reveal no rashes. HEENT: Sclerae anicteric. Extra-occular muscles are intact. No oral ulcers. The neck is supple and no masses. Cardiovascular: Regular rate and rhythm. Respiratory:  Nonlabored, diminsihed  GI: Abdomen nondistended, soft, and nontender. Normal active bowel sounds. Musculoskeletal: No pitting edema of the lower legs. Able to move all ext  Neurological:  Patient is alert and oriented. Cranial nerves II-XII grossly intact  Psychiatric: Mood appears appropriate       Data Review    Recent Results (from the past 24 hour(s))   METABOLIC PANEL, COMPREHENSIVE    Collection Time: 20  7:12 PM   Result Value Ref Range    Sodium 138 136 - 145 mmol/L    Potassium 3.0 (L) 3.5 - 5.1 mmol/L    Chloride 98 97 - 108 mmol/L    CO2 31 21 - 32 mmol/L    Anion gap 9 5 - 15 mmol/L    Glucose 99 65 - 100 mg/dL    BUN 26 (H) 6 - 20 mg/dL    Creatinine 1.64 (H) 0.55 - 1.02 mg/dL    BUN/Creatinine ratio 16 12 - 20      GFR est AA 39 (L) >60 ml/min/1.73m2    GFR est non-AA 33 (L) >60 ml/min/1.73m2    Calcium 8.9 8.5 - 10.1 mg/dL    Bilirubin, total 0.2 0.2 - 1.0 mg/dL    AST (SGOT) 11 (L) 15 - 37 U/L    ALT (SGPT) 13 12 - 78 U/L    Alk.  phosphatase 102 45 - 117 U/L    Protein, total 6.8 6.4 - 8.2 g/dL    Albumin 3.0 (L) 3.5 - 5.0 g/dL    Globulin 3.8 2.0 - 4.0 g/dL    A-G Ratio 0.8 (L) 1.1 - 2.2     MAGNESIUM    Collection Time: 20  7:12 PM   Result Value Ref Range    Magnesium 2.0 1.6 - 2.4 mg/dL   CBC W/O DIFF    Collection Time: 20  7:12 PM   Result Value Ref Range    WBC 7.6 3.6 - 11.0 K/uL    RBC 2.34 (L) 3.80 - 5.20 M/uL    HGB 7.6 (L) 11.5 - 16.0 g/dL    HCT 23.4 (L) 35.0 - 47.0 %    .0 (H) 80.0 - 99.0 FL    MCH 32.5 26.0 - 34.0 PG    MCHC 32.5 30.0 - 36.5 g/dL    RDW 14.7 (H) 11.5 - 14.5 %    PLATELET 048 328 - 121 K/uL    MPV 9.2 8.9 - 12.9 FL       Radiology review: CT chest    Assessment:   1. Acute on chronic hypoxic respiratory failure  2. Acute on chronic diastolic CHF exacerbation  3. Hypertension  4. Seizure disorder  5. COPD  6. Hyperlipidemia  7. Hypothyroidism  8. Depression anxiety  9. Acute on chronic anemia     Plan:    1. BNP elevated greater than 13,000. Cardiology consulted. She had a 2D echo 2 months ago which showed normal EF. On  IV Lasix 40 mg twice daily. Fluid restriction. Vascular surgery consulted. Working up for renal artery stenosis due to refractory HTN. Once workup can follow up as an outpatient  2. Oxygen saturation is within normal limits on 4 L. Will attempt to wean patient down to her normal 2 L. Covid test NEGATIVE. CTA of the chest shows fluid overload. 3.  Blood pressure is elevated. Was given topical Nitropaste in the emergency room. Continue home medications of hydralazine, Procardia, labetalol, isordil  4. Continue with Keppra and Tegretol  5. Continue with DuoNeb's. 6.  Continue Lipitor   7. Continue with Synthroid  8. She is on Zoloft and Ativan as she takes this at home. 7. tranfuse 1 unit of blood. Check iron studies, occult blood, and folate, B12, and vitamin D    CODE STATUS full     DVT prophylaxis: heparin  Ulcer prophylaxis: protonix    Care Plan discussed with: Patient/Family and     Total time spent with patient: 34 minutes.

## 2020-11-20 NOTE — PROGRESS NOTES
Problem: Pressure Injury - Risk of  Goal: *Prevention of pressure injury  Description: Document Rivas Scale and appropriate interventions in the flowsheet. Outcome: Progressing Towards Goal  Note: Pressure Injury Interventions: Activity Interventions: Increase time out of bed, Pressure redistribution bed/mattress(bed type)         Nutrition Interventions: Document food/fluid/supplement intake, Discuss nutritional consult with provider, Offer support with meals,snacks and hydration                     Problem: Patient Education: Go to Patient Education Activity  Goal: Patient/Family Education  Outcome: Progressing Towards Goal     Problem: Discharge Planning  Goal: *Discharge to safe environment  Outcome: Progressing Towards Goal  Goal: *Knowledge of medication management  Outcome: Progressing Towards Goal  Goal: *Knowledge of discharge instructions  Outcome: Progressing Towards Goal     Problem: Patient Education: Go to Patient Education Activity  Goal: Patient/Family Education  Outcome: Progressing Towards Goal     Problem: Falls - Risk of  Goal: *Absence of Falls  Description: Document Nikkie Fall Risk and appropriate interventions in the flowsheet.   Outcome: Progressing Towards Goal  Note: Fall Risk Interventions:            Medication Interventions: Evaluate medications/consider consulting pharmacy, Patient to call before getting OOB, Teach patient to arise slowly                   Problem: Patient Education: Go to Patient Education Activity  Goal: Patient/Family Education  Outcome: Progressing Towards Goal     Problem: Nutrition Deficit  Goal: *Optimize nutritional status  Outcome: Progressing Towards Goal

## 2020-11-20 NOTE — ROUTINE PROCESS
PT eval order received and acknowledged. Pt screened and is currently presenting with baseline I for functional mobility/transfers. PT evaluation order will be discontinued at this time as pt has no acute PT needs. Please reorder PT if pt functional status changes. Thank you.

## 2020-11-20 NOTE — PROGRESS NOTES
OT eval order received and acknowledged. Pt screened and is currently presenting with baseline I for ADLs and functional mobility/transfers. OT evaluation order will be discontinued at this time as pt has no acute OT needs. Please reorder OT if pt functional status changes. Thank you.

## 2020-11-20 NOTE — CONSULTS
Consult    Subjective:     Shanika Washington is a 54 y.o. pleasant woman clinically currently hospital recovering from a shortness of breath. Patient is currently doing much better. She is on room air. Patient has a history of hypertension, COPD with diastolic congestive heart failure. Patient was a recently hospitalized for similar condition. Patient is currently on 6 different antihypertensive medication. Patient's still on hypertensive side. I was consulted for evaluation on aortic stenosis and bilateral renal artery stenosis on CT scan. Patient is examined this afternoon. Patient looks very comfortable without obvious distress patient suffers from hypertensive emergencies quite frequently currently on hydralazine 100 mg p.o. 3 times daily, isosorbide dinitrate 40 mg 3 times daily, labetalol 400 mg p.o. 3 times daily, nifedipine ER 60 mg daily, Lasix 40 mg p.o. twice daily, and hydrochlorothiazide 12.5 mg p.o. daily. On examination the patient's abdomen soft, patient has palpable femoral pulses bilaterally right groin is 2+ pulse left groin is 1+. Patient has dopplerable signal in both pedal area. Past Medical History:   Diagnosis Date    PEDRO PABLO (acute kidney injury) ([address] 10/2/2020    Anxiety 10/2/2020    Anxiety     CHF (congestive heart failure) ([address] 10/2/2020    With preserved lvef, diastolic dysfunction, mitral valve regurgitation moderate.      Chronic obstructive pulmonary disease (HCC)     Chronic respiratory failure (HCC)     Congestive heart failure (CHF) (HCC)     COPD (chronic obstructive pulmonary disease) with emphysema ([address] 10/2/2020    Dysphagia 10/2/2020    GERD (gastroesophageal reflux disease)     Hypertension     Hypertension     Iron deficiency anemia 10/2/2020    Mitral valve regurgitation 10/2/2020    Psychiatric disorder     Renal artery stenosis (HCC)     Seizure disorder ([address]     Thyroid disease       Past Surgical History:   Procedure Laterality Date    HX ROTATOR CUFF REPAIR Right     HX TUBAL LIGATION       Family History   Problem Relation Age of Onset    Hypertension Mother     Stroke Mother     Melanoma Mother     Stroke Father     Melanoma Brother     Melanoma Child       Social History     Tobacco Use    Smoking status: Former Smoker     Types: Cigarettes    Smokeless tobacco: Never Used    Tobacco comment: quit july 2020   Substance Use Topics    Alcohol use: Not Currently       Current Facility-Administered Medications   Medication Dose Route Frequency Provider Last Rate Last Dose    aspirin delayed-release tablet 81 mg  81 mg Oral DAILY Jame Izquierdo MD   81 mg at 44/29/44 6236    folic acid (FOLVITE) tablet 1 mg  1 mg Oral DAILY Jame Izquierdo MD   1 mg at 11/19/20 1100    ferrous sulfate tablet 325 mg  325 mg Oral ACB Jame Izquierdo MD   325 mg at 11/19/20 0753    hydrALAZINE (APRESOLINE) tablet 100 mg  100 mg Oral TID Jame Izquierdo MD   100 mg at 11/19/20 2225    isosorbide dinitrate (ISORDIL) tablet 40 mg  40 mg Oral TID Jame Izquierdo MD   40 mg at 11/19/20 2226    labetaloL (NORMODYNE) tablet 400 mg  400 mg Oral TID Jame Izquierdo MD   400 mg at 11/19/20 2225    levETIRAcetam (KEPPRA) tablet 500 mg  500 mg Oral BID Jame Izquierdo MD   500 mg at 11/19/20 2226    levothyroxine (SYNTHROID) tablet 25 mcg  25 mcg Oral ACB Jame Izquierdo MD   25 mcg at 11/19/20 0753    NIFEdipine ER (PROCARDIA XL) tablet 60 mg  60 mg Oral DAILY Jame Izquierdo MD   Stopped at 11/19/20 0900    sertraline (ZOLOFT) tablet 25 mg  25 mg Oral DAILY Jame Izquierdo MD   25 mg at 11/19/20 1059    atorvastatin (LIPITOR) tablet 20 mg  20 mg Oral QHS Jame Izquierdo MD   20 mg at 11/19/20 2226    budesonide (PULMICORT) 500 mcg/2 ml nebulizer suspension  500 mcg Nebulization BID RT Jame Izquierdo MD   500 mcg at 11/19/20 1935    carBAMazepine (TEGretol) tablet 200 mg  200 mg Oral BID Jame Izquierdo MD   200 mg at 11/19/20 2225    furosemide (LASIX) injection 40 mg  40 mg IntraVENous Q12H Becka Kaur PA-C   40 mg at 11/19/20 2226    traMADoL (ULTRAM) tablet 50 mg  50 mg Oral Q6H PRN Jodie Kaur PA-C   50 mg at 11/19/20 2226    LORazepam (ATIVAN) tablet 0.25 mg  0.25 mg Oral TID PRN Bird Ahmadi PA-C   0.25 mg at 11/19/20 1110    loratadine (CLARITIN) tablet 10 mg  10 mg Oral DAILY Becka Kaur PA-C   10 mg at 11/19/20 1059    albuterol-ipratropium (DUO-NEB) 2.5 MG-0.5 MG/3 ML  3 mL Nebulization Q6HWA RT Hamida Nix MD   3 mL at 11/19/20 1935    heparin porcine injection 5,000 Units  5,000 Units SubCUTAneous Q12H Milagro Nix MD        sodium chloride (NS) flush 5-40 mL  5-40 mL IntraVENous Q8H Benedicto PALUMBO MD   10 mL at 11/19/20 2200    sodium chloride (NS) flush 5-40 mL  5-40 mL IntraVENous PRN Stefani Mcfarland MD        acetaminophen (TYLENOL) tablet 650 mg  650 mg Oral Q6H PRN Stefani Mcfarland MD   650 mg at 11/19/20 0554    Or    acetaminophen (TYLENOL) suppository 650 mg  650 mg Rectal Q6H PRN Stefani Mcfarland MD        polyethylene glycol (MIRALAX) packet 17 g  17 g Oral DAILY PRN Stefani Mcfarland MD        promethazine (PHENERGAN) tablet 12.5 mg  12.5 mg Oral Q6H PRN Stefani Mcfarland MD        Or    [facility]dansetron [facility]) injection 4 mg  4 mg IntraVENous Q6H PRN Stefani Mcfarland MD            Allergies   Allergen Reactions    Sulfa (Sulfonamide Antibiotics) Anaphylaxis        Review of Systems:  A comprehensive review of systems was negative. Objective: Intake and Output:    No intake/output data recorded. 11/18 0701 - 11/19 1900  In: 1100 [P.O.:1100]  Out: -     Physical Exam:   Patient is coherent awake and alert. Very pleasant. Eyes gaze appropriate pupils equal.  Head and neck atraumatic normocephalic trachea central.  Cardiovascular system sinus rhythm regular. Pulmonary no audible wheeze chest excursion with respiration cycle.   Abdomen soft not tender or distended. Neurologically intact. Musculoskeletal system with no limits. Normal range of motion. Skin warm and moist.  Psychosocial: Appropriate  Hematologic: No bruise or lymphadenopathy. ECG: Data Review:   Recent Results (from the past 24 hour(s))   SARS-COV-2    Collection Time: 11/19/20  3:17 AM   Result Value Ref Range    SARS-CoV-2 Please find results under separate order     SARS-COV-2, PCR    Collection Time: 11/19/20  3:17 AM    Specimen: Nasopharyngeal   Result Value Ref Range    Specimen source Nasopharyngeal      SARS-CoV-2 Not detected Not detected   METABOLIC PANEL, COMPREHENSIVE    Collection Time: 11/19/20  7:12 PM   Result Value Ref Range    Sodium 138 136 - 145 mmol/L    Potassium 3.0 (L) 3.5 - 5.1 mmol/L    Chloride 98 97 - 108 mmol/L    CO2 31 21 - 32 mmol/L    Anion gap 9 5 - 15 mmol/L    Glucose 99 65 - 100 mg/dL    BUN 26 (H) 6 - 20 mg/dL    Creatinine 1.64 (H) 0.55 - 1.02 mg/dL    BUN/Creatinine ratio 16 12 - 20      GFR est AA 39 (L) >60 ml/min/1.73m2    GFR est non-AA 33 (L) >60 ml/min/1.73m2    Calcium 8.9 8.5 - 10.1 mg/dL    Bilirubin, total 0.2 0.2 - 1.0 mg/dL    AST (SGOT) 11 (L) 15 - 37 U/L    ALT (SGPT) 13 12 - 78 U/L    Alk.  phosphatase 102 45 - 117 U/L    Protein, total 6.8 6.4 - 8.2 g/dL    Albumin 3.0 (L) 3.5 - 5.0 g/dL    Globulin 3.8 2.0 - 4.0 g/dL    A-G Ratio 0.8 (L) 1.1 - 2.2     MAGNESIUM    Collection Time: 11/19/20  7:12 PM   Result Value Ref Range    Magnesium 2.0 1.6 - 2.4 mg/dL   CBC W/O DIFF    Collection Time: 11/19/20  7:12 PM   Result Value Ref Range    WBC 7.6 3.6 - 11.0 K/uL    RBC 2.34 (L) 3.80 - 5.20 M/uL    HGB 7.6 (L) 11.5 - 16.0 g/dL    HCT 23.4 (L) 35.0 - 47.0 %    .0 (H) 80.0 - 99.0 FL    MCH 32.5 26.0 - 34.0 PG    MCHC 32.5 30.0 - 36.5 g/dL    RDW 14.7 (H) 11.5 - 14.5 %    PLATELET 063 342 - 224 K/uL    MPV 9.2 8.9 - 12.9 FL       Chest x-ray reviewed    Assessment:     Active Problems:    Respiratory failure with hypoxia [facility]) (11/18/2020)        Plan:   I have reviewed the CT angiogram personally. Looks like the patient has a bilateral renal artery stenosis high-grade with a significant adjacent aortic calcification with stenosis. Patient also has a significant bilateral iliac calcification. On clinical examination patient has bilateral femoral pulses. So aortic stenosis and right iliac artery calcification is not hemodynamically significant however left the iliac calcification may be hemodynamically significant. Patient's left groin pulses 1+ is right groin pulses 2+. Patient has a significant atherosclerotic plaque at the aorta at the juxtarenal level with a significant calcification at the ostium of bilateral renal vessels. Patient has a significant history of hypertensive episodes, patient looks like refractory to medical management for her blood pressure. She is currently managed with 5 different antihypertensive medication. Patient currently admitted to hospital respiratory issues with a congested picture on chest x-ray possible pulmonary edema. This may be contributing factor for her respiratory distress with underlying chronic COPD. Patient will need objective analysis of hemodynamics of bilateral renal vessels. I will order bilateral renal duplex ultrasound. Most likely and suspected high-grade stenosis bilateral renal vessels based on the CT scan findings. If renal duplex ultrasound confirms these findings, patient may benefit from bilateral renal artery stent placement. Patient is a significant plaque burden with juxtarenal and aorta stenosis. Endovascular intervention at this area due to morphology of the plaque burden of the aorta and juxtarenal area could be challenging. If aortic stenosis needs to be treated at the time of renal artery stenosis, endovascular with endograft of the aorta with a snorkeling technique for renal vessels could be entertained as well.     Due to complexity of this problem of renal artery stenosis most likely this case may be referred to tertiary care center. But this could be done as outpatient route. I scheduled for renal duplex ultrasound while patient is hospitalized. And after symptomatic bilateral renal artery stenosis confirmed, patient can be referred outpatient route, and I am happy to make arrangements for this.

## 2020-11-21 LAB
ABO + RH BLD: NORMAL
ANION GAP SERPL CALC-SCNC: 5 MMOL/L (ref 5–15)
BASOPHILS # BLD: 0 K/UL (ref 0–0.1)
BASOPHILS NFR BLD: 0 % (ref 0–1)
BLD PROD TYP BPU: NORMAL
BLOOD BANK CMNT PATIENT-IMP: NORMAL
BLOOD GROUP ANTIBODIES SERPL: NEGATIVE
BPU ID: NORMAL
BUN SERPL-MCNC: 25 MG/DL (ref 6–20)
BUN/CREAT SERPL: 17 (ref 12–20)
CA-I BLD-MCNC: 9.5 MG/DL (ref 8.5–10.1)
CHLORIDE SERPL-SCNC: 100 MMOL/L (ref 97–108)
CO2 SERPL-SCNC: 29 MMOL/L (ref 21–32)
CREAT SERPL-MCNC: 1.49 MG/DL (ref 0.55–1.02)
CROSSMATCH RESULT,%XM: NORMAL
DIFFERENTIAL METHOD BLD: ABNORMAL
EOSINOPHIL # BLD: 0.2 K/UL (ref 0–0.4)
EOSINOPHIL NFR BLD: 2 % (ref 0–7)
ERYTHROCYTE [DISTWIDTH] IN BLOOD BY AUTOMATED COUNT: 15.5 % (ref 11.5–14.5)
GLUCOSE SERPL-MCNC: 117 MG/DL (ref 65–100)
HCT VFR BLD AUTO: 28.8 % (ref 35–47)
HGB BLD-MCNC: 9.2 G/DL (ref 11.5–16)
IMM GRANULOCYTES # BLD AUTO: 0 K/UL (ref 0–0.04)
IMM GRANULOCYTES NFR BLD AUTO: 0 % (ref 0–0.5)
LYMPHOCYTES # BLD: 1.2 K/UL (ref 0.8–3.5)
LYMPHOCYTES NFR BLD: 13 % (ref 12–49)
MCH RBC QN AUTO: 31.6 PG (ref 26–34)
MCHC RBC AUTO-ENTMCNC: 31.9 G/DL (ref 30–36.5)
MCV RBC AUTO: 99 FL (ref 80–99)
MONOCYTES # BLD: 0.4 K/UL (ref 0–1)
MONOCYTES NFR BLD: 5 % (ref 5–13)
NEUTS SEG # BLD: 7.7 K/UL (ref 1.8–8)
NEUTS SEG NFR BLD: 80 % (ref 32–75)
PLATELET # BLD AUTO: 315 K/UL (ref 150–400)
PMV BLD AUTO: 9.3 FL (ref 8.9–12.9)
POTASSIUM SERPL-SCNC: 3.6 MMOL/L (ref 3.5–5.1)
RBC # BLD AUTO: 2.91 M/UL (ref 3.8–5.2)
SODIUM SERPL-SCNC: 134 MMOL/L (ref 136–145)
SPECIMEN EXP DATE BLD: NORMAL
STATUS OF UNIT,%ST: NORMAL
TRANSFUSION STATUS PATIENT QL: NORMAL
UNIT DIVISION, %UDIV: 0
WBC # BLD AUTO: 9.6 K/UL (ref 3.6–11)

## 2020-11-21 PROCEDURE — 77010033678 HC OXYGEN DAILY

## 2020-11-21 PROCEDURE — 36415 COLL VENOUS BLD VENIPUNCTURE: CPT

## 2020-11-21 PROCEDURE — 85025 COMPLETE CBC W/AUTO DIFF WBC: CPT

## 2020-11-21 PROCEDURE — 74011250637 HC RX REV CODE- 250/637: Performed by: PHYSICIAN ASSISTANT

## 2020-11-21 PROCEDURE — 74011250637 HC RX REV CODE- 250/637: Performed by: FAMILY MEDICINE

## 2020-11-21 PROCEDURE — 74011250637 HC RX REV CODE- 250/637: Performed by: INTERNAL MEDICINE

## 2020-11-21 PROCEDURE — 83540 ASSAY OF IRON: CPT

## 2020-11-21 PROCEDURE — 65270000029 HC RM PRIVATE

## 2020-11-21 PROCEDURE — 94640 AIRWAY INHALATION TREATMENT: CPT

## 2020-11-21 PROCEDURE — 74011000250 HC RX REV CODE- 250: Performed by: INTERNAL MEDICINE

## 2020-11-21 PROCEDURE — 82607 VITAMIN B-12: CPT

## 2020-11-21 PROCEDURE — 74011250636 HC RX REV CODE- 250/636: Performed by: PHYSICIAN ASSISTANT

## 2020-11-21 PROCEDURE — 80048 BASIC METABOLIC PNL TOTAL CA: CPT

## 2020-11-21 PROCEDURE — 94762 N-INVAS EAR/PLS OXIMTRY CONT: CPT

## 2020-11-21 PROCEDURE — 82306 VITAMIN D 25 HYDROXY: CPT

## 2020-11-21 RX ADMIN — HYDRALAZINE HYDROCHLORIDE 100 MG: 50 TABLET, FILM COATED ORAL at 17:53

## 2020-11-21 RX ADMIN — ISOSORBIDE DINITRATE 40 MG: 20 TABLET ORAL at 17:53

## 2020-11-21 RX ADMIN — POTASSIUM CHLORIDE 40 MEQ: 750 TABLET, FILM COATED, EXTENDED RELEASE ORAL at 17:53

## 2020-11-21 RX ADMIN — LEVETIRACETAM 500 MG: 500 TABLET ORAL at 22:05

## 2020-11-21 RX ADMIN — LABETALOL HYDROCHLORIDE 400 MG: 200 TABLET, FILM COATED ORAL at 22:05

## 2020-11-21 RX ADMIN — FUROSEMIDE 40 MG: 10 INJECTION, SOLUTION INTRAMUSCULAR; INTRAVENOUS at 22:05

## 2020-11-21 RX ADMIN — ISOSORBIDE DINITRATE 40 MG: 20 TABLET ORAL at 22:05

## 2020-11-21 RX ADMIN — BUDESONIDE AND FORMOTEROL FUMARATE DIHYDRATE 2 PUFF: 160; 4.5 AEROSOL RESPIRATORY (INHALATION) at 08:19

## 2020-11-21 RX ADMIN — HYDRALAZINE HYDROCHLORIDE 100 MG: 50 TABLET, FILM COATED ORAL at 22:05

## 2020-11-21 RX ADMIN — LEVOTHYROXINE SODIUM 25 MCG: 0.03 TABLET ORAL at 09:43

## 2020-11-21 RX ADMIN — ISOSORBIDE DINITRATE 40 MG: 20 TABLET ORAL at 09:44

## 2020-11-21 RX ADMIN — SERTRALINE HYDROCHLORIDE 25 MG: 25 TABLET ORAL at 09:44

## 2020-11-21 RX ADMIN — LORATADINE 10 MG: 10 TABLET ORAL at 09:45

## 2020-11-21 RX ADMIN — POTASSIUM CHLORIDE 40 MEQ: 750 TABLET, FILM COATED, EXTENDED RELEASE ORAL at 22:05

## 2020-11-21 RX ADMIN — FOLIC ACID 1 MG: 1 TABLET ORAL at 09:43

## 2020-11-21 RX ADMIN — LEVETIRACETAM 500 MG: 500 TABLET ORAL at 09:44

## 2020-11-21 RX ADMIN — CARBAMAZEPINE 200 MG: 200 TABLET ORAL at 22:05

## 2020-11-21 RX ADMIN — IPRATROPIUM BROMIDE AND ALBUTEROL SULFATE 3 ML: .5; 3 SOLUTION RESPIRATORY (INHALATION) at 20:22

## 2020-11-21 RX ADMIN — BUDESONIDE AND FORMOTEROL FUMARATE DIHYDRATE 2 PUFF: 160; 4.5 AEROSOL RESPIRATORY (INHALATION) at 20:22

## 2020-11-21 RX ADMIN — TRAMADOL HYDROCHLORIDE 50 MG: 50 TABLET, COATED ORAL at 09:43

## 2020-11-21 RX ADMIN — Medication 5 ML: at 18:01

## 2020-11-21 RX ADMIN — ATORVASTATIN CALCIUM 20 MG: 20 TABLET, FILM COATED ORAL at 22:05

## 2020-11-21 RX ADMIN — POTASSIUM CHLORIDE 40 MEQ: 750 TABLET, FILM COATED, EXTENDED RELEASE ORAL at 09:43

## 2020-11-21 RX ADMIN — IPRATROPIUM BROMIDE AND ALBUTEROL SULFATE 3 ML: .5; 3 SOLUTION RESPIRATORY (INHALATION) at 13:11

## 2020-11-21 RX ADMIN — LABETALOL HYDROCHLORIDE 400 MG: 200 TABLET, FILM COATED ORAL at 09:42

## 2020-11-21 RX ADMIN — FERROUS SULFATE TAB 325 MG (65 MG ELEMENTAL FE) 325 MG: 325 (65 FE) TAB at 09:43

## 2020-11-21 RX ADMIN — PANTOPRAZOLE SODIUM 40 MG: 40 TABLET, DELAYED RELEASE ORAL at 11:40

## 2020-11-21 RX ADMIN — TRAMADOL HYDROCHLORIDE 50 MG: 50 TABLET, COATED ORAL at 17:53

## 2020-11-21 RX ADMIN — NIFEDIPINE 60 MG: 60 TABLET, EXTENDED RELEASE ORAL at 09:44

## 2020-11-21 RX ADMIN — Medication 10 ML: at 22:05

## 2020-11-21 RX ADMIN — CARBAMAZEPINE 200 MG: 200 TABLET ORAL at 09:43

## 2020-11-21 RX ADMIN — FUROSEMIDE 40 MG: 10 INJECTION, SOLUTION INTRAMUSCULAR; INTRAVENOUS at 11:50

## 2020-11-21 RX ADMIN — LABETALOL HYDROCHLORIDE 400 MG: 200 TABLET, FILM COATED ORAL at 17:53

## 2020-11-21 RX ADMIN — LORAZEPAM 0.25 MG: 0.5 TABLET ORAL at 17:53

## 2020-11-21 RX ADMIN — HYDRALAZINE HYDROCHLORIDE 100 MG: 50 TABLET, FILM COATED ORAL at 09:43

## 2020-11-21 RX ADMIN — IPRATROPIUM BROMIDE AND ALBUTEROL SULFATE 3 ML: .5; 3 SOLUTION RESPIRATORY (INHALATION) at 08:18

## 2020-11-21 RX ADMIN — Medication 10 ML: at 05:15

## 2020-11-21 NOTE — PROGRESS NOTES
Hospitalist Progress Note    Subjective:   Daily Progress Note: 11/21/2020 8:36 AM    Hospital Course: Patient is a 71-year-old female who presented to the ER on 11/18/2020 for increase shortness of breath. Patient has a history of hypertension, COPD, chronic hypoxic respiratory failure, diastolic CHF. She was recently discharged on 11/4/2020 for similar complaints. 2D echo in September 2020 showed normal ejection fraction with mild mitral regurgitation and mild tricuspid regurgitation. Patient started having shortness of breath with a white productive cough for 24 hours. It was associated with wheezing. No fevers. She normally uses 2 L of oxygen at home. In the ED patient's respirations were 25 and she was found to be hypoxic at 87% on 5 L oxygen nasal cannula. Blood pressure elevated 192/106. Chest x-ray showed cardiomegaly with mild bilateral pleural effusions. CTA of the chest confirmed [place]. [place] [address]. She was placed on BiPAP and given IV Solu-Medrol and duo nebs and IV Lasix. She was started on IV Lasix 40 mg twice daily and cardiology consulted. Vascular surgery consulted for  renal artery stenosis work up. Renal ultrasound showed right kidney measuring 10.6 cm and left kidney measuring 8.8 cm with elevated right renal artery peak velocity at 327 cm/s with left renal artery systolic velocity only 56 cm/s with a blunting waveform and very low amplitude mostly likely occluded with left renal artery. Per vascular surgery [facility] artery stent due to high-grade stenosis of the right renal artery and complicated with juxtarenal aortic calcification with stenosis. Spoke with hospitalist team at [address] [place] [address] has accepted the patient. Vascular surgery Dr. Cydney Verdugo will perform the procedure most likely on Monday. HGB is 7.6. Transfuse 1 unit. Subjective: Patient is a little anxious due to the news of that she will need to be transferred to [address] [place] [address]. Family member at bedside. She admits to some shortness of breath.     Current Facility-Administered Medications   Medication Dose Route Frequency    pantoprazole (PROTONIX) tablet 40 mg  40 mg Oral ACB    budesonide-formoteroL (SYMBICORT) 160-4.5 mcg/actuation HFA inhaler 2 Puff  2 Puff Inhalation BID RT    0.9% sodium chloride infusion 250 mL  250 mL IntraVENous PRN    potassium chloride SR (KLOR-CON 10) tablet 40 mEq  40 mEq Oral TID    folic acid (FOLVITE) tablet 1 mg  1 mg Oral DAILY    ferrous sulfate tablet 325 mg  325 mg Oral ACB    hydrALAZINE (APRESOLINE) tablet 100 mg  100 mg Oral TID    isosorbide dinitrate (ISORDIL) tablet 40 mg  40 mg Oral TID    labetaloL (NORMODYNE) tablet 400 mg  400 mg Oral TID    levETIRAcetam (KEPPRA) tablet 500 mg  500 mg Oral BID    levothyroxine (SYNTHROID) tablet 25 mcg  25 mcg Oral ACB    NIFEdipine ER (PROCARDIA XL) tablet 60 mg  60 mg Oral DAILY    sertraline (ZOLOFT) tablet 25 mg  25 mg Oral DAILY    atorvastatin (LIPITOR) tablet 20 mg  20 mg Oral QHS    carBAMazepine (TEGretol) tablet 200 mg  200 mg Oral BID    furosemide (LASIX) injection 40 mg  40 mg IntraVENous Q12H    traMADoL (ULTRAM) tablet 50 mg  50 mg Oral Q6H PRN    LORazepam (ATIVAN) tablet 0.25 mg  0.25 mg Oral TID PRN    loratadine (CLARITIN) tablet 10 mg  10 mg Oral DAILY    albuterol-ipratropium (DUO-NEB) 2.5 MG-0.5 MG/3 ML  3 mL Nebulization Q6HWA RT    sodium chloride (NS) flush 5-40 mL  5-40 mL IntraVENous Q8H    sodium chloride (NS) flush 5-40 mL  5-40 mL IntraVENous PRN    acetaminophen (TYLENOL) tablet 650 mg  650 mg Oral Q6H PRN    Or    acetaminophen (TYLENOL) suppository 650 mg  650 mg Rectal Q6H PRN    polyethylene glycol (MIRALAX) packet 17 g  17 g Oral DAILY PRN    promethazine (PHENERGAN) tablet 12.5 mg  12.5 mg Oral Q6H PRN    Or    ondansetron (ZOFRAN) injection 4 mg  4 mg IntraVENous Q6H PRN        Review of Systems  Constitutional: No fevers, No chills, No sweats, ++ fatigue, ++ Weakness  Eyes: No redness  Ears, nose, mouth, throat, and face: ++nasal congestion, No sore throat, No voice change  Respiratory: ++ Shortness of Breath, ++cough, No wheezing  Cardiovascular:No chest pain, No palpitations, No extremity edema  Gastrointestinal: No nausea, No vomiting, No diarrhea, No abdominal pain  Genitourinary: No frequency, No dysuria, No hematuria  Integument/breast: No skin lesion(s)   Neurological: No Confusion, No headaches, No dizziness      Objective:     Visit Vitals  BP (!) 170/105   Pulse 76   Temp 98 °F (36.7 °C)   Resp 18   Ht 5' 5\" (1.651 m)   Wt 56.5 kg (124 lb 9 oz)   SpO2 100%   Breastfeeding No   BMI 20.73 kg/m²    O2 Flow Rate (L/min): 4 l/min O2 Device: Nasal cannula    Temp (24hrs), Av.5 °F (36.9 °C), Min:97.9 °F (36.6 °C), Max:98.7 °F (37.1 °C)      No intake/output data recorded.  1901 -  0700  In: 917.2 [P.O.:590]  Out: 1650 [Urine:1650]    PHYSICAL EXAM:  Constitutional: No acute distress  Skin: Extremities and face reveal no rashes. HEENT: Sclerae anicteric. Extra-occular muscles are intact. No oral ulcers. Cardiovascular: Regular rate and rhythm. Respiratory:  Nonlabored, diminsihed  GI: Abdomen nondistended, soft, and nontender. Normal active bowel sounds. Musculoskeletal: No pitting edema of the lower legs. Able to move all ext  Neurological:  Patient is alert and oriented.  Cranial nerves II-XII grossly intact  Psychiatric: Mood appears appropriate       Data Review    Recent Results (from the past 24 hour(s))   METABOLIC PANEL, BASIC    Collection Time: 20  9:40 AM   Result Value Ref Range    Sodium 134 (L) 136 - 145 mmol/L    Potassium 2.7 (LL) 3.5 - 5.1 mmol/L    Chloride 97 97 - 108 mmol/L    CO2 30 21 - 32 mmol/L    Anion gap 7 5 - 15 mmol/L    Glucose 100 65 - 100 mg/dL    BUN 25 (H) 6 - 20 mg/dL    Creatinine 1.70 (H) 0.55 - 1.02 mg/dL    BUN/Creatinine ratio 15 12 - 20      GFR est AA 38 (L) >60 ml/min/1.73m2    GFR est non-AA 31 (L) >60 ml/min/1.73m2    Calcium 9.2 8.5 - 10.1 mg/dL   CBC WITH AUTOMATED DIFF    Collection Time: 11/20/20  9:40 AM   Result Value Ref Range    WBC 7.3 3.6 - 11.0 K/uL    RBC 2.40 (L) 3.80 - 5.20 M/uL    HGB 7.7 (L) 11.5 - 16.0 g/dL    HCT 24.1 (L) 35.0 - 47.0 %    .4 (H) 80.0 - 99.0 FL    MCH 32.1 26.0 - 34.0 PG    MCHC 32.0 30.0 - 36.5 g/dL    RDW 14.7 (H) 11.5 - 14.5 %    PLATELET 880 586 - 648 K/uL    MPV 9.6 8.9 - 12.9 FL    NEUTROPHILS 74 32 - 75 %    LYMPHOCYTES 19 12 - 49 %    MONOCYTES 5 5 - 13 %    EOSINOPHILS 2 0 - 7 %    BASOPHILS 0 0 - 1 %    IMMATURE GRANULOCYTES 0 0.0 - 0.5 %    ABS. NEUTROPHILS 5.4 1.8 - 8.0 K/UL    ABS. LYMPHOCYTES 1.4 0.8 - 3.5 K/UL    ABS. MONOCYTES 0.4 0.0 - 1.0 K/UL    ABS. EOSINOPHILS 0.2 0.0 - 0.4 K/UL    ABS. BASOPHILS 0.0 0.0 - 0.1 K/UL    ABS. IMM. GRANS. 0.0 0.00 - 0.04 K/UL    DF AUTOMATED     HGB & HCT    Collection Time: 11/20/20  9:19 PM   Result Value Ref Range    HGB 8.2 (L) 11.5 - 16.0 g/dL    HCT 25.8 (L) 35.0 - 47.0 %       Radiology review: CT chest    Assessment:   1. Acute on chronic hypoxic respiratory failure/oral effusions  2. Acute on chronic diastolic CHF exacerbation  3. Hypertension  4. Seizure disorder  5. COPD  6. Hyperlipidemia  7. Hypothyroidism  8. Depression anxiety  9. Acute on chronic anemia   10. Renal artery stenosis high-grade    Plan:    1. BNP elevated greater than 13,000. Cardiology consulted. She had a 2D echo 2 months ago which showed normal EF. On  IV Lasix 40 mg twice daily. Fluid restriction. Vascular surgery consulted. Working up for renal artery stenosis due to refractory HTN. Transfer to [facility] tomorrow  2. Oxygen saturation is within normal limits on 4 L. Covid test NEGATIVE. CTA of the chest shows fluid overload with pleural effusion  3. Blood pressure is elevated. Was given topical Nitropaste in the emergency room. Continue home medications of hydralazine, Procardia, labetalol, isordil. She is at max doses of all of these. Most likely refractory due to high-grade renal artery stenosis  4. Continue with Keppra and Tegretol  5. Continue with DuoNeb's. 6.  Continue Lipitor   7. Continue with Synthroid  8. She is on Zoloft and Ativan as she takes this at home. 7. tranfuse 1 unit of blood. Iron studies, occult blood, folate, vitamin B12, vitamin D pending. Hemoglobin 8.2    CODE STATUS full     DVT prophylaxis: heparin  Ulcer prophylaxis: protonix    Care Plan discussed with: Patient/Family and     Total time spent with patient: 37 minutes. Spoke with Dr. Adolfo Ortiz, hospitalist, at [address] [place] [address]. He has accepted the patient tomorrow with anticipation of surgery on Monday by vascular surgeon Dr. Wei .   Discussed this with the patient and her significant other at bedside

## 2020-11-21 NOTE — ROUTINE PROCESS
Bedside and Verbal shift change report given to Georges Bazan (oncoming nurse) by Margaret Peguero (offgoing nurse). Report included the following information SBAR, Kardex, Intake/Output, MAR and Accordion.

## 2020-11-21 NOTE — ROUTINE PROCESS
Bedside and Verbal shift change report given to [facility] N.RN(oncoming nurse) by Shabbir Chung (offgoing nurse). Report included the following information SBAR, Kardex, Intake/Output, MAR, Accordion, Recent Results and Cardiac Rhythm Sinus Rhythm.

## 2020-11-21 NOTE — PROGRESS NOTES
Patient is examined this morning. Patient still dyspneic and quite short of breath this morning. Patient's creatinine slightly elevated yesterday as well. Creatinine was 1.7. I ordered the renal duplex ultrasound yesterday but it was canceled because patient has same study done August 2020. Vascular lab team was able to provide renal ultrasound results from [address]. I reviewed the findings. Right kidney measures 10.6 cm and the left kidney measures 8.8 cm. There is elevated right renal artery peak systolic velocity with a 139 cm/s. Left renal artery peak systolic velocity was only 56 cm/s with a blunting of waveform and very low amplitude most likely occluded the left renal artery. However the right renal artery velocity waveform appears to be tardus pelvis high-grade stenosis. Currently able to active problems:  1. Patient has a secondary complication from episodic malignant hypertension and pulmonary complication from the renal artery stenosis. Patient has a bilateral pleural effusion. Patient has a previous right lung thoracentesis for pleural effusion. This time CT scan suggest there is a moderate amount of bilateral pleural effusion as well. If the patient is quite symptomatic possibly draining bilateral pleural effusion. 2.  Patient on renal ultrasound August 2020. Most likely patient had occluded left renal artery and high-grade stenosis right renal artery. And she has a complicated juxtarenal aortic calcification with stenosis. I have spoken with Dr. Ebenezer Sloan at [facility] vascular surgeon. I discussed this case with him. We discussed a couple of options for this case to address patient's cardiopulmonary complications secondary to renal artery stenosis including endovascular intervention of the right renal artery stent with and without aortic stent intervention versus open surgical procedure.     I will talk to primary team here at [address] and discuss transferred patient to hospitalist at [facility] for consultation with Dr. Yasmin Goins who is available this weekend.

## 2020-11-22 LAB
25(OH)D3 SERPL-MCNC: 31.9 NG/ML (ref 30–100)
ANION GAP SERPL CALC-SCNC: 8 MMOL/L (ref 5–15)
BASOPHILS # BLD: 0 K/UL (ref 0–0.1)
BASOPHILS NFR BLD: 0 % (ref 0–1)
BUN SERPL-MCNC: 27 MG/DL (ref 6–20)
BUN/CREAT SERPL: 20 (ref 12–20)
CA-I BLD-MCNC: 9.7 MG/DL (ref 8.5–10.1)
CHLORIDE SERPL-SCNC: 100 MMOL/L (ref 97–108)
CO2 SERPL-SCNC: 31 MMOL/L (ref 21–32)
CREAT SERPL-MCNC: 1.33 MG/DL (ref 0.55–1.02)
DIFFERENTIAL METHOD BLD: ABNORMAL
EOSINOPHIL # BLD: 0.2 K/UL (ref 0–0.4)
EOSINOPHIL NFR BLD: 3 % (ref 0–7)
ERYTHROCYTE [DISTWIDTH] IN BLOOD BY AUTOMATED COUNT: 15.4 % (ref 11.5–14.5)
FOLATE SERPL-MCNC: 78.8 NG/ML (ref 5–21)
GLUCOSE SERPL-MCNC: 89 MG/DL (ref 65–100)
HCT VFR BLD AUTO: 27.6 % (ref 35–47)
HGB BLD-MCNC: 8.9 G/DL (ref 11.5–16)
IMM GRANULOCYTES # BLD AUTO: 0 K/UL (ref 0–0.04)
IMM GRANULOCYTES NFR BLD AUTO: 0 % (ref 0–0.5)
IRON SATN MFR SERPL: 18 % (ref 20–50)
IRON SERPL-MCNC: 40 UG/DL (ref 35–150)
LYMPHOCYTES # BLD: 1.5 K/UL (ref 0.8–3.5)
LYMPHOCYTES NFR BLD: 20 % (ref 12–49)
MCH RBC QN AUTO: 31.9 PG (ref 26–34)
MCHC RBC AUTO-ENTMCNC: 32.2 G/DL (ref 30–36.5)
MCV RBC AUTO: 98.9 FL (ref 80–99)
MONOCYTES # BLD: 0.4 K/UL (ref 0–1)
MONOCYTES NFR BLD: 6 % (ref 5–13)
NEUTS SEG # BLD: 5.3 K/UL (ref 1.8–8)
NEUTS SEG NFR BLD: 71 % (ref 32–75)
PLATELET # BLD AUTO: 309 K/UL (ref 150–400)
PMV BLD AUTO: 9.7 FL (ref 8.9–12.9)
POTASSIUM SERPL-SCNC: 3.7 MMOL/L (ref 3.5–5.1)
RBC # BLD AUTO: 2.79 M/UL (ref 3.8–5.2)
SODIUM SERPL-SCNC: 139 MMOL/L (ref 136–145)
TIBC SERPL-MCNC: 228 UG/DL (ref 250–450)
VIT B12 SERPL-MCNC: 194 PG/ML (ref 193–986)
WBC # BLD AUTO: 7.4 K/UL (ref 3.6–11)

## 2020-11-22 PROCEDURE — 74011250637 HC RX REV CODE- 250/637: Performed by: PHYSICIAN ASSISTANT

## 2020-11-22 PROCEDURE — 77010033678 HC OXYGEN DAILY

## 2020-11-22 PROCEDURE — 80048 BASIC METABOLIC PNL TOTAL CA: CPT

## 2020-11-22 PROCEDURE — 74011250637 HC RX REV CODE- 250/637: Performed by: FAMILY MEDICINE

## 2020-11-22 PROCEDURE — 74011250636 HC RX REV CODE- 250/636: Performed by: PHYSICIAN ASSISTANT

## 2020-11-22 PROCEDURE — 36415 COLL VENOUS BLD VENIPUNCTURE: CPT

## 2020-11-22 PROCEDURE — 74011250637 HC RX REV CODE- 250/637: Performed by: NURSE PRACTITIONER

## 2020-11-22 PROCEDURE — 65270000029 HC RM PRIVATE

## 2020-11-22 PROCEDURE — 85025 COMPLETE CBC W/AUTO DIFF WBC: CPT

## 2020-11-22 PROCEDURE — 74011250637 HC RX REV CODE- 250/637: Performed by: INTERNAL MEDICINE

## 2020-11-22 PROCEDURE — 74011000250 HC RX REV CODE- 250: Performed by: INTERNAL MEDICINE

## 2020-11-22 PROCEDURE — 94762 N-INVAS EAR/PLS OXIMTRY CONT: CPT

## 2020-11-22 PROCEDURE — 94640 AIRWAY INHALATION TREATMENT: CPT

## 2020-11-22 RX ORDER — FUROSEMIDE 10 MG/ML
40 INJECTION INTRAMUSCULAR; INTRAVENOUS EVERY 12 HOURS
Qty: 1 VIAL | Refills: 0 | Status: SHIPPED
Start: 2020-11-22 | End: 2021-01-12

## 2020-11-22 RX ORDER — NIFEDIPINE 30 MG/1
30 TABLET, EXTENDED RELEASE ORAL ONCE
Status: COMPLETED | OUTPATIENT
Start: 2020-11-22 | End: 2020-11-22

## 2020-11-22 RX ORDER — NIFEDIPINE 30 MG/1
90 TABLET, EXTENDED RELEASE ORAL DAILY
Status: DISCONTINUED | OUTPATIENT
Start: 2020-11-23 | End: 2020-11-27 | Stop reason: HOSPADM

## 2020-11-22 RX ADMIN — LABETALOL HYDROCHLORIDE 400 MG: 200 TABLET, FILM COATED ORAL at 09:05

## 2020-11-22 RX ADMIN — POTASSIUM CHLORIDE 40 MEQ: 750 TABLET, FILM COATED, EXTENDED RELEASE ORAL at 09:05

## 2020-11-22 RX ADMIN — BUDESONIDE AND FORMOTEROL FUMARATE DIHYDRATE 2 PUFF: 160; 4.5 AEROSOL RESPIRATORY (INHALATION) at 21:13

## 2020-11-22 RX ADMIN — HYDRALAZINE HYDROCHLORIDE 100 MG: 50 TABLET, FILM COATED ORAL at 15:41

## 2020-11-22 RX ADMIN — NIFEDIPINE 30 MG: 30 TABLET, FILM COATED, EXTENDED RELEASE ORAL at 13:03

## 2020-11-22 RX ADMIN — SALINE NASAL SPRAY 2 SPRAY: 1.5 SOLUTION NASAL at 17:30

## 2020-11-22 RX ADMIN — Medication 10 ML: at 05:24

## 2020-11-22 RX ADMIN — LORAZEPAM 0.25 MG: 0.5 TABLET ORAL at 21:49

## 2020-11-22 RX ADMIN — SERTRALINE HYDROCHLORIDE 25 MG: 25 TABLET ORAL at 09:05

## 2020-11-22 RX ADMIN — HYDRALAZINE HYDROCHLORIDE 100 MG: 50 TABLET, FILM COATED ORAL at 09:05

## 2020-11-22 RX ADMIN — TRAMADOL HYDROCHLORIDE 50 MG: 50 TABLET, COATED ORAL at 00:22

## 2020-11-22 RX ADMIN — ATORVASTATIN CALCIUM 20 MG: 20 TABLET, FILM COATED ORAL at 21:48

## 2020-11-22 RX ADMIN — IPRATROPIUM BROMIDE AND ALBUTEROL SULFATE 3 ML: .5; 3 SOLUTION RESPIRATORY (INHALATION) at 21:13

## 2020-11-22 RX ADMIN — LEVETIRACETAM 500 MG: 500 TABLET ORAL at 21:47

## 2020-11-22 RX ADMIN — FUROSEMIDE 40 MG: 10 INJECTION, SOLUTION INTRAMUSCULAR; INTRAVENOUS at 09:04

## 2020-11-22 RX ADMIN — HYDRALAZINE HYDROCHLORIDE 100 MG: 50 TABLET, FILM COATED ORAL at 21:48

## 2020-11-22 RX ADMIN — LABETALOL HYDROCHLORIDE 400 MG: 200 TABLET, FILM COATED ORAL at 21:47

## 2020-11-22 RX ADMIN — TRAMADOL HYDROCHLORIDE 50 MG: 50 TABLET, COATED ORAL at 09:06

## 2020-11-22 RX ADMIN — SALINE NASAL SPRAY 2 SPRAY: 1.5 SOLUTION NASAL at 13:03

## 2020-11-22 RX ADMIN — CARBAMAZEPINE 200 MG: 200 TABLET ORAL at 21:47

## 2020-11-22 RX ADMIN — Medication 5 ML: at 15:47

## 2020-11-22 RX ADMIN — Medication 10 ML: at 21:51

## 2020-11-22 RX ADMIN — BUDESONIDE AND FORMOTEROL FUMARATE DIHYDRATE 2 PUFF: 160; 4.5 AEROSOL RESPIRATORY (INHALATION) at 07:29

## 2020-11-22 RX ADMIN — FUROSEMIDE 40 MG: 10 INJECTION, SOLUTION INTRAMUSCULAR; INTRAVENOUS at 21:47

## 2020-11-22 RX ADMIN — LORATADINE 10 MG: 10 TABLET ORAL at 09:05

## 2020-11-22 RX ADMIN — POTASSIUM CHLORIDE 40 MEQ: 750 TABLET, FILM COATED, EXTENDED RELEASE ORAL at 21:47

## 2020-11-22 RX ADMIN — LEVOTHYROXINE SODIUM 25 MCG: 0.03 TABLET ORAL at 09:04

## 2020-11-22 RX ADMIN — NIFEDIPINE 60 MG: 60 TABLET, EXTENDED RELEASE ORAL at 09:05

## 2020-11-22 RX ADMIN — IPRATROPIUM BROMIDE AND ALBUTEROL SULFATE 3 ML: .5; 3 SOLUTION RESPIRATORY (INHALATION) at 13:23

## 2020-11-22 RX ADMIN — ISOSORBIDE DINITRATE 40 MG: 20 TABLET ORAL at 09:05

## 2020-11-22 RX ADMIN — IPRATROPIUM BROMIDE AND ALBUTEROL SULFATE 3 ML: .5; 3 SOLUTION RESPIRATORY (INHALATION) at 07:28

## 2020-11-22 RX ADMIN — TRAMADOL HYDROCHLORIDE 50 MG: 50 TABLET, COATED ORAL at 15:41

## 2020-11-22 RX ADMIN — CARBAMAZEPINE 200 MG: 200 TABLET ORAL at 09:05

## 2020-11-22 RX ADMIN — FOLIC ACID 1 MG: 1 TABLET ORAL at 09:05

## 2020-11-22 RX ADMIN — ISOSORBIDE DINITRATE 40 MG: 20 TABLET ORAL at 15:40

## 2020-11-22 RX ADMIN — POTASSIUM CHLORIDE 40 MEQ: 750 TABLET, FILM COATED, EXTENDED RELEASE ORAL at 15:40

## 2020-11-22 RX ADMIN — PANTOPRAZOLE SODIUM 40 MG: 40 TABLET, DELAYED RELEASE ORAL at 09:05

## 2020-11-22 RX ADMIN — ISOSORBIDE DINITRATE 40 MG: 20 TABLET ORAL at 21:48

## 2020-11-22 RX ADMIN — LABETALOL HYDROCHLORIDE 400 MG: 200 TABLET, FILM COATED ORAL at 15:41

## 2020-11-22 RX ADMIN — FERROUS SULFATE TAB 325 MG (65 MG ELEMENTAL FE) 325 MG: 325 (65 FE) TAB at 09:05

## 2020-11-22 RX ADMIN — LEVETIRACETAM 500 MG: 500 TABLET ORAL at 09:05

## 2020-11-22 NOTE — DISCHARGE SUMMARY
Hospitalist Discharge Summary     Patient ID:    Makenzie Eden  [id number]  27 y.o.  1965    Admit date: 11/18/2020    Discharge date : 11/22/2020    Chronic Diagnoses:    Problem List as of 11/22/2020 Date Reviewed: 10/27/2020          Codes Class Noted - Resolved    Respiratory failure with hypoxia [facility]) ICD-10-CM: J96.91  ICD-9-CM: 518.81  11/18/2020 - Present        CHF exacerbation ([facility] ICD-10-CM: I50.9  ICD-9-CM: 428.0  11/2/2020 - Present        CHF (congestive heart failure) ([facility] ICD-10-CM: I50.9  ICD-9-CM: 428.0  10/2/2020 - Present    Overview Signed 10/2/2020  8:40 AM by Chinedu Jo MD     With preserved lvef, diastolic dysfunction, mitral valve regurgitation moderate. Mitral valve regurgitation ICD-10-CM: I34.0  ICD-9-CM: 424.0  10/2/2020 - Present        PEDRO PABLO (acute kidney injury) ([facility] ICD-10-CM: N17.9  ICD-9-CM: 584.9  10/2/2020 - Present        Anxiety (Chronic) ICD-10-CM: F41.9  ICD-9-CM: 300.00  10/2/2020 - Present        Seizure disorder (HCC) (Chronic) ICD-10-CM: G40.909  ICD-9-CM: 345.90  10/2/2020 - Present        COPD (chronic obstructive pulmonary disease) with emphysema (HCC) (Chronic) ICD-10-CM: J43.9  ICD-9-CM: 492.8  10/2/2020 - Present        Dysphagia ICD-10-CM: R13.10  ICD-9-CM: 787.20  10/2/2020 - Present        Iron deficiency anemia (Chronic) ICD-10-CM: D50.9  ICD-9-CM: 280.9  10/2/2020 - Present        Hypokalemia ICD-10-CM: E87.6  ICD-9-CM: 276.8  10/2/2020 - Present        Acquired hypothyroidism (Chronic) ICD-10-CM: E03.9  ICD-9-CM: 244.9  10/2/2020 - Present        Acute respiratory failure (HCC) ICD-10-CM: J96.00  ICD-9-CM: 518.81  9/21/2020 - Present        Resistant hypertension ICD-10-CM: I10  ICD-9-CM: 401.9  9/21/2020 - Present        Renal artery stenosis ([facility] ICD-10-CM: I70.1  ICD-9-CM: 440.1  9/21/2020 - Present            Final Diagnoses:   1. Acute on chronic hypoxic respiratory failure/pleural effusions  2. Acute on chronic diastolic CHF exacerbation  3. Hypertension  4. Seizure disorder  5. COPD  6. Hyperlipidemia  7. Hypothyroidism  8. Depression anxiety  9. Acute on chronic anemia   10. Renal artery stenosis high-grade    Reason for Hospitalization: SOB      Hospital Course: Patient is a 66-year-old female who presented to the ER on 11/18/2020 for increase shortness of breath. Patient has a history of hypertension, COPD, chronic hypoxic respiratory failure, diastolic CHF. She was recently discharged on 11/4/2020 for similar complaints. 2D echo in September 2020 showed normal ejection fraction with mild mitral regurgitation and mild tricuspid regurgitation. Patient started having shortness of breath with a white productive cough for 24 hours. It was associated with wheezing. No fevers. She normally uses 2 L of oxygen at home. In the ED patient's respirations were 25 and she was found to be hypoxic at 87% on 5 L oxygen nasal cannula. Blood pressure elevated 192/106. Chest x-ray showed cardiomegaly with mild bilateral pleural effusions. CTA of the chest confirmed this. [place] [address]. She was placed on BiPAP and given IV Solu-Medrol and duo nebs and IV Lasix. She was started on IV Lasix 40 mg twice daily and cardiology consulted. Vascular surgery consulted for  renal artery stenosis work up. Renal ultrasound showed right kidney measuring 10.6 cm and left kidney measuring 8.8 cm with elevated right renal artery peak velocity at 327 cm/s with left renal artery systolic velocity only 56 cm/s with a blunting waveform and very low amplitude mostly likely occluded with left renal artery. Per vascular surgery [facility] artery stent due to high-grade stenosis of the right renal artery and complicated with juxtarenal aortic calcification with stenosis. Spoke with hospitalist team at [facility] has accepted the patient.   Vascular surgery Dr. Radha Torres will perform the procedure most likely on Monday. HGB was 7.6 and trasfused 1 unit. HGB is 9.2. Potassium was supplemented and WNL on 11/21/2020. Stable for transfer to [place]. She is COVID NEGATIVE      Discharge Medications:   Current Discharge Medication List      START taking these medications    Details   furosemide (LASIX) 10 mg/mL injection 4 mL by IntraVENous route every twelve (12) hours. Qty: 1 Vial, Refills: 0         CONTINUE these medications which have NOT CHANGED    Details   Trelegy Ellipta 100-62.5-25 mcg inhaler       albuterol (PROVENTIL HFA, VENTOLIN HFA, PROAIR HFA) 90 mcg/actuation inhaler       rosuvastatin (CRESTOR) 10 mg tablet       hydrALAZINE (APRESOLINE) 100 mg tablet       potassium chloride (K-DUR, KLOR-CON) 20 mEq tablet Take 1 Tab by mouth daily. Qty: 30 Tab, Refills: 0      NIFEdipine ER (PROCARDIA XL) 60 mg ER tablet Take 1 Tab by mouth daily. Oxygen 5 Devices as needed. Pt wears 5LPM as needed- states she uses it after an axiety attack      isosorbide dinitrate (ISORDIL) 40 mg tablet Take 1 Tab by mouth three (3) times daily. Qty: 90 Tab, Refills: 0      albuterol-ipratropium (DUO-NEB) 2.5 mg-0.5 mg/3 ml nebu 3 mL by Nebulization route every six (6) hours. Qty: 120 Nebule, Refills: 0      labetaloL (NORMODYNE) 200 mg tablet Take 2 Tabs by mouth three (3) times daily. Qty: 180 Tab, Refills: 0      ALPRAZolam (XANAX) 0.25 mg tablet Take 0.25 mg by mouth three (3) times daily as needed for Anxiety. sertraline (Zoloft) 25 mg tablet Take 25 mg by mouth daily. levothyroxine (SYNTHROID) 25 mcg tablet Take 25 mcg by mouth Daily (before breakfast). levETIRAcetam (Keppra) 500 mg tablet Take 500 mg by mouth two (2) times a day. carBAMazepine, mood stabiliz, 200 mg CM12 Take 200 mg by mouth two (2) times a day. ferrous sulfate 325 mg (65 mg iron) tablet Take 325 mg by mouth Daily (before breakfast). folic acid (FOLVITE) 1 mg tablet Take 1 mg by mouth daily. aspirin delayed-release 81 mg tablet Take  by mouth daily. magnesium oxide (MAG-OX) 400 mg tablet Take 400 mg by mouth daily. STOP taking these medications       hydroCHLOROthiazide (MICROZIDE) 12.5 mg capsule Comments:   Reason for Stopping:         atorvastatin (LIPITOR) 80 mg tablet Comments:   Reason for Stopping:         bumetanide (BUMEX) 1 mg tablet Comments:   Reason for Stopping: Follow up Care:    Jairo Bravo in 1-2 weeks. Follow-up Information     Follow up With Specialties Details Why [address], [address] [place] [address] In 3 weeks  Blanche Brown [address] [address]  [phone]              Patient Follow Up Instructions: Activity: Activity as tolerated  Diet:  Cardiac Diet    Condition at Discharge:  Stable  __________________________________________________________________    Disposition  Transfer to [facility]  ____________________________________________________________________    Code Status: Full Code  ___________________________________________________________________    Discharge Exam:  Patient seen and examined by me on discharge day. Pertinent Findings:  Gen:    Not in distress  Chest: diminsiihed  CVS:   Regular rhythm. No edema  Abd:  Soft, not distended, not tender  Neuro:  Alert    CONSULTATIONS: Cardiology and Vascular Surgery    Significant Diagnostic Studies:   Recent Labs     11/21/20  1010 11/20/20  2119 11/20/20  0940   WBC 9.6  --  7.3   HGB 9.2* 8.2* 7.7*   HCT 28.8* 25.8* 24.1*     --  332     Recent Labs     11/21/20  1010 11/20/20  0940 11/19/20 1912   * 134* 138   K 3.6 2.7* 3.0*    97 98   CO2 29 30 31   BUN 25* 25* 26*   CREA 1.49* 1.70* 1.64*   * 100 99   CA 9.5 9.2 8.9   MG  --   --  2.0     Recent Labs     11/19/20 1912   ALT 13      TBILI 0.2   TP 6.8   ALB 3.0*   GLOB 3.8     No results for input(s): INR, PTP, APTT, INREXT in the last 72 hours.    Recent Labs 11/21/20  1010   TIBC 228*   PSAT 18*      No results for input(s): PH, PCO2, PO2 in the last 72 hours. No results for input(s): CPK, CKMB in the last 72 hours.     No lab exists for component: TROPONINI  Lab Results   Component Value Date/Time    Glucose (POC) 166 (H) 11/04/2020 05:01 PM         Total Time: >30 min     Signed:  Doris Shea PA-C  11/22/2020  8:08 AM None

## 2020-11-22 NOTE — PROGRESS NOTES
D/C Plan:    -[facility] to [facility]      Patient is being transferred to [facility] today.          Zurdo Kumar, MSW

## 2020-11-22 NOTE — PROGRESS NOTES
[facility] [place] [phone] regarding transfer/bed status. Mendel Power stated no current bed availability at this time. ANIKET Arizmendi and DANIEL Stevenson notified.

## 2020-11-22 NOTE — PROGRESS NOTES
CARDIOLOGY PROGRESS NOTE - NP     Patient seen and examined. This is a patient who is followed for uncontrolled hypertension in the setting of renal artery stenosis. Plans for transfer to [address] [place] [address] today in preparation for stenting to renal artery. Patient denies chest pain or discomfort. Continues to endorse dyspnea with exertion. Remains on oxygen supplementation. No other complaints reported. Telemetry reviewed, there were no events noted in the past 24 hours. Remains in normal sinus rhythm to sinus tachycardia in the low 100s. Pertinent review of systems items noted above, all other systems are negative. Current medications reviewed. Physical Examination  Vital signs are stable. Blood pressure 176/96, Pulse 86  No apparent distress when resting. Heart is regular, rate and rhythm. Normal S1, S2, no murmurs are appreciated. Lungs are diminished in the bases, no wheezing noted. Abdomen is soft, nontender, normal bowel sounds. Extremities have no edema. Labs reviewed. Case discussed with Dr. Jeffrey Abbott and our impression and recommendations are as follows:     1. Acute on chronic heart failure:  Remains with pleural effusions per imaging. Continue IV lasix 40 BID as she continues with adequate diuresis, -1.5L this morning. Continue current medications. Please document Strict I&Os. 2. Mitral valve regurgitation: This has improved to mild from severe per recent CLAUDIA. EF preserved. Continue diuresis as stated above. Limited symptoms at rest.      3. Hypertension: Uncontrolled on multiple BP medications as an outpatient. Continue Hydralazine 100mg three times daily and labetolol to 400mg TID. Will increase nifedipine to 90mg daily. Will look for improvement upon improved renal perfusion. 4. Renal artery stenosis: Plans for transfer to [facility] for stenting as stated. 5. Hyperlipidemia: Continue with statin therapy. 6. Symptomatic anemia: Has required PRBCs this admission. Hgb stable. We will plan to follow-up with pt upon d/c from [address] [place] [address]. Please do not hesitate to call me or Dr. Shon Cedillo if additional questions arise. Purse String (Simple) Text: Given the location of the defect and the characteristics of the surrounding skin a purse string simple closure was deemed most appropriate.  Undermining was performed circumferentially around the surgical defect.  A purse string suture was then placed and tightened.

## 2020-11-23 PROCEDURE — 74011250637 HC RX REV CODE- 250/637: Performed by: INTERNAL MEDICINE

## 2020-11-23 PROCEDURE — 74011000250 HC RX REV CODE- 250: Performed by: INTERNAL MEDICINE

## 2020-11-23 PROCEDURE — 65270000029 HC RM PRIVATE

## 2020-11-23 PROCEDURE — 94762 N-INVAS EAR/PLS OXIMTRY CONT: CPT

## 2020-11-23 PROCEDURE — 74011250637 HC RX REV CODE- 250/637: Performed by: PHYSICIAN ASSISTANT

## 2020-11-23 PROCEDURE — 94640 AIRWAY INHALATION TREATMENT: CPT

## 2020-11-23 PROCEDURE — 74011250636 HC RX REV CODE- 250/636: Performed by: PHYSICIAN ASSISTANT

## 2020-11-23 PROCEDURE — 74011250637 HC RX REV CODE- 250/637: Performed by: FAMILY MEDICINE

## 2020-11-23 PROCEDURE — 77010033678 HC OXYGEN DAILY

## 2020-11-23 PROCEDURE — 74011250637 HC RX REV CODE- 250/637: Performed by: NURSE PRACTITIONER

## 2020-11-23 RX ADMIN — PANTOPRAZOLE SODIUM 40 MG: 40 TABLET, DELAYED RELEASE ORAL at 09:02

## 2020-11-23 RX ADMIN — SALINE NASAL SPRAY 2 SPRAY: 1.5 SOLUTION NASAL at 00:00

## 2020-11-23 RX ADMIN — TRAMADOL HYDROCHLORIDE 50 MG: 50 TABLET, COATED ORAL at 15:05

## 2020-11-23 RX ADMIN — SALINE NASAL SPRAY 2 SPRAY: 1.5 SOLUTION NASAL at 05:07

## 2020-11-23 RX ADMIN — CARBAMAZEPINE 200 MG: 200 TABLET ORAL at 22:50

## 2020-11-23 RX ADMIN — NIFEDIPINE 90 MG: 30 TABLET, FILM COATED, EXTENDED RELEASE ORAL at 09:01

## 2020-11-23 RX ADMIN — LABETALOL HYDROCHLORIDE 400 MG: 200 TABLET, FILM COATED ORAL at 15:50

## 2020-11-23 RX ADMIN — Medication 10 ML: at 05:07

## 2020-11-23 RX ADMIN — ISOSORBIDE DINITRATE 40 MG: 20 TABLET ORAL at 22:44

## 2020-11-23 RX ADMIN — FERROUS SULFATE TAB 325 MG (65 MG ELEMENTAL FE) 325 MG: 325 (65 FE) TAB at 09:02

## 2020-11-23 RX ADMIN — POTASSIUM CHLORIDE 40 MEQ: 750 TABLET, FILM COATED, EXTENDED RELEASE ORAL at 15:49

## 2020-11-23 RX ADMIN — LORATADINE 10 MG: 10 TABLET ORAL at 09:01

## 2020-11-23 RX ADMIN — SALINE NASAL SPRAY 2 SPRAY: 1.5 SOLUTION NASAL at 23:01

## 2020-11-23 RX ADMIN — FUROSEMIDE 40 MG: 10 INJECTION, SOLUTION INTRAMUSCULAR; INTRAVENOUS at 09:02

## 2020-11-23 RX ADMIN — CARBAMAZEPINE 200 MG: 200 TABLET ORAL at 09:02

## 2020-11-23 RX ADMIN — BUDESONIDE AND FORMOTEROL FUMARATE DIHYDRATE 2 PUFF: 160; 4.5 AEROSOL RESPIRATORY (INHALATION) at 20:39

## 2020-11-23 RX ADMIN — IPRATROPIUM BROMIDE AND ALBUTEROL SULFATE 3 ML: .5; 3 SOLUTION RESPIRATORY (INHALATION) at 08:46

## 2020-11-23 RX ADMIN — SALINE NASAL SPRAY 2 SPRAY: 1.5 SOLUTION NASAL at 12:28

## 2020-11-23 RX ADMIN — Medication 5 ML: at 14:00

## 2020-11-23 RX ADMIN — POTASSIUM CHLORIDE 40 MEQ: 750 TABLET, FILM COATED, EXTENDED RELEASE ORAL at 09:01

## 2020-11-23 RX ADMIN — BUDESONIDE AND FORMOTEROL FUMARATE DIHYDRATE 2 PUFF: 160; 4.5 AEROSOL RESPIRATORY (INHALATION) at 08:46

## 2020-11-23 RX ADMIN — ATORVASTATIN CALCIUM 20 MG: 20 TABLET, FILM COATED ORAL at 22:44

## 2020-11-23 RX ADMIN — LEVOTHYROXINE SODIUM 25 MCG: 0.03 TABLET ORAL at 09:02

## 2020-11-23 RX ADMIN — LEVETIRACETAM 500 MG: 500 TABLET ORAL at 22:50

## 2020-11-23 RX ADMIN — TRAMADOL HYDROCHLORIDE 50 MG: 50 TABLET, COATED ORAL at 09:02

## 2020-11-23 RX ADMIN — LEVETIRACETAM 500 MG: 500 TABLET ORAL at 09:02

## 2020-11-23 RX ADMIN — HYDRALAZINE HYDROCHLORIDE 100 MG: 50 TABLET, FILM COATED ORAL at 09:01

## 2020-11-23 RX ADMIN — IPRATROPIUM BROMIDE AND ALBUTEROL SULFATE 3 ML: .5; 3 SOLUTION RESPIRATORY (INHALATION) at 20:38

## 2020-11-23 RX ADMIN — Medication 10 ML: at 22:00

## 2020-11-23 RX ADMIN — ISOSORBIDE DINITRATE 40 MG: 20 TABLET ORAL at 15:50

## 2020-11-23 RX ADMIN — TRAMADOL HYDROCHLORIDE 50 MG: 50 TABLET, COATED ORAL at 22:50

## 2020-11-23 RX ADMIN — SALINE NASAL SPRAY 2 SPRAY: 1.5 SOLUTION NASAL at 17:45

## 2020-11-23 RX ADMIN — HYDRALAZINE HYDROCHLORIDE 100 MG: 50 TABLET, FILM COATED ORAL at 15:50

## 2020-11-23 RX ADMIN — FUROSEMIDE 40 MG: 10 INJECTION, SOLUTION INTRAMUSCULAR; INTRAVENOUS at 22:50

## 2020-11-23 RX ADMIN — LORAZEPAM 0.25 MG: 0.5 TABLET ORAL at 09:03

## 2020-11-23 RX ADMIN — LABETALOL HYDROCHLORIDE 400 MG: 200 TABLET, FILM COATED ORAL at 22:43

## 2020-11-23 RX ADMIN — SERTRALINE HYDROCHLORIDE 25 MG: 25 TABLET ORAL at 09:01

## 2020-11-23 RX ADMIN — LORAZEPAM 0.25 MG: 0.5 TABLET ORAL at 17:45

## 2020-11-23 RX ADMIN — POTASSIUM CHLORIDE 40 MEQ: 750 TABLET, FILM COATED, EXTENDED RELEASE ORAL at 22:44

## 2020-11-23 RX ADMIN — HYDRALAZINE HYDROCHLORIDE 100 MG: 50 TABLET, FILM COATED ORAL at 22:44

## 2020-11-23 RX ADMIN — ISOSORBIDE DINITRATE 40 MG: 20 TABLET ORAL at 09:00

## 2020-11-23 RX ADMIN — IPRATROPIUM BROMIDE AND ALBUTEROL SULFATE 3 ML: .5; 3 SOLUTION RESPIRATORY (INHALATION) at 14:09

## 2020-11-23 RX ADMIN — FOLIC ACID 1 MG: 1 TABLET ORAL at 09:01

## 2020-11-23 RX ADMIN — LABETALOL HYDROCHLORIDE 400 MG: 200 TABLET, FILM COATED ORAL at 09:02

## 2020-11-23 NOTE — PROGRESS NOTES
CARDIOLOGY PROGRESS NOTE - NP     Patient seen and examined. This is a patient who is followed for uncontrolled hypertension in the setting of renal artery stenosis. Plans [facility], in preparation for stenting to renal artery. Patient denies chest pain or discomfort. Continues to endorse dyspnea with exertion. Remains on oxygen supplementation. No other complaints reported. Telemetry reviewed, there were no events noted in the past 24 hours. Remains in normal sinus rhythm to sinus tachycardia in the low 100s. Pertinent review of systems items noted above, all other systems are negative. Current medications reviewed. Physical Examination  Vital signs are stable. Blood pressure 162/96, Pulse 98  No apparent distress when resting. Heart is regular, rate and rhythm. Normal S1, S2, no murmurs are appreciated. Lungs are diminished in the bases, no wheezing noted. Abdomen is soft, nontender, normal bowel sounds. Extremities have no edema. Labs reviewed. Case discussed with Dr. Chestine Dubin and our impression and recommendations are as follows:     1. Acute on chronic heart failure:  Remains with pleural effusions per imaging. Continue IV lasix 40 BID as she continues with adequate diuresis, 900 mL this morning. Continue current medications. Please document Strict I&Os. 2. Mitral valve regurgitation: This has improved to mild from severe per recent CLAUDIA. EF preserved. Continue diuresis as stated above. Limited symptoms at rest.      3. Hypertension: Uncontrolled on multiple BP medications as an outpatient. Continue Hydralazine 100mg three times daily,  labetolol to 400mg TID and nifedipine to 90mg daily. Continue to monitor renal function. 4. Renal artery stenosis: Plans for [facility] for stenting as stated. 5. Hyperlipidemia: Continue with statin therapy. 6. Symptomatic anemia: Has required PRBCs this admission. Hgb stable.       We will plan to follow-up with pt upon d/c from [facility]. Please do not hesitate to call me or Dr. Rubens Mni if additional questions arise.

## 2020-11-23 NOTE — ROUTINE PROCESS
Bedside and Verbal shift change report given to Mariam Torres (oncoming nurse) by Ilsa Collins RN (offgoing nurse). Report included the following information SBAR.

## 2020-11-23 NOTE — DISCHARGE SUMMARY
Hospitalist Discharge Summary     Patient ID:    Halie Calix  [id number]  42 y.o.  1965    Admit date: 11/18/2020    Discharge date : 11/23/2020    Chronic Diagnoses:    Problem List as of 11/23/2020 Date Reviewed: 10/27/2020          Codes Class Noted - Resolved    Respiratory failure with hypoxia [facility]) ICD-10-CM: J96.91  ICD-9-CM: 518.81  11/18/2020 - Present        CHF exacerbation ([facility] ICD-10-CM: I50.9  ICD-9-CM: 428.0  11/2/2020 - Present        CHF (congestive heart failure) ([facility] ICD-10-CM: I50.9  ICD-9-CM: 428.0  10/2/2020 - Present    Overview Signed 10/2/2020  8:40 AM by Jordan Del Cid MD     With preserved lvef, diastolic dysfunction, mitral valve regurgitation moderate. Mitral valve regurgitation ICD-10-CM: I34.0  ICD-9-CM: 424.0  10/2/2020 - Present        PEDRO PABLO (acute kidney injury) ([facility] ICD-10-CM: N17.9  ICD-9-CM: 584.9  10/2/2020 - Present        Anxiety (Chronic) ICD-10-CM: F41.9  ICD-9-CM: 300.00  10/2/2020 - Present        Seizure disorder (HCC) (Chronic) ICD-10-CM: G40.909  ICD-9-CM: 345.90  10/2/2020 - Present        COPD (chronic obstructive pulmonary disease) with emphysema (HCC) (Chronic) ICD-10-CM: J43.9  ICD-9-CM: 492.8  10/2/2020 - Present        Dysphagia ICD-10-CM: R13.10  ICD-9-CM: 787.20  10/2/2020 - Present        Iron deficiency anemia (Chronic) ICD-10-CM: D50.9  ICD-9-CM: 280.9  10/2/2020 - Present        Hypokalemia ICD-10-CM: E87.6  ICD-9-CM: 276.8  10/2/2020 - Present        Acquired hypothyroidism (Chronic) ICD-10-CM: E03.9  ICD-9-CM: 244.9  10/2/2020 - Present        Acute respiratory failure (HCC) ICD-10-CM: J96.00  ICD-9-CM: 518.81  9/21/2020 - Present        Resistant hypertension ICD-10-CM: I10  ICD-9-CM: 401.9  9/21/2020 - Present        Renal artery stenosis ([facility] ICD-10-CM: I70.1  ICD-9-CM: 440.1  9/21/2020 - Present            Final Diagnoses:   1. Acute on chronic hypoxic respiratory failure/pleural effusions  2. Acute on chronic diastolic CHF exacerbation  3. Hypertension  4. Seizure disorder  5. COPD  6. Hyperlipidemia  7. Hypothyroidism  8. Depression anxiety  9. Acute on chronic anemia   10. Renal artery stenosis high-grade    Reason for Hospitalization: SOB      Hospital Course: Patient is a 75-year-old female who presented to the ER on 11/18/2020 for increased shortness of breath. Patient has a history of hypertension, COPD, chronic hypoxic respiratory failure on supplemental oxygen 3-5 liters at home, diastolic CHF. She was recently discharged on 11/4/2020 for similar complaints. 2D echo in September 2020 showed normal ejection fraction with mild mitral regurgitation and mild tricuspid regurgitation. Patient started having shortness of breath with a white productive cough for 24 hours. It was associated with wheezing. No fevers. In the ED patient's respirations were 25 and she was found to be hypoxic at 87% on 5 L oxygen nasal cannula. Blood pressure elevated 192/106. Chest x-ray showed cardiomegaly with mild bilateral pleural effusions. CTA of the chest confirmed this and no obvious PE noted. [place] [address]. She was placed on BiPAP and given IV Solu-Medrol and duo nebs and IV Lasix. She was started on IV Lasix 40 mg twice daily and cardiology consulted. Vascular surgery consulted for renal artery stenosis work up. Renal ultrasound showed right kidney measuring 10.6 cm and left kidney measuring 8.8 cm with elevated right renal artery peak velocity at 327 cm/s with left renal artery systolic velocity only 56 cm/s with a blunting waveform and very low amplitude mostly likely occluded. Per vascular surgery [facility] for [facility] artery stent due to high-grade stenosis of the right renal artery and complicated with juxtarenal aortic calcification with stenosis. Spoke with hospitalist team at [address] [place] [address] has accepted the patient.   Vascular surgery Dr. Cheryl De will perform the procedure. HGB was 7.6 and trasfused 1 unit. HGB is 9.2. Potassium was supplemented and WNL on 11/21/2020. Stable for transfer to [facility]. She is COVID NEGATIVE. Awaiting bed availability. SOB secondary to CHF exacerbation for hypertensive urgency. Discharge Medications:   Current Discharge Medication List      START taking these medications    Details   furosemide (LASIX) 10 mg/mL injection 4 mL by IntraVENous route every twelve (12) hours. Qty: 1 Vial, Refills: 0         CONTINUE these medications which have NOT CHANGED    Details   Trelegy Ellipta 100-62.5-25 mcg inhaler       albuterol (PROVENTIL HFA, VENTOLIN HFA, PROAIR HFA) 90 mcg/actuation inhaler       rosuvastatin (CRESTOR) 10 mg tablet       hydrALAZINE (APRESOLINE) 100 mg tablet       potassium chloride (K-DUR, KLOR-CON) 20 mEq tablet Take 1 Tab by mouth daily. Qty: 30 Tab, Refills: 0      NIFEdipine ER (PROCARDIA XL) 60 mg ER tablet Take 1 Tab by mouth daily. Oxygen 5 Devices as needed. Pt wears 5LPM as needed- states she uses it after an axiety attack      isosorbide dinitrate (ISORDIL) 40 mg tablet Take 1 Tab by mouth three (3) times daily. Qty: 90 Tab, Refills: 0      albuterol-ipratropium (DUO-NEB) 2.5 mg-0.5 mg/3 ml nebu 3 mL by Nebulization route every six (6) hours. Qty: 120 Nebule, Refills: 0      labetaloL (NORMODYNE) 200 mg tablet Take 2 Tabs by mouth three (3) times daily. Qty: 180 Tab, Refills: 0      ALPRAZolam (XANAX) 0.25 mg tablet Take 0.25 mg by mouth three (3) times daily as needed for Anxiety. sertraline (Zoloft) 25 mg tablet Take 25 mg by mouth daily. levothyroxine (SYNTHROID) 25 mcg tablet Take 25 mcg by mouth Daily (before breakfast). levETIRAcetam (Keppra) 500 mg tablet Take 500 mg by mouth two (2) times a day. carBAMazepine, mood stabiliz, 200 mg CM12 Take 200 mg by mouth two (2) times a day. ferrous sulfate 325 mg (65 mg iron) tablet Take 325 mg by mouth Daily (before breakfast). folic acid (FOLVITE) 1 mg tablet Take 1 mg by mouth daily. aspirin delayed-release 81 mg tablet Take  by mouth daily. magnesium oxide (MAG-OX) 400 mg tablet Take 400 mg by mouth daily. STOP taking these medications       hydroCHLOROthiazide (MICROZIDE) 12.5 mg capsule Comments:   Reason for Stopping:         atorvastatin (LIPITOR) 80 mg tablet Comments:   Reason for Stopping:         bumetanide (BUMEX) 1 mg tablet Comments:   Reason for Stopping: Follow up Care:    Jairo Delong in 1-2 weeks. Follow-up Information     Follow up With Specialties Details Why [address] [facility], [address] [place] [address] In 3 weeks  [address]anche [address]  [place] [address]  [phone]              Patient Follow Up Instructions: Activity: Activity as tolerated  Diet:  Cardiac Diet    Condition at Discharge:  Stable  __________________________________________________________________    Disposition  Transfer to [facility]  ____________________________________________________________________    Code Status: Full Code  ___________________________________________________________________    Discharge Exam:  Patient seen and examined by me on discharge day. Pertinent Findings:  Gen:    Not in distress  Chest: diminsiihed  CVS:   Regular rhythm. No edema  Abd:  Soft, not distended, not tender  Neuro:  Alert    CONSULTATIONS: Cardiology and Vascular Surgery    Significant Diagnostic Studies:   Recent Labs     11/22/20  0720 11/21/20  1010   WBC 7.4 9.6   HGB 8.9* 9.2*   HCT 27.6* 28.8*    315     Recent Labs     11/22/20  0720 11/21/20  1010    134*   K 3.7 3.6    100   CO2 31 29   BUN 27* 25*   CREA 1.33* 1.49*   GLU 89 117*   CA 9.7 9.5     No results for input(s): ALT, AP, TBIL, TBILI, TP, ALB, GLOB, GGT, AML, LPSE in the last 72 hours. No lab exists for component: SGOT, GPT, AMYP, HLPSE  No results for input(s): INR, PTP, APTT, INREXT, INREXT in the last 72 hours. Recent Labs     11/21/20  1010   TIBC 228*   PSAT 18*      No results for input(s): PH, PCO2, PO2 in the last 72 hours. No results for input(s): CPK, CKMB in the last 72 hours.     No lab exists for component: TROPONINI  Lab Results   Component Value Date/Time    Glucose (POC) 166 (H) 11/04/2020 05:01 PM         Total Time: >30 min     Signed:  Janes Smith PA-C  11/23/2020  8:08 AM

## 2020-11-24 ENCOUNTER — APPOINTMENT (OUTPATIENT)
Dept: INTERVENTIONAL RADIOLOGY/VASCULAR | Age: 55
DRG: 194 | End: 2020-11-24
Attending: PHYSICIAN ASSISTANT
Payer: COMMERCIAL

## 2020-11-24 LAB
ANION GAP SERPL CALC-SCNC: 5 MMOL/L (ref 5–15)
APPEARANCE FLD: CLEAR
APTT PPP: 31.4 SEC (ref 23–35.7)
BASOPHILS # BLD: 0 K/UL (ref 0–0.1)
BASOPHILS NFR BLD: 0 % (ref 0–1)
BUN SERPL-MCNC: 38 MG/DL (ref 6–20)
BUN/CREAT SERPL: 25 (ref 12–20)
CA-I BLD-MCNC: 9.3 MG/DL (ref 8.5–10.1)
CHLORIDE SERPL-SCNC: 100 MMOL/L (ref 97–108)
CO2 SERPL-SCNC: 32 MMOL/L (ref 21–32)
COLOR FLD: YELLOW
CREAT SERPL-MCNC: 1.52 MG/DL (ref 0.55–1.02)
DIFFERENTIAL METHOD BLD: ABNORMAL
EOSINOPHIL # BLD: 0.2 K/UL (ref 0–0.4)
EOSINOPHIL NFR BLD: 2 % (ref 0–7)
EOSINOPHIL NFR FLD MANUAL: 1 %
ERYTHROCYTE [DISTWIDTH] IN BLOOD BY AUTOMATED COUNT: 14.8 % (ref 11.5–14.5)
GLUCOSE SERPL-MCNC: 92 MG/DL (ref 65–100)
HCT VFR BLD AUTO: 28.5 % (ref 35–47)
HGB BLD-MCNC: 9 G/DL (ref 11.5–16)
IMM GRANULOCYTES # BLD AUTO: 0 K/UL (ref 0–0.04)
IMM GRANULOCYTES NFR BLD AUTO: 0 % (ref 0–0.5)
INR PPP: 1 (ref 0.9–1.1)
LDH FLD L TO P-CCNC: 67 U/L
LYMPHOCYTES # BLD: 1.1 K/UL (ref 0.8–3.5)
LYMPHOCYTES NFR BLD: 11 % (ref 12–49)
LYMPHOCYTES NFR FLD: 15 %
MCH RBC QN AUTO: 31.5 PG (ref 26–34)
MCHC RBC AUTO-ENTMCNC: 31.6 G/DL (ref 30–36.5)
MCV RBC AUTO: 99.7 FL (ref 80–99)
MESOTHL CELL NFR FLD: 2 %
MONOCYTES # BLD: 0.5 K/UL (ref 0–1)
MONOCYTES NFR BLD: 5 % (ref 5–13)
MONOCYTES NFR FLD: 23 %
MONOS+MACROS NFR FLD: 48 %
NEUTROPHILS NFR FLD: 11 %
NEUTS SEG # BLD: 8 K/UL (ref 1.8–8)
NEUTS SEG NFR BLD: 82 % (ref 32–75)
NUC CELL # FLD: 86 /CU MM (ref 0–5)
PLATELET # BLD AUTO: 345 K/UL (ref 150–400)
PMV BLD AUTO: 9.9 FL (ref 8.9–12.9)
POTASSIUM SERPL-SCNC: 4.4 MMOL/L (ref 3.5–5.1)
PROT FLD-MCNC: 2.7 G/DL
PROTHROMBIN TIME: 13 SEC (ref 11.9–14.7)
RBC # BLD AUTO: 2.86 M/UL (ref 3.8–5.2)
RBC # FLD: 1 /CU MM
SODIUM SERPL-SCNC: 137 MMOL/L (ref 136–145)
SPECIMEN SOURCE FLD: ABNORMAL
SPECIMEN SOURCE FLD: NORMAL
THERAPEUTIC RANGE,PTTT: NORMAL SEC (ref 68–109)
WBC # BLD AUTO: 9.7 K/UL (ref 3.6–11)

## 2020-11-24 PROCEDURE — 0W9B3ZZ DRAINAGE OF LEFT PLEURAL CAVITY, PERCUTANEOUS APPROACH: ICD-10-PCS | Performed by: RADIOLOGY

## 2020-11-24 PROCEDURE — 74011250637 HC RX REV CODE- 250/637: Performed by: NURSE PRACTITIONER

## 2020-11-24 PROCEDURE — 85730 THROMBOPLASTIN TIME PARTIAL: CPT

## 2020-11-24 PROCEDURE — 74011250636 HC RX REV CODE- 250/636: Performed by: FAMILY MEDICINE

## 2020-11-24 PROCEDURE — 94640 AIRWAY INHALATION TREATMENT: CPT

## 2020-11-24 PROCEDURE — 87205 SMEAR GRAM STAIN: CPT

## 2020-11-24 PROCEDURE — 74011250637 HC RX REV CODE- 250/637: Performed by: FAMILY MEDICINE

## 2020-11-24 PROCEDURE — 74011250637 HC RX REV CODE- 250/637: Performed by: INTERNAL MEDICINE

## 2020-11-24 PROCEDURE — 85025 COMPLETE CBC W/AUTO DIFF WBC: CPT

## 2020-11-24 PROCEDURE — 85610 PROTHROMBIN TIME: CPT

## 2020-11-24 PROCEDURE — 74011250636 HC RX REV CODE- 250/636: Performed by: PHYSICIAN ASSISTANT

## 2020-11-24 PROCEDURE — 84157 ASSAY OF PROTEIN OTHER: CPT

## 2020-11-24 PROCEDURE — 77010033678 HC OXYGEN DAILY

## 2020-11-24 PROCEDURE — C1729 CATH, DRAINAGE: HCPCS

## 2020-11-24 PROCEDURE — 74011000250 HC RX REV CODE- 250: Performed by: INTERNAL MEDICINE

## 2020-11-24 PROCEDURE — 74011250637 HC RX REV CODE- 250/637: Performed by: PHYSICIAN ASSISTANT

## 2020-11-24 PROCEDURE — 65270000029 HC RM PRIVATE

## 2020-11-24 PROCEDURE — 94762 N-INVAS EAR/PLS OXIMTRY CONT: CPT

## 2020-11-24 PROCEDURE — 83615 LACTATE (LD) (LDH) ENZYME: CPT

## 2020-11-24 PROCEDURE — 89050 BODY FLUID CELL COUNT: CPT

## 2020-11-24 PROCEDURE — 36415 COLL VENOUS BLD VENIPUNCTURE: CPT

## 2020-11-24 PROCEDURE — 80048 BASIC METABOLIC PNL TOTAL CA: CPT

## 2020-11-24 RX ORDER — MORPHINE SULFATE 2 MG/ML
2 INJECTION, SOLUTION INTRAMUSCULAR; INTRAVENOUS ONCE
Status: COMPLETED | OUTPATIENT
Start: 2020-11-24 | End: 2020-11-24

## 2020-11-24 RX ORDER — ALPRAZOLAM 0.5 MG/1
0.25 TABLET ORAL
Status: DISCONTINUED | OUTPATIENT
Start: 2020-11-24 | End: 2020-11-27 | Stop reason: HOSPADM

## 2020-11-24 RX ORDER — MORPHINE SULFATE 2 MG/ML
2 INJECTION, SOLUTION INTRAMUSCULAR; INTRAVENOUS
Status: DISCONTINUED | OUTPATIENT
Start: 2020-11-24 | End: 2020-11-27 | Stop reason: HOSPADM

## 2020-11-24 RX ORDER — NORTRIPTYLINE HYDROCHLORIDE 25 MG/1
50 CAPSULE ORAL
Status: DISCONTINUED | OUTPATIENT
Start: 2020-11-24 | End: 2020-11-27 | Stop reason: HOSPADM

## 2020-11-24 RX ORDER — POTASSIUM CHLORIDE 750 MG/1
40 TABLET, FILM COATED, EXTENDED RELEASE ORAL DAILY
Status: DISCONTINUED | OUTPATIENT
Start: 2020-11-25 | End: 2020-11-27 | Stop reason: HOSPADM

## 2020-11-24 RX ADMIN — FUROSEMIDE 40 MG: 10 INJECTION, SOLUTION INTRAMUSCULAR; INTRAVENOUS at 10:01

## 2020-11-24 RX ADMIN — FERROUS SULFATE TAB 325 MG (65 MG ELEMENTAL FE) 325 MG: 325 (65 FE) TAB at 10:01

## 2020-11-24 RX ADMIN — ONDANSETRON 4 MG: 2 INJECTION INTRAMUSCULAR; INTRAVENOUS at 11:22

## 2020-11-24 RX ADMIN — HYDRALAZINE HYDROCHLORIDE 100 MG: 50 TABLET, FILM COATED ORAL at 10:02

## 2020-11-24 RX ADMIN — ISOSORBIDE DINITRATE 40 MG: 20 TABLET ORAL at 17:33

## 2020-11-24 RX ADMIN — Medication 10 ML: at 22:34

## 2020-11-24 RX ADMIN — MORPHINE SULFATE 2 MG: 2 INJECTION, SOLUTION INTRAMUSCULAR; INTRAVENOUS at 19:33

## 2020-11-24 RX ADMIN — CARBAMAZEPINE 200 MG: 200 TABLET ORAL at 22:32

## 2020-11-24 RX ADMIN — IPRATROPIUM BROMIDE AND ALBUTEROL SULFATE 3 ML: .5; 3 SOLUTION RESPIRATORY (INHALATION) at 08:14

## 2020-11-24 RX ADMIN — BUDESONIDE AND FORMOTEROL FUMARATE DIHYDRATE 2 PUFF: 160; 4.5 AEROSOL RESPIRATORY (INHALATION) at 21:57

## 2020-11-24 RX ADMIN — SALINE NASAL SPRAY 2 SPRAY: 1.5 SOLUTION NASAL at 06:12

## 2020-11-24 RX ADMIN — BUDESONIDE AND FORMOTEROL FUMARATE DIHYDRATE 2 PUFF: 160; 4.5 AEROSOL RESPIRATORY (INHALATION) at 08:14

## 2020-11-24 RX ADMIN — NIFEDIPINE 90 MG: 30 TABLET, FILM COATED, EXTENDED RELEASE ORAL at 10:01

## 2020-11-24 RX ADMIN — Medication 10 ML: at 14:25

## 2020-11-24 RX ADMIN — ONDANSETRON 4 MG: 2 INJECTION INTRAMUSCULAR; INTRAVENOUS at 19:33

## 2020-11-24 RX ADMIN — HYDRALAZINE HYDROCHLORIDE 100 MG: 50 TABLET, FILM COATED ORAL at 22:32

## 2020-11-24 RX ADMIN — NORTRIPTYLINE HYDROCHLORIDE 50 MG: 25 CAPSULE ORAL at 22:32

## 2020-11-24 RX ADMIN — LORATADINE 10 MG: 10 TABLET ORAL at 10:02

## 2020-11-24 RX ADMIN — PANTOPRAZOLE SODIUM 40 MG: 40 TABLET, DELAYED RELEASE ORAL at 06:08

## 2020-11-24 RX ADMIN — HYDRALAZINE HYDROCHLORIDE 100 MG: 50 TABLET, FILM COATED ORAL at 17:33

## 2020-11-24 RX ADMIN — IPRATROPIUM BROMIDE AND ALBUTEROL SULFATE 3 ML: .5; 3 SOLUTION RESPIRATORY (INHALATION) at 13:27

## 2020-11-24 RX ADMIN — ISOSORBIDE DINITRATE 40 MG: 20 TABLET ORAL at 10:01

## 2020-11-24 RX ADMIN — IPRATROPIUM BROMIDE AND ALBUTEROL SULFATE 3 ML: .5; 3 SOLUTION RESPIRATORY (INHALATION) at 21:43

## 2020-11-24 RX ADMIN — SERTRALINE HYDROCHLORIDE 25 MG: 25 TABLET ORAL at 10:02

## 2020-11-24 RX ADMIN — LEVOTHYROXINE SODIUM 25 MCG: 0.03 TABLET ORAL at 06:08

## 2020-11-24 RX ADMIN — Medication 10 ML: at 06:08

## 2020-11-24 RX ADMIN — SALINE NASAL SPRAY 2 SPRAY: 1.5 SOLUTION NASAL at 23:00

## 2020-11-24 RX ADMIN — LABETALOL HYDROCHLORIDE 400 MG: 200 TABLET, FILM COATED ORAL at 10:01

## 2020-11-24 RX ADMIN — ALPRAZOLAM 0.25 MG: 0.5 TABLET ORAL at 22:31

## 2020-11-24 RX ADMIN — ALPRAZOLAM 0.25 MG: 0.5 TABLET ORAL at 10:00

## 2020-11-24 RX ADMIN — POTASSIUM CHLORIDE 40 MEQ: 750 TABLET, FILM COATED, EXTENDED RELEASE ORAL at 10:01

## 2020-11-24 RX ADMIN — ISOSORBIDE DINITRATE 40 MG: 20 TABLET ORAL at 22:33

## 2020-11-24 RX ADMIN — LEVETIRACETAM 500 MG: 500 TABLET ORAL at 22:31

## 2020-11-24 RX ADMIN — FUROSEMIDE 40 MG: 10 INJECTION, SOLUTION INTRAMUSCULAR; INTRAVENOUS at 22:33

## 2020-11-24 RX ADMIN — TRAMADOL HYDROCHLORIDE 50 MG: 50 TABLET, COATED ORAL at 06:12

## 2020-11-24 RX ADMIN — MORPHINE SULFATE 2 MG: 2 INJECTION, SOLUTION INTRAMUSCULAR; INTRAVENOUS at 11:22

## 2020-11-24 RX ADMIN — ATORVASTATIN CALCIUM 20 MG: 20 TABLET, FILM COATED ORAL at 22:32

## 2020-11-24 RX ADMIN — SALINE NASAL SPRAY 2 SPRAY: 1.5 SOLUTION NASAL at 17:34

## 2020-11-24 RX ADMIN — CARBAMAZEPINE 200 MG: 200 TABLET ORAL at 10:01

## 2020-11-24 RX ADMIN — LEVETIRACETAM 500 MG: 500 TABLET ORAL at 10:02

## 2020-11-24 RX ADMIN — LABETALOL HYDROCHLORIDE 400 MG: 200 TABLET, FILM COATED ORAL at 17:33

## 2020-11-24 RX ADMIN — FOLIC ACID 1 MG: 1 TABLET ORAL at 10:02

## 2020-11-24 RX ADMIN — LABETALOL HYDROCHLORIDE 400 MG: 200 TABLET, FILM COATED ORAL at 22:33

## 2020-11-24 NOTE — PROGRESS NOTES
CARDIOLOGY PROGRESS NOTE - NP     Patient seen and examined. Resting comfortably this am, no acute events noted overnight. This is a patient who is followed for uncontrolled hypertension in the setting of renal artery stenosis. [facility], in preparation for stenting to renal artery. Patient denies chest pain or discomfort. No other complaints reported. Telemetry reviewed, there were no events noted in the past 24 hours. Remains in normal sinus rhythm to sinus tachycardia in the low 100s. Pertinent review of systems items noted above, all other systems are negative. Current medications reviewed. Physical Examination  Vital signs are stable. Blood pressure 154/96, Pulse 102  No apparent distress when resting. Heart is regular, rate and rhythm. Normal S1, S2, no murmurs are appreciated. Lungs are diminished in the bases, no wheezing noted. Abdomen is soft, nontender, normal bowel sounds. Extremities have no edema. Labs reviewed. Case discussed with Dr. Ian Lange and our impression and recommendations are as follows:     1. Acute on chronic heart failure:  Remains with pleural effusions per imaging. Continue IV lasix 40 BID as she continues with adequate diuresis, 1000 mL this morning. Continue current medications. Please document Strict I&Os. 2. Mitral valve regurgitation: This has improved to mild from severe per recent CLAUDIA. EF preserved. Continue diuresis as stated above. Limited symptoms at rest.      3. Hypertension: Uncontrolled on multiple BP medications as an outpatient. Continue Hydralazine 100mg three times daily,  labetolol to 400mg TID and nifedipine to 90mg daily. Continue to monitor renal function. 4. Renal artery stenosis: [facility] for [facility] as stated. 5. Hyperlipidemia: Continue with statin therapy. 6. Symptomatic anemia: Has required PRBCs this admission. Hgb stable.       We will plan to follow-up with pt upon d/c from [facility]. Please do not hesitate to call me or Dr. Alfredo Cedeño if additional questions arise.

## 2020-11-24 NOTE — ROUTINE PROCESS
Bedside shift change report given to Heaven Marinelli RN (oncoming nurse) by Kay Mckenzie RN (offgoing nurse). Report included the following information SBAR and MAR.

## 2020-11-24 NOTE — PROGRESS NOTES
Hospitalist Progress Note    Subjective:   Daily Progress Note: 11/24/2020 8:36 AM    Hospital Course: Patient is a 80-year-old female who presented to the ER on 11/18/2020 for increased shortness of breath.  Patient has a history of hypertension, COPD, chronic hypoxic respiratory failure on supplemental oxygen 3-5 liters at home, diastolic CHF.  She was recently discharged on 11/4/2020 for similar complaints.  2D echo in September 2020 showed normal ejection fraction with mild mitral regurgitation and mild tricuspid regurgitation.  Patient started having shortness of breath with a white productive cough for 24 hours.  It was associated with wheezing.  No fevers.  In the ED patient's respirations were 25 and she was found to be hypoxic at 87% on 5 L oxygen nasal cannula.  Blood pressure elevated 192/106.  Chest x-ray showed cardiomegaly with mild bilateral pleural effusions.  CTA of the chest confirmed this and no obvious PE noted.  [place] [address]. Marcia Graham was placed on BiPAP and given IV Solu-Medrol and duo nebs and IV Lasix.  She was started on IV Lasix 40 mg twice daily and cardiology consulted. Vascular surgery consulted for renal artery stenosis work up.  Renal ultrasound showed right kidney measuring 10.6 cm and left kidney measuring 8.8 cm with elevated right renal artery peak velocity at 327 cm/s with left renal artery systolic velocity only 56 cm/s with a blunting waveform and very low amplitude mostly likely occluded.  Per vascular surgery [facility] artery stent due to high-grade stenosis of the right renal artery and complicated with juxtarenal aortic calcification with stenosis.  Spoke with hospitalist team at [address] [place] [address] has accepted the patient.  Vascular surgery Dr. Addie Quiroz will perform the procedure. HGB was 7.6 and trasfused 1 unit. HGB is 9.2. Potassium was supplemented and WNL on 11/21/2020. Stable for transfer to [address] [place] [address]. She is COVID NEGATIVE.  Awaiting bed availability. SOB secondary to CHF exacerbation for hypertensive urgency.       Subjective: Persistent headache and elevated blood pressure    Current Facility-Administered Medications   Medication Dose Route Frequency    ALPRAZolam (XANAX) tablet 0.25 mg  0.25 mg Oral TID PRN    nortriptyline (PAMELOR) capsule 50 mg  50 mg Oral QHS    morphine injection 2 mg  2 mg IntraVENous ONCE    sodium chloride (OCEAN) 0.65 % nasal squeeze bottle 2 Spray  2 Spray Both Nostrils Q6H    NIFEdipine ER (PROCARDIA XL) tablet 90 mg  90 mg Oral DAILY    pantoprazole (PROTONIX) tablet 40 mg  40 mg Oral ACB    budesonide-formoteroL (SYMBICORT) 160-4.5 mcg/actuation HFA inhaler 2 Puff  2 Puff Inhalation BID RT    0.9% sodium chloride infusion 250 mL  250 mL IntraVENous PRN    potassium chloride SR (KLOR-CON 10) tablet 40 mEq  40 mEq Oral TID    folic acid (FOLVITE) tablet 1 mg  1 mg Oral DAILY    ferrous sulfate tablet 325 mg  325 mg Oral ACB    hydrALAZINE (APRESOLINE) tablet 100 mg  100 mg Oral TID    isosorbide dinitrate (ISORDIL) tablet 40 mg  40 mg Oral TID    labetaloL (NORMODYNE) tablet 400 mg  400 mg Oral TID    levETIRAcetam (KEPPRA) tablet 500 mg  500 mg Oral BID    levothyroxine (SYNTHROID) tablet 25 mcg  25 mcg Oral ACB    sertraline (ZOLOFT) tablet 25 mg  25 mg Oral DAILY    atorvastatin (LIPITOR) tablet 20 mg  20 mg Oral QHS    carBAMazepine (TEGretol) tablet 200 mg  200 mg Oral BID    furosemide (LASIX) injection 40 mg  40 mg IntraVENous Q12H    traMADoL (ULTRAM) tablet 50 mg  50 mg Oral Q6H PRN    loratadine (CLARITIN) tablet 10 mg  10 mg Oral DAILY    albuterol-ipratropium (DUO-NEB) 2.5 MG-0.5 MG/3 ML  3 mL Nebulization Q6HWA RT    sodium chloride (NS) flush 5-40 mL  5-40 mL IntraVENous Q8H    sodium chloride (NS) flush 5-40 mL  5-40 mL IntraVENous PRN    acetaminophen (TYLENOL) tablet 650 mg  650 mg Oral Q6H PRN    Or    acetaminophen (TYLENOL) suppository 650 mg  650 mg Rectal Q6H PRN    polyethylene glycol (MIRALAX) packet 17 g  17 g Oral DAILY PRN    promethazine (PHENERGAN) tablet 12.5 mg  12.5 mg Oral Q6H PRN    Or    ondansetron (ZOFRAN) injection 4 mg  4 mg IntraVENous Q6H PRN        Review of Systems  Constitutional: No fevers, No chills, No sweats, + fatigue, + Weakness  Eyes: No redness  Ears, nose, mouth, throat, and face: ++nasal congestion, No sore throat, No voice change  Respiratory: + Shortness of Breath, ++cough, No wheezing  Cardiovascular:No chest pain, No palpitations, No extremity edema  Gastrointestinal: No nausea, No vomiting, No diarrhea, No abdominal pain  Genitourinary: No frequency, No dysuria, No hematuria  Integument/breast: No skin lesion(s)   Neurological: No Confusion, No headaches, No dizziness      Objective:     Visit Vitals  BP (!) 169/100 (BP 1 Location: Right arm, BP Patient Position: At rest)   Pulse 95   Temp 98.6 °F (37 °C)   Resp 23   Ht 5' 5\" (1.651 m)   Wt 55.5 kg (122 lb 5.7 oz)   SpO2 94%   Breastfeeding No   BMI 20.36 kg/m²    O2 Flow Rate (L/min): 3 l/min O2 Device: Nasal cannula    Temp (24hrs), Av.8 °F (37.1 °C), Min:98.6 °F (37 °C), Max:99 °F (37.2 °C)      No intake/output data recorded.  1901 -  0700  In: 1500 [P.O.:1500]  Out: 2500 [Urine:2500]    PHYSICAL EXAM:  Constitutional: No acute distress  Skin: Extremities and face reveal no rashes. HEENT: Sclerae anicteric. Extra-occular muscles are intact. No oral ulcers. Cardiovascular: Regular rate and rhythm. Respiratory:  Nonlabored, diminsihed in the bases bilaterally  GI: Abdomen nondistended, soft, and nontender. Normal active bowel sounds. Musculoskeletal: No pitting edema of the lower legs. Able to move all ext  Neurological:  Patient is alert and oriented. Cranial nerves II-XII grossly intact  Psychiatric: Mood appears appropriate       Data Review    No results found for this or any previous visit (from the past 24 hour(s)).     Radiology review: CT chest    Assessment: 1.  Acute on chronic hypoxic respiratory failure/pleural effusions  2. Acute on chronic diastolic CHF exacerbation  3. Hypertension  4. Seizure disorder  5. COPD  6. Hyperlipidemia  7. Hypothyroidism  8. Depression anxiety  9. Acute on chronic anemia   10. Renal artery stenosis high-grade    Plan:    1. BNP elevated greater than 13,000. Cardiology consulted. She had a 2D echo 2 months ago which showed normal EF. Heart failure precipitated by severe hypertension, systolic greater than 207 on  IV Lasix 40 mg twice daily. Fluid restriction. Vascular surgery consulted. Renal artery stenosis right and probable occlusion of the left. Most likely etiology for hypertensive crisis    2. Oxygen saturation is within normal limits on 2-4 L. Covid test NEGATIVE. CTA of the chest shows fluid overload with pleural effusion  Thoracentesis ordered    3. Blood pressure is elevated. Was given topical Nitropaste in the emergency room. Continue home medications of hydralazine, Procardia, labetalol, isordil, lasix. Most likely refractory due to high-grade renal artery stenosis  4. Continue with Keppra and Tegretol  5. Continue with DuoNeb's. 6.  Continue Lipitor   7. Continue with Synthroid  8. She is on Zoloft and xanax as she takes this at home. 7.  Stable HGB  8. Hypokalemia changed  K+ supplementation 40meq to daily rather than every 8 hours, repeat BMP stat    Daily labs    CODE STATUS full     DVT prophylaxis: heparin  Ulcer prophylaxis: protonix    Care Plan discussed with: Patient/Family and     Total time spent with patient: 39 minutes. Dr. Perri Downing hospitalist updated 11/24  Spoke with Dr. Radha Andrade, hospitalist, at [facility]. He has accepted the patient  with anticipation of renal angiogram with possible intervention on Monday by vascular surgeon Dr. Beronica Teague.   Discussed this with the patient and her significant other at bedside

## 2020-11-24 NOTE — PROCEDURES
Interventional Radiology Brief Procedure Note    Patient: Humberto Avitia MRN: [id number]  SSN: [id number]    YOB: 1965  Age: 54 y.o. Sex: female      Date of Procedure: 11/24/2020    Pre-Procedure Diagnosis: bilateral pleural effusions    Post-Procedure Diagnosis: SAME    Procedure(s): Thoracentesis    Brief Description of Procedure: US guided left thoracentesis. Performed By: Veronica Aguilera DO     Assistants: None    Anesthesia: Lidocaine    Estimated Blood Loss: Less than 10ml    Specimens: 60mL clear serous fluid, submitted for analysis. Total of 760 mL drained.     Implants: None    Findings: moderate bilateral pleural effusions    Complications: None

## 2020-11-24 NOTE — PROGRESS NOTES
Dr. Faiza Orourke at bedside for am rounds, aware of BP, reviewed prn pain/anxiety medications with patient and Dr. Faiza Orourke at bedside, orders received.

## 2020-11-25 ENCOUNTER — APPOINTMENT (OUTPATIENT)
Dept: INTERVENTIONAL RADIOLOGY/VASCULAR | Age: 55
DRG: 194 | End: 2020-11-25
Attending: PHYSICIAN ASSISTANT
Payer: COMMERCIAL

## 2020-11-25 ENCOUNTER — APPOINTMENT (OUTPATIENT)
Dept: GENERAL RADIOLOGY | Age: 55
DRG: 194 | End: 2020-11-25
Attending: PHYSICIAN ASSISTANT
Payer: COMMERCIAL

## 2020-11-25 LAB
ANION GAP SERPL CALC-SCNC: 7 MMOL/L (ref 5–15)
BASOPHILS # BLD: 0 K/UL (ref 0–0.1)
BASOPHILS NFR BLD: 0 % (ref 0–1)
BUN SERPL-MCNC: 39 MG/DL (ref 6–20)
BUN/CREAT SERPL: 22 (ref 12–20)
CA-I BLD-MCNC: 9.5 MG/DL (ref 8.5–10.1)
CHLORIDE SERPL-SCNC: 95 MMOL/L (ref 97–108)
CO2 SERPL-SCNC: 32 MMOL/L (ref 21–32)
CREAT SERPL-MCNC: 1.74 MG/DL (ref 0.55–1.02)
DIFFERENTIAL METHOD BLD: ABNORMAL
EOSINOPHIL # BLD: 0.2 K/UL (ref 0–0.4)
EOSINOPHIL NFR BLD: 3 % (ref 0–7)
ERYTHROCYTE [DISTWIDTH] IN BLOOD BY AUTOMATED COUNT: 14.6 % (ref 11.5–14.5)
GLUCOSE SERPL-MCNC: 91 MG/DL (ref 65–100)
HCT VFR BLD AUTO: 27.3 % (ref 35–47)
HGB BLD-MCNC: 8.8 G/DL (ref 11.5–16)
IMM GRANULOCYTES # BLD AUTO: 0 K/UL (ref 0–0.04)
IMM GRANULOCYTES NFR BLD AUTO: 0 % (ref 0–0.5)
LYMPHOCYTES # BLD: 1.3 K/UL (ref 0.8–3.5)
LYMPHOCYTES NFR BLD: 19 % (ref 12–49)
MCH RBC QN AUTO: 31.9 PG (ref 26–34)
MCHC RBC AUTO-ENTMCNC: 32.2 G/DL (ref 30–36.5)
MCV RBC AUTO: 98.9 FL (ref 80–99)
MONOCYTES # BLD: 0.4 K/UL (ref 0–1)
MONOCYTES NFR BLD: 5 % (ref 5–13)
NEUTS SEG # BLD: 4.9 K/UL (ref 1.8–8)
NEUTS SEG NFR BLD: 73 % (ref 32–75)
PLATELET # BLD AUTO: 339 K/UL (ref 150–400)
PMV BLD AUTO: 9.7 FL (ref 8.9–12.9)
POTASSIUM SERPL-SCNC: 3.7 MMOL/L (ref 3.5–5.1)
RBC # BLD AUTO: 2.76 M/UL (ref 3.8–5.2)
SODIUM SERPL-SCNC: 134 MMOL/L (ref 136–145)
WBC # BLD AUTO: 6.7 K/UL (ref 3.6–11)

## 2020-11-25 PROCEDURE — 74011250636 HC RX REV CODE- 250/636: Performed by: PHYSICIAN ASSISTANT

## 2020-11-25 PROCEDURE — 0W993ZZ DRAINAGE OF RIGHT PLEURAL CAVITY, PERCUTANEOUS APPROACH: ICD-10-PCS | Performed by: RADIOLOGY

## 2020-11-25 PROCEDURE — 74011250637 HC RX REV CODE- 250/637: Performed by: FAMILY MEDICINE

## 2020-11-25 PROCEDURE — 71045 X-RAY EXAM CHEST 1 VIEW: CPT

## 2020-11-25 PROCEDURE — 80048 BASIC METABOLIC PNL TOTAL CA: CPT

## 2020-11-25 PROCEDURE — 94762 N-INVAS EAR/PLS OXIMTRY CONT: CPT

## 2020-11-25 PROCEDURE — 94640 AIRWAY INHALATION TREATMENT: CPT

## 2020-11-25 PROCEDURE — C1729 CATH, DRAINAGE: HCPCS

## 2020-11-25 PROCEDURE — 74011250637 HC RX REV CODE- 250/637: Performed by: NURSE PRACTITIONER

## 2020-11-25 PROCEDURE — 74011000250 HC RX REV CODE- 250: Performed by: INTERNAL MEDICINE

## 2020-11-25 PROCEDURE — 74011250637 HC RX REV CODE- 250/637: Performed by: INTERNAL MEDICINE

## 2020-11-25 PROCEDURE — 65270000029 HC RM PRIVATE

## 2020-11-25 PROCEDURE — 74011250637 HC RX REV CODE- 250/637: Performed by: PHYSICIAN ASSISTANT

## 2020-11-25 PROCEDURE — 36415 COLL VENOUS BLD VENIPUNCTURE: CPT

## 2020-11-25 PROCEDURE — 77010033678 HC OXYGEN DAILY

## 2020-11-25 PROCEDURE — 85025 COMPLETE CBC W/AUTO DIFF WBC: CPT

## 2020-11-25 RX ORDER — FUROSEMIDE 10 MG/ML
40 INJECTION INTRAMUSCULAR; INTRAVENOUS DAILY
Status: DISCONTINUED | OUTPATIENT
Start: 2020-11-26 | End: 2020-11-27 | Stop reason: HOSPADM

## 2020-11-25 RX ADMIN — SALINE NASAL SPRAY 2 SPRAY: 1.5 SOLUTION NASAL at 17:57

## 2020-11-25 RX ADMIN — IPRATROPIUM BROMIDE AND ALBUTEROL SULFATE 3 ML: .5; 3 SOLUTION RESPIRATORY (INHALATION) at 14:49

## 2020-11-25 RX ADMIN — LABETALOL HYDROCHLORIDE 400 MG: 200 TABLET, FILM COATED ORAL at 21:31

## 2020-11-25 RX ADMIN — Medication 10 ML: at 21:35

## 2020-11-25 RX ADMIN — ALPRAZOLAM 0.25 MG: 0.5 TABLET ORAL at 08:29

## 2020-11-25 RX ADMIN — LORATADINE 10 MG: 10 TABLET ORAL at 08:21

## 2020-11-25 RX ADMIN — MORPHINE SULFATE 2 MG: 2 INJECTION, SOLUTION INTRAMUSCULAR; INTRAVENOUS at 20:04

## 2020-11-25 RX ADMIN — IPRATROPIUM BROMIDE AND ALBUTEROL SULFATE 3 ML: .5; 3 SOLUTION RESPIRATORY (INHALATION) at 09:17

## 2020-11-25 RX ADMIN — FOLIC ACID 1 MG: 1 TABLET ORAL at 08:22

## 2020-11-25 RX ADMIN — ISOSORBIDE DINITRATE 40 MG: 20 TABLET ORAL at 08:21

## 2020-11-25 RX ADMIN — BUDESONIDE AND FORMOTEROL FUMARATE DIHYDRATE 2 PUFF: 160; 4.5 AEROSOL RESPIRATORY (INHALATION) at 09:17

## 2020-11-25 RX ADMIN — FUROSEMIDE 40 MG: 10 INJECTION, SOLUTION INTRAMUSCULAR; INTRAVENOUS at 08:22

## 2020-11-25 RX ADMIN — CARBAMAZEPINE 200 MG: 200 TABLET ORAL at 21:00

## 2020-11-25 RX ADMIN — LEVOTHYROXINE SODIUM 25 MCG: 0.03 TABLET ORAL at 08:22

## 2020-11-25 RX ADMIN — SALINE NASAL SPRAY 2 SPRAY: 1.5 SOLUTION NASAL at 12:00

## 2020-11-25 RX ADMIN — IPRATROPIUM BROMIDE AND ALBUTEROL SULFATE 3 ML: .5; 3 SOLUTION RESPIRATORY (INHALATION) at 20:25

## 2020-11-25 RX ADMIN — Medication 10 ML: at 06:19

## 2020-11-25 RX ADMIN — SERTRALINE HYDROCHLORIDE 25 MG: 25 TABLET ORAL at 08:21

## 2020-11-25 RX ADMIN — ALPRAZOLAM 0.25 MG: 0.5 TABLET ORAL at 21:31

## 2020-11-25 RX ADMIN — ATORVASTATIN CALCIUM 20 MG: 20 TABLET, FILM COATED ORAL at 21:31

## 2020-11-25 RX ADMIN — MORPHINE SULFATE 2 MG: 2 INJECTION, SOLUTION INTRAMUSCULAR; INTRAVENOUS at 14:45

## 2020-11-25 RX ADMIN — CARBAMAZEPINE 200 MG: 200 TABLET ORAL at 08:21

## 2020-11-25 RX ADMIN — LEVETIRACETAM 500 MG: 500 TABLET ORAL at 08:22

## 2020-11-25 RX ADMIN — HYDRALAZINE HYDROCHLORIDE 100 MG: 50 TABLET, FILM COATED ORAL at 08:21

## 2020-11-25 RX ADMIN — LABETALOL HYDROCHLORIDE 400 MG: 200 TABLET, FILM COATED ORAL at 08:22

## 2020-11-25 RX ADMIN — Medication 10 ML: at 09:56

## 2020-11-25 RX ADMIN — NIFEDIPINE 90 MG: 30 TABLET, FILM COATED, EXTENDED RELEASE ORAL at 08:21

## 2020-11-25 RX ADMIN — BUDESONIDE AND FORMOTEROL FUMARATE DIHYDRATE 2 PUFF: 160; 4.5 AEROSOL RESPIRATORY (INHALATION) at 20:25

## 2020-11-25 RX ADMIN — NORTRIPTYLINE HYDROCHLORIDE 50 MG: 25 CAPSULE ORAL at 21:31

## 2020-11-25 RX ADMIN — PANTOPRAZOLE SODIUM 40 MG: 40 TABLET, DELAYED RELEASE ORAL at 08:21

## 2020-11-25 RX ADMIN — SALINE NASAL SPRAY 2 SPRAY: 1.5 SOLUTION NASAL at 06:17

## 2020-11-25 RX ADMIN — FERROUS SULFATE TAB 325 MG (65 MG ELEMENTAL FE) 325 MG: 325 (65 FE) TAB at 08:22

## 2020-11-25 RX ADMIN — HYDRALAZINE HYDROCHLORIDE 100 MG: 50 TABLET, FILM COATED ORAL at 21:31

## 2020-11-25 RX ADMIN — ISOSORBIDE DINITRATE 40 MG: 20 TABLET ORAL at 21:31

## 2020-11-25 RX ADMIN — LEVETIRACETAM 500 MG: 500 TABLET ORAL at 21:00

## 2020-11-25 NOTE — PROGRESS NOTES
Pending bed availability at [facility] for transfer ([facility]).           BETHANY Garcia

## 2020-11-25 NOTE — PROGRESS NOTES
CARDIOLOGY PROGRESS NOTE - NP     Patient seen and examined. This is a patient who is followed for uncontrolled hypertension in the setting of renal artery stenosis. [facility], in preparation for stenting to renal artery. Patient denies chest pain or discomfort. Continues to endorse dyspnea with exertion. S/p thoracentesis with 760 mL removed, plan for repeat procedure today. Remains on oxygen supplementation. No other complaints reported. Telemetry reviewed, there were no events noted in the past 24 hours. Remains in normal sinus rhythm 88 with no ectopy. Pertinent review of systems items noted above, all other systems are negative. Current medications reviewed. Physical Examination  Vital signs are stable. Blood pressure 159/86, Pulse 94  No apparent distress  Heart is regular, rate and rhythm. Normal S1, S2, no murmurs are appreciated. Lungs are diminished in the bases, no wheezing noted. Abdomen is soft, nontender, normal bowel sounds. Extremities have no edema. Labs reviewed. Case discussed with Dr. Karthikeyan Gay and our impression and recommendations are as follows:     1. Acute on chronic heart failure:  S/p thoracentesis, 760 mL removed, for repeat procedure today. Continue IV lasix 40 BID as she continues with adequate diuresis, 890 mL this morning. Continue current medications. Please document Strict I&Os. 2. Mitral valve regurgitation: This has improved to mild from severe per recent CLAUDIA. EF preserved. Continue diuresis as stated above. Limited symptoms at rest.      3. Hypertension: Uncontrolled on multiple BP medications as an outpatient. Continue Hydralazine 100mg three times daily,  labetolol to 400mg TID and nifedipine to 90mg daily. Continue to monitor renal function. 4. Renal artery stenosis: Plans [facility] for stenting as stated. 5. Hyperlipidemia: Continue with statin therapy.       6. Symptomatic anemia: Has required PRBCs this admission. Hgb stable. Continue to monitor     We will plan to follow-up with pt [facility]. Please do not hesitate to call me or Dr. Alfredo Cedeño if additional questions arise.

## 2020-11-25 NOTE — PROGRESS NOTES
Hospitalist Progress Note    Subjective:   Daily Progress Note: 11/25/2020 8:36 AM    Hospital Course: Patient is a 27-year-old female who presented to the ER on 11/18/2020 for increased shortness of breath.  Patient has a history of hypertension, COPD, chronic hypoxic respiratory failure on supplemental oxygen 3-5 liters at home, diastolic CHF.  She was recently discharged on 11/4/2020 for similar complaints.  2D echo in September 2020 showed normal ejection fraction with mild mitral regurgitation and mild tricuspid regurgitation.  Patient started having shortness of breath with a white productive cough for 24 hours.  It was associated with wheezing.  No fevers.  In the ED patient's respirations were 25 and she was found to be hypoxic at 87% on 5 L oxygen nasal cannula.  Blood pressure elevated 192/106.  Chest x-ray showed cardiomegaly with mild bilateral pleural effusions.  CTA of the chest confirmed this and no obvious PE noted.  [place] [address]. Marcia Graham was placed on BiPAP and given IV Solu-Medrol and duo nebs and IV Lasix.  She was started on IV Lasix 40 mg twice daily and cardiology consulted. Vascular surgery consulted for renal artery stenosis work up.  Renal ultrasound showed right kidney measuring 10.6 cm and left kidney measuring 8.8 cm with elevated right renal artery peak velocity at 327 cm/s with left renal artery systolic velocity only 56 cm/s with a blunting waveform and very low amplitude mostly likely occluded.  Per vascular surgery recommend [facility] for possible renal artery stent due to high-grade stenosis of the right renal artery and complicated with juxtarenal aortic calcification with stenosis.  Spoke with hospitalist team at [facility] has accepted the patient.  Vascular surgery Dr. Addie Quiroz will perform the procedure. HGB was 7.6 and trasfused 1 unit. HGB is 9.2. Potassium was supplemented and WNL on 11/21/2020. Stable for transfer to [facility]. She is COVID NEGATIVE.  Awaiting bed availability. SOB secondary to CHF exacerbation for hypertensive urgency. Thoracentesis left side, 11/24 and ordered thoracentesis of right on 11/25. Subjective: Mild chest pain at the thoracentesis site on the left.   Headache much improved and blood pressure well controlled    Current Facility-Administered Medications   Medication Dose Route Frequency    [START ON 11/26/2020] furosemide (LASIX) injection 40 mg  40 mg IntraVENous DAILY    ALPRAZolam (XANAX) tablet 0.25 mg  0.25 mg Oral TID PRN    nortriptyline (PAMELOR) capsule 50 mg  50 mg Oral QHS    potassium chloride SR (KLOR-CON 10) tablet 40 mEq  40 mEq Oral DAILY    morphine injection 2 mg  2 mg IntraVENous Q4H PRN    sodium chloride (OCEAN) 0.65 % nasal squeeze bottle 2 Spray  2 Spray Both Nostrils Q6H    NIFEdipine ER (PROCARDIA XL) tablet 90 mg  90 mg Oral DAILY    pantoprazole (PROTONIX) tablet 40 mg  40 mg Oral ACB    budesonide-formoteroL (SYMBICORT) 160-4.5 mcg/actuation HFA inhaler 2 Puff  2 Puff Inhalation BID RT    0.9% sodium chloride infusion 250 mL  250 mL IntraVENous PRN    folic acid (FOLVITE) tablet 1 mg  1 mg Oral DAILY    ferrous sulfate tablet 325 mg  325 mg Oral ACB    hydrALAZINE (APRESOLINE) tablet 100 mg  100 mg Oral TID    isosorbide dinitrate (ISORDIL) tablet 40 mg  40 mg Oral TID    labetaloL (NORMODYNE) tablet 400 mg  400 mg Oral TID    levETIRAcetam (KEPPRA) tablet 500 mg  500 mg Oral BID    levothyroxine (SYNTHROID) tablet 25 mcg  25 mcg Oral ACB    sertraline (ZOLOFT) tablet 25 mg  25 mg Oral DAILY    atorvastatin (LIPITOR) tablet 20 mg  20 mg Oral QHS    carBAMazepine (TEGretol) tablet 200 mg  200 mg Oral BID    traMADoL (ULTRAM) tablet 50 mg  50 mg Oral Q6H PRN    loratadine (CLARITIN) tablet 10 mg  10 mg Oral DAILY    albuterol-ipratropium (DUO-NEB) 2.5 MG-0.5 MG/3 ML  3 mL Nebulization Q6HWA RT    sodium chloride (NS) flush 5-40 mL  5-40 mL IntraVENous Q8H    sodium chloride (NS) flush 5-40 mL 5-40 mL IntraVENous PRN    acetaminophen (TYLENOL) tablet 650 mg  650 mg Oral Q6H PRN    Or    acetaminophen (TYLENOL) suppository 650 mg  650 mg Rectal Q6H PRN    polyethylene glycol (MIRALAX) packet 17 g  17 g Oral DAILY PRN    promethazine (PHENERGAN) tablet 12.5 mg  12.5 mg Oral Q6H PRN    Or    ondansetron (ZOFRAN) injection 4 mg  4 mg IntraVENous Q6H PRN        Review of Systems  Constitutional: No fevers, No chills, No sweats, + fatigue, + Weakness  Eyes: No redness  Ears, nose, mouth, throat, and face: ++nasal congestion, No sore throat, No voice change  Respiratory: + Shortness of Breath, ++cough, No wheezing  Cardiovascular:No chest pain, No palpitations, No extremity edema  Gastrointestinal: No nausea, No vomiting, No diarrhea, No abdominal pain  Genitourinary: No frequency, No dysuria, No hematuria  Integument/breast: No skin lesion(s)   Neurological: No Confusion, No headaches, No dizziness      Objective:     Visit Vitals  /69   Pulse 95   Temp 98.4 °F (36.9 °C)   Resp 18   Ht 5' 5\" (1.651 m)   Wt 53.9 kg (118 lb 13.3 oz)   SpO2 95%   Breastfeeding No   BMI 19.77 kg/m²    O2 Flow Rate (L/min): 3 l/min O2 Device: Nasal cannula    Temp (24hrs), Av.7 °F (37.1 °C), Min:98 °F (36.7 °C), Max:99.3 °F (37.4 °C)      No intake/output data recorded.  1901 -  0700  In: 0757 [P.O.:1460]  Out: 2870 [Urine:2870]    PHYSICAL EXAM:  Constitutional: No acute distress  Skin: Extremities and face reveal no rashes. HEENT: Sclerae anicteric. Extra-occular muscles are intact. No oral ulcers. Cardiovascular: Regular rate and rhythm. Respiratory:  Nonlabored, diminsihed on right left much improved  GI: Abdomen nondistended, soft, and nontender. Normal active bowel sounds. Musculoskeletal: No pitting edema of the lower legs. Able to move all ext  Neurological:  Patient is alert and oriented.  Cranial nerves II-XII grossly intact  Psychiatric: Mood appears appropriate       Data Review    Recent Results (from the past 24 hour(s))   PROTHROMBIN TIME + INR    Collection Time: 11/24/20  5:03 PM   Result Value Ref Range    Prothrombin time 13.0 11.9 - 14.7 sec    INR 1.0 0.9 - 1.1     PTT    Collection Time: 11/24/20  5:03 PM   Result Value Ref Range    aPTT 31.4 23.0 - 35.7 sec    aPTT, therapeutic range   68 - 109 sec   CBC WITH AUTOMATED DIFF    Collection Time: 11/24/20  5:03 PM   Result Value Ref Range    WBC 9.7 3.6 - 11.0 K/uL    RBC 2.86 (L) 3.80 - 5.20 M/uL    HGB 9.0 (L) 11.5 - 16.0 g/dL    HCT 28.5 (L) 35.0 - 47.0 %    MCV 99.7 (H) 80.0 - 99.0 FL    MCH 31.5 26.0 - 34.0 PG    MCHC 31.6 30.0 - 36.5 g/dL    RDW 14.8 (H) 11.5 - 14.5 %    PLATELET 665 047 - 625 K/uL    MPV 9.9 8.9 - 12.9 FL    NEUTROPHILS 82 (H) 32 - 75 %    LYMPHOCYTES 11 (L) 12 - 49 %    MONOCYTES 5 5 - 13 %    EOSINOPHILS 2 0 - 7 %    BASOPHILS 0 0 - 1 %    IMMATURE GRANULOCYTES 0 0.0 - 0.5 %    ABS. NEUTROPHILS 8.0 1.8 - 8.0 K/UL    ABS. LYMPHOCYTES 1.1 0.8 - 3.5 K/UL    ABS. MONOCYTES 0.5 0.0 - 1.0 K/UL    ABS. EOSINOPHILS 0.2 0.0 - 0.4 K/UL    ABS. BASOPHILS 0.0 0.0 - 0.1 K/UL    ABS. IMM.  GRANS. 0.0 0.00 - 0.04 K/UL    DF AUTOMATED     METABOLIC PANEL, BASIC    Collection Time: 11/24/20  5:03 PM   Result Value Ref Range    Sodium 137 136 - 145 mmol/L    Potassium 4.4 3.5 - 5.1 mmol/L    Chloride 100 97 - 108 mmol/L    CO2 32 21 - 32 mmol/L    Anion gap 5 5 - 15 mmol/L    Glucose 92 65 - 100 mg/dL    BUN 38 (H) 6 - 20 mg/dL    Creatinine 1.52 (H) 0.55 - 1.02 mg/dL    BUN/Creatinine ratio 25 (H) 12 - 20      GFR est AA 43 (L) >60 ml/min/1.73m2    GFR est non-AA 36 (L) >60 ml/min/1.73m2    Calcium 9.3 8.5 - 10.1 mg/dL   CBC WITH AUTOMATED DIFF    Collection Time: 11/25/20  8:00 AM   Result Value Ref Range    WBC 6.7 3.6 - 11.0 K/uL    RBC 2.76 (L) 3.80 - 5.20 M/uL    HGB 8.8 (L) 11.5 - 16.0 g/dL    HCT 27.3 (L) 35.0 - 47.0 %    MCV 98.9 80.0 - 99.0 FL    MCH 31.9 26.0 - 34.0 PG    MCHC 32.2 30.0 - 36.5 g/dL    RDW 14.6 (H) 11.5 - 14.5 %    PLATELET 756 283 - 123 K/uL    MPV 9.7 8.9 - 12.9 FL    NEUTROPHILS 73 32 - 75 %    LYMPHOCYTES 19 12 - 49 %    MONOCYTES 5 5 - 13 %    EOSINOPHILS 3 0 - 7 %    BASOPHILS 0 0 - 1 %    IMMATURE GRANULOCYTES 0 0.0 - 0.5 %    ABS. NEUTROPHILS 4.9 1.8 - 8.0 K/UL    ABS. LYMPHOCYTES 1.3 0.8 - 3.5 K/UL    ABS. MONOCYTES 0.4 0.0 - 1.0 K/UL    ABS. EOSINOPHILS 0.2 0.0 - 0.4 K/UL    ABS. BASOPHILS 0.0 0.0 - 0.1 K/UL    ABS. IMM. GRANS. 0.0 0.00 - 0.04 K/UL    DF AUTOMATED     METABOLIC PANEL, BASIC    Collection Time: 11/25/20  8:00 AM   Result Value Ref Range    Sodium 134 (L) 136 - 145 mmol/L    Potassium 3.7 3.5 - 5.1 mmol/L    Chloride 95 (L) 97 - 108 mmol/L    CO2 32 21 - 32 mmol/L    Anion gap 7 5 - 15 mmol/L    Glucose 91 65 - 100 mg/dL    BUN 39 (H) 6 - 20 mg/dL    Creatinine 1.74 (H) 0.55 - 1.02 mg/dL    BUN/Creatinine ratio 22 (H) 12 - 20      GFR est AA 37 (L) >60 ml/min/1.73m2    GFR est non-AA 30 (L) >60 ml/min/1.73m2    Calcium 9.5 8.5 - 10.1 mg/dL       Radiology review: CT chest    Assessment:   1. Acute on chronic hypoxic respiratory failure/pleural effusions  2. Acute on chronic diastolic CHF exacerbation  3. Hypertension  4. Seizure disorder  5. COPD  6. Hyperlipidemia  7. Hypothyroidism  8. Depression anxiety  9. Acute on chronic anemia   10. Renal artery stenosis high-grade    Plan:    1. BNP elevated greater than 13,000. Cardiology consulted. She had a 2D echo 2 months ago which showed normal EF. Heart failure precipitated by severe hypertension, systolic greater than 544 on  IV Lasix 40 mg  daily. Fluid restriction. Vascular surgery consulted. Renal artery stenosis right and probable occlusion of the left. Most likely etiology for hypertensive crisis    2. Oxygen saturation is within normal limits on 2-4 L. Covid test NEGATIVE. CTA of the chest shows fluid overload with pleural effusion  Thoracentesis left performed, right today    3. Blood pressure is elevated.   Was given topical Nitropaste in the emergency room. Continue home medications of hydralazine, Procardia, labetalol, isordil, lasix. Most likely  due to high-grade renal artery stenosis    4. Continue with Keppra and Tegretol    5. Continue with DuoNeb's. 6.  Continue Lipitor     7. Continue with Synthroid    8. She is on Zoloft and xanax as she takes this at home. 7.  Stable HGB    8. Hypokalemia changed  K+ supplementation 40meq to daily rather than every 8 hours, continue to monitor    Daily labs    CODE STATUS full     DVT prophylaxis: heparin  Ulcer prophylaxis: protonix    Care Plan discussed with: Patient/Family and     Total time spent with patient: 36 minutes. Dr. Perri Downing hospitalist updated 11/24  Spoke with Dr. Radha Andrade, hospitalist, at [facility]. He has accepted the patient  with anticipation of renal angiogram with possible intervention on Monday by vascular surgeon Dr. Beronica Teague.   Discussed this with the patient and her significant other at bedside

## 2020-11-25 NOTE — PROGRESS NOTES
Physician Progress Note      Reymundo Patel  CSN #:                  [id number]  :                       1965  ADMIT DATE:       2020 7:40 PM  DISCH DATE:  RESPONDING  PROVIDER #:        Lio SANZ PA-C          QUERY TEXT:    Dear Mr. Oscar Beck:    Pt admitted with acute respiratory failure, acute CHF and has malnutrition documented in nutrition assessment completed 2020. Please further specify type of malnutrition with documentation in the medical record. The medical record reflects the following:  Risk Factors: 54 F admitted with acute on chronic respiratory and heat failure, multiple comorbidities  Clinical Indicators: nutrition assessment notes-Energy Intake 75% or less est energy requirements for 1 month or longer(intakes slowly decreasing to   50%), Mild body fat loss,Mild muscle mass loss, albumin 3.0, total protein 6.8, BMI 19.77  Treatment: Ensure Enlive BID, snacks, cardiac diet, nutrition consult, labs    Thank you,  CORAL DanielsN, RN  Clinical   [phone]  Options provided:  -- Mild Malnutrition  -- Moderate Malnutrition  -- Severe Malnutrition  -- Mild Protein calorie malnutrition  -- Moderate Protein calorie malnutrition  -- Severe Protein calorie malnutrition  -- Other - I will add my own diagnosis  -- Disagree - Not applicable / Not valid  -- Disagree - Clinically unable to determine / Unknown  -- Refer to Clinical Documentation Reviewer    PROVIDER RESPONSE TEXT:    This patient has mild malnutrition.     Query created by: Juarez Westbrook on 2020 11:50 AM      Electronically signed by:  Reynaldo De La Torre PA-C 2020 2:09 PM

## 2020-11-25 NOTE — PROGRESS NOTES
Bedside shift change report given to Ashley Lr RN (oncoming nurse) by Ingrid Jesus RN (offgoing nurse). Report included the following information SBAR, Procedure Summary, Intake/Output, MAR and Recent Results.

## 2020-11-26 VITALS
HEART RATE: 98 BPM | DIASTOLIC BLOOD PRESSURE: 90 MMHG | HEIGHT: 65 IN | WEIGHT: 118.83 LBS | TEMPERATURE: 98.2 F | BODY MASS INDEX: 19.8 KG/M2 | SYSTOLIC BLOOD PRESSURE: 150 MMHG | OXYGEN SATURATION: 95 % | RESPIRATION RATE: 18 BRPM

## 2020-11-26 LAB
ANION GAP SERPL CALC-SCNC: 8 MMOL/L (ref 5–15)
BASOPHILS # BLD: 0 K/UL (ref 0–0.1)
BASOPHILS NFR BLD: 0 % (ref 0–1)
BUN SERPL-MCNC: 33 MG/DL (ref 6–20)
BUN/CREAT SERPL: 23 (ref 12–20)
CA-I BLD-MCNC: 9.5 MG/DL (ref 8.5–10.1)
CHLORIDE SERPL-SCNC: 94 MMOL/L (ref 97–108)
CO2 SERPL-SCNC: 33 MMOL/L (ref 21–32)
CREAT SERPL-MCNC: 1.42 MG/DL (ref 0.55–1.02)
DIFFERENTIAL METHOD BLD: ABNORMAL
EOSINOPHIL # BLD: 0.2 K/UL (ref 0–0.4)
EOSINOPHIL NFR BLD: 2 % (ref 0–7)
ERYTHROCYTE [DISTWIDTH] IN BLOOD BY AUTOMATED COUNT: 14.4 % (ref 11.5–14.5)
GLUCOSE SERPL-MCNC: 123 MG/DL (ref 65–100)
HCT VFR BLD AUTO: 28.9 % (ref 35–47)
HGB BLD-MCNC: 9.2 G/DL (ref 11.5–16)
IMM GRANULOCYTES # BLD AUTO: 0 K/UL (ref 0–0.04)
IMM GRANULOCYTES NFR BLD AUTO: 0 % (ref 0–0.5)
LYMPHOCYTES # BLD: 1.1 K/UL (ref 0.8–3.5)
LYMPHOCYTES NFR BLD: 14 % (ref 12–49)
MCH RBC QN AUTO: 31.2 PG (ref 26–34)
MCHC RBC AUTO-ENTMCNC: 31.8 G/DL (ref 30–36.5)
MCV RBC AUTO: 98 FL (ref 80–99)
MONOCYTES # BLD: 0.4 K/UL (ref 0–1)
MONOCYTES NFR BLD: 5 % (ref 5–13)
NEUTS SEG # BLD: 6.7 K/UL (ref 1.8–8)
NEUTS SEG NFR BLD: 79 % (ref 32–75)
PLATELET # BLD AUTO: 354 K/UL (ref 150–400)
PMV BLD AUTO: 9.7 FL (ref 8.9–12.9)
POTASSIUM SERPL-SCNC: 3.3 MMOL/L (ref 3.5–5.1)
RBC # BLD AUTO: 2.95 M/UL (ref 3.8–5.2)
SODIUM SERPL-SCNC: 135 MMOL/L (ref 136–145)
WBC # BLD AUTO: 8.4 K/UL (ref 3.6–11)

## 2020-11-26 PROCEDURE — 36415 COLL VENOUS BLD VENIPUNCTURE: CPT

## 2020-11-26 PROCEDURE — 77010033678 HC OXYGEN DAILY

## 2020-11-26 PROCEDURE — 94762 N-INVAS EAR/PLS OXIMTRY CONT: CPT

## 2020-11-26 PROCEDURE — 74011250637 HC RX REV CODE- 250/637: Performed by: FAMILY MEDICINE

## 2020-11-26 PROCEDURE — 74011250637 HC RX REV CODE- 250/637: Performed by: PHYSICIAN ASSISTANT

## 2020-11-26 PROCEDURE — 74011250637 HC RX REV CODE- 250/637: Performed by: INTERNAL MEDICINE

## 2020-11-26 PROCEDURE — 65270000029 HC RM PRIVATE

## 2020-11-26 PROCEDURE — 74011000250 HC RX REV CODE- 250: Performed by: INTERNAL MEDICINE

## 2020-11-26 PROCEDURE — 94640 AIRWAY INHALATION TREATMENT: CPT

## 2020-11-26 PROCEDURE — 74011250636 HC RX REV CODE- 250/636: Performed by: PHYSICIAN ASSISTANT

## 2020-11-26 PROCEDURE — 74011250637 HC RX REV CODE- 250/637: Performed by: NURSE PRACTITIONER

## 2020-11-26 PROCEDURE — 74011250636 HC RX REV CODE- 250/636: Performed by: FAMILY MEDICINE

## 2020-11-26 PROCEDURE — 80048 BASIC METABOLIC PNL TOTAL CA: CPT

## 2020-11-26 PROCEDURE — 85025 COMPLETE CBC W/AUTO DIFF WBC: CPT

## 2020-11-26 PROCEDURE — 94760 N-INVAS EAR/PLS OXIMETRY 1: CPT

## 2020-11-26 RX ADMIN — ISOSORBIDE DINITRATE 40 MG: 20 TABLET ORAL at 16:49

## 2020-11-26 RX ADMIN — Medication 10 ML: at 14:00

## 2020-11-26 RX ADMIN — HYDRALAZINE HYDROCHLORIDE 100 MG: 50 TABLET, FILM COATED ORAL at 08:08

## 2020-11-26 RX ADMIN — HYDRALAZINE HYDROCHLORIDE 100 MG: 50 TABLET, FILM COATED ORAL at 21:00

## 2020-11-26 RX ADMIN — PANTOPRAZOLE SODIUM 40 MG: 40 TABLET, DELAYED RELEASE ORAL at 08:08

## 2020-11-26 RX ADMIN — LEVOTHYROXINE SODIUM 25 MCG: 0.03 TABLET ORAL at 06:32

## 2020-11-26 RX ADMIN — MORPHINE SULFATE 2 MG: 2 INJECTION, SOLUTION INTRAMUSCULAR; INTRAVENOUS at 16:53

## 2020-11-26 RX ADMIN — BUDESONIDE AND FORMOTEROL FUMARATE DIHYDRATE 2 PUFF: 160; 4.5 AEROSOL RESPIRATORY (INHALATION) at 19:42

## 2020-11-26 RX ADMIN — LABETALOL HYDROCHLORIDE 400 MG: 200 TABLET, FILM COATED ORAL at 21:00

## 2020-11-26 RX ADMIN — SALINE NASAL SPRAY 2 SPRAY: 1.5 SOLUTION NASAL at 12:27

## 2020-11-26 RX ADMIN — MORPHINE SULFATE 2 MG: 2 INJECTION, SOLUTION INTRAMUSCULAR; INTRAVENOUS at 20:58

## 2020-11-26 RX ADMIN — BUDESONIDE AND FORMOTEROL FUMARATE DIHYDRATE 2 PUFF: 160; 4.5 AEROSOL RESPIRATORY (INHALATION) at 09:59

## 2020-11-26 RX ADMIN — Medication 10 ML: at 06:33

## 2020-11-26 RX ADMIN — LABETALOL HYDROCHLORIDE 400 MG: 200 TABLET, FILM COATED ORAL at 16:49

## 2020-11-26 RX ADMIN — CARBAMAZEPINE 200 MG: 200 TABLET ORAL at 08:08

## 2020-11-26 RX ADMIN — CARBAMAZEPINE 200 MG: 200 TABLET ORAL at 21:00

## 2020-11-26 RX ADMIN — IPRATROPIUM BROMIDE AND ALBUTEROL SULFATE 3 ML: .5; 3 SOLUTION RESPIRATORY (INHALATION) at 19:42

## 2020-11-26 RX ADMIN — HYDRALAZINE HYDROCHLORIDE 100 MG: 50 TABLET, FILM COATED ORAL at 16:48

## 2020-11-26 RX ADMIN — LEVETIRACETAM 500 MG: 500 TABLET ORAL at 21:00

## 2020-11-26 RX ADMIN — ATORVASTATIN CALCIUM 20 MG: 20 TABLET, FILM COATED ORAL at 21:00

## 2020-11-26 RX ADMIN — ALPRAZOLAM 0.25 MG: 0.5 TABLET ORAL at 20:58

## 2020-11-26 RX ADMIN — IPRATROPIUM BROMIDE AND ALBUTEROL SULFATE 3 ML: .5; 3 SOLUTION RESPIRATORY (INHALATION) at 14:15

## 2020-11-26 RX ADMIN — SALINE NASAL SPRAY 2 SPRAY: 1.5 SOLUTION NASAL at 06:32

## 2020-11-26 RX ADMIN — ISOSORBIDE DINITRATE 40 MG: 20 TABLET ORAL at 08:08

## 2020-11-26 RX ADMIN — SERTRALINE HYDROCHLORIDE 25 MG: 25 TABLET ORAL at 08:07

## 2020-11-26 RX ADMIN — FOLIC ACID 1 MG: 1 TABLET ORAL at 08:08

## 2020-11-26 RX ADMIN — FUROSEMIDE 40 MG: 10 INJECTION, SOLUTION INTRAMUSCULAR; INTRAVENOUS at 08:09

## 2020-11-26 RX ADMIN — LABETALOL HYDROCHLORIDE 400 MG: 200 TABLET, FILM COATED ORAL at 08:08

## 2020-11-26 RX ADMIN — NIFEDIPINE 90 MG: 30 TABLET, FILM COATED, EXTENDED RELEASE ORAL at 08:07

## 2020-11-26 RX ADMIN — IPRATROPIUM BROMIDE AND ALBUTEROL SULFATE 3 ML: .5; 3 SOLUTION RESPIRATORY (INHALATION) at 09:59

## 2020-11-26 RX ADMIN — NORTRIPTYLINE HYDROCHLORIDE 50 MG: 25 CAPSULE ORAL at 21:02

## 2020-11-26 RX ADMIN — POTASSIUM CHLORIDE 40 MEQ: 750 TABLET, FILM COATED, EXTENDED RELEASE ORAL at 08:06

## 2020-11-26 RX ADMIN — ALPRAZOLAM 0.25 MG: 0.5 TABLET ORAL at 12:35

## 2020-11-26 RX ADMIN — LEVETIRACETAM 500 MG: 500 TABLET ORAL at 08:07

## 2020-11-26 RX ADMIN — MORPHINE SULFATE 2 MG: 2 INJECTION, SOLUTION INTRAMUSCULAR; INTRAVENOUS at 08:29

## 2020-11-26 RX ADMIN — FERROUS SULFATE TAB 325 MG (65 MG ELEMENTAL FE) 325 MG: 325 (65 FE) TAB at 08:08

## 2020-11-26 RX ADMIN — LORATADINE 10 MG: 10 TABLET ORAL at 08:08

## 2020-11-26 RX ADMIN — SALINE NASAL SPRAY 2 SPRAY: 1.5 SOLUTION NASAL at 00:00

## 2020-11-26 RX ADMIN — ONDANSETRON 4 MG: 2 INJECTION INTRAMUSCULAR; INTRAVENOUS at 20:58

## 2020-11-26 RX ADMIN — Medication 10 ML: at 21:05

## 2020-11-26 NOTE — PROGRESS NOTES
Hospitalist Progress Note    Subjective:   Daily Progress Note: 11/26/2020 8:36 AM    Hospital Course: Patient is a 42-year-old female who presented to the ER on 11/18/2020 for increased shortness of breath.  Patient has a history of hypertension, COPD, chronic hypoxic respiratory failure on supplemental oxygen 3-5 liters at home, diastolic CHF.  She was recently discharged on 11/4/2020 for similar complaints.  2D echo in September 2020 showed normal ejection fraction with mild mitral regurgitation and mild tricuspid regurgitation.  Patient started having shortness of breath with a white productive cough for 24 hours.  It was associated with wheezing.  No fevers.  In the ED patient's respirations were 25 and she was found to be hypoxic at 87% on 5 L oxygen nasal cannula.  Blood pressure elevated 192/106.  Chest x-ray showed cardiomegaly with mild bilateral pleural effusions.  CTA of the chest confirmed this and no obvious PE noted.  [place] at [address]. Ashley Abarca was placed on BiPAP and given IV Solu-Medrol and duo nebs and IV Lasix.  She was started on IV Lasix 40 mg twice daily and cardiology consulted. Vascular surgery consulted for renal artery stenosis work up.  Renal ultrasound showed right kidney measuring 10.6 cm and left kidney measuring 8.8 cm with elevated right renal artery peak velocity at 327 cm/s with left renal artery systolic velocity only 56 cm/s with a blunting waveform and very low amplitude mostly likely occluded.  Per vascular surgery recommend [facility] for possible renal artery stent due to high-grade stenosis of the right renal artery and complicated with juxtarenal aortic calcification with stenosis.  Spoke with hospitalist team at [address] [place] [address] has accepted the patient.  Vascular surgery Dr. Yasmin Goins will perform the procedure. HGB was 7.6 and trasfused 1 unit. HGB is 9.2. Potassium was supplemented and WNL on 11/21/2020. Stable for transfer to [address] [place] [address]. She is COVID NEGATIVE.  Awaiting bed availability. SOB secondary to CHF exacerbation for hypertensive urgency. Thoracentesis left side, 11/24 and Right sided thoracentesis on 11/25. Subjective: Says she had some coughing and wheezing this morning. Had some chest pain due to the throacenntesis.      Current Facility-Administered Medications   Medication Dose Route Frequency    furosemide (LASIX) injection 40 mg  40 mg IntraVENous DAILY    ALPRAZolam (XANAX) tablet 0.25 mg  0.25 mg Oral TID PRN    nortriptyline (PAMELOR) capsule 50 mg  50 mg Oral QHS    potassium chloride SR (KLOR-CON 10) tablet 40 mEq  40 mEq Oral DAILY    morphine injection 2 mg  2 mg IntraVENous Q4H PRN    sodium chloride (OCEAN) 0.65 % nasal squeeze bottle 2 Spray  2 Spray Both Nostrils Q6H    NIFEdipine ER (PROCARDIA XL) tablet 90 mg  90 mg Oral DAILY    pantoprazole (PROTONIX) tablet 40 mg  40 mg Oral ACB    budesonide-formoteroL (SYMBICORT) 160-4.5 mcg/actuation HFA inhaler 2 Puff  2 Puff Inhalation BID RT    0.9% sodium chloride infusion 250 mL  250 mL IntraVENous PRN    folic acid (FOLVITE) tablet 1 mg  1 mg Oral DAILY    ferrous sulfate tablet 325 mg  325 mg Oral ACB    hydrALAZINE (APRESOLINE) tablet 100 mg  100 mg Oral TID    isosorbide dinitrate (ISORDIL) tablet 40 mg  40 mg Oral TID    labetaloL (NORMODYNE) tablet 400 mg  400 mg Oral TID    levETIRAcetam (KEPPRA) tablet 500 mg  500 mg Oral BID    levothyroxine (SYNTHROID) tablet 25 mcg  25 mcg Oral ACB    sertraline (ZOLOFT) tablet 25 mg  25 mg Oral DAILY    atorvastatin (LIPITOR) tablet 20 mg  20 mg Oral QHS    carBAMazepine (TEGretol) tablet 200 mg  200 mg Oral BID    traMADoL (ULTRAM) tablet 50 mg  50 mg Oral Q6H PRN    loratadine (CLARITIN) tablet 10 mg  10 mg Oral DAILY    albuterol-ipratropium (DUO-NEB) 2.5 MG-0.5 MG/3 ML  3 mL Nebulization Q6HWA RT    sodium chloride (NS) flush 5-40 mL  5-40 mL IntraVENous Q8H    sodium chloride (NS) flush 5-40 mL  5-40 mL IntraVENous PRN    acetaminophen (TYLENOL) tablet 650 mg  650 mg Oral Q6H PRN    Or    acetaminophen (TYLENOL) suppository 650 mg  650 mg Rectal Q6H PRN    polyethylene glycol (MIRALAX) packet 17 g  17 g Oral DAILY PRN    promethazine (PHENERGAN) tablet 12.5 mg  12.5 mg Oral Q6H PRN    Or    ondansetron (ZOFRAN) injection 4 mg  4 mg IntraVENous Q6H PRN        Review of Systems  Constitutional: No fevers, No chills, No sweats, + fatigue, + Weakness  Eyes: No redness  Ears, nose, mouth, throat, and face: ++nasal congestion, No sore throat, No voice change  Respiratory: + Shortness of Breath, ++cough, No wheezing  Cardiovascular:No chest pain, No palpitations, No extremity edema  Gastrointestinal: No nausea, No vomiting, No diarrhea, No abdominal pain  Genitourinary: No frequency, No dysuria, No hematuria  Integument/breast: No skin lesion(s)   Neurological: No Confusion, No headaches, No dizziness      Objective:     Visit Vitals  BP (!) 165/99 (BP 1 Location: Right arm, BP Patient Position: At rest)   Pulse 100   Temp 98.5 °F (36.9 °C)   Resp 17   Ht 5' 5\" (1.651 m)   Wt 53.9 kg (118 lb 13.3 oz)   SpO2 90%   Breastfeeding No   BMI 19.77 kg/m²    O2 Flow Rate (L/min): 3 l/min O2 Device: Nasal cannula    Temp (24hrs), Av.8 °F (37.1 °C), Min:98.4 °F (36.9 °C), Max:99.3 °F (37.4 °C)      No intake/output data recorded. 1 -  0700  In: 300 [P.O.:300]  Out: 1520 [Urine:1520]    PHYSICAL EXAM:  Constitutional: No acute distress  Skin: Extremities and face reveal no rashes. HEENT: Sclerae anicteric. Extra-occular muscles are intact. No oral ulcers. Cardiovascular: Regular rate and rhythm. Respiratory:  Nonlabored, diminsihed   GI: Abdomen nondistended, soft, and nontender. Normal active bowel sounds. Musculoskeletal: No pitting edema of the lower legs. Able to move all ext  Neurological:  Patient is alert and oriented.  Cranial nerves II-XII grossly intact  Psychiatric: Mood appears appropriate       Data Review    No results found for this or any previous visit (from the past 24 hour(s)). Radiology review: CT chest    Assessment:   1. Acute on chronic hypoxic respiratory failure/pleural effusions  2. Acute on chronic diastolic CHF exacerbation  3. Hypertension  4. Seizure disorder  5. COPD  6. Hyperlipidemia  7. Hypothyroidism  8. Depression anxiety  9. Acute on chronic anemia   10. Renal artery stenosis high-grade    Plan:    1. BNP elevated greater than 13,000. Cardiology consulted. She had a 2D echo 2 months ago which showed normal EF. Heart failure precipitated by severe hypertension, systolic greater than 731 on  IV Lasix 40 mg  daily. Fluid restriction. Vascular surgery consulted. Renal artery stenosis right and probable occlusion of the left. Most likely etiology for hypertensive crisis    2. Oxygen saturation is within normal limits on 2-4 L. Covid test NEGATIVE. CTA of the chest shows fluid overload with pleural effusion  Thoracentesis BL performed     3. Blood pressure is elevated. Was given topical Nitropaste in the emergency room. Continue home medications of hydralazine, Procardia, labetalol, isordil, lasix. Most likely  due to high-grade renal artery stenosis    4. Continue with Keppra and Tegretol    5. Continue with DuoNeb's. 6.  Continue Lipitor     7. Continue with Synthroid    8. She is on Zoloft and xanax as she takes this at home. 7.  HGB pending for today    8. Hypokalemia changed  K+ supplementation 40meq to daily rather than every 8 hours, continue to monitor    Daily labs    CODE STATUS full     DVT prophylaxis: heparin  Ulcer prophylaxis: protonix    Care Plan discussed with: Patient/Family and     Total time spent with patient: 34minutes. Dr. Nehemias Sagastume hospitalist updated 11/24  Spoke with Dr. Malika Dockery, hospitalist, at [facility].   He has accepted the patient  with anticipation of renal angiogram with possible intervention by vascular surgeon Dr. Benjamin Burk.

## 2020-11-26 NOTE — PROGRESS NOTES
Problem: Pressure Injury - Risk of  Goal: *Prevention of pressure injury  Description: Document Rivas Scale and appropriate interventions in the flowsheet. Outcome: Progressing Towards Goal  Note: Pressure Injury Interventions: Activity Interventions: Increase time out of bed         Nutrition Interventions: Document food/fluid/supplement intake    Friction and Shear Interventions: Apply protective barrier, creams and emollients                Problem: Falls - Risk of  Goal: *Absence of Falls  Description: Document Sarah Breen Fall Risk and appropriate interventions in the flowsheet.   Note: Fall Risk Interventions:            Medication Interventions: Patient to call before getting OOB

## 2020-11-26 NOTE — PROGRESS NOTES
CARDIOLOGY PROGRESS NOTE - NP     Patient seen and examined. This is a patient who is followed for uncontrolled hypertension in the setting of renal artery stenosis. Plans for [facility] awaiting bed, in preparation for stenting to renal artery. Patient denies chest pain or discomfort. Shortness of breath has improved with thoracentesis. Remains on oxygen supplementation. No other complaints reported. Telemetry reviewed, there were no events noted in the past 24 hours. Remains in normal sinus rhythm 90 with no ectopy. Pertinent review of systems items noted above, all other systems are negative. Current medications reviewed. Physical Examination  Vital signs are stable. Blood pressure 157/87, Pulse 93  No apparent distress  Heart is regular, rate and rhythm. Normal S1, S2, no murmurs are appreciated. Lungs are diminished in the bases, no wheezing noted. Abdomen is soft, nontender, normal bowel sounds. Extremities have no edema. Labs reviewed. Case discussed with Dr. Jeannie Alan and our impression and recommendations are as follows:     1. Acute on chronic heart failure:  S/p thoracentesis, 1560 mL removed. Continue IV lasix 40 BID as she continues with adequate diuresis, 900 mL this morning. Continue current medications. Please document Strict I&Os. 2. Mitral valve regurgitation: This has improved to mild from severe per recent CLAUDIA. EF preserved. Continue diuresis as stated above. Limited symptoms at rest.      3. Hypertension: Uncontrolled on multiple BP medications as an outpatient. Continue Hydralazine 100mg three times daily,  labetolol to 400mg TID and nifedipine to 90mg daily. Continue to monitor renal function. 4. Renal artery stenosis: Plans for transfer to [facility] for stenting as stated. 5. Hyperlipidemia: Continue with statin therapy. 6. Symptomatic anemia: Has required PRBCs this admission. Hgb stable.  Continue to monitor     We will plan to follow-up with pt upon d/c from [facility]. Please do not hesitate to call me or Dr. Archie Martins if additional questions arise.

## 2020-11-26 NOTE — PROGRESS NOTES
Spoke with Sangita Goncalves at the transfer center. Bed assignment continues to be pending.   ANIKET Kyle is aware

## 2020-11-26 NOTE — PROGRESS NOTES
Bedside shift change report given to Zehra Casiano RN (oncoming nurse) by Rose Marie Villareal RN (offgoing nurse). Report included the following information SBAR, Procedure Summary, Intake/Output, MAR and Recent Results.

## 2020-11-26 NOTE — DISCHARGE SUMMARY
Hospitalist Discharge Summary     Patient ID:    Lidya Mcginnis  [id number]  85 y.o.  1965    Admit date: 11/18/2020    Discharge date : 11/26/2020    Chronic Diagnoses:    Problem List as of 11/26/2020 Date Reviewed: 10/27/2020          Codes Class Noted - Resolved    Respiratory failure with hypoxia [facility]) ICD-10-CM: J96.91  ICD-9-CM: 518.81  11/18/2020 - Present        CHF exacerbation (Lily Mayess) ICD-10-CM: I50.9  ICD-9-CM: 428.0  11/2/2020 - Present        CHF (congestive heart failure) (Lily Mayess) ICD-10-CM: I50.9  ICD-9-CM: 428.0  10/2/2020 - Present    Overview Signed 10/2/2020  8:40 AM by Tim Phelan MD     With preserved lvef, diastolic dysfunction, mitral valve regurgitation moderate. Mitral valve regurgitation ICD-10-CM: I34.0  ICD-9-CM: 424.0  10/2/2020 - Present        PEDRO PABLO (acute kidney injury) (Lilykenneth Charlton) ICD-10-CM: N17.9  ICD-9-CM: 584.9  10/2/2020 - Present        Anxiety (Chronic) ICD-10-CM: F41.9  ICD-9-CM: 300.00  10/2/2020 - Present        Seizure disorder (HCC) (Chronic) ICD-10-CM: G40.909  ICD-9-CM: 345.90  10/2/2020 - Present        COPD (chronic obstructive pulmonary disease) with emphysema (HCC) (Chronic) ICD-10-CM: J43.9  ICD-9-CM: 492.8  10/2/2020 - Present        Dysphagia ICD-10-CM: R13.10  ICD-9-CM: 787.20  10/2/2020 - Present        Iron deficiency anemia (Chronic) ICD-10-CM: D50.9  ICD-9-CM: 280.9  10/2/2020 - Present        Hypokalemia ICD-10-CM: E87.6  ICD-9-CM: 276.8  10/2/2020 - Present        Acquired hypothyroidism (Chronic) ICD-10-CM: E03.9  ICD-9-CM: 244.9  10/2/2020 - Present        Acute respiratory failure (HCC) ICD-10-CM: J96.00  ICD-9-CM: 518.81  9/21/2020 - Present        Resistant hypertension ICD-10-CM: I10  ICD-9-CM: 401.9  9/21/2020 - Present        Renal artery stenosis (Lily Charlton) ICD-10-CM: I70.1  ICD-9-CM: 440.1  9/21/2020 - Present              Final Diagnoses:   1. Acute on chronic hypoxic respiratory failure/pleural effusions  2. Acute on chronic diastolic CHF exacerbation  3. Hypertension  4. Seizure disorder  5. COPD  6. Hyperlipidemia  7. Hypothyroidism  8. Depression anxiety  9. Acute on chronic anemia   10. Renal artery stenosis high-grade    Reason for Hospitalization: SOB       Hospital Course: Patient is a 41-year-old female who presented to the ER on 11/18/2020 for increased shortness of breath. Raymond Malone has a history of hypertension, COPD, chronic hypoxic respiratory failure on supplemental oxygen 3-5 liters at home, diastolic CHF.  She was recently discharged on 11/4/2020 for similar complaints.  2D echo in September 2020 showed normal ejection fraction with mild mitral regurgitation and mild tricuspid regurgitation.  Patient started having shortness of breath with a white productive cough for 24 hours.  It was associated with wheezing.  No fevers.  In the ED patient's respirations were 25 and she was found to be hypoxic at 87% on 5 L oxygen nasal cannula.  Blood pressure elevated 192/106.  Chest x-ray showed cardiomegaly with mild bilateral pleural effusions.  CTA of the chest confirmed this and no obvious PE noted.  [place] [address]. Gilberto Washington was placed on BiPAP and given IV Solu-Medrol and duo nebs and IV Lasix.  She was started on IV Lasix 40 mg twice daily and cardiology consulted.  Vascular surgery consulted for renal artery stenosis work up.  Renal ultrasound showed right kidney measuring 10.6 cm and left kidney measuring 8.8 cm with elevated right renal artery peak velocity at 327 cm/s with left renal artery systolic velocity only 56 cm/s with a blunting waveform and very low amplitude mostly likely occluded.  Per vascular surgery recommend transfer to [facility] for possible renal artery stent due to high-grade stenosis of the right renal artery and complicated with juxtarenal aortic calcification with stenosis.  Spoke with hospitalist team at [facility] has accepted the patient.  Vascular surgery Dr. Wei  will perform the procedure. HGB was 7.6 and trasfused 1 unit. HGB is 9.2. Potassium was supplemented and WNL on 11/21/2020. Stable for transfer to [facility]. She is COVID NEGATIVE. Awaiting bed availability. SOB secondary to CHF exacerbation for hypertensive urgency. Thoracentesis left side, 11/24 and Right sided thoracentesis on 11/25. Total of 760mL drained. Discharge Medications:   Current Discharge Medication List      START taking these medications    Details   furosemide (LASIX) 10 mg/mL injection 4 mL by IntraVENous route every twelve (12) hours. Qty: 1 Vial, Refills: 0         CONTINUE these medications which have NOT CHANGED    Details   Trelegy Ellipta 100-62.5-25 mcg inhaler       albuterol (PROVENTIL HFA, VENTOLIN HFA, PROAIR HFA) 90 mcg/actuation inhaler       rosuvastatin (CRESTOR) 10 mg tablet       hydrALAZINE (APRESOLINE) 100 mg tablet       potassium chloride (K-DUR, KLOR-CON) 20 mEq tablet Take 1 Tab by mouth daily. Qty: 30 Tab, Refills: 0      NIFEdipine ER (PROCARDIA XL) 60 mg ER tablet Take 1 Tab by mouth daily. Oxygen 5 Devices as needed. Pt wears 5LPM as needed- states she uses it after an axiety attack      isosorbide dinitrate (ISORDIL) 40 mg tablet Take 1 Tab by mouth three (3) times daily. Qty: 90 Tab, Refills: 0      albuterol-ipratropium (DUO-NEB) 2.5 mg-0.5 mg/3 ml nebu 3 mL by Nebulization route every six (6) hours. Qty: 120 Nebule, Refills: 0      labetaloL (NORMODYNE) 200 mg tablet Take 2 Tabs by mouth three (3) times daily. Qty: 180 Tab, Refills: 0      ALPRAZolam (XANAX) 0.25 mg tablet Take 0.25 mg by mouth three (3) times daily as needed for Anxiety. sertraline (Zoloft) 25 mg tablet Take 25 mg by mouth daily. levothyroxine (SYNTHROID) 25 mcg tablet Take 25 mcg by mouth Daily (before breakfast). levETIRAcetam (Keppra) 500 mg tablet Take 500 mg by mouth two (2) times a day. carBAMazepine, mood stabiliz, 200 mg CM12 Take 200 mg by mouth two (2) times a day. ferrous sulfate 325 mg (65 mg iron) tablet Take 325 mg by mouth Daily (before breakfast). folic acid (FOLVITE) 1 mg tablet Take 1 mg by mouth daily. aspirin delayed-release 81 mg tablet Take  by mouth daily. magnesium oxide (MAG-OX) 400 mg tablet Take 400 mg by mouth daily. STOP taking these medications       hydroCHLOROthiazide (MICROZIDE) 12.5 mg capsule Comments:   Reason for Stopping:         atorvastatin (LIPITOR) 80 mg tablet Comments:   Reason for Stopping:         bumetanide (BUMEX) 1 mg tablet Comments:   Reason for Stopping: Follow up Care:    Jairo Pandya in 1-2 weeks. Follow-up Information     Follow up With Specialties Details Why [address] [facility], [facility] In 3 weeks  [address]  [place] [address]  [phone]              Patient Follow Up Instructions: Activity: Activity as tolerated  Diet:  Cardiac Diet    Condition at Discharge:  Stable  __________________________________________________________________    Disposition  TRANSFER TO [facility]  ____________________________________________________________________    Code Status: Full Code  ___________________________________________________________________    Discharge Exam:  Patient seen and examined by me on discharge day. Pertinent Findings:  Gen:    Not in distress  Chest: Nonlabored, diminished  CVS:   Regular rhythm.   No edema  Abd:  Soft, not distended, not tender  Neuro:  Alert    CONSULTATIONS: Cardiology, Vascular, and IR     Significant Diagnostic Studies:   Recent Labs     11/26/20  0910 11/25/20  0800   WBC 8.4 6.7   HGB 9.2* 8.8*   HCT 28.9* 27.3*    339     Recent Labs     11/26/20  0910 11/25/20  0800 11/24/20  1703   * 134* 137   K 3.3* 3.7 4.4   CL 94* 95* 100   CO2 33* 32 32   BUN 33* 39* 38*   CREA 1.42* 1.74* 1.52*   * 91 92   CA 9.5 9.5 9.3     No results for input(s): ALT, AP, TBIL, TBILI, TP, ALB, GLOB, GGT, AML, LPSE in the last 72 hours. No lab exists for component: SGOT, GPT, AMYP, HLPSE  Recent Labs     11/24/20  1703   INR 1.0   PTP 13.0   APTT 31.4      No results for input(s): FE, TIBC, PSAT, FERR in the last 72 hours. No results for input(s): PH, PCO2, PO2 in the last 72 hours. No results for input(s): CPK, CKMB in the last 72 hours.     No lab exists for component: TROPONINI  Lab Results   Component Value Date/Time    Glucose (POC) 166 (H) 11/04/2020 05:01 PM         Total Time: >30 min     Signed:  Priscilla Prieto PA-C  11/26/2020  4:35 PM

## 2020-11-26 NOTE — PROGRESS NOTES
Problem: Pressure Injury - Risk of  Goal: *Prevention of pressure injury  Description: Document Rivas Scale and appropriate interventions in the flowsheet. Outcome: Progressing Towards Goal  Note: Pressure Injury Interventions: Activity Interventions: (P) Increase time out of bed         Nutrition Interventions: (P) Document food/fluid/supplement intake    Friction and Shear Interventions: Apply protective barrier, creams and emollients, Foam dressings/transparent film/skin sealants, Lift sheet, Minimize layers                Problem: Patient Education: Go to Patient Education Activity  Goal: Patient/Family Education  Outcome: Progressing Towards Goal     Problem: Discharge Planning  Goal: *Discharge to safe environment  Outcome: Progressing Towards Goal  Goal: *Knowledge of medication management  Outcome: Progressing Towards Goal  Goal: *Knowledge of discharge instructions  Outcome: Progressing Towards Goal     Problem: Patient Education: Go to Patient Education Activity  Goal: Patient/Family Education  Outcome: Progressing Towards Goal     Problem: Falls - Risk of  Goal: *Absence of Falls  Description: Document Nikkie Fall Risk and appropriate interventions in the flowsheet.   Outcome: Progressing Towards Goal  Note: Fall Risk Interventions:            Medication Interventions: Patient to call before getting OOB, Teach patient to arise slowly                   Problem: Patient Education: Go to Patient Education Activity  Goal: Patient/Family Education  Outcome: Progressing Towards Goal     Problem: Nutrition Deficit  Goal: *Optimize nutritional status  Outcome: Progressing Towards Goal

## 2020-11-27 NOTE — PROGRESS NOTES
Patient transferred to [facility] for further management, she left the unit on a stretcher accompanied by EMT X 2 AT 10:00 PM. Discharge summary, lab results, progress notes were sent with her. Radiology department was contacted about getting images on a CD for the patient to take to [facility]. Writer was made known that the cd will be sent directly to [facility] by the department.

## 2020-11-30 LAB
BACTERIA SPEC CULT: NORMAL
GRAM STN SPEC: NORMAL
GRAM STN SPEC: NORMAL
SPECIAL REQUESTS,SREQ: NORMAL

## 2021-01-03 ENCOUNTER — APPOINTMENT (OUTPATIENT)
Dept: GENERAL RADIOLOGY | Age: 56
DRG: 024 | End: 2021-01-03
Attending: EMERGENCY MEDICINE
Payer: COMMERCIAL

## 2021-01-03 ENCOUNTER — HOSPITAL ENCOUNTER (INPATIENT)
Age: 56
LOS: 9 days | Discharge: HOME HEALTH | DRG: 024 | End: 2021-01-12
Attending: EMERGENCY MEDICINE | Admitting: FAMILY MEDICINE
Payer: COMMERCIAL

## 2021-01-03 DIAGNOSIS — L76.82 INCISIONAL PAIN: Primary | ICD-10-CM

## 2021-01-03 DIAGNOSIS — E87.6 HYPOKALEMIA: ICD-10-CM

## 2021-01-03 DIAGNOSIS — R55 SYNCOPE AND COLLAPSE: ICD-10-CM

## 2021-01-03 LAB
ALBUMIN SERPL-MCNC: 3.5 G/DL (ref 3.5–5)
ALBUMIN/GLOB SERPL: 0.7 {RATIO} (ref 1.1–2.2)
ALP SERPL-CCNC: 134 U/L (ref 45–117)
ALT SERPL-CCNC: 25 U/L (ref 12–78)
ANION GAP SERPL CALC-SCNC: 11 MMOL/L (ref 5–15)
APPEARANCE UR: ABNORMAL
AST SERPL W P-5'-P-CCNC: 29 U/L (ref 15–37)
BACTERIA URNS QL MICRO: ABNORMAL /HPF
BASOPHILS # BLD: 0 K/UL (ref 0–0.1)
BASOPHILS NFR BLD: 0 % (ref 0–1)
BILIRUB SERPL-MCNC: 0.6 MG/DL (ref 0.2–1)
BILIRUB UR QL: NEGATIVE
BUN SERPL-MCNC: 12 MG/DL (ref 6–20)
BUN/CREAT SERPL: 10 (ref 12–20)
CA-I BLD-MCNC: 9.8 MG/DL (ref 8.5–10.1)
CHLORIDE SERPL-SCNC: 90 MMOL/L (ref 97–108)
CO2 SERPL-SCNC: 31 MMOL/L (ref 21–32)
COLOR UR: ABNORMAL
CREAT SERPL-MCNC: 1.15 MG/DL (ref 0.55–1.02)
DIFFERENTIAL METHOD BLD: ABNORMAL
EOSINOPHIL # BLD: 0.1 K/UL (ref 0–0.4)
EOSINOPHIL NFR BLD: 1 % (ref 0–7)
ERYTHROCYTE [DISTWIDTH] IN BLOOD BY AUTOMATED COUNT: 14.4 % (ref 11.5–14.5)
GLOBULIN SER CALC-MCNC: 4.9 G/DL (ref 2–4)
GLUCOSE SERPL-MCNC: 109 MG/DL (ref 65–100)
GLUCOSE UR STRIP.AUTO-MCNC: NEGATIVE MG/DL
HCT VFR BLD AUTO: 37.5 % (ref 35–47)
HGB BLD-MCNC: 12.7 G/DL (ref 11.5–16)
HGB UR QL STRIP: NEGATIVE
IMM GRANULOCYTES # BLD AUTO: 0 K/UL (ref 0–0.04)
IMM GRANULOCYTES NFR BLD AUTO: 0 % (ref 0–0.5)
KETONES UR QL STRIP.AUTO: NEGATIVE MG/DL
LEUKOCYTE ESTERASE UR QL STRIP.AUTO: NEGATIVE
LYMPHOCYTES # BLD: 0.9 K/UL (ref 0.8–3.5)
LYMPHOCYTES NFR BLD: 10 % (ref 12–49)
MAGNESIUM SERPL-MCNC: 1.9 MG/DL (ref 1.6–2.4)
MCH RBC QN AUTO: 31.6 PG (ref 26–34)
MCHC RBC AUTO-ENTMCNC: 33.9 G/DL (ref 30–36.5)
MCV RBC AUTO: 93.3 FL (ref 80–99)
MONOCYTES # BLD: 0.7 K/UL (ref 0–1)
MONOCYTES NFR BLD: 7 % (ref 5–13)
MUCOUS THREADS URNS QL MICRO: ABNORMAL /LPF
NEUTS SEG # BLD: 7.6 K/UL (ref 1.8–8)
NEUTS SEG NFR BLD: 82 % (ref 32–75)
NITRITE UR QL STRIP.AUTO: NEGATIVE
PH UR STRIP: 7 [PH] (ref 5–8)
PLATELET # BLD AUTO: 391 K/UL (ref 150–400)
PMV BLD AUTO: 9.3 FL (ref 8.9–12.9)
POTASSIUM SERPL-SCNC: 2.4 MMOL/L (ref 3.5–5.1)
PROT SERPL-MCNC: 8.4 G/DL (ref 6.4–8.2)
PROT UR STRIP-MCNC: >300 MG/DL
RBC # BLD AUTO: 4.02 M/UL (ref 3.8–5.2)
RBC #/AREA URNS HPF: ABNORMAL /HPF (ref 0–5)
SODIUM SERPL-SCNC: 132 MMOL/L (ref 136–145)
SP GR UR REFRACTOMETRY: 1.01 (ref 1–1.03)
TROPONIN I SERPL-MCNC: <0.05 NG/ML
UROBILINOGEN UR QL STRIP.AUTO: 0.1 EU/DL (ref 0.1–1)
WBC # BLD AUTO: 9.2 K/UL (ref 3.6–11)
WBC URNS QL MICRO: ABNORMAL /HPF (ref 0–4)

## 2021-01-03 PROCEDURE — 71045 X-RAY EXAM CHEST 1 VIEW: CPT

## 2021-01-03 PROCEDURE — 93005 ELECTROCARDIOGRAM TRACING: CPT

## 2021-01-03 PROCEDURE — 80053 COMPREHEN METABOLIC PANEL: CPT

## 2021-01-03 PROCEDURE — 99284 EMERGENCY DEPT VISIT MOD MDM: CPT

## 2021-01-03 PROCEDURE — 81001 URINALYSIS AUTO W/SCOPE: CPT

## 2021-01-03 PROCEDURE — 85025 COMPLETE CBC W/AUTO DIFF WBC: CPT

## 2021-01-03 PROCEDURE — 83735 ASSAY OF MAGNESIUM: CPT

## 2021-01-03 PROCEDURE — 74011250636 HC RX REV CODE- 250/636: Performed by: EMERGENCY MEDICINE

## 2021-01-03 PROCEDURE — 36415 COLL VENOUS BLD VENIPUNCTURE: CPT

## 2021-01-03 PROCEDURE — 84484 ASSAY OF TROPONIN QUANT: CPT

## 2021-01-03 PROCEDURE — 65270000029 HC RM PRIVATE

## 2021-01-03 PROCEDURE — 96365 THER/PROPH/DIAG IV INF INIT: CPT

## 2021-01-03 RX ORDER — ATORVASTATIN CALCIUM 80 MG/1
80 TABLET, FILM COATED ORAL DAILY
COMMUNITY
End: 2021-03-31

## 2021-01-03 RX ORDER — ACETAMINOPHEN 325 MG/1
650 TABLET ORAL
Status: DISCONTINUED | OUTPATIENT
Start: 2021-01-03 | End: 2021-01-12 | Stop reason: HOSPADM

## 2021-01-03 RX ORDER — ACETAMINOPHEN 650 MG/1
650 SUPPOSITORY RECTAL
Status: DISCONTINUED | OUTPATIENT
Start: 2021-01-03 | End: 2021-01-12 | Stop reason: HOSPADM

## 2021-01-03 RX ORDER — POLYETHYLENE GLYCOL 3350 17 G/17G
17 POWDER, FOR SOLUTION ORAL DAILY PRN
Status: DISCONTINUED | OUTPATIENT
Start: 2021-01-03 | End: 2021-01-12 | Stop reason: HOSPADM

## 2021-01-03 RX ORDER — NORTRIPTYLINE HYDROCHLORIDE 50 MG/1
50 CAPSULE ORAL
COMMUNITY
End: 2022-03-20

## 2021-01-03 RX ORDER — ONDANSETRON 2 MG/ML
4 INJECTION INTRAMUSCULAR; INTRAVENOUS
Status: DISCONTINUED | OUTPATIENT
Start: 2021-01-03 | End: 2021-01-12 | Stop reason: HOSPADM

## 2021-01-03 RX ORDER — SODIUM CHLORIDE 0.9 % (FLUSH) 0.9 %
5-40 SYRINGE (ML) INJECTION AS NEEDED
Status: DISCONTINUED | OUTPATIENT
Start: 2021-01-03 | End: 2021-01-12 | Stop reason: HOSPADM

## 2021-01-03 RX ORDER — CLOPIDOGREL BISULFATE 75 MG/1
75 TABLET ORAL DAILY
COMMUNITY
Start: 2020-12-11 | End: 2022-03-22

## 2021-01-03 RX ORDER — SPIRONOLACTONE 50 MG/1
50 TABLET, FILM COATED ORAL DAILY
COMMUNITY
Start: 2020-11-29 | End: 2021-01-23

## 2021-01-03 RX ORDER — ENOXAPARIN SODIUM 100 MG/ML
40 INJECTION SUBCUTANEOUS DAILY
Status: DISCONTINUED | OUTPATIENT
Start: 2021-01-04 | End: 2021-01-12 | Stop reason: HOSPADM

## 2021-01-03 RX ORDER — PROMETHAZINE HYDROCHLORIDE 25 MG/1
12.5 TABLET ORAL
Status: DISCONTINUED | OUTPATIENT
Start: 2021-01-03 | End: 2021-01-12 | Stop reason: HOSPADM

## 2021-01-03 RX ORDER — MAGNESIUM SULFATE 1 G/100ML
1 INJECTION INTRAVENOUS
Status: COMPLETED | OUTPATIENT
Start: 2021-01-03 | End: 2021-01-03

## 2021-01-03 RX ORDER — SODIUM CHLORIDE 0.9 % (FLUSH) 0.9 %
5-40 SYRINGE (ML) INJECTION EVERY 8 HOURS
Status: DISCONTINUED | OUTPATIENT
Start: 2021-01-03 | End: 2021-01-12 | Stop reason: HOSPADM

## 2021-01-03 RX ORDER — POTASSIUM CHLORIDE 7.45 MG/ML
10 INJECTION INTRAVENOUS
Status: DISPENSED | OUTPATIENT
Start: 2021-01-03 | End: 2021-01-03

## 2021-01-03 RX ADMIN — SODIUM CHLORIDE 1000 ML: 9 INJECTION, SOLUTION INTRAVENOUS at 17:59

## 2021-01-03 RX ADMIN — MAGNESIUM SULFATE HEPTAHYDRATE 1 G: 1 INJECTION, SOLUTION INTRAVENOUS at 19:27

## 2021-01-03 RX ADMIN — Medication 10 ML: at 22:00

## 2021-01-03 RX ADMIN — SODIUM CHLORIDE 1000 ML: 9 INJECTION, SOLUTION INTRAVENOUS at 22:13

## 2021-01-03 RX ADMIN — POTASSIUM CHLORIDE 10 MEQ: 7.46 INJECTION, SOLUTION INTRAVENOUS at 22:10

## 2021-01-03 NOTE — ED TRIAGE NOTES
Per EMS pt had syncopal episode at home today pta, has a seizure hx however no seizure witnessed, pt denies pain, however has some feelings of 'light-headedness'     This started after taking HTN meds

## 2021-01-03 NOTE — ED PROVIDER NOTES
EMERGENCY DEPARTMENT HISTORY AND PHYSICAL EXAM      Date: 1/3/2021  Patient Name: Gino Jimenez      History of Presenting Illness     Chief Complaint   Patient presents with    Syncope       History Provided By: Patient    HPI: Gino Jimenez, 54 y.o. female with a past medical history significant COPD and And syncope currently followed by neurology presents to the ED with cc of syncopized today when she got up to go to the restroom. She denies any exacerbating or relieving factors but says she felt weak all day. She specifically denies any fever, chills, nausea, vomiting, chest pain, shortness of breath, rash, diarrhea, headache, night sweats or weight loss. She has not treated her symptoms with anything. She arrives by EMS who started IV fluid. There are no other complaints, changes, or physical findings at this time. PCP: Roma Kwon    Current Facility-Administered Medications   Medication Dose Route Frequency Provider Last Rate Last Admin    potassium chloride 10 mEq in 100 ml IVPB  10 mEq IntraVENous Q1H Mahendra Mcnamara MD        sodium chloride 0.9 % bolus infusion 1,000 mL  1,000 mL IntraVENous NOW Mahendra Mcnamara MD         Current Outpatient Medications   Medication Sig Dispense Refill    furosemide (LASIX) 10 mg/mL injection 4 mL by IntraVENous route every twelve (12) hours. 1 Vial 0    Trelegy Ellipta 100-62.5-25 mcg inhaler       albuterol (PROVENTIL HFA, VENTOLIN HFA, PROAIR HFA) 90 mcg/actuation inhaler       rosuvastatin (CRESTOR) 10 mg tablet       hydrALAZINE (APRESOLINE) 100 mg tablet       potassium chloride (K-DUR, KLOR-CON) 20 mEq tablet Take 1 Tab by mouth daily. 30 Tab 0    NIFEdipine ER (PROCARDIA XL) 60 mg ER tablet Take 1 Tab by mouth daily.  Oxygen 5 Devices as needed. Pt wears 5LPM as needed- states she uses it after an axiety attack      isosorbide dinitrate (ISORDIL) 40 mg tablet Take 1 Tab by mouth three (3) times daily.  90 Tab 0    albuterol-ipratropium (DUO-NEB) 2.5 mg-0.5 mg/3 ml nebu 3 mL by Nebulization route every six (6) hours. 120 Nebule 0    labetaloL (NORMODYNE) 200 mg tablet Take 2 Tabs by mouth three (3) times daily. 180 Tab 0    ALPRAZolam (XANAX) 0.25 mg tablet Take 0.25 mg by mouth three (3) times daily as needed for Anxiety.  sertraline (Zoloft) 25 mg tablet Take 25 mg by mouth daily.  levothyroxine (SYNTHROID) 25 mcg tablet Take 25 mcg by mouth Daily (before breakfast).  levETIRAcetam (Keppra) 500 mg tablet Take 500 mg by mouth two (2) times a day.  carBAMazepine, mood stabiliz, 200 mg CM12 Take 200 mg by mouth two (2) times a day.  ferrous sulfate 325 mg (65 mg iron) tablet Take 325 mg by mouth Daily (before breakfast).  folic acid (FOLVITE) 1 mg tablet Take 1 mg by mouth daily.  aspirin delayed-release 81 mg tablet Take  by mouth daily.  magnesium oxide (MAG-OX) 400 mg tablet Take 400 mg by mouth daily. Past History     Past Medical History:  Past Medical History:   Diagnosis Date    PEDRO PABLO (acute kidney injury) ([facility] [address] 10/2/2020    Anxiety 10/2/2020    Anxiety     CHF (congestive heart failure) ([facility] [address] 10/2/2020    With preserved lvef, diastolic dysfunction, mitral valve regurgitation moderate.      Chronic obstructive pulmonary disease (HCC)     Chronic respiratory failure (HCC)     [facility]est[facility]F) [facility]     COPD (chronic obstructive pulmonary disease) with emphysema ([facility] [address] 10/2/2020    Dysphagia 10/2/2020    GERD (gastroesophageal reflux disease)     Hypertension     Hypertension     Iron deficiency anemia 10/2/2020    Mitral valve regurgitation 10/2/2020    Psychiatric disorder     Renal artery stenosis (HCC)     Seizure disorder ([facility] [address]     Thyroid disease        Past Surgical History:  Past Surgical History:   Procedure Laterality Date    HX ROTATOR CUFF REPAIR Right     HX TUBAL LIGATION      IR THORACENTESIS CATH W IMAGE  11/24/2020    IR THORACENTESIS CATH W IMAGE  11/25/2020       Family History:  Family History   Problem Relation Age of Onset    Hypertension Mother     Stroke Mother     Melanoma Mother     Stroke Father     Melanoma Brother     Melanoma Child        Social History:  Social History     Tobacco Use    Smoking status: Former Smoker     Types: Cigarettes    Smokeless tobacco: Never Used    Tobacco comment: quit july 2020   Substance Use Topics    Alcohol use: Not Currently    Drug use: Never       Allergies: Allergies   Allergen Reactions    Sulfa (Sulfonamide Antibiotics) Anaphylaxis         Review of Systems     Review of Systems   Constitutional: Positive for fatigue. Negative for appetite change, chills and fever. HENT: Negative. Negative for congestion and sinus pain. Eyes: Negative. Negative for pain and visual disturbance. Respiratory: Negative. Negative for chest tightness and shortness of breath. Cardiovascular: Negative. Negative for chest pain. Gastrointestinal: Negative. Negative for abdominal pain, diarrhea, nausea and vomiting. Genitourinary: Negative. Negative for difficulty urinating. No discharge   Musculoskeletal: Negative. Negative for arthralgias. Skin: Negative. Negative for rash. Neurological: Positive for syncope and weakness. Negative for headaches. Hematological: Negative. Psychiatric/Behavioral: Negative. Negative for agitation. The patient is not nervous/anxious. All other systems reviewed and are negative. Physical Exam     Physical Exam  Vitals signs and nursing note reviewed. Constitutional:       General: She is not in acute distress. Appearance: She is well-developed. HENT:      Head: Normocephalic and atraumatic. Nose: Nose normal.      Mouth/Throat:      Mouth: Mucous membranes are moist.      Pharynx: Oropharynx is clear. No oropharyngeal exudate. Eyes:      General:         Right eye: No discharge.          Left eye: No discharge. Conjunctiva/sclera: Conjunctivae normal.      Pupils: Pupils are equal, round, and reactive to light. Neck:      Musculoskeletal: Normal range of motion and neck supple. Cardiovascular:      Rate and Rhythm: Normal rate and regular rhythm. Chest Wall: PMI is not displaced. No thrill. Heart sounds: Normal heart sounds. No murmur. No friction rub. No gallop. Pulmonary:      Effort: Pulmonary effort is normal. No respiratory distress. Breath sounds: Normal breath sounds. No wheezing or rales. Chest:      Chest wall: No tenderness. Abdominal:      General: Bowel sounds are normal. There is no distension. Palpations: Abdomen is soft. There is no mass. Tenderness: There is no abdominal tenderness. There is no guarding or rebound. Musculoskeletal: Normal range of motion. Lymphadenopathy:      Cervical: No cervical adenopathy. Skin:     General: Skin is warm and dry. Capillary Refill: Capillary refill takes less than 2 seconds. Findings: No erythema or rash. Neurological:      Mental Status: She is alert and oriented to person, place, and time. Cranial Nerves: No cranial nerve deficit. Coordination: Coordination normal.   Psychiatric:         Mood and Affect: Mood normal.         Behavior: Behavior normal.         Lab and Diagnostic Study Results     Labs -     Recent Results (from the past 12 hour(s))   CBC WITH AUTOMATED DIFF    Collection Time: 01/03/21  4:30 PM   Result Value Ref Range    WBC 9.2 3.6 - 11.0 K/uL    RBC 4.02 3.80 - 5.20 M/uL    HGB 12.7 11.5 - 16.0 g/dL    HCT 37.5 35.0 - 47.0 %    MCV 93.3 80.0 - 99.0 FL    MCH 31.6 26.0 - 34.0 PG    MCHC 33.9 30.0 - 36.5 g/dL    RDW 14.4 11.5 - 14.5 %    PLATELET 310 038 - 475 K/uL    MPV 9.3 8.9 - 12.9 FL    NEUTROPHILS 82 (H) 32 - 75 %    LYMPHOCYTES 10 (L) 12 - 49 %    MONOCYTES 7 5 - 13 %    EOSINOPHILS 1 0 - 7 %    BASOPHILS 0 0 - 1 %    IMMATURE GRANULOCYTES 0 0.0 - 0.5 %    ABS. NEUTROPHILS 7.6 1.8 - 8.0 K/UL    ABS. LYMPHOCYTES 0.9 0.8 - 3.5 K/UL    ABS. MONOCYTES 0.7 0.0 - 1.0 K/UL    ABS. EOSINOPHILS 0.1 0.0 - 0.4 K/UL    ABS. BASOPHILS 0.0 0.0 - 0.1 K/UL    ABS. IMM. GRANS. 0.0 0.00 - 0.04 K/UL    DF AUTOMATED     METABOLIC PANEL, COMPREHENSIVE    Collection Time: 01/03/21  4:30 PM   Result Value Ref Range    Sodium 132 (L) 136 - 145 mmol/L    Potassium 2.4 (LL) 3.5 - 5.1 mmol/L    Chloride 90 (L) 97 - 108 mmol/L    CO2 31 21 - 32 mmol/L    Anion gap 11 5 - 15 mmol/L    Glucose 109 (H) 65 - 100 mg/dL    BUN 12 6 - 20 mg/dL    Creatinine 1.15 (H) 0.55 - 1.02 mg/dL    BUN/Creatinine ratio 10 (L) 12 - 20      GFR est AA 59 (L) >60 ml/min/1.73m2    GFR est non-AA 49 (L) >60 ml/min/1.73m2    Calcium 9.8 8.5 - 10.1 mg/dL    Bilirubin, total 0.6 0.2 - 1.0 mg/dL    AST (SGOT) 29 15 - 37 U/L    ALT (SGPT) 25 12 - 78 U/L    Alk. phosphatase 134 (H) 45 - 117 U/L    Protein, total 8.4 (H) 6.4 - 8.2 g/dL    Albumin 3.5 3.5 - 5.0 g/dL    Globulin 4.9 (H) 2.0 - 4.0 g/dL    A-G Ratio 0.7 (L) 1.1 - 2.2     TROPONIN I    Collection Time: 01/03/21  4:30 PM   Result Value Ref Range    Troponin-I, Qt. <0.05 <0.05 ng/mL   MAGNESIUM    Collection Time: 01/03/21  6:15 PM   Result Value Ref Range    Magnesium 1.9 1.6 - 2.4 mg/dL       Radiologic Studies -   [unfilled]  CT Results  (Last 48 hours)    None        CXR Results  (Last 48 hours)               01/03/21 1645  XR CHEST PORT Final result    Impression:  IMPRESSION: Considerations for baseline interstitial lung disease or mild   pulmonary edema. Narrative:  HISTORY:  syncope       TECHNIQUE:  AP chest radiograph       COMPARISON: 11/25/2020   LIMITATIONS: None       TUBES/LINES: None       LUNG PARENCHYMA: Overall the interstitial markings do appear increased but have   improved since prior examination.  No focal airspace disease   TRACHEA/BRONCHI: Normal   PULMONARY VESSELS: Normal   PLEURA: Normal   HEART: Normal   AORTIC SHADOW:Normal. MEDIASTINUM: Normal   BONE/SOFT TISSUES: No acute abnormality. OTHER: None                 Medical Decision Making and ED Course   - I am the first and primary provider for this patient AND AM THE PRIMARY PROVIDER OF RECORD. - I reviewed the vital signs, available nursing notes, past medical history, past surgical history, family history and social history. - Initial assessment performed. The patients presenting problems have been discussed, and the staff are in agreement with the care plan formulated and outlined with them. I have encouraged them to ask questions as they arise throughout their visit. Vital Signs-Reviewed the patient's vital signs. Patient Vitals for the past 12 hrs:   Temp Pulse Resp BP SpO2   01/03/21 1858 98.5 °F (36.9 °C) 79 20 134/77 100 %       EKG interpretation: (Preliminary): Performed at 4:15 PM, and read at 4:18 PM  Ventricular rate 79 bpm,  ms, QTC 4 554 ms, QRS 84 ms. Interpretation: Normal sinus rhythm LVH with repull abnormality. Prolonged QT. Abnormal EKG. Records Reviewed: Nursing Notes and Old Medical Records    The patient presents with Syncopal episode with a differential diagnosis of ACS, arrhythmia, dehydration, electrolyte abnormality, UTI. ED Course:   Assess with basic and cardiac labs, EKG orthostatics and UA. Will treat with IV fluid and reevaluation       Provider Notes (Medical Decision Making):   Is a pleasant 51-year-old female with complicated past medical history significant for multiple comorbidities who presents after having a syncopal episode today. It was noted that she is extremely orthostatic after IV resuscitation as well as maintaining a potassium of 2.4. I have great concerns with regard to her being able to support herself as an outpatient at this point time. We will replete with IV potassium.   MDM           Consultations:       Consultations: -  Hospitalist Consultant: Dr. Jazmine Weinberg: We have asked for emergent assistance with regard to this patient. We have discussed the patients HPI, ROS, PE and results this far. They will come and evaluate the patient for admission. Procedures and Critical Care       Performed by: Say Guerrero MD  PROCEDURES:  Procedures     CRITICAL CARE NOTE :  4:25 PM  Amount of Critical Care Time: 45(minutes)    IMPENDING DETERIORATION -Cardiovascular and Metabolic  ASSOCIATED RISK FACTORS - Hypotension, Metabolic changes and Dehydration  MANAGEMENT- Bedside Assessment and Supervision of Care  INTERPRETATION -  ECG, Blood Pressure and Cardiac Output Measures   INTERVENTIONS - hemodynamic mngmt and Metobolic interventions  CASE REVIEW - Hospitalist  TREATMENT RESPONSE -Stable  PERFORMED BY - Self    NOTES   :  I have spent critical care time involved in lab review, consultations with specialist, family decision- making, bedside attention and documentation. This time excludes time spent in any separate billed procedures. During this entire length of time I was immediately available to the patient . Say Guerrero MD        Disposition     Disposition: Admitted to Floor Medical Floor the case was discussed with the admitting physician Dr. Serra New      Diagnosis     Clinical Impression:   1. Syncope and collapse    2. Hypokalemia        Attestations:    Say Guerrero MD    Please note that this dictation was completed with [employer], the computer voice recognition software. Quite often unanticipated grammatical, syntax, homophones, and other interpretive errors are inadvertently transcribed by the computer software. Please disregard these errors. Please excuse any errors that have escaped final proofreading. Thank you.

## 2021-01-04 ENCOUNTER — APPOINTMENT (OUTPATIENT)
Dept: NON INVASIVE DIAGNOSTICS | Age: 56
DRG: 024 | End: 2021-01-04
Attending: INTERNAL MEDICINE
Payer: COMMERCIAL

## 2021-01-04 ENCOUNTER — APPOINTMENT (OUTPATIENT)
Dept: CT IMAGING | Age: 56
DRG: 024 | End: 2021-01-04
Attending: INTERNAL MEDICINE
Payer: COMMERCIAL

## 2021-01-04 LAB
ANION GAP SERPL CALC-SCNC: 8 MMOL/L (ref 5–15)
ATRIAL RATE: 79 BPM
BUN SERPL-MCNC: 18 MG/DL (ref 6–20)
BUN/CREAT SERPL: 17 (ref 12–20)
CA-I BLD-MCNC: 8.7 MG/DL (ref 8.5–10.1)
CALCULATED P AXIS, ECG09: 81 DEGREES
CALCULATED R AXIS, ECG10: 77 DEGREES
CALCULATED T AXIS, ECG11: 90 DEGREES
CHLORIDE SERPL-SCNC: 95 MMOL/L (ref 97–108)
CO2 SERPL-SCNC: 31 MMOL/L (ref 21–32)
CREAT SERPL-MCNC: 1.06 MG/DL (ref 0.55–1.02)
DIAGNOSIS, 93000: NORMAL
ECHO AV PEAK GRADIENT: 6 MMHG
ECHO EST RA PRESSURE: 3 MMHG
ECHO LV E' SEPTAL VELOCITY: 6.24 CM/S
ECHO LV EDV A2C: 111 CM3
ECHO LV EJECTION FRACTION A2C: 28 %
ECHO LV EJECTION FRACTION BIPLANE: 54.2 % (ref 55–100)
ECHO LV ESV A2C: 41.4 CM3
ECHO LV INTERNAL DIMENSION DIASTOLIC: 4.8 CM (ref 3.9–5.3)
ECHO LV INTERNAL DIMENSION SYSTOLIC: 3.46 CM
ECHO LV IVSD: 1.11 CM (ref 0.6–0.9)
ECHO LV MASS 2D: 229.6 G (ref 67–162)
ECHO LV MASS INDEX 2D: 155.8 G/M2 (ref 43–95)
ECHO LV POSTERIOR WALL DIASTOLIC: 1.37 CM (ref 0.6–0.9)
ECHO LVOT PEAK GRADIENT: 3 MMHG
ECHO MV A VELOCITY: 139 CM/S
ECHO MV E DECELERATION TIME (DT): 187 MS
ECHO MV E VELOCITY: 93.8 CM/S
ECHO MV E/A RATIO: 0.67
ECHO MV E/E' SEPTAL: 15.03
ECHO MV REGURGITANT VTIA: 227 CM
ECHO PV PEAK INSTANTANEOUS GRADIENT SYSTOLIC: 2 MMHG
ECHO PV REGURGITANT MAX VELOCITY: 120 CM/S
ECHO PV REGURGITANT MAX VELOCITY: 611 CM/S
ECHO PV REGURGITANT MAX VELOCITY: 78.4 CM/S
ECHO PV REGURGITANT MAX VELOCITY: 81.4 CM/S
ECHO PVEIN A DURATION: 109 MS
ECHO PVEIN A VELOCITY: 31.3 CM/S
ECHO RIGHT VENTRICULAR SYSTOLIC PRESSURE (RVSP): 26 MMHG
ECHO RV INTERNAL DIMENSION: 2.63 CM
ECHO TV MAX VELOCITY: 240 CM/S
ECHO TV MEAN GRADIENT: 103 MMHG
ECHO TV REGURGITANT PEAK GRADIENT: 23 MMHG
ERYTHROCYTE [DISTWIDTH] IN BLOOD BY AUTOMATED COUNT: 14.5 % (ref 11.5–14.5)
GLUCOSE SERPL-MCNC: 103 MG/DL (ref 65–100)
HCT VFR BLD AUTO: 30.2 % (ref 35–47)
HGB BLD-MCNC: 10.1 G/DL (ref 11.5–16)
MAGNESIUM SERPL-MCNC: 1.9 MG/DL (ref 1.6–2.4)
MCH RBC QN AUTO: 31.2 PG (ref 26–34)
MCHC RBC AUTO-ENTMCNC: 33.4 G/DL (ref 30–36.5)
MCV RBC AUTO: 93.2 FL (ref 80–99)
P-R INTERVAL, ECG05: 136 MS
PLATELET # BLD AUTO: 293 K/UL (ref 150–400)
PMV BLD AUTO: 9.7 FL (ref 8.9–12.9)
POTASSIUM SERPL-SCNC: 2.2 MMOL/L (ref 3.5–5.1)
Q-T INTERVAL, ECG07: 484 MS
QRS DURATION, ECG06: 84 MS
QTC CALCULATION (BEZET), ECG08: 554 MS
RBC # BLD AUTO: 3.24 M/UL (ref 3.8–5.2)
SODIUM SERPL-SCNC: 134 MMOL/L (ref 136–145)
TROPONIN I SERPL-MCNC: <0.05 NG/ML
TROPONIN I SERPL-MCNC: <0.05 NG/ML
VENTRICULAR RATE, ECG03: 79 BPM
WBC # BLD AUTO: 6.9 K/UL (ref 3.6–11)

## 2021-01-04 PROCEDURE — 74011250637 HC RX REV CODE- 250/637: Performed by: FAMILY MEDICINE

## 2021-01-04 PROCEDURE — 84484 ASSAY OF TROPONIN QUANT: CPT

## 2021-01-04 PROCEDURE — 77010033678 HC OXYGEN DAILY

## 2021-01-04 PROCEDURE — 94640 AIRWAY INHALATION TREATMENT: CPT

## 2021-01-04 PROCEDURE — 74011250636 HC RX REV CODE- 250/636: Performed by: FAMILY MEDICINE

## 2021-01-04 PROCEDURE — 74011250636 HC RX REV CODE- 250/636: Performed by: EMERGENCY MEDICINE

## 2021-01-04 PROCEDURE — 74011250637 HC RX REV CODE- 250/637: Performed by: NURSE PRACTITIONER

## 2021-01-04 PROCEDURE — 97530 THERAPEUTIC ACTIVITIES: CPT

## 2021-01-04 PROCEDURE — 80048 BASIC METABOLIC PNL TOTAL CA: CPT

## 2021-01-04 PROCEDURE — 65270000029 HC RM PRIVATE

## 2021-01-04 PROCEDURE — 94760 N-INVAS EAR/PLS OXIMETRY 1: CPT

## 2021-01-04 PROCEDURE — 93880 EXTRACRANIAL BILAT STUDY: CPT

## 2021-01-04 PROCEDURE — 93306 TTE W/DOPPLER COMPLETE: CPT

## 2021-01-04 PROCEDURE — 80061 LIPID PANEL: CPT

## 2021-01-04 PROCEDURE — 97161 PT EVAL LOW COMPLEX 20 MIN: CPT

## 2021-01-04 PROCEDURE — 74011000250 HC RX REV CODE- 250: Performed by: INTERNAL MEDICINE

## 2021-01-04 PROCEDURE — 36415 COLL VENOUS BLD VENIPUNCTURE: CPT

## 2021-01-04 PROCEDURE — 74011250636 HC RX REV CODE- 250/636: Performed by: INTERNAL MEDICINE

## 2021-01-04 PROCEDURE — 83735 ASSAY OF MAGNESIUM: CPT

## 2021-01-04 PROCEDURE — 74011250637 HC RX REV CODE- 250/637: Performed by: INTERNAL MEDICINE

## 2021-01-04 PROCEDURE — 85027 COMPLETE CBC AUTOMATED: CPT

## 2021-01-04 PROCEDURE — 70450 CT HEAD/BRAIN W/O DYE: CPT

## 2021-01-04 RX ORDER — IPRATROPIUM BROMIDE AND ALBUTEROL SULFATE 2.5; .5 MG/3ML; MG/3ML
3 SOLUTION RESPIRATORY (INHALATION)
Status: DISCONTINUED | OUTPATIENT
Start: 2021-01-04 | End: 2021-01-04

## 2021-01-04 RX ORDER — ASPIRIN 81 MG/1
81 TABLET ORAL DAILY
Status: DISCONTINUED | OUTPATIENT
Start: 2021-01-04 | End: 2021-01-12 | Stop reason: HOSPADM

## 2021-01-04 RX ORDER — LANOLIN ALCOHOL/MO/W.PET/CERES
325 CREAM (GRAM) TOPICAL
Status: DISCONTINUED | OUTPATIENT
Start: 2021-01-04 | End: 2021-01-12 | Stop reason: HOSPADM

## 2021-01-04 RX ORDER — LEVOTHYROXINE SODIUM 25 UG/1
25 TABLET ORAL
Status: DISCONTINUED | OUTPATIENT
Start: 2021-01-04 | End: 2021-01-12 | Stop reason: HOSPADM

## 2021-01-04 RX ORDER — CARBAMAZEPINE 200 MG/1
200 TABLET ORAL EVERY 12 HOURS
Status: DISCONTINUED | OUTPATIENT
Start: 2021-01-04 | End: 2021-01-12 | Stop reason: HOSPADM

## 2021-01-04 RX ORDER — BUDESONIDE 0.5 MG/2ML
500 INHALANT ORAL
Status: DISCONTINUED | OUTPATIENT
Start: 2021-01-04 | End: 2021-01-12 | Stop reason: HOSPADM

## 2021-01-04 RX ORDER — LEVETIRACETAM 500 MG/1
500 TABLET ORAL 2 TIMES DAILY
Status: DISCONTINUED | OUTPATIENT
Start: 2021-01-04 | End: 2021-01-12 | Stop reason: HOSPADM

## 2021-01-04 RX ORDER — ALPRAZOLAM 0.25 MG/1
0.25 TABLET ORAL
Status: DISCONTINUED | OUTPATIENT
Start: 2021-01-04 | End: 2021-01-12 | Stop reason: HOSPADM

## 2021-01-04 RX ORDER — POTASSIUM CHLORIDE 20 MEQ/1
40 TABLET, EXTENDED RELEASE ORAL
Status: COMPLETED | OUTPATIENT
Start: 2021-01-04 | End: 2021-01-04

## 2021-01-04 RX ORDER — ATORVASTATIN CALCIUM 20 MG/1
20 TABLET, FILM COATED ORAL
Status: DISCONTINUED | OUTPATIENT
Start: 2021-01-04 | End: 2021-01-05

## 2021-01-04 RX ORDER — POTASSIUM CHLORIDE 20 MEQ/1
80 TABLET, EXTENDED RELEASE ORAL
Status: COMPLETED | OUTPATIENT
Start: 2021-01-04 | End: 2021-01-04

## 2021-01-04 RX ORDER — POTASSIUM CHLORIDE 7.45 MG/ML
10 INJECTION INTRAVENOUS
Status: DISPENSED | OUTPATIENT
Start: 2021-01-04 | End: 2021-01-04

## 2021-01-04 RX ORDER — SERTRALINE HYDROCHLORIDE 50 MG/1
25 TABLET, FILM COATED ORAL DAILY
Status: DISCONTINUED | OUTPATIENT
Start: 2021-01-04 | End: 2021-01-12 | Stop reason: HOSPADM

## 2021-01-04 RX ORDER — ALBUTEROL SULFATE 90 UG/1
2 AEROSOL, METERED RESPIRATORY (INHALATION)
Status: DISCONTINUED | OUTPATIENT
Start: 2021-01-04 | End: 2021-01-12 | Stop reason: HOSPADM

## 2021-01-04 RX ORDER — HYDRALAZINE HYDROCHLORIDE 50 MG/1
100 TABLET, FILM COATED ORAL 3 TIMES DAILY
Status: DISCONTINUED | OUTPATIENT
Start: 2021-01-04 | End: 2021-01-12 | Stop reason: HOSPADM

## 2021-01-04 RX ORDER — ISOSORBIDE DINITRATE 20 MG/1
40 TABLET ORAL 3 TIMES DAILY
Status: DISCONTINUED | OUTPATIENT
Start: 2021-01-04 | End: 2021-01-12 | Stop reason: HOSPADM

## 2021-01-04 RX ORDER — BUDESONIDE 0.5 MG/2ML
250 INHALANT ORAL
Status: DISCONTINUED | OUTPATIENT
Start: 2021-01-04 | End: 2021-01-04

## 2021-01-04 RX ORDER — LABETALOL 100 MG/1
400 TABLET, FILM COATED ORAL 3 TIMES DAILY
Status: DISCONTINUED | OUTPATIENT
Start: 2021-01-04 | End: 2021-01-12 | Stop reason: HOSPADM

## 2021-01-04 RX ORDER — FOLIC ACID 1 MG/1
1 TABLET ORAL DAILY
Status: DISCONTINUED | OUTPATIENT
Start: 2021-01-04 | End: 2021-01-12 | Stop reason: HOSPADM

## 2021-01-04 RX ORDER — NIFEDIPINE 60 MG/1
60 TABLET, EXTENDED RELEASE ORAL DAILY
Status: DISCONTINUED | OUTPATIENT
Start: 2021-01-04 | End: 2021-01-12 | Stop reason: HOSPADM

## 2021-01-04 RX ADMIN — Medication 10 ML: at 23:11

## 2021-01-04 RX ADMIN — POTASSIUM CHLORIDE 40 MEQ: 1500 TABLET, EXTENDED RELEASE ORAL at 13:22

## 2021-01-04 RX ADMIN — SERTRALINE 25 MG: 50 TABLET, FILM COATED ORAL at 13:20

## 2021-01-04 RX ADMIN — POTASSIUM CHLORIDE 40 MEQ: 1500 TABLET, EXTENDED RELEASE ORAL at 17:00

## 2021-01-04 RX ADMIN — POTASSIUM CHLORIDE 40 MEQ: 1500 TABLET, EXTENDED RELEASE ORAL at 13:20

## 2021-01-04 RX ADMIN — POTASSIUM CHLORIDE 80 MEQ: 1500 TABLET, EXTENDED RELEASE ORAL at 13:22

## 2021-01-04 RX ADMIN — ALPRAZOLAM 0.25 MG: 0.25 TABLET ORAL at 13:19

## 2021-01-04 RX ADMIN — BUDESONIDE 500 MCG: 0.5 INHALANT RESPIRATORY (INHALATION) at 19:39

## 2021-01-04 RX ADMIN — ACETAMINOPHEN 650 MG: 325 TABLET, FILM COATED ORAL at 00:34

## 2021-01-04 RX ADMIN — CARBAMAZEPINE 200 MG: 200 TABLET ORAL at 22:03

## 2021-01-04 RX ADMIN — ISOSORBIDE DINITRATE 40 MG: 20 TABLET ORAL at 17:56

## 2021-01-04 RX ADMIN — ATORVASTATIN CALCIUM 20 MG: 20 TABLET, FILM COATED ORAL at 22:02

## 2021-01-04 RX ADMIN — Medication 10 ML: at 05:13

## 2021-01-04 RX ADMIN — Medication 10 ML: at 14:00

## 2021-01-04 RX ADMIN — LEVOTHYROXINE SODIUM 25 MCG: 0.03 TABLET ORAL at 13:19

## 2021-01-04 RX ADMIN — POTASSIUM CHLORIDE 10 MEQ: 7.46 INJECTION, SOLUTION INTRAVENOUS at 08:20

## 2021-01-04 RX ADMIN — LEVETIRACETAM 500 MG: 500 TABLET ORAL at 22:02

## 2021-01-04 RX ADMIN — CARBAMAZEPINE 200 MG: 200 TABLET ORAL at 13:20

## 2021-01-04 RX ADMIN — FOLIC ACID 1 MG: 1 TABLET ORAL at 13:19

## 2021-01-04 RX ADMIN — POTASSIUM CHLORIDE 40 MEQ: 1500 TABLET, EXTENDED RELEASE ORAL at 17:56

## 2021-01-04 RX ADMIN — LABETALOL HYDROCHLORIDE 400 MG: 100 TABLET, FILM COATED ORAL at 22:02

## 2021-01-04 RX ADMIN — POTASSIUM CHLORIDE 10 MEQ: 7.46 INJECTION, SOLUTION INTRAVENOUS at 00:34

## 2021-01-04 RX ADMIN — FERROUS SULFATE TAB 325 MG (65 MG ELEMENTAL FE) 325 MG: 325 (65 FE) TAB at 13:19

## 2021-01-04 RX ADMIN — ENOXAPARIN SODIUM 40 MG: 40 INJECTION SUBCUTANEOUS at 08:19

## 2021-01-04 RX ADMIN — ISOSORBIDE DINITRATE 40 MG: 20 TABLET ORAL at 13:19

## 2021-01-04 RX ADMIN — NIFEDIPINE 60 MG: 60 TABLET, FILM COATED, EXTENDED RELEASE ORAL at 13:18

## 2021-01-04 RX ADMIN — BUDESONIDE 500 MCG: 0.5 INHALANT RESPIRATORY (INHALATION) at 15:16

## 2021-01-04 RX ADMIN — ASPIRIN 81 MG: 81 TABLET, COATED ORAL at 13:20

## 2021-01-04 RX ADMIN — ISOSORBIDE DINITRATE 40 MG: 20 TABLET ORAL at 22:02

## 2021-01-04 RX ADMIN — HYDRALAZINE HYDROCHLORIDE 100 MG: 50 TABLET, FILM COATED ORAL at 22:02

## 2021-01-04 RX ADMIN — LABETALOL HYDROCHLORIDE 400 MG: 100 TABLET, FILM COATED ORAL at 17:56

## 2021-01-04 RX ADMIN — LEVETIRACETAM 500 MG: 500 TABLET ORAL at 13:19

## 2021-01-04 RX ADMIN — LABETALOL HYDROCHLORIDE 400 MG: 100 TABLET, FILM COATED ORAL at 13:19

## 2021-01-04 RX ADMIN — HYDRALAZINE HYDROCHLORIDE 100 MG: 50 TABLET, FILM COATED ORAL at 17:57

## 2021-01-04 RX ADMIN — ALPRAZOLAM 0.25 MG: 0.25 TABLET ORAL at 22:06

## 2021-01-04 NOTE — PROGRESS NOTES
Two person skin assessment performed by myself and Lasha Garcia RN. Patient has very thin and fragile skin with minimal subcutaneous tissue, skin is dry and has generalized scabbing and bruising but is otherwise warm, dry and intact with no signs of skin breakdown.

## 2021-01-04 NOTE — PROGRESS NOTES
Hospitalist Progress Note    NAME: Beatrice Akins   :  1965   MRN:  [id number]       Subjective:     Chief Complaint / Reason for Physician Visit  Patient seen and evaluated at bedside patient still complaining of generalized weakness. Discussed with RN events overnight. Review of Systems:  Symptom Y/N Comments  Symptom Y/N Comments   Fever/Chills N   Chest Pain N    Poor Appetite N   Edema N    Cough N   Abdominal Pain N    Sputum N   Joint Pain N    SOB/TRAN N   Pruritis/Rash N    Nausea/vomit N   Tolerating PT/OT NA    Diarrhea N   Tolerating Diet Y    Constipation n   Other       Could NOT obtain due to:      Objective:     VITALS:   Last 24hrs VS reviewed since prior progress note. Most recent are:  Patient Vitals for the past 24 hrs:   Temp Pulse Resp BP SpO2   21 1028    (!) 183/76    21 0910 98.3 °F (36.8 °C) 78 17  98 %   21 0400  74      21 0016 98.1 °F (36.7 °C) 75 20 (!) 183/76 99 %   21 0000  75      21 2308  75 19 (!) 176/79 100 %   21 1858 98.5 °F (36.9 °C) 79 20 134/77 100 %     No intake or output data in the 24 hours ending 21 1212     PHYSICAL EXAM:  General: WD, WN. Alert, cooperative, no acute distress    EENT:  EOMI. Anicteric sclerae. MMM  Resp:  CTA bilaterally, no wheezing or rales. No accessory muscle use  CV:  Regular  Rhythm, s1/s2 no m/r/g  No edema  GI:  Soft, Non distended, Non tender. +Bowel sounds  Neurologic:  Alert and oriented X 3, normal speech,   Psych:   Good insight. Not anxious nor agitated  Skin:  No rashes. No jaundice    Procedures: see electronic medical records for all procedures/Xrays and details which were not copied into this note but were reviewed prior to creation of Plan. LABS:  I reviewed today's most current labs and imaging studies.   Pertinent labs include:  Recent Labs     21  1045 21  1630   WBC 6.9 9.2   HGB 10.1* 12.7   HCT 30.2* 37.5    391     Recent Labs 01/04/21  1045 01/03/21  1815 01/03/21  1630   *  --  132*   K 2.2*  --  2.4*   CL 95*  --  90*   CO2 31  --  31   *  --  109*   BUN 18  --  12   CREA 1.06*  --  1.15*   CA 8.7  --  9.8   MG 1.9 1.9  --    ALB  --   --  3.5   TBILI  --   --  0.6   ALT  --   --  25       Signed: Mar Orr MD        Reviewed most current lab test results and cultures  YES  Reviewed most current radiology test results   YES  Review and summation of old records today    NO  Reviewed patient's current orders and MAR    YES  PMH/SH reviewed - no change compared to H&P      Assessment / Plan:    Syncopepatient presents with above-mentioned symptomatology based on patient's clinical presentation patient symptomatology is consistent with syncopal event due to unclear etiology, differentials include cardiac versus neurologic causes  Obtain stat CT head  Follow-up transthoracic echo  Follow-up ultrasound carotids  Cardiac enzymes every 6x3  PT OT evaluation  Cardiology consult  Neurology consult    Hypokalemiareplete potassium and recheck potassium    Hypertensioncontinue home antihypertensive medications    Hypothyroidismcontinue home Synthroid    ProphylaxisLovenox  FENcardiac diet, replete potassium and magnesium  Full code, patient surrogate decision-maker is her ,  Dispositionpending clinical improvement/PT OT eval    18.5 - 24.9 Normal weight / Body mass index is 16.68 kg/m². Code status: Full  Prophylaxis: Lovenox  Recommended Disposition:  PT, OT, RN     ________________________________________________________________________  Care Plan discussed with:    Comments   Patient X    Family  X    RN X    Care Manager X    Consultant  X                     X Multidiciplinary team rounds were held today with , nursing, pharmacist and clinical coordinator. Patient's plan of care was discussed; medications were reviewed and discharge planning was addressed. ________________________________________________________________________  Total NON critical care TIME: 35   Minutes        Comments   >50% of visit spent in counseling and coordination of care X    ________________________________________________________________________  Fermin Pablo, MD

## 2021-01-04 NOTE — PROGRESS NOTES
PT consult received and acknowledged . Currently , pt. Off the floor -CVL . PT will reattempt for PT eval at a later time . Thanks .

## 2021-01-04 NOTE — PROGRESS NOTES
Comprehensive Nutrition Assessment    Type and Reason for Visit: Initial(low BMI)    Nutrition Recommendations/Plan:     Consider liberalization to Regular diet  Add Ensure Enlive TID  Add Magic cup BID    Honor preferences as able   Nursing to document %meal and supplement intakes in I/Os  Obtain weekly measured wts    Nutrition Assessment:  Admitted for dizziness with syncopal episode. Tolerating RA. Ordered Cardiac diet, FR 1500mL per MD. Brian Bernard to pt with RN at bedside. Pt reports not eating well at B [facility] today d/t dislike, eating snakcs at bedside. Agreeable to Ensure Enlive (phuong preferred, any flavor tolerated) and Magic cup. Requested diet liberalization to Regular, plans to discuss with MD. Labs: Na 134, K 2.2, Cl 95, Cr 1.06, , Alk Phos 134. Meds: budesonide, FeSu, folic acid, synthroid, keppra, KCl. Malnutrition Assessment:  Malnutrition Status: Moderate malnutrition    Context:  Acute illness     Findings of the 6 clinical characteristics of malnutrition:   Energy Intake:  No significant decrease in energy intake  Weight Loss:  No significant weight loss(pt reports wt stability at 100lbs)     Body Fat Loss:  7 - Moderate body fat loss, Orbital, Triceps, Fat overlying ribs   Muscle Mass Loss:  1 - Mild muscle mass loss, Clavicles (pectoralis & deltoids), Thigh (quadraceps), Calf  Fluid Accumulation:  No significant fluid accumulation,      Estimated Daily Nutrient Needs:  Energy (kcal): 1455kcal (32kcal/kg); Weight Used for Energy Requirements:    Protein (g): 55g (1.2g/kg); Weight Used for Protein Requirements: Current  Fluid (ml/day): 1455mL; Method Used for Fluid Requirements: 1 ml/kcal      Nutrition Related Findings:  NFPE finding moderate muscle and fat losses. Poor dentition visualized, pt denied c/s difficulty. Pt denied n/v or c/d. No BM yet since admit. No edema.       Wounds:    None       Current Nutrition Therapies:  DIET CARDIAC Regular; 2 GM NA (House Low NA); FR 1500ML    Anthropometric Measures:  · Height:  5' 4.96\" (165 cm)  · Current Body Wt:  45.4 kg (100 lb 1.4 oz)   · Usual Body Wt:  45.4 kg (100 lb)     · Ideal Body Wt:  125 lbs:  80.1 %   · BMI Category:  Underweight (BMI less than 18.5)     Wt Readings from Last 7 Encounters:   01/04/21 45.4 kg (100 lb 1.4 oz)   11/25/20 53.9 kg (118 lb 13.3 oz)   11/02/20 45.4 kg (100 lb)   10/19/20 49 kg (108 lb)   10/02/20 46.2 kg (101 lb 13.6 oz)   09/17/20 54.5 kg (120 lb 2.4 oz)   Wt appears to range 45-49kg on average, possible error in wt from 11/25 based on pt reporting UBW of 100lbs. Will monitor inpatient trends.     Nutrition Diagnosis:   · Underweight related to catabolic illness, inadequate protein-energy intake as evidenced by BMI, moderate loss of subcutaneous fat, mild muscle loss      Nutrition Interventions:   Food and/or Nutrient Delivery: Continue current diet, Start oral nutrition supplement  Nutrition Education and Counseling: No recommendations at this time  Coordination of Nutrition Care: Continue to monitor while inpatient    Goals:  Meet >75% EENs via PO  Wt gain 0.5kg per week  Lytes wnl       Nutrition Monitoring and Evaluation:   Behavioral-Environmental Outcomes: None identified  Food/Nutrient Intake Outcomes: Food and nutrient intake, Supplement intake  Physical Signs/Symptoms Outcomes: Weight, Nutrition focused physical findings, GI status    Discharge Planning:    Continue oral nutrition supplement     Electronically signed by Blanco Avery on 1/4/2021 at 5:49 PM    Contact: [phone]

## 2021-01-04 NOTE — CONSULTS
[facility] CARDIOLOGY  CARDIOLOGY CONSULTATION    REASON FOR CONSULT:  Syncope    CHIEF COMPLAINT: Syncope    HISTORY OF PRESENT ILLNESS:  Ms. Francisco Javier Braun is a 54year-old female with past medical history significant for COPD, HFpEF, GERD, HTN, HLD, MVR, Right renal artery stenosis s/p stent insertion, and seizure who presents today for evaluation of syncope. Syncopal episode occurred yesterday. She has been feeling weak anf fatigued for a few days prior to event. Admission labs and imaging showed hypokalemia, hypomagnesemia, CXR mild pulmonary edema, positive orthostatics, EKG sinus rhythm without acute ischemic changes. On examination she is lying in bed, no acute distress. She endorses dizziness with ambulation. She denies chest pain, sob, palpitations, and swelling. REVIEW OF SYSTEMS:  Per HPI above    Telemetry reviewed. Sinus rhythm in 70s       Physical examination:  Vital signs, Blood pressure 183/76,  Pulse 74  No apparent distress. Heart is regular, rate and rhythm. Normal S1, S2, no murmurs are appreciated. Lungs are clear bilaterally. Abdomen is soft, nontender, normal bowel sounds. Extremities have no edema. Recent labs results and imaging reviewed. Potassium 2.2, magnesium 1.9        Discussed case with Dr. Jose Hernandez. This our impression and recommendation:    1. Syncope: Orthostatics positive on admission. Carotid duplex and echocardiogram reading pending. Troponin negative. Continue to monitor telemetry closely and keep serum potassium between 4-5 and serum magnesium >2. Repletion per primary. Neurology consulted. 2. HFpEF: Echocardiogram reading pending. She appears euvolemic. Maintain fluid balance. 3. Hypertension: Blood pressure above goal. Will add Hydralazine. Continue to monitor. 4. Hyperlipidemia: Continue statin therapy. Thank you for involving us in the care of this patient. Please do not hesitate to call if additional questions arise.       Jas Hamlin Addendum:  Seen and evaluated. Agree with note and plan as noted above. No syncopal symptoms since presentation, fluid balance is improved.

## 2021-01-04 NOTE — H&P
History and Physical    Patient: Geri Maya MRN: [id number]  SSN: [id number]    YOB: 1965  Age: 54 y.o. Sex: female      Subjective:      Chief Complaint: Syncope. HPI: Geri Maya is a 54 y.o. female with past medical history of hypertension, COPD with chronic hypoxic respiratory failure on oxygen, seizure disorder, diastolic congestive heart failure and hyperlipidemia presenting to the ER with complaints of syncopal episode. Ms. Karyna Reyes got up from sitting to walk to the bathroom when she became dizzy and had syncopal episode. She denies obtaining any known injuries, incontinence or seizure activity. Ms. Karyna Reyes reports feeling weak and tired throughout the day. She denies recent fever, chills, chest pain, palpitations, nausea, vomiting, diarrhea or known sick contacts. On arrival to the ER, temperature was 98.5 °F, pulse 79, respirations 20, blood pressure 134/77 and oxygen saturation was 100%. Initial troponin was less than 0.05 and EKG does not have any evidence of acute ischemia. Chest x-ray suggests some mild pulmonary edema. Abnormal laboratory work includes potassium 2.4. In the emergency room, 1 L of normal saline was administered. Potassium 30 mEq IV has been ordered for hypokalemia. Hospital service has been asked to admit Ms. Karyna Reyes for further treatment and evaluation. Patiently currently lives with a roommate. She is a . Her daughter, Yang Noguera, can be reached at [phone]. Patient is a former smoker. She currently denies alcohol, illicit drug use or tobacco use. Ms. Karyna Reyes is a full code. Past medical history, past surgical history, family history, social history and home medication list was reviewed at the time of admission. This is Ms. Oswaldo Mccormick third admission to this facility over the past 6 weeks. Last admission was from November 18 to the 26th. Patient was treated for acute on chronic hypoxic respiratory failure secondary to CHF exacerbation. During hospital admission, patient was found to have severe renal artery stenosis. She was transferred to [facility] and had right renal artery stent placement. Past Medical History:   Diagnosis Date    Anxiety 10/2/2020    Chronic anemia     Chronic respiratory failure (HCC)     COPD (chronic obstructive pulmonary disease) with emphysema (HCC) 10/2/2020    Depression     Diastolic CHF (HCC)     Dysphagia 10/2/2020    GERD (gastroesophageal reflux disease)     High cholesterol     Hypertension     Hypothyroid     Iron deficiency anemia 10/2/2020    Mitral valve regurgitation 10/2/2020    Renal artery stenosis (HCC)     Seizure disorder (HCC)      Past Surgical History:   Procedure Laterality Date    HX CHOLECYSTECTOMY      HX RENAL ARTERY STENT      HX ROTATOR CUFF REPAIR Right     HX TUBAL LIGATION      IR THORACENTESIS CATH W IMAGE  11/24/2020    IR THORACENTESIS CATH W IMAGE  11/25/2020      Family History   Problem Relation Age of Onset    Hypertension Mother     Stroke Mother     Melanoma Mother     Stroke Father     Melanoma Brother     Melanoma Child      Social History     Tobacco Use    Smoking status: Former Smoker     Types: Cigarettes    Smokeless tobacco: Never Used    Tobacco comment: quit july 2020   Substance Use Topics    Alcohol use: Not Currently      Prior to Admission medications    Medication Sig Start Date End Date Taking? Authorizing Provider   spironolactone (ALDACTONE) 50 mg tablet Take 50 mg by mouth daily. 11/29/20  Yes Provider, Historical   clopidogreL (PLAVIX) 75 mg tab Take 75 mg by mouth daily. 12/11/20  Yes Provider, Historical   furosemide (LASIX) 10 mg/mL injection 4 mL by IntraVENous route every twelve (12) hours.  11/22/20   ANYA Fontenotgy Ellipta 100-62.5-25 mcg inhaler  11/12/20   Provider, Historical   albuterol (PROVENTIL HFA, VENTOLIN HFA, PROAIR HFA) 90 mcg/actuation inhaler  11/5/20   Provider, Historical   rosuvastatin (CRESTOR) 10 mg tablet  11/6/20   Provider, Historical   hydrALAZINE (APRESOLINE) 100 mg tablet Take 100 mg by mouth three (3) times daily. 11/6/20   Provider, Historical   potassium chloride (K-DUR, KLOR-CON) 20 mEq tablet Take 1 Tab by mouth daily. 11/5/20   Marco A Darling MD   NIFEdipine ER (PROCARDIA XL) 60 mg ER tablet Take 1 Tab by mouth daily. Provider, Historical   Oxygen 5 Devices as needed. Pt wears 5LPM as needed- states she uses it after an axiety attack    Provider, Historical   isosorbide dinitrate (ISORDIL) 40 mg tablet Take 1 Tab by mouth three (3) times daily. 10/2/20   Amber Andrade MD   albuterol-ipratropium (DUO-NEB) 2.5 mg-0.5 mg/3 ml nebu 3 mL by Nebulization route every six (6) hours. 10/2/20   Amber Andrade MD   labetaloL (NORMODYNE) 200 mg tablet Take 2 Tabs by mouth three (3) times daily. 10/2/20   Amber Andrade MD   ALPRAZolam Verjose c Geovanna) 0.25 mg tablet Take 0.25 mg by mouth three (3) times daily as needed for Anxiety. Provider, Historical   sertraline (Zoloft) 25 mg tablet Take 25 mg by mouth daily. Provider, Historical   levothyroxine (SYNTHROID) 25 mcg tablet Take 25 mcg by mouth Daily (before breakfast). Provider, Historical   levETIRAcetam (Keppra) 500 mg tablet Take 500 mg by mouth two (2) times a day. Provider, Historical   carBAMazepine, mood stabiliz, 200 mg CM12 Take 200 mg by mouth two (2) times a day. Provider, Historical   ferrous sulfate 325 mg (65 mg iron) tablet Take 325 mg by mouth Daily (before breakfast). Provider, Historical   folic acid (FOLVITE) 1 mg tablet Take 1 mg by mouth daily. Provider, Historical   aspirin delayed-release 81 mg tablet Take  by mouth daily. Provider, Historical   magnesium oxide (MAG-OX) 400 mg tablet Take 400 mg by mouth daily.     Provider, Historical        Allergies   Allergen Reactions    Sulfa (Sulfonamide Antibiotics) Anaphylaxis       Review of Systems:  Constitutional: Positive for fatigue and weakness. Denies fevers, chills, weakness, unexplained weight loss, night sweats. Head, Eyes, Ears, Nose, Mouth, Throat: Denies nasal congestion, sore throat, rhinorrhea, earache, ringing of the ears, difficulty hearing, facial pain, facial swelling. Respiratory:Positive for chronic shortness of breath. Denies wheezing, cough, sputum production, hemoptysis. Denies use of oxygen at home. Cardiovascular: Denies chest pain, irregular heart beat, racing pulse, lower extremity edema, dizziness, dyspnea on exertion, orthopnea. No lower extremity edema. Gastrointestinal: Denies nausea, vomiting, diarrhea, constipation, abdominal pain, loss of appetite, acid reflux, melena, hematochezia, change in bowel habits. Endocrine: Denies intolerance to heat or cold. Denies polyuria, polydipsia, polyphagia. Denies recent weight changes. Genitourinary: Denies increased urinary frequency, dysuria, hematuria, urinary incontinence, increased urinary frequency. Integument/Breast: Denies rash, itching or new skin lesions. Musculoskeletal: Denies joint swelling, joint pain, myalgias, neck pain, back pain. Neurological: Positive for syncope. No headaches, dizziness, confusion, tremors, numbness/tingling, paresthesias, weakness, problems with balance, loss of consciousness. Hematologic: Denies easy bleeding, easy bruising, lymphadenopathy. Behavioral/Psychiatric: Denies anxiety, depression, increased irritability, mood swings, delusions, hallucination, SI/HI. Objective:     Vitals:    01/03/21 1613 01/03/21 1858   BP:  134/77   Pulse:  79   Resp:  20   Temp:  98.5 °F (36.9 °C)   SpO2:  100%   Weight: 45.4 kg (100 lb)    Height: 5' 5\" (1.651 m)         Physical Exam:  General: Alert and Oriented x 3. Cooperative and friendly. No acute distress. Nourished and well developed. Appears chronically ill. Patient is on oxygen supplementation via nasal cannula. Head/Eyes: Normocephalic, atraumatic, EOMI, PERRLA. Nose/Mouth: Turbinates within normal limits, No drainage. Mucous membranes are moist. Poor dentition. Throat and Neck: No masses, JVD, thyromegaly or lymphadenopathy appreciated. Cervical spine has good range of motion without pain. Posterior pharynx was not assessed. Lungs: Diminished breath sounds without wheezes, rhonchi or crackles. Good air movement bilaterally. Symmetric chest rise with respirations. Heart: Regular rate and rhythm. Normal S1/S2. No appreciated murmurs, rubs or gallops. No lower extremity edema. Abdomen: Soft, non-tender, non-distended. Bowel sounds present in all four quadrants. No masses or hepatosplenomegaly appreciated. Extremities:  Atraumatic. Able to move all extremities symmetrically. No abnormal bony protuberances appreciated. Joints without swelling. Back: No pain with palpation over spinous processes or paraspinal musculature. No CVA tenderness. Skin: Clean, dry and intact without appreciated lesions. Neurologic: A&Ox3. Cranial nerves 2-12 are grossly intact. Intact sensation and motor strength in all 4 extremities. Facial features are symmetric. Speech is fluent and clear. No focal deficits appreciated. Psychiatric: Normal affect, normal thought process, good eye contact. Recent Results (from the past 24 hour(s))   CBC WITH AUTOMATED DIFF    Collection Time: 01/03/21  4:30 PM   Result Value Ref Range    WBC 9.2 3.6 - 11.0 K/uL    RBC 4.02 3.80 - 5.20 M/uL    HGB 12.7 11.5 - 16.0 g/dL    HCT 37.5 35.0 - 47.0 %    MCV 93.3 80.0 - 99.0 FL    MCH 31.6 26.0 - 34.0 PG    MCHC 33.9 30.0 - 36.5 g/dL    RDW 14.4 11.5 - 14.5 %    PLATELET 666 054 - 293 K/uL    MPV 9.3 8.9 - 12.9 FL    NEUTROPHILS 82 (H) 32 - 75 %    LYMPHOCYTES 10 (L) 12 - 49 %    MONOCYTES 7 5 - 13 %    EOSINOPHILS 1 0 - 7 %    BASOPHILS 0 0 - 1 %    IMMATURE GRANULOCYTES 0 0.0 - 0.5 %    ABS. NEUTROPHILS 7.6 1.8 - 8.0 K/UL    ABS. LYMPHOCYTES 0.9 0.8 - 3.5 K/UL    ABS.  MONOCYTES 0.7 0.0 - 1.0 K/UL ABS. EOSINOPHILS 0.1 0.0 - 0.4 K/UL    ABS. BASOPHILS 0.0 0.0 - 0.1 K/UL    ABS. IMM. GRANS. 0.0 0.00 - 0.04 K/UL    DF AUTOMATED     METABOLIC PANEL, COMPREHENSIVE    Collection Time: 01/03/21  4:30 PM   Result Value Ref Range    Sodium 132 (L) 136 - 145 mmol/L    Potassium 2.4 (LL) 3.5 - 5.1 mmol/L    Chloride 90 (L) 97 - 108 mmol/L    CO2 31 21 - 32 mmol/L    Anion gap 11 5 - 15 mmol/L    Glucose 109 (H) 65 - 100 mg/dL    BUN 12 6 - 20 mg/dL    Creatinine 1.15 (H) 0.55 - 1.02 mg/dL    BUN/Creatinine ratio 10 (L) 12 - 20      GFR est AA 59 (L) >60 ml/min/1.73m2    GFR est non-AA 49 (L) >60 ml/min/1.73m2    Calcium 9.8 8.5 - 10.1 mg/dL    Bilirubin, total 0.6 0.2 - 1.0 mg/dL    AST (SGOT) 29 15 - 37 U/L    ALT (SGPT) 25 12 - 78 U/L    Alk. phosphatase 134 (H) 45 - 117 U/L    Protein, total 8.4 (H) 6.4 - 8.2 g/dL    Albumin 3.5 3.5 - 5.0 g/dL    Globulin 4.9 (H) 2.0 - 4.0 g/dL    A-G Ratio 0.7 (L) 1.1 - 2.2     TROPONIN I    Collection Time: 01/03/21  4:30 PM   Result Value Ref Range    Troponin-I, Qt. <0.05 <0.05 ng/mL   MAGNESIUM    Collection Time: 01/03/21  6:15 PM   Result Value Ref Range    Magnesium 1.9 1.6 - 2.4 mg/dL       XR Results (maximum last 3): Results from Hospital Encounter encounter on 01/03/21   XR CHEST PORT    Narrative HISTORY:  syncope    TECHNIQUE:  AP chest radiograph    COMPARISON: 11/25/2020  LIMITATIONS: None    TUBES/LINES: None    LUNG PARENCHYMA: Overall the interstitial markings do appear increased but have  improved since prior examination. No focal airspace disease  TRACHEA/BRONCHI: Normal  PULMONARY VESSELS: Normal  PLEURA: Normal  HEART: Normal  AORTIC SHADOW:Normal.    MEDIASTINUM: Normal  BONE/SOFT TISSUES: No acute abnormality. OTHER: None      Impression IMPRESSION: Considerations for baseline interstitial lung disease or mild  pulmonary edema.    Results from [facility] encounter on 11/18/20   XR CHEST PORT    Narrative Chest, frontal view, 11/25/2020    History: Right thoracentesis. Comparison: Including chest 11/18/2020. Findings: The cardiac silhouette is enlarged. The lungs are adequately  expanded. Pulmonary vascular congestion and hydrostatic edema are improved as  compared to chest 11/18/2020. Small appearing pleural effusions and bibasilar  atelectasis are noted. These findings are improved on the right. Aeration of  the left base is also mildly improved. No pneumothorax is definitively  identified. The osseous structures are stable. Impression Impression: Interval improvement in hydrostatic edema. Pleural effusions and  bibasilar atelectasis, improved. No pneumothorax definitively identified. XR CHEST SNGL V    Narrative Single View Chest 11/18/2020    Comparison: November 4, 2020    Indication: Dyspnea. Findings:  Heart size is upper normal to borderline enlarged, stable. Aortic calcification. Increased opacity at both lung bases is consistent with pleural effusions with  airspace disease. This airspace disease appears slightly increased since prior. Pulmonary vascularity prominence, possible mild vascular congestion. No  pneumothorax. Impression Impression[de-identified]   1. Mild bilateral pleural effusions with mild bibasilar airspace disease,  atelectasis versus infection. Possible mild vascular congestion       CT Results (maximum last 3): Results from [facility] encounter on 11/18/20   CTA CHEST W OR W WO CONT    Narrative HISTORY:  Acute hypoxic respiratory failure  Dose reduction technique: All CT scans at this facility are performed using dose reduction optimization  technique as appropriate on the exam including the following: Automated exposure  control, adjustment of the MA and/or KV according to patient size of use of  iterative reconstructive technique. .    TECHNIQUE: Postcontrast CT angiogram of the chest is performed with maximum  intensity projection images (MIPS) generated. 100 cc [id number] injected. COMPARISON: None  LIMITATIONS: None    LINES/TUBES/MEDICAL SUPPORT DEVICES:   None      LUNG PARENCHYMA: Diffuse interstitial septal markings prominence. Bibasilar  consolidation overlies the effusions  TRACHEA/BRONCHI: Diffuse bronchial wall thickening. PULMONARY VESSELS: Normal. No definitive persistent central filling defects  identified on multiple contiguous images to diagnose pulmonary embolism. PLEURA: Large bilateral pleural effusions. MEDIASTINUM: Enlarged mediastinal lymph nodes are noted. HEART: Multichamber cardiomegaly. Dense three-vessel coronary arterial  calcifications. Mild diffuse pericardial effusion. AORTA/GREAT VESSELS: No aortic aneurysm or dissection. Mild diffuse aortic arch  calcifications and mild descending thoracic aortic calcifications. The upper  abdominal aorta demonstrates profound peripheral and endoluminal calcification  at the proximal level of the renal arteries but is only partially visualized. ESOPHAGUS: Normal  AXILLAE: Normal  BONES/TISSUES: No acute abnormality    UPPER ABDOMEN: Negative for acute abnormality. OTHER: None      Impression IMPRESSION: findings most suggestive of CHF/pulmonary edema with large bilateral  effusions and bibasilar alveolar edema versus compressive atelectasis in the  setting of the edema; the possibility of airspace component being due to  pneumonia could also be considered in the differential.. Axial and pulmonary  parenchymal interstitial edema. Mild diffuse pericardial effusion. Adenopathy in  the mediastinum could be related to increased fluid resorption but is  nonspecific. Three-vessel coronary arterial calcifications with no pulmonary  embolism or aortic aneurysm/dissection. There is however dense calcifications of  the upper abdominal aorta partially visualized including endoluminal component  and likely significantly narrowing the abdominal aorta.  Dedicated imaging would  be needed however to completely evaluate. Results from [facility] encounter on 11/02/20   CT ABD W CONT    Narrative CT dose reduction was achieved through use of a standardized protocol tailored  for this examination and automatic exposure control for dose modulation. Contrast study maximum intensity projections shows moderate to large pleural  effusions with compressive atelectasis. Moderate pericardial effusion, as  visualized. Liver, spleen, pancreas, are normal. Gallbladder is out. Adrenals and right  kidney are normal. Mild to moderate global atrophy of the left kidney, without  hydronephrosis. Small bowel abnormality, lymphadenopathy or free fluid    Heavily calcified aorta, without aneurysm or dissection. Celiac and SMA are  widely patent. There is severe heavily calcified irregular calcified stenosis of  the renal artery origins, left greater than right flow-limiting on both sides,  left more so than right, as well as likely flow limitation of the aorta at this  same level. There is heavy calcification continuing with high grade  flow-limiting heavily calcified stenosis of the common iliacs. MIGNON is open. No  abnormality is seen in the upper pelvis on this abdomen only exam. Advanced  degenerative changes in the spine      Impression IMPRESSION: Severe heavily calcified stenoses of the aorta, renal and iliac  arteries. Fluid overload with high-grade lower lobe collapse. Assessment:     Vero Carroll is a 54 y.o. female who presents with complaints of syncopal episode. Admit for further treatment and evaluation of syncopal episode. Syncopal episode may be secondary to polypharmacy (HTN, CHF), cardiac arrhythmia and/or orthostatic hypotension. Plan:     1. Admit to telemetry bed. 2. Trend troponin. Check lipid panel. 3. Order carotid doppler studies. 4. Check orthostatic vital signs. Place on fall precautions. 5. Abnormal laboratory work includes hypokalemia.  IV potassium supplements have been ordered in the ER. Continue to monitor electrolytes during hospital admission and replenish as needed. 6. Hor history of hypertension, continue home dose of hydralazine, labetalol and Nifedipine (with holding parameters). 7. For history of DCHF, continue home dose of isordil (with holding parameters). 8. For history of seizure disorder, continue home dose of Keppra and Tegretol. 9. For history of COPD and chronic hypoxic respiratory failure, continue home dose of Trelegy and order albuterol as needed for shortness of breath and wheezing. Continue oxygen supplementation via nasal cannula (uses oxygen at home). 10. For history of hyperlipidemia, continue home dose of Crestor. 11. For history of chronic anemia, continue home dose of FeSO4 and folic acid. 12.  For history of depression, continue home dose of zoloft. 13. For history of hypothyroidism, continue home dose of levothyroxine. GI PPX: Diet ordered. DVT PPX: Lovenox SQ.     Signed By: Luba Basurto MD     January 3, 2021

## 2021-01-04 NOTE — PROGRESS NOTES
PHYSICAL THERAPY EVALUATION  Patient: Frankie Rivera (70 y.o. female)  Date: 1/4/2021  Primary Diagnosis: Syncope [R55]  Hypokalemia [E87.6]        Precautions: falls  ASSESSMENT  This is a 55 y/o female came to  [facility]  with c/o syncopal episode , admitted on 1/3/21 for syncope and hypokalemia  . Pt. Received in semi-supine and agreeable for PT eval. Pt is A& O x 4 , as per pt. Report , she lives with boyfriend ( is alone during the day ) in a single story home with 4 steps to enter , no HR,IND with ADL's and IND  for functional transfers/mobility without AD prior to admission . Based on the objective data described below, the patient presents with decreased strength , 3+/5  grossly in  b/l LE ,  balance deficits , generalized weakness ,  decreased activity tolerance and need for increased assist with functional mobility ( needs mod I  for rolling from side to side  , mod I  for supine <>sit transfers , intact  static sitting balance , c/o dizziness . Deferred from Burnis Lilliam mobility at this time sec to pt with low potassium and orthostatic hypotension   ) . Recommend d/c to home with HHPT and family care when medically appropriate . Current Level of Function Impacting Discharge (mobility/balance): requires mod I for bed mobility . Functional Outcome Measure: The patient scored 15 on the AM PAC basic mobility outcome measure which is indicative of medium complexity . Other factors to consider for discharge: orthostatic hypotension    Patient will benefit from skilled therapy intervention to address the above noted impairments. PLAN :  Recommendations and Planned Interventions: transfer training, gait training, therapeutic exercises, neuromuscular re-education, patient and family training/education and therapeutic activities      Frequency/Duration: Patient will be followed by physical therapy:  5 times a week to address goals.     Recommendation for discharge: (in order for the patient to meet his/her long term goals)  HHPT    This discharge recommendation:  Has been made in collaboration with the attending provider and/or case management    IF patient discharges home will need the following DME: to be determined (TBD)         SUBJECTIVE:   Patient stated I feel weak     OBJECTIVE DATA SUMMARY:   HISTORY:    Past Medical History:   Diagnosis Date    Anxiety 10/2/2020    Chronic anemia     Chronic respiratory failure ([address]     COPD (chronic obstructive pulmonary disease) with emphysema ([address] 10/2/2020    Depression     Diastolic CHF ([address]     Dysphagia 10/2/2020    GERD (gastroesophageal reflux disease)     High cholesterol     Hypertension     Hypothyroid     Iron deficiency anemia 10/2/2020    Mitral valve regurgitation 10/2/2020    Renal artery stenosis (HCC)     Seizure disorder (HCC)      Past Surgical History:   Procedure Laterality Date    HX CHOLECYSTECTOMY      HX RENAL ARTERY STENT      HX ROTATOR CUFF REPAIR Right     HX TUBAL LIGATION      IR THORACENTESIS CATH W IMAGE  11/24/2020    IR THORACENTESIS CATH W IMAGE  11/25/2020       Personal factors and/or comorbidities impacting plan of care:   Home Situation  Home Environment: Private residence  # Steps to Enter: 4  Rails to Enter: No  One/Two Story Residence: One story  Living Alone: No  Support Systems: Friends \ neighbors(lives with boyfriend , is alone during the day )  Patient Expects to be Discharged to[de-identified] Independent living facility  Current DME Used/Available at Home: Cane, straight    PLOF: Pt IND for ADLS/IADLS, IND with mobility prior to admission. EXAMINATION/PRESENTATION/DECISION MAKING:   Critical Behavior:  Neurologic State: Alert  Orientation Level: Oriented X4  Hearing:   Auditory  Auditory Impairment: None  Skin: intact where exposed     Range Of Motion:  AROM: Within functional limits  Strength:    Strength: Generally decreased, functional(3+/5 grossly )  Tone & Sensation:   Tone: Normal   Sensation: Intact Coordination:  Coordination: Within functional limits  Functional Mobility:  Bed Mobility:  Rolling: Modified independent  Supine to Sit: Modified independent  Sit to Supine: Modified independent  Scooting: Modified independent  Balance:   Sitting: Intact; With support      Functional Measure:    [employer] AM-PAC 6 [facility]         Basic Mobility Inpatient Short Form  How much difficulty does the patient currently have. .. Unable A Lot A Little None   1. Turning over in bed (including adjusting bedclothes, sheets and blankets)? [] 1   [] 2   [] 3   [x] 4   2. Sitting down on and standing up from a chair with arms ( e.g., wheelchair, bedside commode, etc.)   [] 1   [] 2   [] 3   [x] 4   3. Moving from lying on back to sitting on the side of the bed? [] 1   [] 2   [] 3   [x] 4          How much help from another person does the patient currently need. .. Total A Lot A Little None   4. Moving to and from a bed to a chair (including a wheelchair)? [x] 1   [] 2   [] 3   [] 4   5. Need to walk in hospital room? [x] 1   [] 2   [] 3   [] 4   6. Climbing 3-5 steps with a railing? [x] 1   [] 2   [] 3   [] 4   © 2007, Trustees of [employer], under license to [employer] All rights reserved     Score:  Initial: 15 Most Recent: X (Date:1/4/21 -- )   Interpretation of Tool:  Represents activities that are increasingly more difficult (i.e. Bed mobility, Transfers, Gait).   Score 24 23 22-20 19-15 14-10 9-7 6   Modifier CH CI CJ CK CL CM CN          Physical Therapy Evaluation Charge Determination   History Examination Presentation Decision-Making   MEDIUM  Complexity : 1-2 comorbidities / personal factors will impact the outcome/ POC  LOW Complexity : 1-2 Standardized tests and measures addressing body structure, function, activity limitation and / or participation in recreation  MEDIUM Complexity : Evolving with changing characteristics  Other Functional Measure [facility] 6 medium complexity Based on the above components, the patient evaluation is determined to be of the following complexity level: LOW     Pain Ratin/10    Activity Tolerance:   Poor  Please refer to the flowsheet for vital signs taken during this treatment. After treatment patient left in no apparent distress:   Supine in bed and Call bell within reach    COMMUNICATION/EDUCATION:   The patients plan of care was discussed with: Registered nurse. Fall prevention education was provided and the patient/caregiver indicated understanding. and Patient/family agree to work toward stated goals and plan of care. Problem: Mobility Impaired (Adult and Pediatric)  Goal: *Acute Goals and Plan of Care (Insert Text)  Description: Pt will be I with LE HEP in 7 days. Pt will perform bed mobility independently in 7 days. Pt will perform transfers with mod I in 7 days. Pt will amb >150' feet with LRAD safely with mod I in 7 days.    Outcome: Not Met       Thank you for this referral.  Debbie Rosario   Time Calculation: 26 mins

## 2021-01-04 NOTE — ROUTINE PROCESS
Blanka Jackson TRANSFER - OUT REPORT: 
 
Verbal report given to [facility]  on Pablo Coelho  being transferred to [phone]  for routine progression of care Report consisted of patients Situation, Background, Assessment and  
Recommendations(SBAR). Information from the following report(s) SBAR was reviewed with the receiving nurse. Lines:  
Peripheral IV 01/03/21 Left Antecubital (Active) Opportunity for questions and clarification was provided. Patient transported with: 
 O2 @ 3 liters

## 2021-01-04 NOTE — CONSULTS
Neurology Consult Note    HPI  Ms. Dipika Singh is a 54 y.o.  female with a history significant for Seizures, HTN, HL, Hypothyroidism, CHF, COPD, Anxiety/Depression who presented with a syncopal episode. Per chart review, patient had gotten up from a seated position to walking to the bathroom when she became dizzy and had a loss of consciousness episode. There was no associated urinary or bowel incontinence or any seizure-like activity that was noted. After the event patient had generalized weakness that lasted almost the entire day. She had no other associated neurologic symptoms. In the ED, patient's chest x-ray was concerning for mild pulmonary edema and potassium was slightly low at 2.4. Patient was given normal saline in the ED and some potassium. Patient reports that she has been quite sick over the past year and that she has been in and out of the hospital.  She reports that she was in a coma at the beginning of 2020 at one point. She follows with Dr. Tu Miller [facility]) who manages her seizure medications and headache medications. Patient reports that she is doing okay in the hospital and does not really have any complaints at the current time. Patient lives at home. She is able to ambulate without assistance or device. She reports no history of strokes or neoplastic disorders that she is aware of. Home AEDs   Levetiracetam 500 BID  Carbamazepine 200 BID (for mood stabilization per chart review)     CTH WO  NAICA  CMIC    CUS  50-79% stenosis in the bilateral proximal ICAs worse on the left  Physiologically forward flowing bilateral vertebral arteries    TTE  EF 50-55%, LA normal, RA normal, no PFO      IMPRESSION  Ms. Dipika Singh is a 54 y.o.  female with the above history presented with syncopal episode. Patient was reportedly at home when she got up from a seated position and then had some dizziness followed by a syncopal episode.   She had no reported seizure-like activity or urinary/bowel incontinence. She felt generalized lethargy thereafter so she presented to the ED. Chest x-ray concerning for some mild pulmonary edema and low potassium otherwise no acute findings. Etiology of patient's syncopal episode seems most consistent with a hypovolemic or cardiac related episode and less likely related to a primary CNS etiology. Given patient's carotid ultrasound with significant stenosis in bilateral proximal ICAs, patient will benefit from initiation of dual antiplatelet therapy and vascular surgery consult. With lack of reported seizure-like activity this is less likely. TTE and CT of the head are reassuring thus far. We will continue patient on home levetiracetam.      RECOMMENDATIONS      1) Syncopal Episode  Associated: Positive Orthostatics  -Q4Hr NeuroChecks, TELE  -Neurovascular Prophylaxis: ASA 81, Clopidogrel 75, Atorvastatin 40   -PT/OT/ST as needed    2) Seizure History  -Seizure Precautions  -Continue levetiracetam 500 BID  -STAT IV Lorazepam 2 mg with any clinical seizure activity lasting > 3 minutes and contact Neurology for further recommendations      Diagnosis and plan was discussed extensively with patient who is in agreement with the above plan. They have been counseled regarding potential side effects of the interventions above and would like to proceed with the aforementioned plan. Review of Systems  A 14 point review was done and pertinent positives & negative findings are listed as part of the history of present illness, all other systems were reviewed and are negative. Physical/Neurological Exam  General: [de-identified]  female  Cardiovascular: normal rate and rhythm   Pulmonary: CTAB   Gastrointestinal/Abdomen: soft w/hypoactive bowel sounds   Skin: warm and dry      Patient awake, alert; following central and peripheral commands   No expressive or receptive aphasia;  No dysarthria   Pupils react to light bilaterally; EOM Intact   No visual field deficits on gross exam   Unable to visualize optic disc had a retinal vessels on funduscopic examination   Intact to light touch on face bilaterally   No facial droop   No gross hearing loss   Tongue is midline   Motor: 5/5 Throughout   FTN intact bilaterally   No abnormal movements   Normal tone throughout   No drift in BUE   Sensation to light touch intact grossly throughout   Reflexes: Trace in bilateral ankles, 2+ in bilateral knees and 2+ in bilateral brachioradialis  Toes equivocal bilaterally  Gait deferred       Past Medical History:   Diagnosis Date   • Anxiety 10/2/2020   • Chronic anemia    • Chronic respiratory failure (HCC)    • COPD (chronic obstructive pulmonary disease) with emphysema (HCC) 10/2/2020   • Depression    • Diastolic CHF (HCC)    • Dysphagia 10/2/2020   • GERD (gastroesophageal reflux disease)    • High cholesterol    • Hypertension    • Hypothyroid    • Iron deficiency anemia 10/2/2020   • Mitral valve regurgitation 10/2/2020   • Renal artery stenosis (HCC)    • Seizure disorder (HCC)       Past Surgical History:   Procedure Laterality Date   • HX CHOLECYSTECTOMY     • HX RENAL ARTERY STENT     • HX ROTATOR CUFF REPAIR Right    • HX TUBAL LIGATION     • IR THORACENTESIS CATH W IMAGE  11/24/2020   • IR THORACENTESIS CATH W IMAGE  11/25/2020     Allergies   Allergen Reactions   • Sulfa (Sulfonamide Antibiotics) Anaphylaxis     Family History   Problem Relation Age of Onset   • Hypertension Mother    • Stroke Mother    • Melanoma Mother    • Stroke Father    • Melanoma Brother    • Melanoma Child      Relationships   Social connections   • Talks on phone: Not on file   • Gets together: Not on file   • Attends [employer] service: Not on file   • Active member of club or organization: Not on file   • Attends meetings of clubs or organizations: Not on file   • Relationship status: Not on file         Medications  Current Outpatient Medications   Medication Instructions   •  albuterol (PROVENTIL HFA, VENTOLIN HFA, PROAIR HFA) 90 mcg/actuation inhaler No dose, route, or frequency recorded.  albuterol-ipratropium (DUO-NEB) 2.5 mg-0.5 mg/3 ml nebu 3 mL, Nebulization, EVERY 6 HOURS    ALPRAZolam (XANAX) 0.25 mg, Oral, 3 TIMES DAILY AS NEEDED    aspirin delayed-release 81 mg tablet Oral, DAILY    atorvastatin (LIPITOR) 80 mg, Oral, DAILY    carBAMazepine (mood stabiliz) 200 mg, Oral, 2 TIMES DAILY    clopidogreL (PLAVIX) 75 mg, Oral, DAILY    ferrous sulfate 325 mg, Oral, DAILY BEFORE BREAKFAST    folic acid (FOLVITE) 1 mg, Oral, DAILY    furosemide (LASIX) 40 mg, IntraVENous, EVERY 12 HOURS    hydrALAZINE (APRESOLINE) 100 mg, Oral, 3 TIMES DAILY    isosorbide dinitrate (ISORDIL) 40 mg, Oral, 3 TIMES DAILY    labetaloL (NORMODYNE) 400 mg, Oral, 3 TIMES DAILY    levETIRAcetam (KEPPRA) 500 mg, Oral, 2 TIMES DAILY    levothyroxine (SYNTHROID) 25 mcg, Oral, DAILY BEFORE BREAKFAST    magnesium oxide (MAG-OX) 400 mg, Oral, DAILY    NIFEdipine ER (PROCARDIA XL) 60 mg ER tablet 1 Tab, Oral, DAILY    nortriptyline (PAMELOR) 50 mg, Oral, EVERY BEDTIME    Oxygen 5 Devices, AS NEEDED, Pt wears 5LPM as needed- states she uses it after an axiety attack     potassium chloride (K-DUR, KLOR-CON) 20 mEq tablet 20 mEq, Oral, DAILY    rosuvastatin (CRESTOR) 10 mg tablet No dose, route, or frequency recorded.  sertraline (ZOLOFT) 25 mg, Oral, DAILY    spironolactone (ALDACTONE) 50 mg, Oral, DAILY    Trelegy Ellipta 100-62.5-25 mcg inhaler No dose, route, or frequency recorded.        Current Facility-Administered Medications   Medication Dose Route Frequency    albuterol (PROVENTIL HFA, VENTOLIN HFA, PROAIR HFA) inhaler 2 Puff  2 Puff Inhalation Q4H PRN    aspirin delayed-release tablet 81 mg  81 mg Oral DAILY    carBAMazepine (TEGretol) tablet 200 mg  200 mg Oral Q12H    ferrous sulfate tablet 325 mg  325 mg Oral ACB    folic acid (FOLVITE) tablet 1 mg  1 mg Oral DAILY    isosorbide dinitrate (ISORDIL) tablet 40 mg  40 mg Oral TID    labetaloL (NORMODYNE) tablet 400 mg  400 mg Oral TID    levETIRAcetam (KEPPRA) tablet 500 mg  500 mg Oral BID    levothyroxine (SYNTHROID) tablet 25 mcg  25 mcg Oral ACB    NIFEdipine ER (PROCARDIA XL) tablet 60 mg  60 mg Oral DAILY    atorvastatin (LIPITOR) tablet 20 mg  20 mg Oral QHS    sertraline (ZOLOFT) tablet 25 mg  25 mg Oral DAILY    ALPRAZolam (XANAX) tablet 0.25 mg  0.25 mg Oral TID PRN    potassium chloride (K-DUR, KLOR-CON) SR tablet 40 mEq  40 mEq Oral NOW    budesonide (PULMICORT) 500 mcg/2 ml nebulizer suspension  500 mcg Nebulization BID RT    potassium chloride (K-DUR, KLOR-CON) SR tablet 80 mEq  80 mEq Oral NOW    potassium chloride (K-DUR, KLOR-CON) SR tablet 40 mEq  40 mEq Oral Q2H    sodium chloride (NS) flush 5-40 mL  5-40 mL IntraVENous Q8H    sodium chloride (NS) flush 5-40 mL  5-40 mL IntraVENous PRN    acetaminophen (TYLENOL) tablet 650 mg  650 mg Oral Q6H PRN    Or    acetaminophen (TYLENOL) suppository 650 mg  650 mg Rectal Q6H PRN    polyethylene glycol (MIRALAX) packet 17 g  17 g Oral DAILY PRN    promethazine (PHENERGAN) tablet 12.5 mg  12.5 mg Oral Q6H PRN    Or    ondansetron (ZOFRAN) injection 4 mg  4 mg IntraVENous Q6H PRN    enoxaparin (LOVENOX) injection 40 mg  40 mg SubCUTAneous DAILY        Objective  Temp:  [98.1 °F (36.7 °C)-98.5 °F (36.9 °C)]   Pulse (Heart Rate):  [74-79]   BP: (134-183)/(76-79)   Resp Rate:  [17-20]   O2 Sat (%):  [98 %-100 %]   Weight:  [45.4 kg (100 lb)-45.4 kg (100 lb 1.4 oz)]   No intake or output data in the 24 hours ending 01/04/21 1253  Wt Readings from Last 3 Encounters:   01/04/21 45.4 kg (100 lb 1.4 oz)   11/25/20 53.9 kg (118 lb 13.3 oz)   11/02/20 45.4 kg (100 lb)        Labs  Recent Results (from the past 24 hour(s))   CBC WITH AUTOMATED DIFF    Collection Time: 01/03/21  4:30 PM   Result Value Ref Range    WBC 9.2 3.6 - 11.0 K/uL    RBC 4.02 3.80 - 5.20 M/uL HGB 12.7 11.5 - 16.0 g/dL    HCT 37.5 35.0 - 47.0 %    MCV 93.3 80.0 - 99.0 FL    MCH 31.6 26.0 - 34.0 PG    MCHC 33.9 30.0 - 36.5 g/dL    RDW 14.4 11.5 - 14.5 %    PLATELET 898 803 - 284 K/uL    MPV 9.3 8.9 - 12.9 FL    NEUTROPHILS 82 (H) 32 - 75 %    LYMPHOCYTES 10 (L) 12 - 49 %    MONOCYTES 7 5 - 13 %    EOSINOPHILS 1 0 - 7 %    BASOPHILS 0 0 - 1 %    IMMATURE GRANULOCYTES 0 0.0 - 0.5 %    ABS. NEUTROPHILS 7.6 1.8 - 8.0 K/UL    ABS. LYMPHOCYTES 0.9 0.8 - 3.5 K/UL    ABS. MONOCYTES 0.7 0.0 - 1.0 K/UL    ABS. EOSINOPHILS 0.1 0.0 - 0.4 K/UL    ABS. BASOPHILS 0.0 0.0 - 0.1 K/UL    ABS. IMM. GRANS. 0.0 0.00 - 0.04 K/UL    DF AUTOMATED     METABOLIC PANEL, COMPREHENSIVE    Collection Time: 01/03/21  4:30 PM   Result Value Ref Range    Sodium 132 (L) 136 - 145 mmol/L    Potassium 2.4 (LL) 3.5 - 5.1 mmol/L    Chloride 90 (L) 97 - 108 mmol/L    CO2 31 21 - 32 mmol/L    Anion gap 11 5 - 15 mmol/L    Glucose 109 (H) 65 - 100 mg/dL    BUN 12 6 - 20 mg/dL    Creatinine 1.15 (H) 0.55 - 1.02 mg/dL    BUN/Creatinine ratio 10 (L) 12 - 20      GFR est AA 59 (L) >60 ml/min/1.73m2    GFR est non-AA 49 (L) >60 ml/min/1.73m2    Calcium 9.8 8.5 - 10.1 mg/dL    Bilirubin, total 0.6 0.2 - 1.0 mg/dL    AST (SGOT) 29 15 - 37 U/L    ALT (SGPT) 25 12 - 78 U/L    Alk.  phosphatase 134 (H) 45 - 117 U/L    Protein, total 8.4 (H) 6.4 - 8.2 g/dL    Albumin 3.5 3.5 - 5.0 g/dL    Globulin 4.9 (H) 2.0 - 4.0 g/dL    A-G Ratio 0.7 (L) 1.1 - 2.2     TROPONIN I    Collection Time: 01/03/21  4:30 PM   Result Value Ref Range    Troponin-I, Qt. <0.05 <0.05 ng/mL   MAGNESIUM    Collection Time: 01/03/21  6:15 PM   Result Value Ref Range    Magnesium 1.9 1.6 - 2.4 mg/dL   URINALYSIS W/MICROSCOPIC    Collection Time: 01/03/21 10:30 PM   Result Value Ref Range    Color Yellow/Straw      Appearance Turbid (A) Clear      Specific gravity 1.010 1.003 - 1.030      pH (UA) 7.0 5.0 - 8.0      Protein >300 (A) Negative mg/dL    Glucose Negative Negative mg/dL Ketone Negative Negative mg/dL    Bilirubin Negative Negative      Blood Negative Negative      Urobilinogen 0.1 0.1 - 1.0 EU/dL    Nitrites Negative Negative      Leukocyte Esterase Negative Negative      WBC 0-4 0 - 4 /hpf    RBC 0-5 0 - 5 /hpf    Bacteria 1+ (A) Negative /hpf    Mucus Trace /lpf   DUPLEX CAROTID BILATERAL    Collection Time: 01/04/21  9:41 AM   Result Value Ref Range    Left CCA dist sys 87.8 cm/s    Left CCA dist foster 24.4 cm/s    Left CCA prox sys 114.0 cm/s    Left CCA prox foster 24.4 cm/s    Left ICA dist sys 152.0 cm/s    Left ICA dist foster 37.1 cm/s    Left ICA prox sys 177.0 cm/s    Left ICA prox foster 46.2 cm/s    Left ECA sys 66.8 cm/s    LEFT EXTERNAL CAROTID ARTERY D 10.50 cm/s    Left subclavian sys 139.0 cm/s    LEFT SUBCLAVIAN ARTERY D 0.00 cm/s    Left vertebral sys 84.8 cm/s    LEFT VERTEBRAL ARTERY D 17.60 cm/s    Right cca dist sys 87.8 cm/s    Right CCA dist foster 27.5 cm/s    Right CCA prox sys 90.1 cm/s    Right CCA prox foster 20.6 cm/s    Right ICA dist sys 116.0 cm/s    Right ICA dist foster 32.1 cm/s    Right ICA prox sys 221.0 cm/s    Right ICA prox foster 64.6 cm/s    Right eca sys 142.0 cm/s    RIGHT EXTERNAL CAROTID ARTERY D 26.70 cm/s    Right subclavian sys 106.0 cm/s    RIGHT SUBCLAVIAN ARTERY D 0.00 cm/s    Right vertebral sys 89.4 cm/s    RIGHT VERTEBRAL ARTERY D 22.20 cm/s   ECHO ADULT COMPLETE    Collection Time: 01/04/21 10:29 AM   Result Value Ref Range    Pulmonic Regurgitant End Max Velocity 120.00 cm/s    AoV PG 6.00 mmHg    IVSd 1.11 (A) 0.6 - 0.9 cm    LVIDd 4.80 3.9 - 5.3 cm    LVIDs 3.46 cm    Pulmonic Regurgitant End Max Velocity 81.40 cm/s    LVOT Peak Gradient 3.00 mmHg    LVPWd 1.37 (A) 0.6 - 0.9 cm    LV E' Septal Velocity 6.24 cm/s    LV ED Vol A2C 111.00 cm3    BP EF 54.2 55 - 100 %    LV ES Vol A2C 41.40 cm3    E/E' septal 15.03     LV Ejection Fraction MOD 2C 28 %    TV .00 mmHg    Mitral Regurgitant Velocity Time Integral 227.00 cm    Pulmonic Regurgitant End Max Velocity 611.00 cm/s    Mitral Valve E Wave Deceleration Time 187.00 ms    MV A Irvin 139.00 cm/s    MV E Irvin 93.80 cm/s    MV E/A 0.67     Pulmonic Regurgitant End Max Velocity 78.40 cm/s    Pulmonic Valve Systolic Peak Instantaneous Gradient 2.00 mmHg    P Vein A Dur 109.00 ms    Pulmonary Vein \"A\" Wave Velocity 31.30 cm/s    Est. RA Pressure 3.00 mmHg    RVIDd 2.63 cm    RVSP 26.00 mmHg    Tricuspid Valve Max Velocity 240.00 cm/s    Triscuspid Valve Regurgitation Peak Gradient 23.00 mmHg    LV Mass .6 67 - 162 g    LV Mass AL Index 155.8 43 - 95 g/m2   METABOLIC PANEL, BASIC    Collection Time: 01/04/21 10:45 AM   Result Value Ref Range    Sodium 134 (L) 136 - 145 mmol/L    Potassium 2.2 (LL) 3.5 - 5.1 mmol/L    Chloride 95 (L) 97 - 108 mmol/L    CO2 31 21 - 32 mmol/L    Anion gap 8 5 - 15 mmol/L    Glucose 103 (H) 65 - 100 mg/dL    BUN 18 6 - 20 mg/dL    Creatinine 1.06 (H) 0.55 - 1.02 mg/dL    BUN/Creatinine ratio 17 12 - 20      GFR est AA >60 >60 ml/min/1.73m2    GFR est non-AA 54 (L) >60 ml/min/1.73m2    Calcium 8.7 8.5 - 10.1 mg/dL   MAGNESIUM    Collection Time: 01/04/21 10:45 AM   Result Value Ref Range    Magnesium 1.9 1.6 - 2.4 mg/dL   CBC W/O DIFF    Collection Time: 01/04/21 10:45 AM   Result Value Ref Range    WBC 6.9 3.6 - 11.0 K/uL    RBC 3.24 (L) 3.80 - 5.20 M/uL    HGB 10.1 (L) 11.5 - 16.0 g/dL    HCT 30.2 (L) 35.0 - 47.0 %    MCV 93.2 80.0 - 99.0 FL    MCH 31.2 26.0 - 34.0 PG    MCHC 33.4 30.0 - 36.5 g/dL    RDW 14.5 11.5 - 14.5 %    PLATELET 453 983 - 694 K/uL    MPV 9.7 8.9 - 12.9 FL   TROPONIN I    Collection Time: 01/04/21 10:45 AM   Result Value Ref Range    Troponin-I, Qt. <0.05 <0.05 ng/mL          Significant Diagnostic Studies  All images independently visualized    XR CHEST PORT   Final Result   IMPRESSION: Considerations for baseline interstitial lung disease or mild   pulmonary edema.             This document has been prepared by the [employer] voice recognition system, typographical errors may have occurred.  Attempts have been made to correct errors, however inadvertent errors may persist.

## 2021-01-04 NOTE — PROGRESS NOTES
Put in order for two runs of potassium because patient was supposed to get 4 runs yesterday, only got 2, and the other 2 timed out.  Will give first one at 9 am.

## 2021-01-05 ENCOUNTER — APPOINTMENT (OUTPATIENT)
Dept: CT IMAGING | Age: 56
DRG: 024 | End: 2021-01-05
Attending: SURGERY
Payer: COMMERCIAL

## 2021-01-05 LAB
ANION GAP SERPL CALC-SCNC: 7 MMOL/L (ref 5–15)
BUN SERPL-MCNC: 15 MG/DL (ref 6–20)
BUN/CREAT SERPL: 15 (ref 12–20)
CA-I BLD-MCNC: 9.1 MG/DL (ref 8.5–10.1)
CHLORIDE SERPL-SCNC: 105 MMOL/L (ref 97–108)
CHOLEST SERPL-MCNC: 152 MG/DL
CO2 SERPL-SCNC: 27 MMOL/L (ref 21–32)
CREAT SERPL-MCNC: 1.03 MG/DL (ref 0.55–1.02)
ERYTHROCYTE [DISTWIDTH] IN BLOOD BY AUTOMATED COUNT: 15 % (ref 11.5–14.5)
GLUCOSE SERPL-MCNC: 121 MG/DL (ref 65–100)
HCT VFR BLD AUTO: 27.9 % (ref 35–47)
HDLC SERPL-MCNC: 80 MG/DL
HDLC SERPL: 1.9 {RATIO} (ref 0–5)
HGB BLD-MCNC: 9.1 G/DL (ref 11.5–16)
LDLC SERPL CALC-MCNC: 48.4 MG/DL (ref 0–100)
LEFT CCA DIST DIAS: 24.4 CM/S
LEFT CCA DIST SYS: 87.8 CM/S
LEFT CCA PROX DIAS: 24.4 CM/S
LEFT CCA PROX SYS: 114 CM/S
LEFT ECA DIAS: 10.5 CM/S
LEFT ECA SYS: 66.8 CM/S
LEFT ICA DIST DIAS: 37.1 CM/S
LEFT ICA DIST SYS: 152 CM/S
LEFT ICA PROX DIAS: 46.2 CM/S
LEFT ICA PROX SYS: 177 CM/S
LEFT SUBCLAVIAN DIAS: 0 CM/S
LEFT SUBCLAVIAN SYS: 139 CM/S
LEFT VERTEBRAL DIAS: 17.6 CM/S
LEFT VERTEBRAL SYS: 84.8 CM/S
LIPID PROFILE,FLP: NORMAL
MAGNESIUM SERPL-MCNC: 1.9 MG/DL (ref 1.6–2.4)
MCH RBC QN AUTO: 31.4 PG (ref 26–34)
MCHC RBC AUTO-ENTMCNC: 32.6 G/DL (ref 30–36.5)
MCV RBC AUTO: 96.2 FL (ref 80–99)
PLATELET # BLD AUTO: 272 K/UL (ref 150–400)
PMV BLD AUTO: 9.7 FL (ref 8.9–12.9)
POTASSIUM SERPL-SCNC: 4 MMOL/L (ref 3.5–5.1)
RBC # BLD AUTO: 2.9 M/UL (ref 3.8–5.2)
RIGHT CCA DIST DIAS: 27.5 CM/S
RIGHT CCA DIST SYS: 87.8 CM/S
RIGHT CCA PROX DIAS: 20.6 CM/S
RIGHT CCA PROX SYS: 90.1 CM/S
RIGHT ECA DIAS: 26.7 CM/S
RIGHT ECA SYS: 142 CM/S
RIGHT ICA DIST DIAS: 32.1 CM/S
RIGHT ICA DIST SYS: 116 CM/S
RIGHT ICA PROX DIAS: 64.6 CM/S
RIGHT ICA PROX SYS: 221 CM/S
RIGHT SUBCLAVIAN DIAS: 0 CM/S
RIGHT SUBCLAVIAN SYS: 106 CM/S
RIGHT VERTEBRAL DIAS: 22.2 CM/S
RIGHT VERTEBRAL SYS: 89.4 CM/S
SODIUM SERPL-SCNC: 139 MMOL/L (ref 136–145)
TRIGL SERPL-MCNC: 118 MG/DL (ref ?–150)
VLDLC SERPL CALC-MCNC: 23.6 MG/DL
WBC # BLD AUTO: 6.2 K/UL (ref 3.6–11)

## 2021-01-05 PROCEDURE — 74011250637 HC RX REV CODE- 250/637: Performed by: STUDENT IN AN ORGANIZED HEALTH CARE EDUCATION/TRAINING PROGRAM

## 2021-01-05 PROCEDURE — 97165 OT EVAL LOW COMPLEX 30 MIN: CPT

## 2021-01-05 PROCEDURE — 85027 COMPLETE CBC AUTOMATED: CPT

## 2021-01-05 PROCEDURE — 74011250637 HC RX REV CODE- 250/637: Performed by: NURSE PRACTITIONER

## 2021-01-05 PROCEDURE — 97530 THERAPEUTIC ACTIVITIES: CPT

## 2021-01-05 PROCEDURE — 74011250636 HC RX REV CODE- 250/636: Performed by: INTERNAL MEDICINE

## 2021-01-05 PROCEDURE — 83735 ASSAY OF MAGNESIUM: CPT

## 2021-01-05 PROCEDURE — 74011000250 HC RX REV CODE- 250: Performed by: INTERNAL MEDICINE

## 2021-01-05 PROCEDURE — 94640 AIRWAY INHALATION TREATMENT: CPT

## 2021-01-05 PROCEDURE — 74011250637 HC RX REV CODE- 250/637: Performed by: INTERNAL MEDICINE

## 2021-01-05 PROCEDURE — 80048 BASIC METABOLIC PNL TOTAL CA: CPT

## 2021-01-05 PROCEDURE — 94760 N-INVAS EAR/PLS OXIMETRY 1: CPT

## 2021-01-05 PROCEDURE — 36415 COLL VENOUS BLD VENIPUNCTURE: CPT

## 2021-01-05 PROCEDURE — 65270000029 HC RM PRIVATE

## 2021-01-05 RX ORDER — CLOPIDOGREL BISULFATE 75 MG/1
75 TABLET ORAL DAILY
Status: DISCONTINUED | OUTPATIENT
Start: 2021-01-05 | End: 2021-01-12 | Stop reason: HOSPADM

## 2021-01-05 RX ORDER — ATORVASTATIN CALCIUM 40 MG/1
40 TABLET, FILM COATED ORAL
Status: DISCONTINUED | OUTPATIENT
Start: 2021-01-05 | End: 2021-01-12 | Stop reason: HOSPADM

## 2021-01-05 RX ADMIN — ALPRAZOLAM 0.25 MG: 0.25 TABLET ORAL at 09:33

## 2021-01-05 RX ADMIN — ASPIRIN 81 MG: 81 TABLET, COATED ORAL at 09:30

## 2021-01-05 RX ADMIN — FERROUS SULFATE TAB 325 MG (65 MG ELEMENTAL FE) 325 MG: 325 (65 FE) TAB at 09:29

## 2021-01-05 RX ADMIN — BUDESONIDE 500 MCG: 0.5 INHALANT RESPIRATORY (INHALATION) at 21:18

## 2021-01-05 RX ADMIN — LEVETIRACETAM 500 MG: 500 TABLET ORAL at 09:30

## 2021-01-05 RX ADMIN — LABETALOL HYDROCHLORIDE 400 MG: 100 TABLET, FILM COATED ORAL at 09:29

## 2021-01-05 RX ADMIN — CARBAMAZEPINE 200 MG: 200 TABLET ORAL at 09:30

## 2021-01-05 RX ADMIN — NIFEDIPINE 60 MG: 60 TABLET, FILM COATED, EXTENDED RELEASE ORAL at 09:30

## 2021-01-05 RX ADMIN — ENOXAPARIN SODIUM 40 MG: 40 INJECTION SUBCUTANEOUS at 09:29

## 2021-01-05 RX ADMIN — LEVOTHYROXINE SODIUM 25 MCG: 0.03 TABLET ORAL at 09:33

## 2021-01-05 RX ADMIN — LABETALOL HYDROCHLORIDE 400 MG: 100 TABLET, FILM COATED ORAL at 15:50

## 2021-01-05 RX ADMIN — LEVETIRACETAM 500 MG: 500 TABLET ORAL at 19:37

## 2021-01-05 RX ADMIN — HYDRALAZINE HYDROCHLORIDE 100 MG: 50 TABLET, FILM COATED ORAL at 15:50

## 2021-01-05 RX ADMIN — ISOSORBIDE DINITRATE 40 MG: 20 TABLET ORAL at 15:50

## 2021-01-05 RX ADMIN — Medication 10 ML: at 23:26

## 2021-01-05 RX ADMIN — CARBAMAZEPINE 200 MG: 200 TABLET ORAL at 19:37

## 2021-01-05 RX ADMIN — ATORVASTATIN CALCIUM 40 MG: 40 TABLET, FILM COATED ORAL at 20:29

## 2021-01-05 RX ADMIN — HYDRALAZINE HYDROCHLORIDE 100 MG: 50 TABLET, FILM COATED ORAL at 09:29

## 2021-01-05 RX ADMIN — ISOSORBIDE DINITRATE 40 MG: 20 TABLET ORAL at 09:30

## 2021-01-05 RX ADMIN — CLOPIDOGREL BISULFATE 75 MG: 75 TABLET ORAL at 09:30

## 2021-01-05 RX ADMIN — Medication 10 ML: at 05:00

## 2021-01-05 RX ADMIN — LABETALOL HYDROCHLORIDE 400 MG: 100 TABLET, FILM COATED ORAL at 23:15

## 2021-01-05 RX ADMIN — FOLIC ACID 1 MG: 1 TABLET ORAL at 09:30

## 2021-01-05 RX ADMIN — BUDESONIDE 500 MCG: 0.5 INHALANT RESPIRATORY (INHALATION) at 08:56

## 2021-01-05 RX ADMIN — ALPRAZOLAM 0.25 MG: 0.25 TABLET ORAL at 19:37

## 2021-01-05 RX ADMIN — ISOSORBIDE DINITRATE 40 MG: 20 TABLET ORAL at 23:15

## 2021-01-05 RX ADMIN — SERTRALINE 25 MG: 50 TABLET, FILM COATED ORAL at 09:30

## 2021-01-05 RX ADMIN — Medication 10 ML: at 14:00

## 2021-01-05 NOTE — PROGRESS NOTES
Hospitalist Progress Note    Subjective:   Daily Progress Note: 1/5/2021 7:53 AM    Hospital Course:     Admitted 1/3/2021. Patient is 42-year-old female with a PMH significant for anxiety/depression, COPD, chronic hypoxia on home oxygen, diastolic HF, dysphagia, GERD, HLD, HTN, chronic iron deficiency anemia, MVR and seizure disorder. She comes into the emergency room with complaints of syncopal episode. States she become dizzy when she stands up from sitting to walking. Associated symptoms of weakness throughout the day. On admission potassium 2.4. She was admitted for further management of syncope, hypokalemia, hypomagnesium, orthostatic hypotension. Cardiology and neurology consulted. Serial cardiac enzymes negative for ischemia hemoglobin stable at 10.1. Subjective:  Examined patient at the bedside. She is eating breakfast this morning with no complaints.     Current Facility-Administered Medications   Medication Dose Route Frequency    clopidogreL (PLAVIX) tablet 75 mg  75 mg Oral DAILY    atorvastatin (LIPITOR) tablet 40 mg  40 mg Oral QHS    albuterol (PROVENTIL HFA, VENTOLIN HFA, PROAIR HFA) inhaler 2 Puff  2 Puff Inhalation Q4H PRN    aspirin delayed-release tablet 81 mg  81 mg Oral DAILY    carBAMazepine (TEGretol) tablet 200 mg  200 mg Oral Q12H    ferrous sulfate tablet 325 mg  325 mg Oral ACB    folic acid (FOLVITE) tablet 1 mg  1 mg Oral DAILY    isosorbide dinitrate (ISORDIL) tablet 40 mg  40 mg Oral TID    labetaloL (NORMODYNE) tablet 400 mg  400 mg Oral TID    levETIRAcetam (KEPPRA) tablet 500 mg  500 mg Oral BID    levothyroxine (SYNTHROID) tablet 25 mcg  25 mcg Oral ACB    NIFEdipine ER (PROCARDIA XL) tablet 60 mg  60 mg Oral DAILY    sertraline (ZOLOFT) tablet 25 mg  25 mg Oral DAILY    ALPRAZolam (XANAX) tablet 0.25 mg  0.25 mg Oral TID PRN    budesonide (PULMICORT) 500 mcg/2 ml nebulizer suspension  500 mcg Nebulization BID RT    hydrALAZINE (APRESOLINE) tablet 100 mg  100 mg Oral TID    sodium chloride (NS) flush 5-40 mL  5-40 mL IntraVENous Q8H    sodium chloride (NS) flush 5-40 mL  5-40 mL IntraVENous PRN    acetaminophen (TYLENOL) tablet 650 mg  650 mg Oral Q6H PRN    Or    acetaminophen (TYLENOL) suppository 650 mg  650 mg Rectal Q6H PRN    polyethylene glycol (MIRALAX) packet 17 g  17 g Oral DAILY PRN    promethazine (PHENERGAN) tablet 12.5 mg  12.5 mg Oral Q6H PRN    Or    ondansetron (ZOFRAN) injection 4 mg  4 mg IntraVENous Q6H PRN    enoxaparin (LOVENOX) injection 40 mg  40 mg SubCUTAneous DAILY        REVIEW OF SYSTEMS    Review of Systems   Constitutional: Positive for malaise/fatigue. HENT: Negative. Respiratory: Negative. Cardiovascular: Negative. Gastrointestinal: Negative. Genitourinary: Negative. Musculoskeletal: Negative. Neurological: Positive for dizziness, weakness and headaches. Objective:     Visit Vitals  BP (!) 146/77 (BP 1 Location: Left arm, BP Patient Position: At rest)   Pulse 73   Temp 97.6 °F (36.4 °C)   Resp 17   Ht 5' 4.96\" (1.65 m)   Wt 45.4 kg (100 lb 1.4 oz)   SpO2 99%   BMI 16.68 kg/m²    O2 Flow Rate (L/min): 3 l/min O2 Device: Room air    Temp (24hrs), Av.1 °F (36.7 °C), Min:97.6 °F (36.4 °C), Max:98.4 °F (36.9 °C)      No intake/output data recorded. No intake/output data recorded. PHYSICAL EXAM:    Physical Exam  Constitutional:       Appearance: She is ill-appearing. HENT:      Mouth/Throat:      Mouth: Mucous membranes are moist.   Eyes:      Extraocular Movements: Extraocular movements intact. Neck:      Musculoskeletal: Normal range of motion. Cardiovascular:      Rate and Rhythm: Normal rate. Pulmonary:      Effort: Pulmonary effort is normal.   Musculoskeletal: Normal range of motion. Skin:     General: Skin is warm and dry. Neurological:      Motor: Weakness present.           Data Review    Recent Results (from the past 24 hour(s))   [address]    Collection Time: 01/04/21  9:41 AM   Result Value Ref Range    Left CCA dist sys 87.8 cm/s    Left CCA dist foster 24.4 cm/s    Left CCA prox sys 114.0 cm/s    Left CCA prox foster 24.4 cm/s    Left ICA dist sys 152.0 cm/s    Left ICA dist foster 37.1 cm/s    Left ICA prox sys 177.0 cm/s    Left ICA prox foster 46.2 cm/s    Left ECA sys 66.8 cm/s    LEFT EXTERNAL CAROTID ARTERY D 10.50 cm/s    Left subclavian sys 139.0 cm/s    LEFT SUBCLAVIAN ARTERY D 0.00 cm/s    Left vertebral sys 84.8 cm/s    LEFT VERTEBRAL ARTERY D 17.60 cm/s    Right cca dist sys 87.8 cm/s    Right CCA dist foster 27.5 cm/s    Right CCA prox sys 90.1 cm/s    Right CCA prox foster 20.6 cm/s    Right ICA dist sys 116.0 cm/s    Right ICA dist foster 32.1 cm/s    Right ICA prox sys 221.0 cm/s    Right ICA prox foster 64.6 cm/s    Right eca sys 142.0 cm/s    RIGHT EXTERNAL CAROTID ARTERY D 26.70 cm/s    Right subclavian sys 106.0 cm/s    RIGHT SUBCLAVIAN ARTERY D 0.00 cm/s    Right vertebral sys 89.4 cm/s    RIGHT VERTEBRAL ARTERY D 22.20 cm/s   ECHO ADULT COMPLETE    Collection Time: 01/04/21 10:29 AM   Result Value Ref Range    Pulmonic Regurgitant End Max Velocity 120.00 cm/s    AoV PG 6.00 mmHg    IVSd 1.11 (A) 0.6 - 0.9 cm    LVIDd 4.80 3.9 - 5.3 cm    LVIDs 3.46 cm    Pulmonic Regurgitant End Max Velocity 81.40 cm/s    LVOT Peak Gradient 3.00 mmHg    LVPWd 1.37 (A) 0.6 - 0.9 cm    LV E' Septal Velocity 6.24 cm/s    LV ED Vol A2C 111.00 cm3    BP EF 54.2 55 - 100 %    LV ES Vol A2C 41.40 cm3    E/E' septal 15.03     LV Ejection Fraction MOD 2C 28 %    TV .00 mmHg    Mitral Regurgitant Velocity Time Integral 227.00 cm    Pulmonic Regurgitant End Max Velocity 611.00 cm/s    Mitral Valve E Wave Deceleration Time 187.00 ms    MV A Irvin 139.00 cm/s    MV E Irvin 93.80 cm/s    MV E/A 0.67     Pulmonic Regurgitant End Max Velocity 78.40 cm/s    Pulmonic Valve Systolic Peak Instantaneous Gradient 2.00 mmHg    P Vein A Dur 109.00 ms    Pulmonary Vein \"A\" Wave Velocity 31.30 cm/s    Est. RA Pressure 3.00 mmHg    RVIDd 2.63 cm    RVSP 26.00 mmHg    Tricuspid Valve Max Velocity 240.00 cm/s    Triscuspid Valve Regurgitation Peak Gradient 23.00 mmHg    LV Mass .6 67 - 162 g    LV Mass AL Index 155.8 43 - 95 g/m2   METABOLIC PANEL, BASIC    Collection Time: 01/04/21 10:45 AM   Result Value Ref Range    Sodium 134 (L) 136 - 145 mmol/L    Potassium 2.2 (LL) 3.5 - 5.1 mmol/L    Chloride 95 (L) 97 - 108 mmol/L    CO2 31 21 - 32 mmol/L    Anion gap 8 5 - 15 mmol/L    Glucose 103 (H) 65 - 100 mg/dL    BUN 18 6 - 20 mg/dL    Creatinine 1.06 (H) 0.55 - 1.02 mg/dL    BUN/Creatinine ratio 17 12 - 20      GFR est AA >60 >60 ml/min/1.73m2    GFR est non-AA 54 (L) >60 ml/min/1.73m2    Calcium 8.7 8.5 - 10.1 mg/dL   MAGNESIUM    Collection Time: 01/04/21 10:45 AM   Result Value Ref Range    Magnesium 1.9 1.6 - 2.4 mg/dL   CBC W/O DIFF    Collection Time: 01/04/21 10:45 AM   Result Value Ref Range    WBC 6.9 3.6 - 11.0 K/uL    RBC 3.24 (L) 3.80 - 5.20 M/uL    HGB 10.1 (L) 11.5 - 16.0 g/dL    HCT 30.2 (L) 35.0 - 47.0 %    MCV 93.2 80.0 - 99.0 FL    MCH 31.2 26.0 - 34.0 PG    MCHC 33.4 30.0 - 36.5 g/dL    RDW 14.5 11.5 - 14.5 %    PLATELET 913 426 - 558 K/uL    MPV 9.7 8.9 - 12.9 FL   TROPONIN I    Collection Time: 01/04/21 10:45 AM   Result Value Ref Range    Troponin-I, Qt. <0.05 <0.05 ng/mL   TROPONIN I    Collection Time: 01/04/21  4:15 PM   Result Value Ref Range    Troponin-I, Qt. <0.05 <0.05 ng/mL       CT HEAD WO CONT   Final Result   Impression:    1. No acute intracranial findings. XR CHEST PORT   Final Result   IMPRESSION: Considerations for baseline interstitial lung disease or mild   pulmonary edema. Active Problems:    Hypokalemia (10/2/2020)      Syncope (1/3/2021)        Assessment/Plan:     1. Syncope:  2.   Orthostatic hypotension:  CT of head negative for acute findings  Echo LVEF 50 to 55% mild concentric hypertrophy, moderate grade 2 diastolic dysfunction, moderate MVR, moderate TVR, possible stenosis in the descending aorta  Carotid Doppler studies right proximal ICA 50 to 79% blockage on higher end of stenosis, left proximal ICA 50 to 79% stenosis with heavier plaque than right  Serial cardiac enzymes negative  PT OT consulted  Cardiology consulted  Neurology consulted    3. Hypokalemia:  4. Hypomagnesium:  Replete and monitor    5. Hypertension:  Continue home hypertensive medications    6. Hypothyroid:  Continue home medications Synthroid    7. CHF with preserved EF:  No signs or symptoms of exacerbation    8. HLD:  Continue statin therapy    9. Seizure disorder:  Continue Tegretol and Keppra    10. Renal artery stenosis: Status post stent placement  Continue statin and Plavix and aspirin    11. Mitral valve regurg:  Cardiology consulting    12. COPD:  15.  Chronic hypoxia: On Home oxygen  No signs or symptoms of exacerbation at this time. Continue home neb Pulmicort    14. Chronic iron deficiency anemia:  Continue oral supplementation iron, folic acid    DVT Prophylaxis: Lovenox  Code Status: Full Code  POA/NOK: Shannan Cabral at #[phone]  ______________________________________________________________________________  Time spent in direct care including coordination of service, review of data and examination: > 35 minutes  Care Plan discussed with: RN and patient  ______________________________________________________________________________    Sarah Hudson NP    This is dictation was done by dragon, computer voice recognition software. Quite often unanticipated grammatical, syntax, homophones and other interpretive errors or inadvertently transcribed by the computer software. Please excuse errors that have escaped final proofreading. Thank you.

## 2021-01-05 NOTE — PROGRESS NOTES
PHYSICAL THERAPY TREATMENT  Patient: Monty Jung (19 y.o. female)  Date: 1/5/2021  Diagnosis: Syncope [R55]  Hypokalemia [E87.6] <principal problem not specified>       Precautions: Fall  Chart, physical therapy assessment, plan of care and goals were reviewed. ASSESSMENT  Patient continues with skilled PT services and is progressing towards goals. Pt. Semi supine in bed upon arrival and agreeable to therapy session. Hesitant for ambulation today but able to accomplish 125' in room at [facility]. Tolerated seated LE TE. No signs or symptoms today. Recommend DC to HHPT. Current Level of Function Impacting Discharge (mobility/balance): ENDURANCE    Other factors to consider for discharge: TBD         PLAN :  Patient continues to benefit from skilled intervention to address the above impairments. Continue treatment per established plan of care. to address goals.     Recommendation for discharge: (in order for the patient to meet his/her long term goals)  Physical therapy at least 2 days/week in the home     This discharge recommendation:  Has been made in collaboration with the attending provider and/or case management    IF patient discharges home will need the following DME: none       SUBJECTIVE:   Patient stated IM HUNGRY BUT I GOT TWO MORE HOURS TO WAIT.    OBJECTIVE DATA SUMMARY:   Critical Behavior:  Neurologic State: Alert  Orientation Level: Oriented X4  Cognition: Appropriate decision making  Safety/Judgement: Insight into deficits  Functional Mobility Training:  Bed Mobility:  Rolling: Supervision  Supine to Sit: Supervision  Sit to Supine: Supervision  Scooting: Supervision        Transfers:  Sit to Stand: Contact guard assistance  Stand to Sit: Contact guard assistance  Stand Pivot Transfers: Contact guard assistance     Bed to Chair: Contact guard assistance;Minimum assistance(EOB<> BSC)                    Balance:  Sitting: Intact  Standing: Intact  Ambulation/Gait Training:  Distance (ft): 125 Feet (ft)  Assistive Device: Gait belt  Ambulation - Level of Assistance: Contact guard assistance     Gait Description (WDL): Exceptions to [facility] [place]           Base of Support: Narrowed     Speed/Dariana: Slow                       Stairs: Therapeutic Exercises:   10X laq, ap, hip abd/add, march  Pain Ratin    Activity Tolerance:   Good  Please refer to the flowsheet for vital signs taken during this treatment. After treatment patient left in no apparent distress:   Supine in bed    COMMUNICATION/COLLABORATION:   The patients plan of care was discussed with: Physical therapy assistant.      Tanesha Dunn   Time Calculation: 25 mins

## 2021-01-05 NOTE — PROGRESS NOTES
CARDIOLOGY PROGRESS NOTE    Patient seen and examined. We are following for syncope. On examination she is lying in bed, no acute distress. She states she has been using bedside commode without dizziness or dyspnea. . She denies chest pain, sob, palpitations, and swelling. REVIEW OF SYSTEMS:  Per HPI above    Telemetry reviewed. Sinus rhythm in 70s       Physical examination:  Vital signs, Blood pressure 146/77,  Pulse 73  No apparent distress. Heart is regular, rate and rhythm. Normal S1, S2,  Murmur noted. Lungs are clear bilaterally. Abdomen is soft, nontender, normal bowel sounds. Extremities have no edema. Recent labs results and imaging reviewed. Potassium 4.0, magnesium 1.9  Echocardiogram  · LV: Estimated LVEF is 50 - 55%. Normal cavity size and systolic function (ejection fraction normal). Mild concentric hypertrophy. Wall motion: normal. Moderate (grade 2) left ventricular diastolic dysfunction. · AV: Aortic valve leaflet calcification present. · MV: Mitral valve thickening. Moderate mitral valve regurgitation is present. · TV: Moderate tricuspid valve regurgitation is present. · Possible stenosis in descending aorta. Carotid duplex  1. Bilateral subclavian artery has mild to moderate amount of mixture of heterogeneous and echodense plaque however both vessels patent. 2.  Bilateral common carotid artery shows intimal thickening and mild heterogeneous plaque. 3.  Right proximal ICA shows elevated peak systolic velocity moderately diffuse irregular echodense plaque,50 to 79% blockage more towards higher end of stenosis. 4.  Left proximal ICA shows moderately elevated peak systolic velocity with significantly heavier plaque, worse than right side, 50 to 79% stenosis. 5. both vertebral arteries patent and antegrade flow. 6.  CT angiogram may be helpful to further delineate plaque burden and morphology if clinically indicated    Discussed case with Dr. Yousuf Gasca.  This our impression and recommendation:    1. Syncope: Orthostatics positive on admission. Carotid duplex and echocardiogram results above. Troponin negative. Continue to monitor telemetry closely and keep serum potassium between 4-5 and serum magnesium >2. Repletion per primary. Followed by Neurology and Vascular    2. HFpEF: Echocardiogram shows preserved EF. She appears euvolemic. Maintain fluid balance. 3. Hypertension: Blood pressure acceptable. Continue medication treatment and monitoring. 4. Hyperlipidemia: Continue statin therapy. Please do not hesitate to call if additional questions arise.

## 2021-01-05 NOTE — PROGRESS NOTES
NEUROLOGY  PROGRESS NOTE    Admission History/Pertinent Events  Dina Malik is a 54y.o. year old female who presented on 1/3/2021. Patient has a past medical history of Seizures, HTN, HL, Hypothyroidism, CHF, COPD, Anxiety/Depression who presented with a syncopal episode. Per chart review, patient had gotten up from a seated position to walking to the bathroom when she became dizzy and had a loss of consciousness episode. There was no associated urinary or bowel incontinence or any seizure-like activity that was noted. After the event patient had generalized weakness that lasted almost the entire day. She had no other associated neurologic symptoms. In the ED, patient's chest x-ray was concerning for mild pulmonary edema and potassium was slightly low at 2.4. Patient was given normal saline in the ED and some potassium. Home AEDs   Levetiracetam 500 BID  Carbamazepine 200 BID (for mood stabilization per chart review)      ASSESSMENT/PLAN      Impression  Patient being followed by vascular surgery was obtaining CT of the neck for further characterization of ICA lesions. We will continue patient on dual antiplatelet therapy and statin therapy for neurovascular prophylaxis in the meantime. No further neurologic work-up is necessary at the current time.       CTH WO  NAICA  CMIC     CUS  50-79% stenosis in the bilateral proximal ICAs worse on the left  Physiologically forward flowing bilateral vertebral arteries     TTE  EF 50-55%, LA normal, RA normal, no PFO      Plan      1) Syncopal Episode  Associated: Positive Orthostatics  -Q4Hr NeuroChecks, TELE  -Neurovascular Prophylaxis: ASA 81, Clopidogrel 75, Atorvastatin 40   -PT/OT/ST as needed  -Vascular Surgery Consult following     2) Seizure History  -Seizure Precautions  -Continue levetiracetam 500 BID  -STAT IV Lorazepam 2 mg with any clinical seizure activity lasting > 3 minutes and contact Neurology for further recommendations        SUBJECTIVE Patient without any complaints this morning. No changes on neurological examination. Physical/Neurological Exam  General: [de-identified]  female      Patient awake, alert; following central and peripheral commands   No expressive or receptive aphasia;  No dysarthria   Pupils react to light bilaterally; EOM Intact   No visual field deficits on gross exam   Intact to light touch on face bilaterally   No facial droop   Motor: 5/5 Throughout   No abnormal movements   Sensation to light touch intact grossly throughout       OBJECTIVE  Vital Signs  Temp:  [97.6 °F (36.4 °C)-98.5 °F (36.9 °C)]   Pulse (Heart Rate):  [73-86]   BP: (118-183)/(74-79)   Resp Rate:  [17-20]   O2 Sat (%):  [98 %-100 %]   Weight:  [45.4 kg (100 lb)-45.4 kg (100 lb 1.4 oz)]     MEDICATIONS    Current Facility-Administered Medications:     clopidogreL (PLAVIX) tablet 75 mg, 75 mg, Oral, DAILY, Sobia Clements MD    atorvastatin (LIPITOR) tablet 40 mg, 40 mg, Oral, QHS, Moses Clements MD    albuterol (PROVENTIL HFA, VENTOLIN HFA, PROAIR HFA) inhaler 2 Puff, 2 Puff, Inhalation, Q4H PRN, Milagro Nix MD    aspirin delayed-release tablet 81 mg, 81 mg, Oral, DAILY, Leonardo Nix MD, 81 mg at 01/04/21 1320    carBAMazepine (TEGretol) tablet 200 mg, 200 mg, Oral, Q12H, Milagro Nix MD, 200 mg at 01/04/21 2203    ferrous sulfate tablet 325 mg, 325 mg, Oral, ACB, Milagro Nix MD, 325 mg at 06/28/94 0790    folic acid (FOLVITE) tablet 1 mg, 1 mg, Oral, DAILY, Milagro Nix MD, 1 mg at 01/04/21 1319    isosorbide dinitrate (ISORDIL) tablet 40 mg, 40 mg, Oral, TID, Manju Arevalo MD, 40 mg at 01/04/21 2202    labetaloL (NORMODYNE) tablet 400 mg, 400 mg, Oral, TID, Milagro Nix MD, 400 mg at 01/04/21 2202    levETIRAcetam (KEPPRA) tablet 500 mg, 500 mg, Oral, BID, Milagro Nix MD, 500 mg at 01/04/21 2202    levothyroxine (SYNTHROID) tablet 25 mcg, 25 mcg, Oral, ACB, Milagro Nix MD, 25 mcg at 01/04/21 1319    NIFEdipine ER (PROCARDIA XL) tablet 60 mg, 60 mg, Oral, DAILY, Milagro Nix MD, 60 mg at 01/04/21 1318    sertraline (ZOLOFT) tablet 25 mg, 25 mg, Oral, DAILY, Milagro Nix MD, 25 mg at 01/04/21 1320    ALPRAZolam Evaline Beams) tablet 0.25 mg, 0.25 mg, Oral, TID PRN, Gely Chen MD, 0.25 mg at 01/04/21 2206    budesonide (PULMICORT) 500 mcg/2 ml nebulizer suspension, 500 mcg, Nebulization, BID RT, Gely Chen MD, 500 mcg at 01/05/21 6801    hydrALAZINE (APRESOLINE) tablet 100 mg, 100 mg, Oral, TID, CARMENZA Norwood, 100 mg at 01/04/21 2202    sodium chloride (NS) flush 5-40 mL, 5-40 mL, IntraVENous, Q8H, Milagro Nix MD, 10 mL at 01/05/21 0500    sodium chloride (NS) flush 5-40 mL, 5-40 mL, IntraVENous, PRN, Obey Nix MD    acetaminophen (TYLENOL) tablet 650 mg, 650 mg, Oral, Q6H PRN, 650 mg at 01/04/21 0034 **OR** acetaminophen (TYLENOL) suppository 650 mg, 650 mg, Rectal, Q6H PRN, Milagro Nix MD    polyethylene glycol (MIRALAX) packet 17 g, 17 g, Oral, DAILY PRN, Milagro Nix MD    promethazine (PHENERGAN) tablet 12.5 mg, 12.5 mg, Oral, Q6H PRN **OR** ondansetron (ZOFRAN) injection 4 mg, 4 mg, IntraVENous, Q6H PRN, Milagro Nix MD    enoxaparin (LOVENOX) injection 40 mg, 40 mg, SubCUTAneous, DAILY, Gely Chen MD, 40 mg at 01/04/21 6979      Labs: I've reviewed the labs for today     This document has been prepared by the Dragon voice recognition system, typographical errors may have occurred.  Attempts have been made to correct errors, however inadvertent errors may persist.

## 2021-01-05 NOTE — PROGRESS NOTES
Problem: Self Care Deficits Care Plan (Adult)  Goal: *Acute Goals and Plan of Care (Insert Text)  Description: Pt will be independence sup<->sit in prep for EOB ADL's  Pt will be independence  LB dressing EOB level  Pt will be independence  sit EOB 10 minutes in prep for EOB ADL's  Pt will be independence  grooming EOB level  Pt will be modified independence  sit<-> prep for toilet transfer  Pt will be modified independence  BSC/toilet transfer with LRAD  Pt will be modified independence  toileting/cloth mgmt LRAD  Pt will be supervision grooming standing sink  Pt will be supervision bathing sitting/standing sink LRAD  Pt will be MI kimberly UE HEP in prep for self care tasks      Outcome: Not Met   OCCUPATIONAL THERAPY EVALUATION  Patient: Andrew Adame (80 y.o. female)  Date: 1/5/2021  Primary Diagnosis: Syncope [R55]  Hypokalemia [E87.6]        Precautions:  Fall    ASSESSMENT  Adm 33/2021, 60-year-old female with a PMH significant for anxiety/depression, COPD, chronic hypoxia on home oxygen, diastolic HF, dysphagia, GERD, HLD, HTN, chronic iron deficiency anemia, MVR and seizure disorder. She comes into the emergency room with complaints of syncopal episode. States she become dizzy when she stands up from sitting to walking. Associated symptoms of weakness throughout the day. On admission potassium 2.4. She was admitted for further management of syncope, hypokalemia, hypomagnesium, orthostatic hypotension. Cardiology and neurology consulted. Serial cardiac enzymes negative for ischemia hemoglobin stable at 10.1. Based on the objective data described below, the patient presents with generalized weakness, fatigue, deconditioning, decreased activity tolerance, limiting her functional mob, transfers, safety, self care. Pt axox4, reporting h/a at 6/10, agreeable to do what she could tolerate d/t low potassium. States been using bedpan but wanted to see about being able to use [facility], discussed w/ RN.  Pt supine<>sit SBA, w/cues to take time to decrease risk of dizziness, lightheadedness. Once EOB, pt reporting feeling \"alittle dizzy but not bad\". Had pt sit EOB a few minutes. Pt change socks w/ SBA/CGA using tailor sit tech. Pt perform EOB<>BSC transfer w/ close CGA. Discussed pt has to call RN when needs to use BSC, do not get up alone, pt agreed, RN aware. Pt sat BSC a few minutes before returning back to bed. Pt reports lives w/ BF, one story, 4STE, no rails, has cane but doesn't use, IND PTA, BF works in day. Current Level of Function Impacting Discharge (ADLs/self-care): close CGA EOB<>BSC transfer, general weakness, fatigue    Patient will benefit from skilled therapy intervention to address the above noted impairments. PLAN :  Recommendations and Planned Interventions: self care training, functional mobility training, therapeutic exercise, balance training, therapeutic activities, endurance activities, patient education and home safety training    Frequency/Duration: Patient will be followed by occupational therapy 5 times a week to address goals.     Recommendation for discharge: (in order for the patient to meet his/her long term goals)  Pending progress home w/home health vs IRF to return to full PLOF    This discharge recommendation:  Has not yet been discussed the attending provider and/or case management    IF patient discharges home will need the following DME: bedside commode       SUBJECTIVE:   Patient stated I really don't want  to use the bed pan if I can use the BSC\"    OBJECTIVE DATA SUMMARY:   HISTORY:   Past Medical History:   Diagnosis Date    Anxiety 10/2/2020    Chronic anemia     [facility] failure [facility] [address]     COPD (chronic obstructive pulmonary disease) with emphysema ([address] 10/2/2020    Depression     Diastolic CHF ([facility] [address]     Dysphagia 10/2/2020    GERD (gastroesophageal reflux disease)     High cholesterol     Hypertension     Hypothyroid     Iron deficiency anemia 10/2/2020    Mitral valve regurgitation 10/2/2020    Renal artery stenosis (HCC)     Seizure disorder (HCC)      Past Surgical History:   Procedure Laterality Date    HX CHOLECYSTECTOMY      HX RENAL ARTERY STENT      HX ROTATOR CUFF REPAIR Right     HX TUBAL LIGATION      IR THORACENTESIS CATH W IMAGE  11/24/2020    IR THORACENTESIS CATH W IMAGE  11/25/2020       Expanded or extensive additional review of patient history:     Home Situation  Home Environment: Private residence  # Steps to Enter: 4  Rails to Enter: No  One/Two Story Residence: One story  Living Alone: No  Support Systems: Spouse/Significant Other/Partner  Patient Expects to be Discharged to[de-identified] Independent living facility  Current DME Used/Available at Home: Cane, straight    PLOF: Pt IND for ADLS/IADLS, IND with mobility prior to admission. EXAMINATION OF PERFORMANCE DEFICITS:  Cognitive/Behavioral Status:  Neurologic State: Alert  Orientation Level: Oriented X4  Cognition: Appropriate decision making; Appropriate for age attention/concentration; Appropriate safety awareness; Follows commands        Safety/Judgement: Insight into deficits    Hearing: Auditory  Auditory Impairment: None                 Range of Motion:    AROM: Within functional limits                         Strength:    Strength: Generally decreased, functional        Tone & Sensation:    Tone: Normal  Sensation: Intact                      Balance:  Sitting: Intact  Standing: Impaired; With support    Functional Mobility and Transfers for ADLs:  Bed Mobility:  Supine to Sit: Supervision  Sit to Supine: Supervision  Scooting: Supervision    Transfers:  Sit to Stand: Contact guard assistance;Minimum assistance  Stand to Sit: Contact guard assistance;Minimum assistance  Bed to Chair: Contact guard assistance;Minimum assistance(EOB<> BSC)  Toilet Transfer : Contact guard assistance;Minimum assistance(EOB<> BSC)    ADL Assessment:       Oral Facial Hygiene/Grooming: Supervision               ADL Intervention and task modifications:       Grooming  Grooming Assistance: Supervision  Position Performed: Seated edge of bed                   Lower Body Dressing Assistance  Socks: Supervision;Contact guard assistance  Leg Crossed Method Used: Yes  Position Performed: Seated edge of bed         Cognitive Retraining  Safety/Judgement: Insight into deficits      [facility] AM-PAC \"6 Clicks\"                                                       Daily Activity Inpatient Short Form  How much help from another person does the patient currently need. .. Total; A Lot A Little None   1. Putting on and taking off regular lower body clothing? []  1 []  2 [x]  3 []  4   2. Bathing (including washing, rinsing, drying)? []  1 []  2 [x]  3 []  4   3. Toileting, which includes using toilet, bedpan or urinal? [] 1 []  2 [x]  3 []  4   4. Putting on and taking off regular upper body clothing? []  1 []  2 []  3 [x]  4   5. Taking care of personal grooming such as brushing teeth? []  1 []  2 []  3 [x]  4   6. Eating meals? []  1 []  2 []  3 [x]  4   © 2007, Trustees of [facility], under license to [employer]. All rights reserved     Score: 21/24     Interpretation of Tool:  Represents clinically-significant functional categories (i.e. Activities of daily living). Percentage of Impairment CH    0%   CI    1-19% CJ    20-39% CK    40-59% CL    60-79% CM    80-99% CN     100%   [facility]  Score 6-24 24 23 20-22 15-19 10-14 7-9 6        Occupational Therapy Evaluation Charge Determination   History Examination Decision-Making   LOW Complexity : Brief history review  LOW Complexity : 1-3 performance deficits relating to physical, cognitive , or psychosocial skils that result in activity limitations and / or participation restrictions  MEDIUM Complexity : Patient may present with comorbidities that affect occupational performnce.  Miniml to moderate modification of tasks or assistance (eg, physical or verbal ) with assesment(s) is necessary to enable patient to complete evaluation       Based on the above components, the patient evaluation is determined to be of the following complexity level: LOW   Pain Ratin/10 headache    Activity Tolerance:   Fair and requires rest breaks  Please refer to the flowsheet for vital signs taken during this treatment. After treatment patient left in no apparent distress:    Supine in bed and Call bell within reach    COMMUNICATION/EDUCATION:   The patients plan of care was discussed with: Physical therapist and Registered nurse. Home safety education was provided and the patient/caregiver indicated understanding. and Patient/family agree to work toward stated goals and plan of care. This patients plan of care is appropriate for delegation to [facility].     Thank you for this referral.  Diane Carson  Time Calculation: 41 mins

## 2021-01-06 ENCOUNTER — APPOINTMENT (OUTPATIENT)
Dept: CT IMAGING | Age: 56
DRG: 024 | End: 2021-01-06
Attending: SURGERY
Payer: COMMERCIAL

## 2021-01-06 LAB
ANION GAP SERPL CALC-SCNC: 5 MMOL/L (ref 5–15)
BASOPHILS # BLD: 0 K/UL (ref 0–0.1)
BASOPHILS NFR BLD: 0 % (ref 0–1)
BUN SERPL-MCNC: 21 MG/DL (ref 6–20)
BUN/CREAT SERPL: 21 (ref 12–20)
CA-I BLD-MCNC: 8.9 MG/DL (ref 8.5–10.1)
CHLORIDE SERPL-SCNC: 103 MMOL/L (ref 97–108)
CO2 SERPL-SCNC: 28 MMOL/L (ref 21–32)
CREAT SERPL-MCNC: 1 MG/DL (ref 0.55–1.02)
DIFFERENTIAL METHOD BLD: ABNORMAL
EOSINOPHIL # BLD: 0.2 K/UL (ref 0–0.4)
EOSINOPHIL NFR BLD: 3 % (ref 0–7)
ERYTHROCYTE [DISTWIDTH] IN BLOOD BY AUTOMATED COUNT: 15 % (ref 11.5–14.5)
GLUCOSE SERPL-MCNC: 92 MG/DL (ref 65–100)
HCT VFR BLD AUTO: 27.1 % (ref 35–47)
HGB BLD-MCNC: 8.6 G/DL (ref 11.5–16)
IMM GRANULOCYTES # BLD AUTO: 0 K/UL (ref 0–0.04)
IMM GRANULOCYTES NFR BLD AUTO: 0 % (ref 0–0.5)
LYMPHOCYTES # BLD: 1.3 K/UL (ref 0.8–3.5)
LYMPHOCYTES NFR BLD: 26 % (ref 12–49)
MCH RBC QN AUTO: 30.8 PG (ref 26–34)
MCHC RBC AUTO-ENTMCNC: 31.7 G/DL (ref 30–36.5)
MCV RBC AUTO: 97.1 FL (ref 80–99)
MONOCYTES # BLD: 0.5 K/UL (ref 0–1)
MONOCYTES NFR BLD: 10 % (ref 5–13)
NEUTS SEG # BLD: 3.1 K/UL (ref 1.8–8)
NEUTS SEG NFR BLD: 61 % (ref 32–75)
PLATELET # BLD AUTO: 253 K/UL (ref 150–400)
PMV BLD AUTO: 9.5 FL (ref 8.9–12.9)
POTASSIUM SERPL-SCNC: 3.7 MMOL/L (ref 3.5–5.1)
RBC # BLD AUTO: 2.79 M/UL (ref 3.8–5.2)
SODIUM SERPL-SCNC: 136 MMOL/L (ref 136–145)
WBC # BLD AUTO: 5.1 K/UL (ref 3.6–11)

## 2021-01-06 PROCEDURE — 74011250637 HC RX REV CODE- 250/637: Performed by: NURSE PRACTITIONER

## 2021-01-06 PROCEDURE — 74011250637 HC RX REV CODE- 250/637: Performed by: INTERNAL MEDICINE

## 2021-01-06 PROCEDURE — 94760 N-INVAS EAR/PLS OXIMETRY 1: CPT

## 2021-01-06 PROCEDURE — 74011000636 HC RX REV CODE- 636: Performed by: INTERNAL MEDICINE

## 2021-01-06 PROCEDURE — 74011250636 HC RX REV CODE- 250/636: Performed by: INTERNAL MEDICINE

## 2021-01-06 PROCEDURE — 94640 AIRWAY INHALATION TREATMENT: CPT

## 2021-01-06 PROCEDURE — 36415 COLL VENOUS BLD VENIPUNCTURE: CPT

## 2021-01-06 PROCEDURE — 74011000250 HC RX REV CODE- 250: Performed by: INTERNAL MEDICINE

## 2021-01-06 PROCEDURE — 97530 THERAPEUTIC ACTIVITIES: CPT

## 2021-01-06 PROCEDURE — 74011250637 HC RX REV CODE- 250/637: Performed by: STUDENT IN AN ORGANIZED HEALTH CARE EDUCATION/TRAINING PROGRAM

## 2021-01-06 PROCEDURE — 80048 BASIC METABOLIC PNL TOTAL CA: CPT

## 2021-01-06 PROCEDURE — 85025 COMPLETE CBC W/AUTO DIFF WBC: CPT

## 2021-01-06 PROCEDURE — 70498 CT ANGIOGRAPHY NECK: CPT

## 2021-01-06 PROCEDURE — 65270000029 HC RM PRIVATE

## 2021-01-06 RX ORDER — PANTOPRAZOLE SODIUM 40 MG/1
40 TABLET, DELAYED RELEASE ORAL
Status: DISCONTINUED | OUTPATIENT
Start: 2021-01-06 | End: 2021-01-12 | Stop reason: HOSPADM

## 2021-01-06 RX ORDER — NORTRIPTYLINE HYDROCHLORIDE 50 MG/1
50 CAPSULE ORAL
Status: DISCONTINUED | OUTPATIENT
Start: 2021-01-06 | End: 2021-01-12 | Stop reason: HOSPADM

## 2021-01-06 RX ORDER — ESOMEPRAZOLE MAGNESIUM 40 MG/1
40 CAPSULE, DELAYED RELEASE ORAL
COMMUNITY
End: 2022-03-20

## 2021-01-06 RX ORDER — BUTALBITAL, ACETAMINOPHEN AND CAFFEINE 50; 325; 40 MG/1; MG/1; MG/1
2 TABLET ORAL
Status: DISCONTINUED | OUTPATIENT
Start: 2021-01-06 | End: 2021-01-12 | Stop reason: HOSPADM

## 2021-01-06 RX ADMIN — HYDRALAZINE HYDROCHLORIDE 100 MG: 50 TABLET, FILM COATED ORAL at 08:34

## 2021-01-06 RX ADMIN — ATORVASTATIN CALCIUM 40 MG: 40 TABLET, FILM COATED ORAL at 19:56

## 2021-01-06 RX ADMIN — ALPRAZOLAM 0.25 MG: 0.25 TABLET ORAL at 19:56

## 2021-01-06 RX ADMIN — BUDESONIDE 500 MCG: 0.5 INHALANT RESPIRATORY (INHALATION) at 19:12

## 2021-01-06 RX ADMIN — CLOPIDOGREL BISULFATE 75 MG: 75 TABLET ORAL at 08:34

## 2021-01-06 RX ADMIN — ISOSORBIDE DINITRATE 40 MG: 20 TABLET ORAL at 19:56

## 2021-01-06 RX ADMIN — ISOSORBIDE DINITRATE 40 MG: 20 TABLET ORAL at 17:30

## 2021-01-06 RX ADMIN — ENOXAPARIN SODIUM 40 MG: 40 INJECTION SUBCUTANEOUS at 08:33

## 2021-01-06 RX ADMIN — ASPIRIN 81 MG: 81 TABLET, COATED ORAL at 08:33

## 2021-01-06 RX ADMIN — FERROUS SULFATE TAB 325 MG (65 MG ELEMENTAL FE) 325 MG: 325 (65 FE) TAB at 08:33

## 2021-01-06 RX ADMIN — HYDRALAZINE HYDROCHLORIDE 100 MG: 50 TABLET, FILM COATED ORAL at 17:30

## 2021-01-06 RX ADMIN — LEVOTHYROXINE SODIUM 25 MCG: 0.03 TABLET ORAL at 08:33

## 2021-01-06 RX ADMIN — CARBAMAZEPINE 200 MG: 200 TABLET ORAL at 08:33

## 2021-01-06 RX ADMIN — SERTRALINE 25 MG: 50 TABLET, FILM COATED ORAL at 08:33

## 2021-01-06 RX ADMIN — ACETAMINOPHEN 650 MG: 325 TABLET, FILM COATED ORAL at 08:34

## 2021-01-06 RX ADMIN — Medication 10 ML: at 05:43

## 2021-01-06 RX ADMIN — LEVETIRACETAM 500 MG: 500 TABLET ORAL at 20:00

## 2021-01-06 RX ADMIN — PANTOPRAZOLE SODIUM 40 MG: 40 TABLET, DELAYED RELEASE ORAL at 09:09

## 2021-01-06 RX ADMIN — IOPAMIDOL 100 ML: 755 INJECTION, SOLUTION INTRAVENOUS at 13:56

## 2021-01-06 RX ADMIN — ISOSORBIDE DINITRATE 40 MG: 20 TABLET ORAL at 08:33

## 2021-01-06 RX ADMIN — LABETALOL HYDROCHLORIDE 400 MG: 100 TABLET, FILM COATED ORAL at 20:00

## 2021-01-06 RX ADMIN — Medication 10 ML: at 21:00

## 2021-01-06 RX ADMIN — LABETALOL HYDROCHLORIDE 400 MG: 100 TABLET, FILM COATED ORAL at 08:34

## 2021-01-06 RX ADMIN — BUTALBITAL, ACETAMINOPHEN, AND CAFFEINE 2 TABLET: 50; 325; 40 TABLET ORAL at 17:30

## 2021-01-06 RX ADMIN — FOLIC ACID 1 MG: 1 TABLET ORAL at 08:33

## 2021-01-06 RX ADMIN — BUDESONIDE 500 MCG: 0.5 INHALANT RESPIRATORY (INHALATION) at 07:37

## 2021-01-06 RX ADMIN — HYDRALAZINE HYDROCHLORIDE 50 MG: 50 TABLET, FILM COATED ORAL at 19:56

## 2021-01-06 RX ADMIN — NORTRIPTYLINE HYDROCHLORIDE 50 MG: 25 CAPSULE ORAL at 20:08

## 2021-01-06 RX ADMIN — LABETALOL HYDROCHLORIDE 400 MG: 100 TABLET, FILM COATED ORAL at 17:30

## 2021-01-06 RX ADMIN — Medication 10 ML: at 14:01

## 2021-01-06 RX ADMIN — NIFEDIPINE 60 MG: 60 TABLET, FILM COATED, EXTENDED RELEASE ORAL at 08:34

## 2021-01-06 RX ADMIN — PANTOPRAZOLE SODIUM 40 MG: 40 TABLET, DELAYED RELEASE ORAL at 17:30

## 2021-01-06 RX ADMIN — LEVETIRACETAM 500 MG: 500 TABLET ORAL at 08:34

## 2021-01-06 RX ADMIN — CARBAMAZEPINE 200 MG: 200 TABLET ORAL at 19:56

## 2021-01-06 NOTE — CONSULTS
History and Physical    Chief complaints: Carotid artery stenosis   History of Presenting Illness:  Anson Sarkar is a 54 y.o. pleasant woman currently hospitalized with a syncopal episode. Patient is actually well-known to me from previous hospital encounter. Patient had symptomatic right renal artery stenosis with congestive heart failure, pulmonary edema for which she underwent right renal artery stent stent placement. 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 Patient's respiratory symptoms resolved after stent was placed. Patient currently still on 3 different antihypertensive medications. Patient is also apparently developed intermittent hypotension. Patient apparently felt dizzy and fell lost consciousness which requires hospital admission. Patient is currently doing fine without any discomfort. Patient had a carotid duplex ultrasound shows bilateral carotid artery moderate stenosis. For which I was consulted. I reviewed the patient's vital signs patient had maintained blood pressure 657Z systolic, yesterday there was episodes of blood pressure systolic 470C. Patient currently denies any chest pain shortness of breath or dyspnea. Patient states that she is very comfortable.     Past Medical History:   Diagnosis Date    Anxiety 10/2/2020    Chronic anemia     Chronic respiratory failure (HCC)     COPD (chronic obstructive pulmonary disease) with emphysema ([address] 10/2/2020    Depression     Diastolic CHF (HCC)     Dysphagia 10/2/2020    GERD (gastroesophageal reflux disease)     High cholesterol     Hypertension     Hypothyroid     Iron deficiency anemia 10/2/2020    Mitral valve regurgitation 10/2/2020    Renal artery stenosis (HCC)     Seizure disorder (HCC)       Past Surgical History:   Procedure Laterality Date    HX CHOLECYSTECTOMY      HX RENAL ARTERY STENT      HX ROTATOR CUFF REPAIR Right     HX TUBAL LIGATION      IR THORACENTESIS CATH W IMAGE  11/24/2020    IR THORACENTESIS CATH W IMAGE  11/25/2020     Family History   Problem Relation Age of Onset    Hypertension Mother     Stroke Mother     Melanoma Mother     Stroke Father     Melanoma Brother     Melanoma Child       Social History     Tobacco Use    Smoking status: Former Smoker     Types: Cigarettes    Smokeless tobacco: Never Used    Tobacco comment: quit july 2020   Substance Use Topics    Alcohol use: Not Currently       Prior to Admission medications    Medication Sig Start Date End Date Taking? Authorizing Provider   spironolactone (ALDACTONE) 50 mg tablet Take 50 mg by mouth daily. 11/29/20  Yes Provider, Historical   clopidogreL (PLAVIX) 75 mg tab Take 75 mg by mouth daily. 12/11/20  Yes Provider, Historical   nortriptyline (PAMELOR) 50 mg capsule Take 50 mg by mouth nightly. Yes Other, MD Iain   atorvastatin (LIPITOR) 80 mg tablet Take 80 mg by mouth daily. Yes Other, MD Iain   hydrALAZINE (APRESOLINE) 100 mg tablet Take 100 mg by mouth three (3) times daily. 11/6/20  Yes Provider, Historical   NIFEdipine ER (PROCARDIA XL) 60 mg ER tablet Take 1 Tab by mouth daily. Yes Provider, Historical   Oxygen 5 Devices as needed. Pt wears 5LPM as needed- states she uses it after an axiety attack   Yes Provider, Historical   labetaloL (NORMODYNE) 200 mg tablet Take 2 Tabs by mouth three (3) times daily. 10/2/20  Yes Sheyla De La Torre MD   ALPRAZolam Rusty Ayala) 0.25 mg tablet Take 0.25 mg by mouth three (3) times daily as needed for Anxiety. Yes Provider, Historical   sertraline (Zoloft) 25 mg tablet Take 25 mg by mouth daily. Yes Provider, Historical   levothyroxine (SYNTHROID) 25 mcg tablet Take 25 mcg by mouth Daily (before breakfast). Yes Provider, Historical   levETIRAcetam (Keppra) 500 mg tablet Take 500 mg by mouth two (2) times a day. Yes Provider, Historical   carBAMazepine, mood stabiliz, 200 mg CM12 Take 200 mg by mouth two (2) times a day.    Yes Provider, Historical   ferrous sulfate 325 mg (65 mg iron) tablet Take 325 mg by mouth Daily (before breakfast). Yes Provider, Historical   folic acid (FOLVITE) 1 mg tablet Take 1 mg by mouth daily. Yes Provider, Historical   aspirin delayed-release 81 mg tablet Take  by mouth daily. Yes Provider, Historical   furosemide (LASIX) 10 mg/mL injection 4 mL by IntraVENous route every twelve (12) hours. 11/22/20   ANYA Prathergy Ellipta 100-62.5-25 mcg inhaler  11/12/20   Provider, Historical   albuterol (PROVENTIL HFA, VENTOLIN HFA, PROAIR HFA) 90 mcg/actuation inhaler  11/5/20   Provider, Historical   rosuvastatin (CRESTOR) 10 mg tablet  11/6/20   Provider, Historical   potassium chloride (K-DUR, KLOR-CON) 20 mEq tablet Take 1 Tab by mouth daily. 11/5/20   Sharri Han MD   isosorbide dinitrate (ISORDIL) 40 mg tablet Take 1 Tab by mouth three (3) times daily. 10/2/20   Dl Alejandre MD   albuterol-ipratropium (DUO-NEB) 2.5 mg-0.5 mg/3 ml nebu 3 mL by Nebulization route every six (6) hours. 10/2/20   Dl Alejandre MD   magnesium oxide (MAG-OX) 400 mg tablet Take 400 mg by mouth daily. Provider, Historical     Allergies   Allergen Reactions    Sulfa (Sulfonamide Antibiotics) Anaphylaxis        Review of Systems:  Pertinent review of systems discussed in HPI, and rest of organ systems personally reviewed and they are negative. Objective:   Vital signs reviewed: Most recent blood pressure systolic 446, heart rates 80s respirations 16 oximetry 98% room air. Physical Exam:   General appearance: Pleasant well-nourished. Eyes pupils equal gaze appropriate. ENT: No hoarse voice oral mucosa is well hydrated. Cardiovascular system regular rate rhythm. Pulmonary no audible wheeze  Chest wall excursion normal with respiration cycle. Abdomen soft nontender distended. Skin is warm and moist.  Neurologically: None focal.  Musculoskeletal system: Moving all 4 extremities.   Hematologic system no bruise or lymphadenopathy. Psychosocial: Appropriate and cooperative. Vascular exam: No carotid bruit, distally well perfused. Data Review: Labs are reviewed. Discussed  Admission on 01/03/2021   Component Date Value Ref Range Status    WBC 01/03/2021 9.2  3.6 - 11.0 K/uL Final    RBC 01/03/2021 4.02  3.80 - 5.20 M/uL Final    HGB 01/03/2021 12.7  11.5 - 16.0 g/dL Final    HCT 01/03/2021 37.5  35.0 - 47.0 % Final    MCV 01/03/2021 93.3  80.0 - 99.0 FL Final    MCH 01/03/2021 31.6  26.0 - 34.0 PG Final    MCHC 01/03/2021 33.9  30.0 - 36.5 g/dL Final    RDW 01/03/2021 14.4  11.5 - 14.5 % Final    PLATELET 76/02/2906 041  150 - 400 K/uL Final    MPV 01/03/2021 9.3  8.9 - 12.9 FL Final    NEUTROPHILS 01/03/2021 82* 32 - 75 % Final    LYMPHOCYTES 01/03/2021 10* 12 - 49 % Final    MONOCYTES 01/03/2021 7  5 - 13 % Final    EOSINOPHILS 01/03/2021 1  0 - 7 % Final    BASOPHILS 01/03/2021 0  0 - 1 % Final    IMMATURE GRANULOCYTES 01/03/2021 0  0.0 - 0.5 % Final    ABS. NEUTROPHILS 01/03/2021 7.6  1.8 - 8.0 K/UL Final    ABS. LYMPHOCYTES 01/03/2021 0.9  0.8 - 3.5 K/UL Final    ABS. MONOCYTES 01/03/2021 0.7  0.0 - 1.0 K/UL Final    ABS. EOSINOPHILS 01/03/2021 0.1  0.0 - 0.4 K/UL Final    ABS. BASOPHILS 01/03/2021 0.0  0.0 - 0.1 K/UL Final    ABS. IMM.  GRANS. 01/03/2021 0.0  0.00 - 0.04 K/UL Final    DF 01/03/2021 AUTOMATED    Final    Sodium 01/03/2021 132* 136 - 145 mmol/L Final    Potassium 01/03/2021 2.4* 3.5 - 5.1 mmol/L Final    Results verified, phoned to and read back by [email or web address]    Chloride 01/03/2021 90* 97 - 108 mmol/L Final    CO2 01/03/2021 31  21 - 32 mmol/L Final    Anion gap 01/03/2021 11  5 - 15 mmol/L Final    Glucose 01/03/2021 109* 65 - 100 mg/dL Final    BUN 01/03/2021 12  6 - 20 mg/dL Final    Creatinine 01/03/2021 1.15* 0.55 - 1.02 mg/dL Final    BUN/Creatinine ratio 01/03/2021 10* 12 - 20   Final    GFR est AA 01/03/2021 59* >60 ml/min/1.73m2 Final  GFR est non-AA 01/03/2021 49* >60 ml/min/1.73m2 Final    Comment: Estimated GFR is calculated using the IDMS-traceable Modification of Diet in Renal Disease (MDRD) Study equation, reported for both  Americans (GFRAA) and non- Americans (GFRNA), and normalized to 1.73m2 body surface area. The physician must decide which value applies to the patient. The MDRD study equation should only be used in individuals age 25 or older. It has not been validated for the following: pregnant women, patients with serious comorbid conditions, or on certain medications, or persons with extremes of body size, muscle mass, or nutritional status.  Calcium 01/03/2021 9.8  8.5 - 10.1 mg/dL Final    Bilirubin, total 01/03/2021 0.6  0.2 - 1.0 mg/dL Final    AST (SGOT) 01/03/2021 29  15 - 37 U/L Final    ALT (SGPT) 01/03/2021 25  12 - 78 U/L Final    Alk.  phosphatase 01/03/2021 134* 45 - 117 U/L Final    Protein, total 01/03/2021 8.4* 6.4 - 8.2 g/dL Final    Albumin 01/03/2021 3.5  3.5 - 5.0 g/dL Final    Globulin 01/03/2021 4.9* 2.0 - 4.0 g/dL Final    A-G Ratio 01/03/2021 0.7* 1.1 - 2.2   Final    Ventricular Rate 01/03/2021 79  BPM Final    Atrial Rate 01/03/2021 79  BPM Final    P-R Interval 01/03/2021 136  ms Final    QRS Duration 01/03/2021 84  ms Final    Q-T Interval 01/03/2021 484  ms Final    QTC Calculation (Bezet) 01/03/2021 554  ms Final    Calculated P Axis 01/03/2021 81  degrees Final    Calculated R Axis 01/03/2021 77  degrees Final    Calculated T Axis 01/03/2021 90  degrees Final    Diagnosis 01/03/2021    Final                    Value:Normal sinus rhythm  Biatrial enlargement  Left ventricular hypertrophy with repolarization abnormality  Prolonged QT  Abnormal ECG  When compared with ECG of 18-NOV-2020 19:57,  Nonspecific T wave abnormality no longer evident in Inferior leads  Nonspecific T wave abnormality now evident in Lateral leads  QT has lengthened  Confirmed by Edilberto Hansen ([phone]) on 1/4/2021 3:11:24 PM      Troponin-I, Qt. 01/03/2021 <0.05  <0.05 ng/mL Final    Comment:  The presence of detectable troponin above the reference range indicates myocardial injury which may be dueto ischemia, myocarditis, trauma, etc. Clinical correlation is necessary to establish the significance of this finding. Sequential testing is recommended to determine if the typical rise and fall of cTnI is demonstrated. Note:  Cardiac troponin I has a relatively long half life and may be present well after the CK MB has returned to baseline. The reference range is based on the 99th   percentile of the referent population.        Color 01/03/2021 Yellow/Straw    Final    Color Reference Range: Straw, Yellow or Dark Yellow    Appearance 01/03/2021 Turbid* Clear   Final    Specific gravity 01/03/2021 1.010  1.003 - 1.030   Final    pH (UA) 01/03/2021 7.0  5.0 - 8.0   Final    Protein 01/03/2021 >300* Negative mg/dL Final    Glucose 01/03/2021 Negative  Negative mg/dL Final    Ketone 01/03/2021 Negative  Negative mg/dL Final    Bilirubin 01/03/2021 Negative  Negative   Final    Blood 01/03/2021 Negative  Negative   Final    Urobilinogen 01/03/2021 0.1  0.1 - 1.0 EU/dL Final    Nitrites 01/03/2021 Negative  Negative   Final    Leukocyte Esterase 01/03/2021 Negative  Negative   Final    WBC 01/03/2021 0-4  0 - 4 /hpf Final    RBC 01/03/2021 0-5  0 - 5 /hpf Final    Bacteria 01/03/2021 1+* Negative /hpf Final    Mucus 01/03/2021 Trace  /lpf Final    Magnesium 01/03/2021 1.9  1.6 - 2.4 mg/dL Final    Sodium 01/04/2021 134* 136 - 145 mmol/L Final    Potassium 01/04/2021 2.2* 3.5 - 5.1 mmol/L Final    Results verified, phoned to and read back by Criselda Lawrence RN ON 1/4/2021 @1159/MAB    Chloride 01/04/2021 95* 97 - 108 mmol/L Final    CO2 01/04/2021 31  21 - 32 mmol/L Final    Anion gap 01/04/2021 8  5 - 15 mmol/L Final    Glucose 01/04/2021 103* 65 - 100 mg/dL Final    BUN 01/04/2021 18  6 - 20 mg/dL Final    Creatinine 01/04/2021 1.06* 0.55 - 1.02 mg/dL Final    BUN/Creatinine ratio 01/04/2021 17  12 - 20   Final    GFR est AA 01/04/2021 >60  >60 ml/min/1.73m2 Final    GFR est non-AA 01/04/2021 54* >60 ml/min/1.73m2 Final    Calcium 01/04/2021 8.7  8.5 - 10.1 mg/dL Final    Magnesium 01/04/2021 1.9  1.6 - 2.4 mg/dL Final    WBC 01/04/2021 6.9  3.6 - 11.0 K/uL Final    RBC 01/04/2021 3.24* 3.80 - 5.20 M/uL Final    HGB 01/04/2021 10.1* 11.5 - 16.0 g/dL Final    Investigated per delta check protocol    HCT 01/04/2021 30.2* 35.0 - 47.0 % Final    MCV 01/04/2021 93.2  80.0 - 99.0 FL Final    MCH 01/04/2021 31.2  26.0 - 34.0 PG Final    MCHC 01/04/2021 33.4  30.0 - 36.5 g/dL Final    RDW 01/04/2021 14.5  11.5 - 14.5 % Final    PLATELET 57/42/4803 532  150 - 400 K/uL Final    MPV 01/04/2021 9.7  8.9 - 12.9 FL Final    LIPID PROFILE 01/04/2021      Final    Cholesterol, total 01/04/2021 152  <200 mg/dL Final    Triglyceride 01/04/2021 118  <150 mg/dL Final    Comment: Based on NCEP-ATP III:  Triglycerides <150 mg/dL  is considered  normal, 150-199 mg/dL  borderline high,  200-499 mg/dL high and   greater than or equal to 500 mg/dL very high.  HDL Cholesterol 01/04/2021 80  mg/dL Final    Comment: Based on NCEP ATP III, HDL Cholesterol <40 mg/dL is considered low  and >60 mg/dL is elevated.       LDL, calculated 01/04/2021 48.4  0 - 100 mg/dL Final    Comment: Based on the NCEP-ATP: LDL-C concentrations are considered  optimal  <100 mg/dL,  near optimal/above Normal 100-129 mg/dL  Borderline High: 130-159, High: 160-189 mg/dL  Very High: Greater than or equal to 190 mg/dL      VLDL, calculated 01/04/2021 23.6  mg/dL Final    CHOL/HDL Ratio 01/04/2021 1.9  0.0 - 5.0   Final    Troponin-I, Qt. 01/04/2021 <0.05  <0.05 ng/mL Final    Left CCA dist sys 01/04/2021 87.8  cm/s Final    Left CCA dist foster 01/04/2021 24.4  cm/s Final    Left CCA prox sys 01/04/2021 114.0  cm/s Final    Left CCA prox foster 01/04/2021 24.4  cm/s Final    Left ICA dist sys 01/04/2021 152.0  cm/s Final    Left ICA dist foster 01/04/2021 37.1  cm/s Final    Left ICA prox sys 01/04/2021 177.0  cm/s Final    Left ICA prox foster 01/04/2021 46.2  cm/s Final    Left ECA sys 01/04/2021 66.8  cm/s Final    LEFT EXTERNAL CAROTID ARTERY D 01/04/2021 10.50  cm/s Final    Left subclavian sys 01/04/2021 139.0  cm/s Final    LEFT SUBCLAVIAN ARTERY D 01/04/2021 0.00  cm/s Final    Left vertebral sys 01/04/2021 84.8  cm/s Final    LEFT VERTEBRAL ARTERY D 01/04/2021 17.60  cm/s Final    Right cca dist sys 01/04/2021 87.8  cm/s Final    Right CCA dist foster 01/04/2021 27.5  cm/s Final    Right CCA prox sys 01/04/2021 90.1  cm/s Final    Right CCA prox foster 01/04/2021 20.6  cm/s Final    Right ICA dist sys 01/04/2021 116.0  cm/s Final    Right ICA dist foster 01/04/2021 32.1  cm/s Final    Right ICA prox sys 01/04/2021 221.0  cm/s Final    Right ICA prox foster 01/04/2021 64.6  cm/s Final    Right eca sys 01/04/2021 142.0  cm/s Final    RIGHT EXTERNAL CAROTID ARTERY D 01/04/2021 26.70  cm/s Final    Right subclavian sys 01/04/2021 106.0  cm/s Final    RIGHT SUBCLAVIAN ARTERY D 01/04/2021 0.00  cm/s Final    Right vertebral sys 01/04/2021 89.4  cm/s Final    RIGHT VERTEBRAL ARTERY D 01/04/2021 22.20  cm/s Final    Pulmonic Regurgitant End Max Veloc* 01/04/2021 120.00  cm/s Final    AoV PG 01/04/2021 6.00  mmHg Final    IVSd 01/04/2021 1.11* 0.6 - 0.9 cm Final    LVIDd 01/04/2021 4.80  3.9 - 5.3 cm Final    LVIDs 01/04/2021 3.46  cm Final    Pulmonic Regurgitant End Max Veloc* 01/04/2021 81.40  cm/s Final    LVOT Peak Gradient 01/04/2021 3.00  mmHg Final    LVPWd 01/04/2021 1.37* 0.6 - 0.9 cm Final    LV E' Septal Velocity 01/04/2021 6.24  cm/s Final    LV ED Vol A2C 01/04/2021 111.00  cm3 Final    BP EF 01/04/2021 54.2  55 - 100 % Final    LV ES Vol A2C 01/04/2021 41.40  cm3 Final    E/E' septal 01/04/2021 15.03 Final    LV Ejection Fraction MOD 2C 01/04/2021 28  % Final    TV MG 01/04/2021 103.00  mmHg Final    Mitral Regurgitant Velocity Time I* 01/04/2021 227.00  cm Final    Pulmonic Regurgitant End Max Veloc* 01/04/2021 611.00  cm/s Final    Mitral Valve E Wave Deceleration T* 01/04/2021 187.00  ms Final    MV A Irvin 01/04/2021 139.00  cm/s Final    MV E Irvin 01/04/2021 93.80  cm/s Final    MV E/A 01/04/2021 0.67   Final    Pulmonic Regurgitant End Max Veloc* 01/04/2021 78.40  cm/s Final    Pulmonic Valve Systolic Peak Insta* 63/45/5680 2.00  mmHg Final    P Vein A Dur 01/04/2021 109.00  ms Final    Pulmonary Vein \"A\" Wave Velocity 01/04/2021 31.30  cm/s Final    Est. RA Pressure 01/04/2021 3.00  mmHg Final    RVIDd 01/04/2021 2.63  cm Final    RVSP 01/04/2021 26.00  mmHg Final    Tricuspid Valve Max Velocity 01/04/2021 240.00  cm/s Final    Triscuspid Valve Regurgitation Pea* 01/04/2021 23.00  mmHg Final    LV Mass AL 01/04/2021 229.6  67 - 162 g Final    LV Mass AL Index 01/04/2021 155.8  43 - 95 g/m2 Final    Troponin-I, Qt. 01/04/2021 <0.05  <0.05 ng/mL Final    WBC 01/05/2021 6.2  3.6 - 11.0 K/uL Final    RBC 01/05/2021 2.90* 3.80 - 5.20 M/uL Final    HGB 01/05/2021 9.1* 11.5 - 16.0 g/dL Final    HCT 01/05/2021 27.9* 35.0 - 47.0 % Final    MCV 01/05/2021 96.2  80.0 - 99.0 FL Final    MCH 01/05/2021 31.4  26.0 - 34.0 PG Final    MCHC 01/05/2021 32.6  30.0 - 36.5 g/dL Final    RDW 01/05/2021 15.0* 11.5 - 14.5 % Final    PLATELET 67/79/8198 538  150 - 400 K/uL Final    MPV 01/05/2021 9.7  8.9 - 12.9 FL Final    Magnesium 01/05/2021 1.9  1.6 - 2.4 mg/dL Final    Sodium 01/05/2021 139  136 - 145 mmol/L Final    Potassium 01/05/2021 4.0  3.5 - 5.1 mmol/L Final    Investigated per delta check protocol    Chloride 01/05/2021 105  97 - 108 mmol/L Final    CO2 01/05/2021 27  21 - 32 mmol/L Final    Anion gap 01/05/2021 7  5 - 15 mmol/L Final    Glucose 01/05/2021 121* 65 - 100 mg/dL Final  BUN 01/05/2021 15  6 - 20 mg/dL Final    Creatinine 01/05/2021 1.03* 0.55 - 1.02 mg/dL Final    BUN/Creatinine ratio 01/05/2021 15  12 - 20   Final    GFR est AA 01/05/2021 >60  >60 ml/min/1.73m2 Final    GFR est non-AA 01/05/2021 56* >60 ml/min/1.73m2 Final    Calcium 01/05/2021 9.1  8.5 - 10.1 mg/dL Final           Imagings reviewed: discussed as below. Assessment:     Active Problems:    Hypokalemia (10/2/2020)      Syncope (1/3/2021)        Plan:   Patient doing very well in respiratory problems from previous pulmonary edema secondary to complication from renal artery stenosis. She had a renal artery stent placement. Patient has intermittent hypotensive episodes. Whether this is related to the carotid artery stenosis with potentially causing drop attacks is to be determined. Other etiology may be related to hypotensive episodes secondary to orthostatic hypotension with a either combination of dehydration with multiple antihypertensive medication. I reviewed the carotid duplex ultrasound. Right proximal ICA has elevated peak systolic velocity 306 cm/s. Left ICA has elevated peak systolic velocity greater than 177 cm/s. Plaque burden is worse on the left side. I will order CT angiogram of the neck to further evaluate this. Patient's renal function has improved significantly since renal artery stent placement. I will make further recommendation after CT angiogram is reviewed.

## 2021-01-06 NOTE — PROGRESS NOTES
PHYSICAL THERAPY TREATMENT  Patient: Thalia Delgado (89 y.o. female)  Date: 1/6/2021  Diagnosis: Syncope [R55]  Hypokalemia [E87.6] <principal problem not specified>       Precautions: Fall  Chart, physical therapy assessment, plan of care and goals were reviewed. ASSESSMENT  Patient continues with skilled PT services and is progressing towards goals. Pt. Semi supine in bed upon arrival and agreeable to therapy session. Complaining of headache this morning, and bad indigestion. Tolerates seated LE TE before  ambulation of 200' at [facility]. Had one bout of SOB BP Assessed of 107/62 after ambulation. Recommend DC to HHPT. Current Level of Function Impacting Discharge (mobility/balance): Endurance    Other factors to consider for discharge: tbd         PLAN :  Patient continues to benefit from skilled intervention to address the above impairments. Continue treatment per established plan of care. to address goals. Recommendation for discharge: (in order for the patient to meet his/her long term goals)  Physical therapy at least 2 days/week in the home     This discharge recommendation:  Has been made in collaboration with the attending provider and/or case management    IF patient discharges home will need the following DME: to be determined (TBD)       SUBJECTIVE:   Patient stated i feel like crap.     OBJECTIVE DATA SUMMARY:   Critical Behavior:  Neurologic State: Alert  Orientation Level: Oriented X4  Cognition: Appropriate decision making  Safety/Judgement: Insight into deficits  Functional Mobility Training:  Bed Mobility:  Rolling: Modified independent  Supine to Sit: Modified independent  Sit to Supine: Modified independent  Scooting: Modified independent        Transfers:  Sit to Stand: Stand-by assistance  Stand to Sit: Stand-by assistance  Stand Pivot Transfers: Stand-by assistance                          Balance:  Sitting: Intact  Standing: Intact  Ambulation/Gait Training:  Distance (ft): 200 Feet (ft)  Assistive Device: Gait belt  Ambulation - Level of Assistance: Contact guard assistance                 Base of Support: Narrowed     Speed/Dariana: Slow                       Stairs: Therapeutic Exercises:   2x20 AP, LAQ, MARCH, HIP ABD/ADD  Pain Ratin MIGRAINE    Activity Tolerance:   Fair  Please refer to the flowsheet for vital signs taken during this treatment. After treatment patient left in no apparent distress:   Supine in bed    COMMUNICATION/COLLABORATION:   The patients plan of care was discussed with: Physical therapy assistant.      [place] Go   Time Calculation: 51 mins

## 2021-01-06 NOTE — PROGRESS NOTES
CARDIOLOGY PROGRESS NOTE    Patient seen and examined. We are following for syncope. On examination she is sitting in bed, no acute distress. She states she has been ambulating to restroom without dizziness or dyspnea. Still with slight weakness. She denies chest pain, sob, palpitations, and swelling. REVIEW OF SYSTEMS:  Per HPI above    Telemetry reviewed. Sinus rhythm in 70s       Physical examination:  Vital signs, Blood pressure 150/77,  Pulse 79  No apparent distress. Heart is regular, rate and rhythm. Normal S1, S2,  Murmur noted. Lungs are clear bilaterally. Abdomen is soft, nontender, normal bowel sounds. Extremities have no edema. Recent labs results and imaging reviewed. Potassium 4.0, magnesium 1.9  Echocardiogram  · LV: Estimated LVEF is 50 - 55%. Normal cavity size and systolic function (ejection fraction normal). Mild concentric hypertrophy. Wall motion: normal. Moderate (grade 2) left ventricular diastolic dysfunction. · AV: Aortic valve leaflet calcification present. · MV: Mitral valve thickening. Moderate mitral valve regurgitation is present. · TV: Moderate tricuspid valve regurgitation is present. · Possible stenosis in descending aorta. Carotid duplex  1. Bilateral subclavian artery has mild to moderate amount of mixture of heterogeneous and echodense plaque however both vessels patent. 2.  Bilateral common carotid artery shows intimal thickening and mild heterogeneous plaque. 3.  Right proximal ICA shows elevated peak systolic velocity moderately diffuse irregular echodense plaque,50 to 79% blockage more towards higher end of stenosis. 4.  Left proximal ICA shows moderately elevated peak systolic velocity with significantly heavier plaque, worse than right side, 50 to 79% stenosis. 5. both vertebral arteries patent and antegrade flow.   6.  CT angiogram may be helpful to further delineate plaque burden and morphology if clinically indicated    Discussed case with  Sophia Adams. This our impression and recommendation:    1. Syncope: Orthostatics positive on admission. Carotid duplex and echocardiogram results above. Continue to monitor telemetry closely and keep serum potassium between 4-5 and serum magnesium >2. Repletion per primary. Followed by Neurology and Vascular. Will order nuclear stress test for clearance tomorrow for CEA Friday. Will make NPO.    2. HFpEF: Echocardiogram shows preserved EF. She appears euvolemic. Maintain fluid balance. 3. Hypertension: Blood pressure acceptable. Continue medication treatment and monitoring. 4. Hyperlipidemia: Continue statin therapy. 5. Preoperative risk assessment:  As per above, we will obtain stress test tomorrow morning to better assess her cardiac risk. Please do not hesitate to call if additional questions arise. Zara Garduno Addendum:  Seen and evaluated. Agree with above. Plan for stress test tomorrow to better risk stratify prior to carotid surgery.

## 2021-01-06 NOTE — PROGRESS NOTES
Reason for Admission:   Syncope               RUR Score:     37    PCP: First and Last name: Cecy Rudolph   Name of Practice:   Are you a current patient: Yes/No: yes   Approximate date of last visit:     Can you do a virtual visit with your PCP:              Resources/supports as identified by patient/family: Richard Dee [phone]                Top Challenges facing patient (as identified by patient/family and CM): Finances/Medication cost?      n/a              Transportation?  n/a              Support system or lack thereof? n/a                      Living arrangements? Home/Self with friend Daljit Raza Self-care/ADLs/Cognition? Current Advanced Directive/Advance Care Plan:                 Plan for utilizing home health:                     Transition of Care Plan:              Patient wants to have home health in the home if she needs it upon discharge. She stated there is a company called [facility]. CM searched for this company and now the company is called [facility]. Patient's Referral has been sent to [facility], Pending acceptance at this time. CM will continue to follow.

## 2021-01-07 ENCOUNTER — APPOINTMENT (OUTPATIENT)
Dept: NON INVASIVE DIAGNOSTICS | Age: 56
DRG: 024 | End: 2021-01-07
Attending: NURSE PRACTITIONER
Payer: COMMERCIAL

## 2021-01-07 ENCOUNTER — APPOINTMENT (OUTPATIENT)
Dept: NUCLEAR MEDICINE | Age: 56
DRG: 024 | End: 2021-01-07
Attending: NURSE PRACTITIONER
Payer: COMMERCIAL

## 2021-01-07 LAB
ANION GAP SERPL CALC-SCNC: 7 MMOL/L (ref 5–15)
BASOPHILS # BLD: 0 K/UL (ref 0–0.1)
BASOPHILS NFR BLD: 0 % (ref 0–1)
BUN SERPL-MCNC: 17 MG/DL (ref 6–20)
BUN/CREAT SERPL: 17 (ref 12–20)
CA-I BLD-MCNC: 9.2 MG/DL (ref 8.5–10.1)
CHLORIDE SERPL-SCNC: 98 MMOL/L (ref 97–108)
CO2 SERPL-SCNC: 32 MMOL/L (ref 21–32)
CREAT SERPL-MCNC: 1.01 MG/DL (ref 0.55–1.02)
DIFFERENTIAL METHOD BLD: ABNORMAL
EOSINOPHIL # BLD: 0.2 K/UL (ref 0–0.4)
EOSINOPHIL NFR BLD: 3 % (ref 0–7)
ERYTHROCYTE [DISTWIDTH] IN BLOOD BY AUTOMATED COUNT: 14.9 % (ref 11.5–14.5)
GLUCOSE SERPL-MCNC: 92 MG/DL (ref 65–100)
HCT VFR BLD AUTO: 31.9 % (ref 35–47)
HGB BLD-MCNC: 10.3 G/DL (ref 11.5–16)
IMM GRANULOCYTES # BLD AUTO: 0 K/UL (ref 0–0.04)
IMM GRANULOCYTES NFR BLD AUTO: 0 % (ref 0–0.5)
LYMPHOCYTES # BLD: 0.9 K/UL (ref 0.8–3.5)
LYMPHOCYTES NFR BLD: 13 % (ref 12–49)
MCH RBC QN AUTO: 31.3 PG (ref 26–34)
MCHC RBC AUTO-ENTMCNC: 32.3 G/DL (ref 30–36.5)
MCV RBC AUTO: 97 FL (ref 80–99)
MONOCYTES # BLD: 0.5 K/UL (ref 0–1)
MONOCYTES NFR BLD: 8 % (ref 5–13)
NEUTS SEG # BLD: 4.8 K/UL (ref 1.8–8)
NEUTS SEG NFR BLD: 76 % (ref 32–75)
PLATELET # BLD AUTO: 291 K/UL (ref 150–400)
PMV BLD AUTO: 9.6 FL (ref 8.9–12.9)
POTASSIUM SERPL-SCNC: 2.9 MMOL/L (ref 3.5–5.1)
RBC # BLD AUTO: 3.29 M/UL (ref 3.8–5.2)
SODIUM SERPL-SCNC: 137 MMOL/L (ref 136–145)
STRESS BASELINE DIAS BP: 99 MMHG
STRESS BASELINE HR: 77 BPM
STRESS BASELINE SYS BP: 187 MMHG
STRESS PERCENT HR ACHIEVED: 85 %
STRESS POST PEAK HR: 140 BPM
STRESS STAGE 1 DURATION: NORMAL MIN:SEC
STRESS STAGE 1 HR: 89 BPM
STRESS STAGE 2 BP: NORMAL MMHG
STRESS STAGE 2 DURATION: NORMAL MIN:SEC
STRESS STAGE 2 HR: 103 BPM
STRESS STAGE 3 BP: NORMAL MMHG
STRESS STAGE 3 DURATION: NORMAL MIN:SEC
STRESS STAGE 3 HR: 101 BPM
STRESS STAGE 4 BP: NORMAL MMHG
STRESS STAGE 4 DURATION: NORMAL MIN:SEC
STRESS STAGE 4 HR: 96 BPM
STRESS TARGET HR: 165 BPM
WBC # BLD AUTO: 6.3 K/UL (ref 3.6–11)

## 2021-01-07 PROCEDURE — 36415 COLL VENOUS BLD VENIPUNCTURE: CPT

## 2021-01-07 PROCEDURE — 74011250637 HC RX REV CODE- 250/637: Performed by: STUDENT IN AN ORGANIZED HEALTH CARE EDUCATION/TRAINING PROGRAM

## 2021-01-07 PROCEDURE — 74011250636 HC RX REV CODE- 250/636

## 2021-01-07 PROCEDURE — 65270000029 HC RM PRIVATE

## 2021-01-07 PROCEDURE — 97530 THERAPEUTIC ACTIVITIES: CPT

## 2021-01-07 PROCEDURE — 74011250637 HC RX REV CODE- 250/637: Performed by: NURSE PRACTITIONER

## 2021-01-07 PROCEDURE — 74011000250 HC RX REV CODE- 250: Performed by: INTERNAL MEDICINE

## 2021-01-07 PROCEDURE — A9500 TC99M SESTAMIBI: HCPCS

## 2021-01-07 PROCEDURE — 80048 BASIC METABOLIC PNL TOTAL CA: CPT

## 2021-01-07 PROCEDURE — 85025 COMPLETE CBC W/AUTO DIFF WBC: CPT

## 2021-01-07 PROCEDURE — 74011250637 HC RX REV CODE- 250/637: Performed by: INTERNAL MEDICINE

## 2021-01-07 PROCEDURE — 94640 AIRWAY INHALATION TREATMENT: CPT

## 2021-01-07 RX ORDER — POTASSIUM CHLORIDE 750 MG/1
40 TABLET, FILM COATED, EXTENDED RELEASE ORAL
Status: DISPENSED | OUTPATIENT
Start: 2021-01-07 | End: 2021-01-07

## 2021-01-07 RX ORDER — POTASSIUM CHLORIDE 20 MEQ/1
40 TABLET, EXTENDED RELEASE ORAL
Status: COMPLETED | OUTPATIENT
Start: 2021-01-07 | End: 2021-01-07

## 2021-01-07 RX ADMIN — LEVETIRACETAM 500 MG: 500 TABLET ORAL at 21:25

## 2021-01-07 RX ADMIN — ISOSORBIDE DINITRATE 40 MG: 20 TABLET ORAL at 21:25

## 2021-01-07 RX ADMIN — ISOSORBIDE DINITRATE 40 MG: 20 TABLET ORAL at 14:12

## 2021-01-07 RX ADMIN — BUTALBITAL, ACETAMINOPHEN, AND CAFFEINE 2 TABLET: 50; 325; 40 TABLET ORAL at 14:24

## 2021-01-07 RX ADMIN — CARBAMAZEPINE 200 MG: 200 TABLET ORAL at 14:11

## 2021-01-07 RX ADMIN — NIFEDIPINE 60 MG: 60 TABLET, FILM COATED, EXTENDED RELEASE ORAL at 14:10

## 2021-01-07 RX ADMIN — HYDRALAZINE HYDROCHLORIDE 50 MG: 50 TABLET, FILM COATED ORAL at 14:11

## 2021-01-07 RX ADMIN — CLOPIDOGREL BISULFATE 75 MG: 75 TABLET ORAL at 14:10

## 2021-01-07 RX ADMIN — LABETALOL HYDROCHLORIDE 400 MG: 100 TABLET, FILM COATED ORAL at 21:26

## 2021-01-07 RX ADMIN — LEVETIRACETAM 500 MG: 500 TABLET ORAL at 14:12

## 2021-01-07 RX ADMIN — Medication 10 ML: at 21:29

## 2021-01-07 RX ADMIN — PANTOPRAZOLE SODIUM 40 MG: 40 TABLET, DELAYED RELEASE ORAL at 17:09

## 2021-01-07 RX ADMIN — NORTRIPTYLINE HYDROCHLORIDE 50 MG: 25 CAPSULE ORAL at 21:25

## 2021-01-07 RX ADMIN — ALPRAZOLAM 0.25 MG: 0.25 TABLET ORAL at 14:10

## 2021-01-07 RX ADMIN — SERTRALINE 25 MG: 50 TABLET, FILM COATED ORAL at 14:12

## 2021-01-07 RX ADMIN — POTASSIUM CHLORIDE 40 MEQ: 1500 TABLET, EXTENDED RELEASE ORAL at 21:37

## 2021-01-07 RX ADMIN — ALPRAZOLAM 0.25 MG: 0.25 TABLET ORAL at 05:07

## 2021-01-07 RX ADMIN — Medication 10 ML: at 17:11

## 2021-01-07 RX ADMIN — ASPIRIN 81 MG: 81 TABLET, COATED ORAL at 14:10

## 2021-01-07 RX ADMIN — CARBAMAZEPINE 200 MG: 200 TABLET ORAL at 21:26

## 2021-01-07 RX ADMIN — ATORVASTATIN CALCIUM 40 MG: 40 TABLET, FILM COATED ORAL at 21:25

## 2021-01-07 RX ADMIN — LABETALOL HYDROCHLORIDE 400 MG: 100 TABLET, FILM COATED ORAL at 14:10

## 2021-01-07 RX ADMIN — POTASSIUM CHLORIDE 40 MEQ: 750 TABLET, FILM COATED, EXTENDED RELEASE ORAL at 17:09

## 2021-01-07 RX ADMIN — REGADENOSON 0.4 MG: 0.08 INJECTION, SOLUTION INTRAVENOUS at 11:00

## 2021-01-07 RX ADMIN — BUDESONIDE 500 MCG: 0.5 INHALANT RESPIRATORY (INHALATION) at 19:48

## 2021-01-07 RX ADMIN — ALPRAZOLAM 0.25 MG: 0.25 TABLET ORAL at 21:37

## 2021-01-07 RX ADMIN — FERROUS SULFATE TAB 325 MG (65 MG ELEMENTAL FE) 325 MG: 325 (65 FE) TAB at 14:11

## 2021-01-07 RX ADMIN — HYDRALAZINE HYDROCHLORIDE 100 MG: 50 TABLET, FILM COATED ORAL at 21:25

## 2021-01-07 RX ADMIN — FOLIC ACID 1 MG: 1 TABLET ORAL at 14:13

## 2021-01-07 NOTE — PROGRESS NOTES
Comprehensive Nutrition Assessment    Type and Reason for Visit: Reassess(Interim)    Nutrition Recommendations/Plan:     Consider liberalization to Regular diet  Continue Ensure Enlive TID, Magic cup BID     Add extra puddings BID    Honor preferences as able     Nursing to document %meal and supplement intakes in I/Os  Obtain weekly measured wts    Nutrition Assessment:  Admitted for dizziness with syncopal episode. Doppler studies finding ICA stenosis, vasc surg following. S/p stress test today. Plans for CEA tomorrow. Tolerating RA. Ordered Cardiac diet, FR 1500mL per MD. Luca Love to pt after arriving back to unit from procedure, reporting hunger. Diet had been advanced, provided Ensure while awaiting tray. Pt reported good acceptance of Ensure, not as enthused for Magic cup however wants to continue d/t high kcal/pro provision. Wants extra puddings, will update. On initial visit, pt reported not eating well, however agreeable to supplements - Ensure Enlive (phuong preferred, any flavor tolerated) and Magic cup. Pt requested diet liberalization to Regular, plans to discuss with MD. Labs: Na WNL, K 2.9, Cr WNL, BG WNL. Meds: Statin, budesonide, FeSu, folic acid, synthroid, keppra, PPI. Malnutrition Assessment:  Malnutrition Status: Moderate malnutrition    Context:  Acute illness       Estimated Daily Nutrient Needs:  Energy (kcal): 1455kcal (32kcal/kg); Weight Used for Energy Requirements:    Protein (g): 55g (1.2g/kg); Weight Used for Protein Requirements: Current  Fluid (ml/day): 1455mL; Method Used for Fluid Requirements: 1 ml/kcal      Nutrition Related Findings:  NFPE finding moderate muscle and fat losses. Poor dentition visualized, pt denied c/s difficulty. Pt denied n/v or c/d. BM documented 1/5. No edema.       Wounds:    None       Current Nutrition Therapies:  DIET NUTRITIONAL SUPPLEMENTS Breakfast, Lunch, Dinner; INTEGRATED BIOPHARMA (likes phuong, any flavor if out)  DIET NUTRITIONAL SUPPLEMENTS Lunch, Dinner; Magic Cup  DIET CARDIAC Regular; FR 1500ML    Anthropometric Measures:  · Height:  5' 4\" (162.6 cm)  · Current Body Wt:  45.4 kg (100 lb 1.4 oz)   · Usual Body Wt:  45.4 kg (100 lb)     · Ideal Body Wt:  125 lbs:  80.1 %   · BMI Category:  Underweight (BMI less than 18.5)     Wt Readings from Last 7 Encounters:   01/04/21 45.4 kg (100 lb 1.4 oz)   11/25/20 53.9 kg (118 lb 13.3 oz)   11/02/20 45.4 kg (100 lb)   10/19/20 49 kg (108 lb)   10/02/20 46.2 kg (101 lb 13.6 oz)   09/17/20 54.5 kg (120 lb 2.4 oz)   Wt appears to range 45-49kg on average, possible error in wt from 11/25 based on pt reporting UBW of 100lbs. Will monitor inpatient trends.     Nutrition Diagnosis:   · Underweight related to catabolic illness, inadequate protein-energy intake as evidenced by BMI, moderate loss of subcutaneous fat, mild muscle loss    Nutrition Interventions:   Food and/or Nutrient Delivery: Continue current diet, Continue oral nutrition supplement, Snacks (specify)  Nutrition Education and Counseling: No recommendations at this time  Coordination of Nutrition Care: Continue to monitor while inpatient    Goals:  Meet >75% EENs via PO (progressing)  Wt gain 0.5kg per week (not progressing)  Lytes wnl  (progressing)     Nutrition Monitoring and Evaluation:   Behavioral-Environmental Outcomes: None identified  Food/Nutrient Intake Outcomes: Food and nutrient intake, Supplement intake  Physical Signs/Symptoms Outcomes: Weight, Nutrition focused physical findings, GI status    Discharge Planning:    Continue oral nutrition supplement     Electronically signed by Jose Cruz Huang on 1/7/2021 at 2:15 PM    Contact:  [phone]

## 2021-01-07 NOTE — PROGRESS NOTES
Physician Progress Note      Juanis Abdalla  [facility] #:                  [id number]  :                       1965  ADMIT DATE:       1/3/2021 4:10 PM  DISCH DATE:  RESPONDING  PROVIDER #:        Selwyn Taylor NP          QUERY TEXT:    Dear Ms. Guardado Neighbor:    Patient admitted with orthostatic hypotension, syncope, hypokalemia. Noted documentation of depression in H&P. Please document in progress notes and discharge summary if you are treating/evaluating the following: The medical record reflects the following:  Risk Factors: 54 F admitted with orthostatic hypotension, syncope, hypokalemia  Clinical Indicators: depression documented in H&P and attending notes; patient with multiple co-morbidities and multiple hospitalizations this past year  Treatment: Zoloft 25mg PO QD, Pamelor 50mg PO QHS, Xanax 0.25mg PO TID PRN    Thank you,  CORAL GallardoN, RN  Clinical   [phone]  Options provided:  -- Major depression, recurrent: mild  -- Major depression, recurrent: moderate  -- Major depression, single episode: mild  -- Major depression, single episode: moderate  -- Other - I will add my own diagnosis  -- Disagree - Not applicable / Not valid  -- Disagree - Clinically unable to determine / Unknown  -- Refer to Clinical Documentation Reviewer    PROVIDER RESPONSE TEXT:    This patent has recurrent major depression, current episode mild.     Query created by: Cayla Booker on 2021 2:32 PM      Electronically signed by:  Selwyn Taylor NP 2021 8:28 AM

## 2021-01-07 NOTE — PROGRESS NOTES
NEUROLOGY  PROGRESS NOTE    Admission History/Pertinent Events  María Soto is a 54y.o. year old female who presented on 1/3/2021. Patient has a past medical history of Seizures, HTN, HL, Hypothyroidism, CHF, COPD, Anxiety/Depression who presented with a syncopal episode. Per chart review, patient had gotten up from a seated position to walking to the bathroom when she became dizzy and had a loss of consciousness episode. There was no associated urinary or bowel incontinence or any seizure-like activity that was noted. After the event patient had generalized weakness that lasted almost the entire day. She had no other associated neurologic symptoms. In the ED, patient's chest x-ray was concerning for mild pulmonary edema and potassium was slightly low at 2.4. Patient was given normal saline in the ED and some potassium. Home AEDs   Levetiracetam 500 BID  Carbamazepine 200 BID (for mood stabilization per chart review)      ASSESSMENT/PLAN      Impression  We will continue patient on dual antiplatelet therapy for neurovascular prophylaxis along with statin per below. Patient reports that she will have surgical intervention with the vascular team this coming Friday.       CTH WO  NAICA  CM[facility]    CTA Neck  60-65% stenosis of the proximal right ICA  40-45% stenosis of the proximal left ICA     CUS  50-79% stenosis in the bilateral proximal ICAs worse on the left  Physiologically forward flowing bilateral vertebral arteries     TTE  EF 50-55%, LA normal, RA normal, no PFO      Plan      1) Syncopal Episode  Associated: Positive Orthostatics  -Q4Hr NeuroChecks, TELE  -Neurovascular Prophylaxis: ASA 81, Clopidogrel 75, Atorvastatin 40   -PT/OT/ST as needed  -Vascular Surgery Consult following     2) Seizure History  -Seizure Precautions  -Continue levetiracetam 500 BID  -STAT IV Lorazepam 2 mg with any clinical seizure activity lasting > 3 minutes and contact Neurology for further recommendations    No further neurologic work-up is necessary. Please contact neurology with further questions/concerns. SUBJECTIVE   Patient without any complaints this morning. No changes on neurological examination. Physical/Neurological Exam  General: [de-identified]  female      Patient awake, alert; following central and peripheral commands   No expressive or receptive aphasia;  No dysarthria   Pupils react to light bilaterally; EOM Intact   No visual field deficits on gross exam   Intact to light touch on face bilaterally   No facial droop   Motor: 5/5 Throughout   No abnormal movements   Sensation to light touch intact grossly throughout       OBJECTIVE  Vital Signs  Temp:  [97.6 °F (36.4 °C)-98.6 °F (37 °C)]   Pulse (Heart Rate):  [73-90]   BP: (110-183)/(61-85)   Resp Rate:  [16-20]   O2 Sat (%):  [97 %-100 %]   Weight:  [45.4 kg (100 lb 1.4 oz)]     MEDICATIONS    Current Facility-Administered Medications:     nortriptyline (PAMELOR) capsule 50 mg, 50 mg, Oral, QHS, Geraldine Brush NP, 50 mg at 01/06/21 2008    pantoprazole (PROTONIX) tablet 40 mg, 40 mg, Oral, ACB&D, Geraldine Brush NP, Stopped at 01/07/21 0730    butalbital-acetaminophen-caffeine (FIORICET, ESGIC) -40 mg per tablet 2 Tab, 2 Tab, Oral, Q4H PRN, Geraldine Brush NP, 2 Tab at 01/06/21 1730    clopidogreL (PLAVIX) tablet 75 mg, 75 mg, Oral, DAILY, Addie Tomlinson MD, 75 mg at 01/06/21 0834    atorvastatin (LIPITOR) tablet 40 mg, 40 mg, Oral, QHS, Addie Tomlinson MD, 40 mg at 01/06/21 1956    albuterol (PROVENTIL HFA, VENTOLIN HFA, PROAIR HFA) inhaler 2 Puff, 2 Puff, Inhalation, Q4H PRN, Milagro Nix MD    aspirin delayed-release tablet 81 mg, 81 mg, Oral, DAILY, Milagro Nix MD, 81 mg at 01/06/21 3604    carBAMazepine (TEGretol) tablet 200 mg, 200 mg, Oral, Q12H, Kardar, Milagro Solares MD, 200 mg at 01/06/21 1956    ferrous sulfate tablet 325 mg, 325 mg, Oral, ACBBoyd Ahmed Sueanne Broker, MD, Stopped at 08/02/66 8413    folic acid (FOLVITE) tablet 1 mg, 1 mg, Oral, DAILY, Milagro Nix MD, 1 mg at 01/06/21 4138    isosorbide dinitrate (ISORDIL) tablet 40 mg, 40 mg, Oral, TID, Jenny Rosario MD, 40 mg at 01/06/21 1956    labetaloL (NORMODYNE) tablet 400 mg, 400 mg, Oral, TID, Milagro Nix MD, 400 mg at 01/06/21 2000    levETIRAcetam (KEPPRA) tablet 500 mg, 500 mg, Oral, BID, Geo Nix MD, 500 mg at 01/06/21 2000    levothyroxine (SYNTHROID) tablet 25 mcg, 25 mcg, Oral, ACB, Geo Nix MD, Stopped at 01/07/21 0730    NIFEdipine ER (PROCARDIA XL) tablet 60 mg, 60 mg, Oral, DAILY, Jenny Rosario MD, 60 mg at 01/06/21 0834    sertraline (ZOLOFT) tablet 25 mg, 25 mg, Oral, DAILY, Jenny Rosario MD, 25 mg at 01/06/21 7171    ALPRAZolam Lance Ket) tablet 0.25 mg, 0.25 mg, Oral, TID PRN, Jenny Rosario MD, 0.25 mg at 01/07/21 0507    budesonide (PULMICORT) 500 mcg/2 ml nebulizer suspension, 500 mcg, Nebulization, BID RT, Jenny Rosario MD, 500 mcg at 01/06/21 1912    hydrALAZINE (APRESOLINE) tablet 100 mg, 100 mg, Oral, TID, Doris Arshda, FNP, 50 mg at 01/06/21 1956    sodium chloride (NS) flush 5-40 mL, 5-40 mL, IntraVENous, Q8H, Geo Nix MD, 10 mL at 01/06/21 2100    sodium chloride (NS) flush 5-40 mL, 5-40 mL, IntraVENous, PRN, Geo Nix MD    acetaminophen (TYLENOL) tablet 650 mg, 650 mg, Oral, Q6H PRN, 650 mg at 01/06/21 0834 **OR** acetaminophen (TYLENOL) suppository 650 mg, 650 mg, Rectal, Q6H PRN, Milagro Nix MD    polyethylene glycol (MIRALAX) packet 17 g, 17 g, Oral, DAILY PRN, Milagro Nix MD    promethazine (PHENERGAN) tablet 12.5 mg, 12.5 mg, Oral, Q6H PRN **OR** ondansetron (ZOFRAN) injection 4 mg, 4 mg, IntraVENous, Q6H PRN, Jenny Rosario MD    enoxaparin (LOVENOX) injection 40 mg, 40 mg, SubCUTAneous, DAILY, Kardar, Daryle Connors, MD, 40 mg at 01/06/21 9801      Labs: I've reviewed the labs for today     This document has been prepared by the Dragon voice recognition system, typographical errors may have occurred.  Attempts have been made to correct errors, however inadvertent errors may persist.

## 2021-01-07 NOTE — PROGRESS NOTES
Hospitalist Progress Note    Subjective:   Daily Progress Note: 1/7/2021 7:53 AM    Hospital Course:     Admitted 1/3/2021. Patient is 27-year-old female with a PMH significant for anxiety/depression, COPD, chronic hypoxia on home oxygen, diastolic HF, dysphagia, GERD, HLD, HTN, chronic iron deficiency anemia, MVR and seizure disorder. She comes into the emergency room with complaints of syncopal episode. States she become dizzy when she stands up from sitting to walking. Associated symptoms of weakness throughout the day. On admission potassium 2.4. She was admitted for further management of syncope, hypokalemia, hypomagnesium, orthostatic hypotension. Cardiology and neurology consulted. Serial cardiac enzymes negative for ischemia hemoglobin stable at 10.1. Carotid duplex Dopplers right proximal ICA 59 to 70% stenosis, left ICA 50 to 79% stenosis worse on the left than right. CT angiogram pending. Vascular surgery consulted. Stress test pending for today for cardiac clearance for CEA scheduled for Friday. Subjective: Follow-up examination of patient at the bedside. Requesting to restart her Nexium and sleeping medications. Otherwise no complaints. Some relief from her headache with Fioricet.     Current Facility-Administered Medications   Medication Dose Route Frequency    nortriptyline (PAMELOR) capsule 50 mg  50 mg Oral QHS    pantoprazole (PROTONIX) tablet 40 mg  40 mg Oral ACB&D    butalbital-acetaminophen-caffeine (FIORICET, ESGIC) -40 mg per tablet 2 Tab  2 Tab Oral Q4H PRN    clopidogreL (PLAVIX) tablet 75 mg  75 mg Oral DAILY    atorvastatin (LIPITOR) tablet 40 mg  40 mg Oral QHS    albuterol (PROVENTIL HFA, VENTOLIN HFA, PROAIR HFA) inhaler 2 Puff  2 Puff Inhalation Q4H PRN    aspirin delayed-release tablet 81 mg  81 mg Oral DAILY    carBAMazepine (TEGretol) tablet 200 mg  200 mg Oral Q12H    ferrous sulfate tablet 325 mg  325 mg Oral ACB    folic acid (FOLVITE) tablet 1 mg  1 mg Oral DAILY   • isosorbide dinitrate (ISORDIL) tablet 40 mg  40 mg Oral TID   • labetaloL (NORMODYNE) tablet 400 mg  400 mg Oral TID   • levETIRAcetam (KEPPRA) tablet 500 mg  500 mg Oral BID   • levothyroxine (SYNTHROID) tablet 25 mcg  25 mcg Oral ACB   • NIFEdipine ER (PROCARDIA XL) tablet 60 mg  60 mg Oral DAILY   • sertraline (ZOLOFT) tablet 25 mg  25 mg Oral DAILY   • ALPRAZolam (XANAX) tablet 0.25 mg  0.25 mg Oral TID PRN   • budesonide (PULMICORT) 500 mcg/2 ml nebulizer suspension  500 mcg Nebulization BID RT   • hydrALAZINE (APRESOLINE) tablet 100 mg  100 mg Oral TID   • sodium chloride (NS) flush 5-40 mL  5-40 mL IntraVENous Q8H   • sodium chloride (NS) flush 5-40 mL  5-40 mL IntraVENous PRN   • acetaminophen (TYLENOL) tablet 650 mg  650 mg Oral Q6H PRN    Or   • acetaminophen (TYLENOL) suppository 650 mg  650 mg Rectal Q6H PRN   • polyethylene glycol (MIRALAX) packet 17 g  17 g Oral DAILY PRN   • promethazine (PHENERGAN) tablet 12.5 mg  12.5 mg Oral Q6H PRN    Or   • ondansetron (ZOFRAN) injection 4 mg  4 mg IntraVENous Q6H PRN   • enoxaparin (LOVENOX) injection 40 mg  40 mg SubCUTAneous DAILY        REVIEW OF SYSTEMS    Review of Systems   Constitutional: Positive for malaise/fatigue.   HENT: Negative.    Respiratory: Negative.    Cardiovascular: Negative.    Gastrointestinal: Negative.    Genitourinary: Negative.    Musculoskeletal: Negative.    Neurological: Positive for dizziness, weakness and headaches.        Objective:     Visit Vitals  BP (!) 161/81   Pulse 76   Temp 98.1 °F (36.7 °C)   Resp 18   Ht 5' 4.96\" (1.65 m)   Wt 45.4 kg (100 lb 1.4 oz)   SpO2 99%   BMI 16.68 kg/m²    O2 Flow Rate (L/min): 3 l/min O2 Device: Nasal cannula    Temp (24hrs), Av °F (36.7 °C), Min:97.8 °F (36.6 °C), Max:98.4 °F (36.9 °C)      No intake/output data recorded.   1901 -  0700  In: 200 [P.O.:200]  Out: 800 [Urine:800]    PHYSICAL EXAM:    Physical Exam  Constitutional:       Appearance: She is  ill-appearing. HENT:      Mouth/Throat:      Mouth: Mucous membranes are moist.   Eyes:      Extraocular Movements: Extraocular movements intact. Neck:      Musculoskeletal: Normal range of motion. Cardiovascular:      Rate and Rhythm: Normal rate. Pulmonary:      Effort: Pulmonary effort is normal.   Musculoskeletal: Normal range of motion. Skin:     General: Skin is warm and dry. Neurological:      Motor: Weakness present. Data Review    No results found for this or any previous visit (from the past 24 hour(s)). CTA NECK   Final Result   Impression:   1. Moderate (60-65%) stenosis of the right ICA origin. 2.  Mild (40-45%) stenosis of the left ICA origin. Please note that degrees of carotid stenosis are measured in accordance with   NASCET criteria. CT HEAD WO CONT   Final Result   Impression:    1. No acute intracranial findings. XR CHEST PORT   Final Result   IMPRESSION: Considerations for baseline interstitial lung disease or mild   pulmonary edema. Active Problems:    Resistant hypertension (9/21/2020)      Renal artery stenosis (HCC) (9/21/2020)      CHF (congestive heart failure) ([address] (10/2/2020)      Overview: With preserved lvef, diastolic dysfunction, mitral valve regurgitation       moderate. Mitral valve regurgitation (10/2/2020)      Anxiety (10/2/2020)      Seizure disorder ([address] (10/2/2020)      COPD (chronic obstructive pulmonary disease) with emphysema ([address] (10/2/2020)      Hypokalemia (10/2/2020)      Acquired hypothyroidism (10/2/2020)      Syncope (1/3/2021)        Assessment/Plan:     1. Syncope:  2.   Orthostatic hypotension:  · CT of head negative for acute findings  · Echo LVEF 50 to 55% mild concentric hypertrophy, moderate grade 2 diastolic dysfunction, moderate MVR, moderate TVR, possible stenosis in the descending aorta  · Carotid Doppler studies right proximal ICA 50 to 79% blockage on higher end of stenosis, left proximal ICA 50 to 79% stenosis with heavier plaque than right  · Serial cardiac enzymes negative  · PT OT consulted recommending home with home health PT  · Cardiology consulted  · Neurology consulted  · Vascular surgery consulted moderate stenosis  · CT angiogram   · Stress test today for clearance  · Planned CEA intervention left ICA stenosis Friday    3. Hypokalemia:  4. Hypomagnesium:  · Replete and monitor    5. Hypertension:  · Continue home hypertensive medications    6. Hypothyroid:  · Continue home medications Synthroid    7. CHF with preserved EF:  No signs or symptoms of exacerbation    8. HLD:  · Continue statin therapy    9. Seizure disorder:  Continue Tegretol and Keppra    10. Renal artery stenosis: Status post stent placement  · Continue statin and Plavix and aspirin    11. Mitral valve regurg:  · Cardiology consulting    12. COPD:  15.  Chronic hypoxia: On Home oxygen  · No signs or symptoms of exacerbation at this time. · Continue home neb Pulmicort    14. Chronic iron deficiency anemia:  · Continue oral supplementation iron, folic acid    DVT Prophylaxis: Lovenox  Code Status: Full Code  POA/NOK: Jerelnuvia Situ at #[phone]  ______________________________________________________________________________  Time spent in direct care including coordination of service, review of data and examination: > 35 minutes  Care Plan discussed with: RN and patient  ______________________________________________________________________________    Corbin Galarza NP    This is dictation was done by dragon, computer voice recognition software. Quite often unanticipated grammatical, syntax, homophones and other interpretive errors or inadvertently transcribed by the computer software. Please excuse errors that have escaped final proofreading. Thank you.

## 2021-01-07 NOTE — PROGRESS NOTES
Bedside shift change report given to [address] (oncoming nurse) by Courtney Pratt LPN (offgoing nurse). Report included the following information SBAR.

## 2021-01-07 NOTE — PROGRESS NOTES
PHYSICAL THERAPY TREATMENT  Patient: Frankie Rivera (83 y.o. female)  Date: 1/7/2021  Diagnosis: Syncope [R55]  Hypokalemia [E87.6] <principal problem not specified>  Procedure(s) (LRB):  RIGHT CAROTID ARTERY ENDARTERECTOMY (Right)    Precautions: Fall  Chart, physical therapy assessment, plan of care and goals were reviewed. ASSESSMENT  Patient continues with skilled PT services and is progressing towards goals. Pt. Semi supine in bed upon arrival and agreeable to therapy session. Pt. Reports having stress today and still having migraine afterwards. Held ambulation today. Continues to have Mod I for bed mobility and transfers. Continues to do well with seated LE TE. Recommend DC to HHPT. .     Current Level of Function Impacting Discharge (mobility/balance): ENDURANCE    Other factors to consider for discharge: TBD         PLAN :  Patient continues to benefit from skilled intervention to address the above impairments. Continue treatment per established plan of care. to address goals. Recommendation for discharge: (in order for the patient to meet his/her long term goals)  Physical therapy at least 2 days/week in the home     This discharge recommendation:  Has been made in collaboration with the attending provider and/or case management    IF patient discharges home will need the following DME: to be determined (TBD)       SUBJECTIVE:   Patient stated [facility]. [facility]    OBJECTIVE DATA SUMMARY:   Critical Behavior:  Neurologic State: Alert  Orientation Level: Oriented X4  Cognition: Appropriate decision making  Safety/Judgement: Insight into deficits  Functional Mobility Training:  Bed Mobility:  Rolling: Modified independent  Supine to Sit: Modified independent  Sit to Supine: Modified independent  Scooting: Modified independent        Transfers:                                   Balance:  Sitting: Intact  Ambulation/Gait Training:                                                        Stairs: Therapeutic Exercises:   20X SEATED AP, LAQ, MARCH, HIP ABD/ADD  Pain Ratin    Activity Tolerance:   Good  Please refer to the flowsheet for vital signs taken during this treatment. After treatment patient left in no apparent distress:   Supine in bed    COMMUNICATION/COLLABORATION:   The patients plan of care was discussed with: Physical therapy assistant.      Randall Power   Time Calculation: 15 mins

## 2021-01-07 NOTE — PROGRESS NOTES
OT visit attempted at , pt currently off floor at nuclear medicine, follow up as time allows, thank you.

## 2021-01-07 NOTE — PROGRESS NOTES
CARDIOLOGY PROGRESS NOTE    Patient seen and examined prior to stress test. We are following for syncope. On examination, she is lying in bed, no acute distress. She states she feels good today. She denies chest pain, sob, palpitations, and swelling. Scheduled for Right CEA tomorrow pending stress test results    REVIEW OF SYSTEMS:  Per HPI above    Telemetry reviewed. Sinus rhythm in 70s       Physical examination:  Vital signs, Blood pressure 161/81,  Pulse 79  No apparent distress. Heart is regular, rate and rhythm. Normal S1, S2,  Murmur noted. Lungs are clear bilaterally. Abdomen is soft, nontender, normal bowel sounds. Extremities have no edema. Recent labs results and imaging reviewed. Neck CTA:  1. Moderate (60-65%) stenosis of the right ICA origin. 2.  Mild (40-45%) stenosis of the left ICA origin. Discussed case with Dr. Moses Vega. This our impression and recommendation:    1. Syncope: Orthostatics positive on admission. Continue to monitor telemetry closely and keep serum potassium between 4-5 and serum magnesium >2. Nuclear stress test performed this morning, normal. Scheduled for right CEA tomorrow. 2. HFpEF: Echocardiogram shows preserved EF. She appears euvolemic. Maintain fluid balance. 3. Hypertension: Blood pressure acceptable. Continue medication treatment and monitoring. 4. Hyperlipidemia: Continue statin therapy. 5. Preoperative risk assessment: Stress test performed today, normal. Scheduled for right CEA tomorrow. Please do not hesitate to call if additional questions arise. Tiffany Clark Addendum:  Seen and evaluated, agree with NP note above. Stress test performed today reviewed, study was normal, EF 55%, no ischemia. Acceptably low risk for a cardiac event with the planned vascular surgery, and acceptable from our perspective to proceed.

## 2021-01-07 NOTE — PROGRESS NOTES
Bedside shift change report given to calli butler (oncoming nurse) by Romaine altamirano (offgoing nurse). Report included the followig information SBAR, Intake/Output, MAR and Recent Results.

## 2021-01-07 NOTE — PROGRESS NOTES
Physician Progress Note      Reinier Oneill  [facility] #:                  [id number]  :                       1965  ADMIT DATE:       1/3/2021 4:10 PM  DISCH DATE:  RESPONDING  PROVIDER #:        Charla Cox NP          QUERY TEXT:    Dear Ms. Rolley Duverney:    Pt admitted with syncope with carotid artery stenosis and orthostatic hypoenesion and has malnutrition documented. Please further specify type of malnutrition with documentation in the medical record. The medical record reflects the following:  Risk Factors:55 F admitted following syncpal episode with orthostatic hypotension, carotid artery stenosis  Clinical Indicators: Nutrition assessment notes moderate malnutrition with moderate body fat loss and mild muscle mass loss; total protein 8.4, albumin 3.5, BMI 16.68  Treatment: labs, nutrition assessment, liberalize to regular diet, add Ensure Enlive TID, Magic Cup BID, monitor intake    Thank you,  Michelle Cox, BSN, RN  Clinical   [phone]  Options provided:  -- Moderate Malnutrition  -- Moderate Protein calorie malnutrition  -- Other - I will add my own diagnosis  -- Disagree - Not applicable / Not valid  -- Disagree - Clinically unable to determine / Unknown  -- Refer to Clinical Documentation Reviewer    PROVIDER RESPONSE TEXT:    This patient has moderate malnutrition.     Query created by: Kristal Bassett on 2021 10:53 AM      Electronically signed by:  Charla Cox NP 2021 12:54 PM

## 2021-01-07 NOTE — PROGRESS NOTES
CM reviewed chart this morning. Current dispo is home with . We are currently pending acceptance to patients choice of [address]. CM will continue to follow case for other needs prior to discharge.

## 2021-01-08 ENCOUNTER — ANESTHESIA (OUTPATIENT)
Dept: SURGERY | Age: 56
DRG: 024 | End: 2021-01-08
Payer: COMMERCIAL

## 2021-01-08 ENCOUNTER — ANESTHESIA EVENT (OUTPATIENT)
Dept: SURGERY | Age: 56
DRG: 024 | End: 2021-01-08
Payer: COMMERCIAL

## 2021-01-08 LAB
ANION GAP SERPL CALC-SCNC: 6 MMOL/L (ref 5–15)
BASOPHILS # BLD: 0 K/UL (ref 0–0.1)
BASOPHILS NFR BLD: 0 % (ref 0–1)
BUN SERPL-MCNC: 19 MG/DL (ref 6–20)
BUN/CREAT SERPL: 18 (ref 12–20)
CA-I BLD-MCNC: 9.2 MG/DL (ref 8.5–10.1)
CHLORIDE SERPL-SCNC: 100 MMOL/L (ref 97–108)
CO2 SERPL-SCNC: 31 MMOL/L (ref 21–32)
CREAT SERPL-MCNC: 1.04 MG/DL (ref 0.55–1.02)
DIFFERENTIAL METHOD BLD: ABNORMAL
EOSINOPHIL # BLD: 0.2 K/UL (ref 0–0.4)
EOSINOPHIL NFR BLD: 3 % (ref 0–7)
ERYTHROCYTE [DISTWIDTH] IN BLOOD BY AUTOMATED COUNT: 15 % (ref 11.5–14.5)
GLUCOSE SERPL-MCNC: 86 MG/DL (ref 65–100)
HCT VFR BLD AUTO: 28 % (ref 35–47)
HGB BLD-MCNC: 9 G/DL (ref 11.5–16)
IMM GRANULOCYTES # BLD AUTO: 0 K/UL (ref 0–0.04)
IMM GRANULOCYTES NFR BLD AUTO: 0 % (ref 0–0.5)
LYMPHOCYTES # BLD: 1.2 K/UL (ref 0.8–3.5)
LYMPHOCYTES NFR BLD: 19 % (ref 12–49)
MCH RBC QN AUTO: 31 PG (ref 26–34)
MCHC RBC AUTO-ENTMCNC: 32.1 G/DL (ref 30–36.5)
MCV RBC AUTO: 96.6 FL (ref 80–99)
MONOCYTES # BLD: 0.5 K/UL (ref 0–1)
MONOCYTES NFR BLD: 7 % (ref 5–13)
NEUTS SEG # BLD: 4.4 K/UL (ref 1.8–8)
NEUTS SEG NFR BLD: 71 % (ref 32–75)
PLATELET # BLD AUTO: 292 K/UL (ref 150–400)
PMV BLD AUTO: 9.6 FL (ref 8.9–12.9)
POTASSIUM SERPL-SCNC: 3.5 MMOL/L (ref 3.5–5.1)
RBC # BLD AUTO: 2.9 M/UL (ref 3.8–5.2)
SODIUM SERPL-SCNC: 137 MMOL/L (ref 136–145)
WBC # BLD AUTO: 6.2 K/UL (ref 3.6–11)

## 2021-01-08 PROCEDURE — 76210000006 HC OR PH I REC 0.5 TO 1 HR: Performed by: SURGERY

## 2021-01-08 PROCEDURE — 76010000173 HC OR TIME 3 TO 3.5 HR INTENSV-TIER 1: Performed by: SURGERY

## 2021-01-08 PROCEDURE — 85025 COMPLETE CBC W/AUTO DIFF WBC: CPT

## 2021-01-08 PROCEDURE — 2709999900 HC NON-CHARGEABLE SUPPLY: Performed by: SURGERY

## 2021-01-08 PROCEDURE — 74011250636 HC RX REV CODE- 250/636: Performed by: SURGERY

## 2021-01-08 PROCEDURE — 03UK0KZ SUPPLEMENT RIGHT INTERNAL CAROTID ARTERY WITH NONAUTOLOGOUS TISSUE SUBSTITUTE, OPEN APPROACH: ICD-10-PCS | Performed by: SURGERY

## 2021-01-08 PROCEDURE — 74011000250 HC RX REV CODE- 250: Performed by: ANESTHESIOLOGY

## 2021-01-08 PROCEDURE — 77030013079 HC BLNKT BAIR HGGR 3M -A: Performed by: ANESTHESIOLOGY

## 2021-01-08 PROCEDURE — 88304 TISSUE EXAM BY PATHOLOGIST: CPT

## 2021-01-08 PROCEDURE — 77030038552 HC DRN WND MDII -A: Performed by: SURGERY

## 2021-01-08 PROCEDURE — 77030010512 HC APPL CLP LIG J&J -C: Performed by: SURGERY

## 2021-01-08 PROCEDURE — 74011250637 HC RX REV CODE- 250/637: Performed by: STUDENT IN AN ORGANIZED HEALTH CARE EDUCATION/TRAINING PROGRAM

## 2021-01-08 PROCEDURE — 36415 COLL VENOUS BLD VENIPUNCTURE: CPT

## 2021-01-08 PROCEDURE — 74011250636 HC RX REV CODE- 250/636: Performed by: NURSE PRACTITIONER

## 2021-01-08 PROCEDURE — 74011000250 HC RX REV CODE- 250

## 2021-01-08 PROCEDURE — 77030002916 HC SUT ETHLN J&J -A: Performed by: SURGERY

## 2021-01-08 PROCEDURE — 74011250636 HC RX REV CODE- 250/636: Performed by: ANESTHESIOLOGY

## 2021-01-08 PROCEDURE — 74011250636 HC RX REV CODE- 250/636: Performed by: NURSE ANESTHETIST, CERTIFIED REGISTERED

## 2021-01-08 PROCEDURE — 77030002996 HC SUT SLK J&J -A: Performed by: SURGERY

## 2021-01-08 PROCEDURE — 77030026438 HC STYL ET INTUB CARD -A: Performed by: ANESTHESIOLOGY

## 2021-01-08 PROCEDURE — 77030005530 HC CATH URETH FOL40 BARD -B: Performed by: SURGERY

## 2021-01-08 PROCEDURE — 76010000170 HC OR TIME 13.5 TO 14 HR INTENSV-TIER 1: Performed by: SURGERY

## 2021-01-08 PROCEDURE — C1768 GRAFT, VASCULAR: HCPCS | Performed by: SURGERY

## 2021-01-08 PROCEDURE — 77030002987 HC SUT PROL J&J -B: Performed by: SURGERY

## 2021-01-08 PROCEDURE — 77030019908 HC STETH ESOPH SIMS -A: Performed by: ANESTHESIOLOGY

## 2021-01-08 PROCEDURE — 77030008684 HC TU ET CUF COVD -B: Performed by: ANESTHESIOLOGY

## 2021-01-08 PROCEDURE — 86923 COMPATIBILITY TEST ELECTRIC: CPT

## 2021-01-08 PROCEDURE — 77030002933 HC SUT MCRYL J&J -A: Performed by: SURGERY

## 2021-01-08 PROCEDURE — 74011250637 HC RX REV CODE- 250/637: Performed by: NURSE PRACTITIONER

## 2021-01-08 PROCEDURE — 77030030917 HC SHNT CAR PRUITT F3 LEMV -F: Performed by: SURGERY

## 2021-01-08 PROCEDURE — 03HY32Z INSERTION OF MONITORING DEVICE INTO UPPER ARTERY, PERCUTANEOUS APPROACH: ICD-10-PCS | Performed by: NURSE ANESTHETIST, CERTIFIED REGISTERED

## 2021-01-08 PROCEDURE — 86901 BLOOD TYPING SEROLOGIC RH(D): CPT

## 2021-01-08 PROCEDURE — 65270000029 HC RM PRIVATE

## 2021-01-08 PROCEDURE — 94640 AIRWAY INHALATION TREATMENT: CPT

## 2021-01-08 PROCEDURE — 80048 BASIC METABOLIC PNL TOTAL CA: CPT

## 2021-01-08 PROCEDURE — 74011000250 HC RX REV CODE- 250: Performed by: SURGERY

## 2021-01-08 PROCEDURE — 77030031139 HC SUT VCRL2 J&J -A: Performed by: SURGERY

## 2021-01-08 PROCEDURE — 76060000037 HC ANESTHESIA 3 TO 3.5 HR: Performed by: SURGERY

## 2021-01-08 PROCEDURE — 74011250637 HC RX REV CODE- 250/637: Performed by: INTERNAL MEDICINE

## 2021-01-08 PROCEDURE — 03CK0ZZ EXTIRPATION OF MATTER FROM RIGHT INTERNAL CAROTID ARTERY, OPEN APPROACH: ICD-10-PCS | Performed by: SURGERY

## 2021-01-08 PROCEDURE — 74011000250 HC RX REV CODE- 250: Performed by: NURSE ANESTHETIST, CERTIFIED REGISTERED

## 2021-01-08 PROCEDURE — 76060000059 HC ANESTHESIA 14 TO 14.5 HR: Performed by: SURGERY

## 2021-01-08 PROCEDURE — 74011000258 HC RX REV CODE- 258: Performed by: ANESTHESIOLOGY

## 2021-01-08 PROCEDURE — 77030013567 HC DRN WND RESERV BARD -A: Performed by: SURGERY

## 2021-01-08 DEVICE — XENOSURE BIOLOGIC PATCH, 0.8CM X 8CM, EIFU
Type: IMPLANTABLE DEVICE | Site: NECK | Status: FUNCTIONAL
Brand: XENOSURE BIOLOGIC PATCH

## 2021-01-08 RX ORDER — HYDROMORPHONE HYDROCHLORIDE 2 MG/ML
INJECTION, SOLUTION INTRAMUSCULAR; INTRAVENOUS; SUBCUTANEOUS AS NEEDED
Status: DISCONTINUED | OUTPATIENT
Start: 2021-01-08 | End: 2021-01-08 | Stop reason: HOSPADM

## 2021-01-08 RX ORDER — ONDANSETRON 2 MG/ML
INJECTION INTRAMUSCULAR; INTRAVENOUS AS NEEDED
Status: DISCONTINUED | OUTPATIENT
Start: 2021-01-08 | End: 2021-01-08 | Stop reason: HOSPADM

## 2021-01-08 RX ORDER — LIDOCAINE HYDROCHLORIDE 20 MG/ML
INJECTION, SOLUTION EPIDURAL; INFILTRATION; INTRACAUDAL; PERINEURAL AS NEEDED
Status: DISCONTINUED | OUTPATIENT
Start: 2021-01-08 | End: 2021-01-08 | Stop reason: HOSPADM

## 2021-01-08 RX ORDER — NEOSTIGMINE METHYLSULFATE 1 MG/ML
INJECTION INTRAVENOUS AS NEEDED
Status: DISCONTINUED | OUTPATIENT
Start: 2021-01-08 | End: 2021-01-08 | Stop reason: HOSPADM

## 2021-01-08 RX ORDER — PROPOFOL 10 MG/ML
INJECTION, EMULSION INTRAVENOUS AS NEEDED
Status: DISCONTINUED | OUTPATIENT
Start: 2021-01-08 | End: 2021-01-08 | Stop reason: HOSPADM

## 2021-01-08 RX ORDER — POTASSIUM CHLORIDE 7.45 MG/ML
10 INJECTION INTRAVENOUS
Status: DISPENSED | OUTPATIENT
Start: 2021-01-08 | End: 2021-01-08

## 2021-01-08 RX ORDER — GLYCOPYRROLATE 0.2 MG/ML
INJECTION INTRAMUSCULAR; INTRAVENOUS AS NEEDED
Status: DISCONTINUED | OUTPATIENT
Start: 2021-01-08 | End: 2021-01-08 | Stop reason: HOSPADM

## 2021-01-08 RX ORDER — IPRATROPIUM BROMIDE AND ALBUTEROL SULFATE 2.5; .5 MG/3ML; MG/3ML
SOLUTION RESPIRATORY (INHALATION)
Status: COMPLETED
Start: 2021-01-08 | End: 2021-01-08

## 2021-01-08 RX ORDER — SODIUM CHLORIDE 9 MG/ML
INJECTION, SOLUTION INTRAVENOUS
Status: DISCONTINUED | OUTPATIENT
Start: 2021-01-08 | End: 2021-01-08 | Stop reason: HOSPADM

## 2021-01-08 RX ORDER — HEPARIN SODIUM 1000 [USP'U]/ML
INJECTION, SOLUTION INTRAVENOUS; SUBCUTANEOUS AS NEEDED
Status: DISCONTINUED | OUTPATIENT
Start: 2021-01-08 | End: 2021-01-08 | Stop reason: HOSPADM

## 2021-01-08 RX ORDER — FENTANYL CITRATE 50 UG/ML
INJECTION, SOLUTION INTRAMUSCULAR; INTRAVENOUS AS NEEDED
Status: DISCONTINUED | OUTPATIENT
Start: 2021-01-08 | End: 2021-01-08 | Stop reason: HOSPADM

## 2021-01-08 RX ORDER — CEFAZOLIN SODIUM 1 G/3ML
INJECTION, POWDER, FOR SOLUTION INTRAMUSCULAR; INTRAVENOUS AS NEEDED
Status: DISCONTINUED | OUTPATIENT
Start: 2021-01-08 | End: 2021-01-08 | Stop reason: HOSPADM

## 2021-01-08 RX ORDER — LIDOCAINE HYDROCHLORIDE 10 MG/ML
INJECTION, SOLUTION EPIDURAL; INFILTRATION; INTRACAUDAL; PERINEURAL
Status: DISPENSED
Start: 2021-01-08 | End: 2021-01-09

## 2021-01-08 RX ORDER — MIDAZOLAM HYDROCHLORIDE 1 MG/ML
INJECTION, SOLUTION INTRAMUSCULAR; INTRAVENOUS AS NEEDED
Status: DISCONTINUED | OUTPATIENT
Start: 2021-01-08 | End: 2021-01-08 | Stop reason: HOSPADM

## 2021-01-08 RX ORDER — MAGNESIUM SULFATE 1 G/100ML
1 INJECTION INTRAVENOUS ONCE
Status: COMPLETED | OUTPATIENT
Start: 2021-01-08 | End: 2021-01-09

## 2021-01-08 RX ORDER — ROCURONIUM BROMIDE 10 MG/ML
INJECTION, SOLUTION INTRAVENOUS AS NEEDED
Status: DISCONTINUED | OUTPATIENT
Start: 2021-01-08 | End: 2021-01-08 | Stop reason: HOSPADM

## 2021-01-08 RX ORDER — SODIUM CHLORIDE, SODIUM LACTATE, POTASSIUM CHLORIDE, CALCIUM CHLORIDE 600; 310; 30; 20 MG/100ML; MG/100ML; MG/100ML; MG/100ML
INJECTION, SOLUTION INTRAVENOUS
Status: DISCONTINUED | OUTPATIENT
Start: 2021-01-08 | End: 2021-01-08 | Stop reason: HOSPADM

## 2021-01-08 RX ORDER — LIDOCAINE HYDROCHLORIDE 10 MG/ML
INJECTION INFILTRATION; PERINEURAL AS NEEDED
Status: DISCONTINUED | OUTPATIENT
Start: 2021-01-08 | End: 2021-01-09 | Stop reason: HOSPADM

## 2021-01-08 RX ORDER — DEXAMETHASONE SODIUM PHOSPHATE 4 MG/ML
INJECTION, SOLUTION INTRA-ARTICULAR; INTRALESIONAL; INTRAMUSCULAR; INTRAVENOUS; SOFT TISSUE AS NEEDED
Status: DISCONTINUED | OUTPATIENT
Start: 2021-01-08 | End: 2021-01-08 | Stop reason: HOSPADM

## 2021-01-08 RX ORDER — LORAZEPAM 2 MG/ML
2 INJECTION INTRAMUSCULAR
Status: DISCONTINUED | OUTPATIENT
Start: 2021-01-08 | End: 2021-01-12 | Stop reason: HOSPADM

## 2021-01-08 RX ADMIN — CARBAMAZEPINE 200 MG: 200 TABLET ORAL at 09:20

## 2021-01-08 RX ADMIN — NEOSTIGMINE METHYLSULFATE 5 MG: 1 INJECTION INTRAVENOUS at 23:21

## 2021-01-08 RX ADMIN — SERTRALINE 25 MG: 50 TABLET, FILM COATED ORAL at 09:28

## 2021-01-08 RX ADMIN — SODIUM CHLORIDE, POTASSIUM CHLORIDE, SODIUM LACTATE AND CALCIUM CHLORIDE: 600; 310; 30; 20 INJECTION, SOLUTION INTRAVENOUS at 20:38

## 2021-01-08 RX ADMIN — PROPOFOL 150 MG: 10 INJECTION, EMULSION INTRAVENOUS at 20:38

## 2021-01-08 RX ADMIN — HYDROMORPHONE HYDROCHLORIDE 0.5 MG: 2 INJECTION INTRAMUSCULAR; INTRAVENOUS; SUBCUTANEOUS at 23:45

## 2021-01-08 RX ADMIN — ISOSORBIDE DINITRATE 40 MG: 20 TABLET ORAL at 09:21

## 2021-01-08 RX ADMIN — MAGNESIUM SULFATE HEPTAHYDRATE 1 G: 1 INJECTION, SOLUTION INTRAVENOUS at 03:36

## 2021-01-08 RX ADMIN — POTASSIUM CHLORIDE 10 MEQ: 7.46 INJECTION, SOLUTION INTRAVENOUS at 05:04

## 2021-01-08 RX ADMIN — HYDRALAZINE HYDROCHLORIDE 100 MG: 50 TABLET, FILM COATED ORAL at 09:20

## 2021-01-08 RX ADMIN — MIDAZOLAM HYDROCHLORIDE 2 MG: 2 INJECTION, SOLUTION INTRAMUSCULAR; INTRAVENOUS at 20:22

## 2021-01-08 RX ADMIN — FERROUS SULFATE TAB 325 MG (65 MG ELEMENTAL FE) 325 MG: 325 (65 FE) TAB at 09:19

## 2021-01-08 RX ADMIN — GLYCOPYRROLATE 0.6 MG: 0.2 INJECTION, SOLUTION INTRAMUSCULAR; INTRAVENOUS at 23:21

## 2021-01-08 RX ADMIN — ALPRAZOLAM 0.25 MG: 0.25 TABLET ORAL at 09:19

## 2021-01-08 RX ADMIN — HYDROMORPHONE HYDROCHLORIDE 0.5 MG: 2 INJECTION INTRAMUSCULAR; INTRAVENOUS; SUBCUTANEOUS at 23:39

## 2021-01-08 RX ADMIN — DEXAMETHASONE SODIUM PHOSPHATE 4 MG: 4 INJECTION, SOLUTION INTRA-ARTICULAR; INTRALESIONAL; INTRAMUSCULAR; INTRAVENOUS; SOFT TISSUE at 20:38

## 2021-01-08 RX ADMIN — ROCURONIUM BROMIDE 30 MG: 10 SOLUTION INTRAVENOUS at 20:38

## 2021-01-08 RX ADMIN — LABETALOL HYDROCHLORIDE 400 MG: 100 TABLET, FILM COATED ORAL at 09:20

## 2021-01-08 RX ADMIN — PANTOPRAZOLE SODIUM 40 MG: 40 TABLET, DELAYED RELEASE ORAL at 09:21

## 2021-01-08 RX ADMIN — POTASSIUM CHLORIDE 10 MEQ: 7.46 INJECTION, SOLUTION INTRAVENOUS at 10:55

## 2021-01-08 RX ADMIN — ONDANSETRON 4 MG: 2 INJECTION INTRAMUSCULAR; INTRAVENOUS at 20:38

## 2021-01-08 RX ADMIN — CEFAZOLIN SODIUM 2 G: 1 INJECTION, POWDER, FOR SOLUTION INTRAMUSCULAR; INTRAVENOUS at 20:45

## 2021-01-08 RX ADMIN — NIFEDIPINE 60 MG: 60 TABLET, FILM COATED, EXTENDED RELEASE ORAL at 09:20

## 2021-01-08 RX ADMIN — FOLIC ACID 1 MG: 1 TABLET ORAL at 09:20

## 2021-01-08 RX ADMIN — PHENYLEPHRINE HYDROCHLORIDE 100 MCG: 10 INJECTION INTRAVENOUS at 20:55

## 2021-01-08 RX ADMIN — IPRATROPIUM BROMIDE AND ALBUTEROL SULFATE 3 ML: .5; 3 SOLUTION RESPIRATORY (INHALATION) at 18:14

## 2021-01-08 RX ADMIN — PHENYLEPHRINE HYDROCHLORIDE 50 MCG/MIN: 10 INJECTION INTRAVENOUS at 20:43

## 2021-01-08 RX ADMIN — Medication 10 ML: at 13:11

## 2021-01-08 RX ADMIN — LIDOCAINE HYDROCHLORIDE 100 MG: 20 INJECTION, SOLUTION EPIDURAL; INFILTRATION; INTRACAUDAL; PERINEURAL at 20:38

## 2021-01-08 RX ADMIN — HEPARIN SODIUM 5000 UNITS: 1000 INJECTION, SOLUTION INTRAVENOUS; SUBCUTANEOUS at 21:43

## 2021-01-08 RX ADMIN — CLOPIDOGREL BISULFATE 75 MG: 75 TABLET ORAL at 09:28

## 2021-01-08 RX ADMIN — SODIUM CHLORIDE: 9 INJECTION, SOLUTION INTRAVENOUS at 20:37

## 2021-01-08 RX ADMIN — PROPOFOL 50 MG: 10 INJECTION, EMULSION INTRAVENOUS at 21:10

## 2021-01-08 RX ADMIN — LEVETIRACETAM 500 MG: 500 TABLET ORAL at 09:21

## 2021-01-08 RX ADMIN — PHENYLEPHRINE HYDROCHLORIDE 100 MCG: 10 INJECTION INTRAVENOUS at 20:43

## 2021-01-08 RX ADMIN — FENTANYL CITRATE 100 MCG: 50 INJECTION, SOLUTION INTRAMUSCULAR; INTRAVENOUS at 20:38

## 2021-01-08 RX ADMIN — POTASSIUM CHLORIDE 10 MEQ: 7.46 INJECTION, SOLUTION INTRAVENOUS at 13:14

## 2021-01-08 RX ADMIN — Medication 10 ML: at 05:04

## 2021-01-08 RX ADMIN — PHENYLEPHRINE HYDROCHLORIDE 100 MCG/MIN: 10 INJECTION INTRAVENOUS at 20:55

## 2021-01-08 RX ADMIN — ASPIRIN 81 MG: 81 TABLET, COATED ORAL at 09:20

## 2021-01-08 RX ADMIN — FENTANYL CITRATE 100 MCG: 50 INJECTION, SOLUTION INTRAMUSCULAR; INTRAVENOUS at 21:10

## 2021-01-08 RX ADMIN — PROPOFOL 40 MG: 10 INJECTION, EMULSION INTRAVENOUS at 21:58

## 2021-01-08 RX ADMIN — ROCURONIUM BROMIDE 20 MG: 10 SOLUTION INTRAVENOUS at 22:08

## 2021-01-08 NOTE — ANESTHESIA PREPROCEDURE EVALUATION
Relevant Problems   RESPIRATORY SYSTEM   (+) COPD (chronic obstructive pulmonary disease) with emphysema (HCC)      NEUROLOGY   (+) Seizure disorder (HCC)      CARDIOVASCULAR   (+) CHF (congestive heart failure) (HCC)   (+) CHF exacerbation (HCC)   (+) Mitral valve regurgitation   (+) Renal artery stenosis (HCC)   (+) Resistant hypertension      RENAL FAILURE   (+) PEDRO PABLO (acute kidney injury) ([address]      ENDOCRINE   (+) Acquired hypothyroidism      HEMATOLOGY   (+) Iron deficiency anemia       Anesthetic History   No history of anesthetic complications            Review of Systems / Medical History  Patient summary reviewed, nursing notes reviewed and pertinent labs reviewed    Pulmonary    COPD: moderate               Neuro/Psych     seizures  CVA  TIA and psychiatric history     Cardiovascular    Hypertension          Hyperlipidemia    Exercise tolerance: >4 METS  Comments: TTE:  · LV: Estimated LVEF is 50 - 55%. Normal cavity size and systolic function (ejection fraction normal). Mild concentric hypertrophy. Wall motion: normal. Moderate (grade 2) left ventricular diastolic dysfunction. · AV: Aortic valve leaflet calcification present. · MV: Mitral valve thickening. Moderate mitral valve regurgitation is present. · TV: Moderate tricuspid valve regurgitation is present. · Possible stenosis in descending aorta.     Normal stress test   GI/Hepatic/Renal     GERD    Renal disease: CRI       Endo/Other      Hypothyroidism  Anemia     Other Findings            Past Medical History:   Diagnosis Date    Anxiety 10/2/2020    Chronic anemia     Chronic respiratory failure (HCC)     COPD (chronic obstructive pulmonary disease) with emphysema ([address] 10/2/2020    Depression     Diastolic CHF (HCC)     Dysphagia 10/2/2020    GERD (gastroesophageal reflux disease)     High cholesterol     Hypertension     Hypothyroid     Iron deficiency anemia 10/2/2020    Mitral valve regurgitation 10/2/2020    [facility] stenosis [facility]     Seizure disorder ([facility]        Past Surgical History:   Procedure Laterality Date    HX CHOLECYSTECTOMY      HX RENAL ARTERY STENT      HX ROTATOR CUFF REPAIR Right     HX TUBAL LIGATION      IR THORACENTESIS CATH W IMAGE  11/24/2020    IR THORACENTESIS CATH W IMAGE  11/25/2020       Current Outpatient Medications   Medication Instructions    albuterol (PROVENTIL HFA, VENTOLIN HFA, PROAIR HFA) 90 mcg/actuation inhaler No dose, route, or frequency recorded.  albuterol-ipratropium (DUO-NEB) 2.5 mg-0.5 mg/3 ml nebu 3 mL, Nebulization, EVERY 6 HOURS    ALPRAZolam (XANAX) 0.25 mg, Oral, 3 TIMES DAILY AS NEEDED    aspirin delayed-release 81 mg tablet Oral, DAILY    atorvastatin (LIPITOR) 80 mg, Oral, DAILY    carBAMazepine (mood stabiliz) 200 mg, Oral, 2 TIMES DAILY    clopidogreL (PLAVIX) 75 mg, Oral, DAILY    esomeprazole (NEXIUM) 40 mg, Oral    ferrous sulfate 325 mg, Oral, DAILY BEFORE BREAKFAST    folic acid (FOLVITE) 1 mg, Oral, DAILY    furosemide (LASIX) 40 mg, IntraVENous, EVERY 12 HOURS    hydrALAZINE (APRESOLINE) 100 mg, Oral, 3 TIMES DAILY    isosorbide dinitrate (ISORDIL) 40 mg, Oral, 3 TIMES DAILY    labetaloL (NORMODYNE) 400 mg, Oral, 3 TIMES DAILY    levETIRAcetam (KEPPRA) 500 mg, Oral, 2 TIMES DAILY    levothyroxine (SYNTHROID) 25 mcg, Oral, DAILY BEFORE BREAKFAST    magnesium oxide (MAG-OX) 400 mg, Oral, DAILY    NIFEdipine ER (PROCARDIA XL) 60 mg ER tablet 1 Tab, Oral, DAILY    nortriptyline (PAMELOR) 50 mg, Oral, EVERY BEDTIME    Oxygen 5 Devices, AS NEEDED, Pt wears 5LPM as needed- states she uses it after an axiety attack     potassium chloride (K-DUR, KLOR-CON) 20 mEq tablet 20 mEq, Oral, DAILY    rosuvastatin (CRESTOR) 10 mg tablet No dose, route, or frequency recorded.  sertraline (ZOLOFT) 25 mg, Oral, DAILY    spironolactone (ALDACTONE) 50 mg, Oral, DAILY    Trelegy Ellipta 100-62.5-25 mcg inhaler No dose, route, or frequency recorded. Current Facility-Administered Medications   Medication Dose Route Frequency    LORazepam (ATIVAN) injection 2 mg  2 mg IntraVENous Q4H PRN    nortriptyline (PAMELOR) capsule 50 mg  50 mg Oral QHS    pantoprazole (PROTONIX) tablet 40 mg  40 mg Oral ACB&D    butalbital-acetaminophen-caffeine (FIORICET, ESGIC) -40 mg per tablet 2 Tab  2 Tab Oral Q4H PRN    clopidogreL (PLAVIX) tablet 75 mg  75 mg Oral DAILY    atorvastatin (LIPITOR) tablet 40 mg  40 mg Oral QHS    albuterol (PROVENTIL HFA, VENTOLIN HFA, PROAIR HFA) inhaler 2 Puff  2 Puff Inhalation Q4H PRN    aspirin delayed-release tablet 81 mg  81 mg Oral DAILY    carBAMazepine (TEGretol) tablet 200 mg  200 mg Oral Q12H    ferrous sulfate tablet 325 mg  325 mg Oral ACB    folic acid (FOLVITE) tablet 1 mg  1 mg Oral DAILY    isosorbide dinitrate (ISORDIL) tablet 40 mg  40 mg Oral TID    labetaloL (NORMODYNE) tablet 400 mg  400 mg Oral TID    levETIRAcetam (KEPPRA) tablet 500 mg  500 mg Oral BID    levothyroxine (SYNTHROID) tablet 25 mcg  25 mcg Oral ACB    NIFEdipine ER (PROCARDIA XL) tablet 60 mg  60 mg Oral DAILY    sertraline (ZOLOFT) tablet 25 mg  25 mg Oral DAILY    ALPRAZolam (XANAX) tablet 0.25 mg  0.25 mg Oral TID PRN    budesonide (PULMICORT) 500 mcg/2 ml nebulizer suspension  500 mcg Nebulization BID RT    hydrALAZINE (APRESOLINE) tablet 100 mg  100 mg Oral TID    sodium chloride (NS) flush 5-40 mL  5-40 mL IntraVENous Q8H    sodium chloride (NS) flush 5-40 mL  5-40 mL IntraVENous PRN    acetaminophen (TYLENOL) tablet 650 mg  650 mg Oral Q6H PRN    Or    acetaminophen (TYLENOL) suppository 650 mg  650 mg Rectal Q6H PRN    polyethylene glycol (MIRALAX) packet 17 g  17 g Oral DAILY PRN    promethazine (PHENERGAN) tablet 12.5 mg  12.5 mg Oral Q6H PRN    Or    ondansetron (ZOFRAN) injection 4 mg  4 mg IntraVENous Q6H PRN    enoxaparin (LOVENOX) injection 40 mg  40 mg SubCUTAneous DAILY       Patient Vitals for the past 24 hrs:   Temp Pulse Resp BP SpO2   01/08/21 1427 36.7 °C (98.1 °F) 80 17 (!) 148/82 99 %   01/08/21 0840 37 °C (98.6 °F) 84 18 (!) 148/72 98 %   01/07/21 1941 36.7 °C (98 °F) 88 17 (!) 161/82        Lab Results   Component Value Date/Time    WBC 6.2 01/08/2021 01:26 PM    HGB 9.0 (L) 01/08/2021 01:26 PM    HCT 28.0 (L) 01/08/2021 01:26 PM    PLATELET 096 80/58/6957 01:26 PM    MCV 96.6 01/08/2021 01:26 PM     Lab Results   Component Value Date/Time    Sodium 137 01/08/2021 01:26 PM    Potassium 3.5 01/08/2021 01:26 PM    Chloride 100 01/08/2021 01:26 PM    CO2 31 01/08/2021 01:26 PM    Anion gap 6 01/08/2021 01:26 PM    Glucose 86 01/08/2021 01:26 PM    BUN 19 01/08/2021 01:26 PM    Creatinine 1.04 (H) 01/08/2021 01:26 PM    BUN/Creatinine ratio 18 01/08/2021 01:26 PM    GFR est AA >60 01/08/2021 01:26 PM    GFR est non-AA 55 (L) 01/08/2021 01:26 PM    Calcium 9.2 01/08/2021 01:26 PM     No results found for: APTT, PTP, INR, INREXT  Lab Results   Component Value Date/Time    Glucose 86 01/08/2021 01:26 PM    Glucose (POC) 166 (H) 11/04/2020 05:01 PM     Physical Exam    Airway  Mallampati: II  TM Distance: 4 - 6 cm  Neck ROM: normal range of motion   Mouth opening: Normal     Cardiovascular    Rhythm: regular  Rate: normal         Dental  No notable dental hx       Pulmonary    Rhonchi             Abdominal  GI exam deferred       Other Findings            Anesthetic Plan    ASA: 4, emergent  Anesthesia type: general    Monitoring Plan: Arterial line      Induction: Intravenous  Anesthetic plan and risks discussed with: Patient and Family

## 2021-01-08 NOTE — PROGRESS NOTES
Hospitalist Progress Note    Subjective:   Daily Progress Note: 1/8/2021 7:53 AM    Hospital Course:     Admitted 1/3/2021. Patient is 54-year-old female with a PMH significant for anxiety/depression, COPD, chronic hypoxia on home oxygen, diastolic HF, dysphagia, GERD, HLD, HTN, chronic iron deficiency anemia, MVR and seizure disorder. She comes into the emergency room with complaints of syncopal episode. States she become dizzy when she stands up from sitting to walking. Associated symptoms of weakness throughout the day. On admission potassium 2.4. She was admitted for further management of syncope, hypokalemia, hypomagnesium, orthostatic hypotension. Cardiology and neurology consulted. Serial cardiac enzymes negative for ischemia hemoglobin stable at 10.1. Carotid duplex Dopplers right proximal ICA 59 to 70% stenosis, left ICA 50 to 79% stenosis worse on the left than right. CT angiogram pending. Vascular surgery consulted. Stress test completed cleared for today for cardiac clearance for CEA scheduled for day. Subjective: Follow-up examination of patient at the bedside. Anxious about CEA procedure today.       Current Facility-Administered Medications   Medication Dose Route Frequency    nortriptyline (PAMELOR) capsule 50 mg  50 mg Oral QHS    pantoprazole (PROTONIX) tablet 40 mg  40 mg Oral ACB&D    butalbital-acetaminophen-caffeine (FIORICET, ESGIC) -40 mg per tablet 2 Tab  2 Tab Oral Q4H PRN    clopidogreL (PLAVIX) tablet 75 mg  75 mg Oral DAILY    atorvastatin (LIPITOR) tablet 40 mg  40 mg Oral QHS    albuterol (PROVENTIL HFA, VENTOLIN HFA, PROAIR HFA) inhaler 2 Puff  2 Puff Inhalation Q4H PRN    aspirin delayed-release tablet 81 mg  81 mg Oral DAILY    carBAMazepine (TEGretol) tablet 200 mg  200 mg Oral Q12H    ferrous sulfate tablet 325 mg  325 mg Oral ACB    folic acid (FOLVITE) tablet 1 mg  1 mg Oral DAILY    isosorbide dinitrate (ISORDIL) tablet 40 mg  40 mg Oral TID  labetaloL (NORMODYNE) tablet 400 mg  400 mg Oral TID    levETIRAcetam (KEPPRA) tablet 500 mg  500 mg Oral BID    levothyroxine (SYNTHROID) tablet 25 mcg  25 mcg Oral ACB    NIFEdipine ER (PROCARDIA XL) tablet 60 mg  60 mg Oral DAILY    sertraline (ZOLOFT) tablet 25 mg  25 mg Oral DAILY    ALPRAZolam (XANAX) tablet 0.25 mg  0.25 mg Oral TID PRN    budesonide (PULMICORT) 500 mcg/2 ml nebulizer suspension  500 mcg Nebulization BID RT    hydrALAZINE (APRESOLINE) tablet 100 mg  100 mg Oral TID    sodium chloride (NS) flush 5-40 mL  5-40 mL IntraVENous Q8H    sodium chloride (NS) flush 5-40 mL  5-40 mL IntraVENous PRN    acetaminophen (TYLENOL) tablet 650 mg  650 mg Oral Q6H PRN    Or    acetaminophen (TYLENOL) suppository 650 mg  650 mg Rectal Q6H PRN    polyethylene glycol (MIRALAX) packet 17 g  17 g Oral DAILY PRN    promethazine (PHENERGAN) tablet 12.5 mg  12.5 mg Oral Q6H PRN    Or    ondansetron (ZOFRAN) injection 4 mg  4 mg IntraVENous Q6H PRN    enoxaparin (LOVENOX) injection 40 mg  40 mg SubCUTAneous DAILY        REVIEW OF SYSTEMS    Review of Systems   Constitutional: Positive for malaise/fatigue. HENT: Negative. Respiratory: Negative. Cardiovascular: Negative. Gastrointestinal: Negative. Genitourinary: Negative. Musculoskeletal: Negative. Neurological: Positive for dizziness, weakness and headaches. Objective:     Visit Vitals  BP (!) 161/82 (BP 1 Location: Left arm, BP Patient Position: At rest;Supine; Head of bed elevated (Comment degrees))   Pulse 88   Temp 98 °F (36.7 °C)   Resp 17   Ht 5' 4\" (1.626 m)   Wt 45.4 kg (100 lb)   SpO2 98%   BMI 17.16 kg/m²    O2 Flow Rate (L/min): 2 l/min(pt waiting for stress test, not weaned yet. Nocturnal O2 use ) O2 Device: Room air    Temp (24hrs), Av.1 °F (36.7 °C), Min:97.9 °F (36.6 °C), Max:98.3 °F (36.8 °C)      No intake/output data recorded.    1901 -  0700  In: 600 [P.O.:600]  Out: 800 [Urine:800]    PHYSICAL EXAM:    Physical Exam  Constitutional:       Appearance: She is ill-appearing. HENT:      Mouth/Throat:      Mouth: Mucous membranes are moist.   Eyes:      Extraocular Movements: Extraocular movements intact. Neck:      Musculoskeletal: Normal range of motion. Cardiovascular:      Rate and Rhythm: Normal rate. Pulmonary:      Effort: Pulmonary effort is normal.   Musculoskeletal: Normal range of motion. Skin:     General: Skin is warm and dry. Neurological:      Motor: Weakness present. Data Review    Recent Results (from the past 24 hour(s))   NUCLEAR CARDIAC STRESS TEST    Collection Time: 01/07/21 12:10 PM   Result Value Ref Range    Target  bpm    Baseline HR 77 bpm    Baseline  mmHg    Percent HR 85 %    Post peak  bpm    Stress Base Diastolic BP 99 mmHg    Stress Stage 1 Duration 1min min:sec    Stress Stage 2 Duration 2min min:sec    Stress Stage 3 Duration 3min min:sec    Stress Stage 4 Duration 4min min:sec    Stress Stage 1 HR 89 bpm    Stress Stage 2  bpm    Stress Stage 2 /81 mmHg    Stress Stage 3  bpm    Stress Stage 3 /90 mmHg    Stress Stage 4 HR 96 bpm    Stress Stage 4 /103 mmHg   CBC WITH AUTOMATED DIFF    Collection Time: 01/07/21 12:48 PM   Result Value Ref Range    WBC 6.3 3.6 - 11.0 K/uL    RBC 3.29 (L) 3.80 - 5.20 M/uL    HGB 10.3 (L) 11.5 - 16.0 g/dL    HCT 31.9 (L) 35.0 - 47.0 %    MCV 97.0 80.0 - 99.0 FL    MCH 31.3 26.0 - 34.0 PG    MCHC 32.3 30.0 - 36.5 g/dL    RDW 14.9 (H) 11.5 - 14.5 %    PLATELET 179 056 - 260 K/uL    MPV 9.6 8.9 - 12.9 FL    NEUTROPHILS 76 (H) 32 - 75 %    LYMPHOCYTES 13 12 - 49 %    MONOCYTES 8 5 - 13 %    EOSINOPHILS 3 0 - 7 %    BASOPHILS 0 0 - 1 %    IMMATURE GRANULOCYTES 0 0.0 - 0.5 %    ABS. NEUTROPHILS 4.8 1.8 - 8.0 K/UL    ABS. LYMPHOCYTES 0.9 0.8 - 3.5 K/UL    ABS. MONOCYTES 0.5 0.0 - 1.0 K/UL    ABS. EOSINOPHILS 0.2 0.0 - 0.4 K/UL    ABS. BASOPHILS 0.0 0.0 - 0.1 K/UL    ABS. IMM. GRANS. 0.0 0.00 - 0.04 K/UL    DF AUTOMATED     METABOLIC PANEL, BASIC    Collection Time: 01/07/21 12:48 PM   Result Value Ref Range    Sodium 137 136 - 145 mmol/L    Potassium 2.9 (L) 3.5 - 5.1 mmol/L    Chloride 98 97 - 108 mmol/L    CO2 32 21 - 32 mmol/L    Anion gap 7 5 - 15 mmol/L    Glucose 92 65 - 100 mg/dL    BUN 17 6 - 20 mg/dL    Creatinine 1.01 0.55 - 1.02 mg/dL    BUN/Creatinine ratio 17 12 - 20      GFR est AA >60 >60 ml/min/1.73m2    GFR est non-AA 57 (L) >60 ml/min/1.73m2    Calcium 9.2 8.5 - 10.1 mg/dL       CTA NECK   Final Result   Impression:   1. Moderate (60-65%) stenosis of the right ICA origin. 2.  Mild (40-45%) stenosis of the left ICA origin. Please note that degrees of carotid stenosis are measured in accordance with   NASCET criteria. CT HEAD WO CONT   Final Result   Impression:    1. No acute intracranial findings. XR CHEST PORT   Final Result   IMPRESSION: Considerations for baseline interstitial lung disease or mild   pulmonary edema. Active Problems:    Resistant hypertension (9/21/2020)      Renal artery stenosis (HCC) (9/21/2020)      CHF (congestive heart failure) ([facility] [address] (10/2/2020)      Overview: With preserved lvef, diastolic dysfunction, mitral valve regurgitation       moderate. Mitral valve regurgitation (10/2/2020)      Anxiety (10/2/2020)      Seizure disorder ([address] (10/2/2020)      COPD (chronic obstructive pulmonary disease) with emphysema ([facility] [address] (10/2/2020)      Hypokalemia (10/2/2020)      Acquired hypothyroidism (10/2/2020)      Syncope (1/3/2021)        Assessment/Plan:     1. Syncope:  2.   Orthostatic hypotension:  · CT of head negative for acute findings  · Echo LVEF 50 to 55% mild concentric hypertrophy, moderate grade 2 diastolic dysfunction, moderate MVR, moderate TVR, possible stenosis in the descending aorta  · Carotid Doppler studies right proximal ICA 50 to 79% blockage on higher end of stenosis, left proximal ICA 50 to 79% stenosis with heavier plaque than right  · Serial cardiac enzymes negative  · PT OT consulted recommending home with home health PT  · Cardiology consulted  · Neurology consulted  · Vascular surgery consulted moderate stenosis  · CT angiogrammoderate 60-65 stenosis in right ICA, mild 40 to 45% stenosis of left ICA  · Stress test completed cleared for procedure  · Planned CEA intervention left ICA stenosis scheduled for today    3. Hypokalemia:  4. Hypomagnesium:  · Replete and monitor    5. Hypertension:  · Continue home hypertensive medications    6. Hypothyroid:  · Continue home medications Synthroid    7. CHF with preserved EF:  No signs or symptoms of exacerbation    8. HLD:  · Continue statin therapy    9. Seizure disorder:  Continue Tegretol and Keppra    10. Renal artery stenosis: Status post stent placement  · Continue statin and Plavix and aspirin    11. Mitral valve regurg:  · Cardiology consulting    12. COPD:  15.  Chronic hypoxia: On Home oxygen  · No signs or symptoms of exacerbation at this time. · Continue home neb Pulmicort    14. Chronic iron deficiency anemia:  · Continue oral supplementation iron, folic acid    15. Malnutrition:  · Nutrition consult    DVT Prophylaxis: Lovenox  Code Status: Full Code  POA/NOK: Timoteo Luo at #[phone]  ______________________________________________________________________________  Time spent in direct care including coordination of service, review of data and examination: > 35 minutes  Care Plan discussed with: RN and patient. Patient states she wants to be a DNR discussed how she could not be a DNR for the surgical procedure but that she can request DNR status after she recovers from carotid endarterectomy. ______________________________________________________________________________    Alysia Catherine NP    This is dictation was done by dragon, computer voice recognition software.   Quite often unanticipated grammatical, syntax, homophones and other interpretive errors or inadvertently transcribed by the computer software. Please excuse errors that have escaped final proofreading. Thank you.

## 2021-01-08 NOTE — PROGRESS NOTES
Patient examined this morning. Patient looks comfortable. Patient had stress test yesterday and she did very well. I discussed with the patient yesterday about carotid endarterectomy on right side. We talked about rationale for the procedure, risks benefits and complication. Patient has been scheduled for right-sided carotid endarterectomy for symptomatic drop attacks. I did review CT angiogram of the neck and also compared to ultrasonogram examination. Right side internal carotid artery heavy calcified plaque with a luminal narrowing up to 65% per CT scan report, I reviewed the angiogram myself more likely close to 70%. There is no soft plaque or intramural thrombus. The carotid duplex ultrasound patient is a significant elevated peak systolic velocity. On the left side patient also had a heavy calcified plaque. Luminal narrowing possibly up to 60% on my personal interpretation. ,  There is no soft plaque or intramural thrombus. On the ultrasound however plaque burden seems to be worse, but peak systolic velocity proximal left internal carotid artery is not as bad as a right side. My personal recommendation is addressed left-sided carotid stenosis at some point more electively. Anyways patient has been receiving IV potassium since last night for hyperkalemia. I also gave her a dose of magnesium sulfate this morning. We will repeat BMP level this morning prior to surgery. Patient will be kept ICU postop.

## 2021-01-08 NOTE — PROGRESS NOTES
Pt. Refused therapy session this morning and is nervous about surgery this afternoon. Will require reassessment tomorrow.

## 2021-01-08 NOTE — PROGRESS NOTES
Problem: Falls - Risk of  Goal: *Absence of Falls  Description: Document Naga Hayden Fall Risk and appropriate interventions in the flowsheet.   Outcome: Progressing Towards Goal  Note: Fall Risk Interventions:  Mobility Interventions: Bed/chair exit alarm         Medication Interventions: Bed/chair exit alarm    Elimination Interventions: Bed/chair exit alarm, Call light in reach

## 2021-01-08 NOTE — PROGRESS NOTES
[facility] been [facility] for PT and OT only at this time. They do not have the staffing for a skilled nurse currently. [facility]. If skilled nursing will be required post discharge, we will need to find another [address] [place] [address] to accept patient.

## 2021-01-08 NOTE — PROGRESS NOTES
CARDIOLOGY PROGRESS NOTE    Patient seen and examined. We are following for syncope. On examination, she is resting comfortably, no acute distress. Feeling tired this am. She denies chest pain, sob, palpitations, and swelling. Scheduled for Right CEA this am.    REVIEW OF SYSTEMS:  Per HPI above    Telemetry reviewed. Sinus rhythm in 80s       Physical examination:  Vital signs, Blood pressure 148/72,  Pulse 82  No apparent distress. Heart is regular, rate and rhythm. Normal S1, S2,  Murmur noted. Lungs are clear bilaterally. Abdomen is soft, nontender, normal bowel sounds. Extremities have no edema. Recent labs results and imaging reviewed. Neck CTA:  1. Moderate (60-65%) stenosis of the right ICA origin. 2.  Mild (40-45%) stenosis of the left ICA origin. Discussed case with Dr. Geetha Rose. This our impression and recommendation:    1. Syncope: Orthostatics positive on admission. For right-sided CEA this am.  Continue to monitor telemetry closely and keep serum potassium between 4-5 and serum magnesium >2. Nuclear stress test normal. Scheduled for right CEA this am.    2. HFpEF: Echocardiogram shows preserved EF. She appears euvolemic. Maintain fluid balance. 3. Hypertension: Blood pressure acceptable. Continue medication treatment and monitoring. 4. Hyperlipidemia: Continue statin therapy. 5. Preoperative risk assessment: Moderate but acceptable risk. Scheduled for right CEA tomorrow. Please do not hesitate to call if additional questions arise.

## 2021-01-08 NOTE — PROGRESS NOTES
This RN rounded on pt, asked pt and her family member if they needed anything and apologized for delayed procedure time. Pt's visitor then stated to this RN, Gay Baumgarten this is pretty fucking stupid. Nancy Crandall are the biggest bunch of damn motherfuckers I've ever seen. I bet [employer] will eat this up. \" This RN continued to apologize for their inconvenience and awknowledged their frustrations. Will call OR to facilitate pt going to have their procedure complete. Tegan Patiño RN .

## 2021-01-09 LAB
ANION GAP SERPL CALC-SCNC: 6 MMOL/L (ref 5–15)
BUN SERPL-MCNC: 18 MG/DL (ref 6–20)
BUN/CREAT SERPL: 15 (ref 12–20)
CA-I BLD-MCNC: 9.1 MG/DL (ref 8.5–10.1)
CHLORIDE SERPL-SCNC: 103 MMOL/L (ref 97–108)
CO2 SERPL-SCNC: 30 MMOL/L (ref 21–32)
CREAT SERPL-MCNC: 1.17 MG/DL (ref 0.55–1.02)
ERYTHROCYTE [DISTWIDTH] IN BLOOD BY AUTOMATED COUNT: 15.3 % (ref 11.5–14.5)
GLUCOSE SERPL-MCNC: 133 MG/DL (ref 65–100)
HCT VFR BLD AUTO: 28.1 % (ref 35–47)
HGB BLD-MCNC: 9.3 G/DL (ref 11.5–16)
MCH RBC QN AUTO: 32.1 PG (ref 26–34)
MCHC RBC AUTO-ENTMCNC: 33.1 G/DL (ref 30–36.5)
MCV RBC AUTO: 96.9 FL (ref 80–99)
MRSA DNA SPEC QL NAA+PROBE: NOT DETECTED
PLATELET # BLD AUTO: 333 K/UL (ref 150–400)
PMV BLD AUTO: 9.6 FL (ref 8.9–12.9)
POTASSIUM SERPL-SCNC: 4.2 MMOL/L (ref 3.5–5.1)
RBC # BLD AUTO: 2.9 M/UL (ref 3.8–5.2)
SODIUM SERPL-SCNC: 139 MMOL/L (ref 136–145)
WBC # BLD AUTO: 11.6 K/UL (ref 3.6–11)

## 2021-01-09 PROCEDURE — 74011250637 HC RX REV CODE- 250/637: Performed by: SURGERY

## 2021-01-09 PROCEDURE — 80048 BASIC METABOLIC PNL TOTAL CA: CPT

## 2021-01-09 PROCEDURE — 94640 AIRWAY INHALATION TREATMENT: CPT

## 2021-01-09 PROCEDURE — P9035 PLATELET PHERES LEUKOREDUCED: HCPCS

## 2021-01-09 PROCEDURE — 36415 COLL VENOUS BLD VENIPUNCTURE: CPT

## 2021-01-09 PROCEDURE — 85027 COMPLETE CBC AUTOMATED: CPT

## 2021-01-09 PROCEDURE — 30233R1 TRANSFUSION OF NONAUTOLOGOUS PLATELETS INTO PERIPHERAL VEIN, PERCUTANEOUS APPROACH: ICD-10-PCS | Performed by: INTERNAL MEDICINE

## 2021-01-09 PROCEDURE — 74011000250 HC RX REV CODE- 250: Performed by: SURGERY

## 2021-01-09 PROCEDURE — 65610000006 HC RM INTENSIVE CARE

## 2021-01-09 PROCEDURE — 36430 TRANSFUSION BLD/BLD COMPNT: CPT

## 2021-01-09 PROCEDURE — 74011250636 HC RX REV CODE- 250/636: Performed by: SURGERY

## 2021-01-09 PROCEDURE — 87641 MR-STAPH DNA AMP PROBE: CPT

## 2021-01-09 PROCEDURE — 65270000029 HC RM PRIVATE

## 2021-01-09 RX ORDER — SODIUM CHLORIDE 0.9 % (FLUSH) 0.9 %
5-40 SYRINGE (ML) INJECTION AS NEEDED
Status: DISCONTINUED | OUTPATIENT
Start: 2021-01-09 | End: 2021-01-09 | Stop reason: HOSPADM

## 2021-01-09 RX ORDER — HYDROMORPHONE HYDROCHLORIDE 1 MG/ML
1 INJECTION, SOLUTION INTRAMUSCULAR; INTRAVENOUS; SUBCUTANEOUS
Status: DISPENSED | OUTPATIENT
Start: 2021-01-09 | End: 2021-01-11

## 2021-01-09 RX ORDER — DIPHENHYDRAMINE HYDROCHLORIDE 50 MG/ML
12.5 INJECTION, SOLUTION INTRAMUSCULAR; INTRAVENOUS AS NEEDED
Status: DISCONTINUED | OUTPATIENT
Start: 2021-01-09 | End: 2021-01-09 | Stop reason: HOSPADM

## 2021-01-09 RX ORDER — MIDAZOLAM HYDROCHLORIDE 1 MG/ML
0.5 INJECTION, SOLUTION INTRAMUSCULAR; INTRAVENOUS
Status: DISCONTINUED | OUTPATIENT
Start: 2021-01-09 | End: 2021-01-09 | Stop reason: HOSPADM

## 2021-01-09 RX ORDER — HYDROCODONE BITARTRATE AND ACETAMINOPHEN 5; 325 MG/1; MG/1
1 TABLET ORAL
Status: DISCONTINUED | OUTPATIENT
Start: 2021-01-09 | End: 2021-01-12 | Stop reason: HOSPADM

## 2021-01-09 RX ORDER — MORPHINE SULFATE 10 MG/ML
2 INJECTION, SOLUTION INTRAMUSCULAR; INTRAVENOUS
Status: DISCONTINUED | OUTPATIENT
Start: 2021-01-09 | End: 2021-01-09 | Stop reason: HOSPADM

## 2021-01-09 RX ORDER — IPRATROPIUM BROMIDE AND ALBUTEROL SULFATE 2.5; .5 MG/3ML; MG/3ML
3 SOLUTION RESPIRATORY (INHALATION)
Status: ACTIVE | OUTPATIENT
Start: 2021-01-09 | End: 2021-01-09

## 2021-01-09 RX ORDER — LIDOCAINE HYDROCHLORIDE 10 MG/ML
0.1 INJECTION, SOLUTION EPIDURAL; INFILTRATION; INTRACAUDAL; PERINEURAL AS NEEDED
Status: DISCONTINUED | OUTPATIENT
Start: 2021-01-09 | End: 2021-01-09 | Stop reason: HOSPADM

## 2021-01-09 RX ORDER — MIDAZOLAM HYDROCHLORIDE 1 MG/ML
1 INJECTION, SOLUTION INTRAMUSCULAR; INTRAVENOUS AS NEEDED
Status: DISCONTINUED | OUTPATIENT
Start: 2021-01-09 | End: 2021-01-09 | Stop reason: HOSPADM

## 2021-01-09 RX ORDER — EPHEDRINE SULFATE/0.9% NACL/PF 50 MG/5 ML
5 SYRINGE (ML) INTRAVENOUS AS NEEDED
Status: DISCONTINUED | OUTPATIENT
Start: 2021-01-09 | End: 2021-01-09 | Stop reason: HOSPADM

## 2021-01-09 RX ORDER — ONDANSETRON 2 MG/ML
4 INJECTION INTRAMUSCULAR; INTRAVENOUS AS NEEDED
Status: DISCONTINUED | OUTPATIENT
Start: 2021-01-09 | End: 2021-01-09 | Stop reason: HOSPADM

## 2021-01-09 RX ORDER — DEXTROSE, SODIUM CHLORIDE, AND POTASSIUM CHLORIDE 5; .45; .15 G/100ML; G/100ML; G/100ML
75 INJECTION INTRAVENOUS CONTINUOUS
Status: DISCONTINUED | OUTPATIENT
Start: 2021-01-09 | End: 2021-01-09

## 2021-01-09 RX ORDER — FENTANYL CITRATE 50 UG/ML
50 INJECTION, SOLUTION INTRAMUSCULAR; INTRAVENOUS
Status: DISCONTINUED | OUTPATIENT
Start: 2021-01-09 | End: 2021-01-09 | Stop reason: HOSPADM

## 2021-01-09 RX ORDER — SODIUM CHLORIDE 9 MG/ML
250 INJECTION, SOLUTION INTRAVENOUS AS NEEDED
Status: DISCONTINUED | OUTPATIENT
Start: 2021-01-09 | End: 2021-01-12 | Stop reason: HOSPADM

## 2021-01-09 RX ORDER — HYDROMORPHONE HYDROCHLORIDE 1 MG/ML
0.4 INJECTION, SOLUTION INTRAMUSCULAR; INTRAVENOUS; SUBCUTANEOUS
Status: DISCONTINUED | OUTPATIENT
Start: 2021-01-09 | End: 2021-01-09 | Stop reason: HOSPADM

## 2021-01-09 RX ORDER — SODIUM CHLORIDE 0.9 % (FLUSH) 0.9 %
5-40 SYRINGE (ML) INJECTION EVERY 8 HOURS
Status: DISCONTINUED | OUTPATIENT
Start: 2021-01-09 | End: 2021-01-09 | Stop reason: HOSPADM

## 2021-01-09 RX ORDER — SODIUM CHLORIDE 0.9 % (FLUSH) 0.9 %
5-40 SYRINGE (ML) INJECTION AS NEEDED
Status: DISCONTINUED | OUTPATIENT
Start: 2021-01-09 | End: 2021-01-12 | Stop reason: HOSPADM

## 2021-01-09 RX ORDER — SODIUM CHLORIDE 0.9 % (FLUSH) 0.9 %
5-40 SYRINGE (ML) INJECTION EVERY 8 HOURS
Status: DISCONTINUED | OUTPATIENT
Start: 2021-01-09 | End: 2021-01-12 | Stop reason: HOSPADM

## 2021-01-09 RX ORDER — FENTANYL CITRATE 50 UG/ML
50 INJECTION, SOLUTION INTRAMUSCULAR; INTRAVENOUS AS NEEDED
Status: DISCONTINUED | OUTPATIENT
Start: 2021-01-09 | End: 2021-01-09 | Stop reason: HOSPADM

## 2021-01-09 RX ORDER — LABETALOL HCL 20 MG/4 ML
5 SYRINGE (ML) INTRAVENOUS
Status: DISCONTINUED | OUTPATIENT
Start: 2021-01-09 | End: 2021-01-09 | Stop reason: HOSPADM

## 2021-01-09 RX ORDER — HYDROCODONE BITARTRATE AND ACETAMINOPHEN 5; 325 MG/1; MG/1
1 TABLET ORAL AS NEEDED
Status: DISCONTINUED | OUTPATIENT
Start: 2021-01-09 | End: 2021-01-09 | Stop reason: HOSPADM

## 2021-01-09 RX ORDER — METOPROLOL TARTRATE 5 MG/5ML
2.5 INJECTION INTRAVENOUS
Status: DISCONTINUED | OUTPATIENT
Start: 2021-01-09 | End: 2021-01-09 | Stop reason: HOSPADM

## 2021-01-09 RX ORDER — HYDRALAZINE HYDROCHLORIDE 20 MG/ML
10 INJECTION INTRAMUSCULAR; INTRAVENOUS
Status: DISCONTINUED | OUTPATIENT
Start: 2021-01-09 | End: 2021-01-09 | Stop reason: HOSPADM

## 2021-01-09 RX ADMIN — CARBAMAZEPINE 200 MG: 200 TABLET ORAL at 09:14

## 2021-01-09 RX ADMIN — FERROUS SULFATE TAB 325 MG (65 MG ELEMENTAL FE) 325 MG: 325 (65 FE) TAB at 08:11

## 2021-01-09 RX ADMIN — HYDROMORPHONE HYDROCHLORIDE 1 MG: 1 INJECTION, SOLUTION INTRAMUSCULAR; INTRAVENOUS; SUBCUTANEOUS at 07:23

## 2021-01-09 RX ADMIN — LABETALOL HYDROCHLORIDE 400 MG: 100 TABLET, FILM COATED ORAL at 15:27

## 2021-01-09 RX ADMIN — LEVETIRACETAM 500 MG: 500 TABLET ORAL at 21:26

## 2021-01-09 RX ADMIN — HYDROMORPHONE HYDROCHLORIDE 1 MG: 1 INJECTION, SOLUTION INTRAMUSCULAR; INTRAVENOUS; SUBCUTANEOUS at 23:40

## 2021-01-09 RX ADMIN — CARBAMAZEPINE 200 MG: 200 TABLET ORAL at 21:26

## 2021-01-09 RX ADMIN — Medication 10 ML: at 22:00

## 2021-01-09 RX ADMIN — LABETALOL HYDROCHLORIDE 400 MG: 100 TABLET, FILM COATED ORAL at 02:34

## 2021-01-09 RX ADMIN — Medication 10 ML: at 06:00

## 2021-01-09 RX ADMIN — ISOSORBIDE DINITRATE 40 MG: 20 TABLET ORAL at 09:14

## 2021-01-09 RX ADMIN — BUDESONIDE 500 MCG: 0.5 INHALANT RESPIRATORY (INHALATION) at 09:48

## 2021-01-09 RX ADMIN — HYDROMORPHONE HYDROCHLORIDE 1 MG: 1 INJECTION, SOLUTION INTRAMUSCULAR; INTRAVENOUS; SUBCUTANEOUS at 11:54

## 2021-01-09 RX ADMIN — Medication 10 ML: at 15:31

## 2021-01-09 RX ADMIN — ONDANSETRON 4 MG: 2 INJECTION INTRAMUSCULAR; INTRAVENOUS at 07:23

## 2021-01-09 RX ADMIN — HYDRALAZINE HYDROCHLORIDE 100 MG: 50 TABLET, FILM COATED ORAL at 09:13

## 2021-01-09 RX ADMIN — ONDANSETRON 4 MG: 2 INJECTION INTRAMUSCULAR; INTRAVENOUS at 21:43

## 2021-01-09 RX ADMIN — ISOSORBIDE DINITRATE 40 MG: 20 TABLET ORAL at 15:27

## 2021-01-09 RX ADMIN — HYDRALAZINE HYDROCHLORIDE 100 MG: 50 TABLET, FILM COATED ORAL at 02:33

## 2021-01-09 RX ADMIN — HYDRALAZINE HYDROCHLORIDE 100 MG: 50 TABLET, FILM COATED ORAL at 15:27

## 2021-01-09 RX ADMIN — NIFEDIPINE 60 MG: 60 TABLET, FILM COATED, EXTENDED RELEASE ORAL at 09:14

## 2021-01-09 RX ADMIN — ONDANSETRON 4 MG: 2 INJECTION INTRAMUSCULAR; INTRAVENOUS at 02:41

## 2021-01-09 RX ADMIN — SERTRALINE 25 MG: 50 TABLET, FILM COATED ORAL at 09:14

## 2021-01-09 RX ADMIN — PANTOPRAZOLE SODIUM 40 MG: 40 TABLET, DELAYED RELEASE ORAL at 16:19

## 2021-01-09 RX ADMIN — LEVOTHYROXINE SODIUM 25 MCG: 0.03 TABLET ORAL at 08:11

## 2021-01-09 RX ADMIN — LABETALOL HYDROCHLORIDE 400 MG: 100 TABLET, FILM COATED ORAL at 09:13

## 2021-01-09 RX ADMIN — Medication 10 ML: at 15:30

## 2021-01-09 RX ADMIN — Medication 10 ML: at 02:00

## 2021-01-09 RX ADMIN — PANTOPRAZOLE SODIUM 40 MG: 40 TABLET, DELAYED RELEASE ORAL at 08:11

## 2021-01-09 RX ADMIN — LEVETIRACETAM 500 MG: 500 TABLET ORAL at 09:14

## 2021-01-09 RX ADMIN — FOLIC ACID 1 MG: 1 TABLET ORAL at 09:14

## 2021-01-09 RX ADMIN — LABETALOL HYDROCHLORIDE 400 MG: 100 TABLET, FILM COATED ORAL at 21:26

## 2021-01-09 RX ADMIN — HYDROMORPHONE HYDROCHLORIDE 1 MG: 1 INJECTION, SOLUTION INTRAMUSCULAR; INTRAVENOUS; SUBCUTANEOUS at 15:27

## 2021-01-09 RX ADMIN — NORTRIPTYLINE HYDROCHLORIDE 50 MG: 25 CAPSULE ORAL at 21:26

## 2021-01-09 RX ADMIN — HYDROCODONE BITARTRATE AND ACETAMINOPHEN 1 TABLET: 5; 325 TABLET ORAL at 03:30

## 2021-01-09 RX ADMIN — ISOSORBIDE DINITRATE 40 MG: 20 TABLET ORAL at 21:26

## 2021-01-09 RX ADMIN — HYDRALAZINE HYDROCHLORIDE 100 MG: 50 TABLET, FILM COATED ORAL at 21:26

## 2021-01-09 RX ADMIN — HYDROMORPHONE HYDROCHLORIDE 1 MG: 1 INJECTION, SOLUTION INTRAMUSCULAR; INTRAVENOUS; SUBCUTANEOUS at 19:32

## 2021-01-09 RX ADMIN — POTASSIUM CHLORIDE, DEXTROSE MONOHYDRATE AND SODIUM CHLORIDE 75 ML/HR: 150; 5; 450 INJECTION, SOLUTION INTRAVENOUS at 01:32

## 2021-01-09 RX ADMIN — ATORVASTATIN CALCIUM 40 MG: 40 TABLET, FILM COATED ORAL at 21:26

## 2021-01-09 NOTE — PROGRESS NOTES
Patient examined this morning. She is hemodynamic stable blood pressures 534W systolic. She has no focal neuro deficits. Her incision on the right neck was oozy so will change dressing I have to apply a few staples incision site. Dressing was applied. We will suspend Plavix, aspirin and Lovenox for today. Patient strict bedrest for now. Keep in ICU for now. I am hoping that her baseline blood pressure will reset since right-sided carotid endarterectomy was performed.

## 2021-01-09 NOTE — PROGRESS NOTES
Hospitalist Critical Care progress Note    Subjective:   Daily Progress Note: 1/9/2021 7:53 AM    Hospital Course:     Admitted 1/3/2021.  Patient is 55-year-old female with a PMH significant for anxiety/depression, COPD, chronic hypoxia on home oxygen, diastolic HF, dysphagia, GERD, HLD, HTN, chronic iron deficiency anemia, MVR and seizure disorder.  She comes into the emergency room with complaints of syncopal episode.  States she become dizzy when she stands up from sitting to walking.  Associated symptoms of weakness throughout the day.  On admission potassium 2.4.  She was admitted for further management of syncope, hypokalemia, hypomagnesium, orthostatic hypotension.  Cardiology and neurology consulted.  Serial cardiac enzymes negative for ischemia hemoglobin stable at 10.1.  Carotid duplex Dopplers right proximal ICA 59 to 70% stenosis, left ICA 50 to 79% stenosis worse on the left than right.  CT angiogram pending.  Vascular surgery consulted.  Stress test completed cleared for today for cardiac clearance for CEA.  1/8/2021 CEA performed by vascular surgery.      Subjective:  Follow-up examination of patient at the bedside.   Continues to have oozing from surgical site.  She states she has moderate pain from surgical site.  Complains of some anxiety as well as pain.    Current Facility-Administered Medications   Medication Dose Route Frequency   • albuterol-ipratropium (DUO-NEB) 2.5 MG-0.5 MG/3 ML  3 mL Nebulization NOW   • sodium chloride (NS) flush 5-40 mL  5-40 mL IntraVENous Q8H   • sodium chloride (NS) flush 5-40 mL  5-40 mL IntraVENous PRN   • HYDROcodone-acetaminophen (NORCO) 5-325 mg per tablet 1 Tab  1 Tab Oral Q4H PRN   • HYDROmorphone (DILAUDID) injection 1 mg  1 mg IntraVENous Q4H PRN   • LORazepam (ATIVAN) injection 2 mg  2 mg IntraVENous Q4H PRN   • nortriptyline (PAMELOR) capsule 50 mg  50 mg Oral QHS   • pantoprazole (PROTONIX) tablet 40 mg  40 mg Oral ACB&D   •  butalbital-acetaminophen-caffeine (FIORICET, ESGIC) -40 mg per tablet 2 Tab  2 Tab Oral Q4H PRN    [Held by provider] clopidogreL (PLAVIX) tablet 75 mg  75 mg Oral DAILY    atorvastatin (LIPITOR) tablet 40 mg  40 mg Oral QHS    albuterol (PROVENTIL HFA, VENTOLIN HFA, PROAIR HFA) inhaler 2 Puff  2 Puff Inhalation Q4H PRN    [Held by provider] aspirin delayed-release tablet 81 mg  81 mg Oral DAILY    carBAMazepine (TEGretol) tablet 200 mg  200 mg Oral Q12H    ferrous sulfate tablet 325 mg  325 mg Oral ACB    folic acid (FOLVITE) tablet 1 mg  1 mg Oral DAILY    isosorbide dinitrate (ISORDIL) tablet 40 mg  40 mg Oral TID    labetaloL (NORMODYNE) tablet 400 mg  400 mg Oral TID    levETIRAcetam (KEPPRA) tablet 500 mg  500 mg Oral BID    levothyroxine (SYNTHROID) tablet 25 mcg  25 mcg Oral ACB    NIFEdipine ER (PROCARDIA XL) tablet 60 mg  60 mg Oral DAILY    sertraline (ZOLOFT) tablet 25 mg  25 mg Oral DAILY    ALPRAZolam (XANAX) tablet 0.25 mg  0.25 mg Oral TID PRN    budesonide (PULMICORT) 500 mcg/2 ml nebulizer suspension  500 mcg Nebulization BID RT    hydrALAZINE (APRESOLINE) tablet 100 mg  100 mg Oral TID    sodium chloride (NS) flush 5-40 mL  5-40 mL IntraVENous Q8H    sodium chloride (NS) flush 5-40 mL  5-40 mL IntraVENous PRN    acetaminophen (TYLENOL) tablet 650 mg  650 mg Oral Q6H PRN    Or    acetaminophen (TYLENOL) suppository 650 mg  650 mg Rectal Q6H PRN    polyethylene glycol (MIRALAX) packet 17 g  17 g Oral DAILY PRN    promethazine (PHENERGAN) tablet 12.5 mg  12.5 mg Oral Q6H PRN    Or    ondansetron (ZOFRAN) injection 4 mg  4 mg IntraVENous Q6H PRN    [Held by provider] enoxaparin (LOVENOX) injection 40 mg  40 mg SubCUTAneous DAILY        REVIEW OF SYSTEMS    Review of Systems   Constitutional: Positive for malaise/fatigue. HENT: Negative. Respiratory: Negative. Cardiovascular: Negative. Gastrointestinal: Negative. Genitourinary: Negative.     Musculoskeletal: Negative. Neurological: Positive for weakness and headaches. Objective:     Visit Vitals  BP (!) 142/76   Pulse 83   Temp 97.9 °F (36.6 °C)   Resp (!) 7   Ht 5' 4\" (1.626 m)   Wt 46.2 kg (101 lb 13.6 oz)   SpO2 98%   BMI 17.48 kg/m²    O2 Flow Rate (L/min): 2 l/min O2 Device: Room air    Temp (24hrs), Av.1 °F (36.7 °C), Min:97.8 °F (36.6 °C), Max:98.8 °F (37.1 °C)      No intake/output data recorded.  1901 -  0700  In: 1300 [I.V.:1300]  Out: 700 [Urine:425; Drains:75]    PHYSICAL EXAM:    Physical Exam  Constitutional:       Appearance: She is ill-appearing. HENT:      Mouth/Throat:      Mouth: Mucous membranes are moist.   Eyes:      Extraocular Movements: Extraocular movements intact. Neck:      Musculoskeletal: Normal range of motion. Cardiovascular:      Rate and Rhythm: Normal rate. Pulmonary:      Effort: Pulmonary effort is normal.   Musculoskeletal: Normal range of motion. Skin:     General: Skin is warm and dry. Neurological:      Motor: Weakness present. Data Review    Recent Results (from the past 24 hour(s))   CBC WITH AUTOMATED DIFF    Collection Time: 21  1:26 PM   Result Value Ref Range    WBC 6.2 3.6 - 11.0 K/uL    RBC 2.90 (L) 3.80 - 5.20 M/uL    HGB 9.0 (L) 11.5 - 16.0 g/dL    HCT 28.0 (L) 35.0 - 47.0 %    MCV 96.6 80.0 - 99.0 FL    MCH 31.0 26.0 - 34.0 PG    MCHC 32.1 30.0 - 36.5 g/dL    RDW 15.0 (H) 11.5 - 14.5 %    PLATELET 766 031 - 025 K/uL    MPV 9.6 8.9 - 12.9 FL    NEUTROPHILS 71 32 - 75 %    LYMPHOCYTES 19 12 - 49 %    MONOCYTES 7 5 - 13 %    EOSINOPHILS 3 0 - 7 %    BASOPHILS 0 0 - 1 %    IMMATURE GRANULOCYTES 0 0.0 - 0.5 %    ABS. NEUTROPHILS 4.4 1.8 - 8.0 K/UL    ABS. LYMPHOCYTES 1.2 0.8 - 3.5 K/UL    ABS. MONOCYTES 0.5 0.0 - 1.0 K/UL    ABS. EOSINOPHILS 0.2 0.0 - 0.4 K/UL    ABS. BASOPHILS 0.0 0.0 - 0.1 K/UL    ABS. IMM.  GRANS. 0.0 0.00 - 0.04 K/UL    DF AUTOMATED     METABOLIC PANEL, BASIC    Collection Time: 21  1:26 PM Result Value Ref Range    Sodium 137 136 - 145 mmol/L    Potassium 3.5 3.5 - 5.1 mmol/L    Chloride 100 97 - 108 mmol/L    CO2 31 21 - 32 mmol/L    Anion gap 6 5 - 15 mmol/L    Glucose 86 65 - 100 mg/dL    BUN 19 6 - 20 mg/dL    Creatinine 1.04 (H) 0.55 - 1.02 mg/dL    BUN/Creatinine ratio 18 12 - 20      GFR est AA >60 >60 ml/min/1.73m2    GFR est non-AA 55 (L) >60 ml/min/1.73m2    Calcium 9.2 8.5 - 10.1 mg/dL   TYPE & SCREEN    Collection Time: 01/08/21  6:10 PM   Result Value Ref Range    Crossmatch Expiration 01/11/2021,2359     ABO/Rh(D) O Positive     Antibody screen Negative    MRSA SCREEN - PCR (NASAL)    Collection Time: 01/09/21  1:00 AM   Result Value Ref Range    MRSA by PCR, Nasal Not Detected Not Detected     METABOLIC PANEL, BASIC    Collection Time: 01/09/21  1:45 AM   Result Value Ref Range    Sodium 139 136 - 145 mmol/L    Potassium 4.2 3.5 - 5.1 mmol/L    Chloride 103 97 - 108 mmol/L    CO2 30 21 - 32 mmol/L    Anion gap 6 5 - 15 mmol/L    Glucose 133 (H) 65 - 100 mg/dL    BUN 18 6 - 20 mg/dL    Creatinine 1.17 (H) 0.55 - 1.02 mg/dL    BUN/Creatinine ratio 15 12 - 20      GFR est AA 58 (L) >60 ml/min/1.73m2    GFR est non-AA 48 (L) >60 ml/min/1.73m2    Calcium 9.1 8.5 - 10.1 mg/dL   CBC W/O DIFF    Collection Time: 01/09/21  1:45 AM   Result Value Ref Range    WBC 11.6 (H) 3.6 - 11.0 K/uL    RBC 2.90 (L) 3.80 - 5.20 M/uL    HGB 9.3 (L) 11.5 - 16.0 g/dL    HCT 28.1 (L) 35.0 - 47.0 %    MCV 96.9 80.0 - 99.0 FL    MCH 32.1 26.0 - 34.0 PG    MCHC 33.1 30.0 - 36.5 g/dL    RDW 15.3 (H) 11.5 - 14.5 %    PLATELET 725 549 - 889 K/uL    MPV 9.6 8.9 - 12.9 FL       CTA NECK   Final Result   Impression:   1. Moderate (60-65%) stenosis of the right ICA origin. 2.  Mild (40-45%) stenosis of the left ICA origin. Please note that degrees of carotid stenosis are measured in accordance with   NASCET criteria. CT HEAD WO CONT   Final Result   Impression:    1. No acute intracranial findings. XR CHEST PORT   Final Result   IMPRESSION: Considerations for baseline interstitial lung disease or mild   pulmonary edema. Active Problems:    Resistant hypertension (9/21/2020)      Renal artery stenosis (HCC) (9/21/2020)      CH[facility]estive heart failure) [facility] (10/2/2020)      Overview: With preserved lvef, diastolic dysfunction, mitral valve regurgitation       moderate. Mitral valve regurgitation (10/2/2020)      Anxiety (10/2/2020)      Seizure disorder ([facility] (10/2/2020)      COPD (chronic obstructive pulmonary disease) with emphysema ([facility] (10/2/2020)      Hypokalemia (10/2/2020)      Acquired hypothyroidism (10/2/2020)      Syncope (1/3/2021)        Assessment/Plan:     1. Syncope:  2. Orthostatic hypotension:  · CT of head negative for acute findings  · Echo LVEF 50 to 55% mild concentric hypertrophy, moderate grade 2 diastolic dysfunction, moderate MVR, moderate TVR, possible stenosis in the descending aorta  · Carotid Doppler studies right proximal ICA 50 to 79% blockage on higher end of stenosis, left proximal ICA 50 to 79% stenosis with heavier plaque than right  · Serial cardiac enzymes negative  · PT OT consulted recommending home with home health PT  · Cardiology consulted  · Neurology consulted  · Vascular surgery consulted moderate stenosis  · CT angiogrammoderate 60-65% stenosis in right ICA, mild 40 to 45% stenosis of left ICA  · Stress test completed cleared for procedure  · 1/8/2021 CEA intervention left ICA stenosis     3. Hypokalemia:  4. Hypomagnesium:  · Replete and monitor    5. Hypertension:  · Continue home hypertensive medications    6. Hypothyroid:  · Continue home medications Synthroid    7. CHF with preserved EF:  · No signs or symptoms of exacerbation    8. HLD:  · Continue statin therapy    9. Seizure disorder:  · Continue Tegretol and Keppra    10.   Renal artery stenosis: Status post stent placement  · Continue statin and holding Plavix and aspirin that is post CVA    6. Mitral valve regurg:  · Cardiology following    12. COPD:  15.  Chronic hypoxia: On Home oxygen  · No signs or symptoms of exacerbation at this time. · Continue home neb Pulmicort    14. Chronic iron deficiency anemia:  15.  Acute blood loss anemia: Secondary to CEA site oozing  · Continue oral supplementation iron, folic acid  · Hgb 9.6 KGRUDXVLY>9.4 today  · Transfused 2 unit PRBC   · Transfused 1 unit platelets    15. Malnutrition:  · Nutrition consult    DVT Prophylaxis: Lovenox  Code Status: Full Code  POA/NOK: Stacia Sylvester at #[phone]  ______________________________________________________________________________  Sickle care time spent in direct care including coordination of service, review of data and examination: 74 minutes  Care Plan discussed with: RN and patient. Patient states she wants to be a DNR discussed how she could not be a DNR for the surgical procedure but that she can request DNR status after she recovers from carotid endarterectomy. ______________________________________________________________________________    Earnest Areas, NP    This is dictation was done by dragon, computer voice recognition software. Quite often unanticipated grammatical, syntax, homophones and other interpretive errors or inadvertently transcribed by the computer software. Please excuse errors that have escaped final proofreading. Thank you.

## 2021-01-09 NOTE — PROGRESS NOTES
0100-Primary Nurse Jhoana Kincaid, DANIEL and Ginger Salmon RN performed a dual skin assessment on this patient Impairment noted- see wound doc flow sheet for Surgical Site Documentation. Hayley Peters RN, CCRN

## 2021-01-09 NOTE — PROGRESS NOTES
3061-  Dr. Sony Rene to bedside surgical wound continues to ooze despite reinforcing dressing. Dr. Sony Rene removed current pressure dressing and steri strips. 2 Sites noted to have oozing. Surgical Staples x 4 applied to oozing sites. Incision redressed with Steri Strips/ pressure dressing / tegaderm by Dr. Sony Rene with assistance from this RN. Per Dr. Sony Rene Pt to remain on strict bed rest. No additional BP orders @ this time. Additional pain medication orders entered as well. 0700- Bedside and Verbal shift change report given to Yvonne Anderson 3191 (oncoming nurse) by Shanique Ortega RN (offgoing nurse). Report included the following information SBAR, Kardex, Intake/Output, MAR and Recent Results. Hayley Peters RN, CCRN

## 2021-01-09 NOTE — PROGRESS NOTES
Dr Abrahan Jackson notified of breakthrough bleeding from surgical site. Instructions given to leave dressing on and to give unit of platelets.

## 2021-01-09 NOTE — ANESTHESIA PROCEDURE NOTES
Arterial Line Placement    Start time: 1/8/2021 8:15 PM  End time: 1/8/2021 8:25 PM  Performed by: Candy Wong MD  Authorized by: Candy Wong MD     Pre-Procedure  Indications:  Arterial pressure monitoring  Preanesthetic Checklist: patient identified, risks and benefits discussed, anesthesia consent, site marked, patient being monitored, timeout performed and patient being monitored      Procedure:   Prep:  Chlorhexidine  Seldinger Technique?: No    Orientation:  Right  Location:  Radial artery  Catheter size:  20 G  Number of attempts:  1  Cont Cardiac Output Sensor: No      Assessment:   Post-procedure:  Line secured and sterile dressing applied  Patient Tolerance:  Patient tolerated the procedure well with no immediate complications

## 2021-01-09 NOTE — ANESTHESIA POSTPROCEDURE EVALUATION
Procedure(s):  RIGHT CAROTID ARTERY ENDARTERECTOMY.     general    Anesthesia Post Evaluation      Multimodal analgesia: multimodal analgesia not used between 6 hours prior to anesthesia start to PACU discharge  Patient location during evaluation: PACU  Patient participation: complete - patient participated  Level of consciousness: awake and alert  Pain score: 0  Pain management: adequate  Airway patency: patent  Anesthetic complications: no  Cardiovascular status: acceptable  Respiratory status: acceptable  Hydration status: acceptable  Post anesthesia nausea and vomiting:  none  Final Post Anesthesia Temperature Assessment:  Normothermia (36.0-37.5 degrees C)      INITIAL Post-op Vital signs:   Vitals Value Taken Time   /58 01/09/21 0030   Temp 37.1 °C (98.8 °F) 01/08/21 2350   Pulse 77 01/09/21 0030   Resp 16 01/09/21 0030   SpO2 96 % 01/09/21 0030

## 2021-01-09 NOTE — PROGRESS NOTES
CARDIOLOGY PROGRESS NOTE    Patient seen and examined. We are following for syncope and mitral valve regurgitation. She is s/p R carotid endardarectomy yesterday. Oozing at site so vascular has held antiplatelets, pain is controlled with current regimen. She is resting comfortably, no acute distress. She denies chest pain, sob, palpitations, and swelling. Telemetry reviewed. Sinus rhythm in 80s     Physical examination:  Vital signs, Blood pressure 142/76,  Pulse 83  No apparent distress. Comfortable, talkitive  Heart is regular, rate and rhythm. Normal S1, S2,  Murmur noted. Lungs are clear to course bilaterally. Occ. Prod cough  Abdomen is soft, nontender, normal bowel sounds. Extremities have no edema. Recent labs results and imaging reviewed. Neck CTA:  1. Moderate (60-65%) stenosis of the right ICA origin. 2.  Mild (40-45%) stenosis of the left ICA origin. Discussed case with Dr. Zora Jones. This our impression and recommendation:    1. Carotid Stenosis: with syncope. S/P R CEA. Per vascular surgery; aspirin,plavix on hold. She is s/p Renal artery stent placement, restart DAPT when able. 2. HFpEF: Echocardiogram shows preserved EF. She appears euvolemic. Maintain fluid balance. 3. Hypertension: Blood pressure acceptable. Continue medication treatment and monitoring. Renal function greatly improved s/p renal artery stent. 4. Hyperlipidemia: Continue statin therapy. 5. Preoperative risk assessment: Moderate but acceptable risk. Appears to be doing well post-operatively. Please do not hesitate to call if additional questions arise.

## 2021-01-09 NOTE — PROGRESS NOTES
Dr. Shabbir Kirkland removed KATHARINA drain at the bedside. Tolerated well. No acute distress noted. New dressing applied to right neck, 4x4, tegaderm.

## 2021-01-09 NOTE — BRIEF OP NOTE
Brief Postoperative Note    Patient: Deana Rae  YOB: 1965  MRN: [id number]    Date of Procedure: 1/8/2021     Pre-Op Diagnosis: Syncope, and right carotid artery stenosis. Post-Op Diagnosis: same    Procedure(s):  RIGHT CAROTID ARTERY ENDARTERECTOMY    Surgeon(s):  Carolin Bran MD    Surgical Assistant: Registered Nurse First Assistant: Karthikeyan Hernandez RN    Anesthesia: General     Estimated Blood Loss (mL): 991AI    Complications: none    Specimens:   ID Type Source Tests Collected by Time Destination   1 : Right Carotid Plaque Preservative Neck  Carolin Bran MD 1/8/2021 2329 Pathology   1 : Urine Urine Bladder CULTURE, ANAEROBIC, CULTURE, URINE Carolin Bran MD 1/8/2021 2052 Microbiology        Implants:   Implant Name Type Inv. Item Serial No.  Lot No. LRB No. Used Action   PATCH VASC [address] 0.8X8CM -- [facility] [id number]  PATCH VASC [address] 0.8X8CM -- [facility] [id number] Right 1 Implanted       Drains: dhiraj drain  Findings: as above.   Electronically Signed by Miguel Esqueda MD on 1/8/2021 at 11:53 PM

## 2021-01-09 NOTE — PROGRESS NOTES
Notified Dr. Love Ferreira who came to the bedside. Dr. Love Ferreira removed dressing, applied manual pressure. Requested suture kit and pressure tape along with new dressing supplies. Fellow RN Taylor Marcos went to obtain supplies. Dr. Love Ferreira and this RN remained at the bedside. Patient in no acute distress at the moment, resting comfortably in the bed.

## 2021-01-10 LAB
ANION GAP SERPL CALC-SCNC: 6 MMOL/L (ref 5–15)
BASOPHILS # BLD: 0 K/UL (ref 0–0.1)
BASOPHILS NFR BLD: 0 % (ref 0–1)
BLD PROD TYP BPU: NORMAL
BPU ID: NORMAL
BUN SERPL-MCNC: 17 MG/DL (ref 6–20)
BUN/CREAT SERPL: 16 (ref 12–20)
CA-I BLD-MCNC: 8.7 MG/DL (ref 8.5–10.1)
CHLORIDE SERPL-SCNC: 99 MMOL/L (ref 97–108)
CO2 SERPL-SCNC: 30 MMOL/L (ref 21–32)
CREAT SERPL-MCNC: 1.07 MG/DL (ref 0.55–1.02)
DIFFERENTIAL METHOD BLD: ABNORMAL
EOSINOPHIL # BLD: 0.1 K/UL (ref 0–0.4)
EOSINOPHIL NFR BLD: 3 % (ref 0–7)
ERYTHROCYTE [DISTWIDTH] IN BLOOD BY AUTOMATED COUNT: 15.2 % (ref 11.5–14.5)
GLUCOSE SERPL-MCNC: 91 MG/DL (ref 65–100)
HCT VFR BLD AUTO: 24.7 % (ref 35–47)
HCT VFR BLD AUTO: 41.9 % (ref 35–47)
HGB BLD-MCNC: 13.6 G/DL (ref 11.5–16)
HGB BLD-MCNC: 7.6 G/DL (ref 11.5–16)
IMM GRANULOCYTES # BLD AUTO: 0 K/UL (ref 0–0.04)
IMM GRANULOCYTES NFR BLD AUTO: 0 % (ref 0–0.5)
LYMPHOCYTES # BLD: 1 K/UL (ref 0.8–3.5)
LYMPHOCYTES NFR BLD: 19 % (ref 12–49)
MCH RBC QN AUTO: 30.3 PG (ref 26–34)
MCHC RBC AUTO-ENTMCNC: 30.8 G/DL (ref 30–36.5)
MCV RBC AUTO: 98.4 FL (ref 80–99)
MONOCYTES # BLD: 0.6 K/UL (ref 0–1)
MONOCYTES NFR BLD: 12 % (ref 5–13)
NEUTS SEG # BLD: 3.5 K/UL (ref 1.8–8)
NEUTS SEG NFR BLD: 66 % (ref 32–75)
NRBC # BLD: 0 K/UL (ref 0–0.01)
NRBC BLD-RTO: 0 PER 100 WBC
PLATELET # BLD AUTO: 266 K/UL (ref 150–400)
PMV BLD AUTO: 9.6 FL (ref 8.9–12.9)
POTASSIUM SERPL-SCNC: 3.9 MMOL/L (ref 3.5–5.1)
RBC # BLD AUTO: 2.51 M/UL (ref 3.8–5.2)
SODIUM SERPL-SCNC: 135 MMOL/L (ref 136–145)
STATUS OF UNIT,%ST: NORMAL
TRANSFUSION STATUS PATIENT QL: NORMAL
UNIT DIVISION, %UDIV: 0
WBC # BLD AUTO: 5.2 K/UL (ref 3.6–11)

## 2021-01-10 PROCEDURE — 74011250637 HC RX REV CODE- 250/637: Performed by: SURGERY

## 2021-01-10 PROCEDURE — 36430 TRANSFUSION BLD/BLD COMPNT: CPT

## 2021-01-10 PROCEDURE — 85018 HEMOGLOBIN: CPT

## 2021-01-10 PROCEDURE — 65610000006 HC RM INTENSIVE CARE

## 2021-01-10 PROCEDURE — 74011250636 HC RX REV CODE- 250/636: Performed by: SURGERY

## 2021-01-10 PROCEDURE — 80048 BASIC METABOLIC PNL TOTAL CA: CPT

## 2021-01-10 PROCEDURE — 30233N1 TRANSFUSION OF NONAUTOLOGOUS RED BLOOD CELLS INTO PERIPHERAL VEIN, PERCUTANEOUS APPROACH: ICD-10-PCS | Performed by: INTERNAL MEDICINE

## 2021-01-10 PROCEDURE — 36415 COLL VENOUS BLD VENIPUNCTURE: CPT

## 2021-01-10 PROCEDURE — 65270000029 HC RM PRIVATE

## 2021-01-10 PROCEDURE — 85025 COMPLETE CBC W/AUTO DIFF WBC: CPT

## 2021-01-10 PROCEDURE — P9016 RBC LEUKOCYTES REDUCED: HCPCS

## 2021-01-10 RX ORDER — SODIUM CHLORIDE 9 MG/ML
250 INJECTION, SOLUTION INTRAVENOUS AS NEEDED
Status: DISCONTINUED | OUTPATIENT
Start: 2021-01-10 | End: 2021-01-12 | Stop reason: HOSPADM

## 2021-01-10 RX ADMIN — Medication 10 ML: at 23:40

## 2021-01-10 RX ADMIN — CARBAMAZEPINE 200 MG: 200 TABLET ORAL at 08:10

## 2021-01-10 RX ADMIN — ATORVASTATIN CALCIUM 40 MG: 40 TABLET, FILM COATED ORAL at 21:02

## 2021-01-10 RX ADMIN — LEVETIRACETAM 500 MG: 500 TABLET ORAL at 21:02

## 2021-01-10 RX ADMIN — POLYETHYLENE GLYCOL 3350 17 G: 17 POWDER, FOR SOLUTION ORAL at 07:50

## 2021-01-10 RX ADMIN — HYDROMORPHONE HYDROCHLORIDE 1 MG: 1 INJECTION, SOLUTION INTRAMUSCULAR; INTRAVENOUS; SUBCUTANEOUS at 15:48

## 2021-01-10 RX ADMIN — HYDROMORPHONE HYDROCHLORIDE 1 MG: 1 INJECTION, SOLUTION INTRAMUSCULAR; INTRAVENOUS; SUBCUTANEOUS at 03:41

## 2021-01-10 RX ADMIN — ALPRAZOLAM 0.25 MG: 0.25 TABLET ORAL at 08:10

## 2021-01-10 RX ADMIN — HYDROMORPHONE HYDROCHLORIDE 1 MG: 1 INJECTION, SOLUTION INTRAMUSCULAR; INTRAVENOUS; SUBCUTANEOUS at 11:36

## 2021-01-10 RX ADMIN — NORTRIPTYLINE HYDROCHLORIDE 50 MG: 25 CAPSULE ORAL at 21:02

## 2021-01-10 RX ADMIN — Medication 10 ML: at 22:00

## 2021-01-10 RX ADMIN — LEVOTHYROXINE SODIUM 25 MCG: 0.03 TABLET ORAL at 08:10

## 2021-01-10 RX ADMIN — FOLIC ACID 1 MG: 1 TABLET ORAL at 08:10

## 2021-01-10 RX ADMIN — HYDROMORPHONE HYDROCHLORIDE 1 MG: 1 INJECTION, SOLUTION INTRAMUSCULAR; INTRAVENOUS; SUBCUTANEOUS at 23:39

## 2021-01-10 RX ADMIN — ISOSORBIDE DINITRATE 40 MG: 20 TABLET ORAL at 08:10

## 2021-01-10 RX ADMIN — HYDRALAZINE HYDROCHLORIDE 100 MG: 50 TABLET, FILM COATED ORAL at 15:48

## 2021-01-10 RX ADMIN — ISOSORBIDE DINITRATE 40 MG: 20 TABLET ORAL at 21:02

## 2021-01-10 RX ADMIN — Medication 10 ML: at 06:00

## 2021-01-10 RX ADMIN — LABETALOL HYDROCHLORIDE 400 MG: 100 TABLET, FILM COATED ORAL at 21:02

## 2021-01-10 RX ADMIN — FERROUS SULFATE TAB 325 MG (65 MG ELEMENTAL FE) 325 MG: 325 (65 FE) TAB at 07:53

## 2021-01-10 RX ADMIN — HYDROMORPHONE HYDROCHLORIDE 1 MG: 1 INJECTION, SOLUTION INTRAMUSCULAR; INTRAVENOUS; SUBCUTANEOUS at 19:32

## 2021-01-10 RX ADMIN — LABETALOL HYDROCHLORIDE 400 MG: 100 TABLET, FILM COATED ORAL at 08:10

## 2021-01-10 RX ADMIN — HYDRALAZINE HYDROCHLORIDE 100 MG: 50 TABLET, FILM COATED ORAL at 21:02

## 2021-01-10 RX ADMIN — HYDROMORPHONE HYDROCHLORIDE 1 MG: 1 INJECTION, SOLUTION INTRAMUSCULAR; INTRAVENOUS; SUBCUTANEOUS at 07:50

## 2021-01-10 RX ADMIN — ISOSORBIDE DINITRATE 40 MG: 20 TABLET ORAL at 15:48

## 2021-01-10 RX ADMIN — NIFEDIPINE 60 MG: 60 TABLET, FILM COATED, EXTENDED RELEASE ORAL at 08:10

## 2021-01-10 RX ADMIN — PANTOPRAZOLE SODIUM 40 MG: 40 TABLET, DELAYED RELEASE ORAL at 08:10

## 2021-01-10 RX ADMIN — LEVETIRACETAM 500 MG: 500 TABLET ORAL at 08:10

## 2021-01-10 RX ADMIN — LABETALOL HYDROCHLORIDE 400 MG: 100 TABLET, FILM COATED ORAL at 15:48

## 2021-01-10 RX ADMIN — HYDRALAZINE HYDROCHLORIDE 100 MG: 50 TABLET, FILM COATED ORAL at 08:10

## 2021-01-10 RX ADMIN — PANTOPRAZOLE SODIUM 40 MG: 40 TABLET, DELAYED RELEASE ORAL at 15:48

## 2021-01-10 RX ADMIN — CARBAMAZEPINE 200 MG: 200 TABLET ORAL at 21:02

## 2021-01-10 RX ADMIN — SERTRALINE 25 MG: 50 TABLET, FILM COATED ORAL at 08:10

## 2021-01-10 RX ADMIN — Medication 10 ML: at 14:00

## 2021-01-10 NOTE — PROGRESS NOTES
Patient currently on PT caseload however patient transferred to ICU s/p procedure. Will need new PT eval order once pt medically stable for evaluation. Thank you.

## 2021-01-10 NOTE — PROGRESS NOTES
Patient currently on OT caseload however patient transferred to ICU s/p procedure. Will need new OT eval order once pt medically stable for evaluation. Thank you.

## 2021-01-10 NOTE — PROGRESS NOTES
4462- S/t Dr. Paniagua Score regarding sangineous drainage from CEA surgical site with breakthrough drainage and Hgb drop > 7.6. He advises he will be in to see patient in a couple hours & TORB to transfuse 1 Unit PRBC's.0655- Message from Dr. Paniagua Score to please have Skin Stapler @ bedside incase he needs to add additional staples. Called OR they advised they will have it ready. 0700-Bedside and Verbal shift change report given to Radha Montemayor RN (oncoming nurse) by Tu Davila RN (offgoing nurse). Report included the following information SBAR, Kardex, Intake/Output, MAR and Recent Results. Hayley Peters RN, CCRN

## 2021-01-10 NOTE — PROGRESS NOTES
Dr. Link Wright and Azael Thomson NP. Both made rounds on the patient this morning. Both verbalized that patient is okay to ambulate as well as sit up in the chair if able. Cardiology suggested getting orthostatic vitals on the patient if we did decide to ambulate her today. Supine: HR 90, /64  Sitting: HR 90, /72  Standing: HR 91, /71    Patient was stable on her feet and able to transition to recliner. However, did state she was a little dizzy.

## 2021-01-10 NOTE — PROGRESS NOTES
CARDIOLOGY PROGRESS NOTE    Patient seen and examined. We are following for syncope and mitral valve regurgitation. She is s/p R carotid endardarectomy 1/8, acute blood loss from site, underwent additional stapling today. Hgb 7.6. To receive 2 U PRBC. Antiplatelets held. Breathing is stable, on comfort O2 overnight. No dizziness, chest pain at this time. She is comfortable, family at bedside. Questions answered and plan of care discussed. Telemetry reviewed. Sinus rhythm in 70-80s     Physical examination:  Vital signs, Blood pressure 145/56,  Pulse 78  No apparent distress. Comfortable, talkitive  Heart is regular, rate and rhythm. Normal S1, S2,  +Murmur noted. Lungs are clear to course bilaterally. Occ. Prod cough  Abdomen is soft, nontender, normal bowel sounds. Extremities have no edema. Recent labs results and imaging reviewed. Recent Results (from the past 24 hour(s))   PLATELETS, ALLOCATE    Collection Time: 01/09/21  1:15 PM   Result Value Ref Range    Unit number [id number]     Blood component type PLTPH LR,3     Unit division 00     Status of unit Issued,final     TRANSFUSION STATUS Ok to transfuse    CBC WITH AUTOMATED DIFF    Collection Time: 01/10/21  3:45 AM   Result Value Ref Range    WBC 5.2 3.6 - 11.0 K/uL    RBC 2.51 (L) 3.80 - 5.20 M/uL    HGB 7.6 (L) 11.5 - 16.0 g/dL    HCT 24.7 (L) 35.0 - 47.0 %    MCV 98.4 80.0 - 99.0 FL    MCH 30.3 26.0 - 34.0 PG    MCHC 30.8 30.0 - 36.5 g/dL    RDW 15.2 (H) 11.5 - 14.5 %    PLATELET 737 573 - 470 K/uL    MPV 9.6 8.9 - 12.9 FL    NRBC 0.0 0  WBC    ABSOLUTE NRBC 0.00 0.00 - 0.01 K/uL    NEUTROPHILS 66 32 - 75 %    LYMPHOCYTES 19 12 - 49 %    MONOCYTES 12 5 - 13 %    EOSINOPHILS 3 0 - 7 %    BASOPHILS 0 0 - 1 %    IMMATURE GRANULOCYTES 0 0.0 - 0.5 %    ABS. NEUTROPHILS 3.5 1.8 - 8.0 K/UL    ABS. LYMPHOCYTES 1.0 0.8 - 3.5 K/UL    ABS. MONOCYTES 0.6 0.0 - 1.0 K/UL    ABS. EOSINOPHILS 0.1 0.0 - 0.4 K/UL    ABS.  BASOPHILS 0.0 0.0 - 0.1 K/UL    ABS. IMM. GRANS. 0.0 0.00 - 0.04 K/UL    DF AUTOMATED     METABOLIC PANEL, BASIC    Collection Time: 01/10/21  3:45 AM   Result Value Ref Range    Sodium 135 (L) 136 - 145 mmol/L    Potassium 3.9 3.5 - 5.1 mmol/L    Chloride 99 97 - 108 mmol/L    CO2 30 21 - 32 mmol/L    Anion gap 6 5 - 15 mmol/L    Glucose 91 65 - 100 mg/dL    BUN 17 6 - 20 mg/dL    Creatinine 1.07 (H) 0.55 - 1.02 mg/dL    BUN/Creatinine ratio 16 12 - 20      GFR est AA >60 >60 ml/min/1.73m2    GFR est non-AA 53 (L) >60 ml/min/1.73m2    Calcium 8.7 8.5 - 10.1 mg/dL       Neck CTA:  1. Moderate (60-65%) stenosis of the right ICA origin. 2.  Mild (40-45%) stenosis of the left ICA origin. Discussed case with Dr. Zora Jones. This our impression and recommendation:    1. Carotid Stenosis: with syncope. S/P R CEA. Per vascular surgery; aspirin,plavix on hold. She is s/p Renal artery stent placement, restart DAPT when able. 2. HFpEF: Echocardiogram shows preserved EF. She appears euvolemic; not currently on diuresis. Maintain fluid balance. 3. Hypertension: Blood pressure acceptable. Continue medication treatment and monitoring. Renal function greatly improved s/p renal artery stent. As she is s/p renal stent and correction of carotid stenosis; will obtain daily orthostatic vital signs and adjust BP meds as deemed necessary. 4. Hyperlipidemia: Continue statin therapy. 5. Preoperative risk assessment: Moderate but acceptable risk. Appears to be doing well post-operatively. Hopeful for discharge in next 24-48 hours. Follow up in our office is arranged for 1/19. Please do not hesitate to call if additional questions arise.

## 2021-01-11 LAB
ABO + RH BLD: NORMAL
ANION GAP SERPL CALC-SCNC: 7 MMOL/L (ref 5–15)
BASOPHILS # BLD: 0 K/UL (ref 0–0.1)
BASOPHILS NFR BLD: 0 % (ref 0–1)
BLD PROD TYP BPU: NORMAL
BLD PROD TYP BPU: NORMAL
BLOOD GROUP ANTIBODIES SERPL: NEGATIVE
BPU ID: NORMAL
BPU ID: NORMAL
BUN SERPL-MCNC: 11 MG/DL (ref 6–20)
BUN/CREAT SERPL: 13 (ref 12–20)
CA-I BLD-MCNC: 9 MG/DL (ref 8.5–10.1)
CHLORIDE SERPL-SCNC: 98 MMOL/L (ref 97–108)
CO2 SERPL-SCNC: 30 MMOL/L (ref 21–32)
CREAT SERPL-MCNC: 0.88 MG/DL (ref 0.55–1.02)
CROSSMATCH RESULT,%XM: NORMAL
CROSSMATCH RESULT,%XM: NORMAL
DIFFERENTIAL METHOD BLD: ABNORMAL
EOSINOPHIL # BLD: 0.2 K/UL (ref 0–0.4)
EOSINOPHIL NFR BLD: 2 % (ref 0–7)
ERYTHROCYTE [DISTWIDTH] IN BLOOD BY AUTOMATED COUNT: 15.9 % (ref 11.5–14.5)
GLUCOSE SERPL-MCNC: 95 MG/DL (ref 65–100)
HCT VFR BLD AUTO: 26.6 % (ref 35–47)
HGB BLD-MCNC: 8.9 G/DL (ref 11.5–16)
IMM GRANULOCYTES # BLD AUTO: 0 K/UL (ref 0–0.04)
IMM GRANULOCYTES NFR BLD AUTO: 0 % (ref 0–0.5)
LYMPHOCYTES # BLD: 1.1 K/UL (ref 0.8–3.5)
LYMPHOCYTES NFR BLD: 11 % (ref 12–49)
MCH RBC QN AUTO: 30.9 PG (ref 26–34)
MCHC RBC AUTO-ENTMCNC: 33.5 G/DL (ref 30–36.5)
MCV RBC AUTO: 92.4 FL (ref 80–99)
MONOCYTES # BLD: 1 K/UL (ref 0–1)
MONOCYTES NFR BLD: 10 % (ref 5–13)
NEUTS SEG # BLD: 7.7 K/UL (ref 1.8–8)
NEUTS SEG NFR BLD: 77 % (ref 32–75)
PLATELET # BLD AUTO: 263 K/UL (ref 150–400)
PMV BLD AUTO: 9.4 FL (ref 8.9–12.9)
POTASSIUM SERPL-SCNC: 2.6 MMOL/L (ref 3.5–5.1)
RBC # BLD AUTO: 2.88 M/UL (ref 3.8–5.2)
SODIUM SERPL-SCNC: 135 MMOL/L (ref 136–145)
SPECIMEN EXP DATE BLD: NORMAL
STATUS OF UNIT,%ST: NORMAL
STATUS OF UNIT,%ST: NORMAL
TRANSFUSION STATUS PATIENT QL: NORMAL
TRANSFUSION STATUS PATIENT QL: NORMAL
UNIT DIVISION, %UDIV: 0
UNIT DIVISION, %UDIV: 0
WBC # BLD AUTO: 10 K/UL (ref 3.6–11)

## 2021-01-11 PROCEDURE — 74011250637 HC RX REV CODE- 250/637: Performed by: SURGERY

## 2021-01-11 PROCEDURE — 65270000029 HC RM PRIVATE

## 2021-01-11 PROCEDURE — 74011250636 HC RX REV CODE- 250/636: Performed by: SURGERY

## 2021-01-11 PROCEDURE — 80048 BASIC METABOLIC PNL TOTAL CA: CPT

## 2021-01-11 PROCEDURE — 36415 COLL VENOUS BLD VENIPUNCTURE: CPT

## 2021-01-11 PROCEDURE — 85025 COMPLETE CBC W/AUTO DIFF WBC: CPT

## 2021-01-11 PROCEDURE — 65610000006 HC RM INTENSIVE CARE

## 2021-01-11 RX ORDER — POTASSIUM CHLORIDE 7.45 MG/ML
10 INJECTION INTRAVENOUS
Status: COMPLETED | OUTPATIENT
Start: 2021-01-11 | End: 2021-01-11

## 2021-01-11 RX ORDER — POTASSIUM CHLORIDE 750 MG/1
40 TABLET, FILM COATED, EXTENDED RELEASE ORAL ONCE
Status: COMPLETED | OUTPATIENT
Start: 2021-01-11 | End: 2021-01-11

## 2021-01-11 RX ADMIN — HYDROCODONE BITARTRATE AND ACETAMINOPHEN 1 TABLET: 5; 325 TABLET ORAL at 04:39

## 2021-01-11 RX ADMIN — LABETALOL HYDROCHLORIDE 400 MG: 100 TABLET, FILM COATED ORAL at 18:03

## 2021-01-11 RX ADMIN — NIFEDIPINE 60 MG: 60 TABLET, FILM COATED, EXTENDED RELEASE ORAL at 08:17

## 2021-01-11 RX ADMIN — POTASSIUM CHLORIDE 10 MEQ: 7.46 INJECTION, SOLUTION INTRAVENOUS at 07:00

## 2021-01-11 RX ADMIN — LEVETIRACETAM 500 MG: 500 TABLET ORAL at 08:17

## 2021-01-11 RX ADMIN — ALPRAZOLAM 0.25 MG: 0.25 TABLET ORAL at 22:33

## 2021-01-11 RX ADMIN — HYDROCODONE BITARTRATE AND ACETAMINOPHEN 1 TABLET: 5; 325 TABLET ORAL at 18:03

## 2021-01-11 RX ADMIN — LEVOTHYROXINE SODIUM 25 MCG: 0.03 TABLET ORAL at 08:17

## 2021-01-11 RX ADMIN — HYDROCODONE BITARTRATE AND ACETAMINOPHEN 1 TABLET: 5; 325 TABLET ORAL at 08:44

## 2021-01-11 RX ADMIN — ALPRAZOLAM 0.25 MG: 0.25 TABLET ORAL at 18:03

## 2021-01-11 RX ADMIN — SERTRALINE 25 MG: 50 TABLET, FILM COATED ORAL at 08:17

## 2021-01-11 RX ADMIN — CARBAMAZEPINE 200 MG: 200 TABLET ORAL at 08:17

## 2021-01-11 RX ADMIN — LABETALOL HYDROCHLORIDE 400 MG: 100 TABLET, FILM COATED ORAL at 21:56

## 2021-01-11 RX ADMIN — Medication 10 ML: at 14:25

## 2021-01-11 RX ADMIN — Medication 10 ML: at 14:26

## 2021-01-11 RX ADMIN — LABETALOL HYDROCHLORIDE 400 MG: 100 TABLET, FILM COATED ORAL at 14:25

## 2021-01-11 RX ADMIN — PANTOPRAZOLE SODIUM 40 MG: 40 TABLET, DELAYED RELEASE ORAL at 08:17

## 2021-01-11 RX ADMIN — POTASSIUM CHLORIDE 10 MEQ: 7.46 INJECTION, SOLUTION INTRAVENOUS at 09:30

## 2021-01-11 RX ADMIN — POTASSIUM CHLORIDE 10 MEQ: 7.46 INJECTION, SOLUTION INTRAVENOUS at 10:30

## 2021-01-11 RX ADMIN — HYDRALAZINE HYDROCHLORIDE 100 MG: 50 TABLET, FILM COATED ORAL at 21:54

## 2021-01-11 RX ADMIN — ATORVASTATIN CALCIUM 40 MG: 40 TABLET, FILM COATED ORAL at 21:53

## 2021-01-11 RX ADMIN — ALPRAZOLAM 0.25 MG: 0.25 TABLET ORAL at 04:39

## 2021-01-11 RX ADMIN — HYDROCODONE BITARTRATE AND ACETAMINOPHEN 1 TABLET: 5; 325 TABLET ORAL at 22:33

## 2021-01-11 RX ADMIN — ISOSORBIDE DINITRATE 40 MG: 20 TABLET ORAL at 18:03

## 2021-01-11 RX ADMIN — ALPRAZOLAM 0.25 MG: 0.25 TABLET ORAL at 11:48

## 2021-01-11 RX ADMIN — HYDROCODONE BITARTRATE AND ACETAMINOPHEN 1 TABLET: 5; 325 TABLET ORAL at 14:25

## 2021-01-11 RX ADMIN — Medication 10 ML: at 06:00

## 2021-01-11 RX ADMIN — NORTRIPTYLINE HYDROCHLORIDE 50 MG: 25 CAPSULE ORAL at 21:57

## 2021-01-11 RX ADMIN — ISOSORBIDE DINITRATE 40 MG: 20 TABLET ORAL at 21:55

## 2021-01-11 RX ADMIN — Medication 10 ML: at 21:59

## 2021-01-11 RX ADMIN — CARBAMAZEPINE 200 MG: 200 TABLET ORAL at 21:54

## 2021-01-11 RX ADMIN — FOLIC ACID 1 MG: 1 TABLET ORAL at 08:17

## 2021-01-11 RX ADMIN — ISOSORBIDE DINITRATE 40 MG: 20 TABLET ORAL at 08:17

## 2021-01-11 RX ADMIN — LEVETIRACETAM 500 MG: 500 TABLET ORAL at 21:56

## 2021-01-11 RX ADMIN — PANTOPRAZOLE SODIUM 40 MG: 40 TABLET, DELAYED RELEASE ORAL at 18:03

## 2021-01-11 RX ADMIN — Medication 10 ML: at 21:58

## 2021-01-11 RX ADMIN — HYDRALAZINE HYDROCHLORIDE 100 MG: 50 TABLET, FILM COATED ORAL at 08:17

## 2021-01-11 RX ADMIN — FERROUS SULFATE TAB 325 MG (65 MG ELEMENTAL FE) 325 MG: 325 (65 FE) TAB at 08:17

## 2021-01-11 RX ADMIN — POTASSIUM CHLORIDE 10 MEQ: 7.46 INJECTION, SOLUTION INTRAVENOUS at 08:18

## 2021-01-11 RX ADMIN — POTASSIUM CHLORIDE 40 MEQ: 750 TABLET, FILM COATED, EXTENDED RELEASE ORAL at 08:17

## 2021-01-11 RX ADMIN — HYDRALAZINE HYDROCHLORIDE 100 MG: 50 TABLET, FILM COATED ORAL at 18:03

## 2021-01-11 NOTE — PROGRESS NOTES
Patient doing well. Patient's blood pressure has been more stabilized in systolic blood pressure was 150 140s. Dressing is intact on the leg. And wounds are much  and less drainage. We will plan discharge plan for tomorrow. Pt  Is encouraged to ambulate today.

## 2021-01-11 NOTE — PROGRESS NOTES
Pt chart reviewed and new PT orders received. PT re-evaluation attempted at 1340. Pt declined PT session today due to R sided neck pain stating \"I'll work with you tomorrow. \" Pt refused despite max encouragement and education on the importance of mobility. Will continue to follow and attempt at later date.  Thank you

## 2021-01-11 NOTE — PROGRESS NOTES
Hospitalist Critical Care progress Note    Subjective:   Daily Progress Note: 1/11/2021 7:53 AM    Hospital Course:     Admitted 1/3/2021. Patient is 49-year-old female with a PMH significant for anxiety/depression, COPD, chronic hypoxia on home oxygen, diastolic HF, dysphagia, GERD, HLD, HTN, chronic iron deficiency anemia, MVR and seizure disorder. She comes into the emergency room with complaints of syncopal episode. States she become dizzy when she stands up from sitting to walking. Associated symptoms of weakness throughout the day. On admission potassium 2.4. She was admitted for further management of syncope, hypokalemia, hypomagnesium, orthostatic hypotension. Cardiology and neurology consulted. Serial cardiac enzymes negative for ischemia hemoglobin stable at 10.1. Carotid duplex Dopplers right proximal ICA 59 to 70% stenosis, left ICA 50 to 79% stenosis worse on the left than right. CT angiogram pending. Vascular surgery consulted. Stress test completed cleared for today for cardiac clearance for CEA. 1/8/2021 CEA performed by vascular surgery. Postsurgical transfusions required of blood and platelets. Subjective: Follow-up examination of patient at the bedside. She feels better today. Interval improvement in blood pressures. Possible discharge tomorrow. Encouraged ambulation In hallways.     Current Facility-Administered Medications   Medication Dose Route Frequency    potassium chloride SR (KLOR-CON 10) tablet 40 mEq  40 mEq Oral ONCE    potassium chloride 10 mEq in 100 ml IVPB  10 mEq IntraVENous Q1H    0.9% sodium chloride infusion 250 mL  250 mL IntraVENous PRN    0.9% sodium chloride infusion 250 mL  250 mL IntraVENous PRN    sodium chloride (NS) flush 5-40 mL  5-40 mL IntraVENous Q8H    sodium chloride (NS) flush 5-40 mL  5-40 mL IntraVENous PRN    HYDROcodone-acetaminophen (NORCO) 5-325 mg per tablet 1 Tab  1 Tab Oral Q4H PRN    HYDROmorphone (DILAUDID) injection 1 mg  1 mg IntraVENous Q4H PRN    0.9% sodium chloride infusion 250 mL  250 mL IntraVENous PRN    LORazepam (ATIVAN) injection 2 mg  2 mg IntraVENous Q4H PRN    nortriptyline (PAMELOR) capsule 50 mg  50 mg Oral QHS    pantoprazole (PROTONIX) tablet 40 mg  40 mg Oral ACB&D    butalbital-acetaminophen-caffeine (FIORICET, ESGIC) -40 mg per tablet 2 Tab  2 Tab Oral Q4H PRN    [Held by provider] clopidogreL (PLAVIX) tablet 75 mg  75 mg Oral DAILY    atorvastatin (LIPITOR) tablet 40 mg  40 mg Oral QHS    albuterol (PROVENTIL HFA, VENTOLIN HFA, PROAIR HFA) inhaler 2 Puff  2 Puff Inhalation Q4H PRN    [Held by provider] aspirin delayed-release tablet 81 mg  81 mg Oral DAILY    carBAMazepine (TEGretol) tablet 200 mg  200 mg Oral Q12H    ferrous sulfate tablet 325 mg  325 mg Oral ACB    folic acid (FOLVITE) tablet 1 mg  1 mg Oral DAILY    isosorbide dinitrate (ISORDIL) tablet 40 mg  40 mg Oral TID    labetaloL (NORMODYNE) tablet 400 mg  400 mg Oral TID    levETIRAcetam (KEPPRA) tablet 500 mg  500 mg Oral BID    levothyroxine (SYNTHROID) tablet 25 mcg  25 mcg Oral ACB    NIFEdipine ER (PROCARDIA XL) tablet 60 mg  60 mg Oral DAILY    sertraline (ZOLOFT) tablet 25 mg  25 mg Oral DAILY    ALPRAZolam (XANAX) tablet 0.25 mg  0.25 mg Oral TID PRN    budesonide (PULMICORT) 500 mcg/2 ml nebulizer suspension  500 mcg Nebulization BID RT    hydrALAZINE (APRESOLINE) tablet 100 mg  100 mg Oral TID    sodium chloride (NS) flush 5-40 mL  5-40 mL IntraVENous Q8H    sodium chloride (NS) flush 5-40 mL  5-40 mL IntraVENous PRN    acetaminophen (TYLENOL) tablet 650 mg  650 mg Oral Q6H PRN    Or    acetaminophen (TYLENOL) suppository 650 mg  650 mg Rectal Q6H PRN    polyethylene glycol (MIRALAX) packet 17 g  17 g Oral DAILY PRN    promethazine (PHENERGAN) tablet 12.5 mg  12.5 mg Oral Q6H PRN    Or    ondansetron (ZOFRAN) injection 4 mg  4 mg IntraVENous Q6H PRN    [Held by provider] enoxaparin (LOVENOX) injection 40 mg  40 mg SubCUTAneous DAILY        REVIEW OF SYSTEMS    Review of Systems   Constitutional: Positive for malaise/fatigue. HENT: Negative. Respiratory: Negative. Cardiovascular: Negative. Gastrointestinal: Negative. Genitourinary: Negative. Musculoskeletal: Negative. Neurological: Positive for weakness and headaches. Objective:     Visit Vitals  BP (!) 158/69 (BP 1 Location: Right arm, BP Patient Position: At rest)   Pulse 86   Temp 97.7 °F (36.5 °C)   Resp 12   Ht 5' 4\" (1.626 m)   Wt 46.2 kg (101 lb 13.6 oz)   SpO2 96%   BMI 17.48 kg/m²    O2 Flow Rate (L/min): 2 l/min O2 Device: Room air    Temp (24hrs), Av.7 °F (37.1 °C), Min:97.7 °F (36.5 °C), Max:100.4 °F (38 °C)      No intake/output data recorded.  1901 -  0700  In: 664.6   Out: -     PHYSICAL EXAM:    Physical Exam  Constitutional:       Appearance: She is ill-appearing. HENT:      Mouth/Throat:      Mouth: Mucous membranes are moist.   Eyes:      Extraocular Movements: Extraocular movements intact. Neck:      Musculoskeletal: Normal range of motion. Cardiovascular:      Rate and Rhythm: Normal rate. Pulmonary:      Effort: Pulmonary effort is normal.   Musculoskeletal: Normal range of motion. Skin:     General: Skin is warm and dry. Neurological:      Motor: Weakness present.           Data Review    Recent Results (from the past 24 hour(s))   HGB & HCT    Collection Time: 01/10/21  3:50 PM   Result Value Ref Range    HGB 13.6 11.5 - 16.0 g/dL    HCT 41.9 35.0 - 47.0 %   CBC WITH AUTOMATED DIFF    Collection Time: 21  4:30 AM   Result Value Ref Range    WBC 10.0 3.6 - 11.0 K/uL    RBC 2.88 (L) 3.80 - 5.20 M/uL    HGB 8.9 (L) 11.5 - 16.0 g/dL    HCT 26.6 (L) 35.0 - 47.0 %    MCV 92.4 80.0 - 99.0 FL    MCH 30.9 26.0 - 34.0 PG    MCHC 33.5 30.0 - 36.5 g/dL    RDW 15.9 (H) 11.5 - 14.5 %    PLATELET 089 151 - 429 K/uL    MPV 9.4 8.9 - 12.9 FL    NEUTROPHILS 77 (H) 32 - 75 %    LYMPHOCYTES 11 (L) 12 - 49 %    MONOCYTES 10 5 - 13 %    EOSINOPHILS 2 0 - 7 %    BASOPHILS 0 0 - 1 %    IMMATURE GRANULOCYTES 0 0.0 - 0.5 %    ABS. NEUTROPHILS 7.7 1.8 - 8.0 K/UL    ABS. LYMPHOCYTES 1.1 0.8 - 3.5 K/UL    ABS. MONOCYTES 1.0 0.0 - 1.0 K/UL    ABS. EOSINOPHILS 0.2 0.0 - 0.4 K/UL    ABS. BASOPHILS 0.0 0.0 - 0.1 K/UL    ABS. IMM. GRANS. 0.0 0.00 - 0.04 K/UL    DF AUTOMATED     METABOLIC PANEL, BASIC    Collection Time: 01/11/21  4:30 AM   Result Value Ref Range    Sodium 135 (L) 136 - 145 mmol/L    Potassium 2.6 (LL) 3.5 - 5.1 mmol/L    Chloride 98 97 - 108 mmol/L    CO2 30 21 - 32 mmol/L    Anion gap 7 5 - 15 mmol/L    Glucose 95 65 - 100 mg/dL    BUN 11 6 - 20 mg/dL    Creatinine 0.88 0.55 - 1.02 mg/dL    BUN/Creatinine ratio 13 12 - 20      GFR est AA >60 >60 ml/min/1.73m2    GFR est non-AA >60 >60 ml/min/1.73m2    Calcium 9.0 8.5 - 10.1 mg/dL       CTA NECK   Final Result   Impression:   1. Moderate (60-65%) stenosis of the right ICA origin. 2.  Mild (40-45%) stenosis of the left ICA origin. Please note that degrees of carotid stenosis are measured in accordance with   NASCET criteria. CT HEAD WO CONT   Final Result   Impression:    1. No acute intracranial findings. XR CHEST PORT   Final Result   IMPRESSION: Considerations for baseline interstitial lung disease or mild   pulmonary edema. Active Problems:    Resistant hypertension (9/21/2020)      Renal artery stenosis (HCC) (9/21/2020)      CHF (congestive heart failure) ([facility] (10/2/2020)      Overview: With preserved lvef, diastolic dysfunction, mitral valve regurgitation       moderate. Mitral valve regurgitation (10/2/2020)      Anxiety (10/2/2020)      Seizure disorder ([facility] (10/2/2020)      COPD (chronic obstructive pulmonary disease) with emphysema ([facility] [address] (10/2/2020)      Hypokalemia (10/2/2020)      Acquired hypothyroidism (10/2/2020)      Syncope (1/3/2021)        Assessment/Plan:     1. Syncope:  2.   Orthostatic hypotension:  · CT of head negative for acute findings  · Echo LVEF 50 to 55% mild concentric hypertrophy, moderate grade 2 diastolic dysfunction, moderate MVR, moderate TVR, possible stenosis in the descending aorta  · Carotid Doppler studies right proximal ICA 50 to 79% blockage on higher end of stenosis, left proximal ICA 50 to 79% stenosis with heavier plaque than right  · Serial cardiac enzymes negative  · PT OT consulted recommending home with home health PT  · Cardiology consulted  · Neurology consulted  · Vascular surgery consulted moderate stenosis  · CT angiogrammoderate 60-65% stenosis in right ICA, mild 40 to 45% stenosis of left ICA  · Stress test completed cleared for procedure  · 1/8/2021 CEA intervention left ICA stenosis   · Receiving both blood and platelet transfusions for surgical site oozing    3. Hypokalemia:  4. Hypomagnesium:  · Replete and monitor    5. Hypertension:  · Continue home hypertensive medications    6. Hypothyroid:  · Continue home medications Synthroid    7. CHF with preserved EF:  · No signs or symptoms of exacerbation    8. HLD:  · Continue statin therapy    9. Seizure disorder:  · Continue Tegretol and Keppra    10. Renal artery stenosis: Status post stent placement  · Continue statin and holding Plavix and aspirin that is post CVA    11. Mitral valve regurg:  · Cardiology following    12. COPD:  15.  Chronic hypoxia: On Home oxygen  · No signs or symptoms of exacerbation at this time. · Continue home neb Pulmicort    14. Chronic iron deficiency anemia:  15.  Acute blood loss anemia: Secondary to CEA site oozing  · Continue oral supplementation iron, folic acid  · Hgb 8.9 today  · Transfused 2 unit PRBC   · Transfused 1 unit platelets    15.   Malnutrition:  · Nutrition consulted    DVT Prophylaxis: Lovenox  Code Status: Full Code  POA/NOK: Stacia Sylvester at #[phone]  __________________________________________________________________  Critical care time spent in direct care including coordination of service, review of data and examination: 74 minutes critical care time  Care Plan discussed with: RN and patient. Patient states she wants to be a DNR discussed how she could not be a DNR for the surgical procedure but that she can request DNR status after she recovers from carotid endarterectomy. ______________________________________________________________________________    Corbin Galarza NP    This is dictation was done by dragon, computer voice recognition software. Quite often unanticipated grammatical, syntax, homophones and other interpretive errors or inadvertently transcribed by the computer software. Please excuse errors that have escaped final proofreading. Thank you.

## 2021-01-11 NOTE — PROGRESS NOTES
Comprehensive Nutrition Assessment    Type and Reason for Visit: Reassess(Goal)    Nutrition Recommendations/Plan:     Consider liberalization to Regular diet while inpatient  Continue Ensure Enlive TID  Add Ensure Pdg daily   D/c Magic cup       Honor preferences as able      Nursing to document %meal and supplement intakes in I/Os  Obtain weekly measured wts    Nutrition Assessment:  Admitted for dizziness with syncopal episode. Doppler studies finding ICA stenosis, vasc surg following. S/p stress test 1/7. S/p CEA 1/8. Remains in ICU recovering after procedure. Per MD notes, possible plans for d/c tomorrow. Continues to tolerate RA. Ordered Cardiac diet, FR 1500mL per MD. Recent EMR intake of D - 50% recorded on 1/9. Spoke to pt at bedside, visualized 50% of lunch eaten, 100% intake of Ensure, bites of magic cup. Pt reports doing alright no longer wants to receive Magic cup, prefers reg ice cream, agreeable to try Ensure pdg phuong will adjust. Similar presentation on previous visit. On initial visit, pt reported not eating well, however agreeable to supplements - Ensure Enlive (phuong preferred, any flavor tolerated). Pt requested diet liberalization to Regular, plans to discuss with MD. Labs: H/H: 8.9/26.6, Na 135, K 2.6. Meds: Statin, norco, dilaudid, budesonide, ativan, FeSu, folic acid, PPI, KCl. Malnutrition Assessment:  Malnutrition Status:   Moderate malnutrition    Context:  Acute illness     Findings of the 6 clinical characteristics of malnutrition:   Energy Intake:  No significant decrease in energy intake  Weight Loss:  No significant weight loss(pt reports wt stability at 100lbs)     Body Fat Loss:  7 - Moderate body fat loss, Orbital, Triceps, Fat overlying ribs   Muscle Mass Loss:  1 - Mild muscle mass loss, Clavicles (pectoralis & deltoids), Thigh (quadraceps), Calf  Fluid Accumulation:  No significant fluid accumulation,          Estimated Daily Nutrient Needs:  Energy (kcal): 1455kcal (32kcal/kg); Weight Used for Energy Requirements:    Protein (g): 55g (1.2g/kg); Weight Used for Protein Requirements: Current  Fluid (ml/day): 1455mL; Method Used for Fluid Requirements: 1 ml/kcal      Nutrition Related Findings:  NFPE finding moderate muscle and fat losses. Poor dentition visualized, pt denied c/s difficulty. Pt denied n/v or c/d. BM documented 1/7. No edema. Wounds:    None       Current Nutrition Therapies:  DIET NUTRITIONAL SUPPLEMENTS Breakfast, Lunch, Dinner; Ensure [employer] (likes phuong, any flavor if out)  DIET NUTRITIONAL SUPPLEMENTS Lunch, Dinner; [employer]  DIET CARDIAC Regular    Anthropometric Measures:  · Height:  5' 4\" (162.6 cm)  · Current Body Wt:  45.4 kg (100 lb 1.4 oz)   · Usual Body Wt:  45.4 kg (100 lb)     · Ideal Body Wt:  125 lbs:  80.1 %   · BMI Category:  Underweight (BMI less than 18.5)     Wt Readings from Last 7 Encounters:   01/04/21 45.4 kg (100 lb 1.4 oz)   11/25/20 53.9 kg (118 lb 13.3 oz)   11/02/20 45.4 kg (100 lb)   10/19/20 49 kg (108 lb)   10/02/20 46.2 kg (101 lb 13.6 oz)   09/17/20 54.5 kg (120 lb 2.4 oz)   Wt appears to range 45-49kg on average, possible error in wt from 11/25 based on pt reporting UBW of 100lbs. Will monitor inpatient trends.     Nutrition Diagnosis:   · Underweight related to catabolic illness, inadequate protein-energy intake as evidenced by BMI, moderate loss of subcutaneous fat, mild muscle loss    Nutrition Interventions:   Food and/or Nutrient Delivery: Continue current diet, Modify oral nutrition supplement  Nutrition Education and Counseling: No recommendations at this time  Coordination of Nutrition Care: Continue to monitor while inpatient    Goals:  Meet >75% EENs via PO (met w/ supplementation)  Wt gain 0.5kg per week (met)  Lytes wnl (not met)    Nutrition Monitoring and Evaluation:   Behavioral-Environmental Outcomes: None identified  Food/Nutrient Intake Outcomes: Food and nutrient intake, Supplement intake  Physical Signs/Symptoms Outcomes: GI status, Weight, Nutrition focused physical findings    Discharge Planning:    Continue oral nutrition supplement     Electronically signed by Kirby Bolden on 1/11/2021 at 1:40 PM    Contact: [phone]

## 2021-01-11 NOTE — PROGRESS NOTES
0615- AM Labs come back with K 2.6 Critical called > Dr. Danita Avila for 40 MEQ IV & 40 MEQ PO. Dressing continues to have shadowing/breakthrough drainage. 0705-Bedside and Verbal shift change report given to Elsy Pickard (oncoming nurse) by Angel Jimenez RN (offgoing nurse). Report included the following information SBAR, Kardex, Intake/Output, MAR and Recent Results. Hayley Peters RN, CCRN

## 2021-01-11 NOTE — PROGRESS NOTES
CARDIOLOGY PROGRESS NOTE    Patient seen and examined. We are following for syncope and mitral valve regurgitation. She is s/p R carotid endardarectomy 1/8, acute blood loss from site. S/p tranfusion, Hgb 8.9. Continues to ooze from site this am. Sitting in the chair, appears comfortable otherwise. Denies any new cardiac complaints. Telemetry reviewed. Sinus rhythm in 70-80s     Physical examination:  Vital signs, Blood pressure 158/69,  Pulse 86, Temp 100.4  No apparent distress. Heart is regular, rate and rhythm. Normal S1, S2,  +Murmur noted. Lungs are clear bilaterally  Abdomen is soft, nontender, normal bowel sounds. Extremities have no edema. Recent labs results and imaging reviewed. Recent Results (from the past 24 hour(s))   HGB & HCT    Collection Time: 01/10/21  3:50 PM   Result Value Ref Range    HGB 13.6 11.5 - 16.0 g/dL    HCT 41.9 35.0 - 47.0 %   CBC WITH AUTOMATED DIFF    Collection Time: 01/11/21  4:30 AM   Result Value Ref Range    WBC 10.0 3.6 - 11.0 K/uL    RBC 2.88 (L) 3.80 - 5.20 M/uL    HGB 8.9 (L) 11.5 - 16.0 g/dL    HCT 26.6 (L) 35.0 - 47.0 %    MCV 92.4 80.0 - 99.0 FL    MCH 30.9 26.0 - 34.0 PG    MCHC 33.5 30.0 - 36.5 g/dL    RDW 15.9 (H) 11.5 - 14.5 %    PLATELET 625 235 - 499 K/uL    MPV 9.4 8.9 - 12.9 FL    NEUTROPHILS 77 (H) 32 - 75 %    LYMPHOCYTES 11 (L) 12 - 49 %    MONOCYTES 10 5 - 13 %    EOSINOPHILS 2 0 - 7 %    BASOPHILS 0 0 - 1 %    IMMATURE GRANULOCYTES 0 0.0 - 0.5 %    ABS. NEUTROPHILS 7.7 1.8 - 8.0 K/UL    ABS. LYMPHOCYTES 1.1 0.8 - 3.5 K/UL    ABS. MONOCYTES 1.0 0.0 - 1.0 K/UL    ABS. EOSINOPHILS 0.2 0.0 - 0.4 K/UL    ABS. BASOPHILS 0.0 0.0 - 0.1 K/UL    ABS. IMM.  GRANS. 0.0 0.00 - 0.04 K/UL    DF AUTOMATED     METABOLIC PANEL, BASIC    Collection Time: 01/11/21  4:30 AM   Result Value Ref Range    Sodium 135 (L) 136 - 145 mmol/L    Potassium 2.6 (LL) 3.5 - 5.1 mmol/L    Chloride 98 97 - 108 mmol/L    CO2 30 21 - 32 mmol/L    Anion gap 7 5 - 15 mmol/L Glucose 95 65 - 100 mg/dL    BUN 11 6 - 20 mg/dL    Creatinine 0.88 0.55 - 1.02 mg/dL    BUN/Creatinine ratio 13 12 - 20      GFR est AA >60 >60 ml/min/1.73m2    GFR est non-AA >60 >60 ml/min/1.73m2    Calcium 9.0 8.5 - 10.1 mg/dL       Neck CTA:  1. Moderate (60-65%) stenosis of the right ICA origin. 2.  Mild (40-45%) stenosis of the left ICA origin. Discussed case with Dr. Sara Interiano. This our impression and recommendation:    1. Carotid Stenosis: with syncope. S/P R CEA. Per vascular surgery; continue to hold aspirin,plavix. She is s/p Renal artery stent placement, restart DAPT when able. 2. HFpEF: Echocardiogram shows preserved EF. She appears euvolemic; not currently on diuresis. Maintain fluid balance. 3. Hypertension: Blood pressure acceptable. Continue medication treatment and monitoring. Renal function greatly improved s/p renal artery stent. Continue current blood pressure medications. Continue to monitor. 4. Hyperlipidemia: Continue statin therapy. 5. Hypokalemia: K 2.6, replete and monitor. Repeat labs in the am.       Hopeful for discharge in next 24-48 hours. Follow up in our office is arranged for 1/19. Please do not hesitate to call if additional questions arise.

## 2021-01-11 NOTE — OP NOTES
This is operative report on Astrid Tubbs, patient's YOB: 1965. Date of surgery: January 8, 2021. Surgeon: Dr. Chuck Johns. Preop diagnosis: Bilateral carotid artery stenosis, right-sided internal carotid stenosis. Syncopal episode with a drop attack. Postop diagnosis: Hard calcified plaque stenosis in the right proximal internal carotid artery about 70%. Procedure: Right internal carotid artery endarterectomy with a biologic patch angioplasty. Anesthesia: General with cerebral oxygen perfusion monitor. EBL: 250 cc. Fluids see operative record. Specimen: Plaque removed from right internal carotid. Assistant: Ms. Gabriel Zamarripa. Complication: None. Description of procedure: Patient was brought to the operating table comfortable supine position general surgery was initiated. Cervix intubation monitor device was placed in forehead. Patient's right side neck and chest was prepped usual sterile technique using DuraPrep's followed by OR towels and drapes applied usual fashion. At this time timeout was called after complaints was made. Procedure commenced. I used 1% lidocaine for local anesthetics. I made a small incision using number 15 blade anterior border of the sternocleidomastoid muscle on the right side. Followed by subcutaneous tissue and protect the small muscle divided using electrocautery. Dissection was continued jugular vein was dissected and retracted. Carotid sheath was opened. Common carotid artery and branches including internal and external carotid artery dissected the usual fashion. Followed by Vesseloops were applied. After adequate exposure was done internal carotid artery patient was systemically heparinized. Then followed by vascular clamp was applied usual fashion and the endarterectomy was made he is now 11 blade extending from the common carotid artery to internal carotid artery. Heavy calcified plaque was noted. Endarterectomy was performed. Followed by #8 [place] soriano was placed usual fashion. During the shunting procedure there is no significant drop cerebral oxygen perfusion level. At the completion of endarterectomy. The endpoint of internal carotid artery was secured with a 7-0 Prolene sutures. Followed by biologic patch was applied to the angioplasty. 6-0 Prolene was used. Before patch was closed shot should not remove the usual fashion. After hemostasis obtained blood flow was established back to internal carotid artery usual fashion. Discussed that there is no significant changes in cerebral oxygen perfusion level. After hemostasis obtained wounds are closed with in layers using 3-0 Vicryl sutures. Followed by skin was closed with a 4-0 Monocryl sutures. Followed by sterile dressing was applied. Patient tolerated procedure well. At the end of case neurologic examination shows complete and intact without any complication.

## 2021-01-11 NOTE — PROGRESS NOTES
Hospitalist Critical Care progress Note    Subjective:   Daily Progress Note: 1/11/2021 7:53 AM    Hospital Course:     Admitted 1/3/2021. Patient is 60-year-old female with a PMH significant for anxiety/depression, COPD, chronic hypoxia on home oxygen, diastolic HF, dysphagia, GERD, HLD, HTN, chronic iron deficiency anemia, MVR and seizure disorder. She comes into the emergency room with complaints of syncopal episode. States she become dizzy when she stands up from sitting to walking. Associated symptoms of weakness throughout the day. On admission potassium 2.4. She was admitted for further management of syncope, hypokalemia, hypomagnesium, orthostatic hypotension. Cardiology and neurology consulted. Serial cardiac enzymes negative for ischemia hemoglobin stable at 10.1. Carotid duplex Dopplers right proximal ICA 59 to 70% stenosis, left ICA 50 to 79% stenosis worse on the left than right. CT angiogram pending. Vascular surgery consulted. Stress test completed cleared for today for cardiac clearance for CEA. 1/8/2021 CEA performed by vascular surgery. Subjective: Follow-up examination of patient at the bedside. Continues to have oozing from surgical site. She states she has moderate pain from surgical site. Complains of some anxiety as well as pain.     Current Facility-Administered Medications   Medication Dose Route Frequency    potassium chloride SR (KLOR-CON 10) tablet 40 mEq  40 mEq Oral ONCE    potassium chloride 10 mEq in 100 ml IVPB  10 mEq IntraVENous Q1H    0.9% sodium chloride infusion 250 mL  250 mL IntraVENous PRN    0.9% sodium chloride infusion 250 mL  250 mL IntraVENous PRN    sodium chloride (NS) flush 5-40 mL  5-40 mL IntraVENous Q8H    sodium chloride (NS) flush 5-40 mL  5-40 mL IntraVENous PRN    HYDROcodone-acetaminophen (NORCO) 5-325 mg per tablet 1 Tab  1 Tab Oral Q4H PRN    HYDROmorphone (DILAUDID) injection 1 mg  1 mg IntraVENous Q4H PRN    0.9% sodium chloride infusion 250 mL  250 mL IntraVENous PRN    LORazepam (ATIVAN) injection 2 mg  2 mg IntraVENous Q4H PRN    nortriptyline (PAMELOR) capsule 50 mg  50 mg Oral QHS    pantoprazole (PROTONIX) tablet 40 mg  40 mg Oral ACB&D    butalbital-acetaminophen-caffeine (FIORICET, ESGIC) -40 mg per tablet 2 Tab  2 Tab Oral Q4H PRN    [Held by provider] clopidogreL (PLAVIX) tablet 75 mg  75 mg Oral DAILY    atorvastatin (LIPITOR) tablet 40 mg  40 mg Oral QHS    albuterol (PROVENTIL HFA, VENTOLIN HFA, PROAIR HFA) inhaler 2 Puff  2 Puff Inhalation Q4H PRN    [Held by provider] aspirin delayed-release tablet 81 mg  81 mg Oral DAILY    carBAMazepine (TEGretol) tablet 200 mg  200 mg Oral Q12H    ferrous sulfate tablet 325 mg  325 mg Oral ACB    folic acid (FOLVITE) tablet 1 mg  1 mg Oral DAILY    isosorbide dinitrate (ISORDIL) tablet 40 mg  40 mg Oral TID    labetaloL (NORMODYNE) tablet 400 mg  400 mg Oral TID    levETIRAcetam (KEPPRA) tablet 500 mg  500 mg Oral BID    levothyroxine (SYNTHROID) tablet 25 mcg  25 mcg Oral ACB    NIFEdipine ER (PROCARDIA XL) tablet 60 mg  60 mg Oral DAILY    sertraline (ZOLOFT) tablet 25 mg  25 mg Oral DAILY    ALPRAZolam (XANAX) tablet 0.25 mg  0.25 mg Oral TID PRN    budesonide (PULMICORT) 500 mcg/2 ml nebulizer suspension  500 mcg Nebulization BID RT    hydrALAZINE (APRESOLINE) tablet 100 mg  100 mg Oral TID    sodium chloride (NS) flush 5-40 mL  5-40 mL IntraVENous Q8H    sodium chloride (NS) flush 5-40 mL  5-40 mL IntraVENous PRN    acetaminophen (TYLENOL) tablet 650 mg  650 mg Oral Q6H PRN    Or    acetaminophen (TYLENOL) suppository 650 mg  650 mg Rectal Q6H PRN    polyethylene glycol (MIRALAX) packet 17 g  17 g Oral DAILY PRN    promethazine (PHENERGAN) tablet 12.5 mg  12.5 mg Oral Q6H PRN    Or    ondansetron (ZOFRAN) injection 4 mg  4 mg IntraVENous Q6H PRN    [Held by provider] enoxaparin (LOVENOX) injection 40 mg  40 mg SubCUTAneous DAILY        REVIEW OF SYSTEMS    Review of Systems   Constitutional: Positive for malaise/fatigue. HENT: Negative. Respiratory: Negative. Cardiovascular: Negative. Gastrointestinal: Negative. Genitourinary: Negative. Musculoskeletal: Negative. Neurological: Positive for weakness and headaches. Objective:     Visit Vitals  BP (!) 158/69 (BP 1 Location: Right arm, BP Patient Position: At rest)   Pulse 86   Temp 97.7 °F (36.5 °C)   Resp 12   Ht 5' 4\" (1.626 m)   Wt 46.2 kg (101 lb 13.6 oz)   SpO2 96%   BMI 17.48 kg/m²    O2 Flow Rate (L/min): 2 l/min O2 Device: Room air    Temp (24hrs), Av.6 °F (37 °C), Min:97.7 °F (36.5 °C), Max:100.4 °F (38 °C)      No intake/output data recorded.  1901 -  0700  In: 664.6   Out: -     PHYSICAL EXAM:    Physical Exam  Constitutional:       Appearance: She is ill-appearing. HENT:      Mouth/Throat:      Mouth: Mucous membranes are moist.   Eyes:      Extraocular Movements: Extraocular movements intact. Neck:      Musculoskeletal: Normal range of motion. Cardiovascular:      Rate and Rhythm: Normal rate. Pulmonary:      Effort: Pulmonary effort is normal.   Musculoskeletal: Normal range of motion. Skin:     General: Skin is warm and dry. Neurological:      Motor: Weakness present.           Data Review    Recent Results (from the past 24 hour(s))   HGB & HCT    Collection Time: 01/10/21  3:50 PM   Result Value Ref Range    HGB 13.6 11.5 - 16.0 g/dL    HCT 41.9 35.0 - 47.0 %   CBC WITH AUTOMATED DIFF    Collection Time: 21  4:30 AM   Result Value Ref Range    WBC 10.0 3.6 - 11.0 K/uL    RBC 2.88 (L) 3.80 - 5.20 M/uL    HGB 8.9 (L) 11.5 - 16.0 g/dL    HCT 26.6 (L) 35.0 - 47.0 %    MCV 92.4 80.0 - 99.0 FL    MCH 30.9 26.0 - 34.0 PG    MCHC 33.5 30.0 - 36.5 g/dL    RDW 15.9 (H) 11.5 - 14.5 %    PLATELET 960 971 - 788 K/uL    MPV 9.4 8.9 - 12.9 FL    NEUTROPHILS 77 (H) 32 - 75 %    LYMPHOCYTES 11 (L) 12 - 49 %    MONOCYTES 10 5 - 13 %    EOSINOPHILS 2 0 - 7 %    BASOPHILS 0 0 - 1 %    IMMATURE GRANULOCYTES 0 0.0 - 0.5 %    ABS. NEUTROPHILS 7.7 1.8 - 8.0 K/UL    ABS. LYMPHOCYTES 1.1 0.8 - 3.5 K/UL    ABS. MONOCYTES 1.0 0.0 - 1.0 K/UL    ABS. EOSINOPHILS 0.2 0.0 - 0.4 K/UL    ABS. BASOPHILS 0.0 0.0 - 0.1 K/UL    ABS. IMM. GRANS. 0.0 0.00 - 0.04 K/UL    DF AUTOMATED     METABOLIC PANEL, BASIC    Collection Time: 01/11/21  4:30 AM   Result Value Ref Range    Sodium 135 (L) 136 - 145 mmol/L    Potassium 2.6 (LL) 3.5 - 5.1 mmol/L    Chloride 98 97 - 108 mmol/L    CO2 30 21 - 32 mmol/L    Anion gap 7 5 - 15 mmol/L    Glucose 95 65 - 100 mg/dL    BUN 11 6 - 20 mg/dL    Creatinine 0.88 0.55 - 1.02 mg/dL    BUN/Creatinine ratio 13 12 - 20      GFR est AA >60 >60 ml/min/1.73m2    GFR est non-AA >60 >60 ml/min/1.73m2    Calcium 9.0 8.5 - 10.1 mg/dL       CTA NECK   Final Result   Impression:   1. Moderate (60-65%) stenosis of the right ICA origin. 2.  Mild (40-45%) stenosis of the left ICA origin. Please note that degrees of carotid stenosis are measured in accordance with   NASCET criteria. CT HEAD WO CONT   Final Result   Impression:    1. No acute intracranial findings. XR CHEST PORT   Final Result   IMPRESSION: Considerations for baseline interstitial lung disease or mild   pulmonary edema. Active Problems:    Resistant hypertension (9/21/2020)      Renal artery stenosis (HCC) (9/21/2020)      CHF (congestive heart failure) ([facility] (10/2/2020)      Overview: With preserved lvef, diastolic dysfunction, mitral valve regurgitation       moderate. Mitral valve regurgitation (10/2/2020)      Anxiety (10/2/2020)      Seizure disorder ([facility] [address] (10/2/2020)      COPD (chronic obstructive pulmonary disease) with emphysema ([facility] [address] (10/2/2020)      Hypokalemia (10/2/2020)      Acquired hypothyroidism (10/2/2020)      Syncope (1/3/2021)        Assessment/Plan:     1. Syncope:  2.   Orthostatic hypotension:  · CT of head negative for acute findings  · Echo LVEF 50 to 55% mild concentric hypertrophy, moderate grade 2 diastolic dysfunction, moderate MVR, moderate TVR, possible stenosis in the descending aorta  · Carotid Doppler studies right proximal ICA 50 to 79% blockage on higher end of stenosis, left proximal ICA 50 to 79% stenosis with heavier plaque than right  · Serial cardiac enzymes negative  · PT OT consulted recommending home with home health PT  · Cardiology consulted  · Neurology consulted  · Vascular surgery consulted moderate stenosis  · CT angiogrammoderate 60-65% stenosis in right ICA, mild 40 to 45% stenosis of left ICA  · Stress test completed cleared for procedure  · 1/8/2021 CEA intervention left ICA stenosis   · Receiving both blood and platelet transfusions for surgical site oozing    3. Hypokalemia:  4. Hypomagnesium:  · Replete and monitor    5. Hypertension:  · Continue home hypertensive medications    6. Hypothyroid:  · Continue home medications Synthroid    7. CHF with preserved EF:  · No signs or symptoms of exacerbation    8. HLD:  · Continue statin therapy    9. Seizure disorder:  · Continue Tegretol and Keppra    10. Renal artery stenosis: Status post stent placement  · Continue statin and holding Plavix and aspirin that is post CVA    11. Mitral valve regurg:  · Cardiology following    12. COPD:  15.  Chronic hypoxia: On Home oxygen  · No signs or symptoms of exacerbation at this time. · Continue home neb Pulmicort    14. Chronic iron deficiency anemia:  15.  Acute blood loss anemia: Secondary to CEA site oozing  · Continue oral supplementation iron, folic acid  · Hgb 9.6 >5.5  · Transfused 2 unit PRBC   · Transfused 1 unit platelets    15.   Malnutrition:  · Nutrition consult    DVT Prophylaxis: Lovenox  Code Status: Full Code  POA/NOK: Stacia Nga at #[phone]  ______________________________________________________________________________  Critical care time spent in direct care including coordination of service, review of data and examination: 74 minutes critical care time  Care Plan discussed with: RN and patient. Patient states she wants to be a DNR discussed how she could not be a DNR for the surgical procedure but that she can request DNR status after she recovers from carotid endarterectomy. ______________________________________________________________________________    Ara Rendon NP    This is dictation was done by dragon, computer voice recognition software. Quite often unanticipated grammatical, syntax, homophones and other interpretive errors or inadvertently transcribed by the computer software. Please excuse errors that have escaped final proofreading. Thank you.

## 2021-01-12 VITALS
RESPIRATION RATE: 13 BRPM | TEMPERATURE: 97.4 F | HEART RATE: 91 BPM | WEIGHT: 113.1 LBS | BODY MASS INDEX: 19.31 KG/M2 | DIASTOLIC BLOOD PRESSURE: 82 MMHG | SYSTOLIC BLOOD PRESSURE: 126 MMHG | OXYGEN SATURATION: 98 % | HEIGHT: 64 IN

## 2021-01-12 LAB
ANION GAP SERPL CALC-SCNC: 8 MMOL/L (ref 5–15)
BASOPHILS # BLD: 0 K/UL (ref 0–0.1)
BASOPHILS NFR BLD: 0 % (ref 0–1)
BUN SERPL-MCNC: 14 MG/DL (ref 6–20)
BUN/CREAT SERPL: 16 (ref 12–20)
CA-I BLD-MCNC: 9.4 MG/DL (ref 8.5–10.1)
CHLORIDE SERPL-SCNC: 95 MMOL/L (ref 97–108)
CO2 SERPL-SCNC: 33 MMOL/L (ref 21–32)
CREAT SERPL-MCNC: 0.9 MG/DL (ref 0.55–1.02)
DIFFERENTIAL METHOD BLD: ABNORMAL
EOSINOPHIL # BLD: 0.2 K/UL (ref 0–0.4)
EOSINOPHIL NFR BLD: 2 % (ref 0–7)
ERYTHROCYTE [DISTWIDTH] IN BLOOD BY AUTOMATED COUNT: 15.8 % (ref 11.5–14.5)
GLUCOSE BLD STRIP.AUTO-MCNC: 108 MG/DL (ref 65–100)
GLUCOSE SERPL-MCNC: 96 MG/DL (ref 65–100)
HCT VFR BLD AUTO: 26.7 % (ref 35–47)
HGB BLD-MCNC: 9 G/DL (ref 11.5–16)
IMM GRANULOCYTES # BLD AUTO: 0 K/UL (ref 0–0.04)
IMM GRANULOCYTES NFR BLD AUTO: 0 % (ref 0–0.5)
LYMPHOCYTES # BLD: 1 K/UL (ref 0.8–3.5)
LYMPHOCYTES NFR BLD: 12 % (ref 12–49)
MAGNESIUM SERPL-MCNC: 1.7 MG/DL (ref 1.6–2.4)
MCH RBC QN AUTO: 31.5 PG (ref 26–34)
MCHC RBC AUTO-ENTMCNC: 33.7 G/DL (ref 30–36.5)
MCV RBC AUTO: 93.4 FL (ref 80–99)
MONOCYTES # BLD: 1.1 K/UL (ref 0–1)
MONOCYTES NFR BLD: 12 % (ref 5–13)
NEUTS SEG # BLD: 6.2 K/UL (ref 1.8–8)
NEUTS SEG NFR BLD: 74 % (ref 32–75)
PERFORMED BY, TECHID: ABNORMAL
PLATELET # BLD AUTO: 288 K/UL (ref 150–400)
PMV BLD AUTO: 9.7 FL (ref 8.9–12.9)
POTASSIUM SERPL-SCNC: 2.6 MMOL/L (ref 3.5–5.1)
POTASSIUM SERPL-SCNC: 3.3 MMOL/L (ref 3.5–5.1)
RBC # BLD AUTO: 2.86 M/UL (ref 3.8–5.2)
SODIUM SERPL-SCNC: 136 MMOL/L (ref 136–145)
WBC # BLD AUTO: 8.5 K/UL (ref 3.6–11)

## 2021-01-12 PROCEDURE — 97530 THERAPEUTIC ACTIVITIES: CPT

## 2021-01-12 PROCEDURE — 77010033678 HC OXYGEN DAILY

## 2021-01-12 PROCEDURE — 74011250637 HC RX REV CODE- 250/637: Performed by: SURGERY

## 2021-01-12 PROCEDURE — 82962 GLUCOSE BLOOD TEST: CPT

## 2021-01-12 PROCEDURE — 85025 COMPLETE CBC W/AUTO DIFF WBC: CPT

## 2021-01-12 PROCEDURE — 74011250636 HC RX REV CODE- 250/636: Performed by: SURGERY

## 2021-01-12 PROCEDURE — 83735 ASSAY OF MAGNESIUM: CPT

## 2021-01-12 PROCEDURE — 36415 COLL VENOUS BLD VENIPUNCTURE: CPT

## 2021-01-12 PROCEDURE — 84132 ASSAY OF SERUM POTASSIUM: CPT

## 2021-01-12 RX ORDER — POTASSIUM CHLORIDE 7.45 MG/ML
10 INJECTION INTRAVENOUS
Status: DISPENSED | OUTPATIENT
Start: 2021-01-12 | End: 2021-01-12

## 2021-01-12 RX ORDER — HYDROCODONE BITARTRATE AND ACETAMINOPHEN 5; 325 MG/1; MG/1
1 TABLET ORAL
Qty: 20 TAB | Refills: 0 | Status: SHIPPED | OUTPATIENT
Start: 2021-01-12 | End: 2021-01-23

## 2021-01-12 RX ORDER — POTASSIUM CHLORIDE 20 MEQ/1
20 TABLET, EXTENDED RELEASE ORAL DAILY
Qty: 30 TAB | Refills: 0 | Status: SHIPPED | OUTPATIENT
Start: 2021-01-12 | End: 2021-01-22

## 2021-01-12 RX ORDER — POTASSIUM CHLORIDE 1.5 G/1.77G
40 POWDER, FOR SOLUTION ORAL
Status: COMPLETED | OUTPATIENT
Start: 2021-01-12 | End: 2021-01-12

## 2021-01-12 RX ADMIN — LABETALOL HYDROCHLORIDE 400 MG: 100 TABLET, FILM COATED ORAL at 16:45

## 2021-01-12 RX ADMIN — HYDROCODONE BITARTRATE AND ACETAMINOPHEN 1 TABLET: 5; 325 TABLET ORAL at 08:32

## 2021-01-12 RX ADMIN — ISOSORBIDE DINITRATE 40 MG: 20 TABLET ORAL at 16:46

## 2021-01-12 RX ADMIN — HYDROCODONE BITARTRATE AND ACETAMINOPHEN 1 TABLET: 5; 325 TABLET ORAL at 16:45

## 2021-01-12 RX ADMIN — PANTOPRAZOLE SODIUM 40 MG: 40 TABLET, DELAYED RELEASE ORAL at 08:32

## 2021-01-12 RX ADMIN — POTASSIUM CHLORIDE 40 MEQ: 1.5 FOR SOLUTION ORAL at 08:35

## 2021-01-12 RX ADMIN — ALPRAZOLAM 0.25 MG: 0.25 TABLET ORAL at 16:45

## 2021-01-12 RX ADMIN — NIFEDIPINE 60 MG: 60 TABLET, FILM COATED, EXTENDED RELEASE ORAL at 08:35

## 2021-01-12 RX ADMIN — LABETALOL HYDROCHLORIDE 400 MG: 100 TABLET, FILM COATED ORAL at 08:31

## 2021-01-12 RX ADMIN — HYDRALAZINE HYDROCHLORIDE 100 MG: 50 TABLET, FILM COATED ORAL at 16:46

## 2021-01-12 RX ADMIN — CARBAMAZEPINE 200 MG: 200 TABLET ORAL at 08:32

## 2021-01-12 RX ADMIN — PANTOPRAZOLE SODIUM 40 MG: 40 TABLET, DELAYED RELEASE ORAL at 16:45

## 2021-01-12 RX ADMIN — Medication 10 ML: at 06:11

## 2021-01-12 RX ADMIN — POTASSIUM CHLORIDE 10 MEQ: 7.46 INJECTION, SOLUTION INTRAVENOUS at 08:28

## 2021-01-12 RX ADMIN — FOLIC ACID 1 MG: 1 TABLET ORAL at 08:31

## 2021-01-12 RX ADMIN — LEVOTHYROXINE SODIUM 25 MCG: 0.03 TABLET ORAL at 08:32

## 2021-01-12 RX ADMIN — ISOSORBIDE DINITRATE 40 MG: 20 TABLET ORAL at 08:29

## 2021-01-12 RX ADMIN — ALPRAZOLAM 0.25 MG: 0.25 TABLET ORAL at 08:39

## 2021-01-12 RX ADMIN — LEVETIRACETAM 500 MG: 500 TABLET ORAL at 08:32

## 2021-01-12 RX ADMIN — FERROUS SULFATE TAB 325 MG (65 MG ELEMENTAL FE) 325 MG: 325 (65 FE) TAB at 08:33

## 2021-01-12 RX ADMIN — HYDRALAZINE HYDROCHLORIDE 100 MG: 50 TABLET, FILM COATED ORAL at 08:30

## 2021-01-12 RX ADMIN — SERTRALINE 25 MG: 50 TABLET, FILM COATED ORAL at 08:32

## 2021-01-12 NOTE — PROGRESS NOTES
Chart reviewed. Patient is clear to discharge from vascular standpoint. Patient has [facility] for PT/OT at discharge.

## 2021-01-12 NOTE — DISCHARGE SUMMARY
Hospitalist         Discharge Summary Note: 1/12/2021      Subjective:     Patient is a 70-year-old female with history of anxiety/depression, COPD, GERD hypertension hyperlipidemia and seizure disorder who was admitted on 1/03/2020 after syncopal episode. Carotid duplex ultrasounds were done which showed right proximal ICA of 59 to 70% stenosis in left ICA between 50 and 79 stenosis vascular surgery was consulted who did a right carotid endarterectomy during hospital stay and she tolerated well. That is post carotid endarterectomy she had no focal neuro deficits in her incision on the right neck remain intact. Pressures remained stable wound care was maintained during hospital stay. Had episodes of dizziness or lightheadedness and overall work with PT OT and did well. Today she is stable for discharge to home with home health PT OT with recommendations to follow-up with PCP and vascular surgeon outpatient.     Problem List:  Patient Active Problem List   Diagnosis Code    Acute respiratory failure ([facility]) J96.00    Resistant hypertension I10    Renal artery stenosis ([facility]) I70.1    CHF (congestive heart failure) ([facility]) I50.9    Mitral valve regurgitation I34.0    PEDRO PABLO (acute kidney injury) ([facility] [address] N17.9    Anxiety F41.9    Seizure disorder ([facility]) G40.909    COPD (chronic obstructive pulmonary disease) with emphysema ([facility]) J43.9    Dysphagia R13.10    Iron deficiency anemia D50.9    Hypokalemia E87.6    Acquired hypothyroidism E03.9    CHF exacerbation ([facility]) I50.9    Respiratory failure with hypoxia ([facility]) J96.91    Syncope R55         Medications reviewed  Current Facility-Administered Medications   Medication Dose Route Frequency    0.9% sodium chloride infusion 250 mL  250 mL IntraVENous PRN    0.9% sodium chloride infusion 250 mL  250 mL IntraVENous PRN    sodium chloride (NS) flush 5-40 mL  5-40 mL IntraVENous Q8H    sodium chloride (NS) flush 5-40 mL  5-40 mL IntraVENous PRN    HYDROcodone-acetaminophen (NORCO) 5-325 mg per tablet 1 Tab  1 Tab Oral Q4H PRN    0.9% sodium chloride infusion 250 mL  250 mL IntraVENous PRN    LORazepam (ATIVAN) injection 2 mg  2 mg IntraVENous Q4H PRN    nortriptyline (PAMELOR) capsule 50 mg  50 mg Oral QHS    pantoprazole (PROTONIX) tablet 40 mg  40 mg Oral ACB&D    butalbital-acetaminophen-caffeine (FIORICET, ESGIC) -40 mg per tablet 2 Tab  2 Tab Oral Q4H PRN    [Held by provider] clopidogreL (PLAVIX) tablet 75 mg  75 mg Oral DAILY    atorvastatin (LIPITOR) tablet 40 mg  40 mg Oral QHS    albuterol (PROVENTIL HFA, VENTOLIN HFA, PROAIR HFA) inhaler 2 Puff  2 Puff Inhalation Q4H PRN    [Held by provider] aspirin delayed-release tablet 81 mg  81 mg Oral DAILY    carBAMazepine (TEGretol) tablet 200 mg  200 mg Oral Q12H    ferrous sulfate tablet 325 mg  325 mg Oral ACB    folic acid (FOLVITE) tablet 1 mg  1 mg Oral DAILY    isosorbide dinitrate (ISORDIL) tablet 40 mg  40 mg Oral TID    labetaloL (NORMODYNE) tablet 400 mg  400 mg Oral TID    levETIRAcetam (KEPPRA) tablet 500 mg  500 mg Oral BID    levothyroxine (SYNTHROID) tablet 25 mcg  25 mcg Oral ACB    NIFEdipine ER (PROCARDIA XL) tablet 60 mg  60 mg Oral DAILY    sertraline (ZOLOFT) tablet 25 mg  25 mg Oral DAILY    ALPRAZolam (XANAX) tablet 0.25 mg  0.25 mg Oral TID PRN    budesonide (PULMICORT) 500 mcg/2 ml nebulizer suspension  500 mcg Nebulization BID RT    hydrALAZINE (APRESOLINE) tablet 100 mg  100 mg Oral TID    sodium chloride (NS) flush 5-40 mL  5-40 mL IntraVENous Q8H    sodium chloride (NS) flush 5-40 mL  5-40 mL IntraVENous PRN    acetaminophen (TYLENOL) tablet 650 mg  650 mg Oral Q6H PRN    Or    acetaminophen (TYLENOL) suppository 650 mg  650 mg Rectal Q6H PRN    polyethylene glycol (MIRALAX) packet 17 g  17 g Oral DAILY PRN    promethazine (PHENERGAN) tablet 12.5 mg  12.5 mg Oral Q6H PRN    Or    ondansetron (ZOFRAN) injection 4 mg  4 mg IntraVENous Q6H PRN    [Held by provider] enoxaparin (LOVENOX) injection 40 mg  40 mg SubCUTAneous DAILY       Review of Systems:   Review of Systems   Constitutional: Negative for chills, diaphoresis, fever, malaise/fatigue and weight loss. HENT: Negative for ear discharge, ear pain, hearing loss, nosebleeds, sinus pain and tinnitus. Respiratory: Negative for cough, hemoptysis, sputum production, shortness of breath and wheezing. Cardiovascular: Negative for chest pain, palpitations, orthopnea, claudication and leg swelling. Gastrointestinal: Negative for abdominal pain, constipation, diarrhea, heartburn, nausea and vomiting. Genitourinary: Negative for dysuria, flank pain, frequency, hematuria and urgency. Musculoskeletal: Negative for back pain, falls, joint pain, myalgias and neck pain. Neurological: Negative for dizziness, tingling, focal weakness, seizures, weakness and headaches. Psychiatric/Behavioral: Negative for depression, hallucinations, memory loss, substance abuse and suicidal ideas. The patient is not nervous/anxious. Objective:   Physical Exam  Constitutional:       General: She is not in acute distress. Appearance: Normal appearance. HENT:      Head: Normocephalic and atraumatic. Mouth/Throat:      Mouth: Mucous membranes are moist.   Eyes:      Pupils: Pupils are equal, round, and reactive to light. Neck:      Musculoskeletal: No neck rigidity. Comments: Dressing to right neck incision clean dry and intact. Cardiovascular:      Rate and Rhythm: Normal rate and regular rhythm. Pulses: Normal pulses. Heart sounds: Normal heart sounds. Pulmonary:      Effort: Pulmonary effort is normal. No respiratory distress. Breath sounds: Normal breath sounds. No wheezing. Abdominal:      General: Bowel sounds are normal.      Palpations: Abdomen is soft. There is no mass. Tenderness: There is no abdominal tenderness.    Musculoskeletal: General: No swelling, tenderness, deformity or signs of injury. Skin:     General: Skin is warm and dry. Findings: No bruising or erythema. Neurological:      General: No focal deficit present. Mental Status: She is alert and oriented to person, place, and time. Motor: No weakness. Psychiatric:         Mood and Affect: Mood normal.          Visit Vitals  /71   Pulse 93   Temp 97.4 °F (36.3 °C)   Resp 13   Ht 5' 4\" (1.626 m)   Wt 51.3 kg (113 lb 1.5 oz)   SpO2 98%   BMI 19.41 kg/m²          Data Review:       Recent Days:  Recent Labs     01/12/21  0430 01/11/21  0430 01/10/21  1550 01/10/21  0345   WBC 8.5 10.0  --  5.2   HGB 9.0* 8.9* 13.6 7.6*   HCT 26.7* 26.6* 41.9 24.7*    263  --  266     Recent Labs     01/12/21  1323 01/12/21  0430 01/11/21  0430 01/10/21  0345   NA  --  136 135* 135*   K 3.3* 2.6* 2.6* 3.9   CL  --  95* 98 99   CO2  --  33* 30 30   GLU  --  96 95 91   BUN  --  14 11 17   CREA  --  0.90 0.88 1.07*   CA  --  9.4 9.0 8.7   MG 1.7  --   --   --          Assessment/Plan     1. Carotid stenosis  2. Syncope episode  3. Hypertension  4. Seizure disorder  5. COPD  7. Depression /anxiety    Status post right carotid endarterectomy during hospital stay  No further episodes of syncope during hospital stay  Seen by PT OT recommendation to continue PT OT at home  Continue all current prescribed medications  Follow-up with PCP in 3 to 5 days  Follow-up with vascular surgeon in 5 to 7 days  Stable for discharge. Total time spent with patient: 40 minutes.     Herbie Potter NP

## 2021-01-12 NOTE — PROGRESS NOTES
OCCUPATIONAL THERAPY RE-EVALUATION  Patient: Hamida Burr (80 y.o. female)  Date: 1/12/2021  Diagnosis: Syncope [R55]  Hypokalemia [E87.6] <principal problem not specified>  Procedure(s) (LRB):  RIGHT CAROTID ARTERY ENDARTERECTOMY (Right) 4 Days Post-Op  Precautions: Fall  Chart, occupational therapy assessment, plan of care, and goals were reviewed. ASSESSMENT  Pt is a 55 y/o female came to [facility] with c/o syncopal episode, admitted on 1/3/21 for syncope and hypokalemia. Pt underwent a right carotid artery endarterectomy on 1/8/2021 and was transferred to ICU following procedure. New OT orders was received on 1/11/2021. Pt initial evaluation completed on 1/5/2021, pt attempted to be seen for OT tx session since then, however due for RA today with new orders 2' to ICU transfer following procedure. Pt A&O x4, per pt report, she lives with friend (is alone during the day) in a single story home with 4 steps to enter, no HR, IND with ADL's and IND  for functional transfers/mobility without AD prior to admission. Based on the objective data described below, the patient presents with deficits in generalized strength, functional activity tolerance, and dynamic standing balance impacting overall performance of ADLs and functional transfers/mobility. Pt received semi-supine in bed and agreeable to working with OT/PT at this time for increased pt/clinician safety. Pt demos donning of socks by crossing leg over opposite knee s/p setup in long sitting. Supine>sit completed with SBA-CGA to EOB with increased time. Clean gown donned with min A to bring up over shoulders in sitting. Pt CGA with increased time for supine <> sit transfers, and CGA to min A for sit <> stand transfers. Demos ability to use utensils/eat IND s/p setup of tray.  Overall Pt did fair with session today and would benefit from continued skilled OT to address impairments and improve IND and safety with self cares and mobility at d/c. OT recommending HHOT at d/c once medically appropriate. Other factors to consider for discharge: Family support, DME, time since onset, severity of deficits         PLAN :  Recommendations and Planned Interventions: self care training, functional mobility training, therapeutic exercise, balance training, therapeutic activities, endurance activities, neuromuscular re-education, patient education, and home safety training    Frequency/Duration: Patient will be followed by occupational therapy 3 times a week to address goals. Recommend with staff: up to chair for meals    Recommend next OT session: toilet transfer    Recommendation for discharge: (in order for the patient to meet his/her long term goals)  HHOT    This discharge recommendation:  Has been made in collaboration with the attending provider and/or case management    Equipment recommendations for successful discharge (if) home:TBD       SUBJECTIVE:   Patient stated Loup Helling you give me a few more minutes? I'm tired    OBJECTIVE DATA SUMMARY:   Hospital course since last seen and reason for reevaluation: Pt initial evaluation completed on 1/5/2021, pt attempted to be seen for OT tx session since then, however due for RA today with new orders 2' to ICU transfer following procedure. Pt currently requiring setup assist to don socks in long sit, min A UB dressing, CGA-SBA bed mobility and functional transfers. Setup for retrieval with meals. Cognitive/Behavioral Status:  Neurologic State: Alert  Orientation Level: Oriented X4  Cognition: Appropriate decision making    Hearing: Auditory  Auditory Impairment: None    Vision/Perceptual:     WFL      Range of Motion:  AROM: Within functional limits  PROM: Within functional limits    Strength:  Strength: Generally decreased, functional(Grossly 3+/5)    Coordination:  Coordination: Within functional limits  Fine Motor Skills-Upper: Left Intact; Right Intact    Gross Motor Skills-Upper: Left Intact; Right Intact    Tone & Sensation:  Tone: Normal  Sensation: Intact    Functional Mobility and Transfers for ADLs:  Bed Mobility:  Rolling: Stand-by assistance;Contact guard assistance  Supine to Sit: Stand-by assistance;Contact guard assistance; Additional time  Scooting: Stand-by assistance;Contact guard assistance; Additional time    Transfers:  Sit to Stand: Contact guard assistance; Additional time     Bed to Chair: Contact guard assistance; Additional time    Balance:  Sitting: Intact; Without support  Standing: Intact; Without support    ADL Intervention and task modifications:  Feeding  Feeding Assistance: Set-up; Independent  Container Management: Independent  Utensil Management: Independent    Upper [address]: Minimum  assistance    Lower Body Dressing Assistance  Socks: Set-up; Stand-by assistance  Position Performed: Long sitting on bed    Therapeutic Exercises:   Pt would benefit from continued UE HEP to complete throughout the day and can be further educated in next tx session. Functional Measure:    [address] [place] [id number] AM-PAC \"6 Clicks\"                                                       Daily Activity Inpatient Short Form  How much help from another person does the patient currently need. .. Total; A Lot A Little None   1. Putting on and taking off regular lower body clothing? []  1 []  2 [x]  3 []  4   2. Bathing (including washing, rinsing, drying)? []  1 []  2 [x]  3 []  4   3. Toileting, which includes using toilet, bedpan or urinal? [] 1 []  2 [x]  3 []  4   4. Putting on and taking off regular upper body clothing? []  1 []  2 [x]  3 []  4   5. Taking care of personal grooming such as brushing teeth? []  1 []  2 [x]  3 []  4   6. Eating meals? []  1 []  2 [x]  3 []  4   © 2007, Trustees of [address] [place] [id number], under license to [employer].  All rights reserved     Score: 18/24     Interpretation of Tool:  Represents clinically-significant functional categories (i.e. Activities of daily living). Percentage of Impairment CH    0%   CI    1-19% CJ    20-39% CK    40-59% CL    60-79% CM    80-99% CN     100%   AMPAC  Score 6-24 24 23 20-22 15-19 10-14 7-9 6        Pain:  0/10    Activity Tolerance:   Fair    After treatment patient left in no apparent distress:   Sitting in chair and Call bell within reach    COMMUNICATION/COLLABORATION:   The patients plan of care was discussed with: Physical therapist and Registered nurse.      OT/PT sessions occurred together for increased patient and clinician safety as pt requires A of 2 for mobility at this time    [employer]  Time Calculation: 23 mins   Problem: Self Care Deficits Care Plan (Adult)  Goal: *Acute Goals and Plan of Care (Insert Text)  Description: Pt will be independence sup<->sit in prep for EOB ADL's  Pt will be independence  LB dressing EOB level  Pt will be independence  sit EOB 10 minutes in prep for EOB ADL's  Pt will be independence  grooming EOB level  Pt will be modified independence  sit<-> prep for toilet transfer  Pt will be modified independence  BSC/toilet transfer with LRAD  Pt will be modified independence  toileting/cloth mgmt LRAD  Pt will be supervision grooming standing sink  Pt will be supervision bathing sitting/standing sink LRAD  Pt will be MI kimberly UE HEP in prep for self care tasks      Outcome: Progressing Towards Goal

## 2021-01-12 NOTE — PROGRESS NOTES
Bedside and Verbal shift change report given to Bismark Soto RN (oncoming nurse) by Mark Pizano (offgoing nurse). Report included the following information SBAR.

## 2021-01-12 NOTE — DISCHARGE INSTRUCTIONS
Patient Education        ACE Inhibitors: Care Instructions  Your Care Instructions     ACE (angiotensin-converting enzyme) inhibitors lower blood pressure. They also treat heart failure and prevent heart attacks and strokes. They block an enzyme that makes blood vessels narrow. As a result, the blood vessels relax and widen. This lowers blood pressure. These medicines also put more water and salt into the urine. This lowers blood pressure too. These medicines are a good choice for people with diabetes. They don't affect blood sugar levels, and they may protect the kidneys. Examples include:  · Benazepril (Lotensin). · Lisinopril (Prinivil, Zestril). · Ramipril (Altace). Before you start taking this medicine, make sure your doctor knows if you take other medicines, especially water pills (diuretics) or potassium tablets. And tell your doctor if you use a salt substitute. You should not take an ACE inhibitor if you are pregnant or planning to become pregnant. You may need regular blood tests. Follow-up care is a key part of your treatment and safety. Be sure to make and go to all appointments, and call your doctor if you are having problems. It's also a good idea to know your test results and keep a list of the medicines you take. How can you care for yourself at home? · Take your medicines exactly as prescribed. Be sure to take your medicine every day. Call your doctor if you think you are having a problem with your medicine. · ACE inhibitors can cause a dry cough. If the cough is bad, talk to your doctor. You may need to try a different medicine. · ACE inhibitors can cause swelling of your lips, tongue, or face. If the swelling is severe, you may need treatment right away. Severe swelling can make it hard to breathe, but this is very rare. · Check with your doctor or pharmacist before you use any other medicines. This includes over-the-counter medicines.  Make sure your doctor knows all of the medicines, vitamins, herbal products, and supplements you take. Taking some medicines together can cause problems. When should you call for help? Call your doctor now or seek immediate medical care if:    · You have swelling of your lips, tongue, or face.     · You think you are having problems with your medicine. Watch closely for changes in your health, and be sure to contact your doctor if you have any problems. Where can you learn more? Go to http://www.gray.com/  Enter [id number] in the search box to learn more about \"ACE Inhibitors: Care Instructions. \"  Current as of: December 16, 2019               Content Version: 12.6  © 6538-2774 [employer]. Care instructions adapted under license by [employer] (which disclaims liability or warranty for this information). If you have questions about a medical condition or this instruction, always ask your healthcare professional. [address] any warranty or liability for your use of this information. Patient Education        Learning About ACE Inhibitors for Heart Failure  Introduction     ACE (angiotensin-converting enzyme) inhibitors block an enzyme that makes blood vessels narrow. As a result, the blood vessels relax and widen. This lowers blood pressure. These medicines also put more water and salt into the urine. This also lowers blood pressure. In heart failure, your heart does not pump as much blood as your body needs. These medicines can help:  · Make it easier for your heart to pump. · Reduce symptoms. · Make it less likely you will need to stay in a hospital.  · Lower the risk of early death. Examples  Here are some examples of ACE inhibitors. For each item in the list, the generic name is first, followed by any brand names. · benazepril (Lotensin)  · enalapril (Vasotec)  · ramipril (Altace)  · lisinopril (Prinivil, Zestril)  This is not a complete list of ACE inhibitors.   Possible side effects  Side effects may include:  · A cough. · Low blood pressure. This can make you feel dizzy or weak. · Too much potassium in your body. · Swelling of your lips, tongue, or face. If the swelling is severe, you may need treatment right away. Severe swelling can make it hard to breathe, but this is rare. You may have other side effects or reactions not listed here. Check the information that comes with your medicine. What to know about taking this medicine  · ACE inhibitors can cause a dry cough. Talk to your doctor if you have a dry cough. You may need a different medicine. · These medicines can cause an allergic reaction. This can cause a little swelling. Or it can cause red bumps on your skin that hurt. In rare cases, the swelling may make it hard for you to breathe. · Do not take this medicine if you are pregnant or plan to become pregnant. · Take your medicines exactly as prescribed. Call your doctor if you think you are having a problem with your medicine. · Check with your doctor or pharmacist before you use any other medicines. This includes over-the-counter medicines. Make sure your doctor knows all of the medicines, vitamins, herbal products, and supplements you take. Taking some medicines together can cause problems. · You may need regular blood tests. Where can you learn more? Go to http://lucas-jeana.info/  Enter R288 in the search box to learn more about \"Learning About ACE Inhibitors for Heart Failure. \"  Current as of: December 16, 2019               Content Version: 12.6  © 8305-3606 [employer]. Care instructions adapted under license by [employer] (which disclaims liability or warranty for this information). If you have questions about a medical condition or this instruction, always ask your healthcare professional. [place] [address] any warranty or liability for your use of this information.          Patient Education        Alpha-1 Antitrypsin Deficiency and Emphysema: Care Instructions  Your Care Instructions     Alpha-1 antitrypsin (AAT) is a protein normally found in your lungs and blood. It helps protect the lungs from damage that leads to the lung disease emphysema (say \"[id number]\"). Some people do not make enough AAT in their bodies. This is called AAT deficiency. It is also called inherited emphysema, because it is passed down by genes that you inherit from your family. If you have AAT deficiency, you may get emphysema at a young age. People with AAT deficiency may get emphysema when they are 27or 36years old, especially if they smoke. If you have AAT deficiency but do not smoke, you may not get emphysema. Follow-up care is a key part of your treatment and safety. Be sure to make and go to all appointments, and call your doctor if you are having problems. It's also a good idea to know your test results and keep a list of the medicines you take. How can you care for yourself at home? To stay healthy    · Do not smoke. This is the most important step you can take to prevent damage to your lungs. If you already smoke, it is never too late to stop. If you need help quitting, talk to your doctor about stop-smoking programs and medicines. These can increase your chances of quitting for good.     · Avoid secondhand smoke, air pollution, very cold or very hot air, and high altitudes. Stay at home with your windows closed when air pollution is bad.     · Talk to your doctor before you fly or travel to places that are at high altitudes.     · Do not use aerosol products such as aerosol hair spray, spray paint, or aerosol cleaning products.     · Avoid colds and flu. ? Get the pneumococcal vaccine. It may not keep you from getting pneumonia. But if you do get pneumonia, you probably will not be as sick. ? Get a flu vaccine each year, as soon as it is available.  Ask those you live or work with to do the same so they will not get the flu and infect you. ? Try to stay away from people with colds or the flu. ? Wash your hands often. Medicines    · Take your medicines exactly as prescribed. Call your doctor if you think you are having a problem with your medicine. You will get more details on the specific medicines your doctor prescribes.     · If you use inhaled medicines, a spacer may help you get more medicine into your lungs. Ask your doctor or pharmacist if a spacer is right for you. If so, ask him or her how to use it properly.     · You may take medicines such as:  ? AAT enzyme replacement (Prolastin). These AAT proteins may slow down lung disease in people with AAT deficiency. You get these medicines through a tube in your vein, called an IV. ? Bronchodilators (such as albuterol or metaproterenol). These help open your airways and make breathing easier. Bronchodilators are either short-acting (they work for 2 to 6 hours) or long-acting (they work for 24 hours). You inhale most bronchodilators, so they start to act quickly. Always carry your short-acting inhaler with you in case you need it while you are away from home. ? Corticosteroid medicines (such as budesonide or prednisone). These reduce airway swelling. They come in pill or inhaled form. You must take these medicines every day for them to work well.     · Do not take any vitamins, over-the-counter drugs, or herbal products without talking to your doctor first.     · If your doctor prescribes antibiotics for an infection, take them as directed. Do not stop taking them just because you feel better. You need to take the full course of antibiotics. Activity    · Get regular exercise. Walking is an easy way to get exercise. Start out slowly, and walk a little more each day.     · Pay attention to your breathing. You are exercising too hard if you cannot talk while you are exercising. Mental health    · Having AAT deficiency can bring up many emotions.  It is common to feel frightened, angry, hopeless, helpless, and even guilty. It may help to talk with your family, friends, or a therapist about your feelings. Talking openly about your feelings can help you cope. If you think you are depressed, ask your doctor for help. Treatment can help you feel better. When should you call for help? Call 911 anytime you think you may need emergency care. For example, call if:    · You have severe trouble breathing. Call your doctor now or seek immediate medical care if:    · You have new or worse shortness of breath.     · You are coughing more deeply or more often, especially if you notice more mucus or a change in the color of your mucus.     · You cough up blood.     · You have new or increased swelling in your legs or belly.     · You have a fever. Watch closely for changes in your health, and be sure to contact your doctor if you have any problems. Where can you learn more? Go to http://www.gray.com/  Enter X830 in the search box to learn more about \"Alpha-1 Antitrypsin Deficiency and Emphysema: Care Instructions. \"  Current as of: February 24, 2020               Content Version: 12.6  © 9967-0546 [employer]. Care instructions adapted under license by [employer] (which disclaims liability or warranty for this information). If you have questions about a medical condition or this instruction, always ask your healthcare professional. [place] [address] any warranty or liability for your use of this information. Patient Education        Learning About ACE Inhibitors  What are ACE inhibitors? ACE (angiotensin-converting enzyme) inhibitors are medicines that lower blood pressure. They stop the release of an enzyme. This enzyme makes your blood vessels smaller. Without it, your blood vessels relax and get bigger. This lowers your blood pressure.   These medicines also increase how much water and salt go into your urine. This also lowers blood pressure. You may take this kind of medicine if you have high blood pressure. Or you may take it if you have heart problems, kidney problems, or diabetes. If you have coronary artery disease, this medicine can help prevent heart attacks and strokes. Examples  · benazepril (Lotensin)  · enalapril (Vasotec)  · lisinopril (Prinivil, Zestril)  · ramipril (Altace)  This is not a complete list.  Possible side effects  Side effects may include:  · A cough. · Low blood pressure. This can make you feel dizzy or weak. · Too much potassium in your body. · Swelling of your lips, tongue, or face. If the swelling is severe, you may need treatment right away. Severe swelling can make it hard to breathe, but this is rare. You may have other side effects or reactions not listed here. Check the information that comes with your medicine. What to know about taking this medicine  · ACE inhibitors can cause a dry cough. Talk to your doctor if you have a dry cough. You may need a different medicine. · These medicines can cause an allergic reaction. This can cause a little swelling. Or it can cause red bumps on your skin that hurt. In rare cases, the swelling may make it hard for you to breathe. · Do not take this medicine if you are pregnant or plan to become pregnant. · Take your medicines exactly as prescribed. Call your doctor if you think you are having a problem with your medicine. · Check with your doctor or pharmacist before you use any other medicines. This includes over-the-counter medicines. Make sure your doctor knows all of the medicines, vitamins, herbal products, and supplements you take. Taking some medicines together can cause problems. · You may need regular blood tests. Where can you learn more? Go to http://www.gray.com/  Enter P050 in the search box to learn more about \"Learning About ACE Inhibitors. \"  Current as of: December 16, 6957               [facility] [place]: 12.6  © 4287-7420 [employer]. Care instructions adapted under license by [employer] (which disclaims liability or warranty for this information). If you have questions about a medical condition or this instruction, always ask your healthcare professional. [address] any warranty or liability for your use of this information. Patient Education        Learning About ARBs  Introduction     ARBs (angiotensin II receptor blockers) block a hormone that makes blood vessels narrow. As a result, the blood vessels relax and widen. This lowers blood pressure. ARBs also put more water and salt into the urine. This also lowers blood pressure. ARBs can treat:  · High blood pressure. · Coronary artery disease. · Heart failure. They also may be used to help your kidneys when you have diabetes. Examples  · candesartan (Atacand)  · irbesartan (Avapro)  · losartan (Cozaar)  · olmesartan (Benicar)  · valsartan (Diovan)  This is not a complete list of all ARBs. Possible side effects  Side effects may include:  · Low blood pressure. You may feel dizzy and weak. · High potassium levels. You may have other side effects or reactions not listed here. Check the information that comes with your medicine. What to know about taking this medicine  · ARBs may be used if you had a cough when you tried to take an ACE inhibitor. ARBs are less likely to cause a cough. · You may need regular blood tests. · Take your medicines exactly as prescribed. Call your doctor if you think you are having a problem with your medicine. · Tell your doctor or pharmacist all the medicines you take. This includes over-the-counter medicines, vitamins, herbal products, and supplements. Taking some medicines together can cause problems. · You should not take ARBs if you are pregnant or planning to become pregnant. Where can you learn more?   Go to http://www.gray.com/  Enter K212 in the search box to learn more about \"Learning About ARBs. \"  Current as of: December 16, 2019               Content Version: 12.6  © 1450-4041 [employer]). Care instructions adapted under license by [employer] (which disclaims liability or warranty for this information). If you have questions about a medical condition or this instruction, always ask your healthcare professional. [place] [employer] any warranty or liability for your use of this information. Patient Education        Anxiety Disorder: Care Instructions  Your Care Instructions     Anxiety is a normal reaction to stress. Difficult situations can cause you to have symptoms such as sweaty palms and a nervous feeling. In an anxiety disorder, the symptoms are far more severe. Constant worry, muscle tension, trouble sleeping, nausea and diarrhea, and other symptoms can make normal daily activities difficult or impossible. These symptoms may occur for no reason, and they can affect your work, school, or social life. Medicines, counseling, and self-care can all help. Follow-up care is a key part of your treatment and safety. Be sure to make and go to all appointments, and call your doctor if you are having problems. It's also a good idea to know your test results and keep a list of the medicines you take. How can you care for yourself at home? · Take medicines exactly as directed. Call your doctor if you think you are having a problem with your medicine. · Go to your counseling sessions and follow-up appointments. · Recognize and accept your anxiety. Then, when you are in a situation that makes you anxious, say to yourself, \"This is not an emergency. I feel uncomfortable, but I am not in danger. I can keep going even if I feel anxious. \"  · Be kind to your body:  ? Relieve tension with exercise or a massage. ? Get enough rest.  ?  Avoid alcohol, caffeine, nicotine, and illegal drugs. They can increase your anxiety level and cause sleep problems. ? Learn and do relaxation techniques. See below for more about these techniques. · Engage your mind. Get out and do something you enjoy. Go to a funny movie, or take a walk or hike. Plan your day. Having too much or too little to do can make you anxious. · Keep a record of your symptoms. Discuss your fears with a good friend or family member, or join a support group for people with similar problems. Talking to others sometimes relieves stress. · Get involved in social groups, or volunteer to help others. Being alone sometimes makes things seem worse than they are. · Get at least 30 minutes of exercise on most days of the week to relieve stress. Walking is a good choice. You also may want to do other activities, such as running, swimming, cycling, or playing tennis or team sports. Relaxation techniques  Do relaxation exercises 10 to 20 minutes a day. You can play soothing, relaxing music while you do them, if you wish. · Tell others in your house that you are going to do your relaxation exercises. Ask them not to disturb you. · Find a comfortable place, away from all distractions and noise. · Lie down on your back, or sit with your back straight. · Focus on your breathing. Make it slow and steady. · Breathe in through your nose. Breathe out through either your nose or mouth. · Breathe deeply, filling up the area between your navel and your rib cage. Breathe so that your belly goes up and down. · Do not hold your breath. · Breathe like this for 5 to 10 minutes. Notice the feeling of calmness throughout your whole body. As you continue to breathe slowly and deeply, relax by doing the following for another 5 to 10 minutes:  · Tighten and relax each muscle group in your body. You can begin at your toes and work your way up to your head. · Imagine your muscle groups relaxing and becoming heavy.   · Empty your mind of all thoughts.  · Let yourself relax more and more deeply.  · Become aware of the state of calmness that surrounds you.  · When your relaxation time is over, you can bring yourself back to alertness by moving your fingers and toes and then your hands and feet and then stretching and moving your entire body. Sometimes people fall asleep during relaxation, but they usually wake up shortly afterward.  · Always give yourself time to return to full alertness before you drive a car or do anything that might cause an accident if you are not fully alert. Never play a relaxation tape while you drive a car.  When should you call for help?   Call 911 anytime you think you may need emergency care. For example, call if:    · You feel you cannot stop from hurting yourself or someone else.   Keep the numbers for these national suicide hotlines: 4-461-481-TALK (1-398.591.3667) and 3-627-ZWBYBWK (1-528.828.8316). If you or someone you know talks about suicide or feeling hopeless, get help right away.  Watch closely for changes in your health, and be sure to contact your doctor if:    · You have anxiety or fear that affects your life.     · You have symptoms of anxiety that are new or different from those you had before.   Where can you learn more?  Go to [email or web address]://[email or web address]/[email or web address]  Enter P754 in the search box to learn more about \"Anxiety Disorder: Care Instructions.\"  Current as of: January 31, 2020               Content Version: 12.6  © 3356-2261 [employer]   Care instructions adapted under license by [employer] (which disclaims liability or warranty for this information). If you have questions about a medical condition or this instruction, always ask your healthcare professional. [employer] disclaims any warranty or liability for your use of this information.

## 2021-01-12 NOTE — PROGRESS NOTES
1800- patient discharged and taken out in a wheelchair to [employer] truck who drove patient home. All instructions and belongings with patient.

## 2021-01-12 NOTE — PROGRESS NOTES
PHYSICAL THERAPY REEVALUATION  Patient: Jairon Cerna (1 y.o. female)  Date: 1/12/2021  Primary Diagnosis: Syncope [R55]  Hypokalemia [E87.6]  Procedure(s) (LRB):  RIGHT CAROTID ARTERY ENDARTERECTOMY (Right) 4 Days Post-Op   Precautions:  Fall      ASSESSMENT  Pt is a 55 y/o female came to [facility] c/o syncopal episode, admitted on 1/3/21 for syncope and hypokalemia. Pt underwent a right carotid artery endarterectomy on 1/8/2021 and was transferred to ICU following procedure. New orders was received on 1/11/2021. Pt initial evaluation completed on 1/4/2021 followed by 3 skilled sessions prior to RA on this day due to ICU transfer following procedure. Pt A&O x4, per pt report, she lives with boyfriend (is alone during the day) in a single story home with 4 steps to enter, no HR, IND with ADL's and IND  for functional transfers/mobility without AD prior to admission.     Based on the objective data described below, the patient presents with decreased activity tolerance, generalized weakness, increased need for assistance with mobility and amb, c/o 8-9/10 head/neck pain impacting willingness to participate. Patient continues with skilled PT services and is progressing towards goals. Pt semi-supine in bed upon PT/OT arrival, agreeable to session. Pt required SBA to CGA for bed mobility, CGA with increased time for supine <> sit transfers, and CGA to min A for sit <> stand transfers. Pt amb 2 feet with no AD, gt belt, and CGA, demonstrates WBOS with short, step to gt pattern. Pt did fair with session today currently presenting with increased pain and limited desire and motivation requiring increased encouragement to participate with OOB mobility today. Will continue to benefit from skilled PT services, and will continue to progress as tolerated. Current Level of Function Impacting Discharge (mobility/balance):  CGA with increased time    Other factors to consider for discharge: PLOF, acute medical state Patient will benefit from skilled therapy intervention to address the above noted impairments. PLAN :  Recommendations and Planned Interventions: bed mobility training, transfer training, gait training, therapeutic exercises, patient and family training/education and therapeutic activities      Frequency/Duration: Patient will be followed by physical therapy:  3 times a week to address goals. Recommendation for discharge: (in order for the patient to meet his/her long term goals)  HHPT with possible DME    This discharge recommendation:  Has been made in collaboration with the attending provider and/or case management    Equipment recommendations for successful discharge (if) home: cane: straight or walker: rolling         SUBJECTIVE:   Patient stated Do I have to get up?     OBJECTIVE DATA SUMMARY:   HISTORY:    Past Medical History:   Diagnosis Date    Anxiety 10/2/2020    Chronic anemia     Chronic respiratory failure (HCC)     COPD (chronic obstructive pulmonary disease) with emphysema ([address] 10/2/2020    Depression     Diastolic CHF (HCC)     Dysphagia 10/2/2020    GERD (gastroesophageal reflux disease)     High cholesterol     Hypertension     Hypothyroid     Iron deficiency anemia 10/2/2020    Mitral valve regurgitation 10/2/2020    Renal artery stenosis (HCC)     Seizure disorder (HCC)      Past Surgical History:   Procedure Laterality Date    HX CHOLECYSTECTOMY      HX RENAL ARTERY STENT      HX ROTATOR CUFF REPAIR Right     HX TUBAL LIGATION      IR THORACENTESIS CATH W IMAGE  11/24/2020    IR THORACENTESIS CATH W IMAGE  11/25/2020       Home Situation  Home Environment: Private residence  # Steps to Enter: 4  Rails to Enter: No  One/Two Story Residence: One story  Living Alone: No  Support Systems: Spouse/Significant Other/Partner(resides with boyfriend who works during the day)  Patient Expects to be Discharged to[de-identified] Independent living facility  Current DME Used/Available at Home: Oxygen, portable    EXAMINATION/PRESENTATION/DECISION MAKING:   Critical Behavior:  Neurologic State: Alert  Orientation Level: Oriented X4  Cognition: Appropriate decision making  Safety/Judgement: Insight into deficits  Hearing: Auditory  Auditory Impairment: None  Skin:  Intact where visible, bandage noted on right sided anterior/lateral neck  Edema: none noted   Range Of Motion:  AROM: Within functional limits           PROM: Within functional limits           Strength:    Strength: Grossly decreased, non-functional(grossly 3+/5)           Functional Mobility:  Bed Mobility:  Rolling: Stand-by assistance;Contact guard assistance  Supine to Sit: Stand-by assistance;Contact guard assistance; Additional time     Scooting: Stand-by assistance;Contact guard assistance; Additional time  Transfers:  Sit to Stand: Contact guard assistance; Additional time  Stand to Sit: Contact guard assistance; Additional time        Bed to Chair: Contact guard assistance; Additional time              Balance:   Sitting: Intact; Without support  Standing: Intact; Without support  Ambulation/Gait Training:  Distance (ft): 2 Feet (ft)(bed> chair)  Assistive Device: Gait belt  Ambulation - Level of Assistance: Contact guard assistance; Additional time        Gait Abnormalities: Step to gait        Base of Support: Narrowed     Speed/Dariana: Slow     Swing Pattern: Right symmetrical;Left symmetrical        Therapeutic Exercises:   Not completed this session    Functional Measure:  [facility] [address] [address] AM-PAC 6 Clicks         Basic Mobility Inpatient Short Form  How much difficulty does the patient currently have. .. Unable A Lot A Little None   1. Turning over in bed (including adjusting bedclothes, sheets and blankets)? [] 1   [] 2   [] 3   [x] 4   2. Sitting down on and standing up from a chair with arms ( e.g., wheelchair, bedside commode, etc.)   [] 1   [] 2   [x] 3   [] 4   3.   Moving from lying on back to sitting on the side of the bed? [] 1   [] 2   [x] 3   [] 4          How much help from another person does the patient currently need. .. Total A Lot A Little None   4. Moving to and from a bed to a chair (including a wheelchair)? [] 1   [] 2   [x] 3   [] 4   5. Need to walk in hospital room? [] 1   [] 2   [x] 3   [] 4   6. Climbing 3-5 steps with a railing? [] 1   [] 2   [x] 3   [] 4   © , Trustees of [address] [place] [address] [address] [id number], under license to [employer]. All rights reserved     Score:  Initial:  Most Recent: X (Date: 2021)   Interpretation of Tool:  Represents activities that are increasingly more difficult (i.e. Bed mobility, Transfers, Gait). Score 24 23 22-20 19-15 14-10 9-7 6   Modifier CH CI CJ CK CL CM CN       Pain Ratin-9/10 head/neck    Activity Tolerance:   Good and requires rest breaks    After treatment patient left in no apparent distress:   Sitting in chair and Call bell within reach, eating lunch; nsg updated. Goals:    Problem: Mobility Impaired (Adult and Pediatric)  Goal: *Acute Goals and Plan of Care (Insert Text)  Description: Pt will be I with LE HEP in 7 days. Pt will perform bed mobility independently in 7 days. Pt will perform transfers with mod I in 7 days. Pt will amb >150' feet with LRAD safely with mod I in 7 days. Outcome: Progressing Towards Goal       COMMUNICATION/EDUCATION:   The patients plan of care was discussed with: Occupational therapist and Registered nurse. Fall prevention education was provided and the patient/caregiver indicated understanding., Patient/family have participated as able in goal setting and plan of care. and Patient/family agree to work toward stated goals and plan of care. PT/OT sessions occurred together for increased safety of pt and clinician.     Thank you for this referral.  Kelly Bar, PT, DPT   Time Calculation: 23 mins

## 2021-01-12 NOTE — PROGRESS NOTES
Patient examined this morning. Patient looks comfortable. Patient is a right neck looks clean and dry normal wheezing. Patient did ambulate yesterday. Patient says her energy and stamina feels better. Patient's headache is resolved. Next  Patient is potassium is low. We will replace these with oral and IV supplement. Patient can go home with a oral supplement KCl 40 M EQ daily. And set up outpatient lab 1 week before potassium level. From vascular point, patient can be discharged home today. I did explain to the patient wound care. I instructed her to remove the dressing in 2 days and leave wound open to air. Patient may take shower. And please make appointment to come see Dr. Lisa Vasques in 1 week. And patient can resume aspirin and Plavix starting today.

## 2021-01-12 NOTE — PROGRESS NOTES
CARDIOLOGY PROGRESS NOTE    Patient seen and examined. We are following for syncope and mitral valve regurgitation. She is s/p R carotid endardarectomy 1/8, acute blood loss from site. S/p tranfusion, Hgb 9.0. Resting comfortably this am. No new cardiac complaints noted. Telemetry reviewed. Sinus rhythm in 90s     Physical examination:  Vital signs, Blood pressure 188/87,  Pulse 94,  No apparent distress. Heart is regular, rate and rhythm. Normal S1, S2,  +Murmur noted. Lungs are clear bilaterally  Abdomen is soft, nontender, normal bowel sounds. Extremities have no edema. Recent labs results and imaging reviewed. Recent Results (from the past 24 hour(s))   GLUCOSE, POC    Collection Time: 01/12/21  4:25 AM   Result Value Ref Range    Glucose (POC) 108 (H) 65 - 100 mg/dL    Performed by John Snell    CBC WITH AUTOMATED DIFF    Collection Time: 01/12/21  4:30 AM   Result Value Ref Range    WBC 8.5 3.6 - 11.0 K/uL    RBC 2.86 (L) 3.80 - 5.20 M/uL    HGB 9.0 (L) 11.5 - 16.0 g/dL    HCT 26.7 (L) 35.0 - 47.0 %    MCV 93.4 80.0 - 99.0 FL    MCH 31.5 26.0 - 34.0 PG    MCHC 33.7 30.0 - 36.5 g/dL    RDW 15.8 (H) 11.5 - 14.5 %    PLATELET 135 929 - 453 K/uL    MPV 9.7 8.9 - 12.9 FL    NEUTROPHILS 74 32 - 75 %    LYMPHOCYTES 12 12 - 49 %    MONOCYTES 12 5 - 13 %    EOSINOPHILS 2 0 - 7 %    BASOPHILS 0 0 - 1 %    IMMATURE GRANULOCYTES 0 0.0 - 0.5 %    ABS. NEUTROPHILS 6.2 1.8 - 8.0 K/UL    ABS. LYMPHOCYTES 1.0 0.8 - 3.5 K/UL    ABS. MONOCYTES 1.1 (H) 0.0 - 1.0 K/UL    ABS. EOSINOPHILS 0.2 0.0 - 0.4 K/UL    ABS. BASOPHILS 0.0 0.0 - 0.1 K/UL    ABS. IMM.  GRANS. 0.0 0.00 - 0.04 K/UL    DF AUTOMATED     METABOLIC PANEL, BASIC    Collection Time: 01/12/21  4:30 AM   Result Value Ref Range    Sodium 136 136 - 145 mmol/L    Potassium 2.6 (LL) 3.5 - 5.1 mmol/L    Chloride 95 (L) 97 - 108 mmol/L    CO2 33 (H) 21 - 32 mmol/L    Anion gap 8 5 - 15 mmol/L    Glucose 96 65 - 100 mg/dL    BUN 14 6 - 20 mg/dL Creatinine 0.90 0.55 - 1.02 mg/dL    BUN/Creatinine ratio 16 12 - 20      GFR est AA >60 >60 ml/min/1.73m2    GFR est non-AA >60 >60 ml/min/1.73m2    Calcium 9.4 8.5 - 10.1 mg/dL       Neck CTA:  1. Moderate (60-65%) stenosis of the right ICA origin. 2.  Mild (40-45%) stenosis of the left ICA origin. Discussed case with Dr. Zora Jones. This our impression and recommendation:    1. Carotid Stenosis: with syncope. S/P R CEA. Per vascular surgery; resume plavix and asa. She is s/p Renal artery stent placement, restart DAPT when able. 2. HFpEF: Echocardiogram shows preserved EF. She appears euvolemic; not currently on diuresis. Maintain fluid balance. 3. Hypertension: Blood pressure acceptable. Continue medication treatment and monitoring. Renal function greatly improved s/p renal artery stent. Continue current blood pressure medications. Continue to monitor. 4. Hyperlipidemia: Continue statin therapy. 5. Hypokalemia: K 2.6, replete and monitor. Repeat labs in the am.       Hopeful for discharge in next 24-48 hours. Follow up in our office is arranged for 1/19. Please do not hesitate to call if additional questions arise.

## 2021-01-14 ENCOUNTER — OFFICE VISIT (OUTPATIENT)
Dept: SURGERY | Age: 56
End: 2021-01-14
Payer: COMMERCIAL

## 2021-01-14 VITALS
HEART RATE: 120 BPM | SYSTOLIC BLOOD PRESSURE: 136 MMHG | DIASTOLIC BLOOD PRESSURE: 103 MMHG | OXYGEN SATURATION: 98 % | WEIGHT: 99.4 LBS | TEMPERATURE: 97.7 F | HEIGHT: 64 IN | BODY MASS INDEX: 16.97 KG/M2

## 2021-01-14 DIAGNOSIS — R13.10 DYSPHAGIA, UNSPECIFIED TYPE: Primary | ICD-10-CM

## 2021-01-14 PROCEDURE — 99024 POSTOP FOLLOW-UP VISIT: CPT | Performed by: SURGERY

## 2021-01-14 NOTE — PROGRESS NOTES
Subjective:      Andrew Adame is a 54 y.o. female who presents today for wound check. Patient had a right-sided carotid endarterectomy for drop attacks. She is here today for postop follow-up. Patient says the blood pressure is better. No more dizzy spells or drop attacks. Objective:     Visit Vitals  BP (!) 136/103 (BP 1 Location: Left arm, BP Patient Position: Sitting)   Pulse (!) 120   Temp 97.7 °F (36.5 °C)   Ht 5' 4\" (1.626 m)   Wt 45.1 kg (99 lb 6.4 oz)   SpO2 98%   BMI 17.06 kg/m²       Wound exam: I examined the patient's right neck, dressings removed. Patient still moderate sized hematoma. Assessment:   Etiology of Wound: Postop wound      Plan:   Patient was discussed about wound care instructions on the right neck.   I will have her come in next week for staple removal.

## 2021-01-17 ENCOUNTER — APPOINTMENT (OUTPATIENT)
Dept: GENERAL RADIOLOGY | Age: 56
DRG: 469 | End: 2021-01-17
Attending: NURSE PRACTITIONER
Payer: COMMERCIAL

## 2021-01-17 ENCOUNTER — HOSPITAL ENCOUNTER (INPATIENT)
Age: 56
LOS: 5 days | Discharge: HOME | DRG: 469 | End: 2021-01-23
Attending: FAMILY MEDICINE | Admitting: INTERNAL MEDICINE
Payer: COMMERCIAL

## 2021-01-17 ENCOUNTER — APPOINTMENT (OUTPATIENT)
Dept: CT IMAGING | Age: 56
DRG: 469 | End: 2021-01-17
Attending: NURSE PRACTITIONER
Payer: COMMERCIAL

## 2021-01-17 DIAGNOSIS — I10 HYPERTENSION, UNSPECIFIED TYPE: ICD-10-CM

## 2021-01-17 DIAGNOSIS — E87.6 HYPOKALEMIA: Primary | ICD-10-CM

## 2021-01-17 DIAGNOSIS — E87.1 HYPONATREMIA: ICD-10-CM

## 2021-01-17 LAB
ALBUMIN SERPL-MCNC: 2.6 G/DL (ref 3.5–5)
ALBUMIN/GLOB SERPL: 0.5 {RATIO} (ref 1.1–2.2)
ALP SERPL-CCNC: 163 U/L (ref 45–117)
ALT SERPL-CCNC: 30 U/L (ref 12–78)
ANION GAP SERPL CALC-SCNC: 13 MMOL/L (ref 5–15)
AST SERPL W P-5'-P-CCNC: 32 U/L (ref 15–37)
BASOPHILS # BLD: 0 K/UL (ref 0–0.1)
BASOPHILS NFR BLD: 0 % (ref 0–1)
BILIRUB SERPL-MCNC: 0.3 MG/DL (ref 0.2–1)
BUN SERPL-MCNC: 30 MG/DL (ref 6–20)
BUN/CREAT SERPL: 17 (ref 12–20)
CA-I BLD-MCNC: 9.2 MG/DL (ref 8.5–10.1)
CHLORIDE SERPL-SCNC: 85 MMOL/L (ref 97–108)
CO2 SERPL-SCNC: 27 MMOL/L (ref 21–32)
COVID-19 RAPID TEST, COVR: NOT DETECTED
CREAT SERPL-MCNC: 1.81 MG/DL (ref 0.55–1.02)
DIFFERENTIAL METHOD BLD: ABNORMAL
EOSINOPHIL # BLD: 0 K/UL (ref 0–0.4)
EOSINOPHIL NFR BLD: 0 % (ref 0–7)
ERYTHROCYTE [DISTWIDTH] IN BLOOD BY AUTOMATED COUNT: 14.2 % (ref 11.5–14.5)
GLOBULIN SER CALC-MCNC: 5.4 G/DL (ref 2–4)
GLUCOSE BLD STRIP.AUTO-MCNC: 152 MG/DL (ref 65–100)
GLUCOSE SERPL-MCNC: 143 MG/DL (ref 65–100)
HCT VFR BLD AUTO: 43.5 % (ref 35–47)
HGB BLD-MCNC: 15.3 G/DL (ref 11.5–16)
IMM GRANULOCYTES # BLD AUTO: 0.1 K/UL (ref 0–0.04)
IMM GRANULOCYTES NFR BLD AUTO: 1 % (ref 0–0.5)
LIPASE SERPL-CCNC: 201 U/L (ref 73–393)
LYMPHOCYTES # BLD: 0.8 K/UL (ref 0.8–3.5)
LYMPHOCYTES NFR BLD: 8 % (ref 12–49)
MCH RBC QN AUTO: 31 PG (ref 26–34)
MCHC RBC AUTO-ENTMCNC: 35.2 G/DL (ref 30–36.5)
MCV RBC AUTO: 88.1 FL (ref 80–99)
MONOCYTES # BLD: 0.9 K/UL (ref 0–1)
MONOCYTES NFR BLD: 8 % (ref 5–13)
NEUTS SEG # BLD: 9.1 K/UL (ref 1.8–8)
NEUTS SEG NFR BLD: 83 % (ref 32–75)
NRBC # BLD: 0 K/UL (ref 0–0.01)
NRBC BLD-RTO: 0 PER 100 WBC
PERFORMED BY, TECHID: ABNORMAL
PLATELET # BLD AUTO: 562 K/UL (ref 150–400)
PMV BLD AUTO: 9.8 FL (ref 8.9–12.9)
POTASSIUM SERPL-SCNC: 2.1 MMOL/L (ref 3.5–5.1)
POTASSIUM UR-SCNC: 20 MMOL/L
PROT SERPL-MCNC: 8 G/DL (ref 6.4–8.2)
RBC # BLD AUTO: 4.94 M/UL (ref 3.8–5.2)
SARS-COV-2, COV2: NORMAL
SODIUM SERPL-SCNC: 125 MMOL/L (ref 136–145)
SODIUM UR-SCNC: 62 MMOL/L
SPECIMEN SOURCE: NORMAL
TSH SERPL DL<=0.05 MIU/L-ACNC: 5.84 UIU/ML (ref 0.36–3.74)
WBC # BLD AUTO: 10.8 K/UL (ref 3.6–11)

## 2021-01-17 PROCEDURE — 96365 THER/PROPH/DIAG IV INF INIT: CPT

## 2021-01-17 PROCEDURE — 96376 TX/PRO/DX INJ SAME DRUG ADON: CPT

## 2021-01-17 PROCEDURE — 87635 SARS-COV-2 COVID-19 AMP PRB: CPT

## 2021-01-17 PROCEDURE — 83930 ASSAY OF BLOOD OSMOLALITY: CPT

## 2021-01-17 PROCEDURE — 84443 ASSAY THYROID STIM HORMONE: CPT

## 2021-01-17 PROCEDURE — 96374 THER/PROPH/DIAG INJ IV PUSH: CPT

## 2021-01-17 PROCEDURE — 74011250637 HC RX REV CODE- 250/637: Performed by: FAMILY MEDICINE

## 2021-01-17 PROCEDURE — 74011250636 HC RX REV CODE- 250/636: Performed by: NURSE PRACTITIONER

## 2021-01-17 PROCEDURE — 99218 HC RM OBSERVATION: CPT

## 2021-01-17 PROCEDURE — 83690 ASSAY OF LIPASE: CPT

## 2021-01-17 PROCEDURE — 80053 COMPREHEN METABOLIC PANEL: CPT

## 2021-01-17 PROCEDURE — 82962 GLUCOSE BLOOD TEST: CPT

## 2021-01-17 PROCEDURE — 93005 ELECTROCARDIOGRAM TRACING: CPT

## 2021-01-17 PROCEDURE — 96375 TX/PRO/DX INJ NEW DRUG ADDON: CPT

## 2021-01-17 PROCEDURE — 85025 COMPLETE CBC W/AUTO DIFF WBC: CPT

## 2021-01-17 PROCEDURE — 84300 ASSAY OF URINE SODIUM: CPT

## 2021-01-17 PROCEDURE — 71045 X-RAY EXAM CHEST 1 VIEW: CPT

## 2021-01-17 PROCEDURE — 84133 ASSAY OF URINE POTASSIUM: CPT

## 2021-01-17 PROCEDURE — 36415 COLL VENOUS BLD VENIPUNCTURE: CPT

## 2021-01-17 PROCEDURE — 99285 EMERGENCY DEPT VISIT HI MDM: CPT

## 2021-01-17 PROCEDURE — 70450 CT HEAD/BRAIN W/O DYE: CPT

## 2021-01-17 PROCEDURE — 83935 ASSAY OF URINE OSMOLALITY: CPT

## 2021-01-17 RX ORDER — HYDRALAZINE HYDROCHLORIDE 50 MG/1
100 TABLET, FILM COATED ORAL 3 TIMES DAILY
Status: DISCONTINUED | OUTPATIENT
Start: 2021-01-17 | End: 2021-01-23

## 2021-01-17 RX ORDER — LABETALOL HCL 20 MG/4 ML
20 SYRINGE (ML) INTRAVENOUS ONCE
Status: COMPLETED | OUTPATIENT
Start: 2021-01-17 | End: 2021-01-17

## 2021-01-17 RX ORDER — CLOPIDOGREL BISULFATE 75 MG/1
75 TABLET ORAL DAILY
Status: DISCONTINUED | OUTPATIENT
Start: 2021-01-18 | End: 2021-01-23 | Stop reason: HOSPADM

## 2021-01-17 RX ORDER — SODIUM CHLORIDE 9 MG/ML
75 INJECTION, SOLUTION INTRAVENOUS CONTINUOUS
Status: DISPENSED | OUTPATIENT
Start: 2021-01-17 | End: 2021-01-18

## 2021-01-17 RX ORDER — LEVOTHYROXINE SODIUM 25 UG/1
25 TABLET ORAL
Status: DISCONTINUED | OUTPATIENT
Start: 2021-01-18 | End: 2021-01-23

## 2021-01-17 RX ORDER — PROMETHAZINE HYDROCHLORIDE 25 MG/1
12.5 TABLET ORAL
Status: DISCONTINUED | OUTPATIENT
Start: 2021-01-17 | End: 2021-01-23 | Stop reason: HOSPADM

## 2021-01-17 RX ORDER — LABETALOL 200 MG/1
400 TABLET, FILM COATED ORAL 3 TIMES DAILY
Status: DISCONTINUED | OUTPATIENT
Start: 2021-01-18 | End: 2021-01-23

## 2021-01-17 RX ORDER — POTASSIUM CHLORIDE 7.45 MG/ML
10 INJECTION INTRAVENOUS
Status: COMPLETED | OUTPATIENT
Start: 2021-01-17 | End: 2021-01-18

## 2021-01-17 RX ORDER — LABETALOL HCL 20 MG/4 ML
40 SYRINGE (ML) INTRAVENOUS ONCE
Status: COMPLETED | OUTPATIENT
Start: 2021-01-17 | End: 2021-01-17

## 2021-01-17 RX ORDER — ENOXAPARIN SODIUM 100 MG/ML
30 INJECTION SUBCUTANEOUS DAILY
Status: DISCONTINUED | OUTPATIENT
Start: 2021-01-18 | End: 2021-01-23 | Stop reason: HOSPADM

## 2021-01-17 RX ORDER — NIFEDIPINE 30 MG/1
60 TABLET, EXTENDED RELEASE ORAL DAILY
Status: DISCONTINUED | OUTPATIENT
Start: 2021-01-18 | End: 2021-01-23 | Stop reason: HOSPADM

## 2021-01-17 RX ORDER — ATORVASTATIN CALCIUM 40 MG/1
80 TABLET, FILM COATED ORAL DAILY
Status: DISCONTINUED | OUTPATIENT
Start: 2021-01-18 | End: 2021-01-23 | Stop reason: HOSPADM

## 2021-01-17 RX ORDER — POLYETHYLENE GLYCOL 3350 17 G/17G
17 POWDER, FOR SOLUTION ORAL DAILY PRN
Status: DISCONTINUED | OUTPATIENT
Start: 2021-01-17 | End: 2021-01-23 | Stop reason: HOSPADM

## 2021-01-17 RX ORDER — LEVETIRACETAM 500 MG/1
500 TABLET ORAL 2 TIMES DAILY
Status: DISCONTINUED | OUTPATIENT
Start: 2021-01-17 | End: 2021-01-23 | Stop reason: HOSPADM

## 2021-01-17 RX ORDER — ONDANSETRON 2 MG/ML
4 INJECTION INTRAMUSCULAR; INTRAVENOUS
Status: DISCONTINUED | OUTPATIENT
Start: 2021-01-17 | End: 2021-01-23 | Stop reason: HOSPADM

## 2021-01-17 RX ORDER — SODIUM CHLORIDE 9 MG/ML
100 INJECTION, SOLUTION INTRAVENOUS CONTINUOUS
Status: DISCONTINUED | OUTPATIENT
Start: 2021-01-17 | End: 2021-01-20

## 2021-01-17 RX ORDER — ASPIRIN 81 MG/1
81 TABLET ORAL DAILY
Status: DISCONTINUED | OUTPATIENT
Start: 2021-01-18 | End: 2021-01-23 | Stop reason: HOSPADM

## 2021-01-17 RX ORDER — ACETAMINOPHEN 325 MG/1
650 TABLET ORAL
Status: DISCONTINUED | OUTPATIENT
Start: 2021-01-17 | End: 2021-01-23 | Stop reason: HOSPADM

## 2021-01-17 RX ORDER — ACETAMINOPHEN 650 MG/1
650 SUPPOSITORY RECTAL
Status: DISCONTINUED | OUTPATIENT
Start: 2021-01-17 | End: 2021-01-23 | Stop reason: HOSPADM

## 2021-01-17 RX ORDER — ONDANSETRON 2 MG/ML
4 INJECTION INTRAMUSCULAR; INTRAVENOUS
Status: COMPLETED | OUTPATIENT
Start: 2021-01-17 | End: 2021-01-17

## 2021-01-17 RX ORDER — ALBUTEROL SULFATE 90 UG/1
2 AEROSOL, METERED RESPIRATORY (INHALATION)
Status: DISCONTINUED | OUTPATIENT
Start: 2021-01-17 | End: 2021-01-23 | Stop reason: HOSPADM

## 2021-01-17 RX ORDER — BUDESONIDE AND FORMOTEROL FUMARATE DIHYDRATE 160; 4.5 UG/1; UG/1
2 AEROSOL RESPIRATORY (INHALATION) EVERY 12 HOURS
Status: DISCONTINUED | OUTPATIENT
Start: 2021-01-18 | End: 2021-01-18

## 2021-01-17 RX ORDER — DEXAMETHASONE SODIUM PHOSPHATE 10 MG/ML
10 INJECTION INTRAMUSCULAR; INTRAVENOUS ONCE
Status: COMPLETED | OUTPATIENT
Start: 2021-01-17 | End: 2021-01-17

## 2021-01-17 RX ADMIN — POTASSIUM CHLORIDE 10 MEQ: 7.46 INJECTION, SOLUTION INTRAVENOUS at 20:33

## 2021-01-17 RX ADMIN — SODIUM CHLORIDE 125 ML/HR: 9 INJECTION, SOLUTION INTRAVENOUS at 19:42

## 2021-01-17 RX ADMIN — ONDANSETRON 4 MG: 2 INJECTION INTRAMUSCULAR; INTRAVENOUS at 18:11

## 2021-01-17 RX ADMIN — DEXAMETHASONE SODIUM PHOSPHATE 10 MG: 10 INJECTION, SOLUTION INTRAMUSCULAR; INTRAVENOUS at 18:11

## 2021-01-17 RX ADMIN — POTASSIUM CHLORIDE 10 MEQ: 7.46 INJECTION, SOLUTION INTRAVENOUS at 19:38

## 2021-01-17 RX ADMIN — POTASSIUM CHLORIDE 10 MEQ: 7.46 INJECTION, SOLUTION INTRAVENOUS at 21:31

## 2021-01-17 RX ADMIN — LABETALOL HYDROCHLORIDE 20 MG: 5 INJECTION, SOLUTION INTRAVENOUS at 18:11

## 2021-01-17 RX ADMIN — POTASSIUM CHLORIDE 10 MEQ: 7.46 INJECTION, SOLUTION INTRAVENOUS at 23:14

## 2021-01-17 RX ADMIN — HYDRALAZINE HYDROCHLORIDE 100 MG: 50 TABLET, FILM COATED ORAL at 23:35

## 2021-01-17 RX ADMIN — LABETALOL HYDROCHLORIDE 40 MG: 5 INJECTION, SOLUTION INTRAVENOUS at 19:28

## 2021-01-17 RX ADMIN — SODIUM CHLORIDE 1000 ML: 9 INJECTION, SOLUTION INTRAVENOUS at 18:15

## 2021-01-17 RX ADMIN — LEVETIRACETAM 500 MG: 250 TABLET, FILM COATED ORAL at 23:35

## 2021-01-17 RX ADMIN — FAMOTIDINE 20 MG: 10 INJECTION, SOLUTION INTRAVENOUS at 18:11

## 2021-01-17 NOTE — ED PROVIDER NOTES
EMERGENCY DEPARTMENT HISTORY AND PHYSICAL EXAM      Date: 1/17/2021  Patient Name: Monty Jung      History of Presenting Illness     Chief Complaint   Patient presents with    Vomiting       History Provided By: patient    HPI: Monty Jung, 54 y.o. female with a past medical history significant COPD and congestive heart failure, Berry's disease, kidney deficiency, seizure disorder, HTN, GERD presents to the ED with cc of nausea, vomiting, tongue swelling, decreased appetite, HA, cough, chills for the past 3 days. Denies feeling like her throat is closing however reports intermittent mild drooling. Pt status post right-sided carotid endarterectomy 1/8/21 for drop attacks and dizzy spells. Staples present to right carotid site with mild ecchymosis and edema, no erythema, warmth, discharge noted to site. 3 L of oxygen at home due to COPD. Denies smoking, fever, CP, abdominal pain, worsening SOB. There are no other complaints, changes, or physical findings at this time.     PCP: Preston Mclaughlin    Current Facility-Administered Medications   Medication Dose Route Frequency Provider Last Rate Last Admin    potassium chloride 10 mEq in 100 ml IVPB  10 mEq IntraVENous Q1H Belgica Jansen  mL/hr at 01/17/21 1938 10 mEq at 01/17/21 1938    0.9% sodium chloride infusion  125 mL/hr IntraVENous CONTINUOUS Brandi MARTIN  mL/hr at 01/17/21 1942 125 mL/hr at 01/17/21 1942    acetaminophen (TYLENOL) tablet 650 mg  650 mg Oral Q6H PRN Wayne Perez MD        Or    acetaminophen (TYLENOL) suppository 650 mg  650 mg Rectal Q6H PRN Wayne Perez MD        polyethylene glycol (MIRALAX) packet 17 g  17 g Oral DAILY PRN Wayne Perez MD        promethazine (PHENERGAN) tablet 12.5 mg  12.5 mg Oral Q6H PRN Wayne Perez MD        Or    ondansetron [facility]) injection 4 mg  4 mg IntraVENous Q6H PRN Wayne Perez MD        [START ON 1/18/2021] enoxaparin (LOVENOX) injection 30 mg 30 mg SubCUTAneous DAILY Adrianna Pereira MD        0.9% sodium chloride infusion  75 mL/hr IntraVENous CONTINUOUS Adrianna Pereira MD         Current Outpatient Medications   Medication Sig Dispense Refill    HYDROcodone-acetaminophen (NORCO) 5-325 mg per tablet Take 1 Tab by mouth every six (6) hours as needed for Pain for up to 5 days. Max Daily Amount: 4 Tabs. 20 Tab 0    esomeprazole (NexIUM) 40 mg capsule Take 40 mg by mouth. Indications: Berry's esophagus      spironolactone (ALDACTONE) 50 mg tablet Take 50 mg by mouth daily.  clopidogreL (PLAVIX) 75 mg tab Take 75 mg by mouth daily.  nortriptyline (PAMELOR) 50 mg capsule Take 50 mg by mouth nightly.  atorvastatin (LIPITOR) 80 mg tablet Take 80 mg by mouth daily.  Trelegy Ellipta 100-62.5-25 mcg inhaler       albuterol (PROVENTIL HFA, VENTOLIN HFA, PROAIR HFA) 90 mcg/actuation inhaler       rosuvastatin (CRESTOR) 10 mg tablet       hydrALAZINE (APRESOLINE) 100 mg tablet Take 100 mg by mouth three (3) times daily.  NIFEdipine ER (PROCARDIA XL) 60 mg ER tablet Take 1 Tab by mouth daily.  Oxygen 5 Devices as needed. Pt wears 5LPM as needed- states she uses it after an axiety attack      isosorbide dinitrate (ISORDIL) 40 mg tablet Take 1 Tab by mouth three (3) times daily. 90 Tab 0    albuterol-ipratropium (DUO-NEB) 2.5 mg-0.5 mg/3 ml nebu 3 mL by Nebulization route every six (6) hours. 120 Nebule 0    labetaloL (NORMODYNE) 200 mg tablet Take 2 Tabs by mouth three (3) times daily. 180 Tab 0    ALPRAZolam (XANAX) 0.25 mg tablet Take 0.25 mg by mouth three (3) times daily as needed for Anxiety.  sertraline (Zoloft) 25 mg tablet Take 25 mg by mouth daily.  levothyroxine (SYNTHROID) 25 mcg tablet Take 25 mcg by mouth Daily (before breakfast).  levETIRAcetam (Keppra) 500 mg tablet Take 500 mg by mouth two (2) times a day.       carBAMazepine, mood stabiliz, 200 mg CM12 Take 200 mg by mouth two (2) times a day.      ferrous sulfate 325 mg (65 mg iron) tablet Take 325 mg by mouth Daily (before breakfast).  folic acid (FOLVITE) 1 mg tablet Take 1 mg by mouth daily.  aspirin delayed-release 81 mg tablet Take  by mouth daily.  magnesium oxide (MAG-OX) 400 mg tablet Take 400 mg by mouth daily. Past History     Past Medical History:  Past Medical History:   Diagnosis Date    Anxiety 10/2/2020    Chronic anemia     Chronic respiratory failure (HCC)     COPD (chronic obstructive pulmonary disease) with emphysema ([address] 10/2/2020    Depression     Diastolic CHF (HCC)     Dysphagia 10/2/2020    GERD (gastroesophageal reflux disease)     High cholesterol     Hypertension     Hypothyroid     Iron deficiency anemia 10/2/2020    Mitral valve regurgitation 10/2/2020    Renal artery stenosis (HCC)     Seizure disorder (HCC)        Past Surgical History:  Past Surgical History:   Procedure Laterality Date    HX CAROTID ENDARTERECTOMY      HX CHOLECYSTECTOMY      HX RENAL ARTERY STENT      HX ROTATOR CUFF REPAIR Right     HX TUBAL LIGATION      IR THORACENTESIS CATH W IMAGE  11/24/2020    IR THORACENTESIS CATH W IMAGE  11/25/2020       Family History:  Family History   Problem Relation Age of Onset    Hypertension Mother     Stroke Mother     Melanoma Mother     Stroke Father     Melanoma Brother     Melanoma Child        Social History:  Social History     Tobacco Use    Smoking status: Former Smoker     Types: Cigarettes    Smokeless tobacco: Never Used    Tobacco comment: quit july 2020   Substance Use Topics    Alcohol use: Not Currently    Drug use: Never       Allergies: Allergies   Allergen Reactions    Sulfa (Sulfonamide Antibiotics) Anaphylaxis         Review of Systems     Review of Systems   Constitutional: Positive for appetite change and chills. Negative for fever. HENT:        Tongue swelling     Respiratory: Positive for cough.  Negative for shortness of breath and wheezing. Cardiovascular: Negative for chest pain and leg swelling. Gastrointestinal: Positive for nausea and vomiting. Negative for abdominal pain. Genitourinary: Negative. Skin:        incision to right neck    Neurological: Positive for headaches. Negative for dizziness and light-headedness. Physical Exam     Physical Exam  Constitutional:       General: She is not in acute distress. Appearance: Normal appearance. She is normal weight. She is ill-appearing. She is not toxic-appearing. HENT:      Head: Normocephalic and atraumatic. Mouth/Throat:      Mouth: Mucous membranes are dry. Eyes:      Extraocular Movements: Extraocular movements intact. Pupils: Pupils are equal, round, and reactive to light. Neck:      Musculoskeletal: Muscular tenderness present. Cardiovascular:      Rate and Rhythm: Tachycardia present. Pulses: Normal pulses. Heart sounds: Normal heart sounds. Pulmonary:      Effort: Pulmonary effort is normal. No respiratory distress. Breath sounds: Examination of the right-lower field reveals decreased breath sounds. Examination of the left-lower field reveals decreased breath sounds. Decreased breath sounds present. No wheezing or rhonchi. Musculoskeletal: Normal range of motion. Lymphadenopathy:      Cervical: No cervical adenopathy. Skin:     General: Skin is warm and dry. Capillary Refill: Capillary refill takes less than 2 seconds. Findings: Bruising present. Comments: Vertical incision to right neck with staples present, mild ecchymosis and swelling noted. No warmth, erythema, or drainage present. Neurological:      Mental Status: She is alert and oriented to person, place, and time.          Lab and Diagnostic Study Results     Labs -     Recent Results (from the past 12 hour(s))   CBC WITH AUTOMATED DIFF    Collection Time: 01/17/21  5:00 PM   Result Value Ref Range    WBC 10.8 3.6 - 11.0 K/uL    RBC 4.94 3.80 - 5.20 M/uL    HGB 15.3 11.5 - 16.0 g/dL    HCT 43.5 35.0 - 47.0 %    MCV 88.1 80.0 - 99.0 FL    MCH 31.0 26.0 - 34.0 PG    MCHC 35.2 30.0 - 36.5 g/dL    RDW 14.2 11.5 - 14.5 %    PLATELET 291 (H) 043 - 400 K/uL    MPV 9.8 8.9 - 12.9 FL    NRBC 0.0 0  WBC    ABSOLUTE NRBC 0.00 0.00 - 0.01 K/uL    NEUTROPHILS 83 (H) 32 - 75 %    LYMPHOCYTES 8 (L) 12 - 49 %    MONOCYTES 8 5 - 13 %    EOSINOPHILS 0 0 - 7 %    BASOPHILS 0 0 - 1 %    IMMATURE GRANULOCYTES 1 (H) 0.0 - 0.5 %    ABS. NEUTROPHILS 9.1 (H) 1.8 - 8.0 K/UL    ABS. LYMPHOCYTES 0.8 0.8 - 3.5 K/UL    ABS. MONOCYTES 0.9 0.0 - 1.0 K/UL    ABS. EOSINOPHILS 0.0 0.0 - 0.4 K/UL    ABS. BASOPHILS 0.0 0.0 - 0.1 K/UL    ABS. IMM. GRANS. 0.1 (H) 0.00 - 0.04 K/UL    DF AUTOMATED     METABOLIC PANEL, COMPREHENSIVE    Collection Time: 01/17/21  5:00 PM   Result Value Ref Range    Sodium 125 (L) 136 - 145 mmol/L    Potassium 2.1 (LL) 3.5 - 5.1 mmol/L    Chloride 85 (L) 97 - 108 mmol/L    CO2 27 21 - 32 mmol/L    Anion gap 13 5 - 15 mmol/L    Glucose 143 (H) 65 - 100 mg/dL    BUN 30 (H) 6 - 20 mg/dL    Creatinine 1.81 (H) 0.55 - 1.02 mg/dL    BUN/Creatinine ratio 17 12 - 20      GFR est AA 35 (L) >60 ml/min/1.73m2    GFR est non-AA 29 (L) >60 ml/min/1.73m2    Calcium 9.2 8.5 - 10.1 mg/dL    Bilirubin, total 0.3 0.2 - 1.0 mg/dL    AST (SGOT) 32 15 - 37 U/L    ALT (SGPT) 30 12 - 78 U/L    Alk. phosphatase 163 (H) 45 - 117 U/L    Protein, total 8.0 6.4 - 8.2 g/dL    Albumin 2.6 (L) 3.5 - 5.0 g/dL    Globulin 5.4 (H) 2.0 - 4.0 g/dL    A-G Ratio 0.5 (L) 1.1 - 2.2     LIPASE    Collection Time: 01/17/21  5:00 PM   Result Value Ref Range    Lipase 201 73 - 393 U/L   GLUCOSE, POC    Collection Time: 01/17/21  6:11 PM   Result Value Ref Range    Glucose (POC) 152 (H) 65 - 100 mg/dL    Performed by Ramona Gibson        Radiologic Studies -   [unfilled]  CT Results  (Last 48 hours)               01/17/21 1842  CT HEAD WO CONT Final result    Impression:  IMPRESSION:   1.  No findings of acute intracranial abnormality. 2. Calcification within the central renzo appearing similar to prior. There is an   additional punctate calcification within the right posterior temporal region. Further evaluation with non-emergent brain MRI should be considered. 3. Other chronic findings, including nonspecific white matter findings that are   most commonly the result of chronic microangiopathy. Narrative:  Exam: CT Head without contrast       TECHNIQUE: Multiple transaxial CT images of the head were obtained without   contrast. Coronal and sagittal reformatted images were provided. Dose reduction: All CT scans at this facility are performed using dose reduction   optimization techniques as appropriate to a performed exam including the   following: Automated exposure control, adjustments of the mA and/or kV according   to patient size, or use of iterative reconstruction technique. HISTORY: HA and hypertensive. COMPARISON: Head CT January 4, 2021, December 27, 2019, May 20, 2015       FINDINGS:       Mild symmetric parenchymal volume loss with associated proportional ex vacuo   dilatation of the ventricles and extra-axial CSF spaces. No significant midline   shift or mass effect. There is mild periventricular white matter hypoattenuation   that is nonspecific but most commonly the result of chronic microangiopathy. A   small calcific density within the central aspect of the renzo appears similar to   prior. There is a punctate calcification in the right posterior temporal region   which may be extra-axial but was not seen on prior exam. No findings of acute   intracranial hemorrhage. Gray-white matter differentiation appears preserved. The basilar cisterns are preserved. Intracranial atherosclerosis. Mastoid air cells appear clear. Visualized paranasal sinuses appear clear. Globes and orbits appear unremarkable.  Calvarium appears intact without findings   of acute abnormality. CXR Results  (Last 48 hours)               01/17/21 1830  XR CHEST PORT Final result    Impression:  IMPRESSION:   1. No findings of acute cardiopulmonary abnormality. Narrative:  Examination: XR CHEST PORT        History: SEPSIS       Comparison: Chest radiograph January 3, 2021       FINDINGS:       Single frontal portable view of the chest. Symmetric lung volumes without focal   airspace consolidation, pneumothorax, or significant pleural effusion. Cardiac   mediastinal silhouette is within normal limits for size. There is   atherosclerosis of the thoracic aorta. Remote healed right lower rib fractures. Medical Decision Making and ED Course   - I am the first and primary provider for this patient AND AM THE PRIMARY PROVIDER OF RECORD. - I reviewed the vital signs, available nursing notes, past medical history, past surgical history, family history and social history. - Initial assessment performed. The patients presenting problems have been discussed, and the staff are in agreement with the care plan formulated and outlined with them. I have encouraged them to ask questions as they arise throughout their visit. Vital Signs-Reviewed the patient's vital signs. Patient Vitals for the past 12 hrs:   Temp Pulse Resp BP SpO2   01/17/21 1934  91 16 (!) 177/110 100 %   01/17/21 1908  85 20 (!) 216/104 100 %   01/17/21 1823  (!) 107  (!) 174/108 100 %   01/17/21 1811  (!) 119  (!) 210/134    01/17/21 1650 97.6 °F (36.4 °C) (!) 120 17 (!) 214/117 100 %       EKG interpretation: (Preliminary): Performed at 1658, and read at 1700  Rhythm: sinus tachycardia; and regular . Rate (approx.): 116; Axis: normal; AZ interval: normal; QRS interval: prolonged; ST/T wave: normal; Other findings: abnormal ekg. biatrial enlargement,LVH      Records Reviewed:  Old Medical Records    The patient presents with nausea and vomiting with a differential diagnosis of kalemia, hyponatremia,    ED Course:              Provider Notes (Medical Decision Making):      Noted to have uncontrolled hypertension. Reports unable to tolerate p.o. therefore has not been able to take her blood pressure medications. Hyponatremia hypokalemia tachycardia noted on exam.  Patient resuscitated with IV fluids, potassium infusion x4 ordered, prophylactic given for nausea admitted to hospitalist services for further evaluation. Patient verbalized understanding head CT due to elevated blood pressure negative chest x-ray within normal limits. Patient initially complained of fat tongue Pepcid and Decadron, given,  100% no acute respiratory distress at baseline with 3 L of O2. Stable at time of admission         Consultations:       Consultations: Dr. Narciso Hernández hospitalist         Procedures and Critical Care       Performed by: NANDO Nguyen NP        Disposition     Disposition:     Admitted    DISCHARGE PLAN:  1. Current Discharge Medication List      CONTINUE these medications which have NOT CHANGED    Details   HYDROcodone-acetaminophen (NORCO) 5-325 mg per tablet Take 1 Tab by mouth every six (6) hours as needed for Pain for up to 5 days. Max Daily Amount: 4 Tabs. Qty: 20 Tab, Refills: 0    Associated Diagnoses: Incisional pain      esomeprazole (NexIUM) 40 mg capsule Take 40 mg by mouth. Indications: Berry's esophagus      spironolactone (ALDACTONE) 50 mg tablet Take 50 mg by mouth daily. clopidogreL (PLAVIX) 75 mg tab Take 75 mg by mouth daily. nortriptyline (PAMELOR) 50 mg capsule Take 50 mg by mouth nightly. atorvastatin (LIPITOR) 80 mg tablet Take 80 mg by mouth daily. Trelegy Ellipta 100-62.5-25 mcg inhaler       albuterol (PROVENTIL HFA, VENTOLIN HFA, PROAIR HFA) 90 mcg/actuation inhaler       rosuvastatin (CRESTOR) 10 mg tablet       hydrALAZINE (APRESOLINE) 100 mg tablet Take 100 mg by mouth three (3) times daily.       NIFEdipine ER (PROCARDIA XL) 60 mg ER tablet Take 1 Tab by mouth daily. Oxygen 5 Devices as needed. Pt wears 5LPM as needed- states she uses it after an axiety attack      isosorbide dinitrate (ISORDIL) 40 mg tablet Take 1 Tab by mouth three (3) times daily. Qty: 90 Tab, Refills: 0      albuterol-ipratropium (DUO-NEB) 2.5 mg-0.5 mg/3 ml nebu 3 mL by Nebulization route every six (6) hours. Qty: 120 Nebule, Refills: 0      labetaloL (NORMODYNE) 200 mg tablet Take 2 Tabs by mouth three (3) times daily. Qty: 180 Tab, Refills: 0      ALPRAZolam (XANAX) 0.25 mg tablet Take 0.25 mg by mouth three (3) times daily as needed for Anxiety. sertraline (Zoloft) 25 mg tablet Take 25 mg by mouth daily. levothyroxine (SYNTHROID) 25 mcg tablet Take 25 mcg by mouth Daily (before breakfast). levETIRAcetam (Keppra) 500 mg tablet Take 500 mg by mouth two (2) times a day. carBAMazepine, mood stabiliz, 200 mg CM12 Take 200 mg by mouth two (2) times a day. ferrous sulfate 325 mg (65 mg iron) tablet Take 325 mg by mouth Daily (before breakfast). folic acid (FOLVITE) 1 mg tablet Take 1 mg by mouth daily. aspirin delayed-release 81 mg tablet Take  by mouth daily. magnesium oxide (MAG-OX) 400 mg tablet Take 400 mg by mouth daily. STOP taking these medications       potassium chloride (K-DUR, KLOR-CON) 20 mEq tablet Comments:   Reason for Stoppin.   Follow-up Information    None       3. Return to ED if worse   4. Current Discharge Medication List          Diagnosis     Clinical Impression:   1. Hypokalemia    2. Hyponatremia    3. Hypertension, unspecified type        Attestations:    Audrey Soto NP    Please note that this dictation was completed with [employer], the [employer] voice recognition software. Quite often unanticipated grammatical, syntax, homophones, and other interpretive errors are inadvertently transcribed by the computer software. Please disregard these errors.   Please excuse any errors that have escaped final proofreading.  Thank you.

## 2021-01-17 NOTE — Clinical Note
Patient Class[de-identified] OBSERVATION [104]   Type of Bed: Remote Telemetry [29]   Reason for Observation: Hypokalemia, Hyponatremia   Admitting Diagnosis: Hypokalemia [749664]   Admitting Diagnosis: PEDRO PABLO (acute kidney injury) [facility]) [[id number]]   Admitting Diagnosis: Hyponatremia [[id number]]   Admitting Physician: Nikita Gilmore [[id number]]   Attending Physician: Nikita Gilmore [[id number]]

## 2021-01-17 NOTE — ED TRIAGE NOTES
N/V X 2 DAYS, RECENT NECK SURGERY BY DR HERNANDEZ, SOME DIARRHEA. GCS 15, ALSO REPORTS TONGUE SWELLING X 2 DAYS, EMS REPORTS BLUISH COLOR, 50 BENADRYL IV GIVEN

## 2021-01-18 ENCOUNTER — APPOINTMENT (OUTPATIENT)
Dept: CT IMAGING | Age: 56
DRG: 469 | End: 2021-01-18
Attending: INTERNAL MEDICINE
Payer: COMMERCIAL

## 2021-01-18 LAB
ANION GAP SERPL CALC-SCNC: 12 MMOL/L (ref 5–15)
ANION GAP SERPL CALC-SCNC: 7 MMOL/L (ref 5–15)
ANION GAP SERPL CALC-SCNC: 9 MMOL/L (ref 5–15)
ATRIAL RATE: 116 BPM
BUN SERPL-MCNC: 31 MG/DL (ref 6–20)
BUN SERPL-MCNC: 31 MG/DL (ref 6–20)
BUN SERPL-MCNC: 36 MG/DL (ref 6–20)
BUN/CREAT SERPL: 18 (ref 12–20)
BUN/CREAT SERPL: 19 (ref 12–20)
BUN/CREAT SERPL: 19 (ref 12–20)
CA-I BLD-MCNC: 7.8 MG/DL (ref 8.5–10.1)
CA-I BLD-MCNC: 7.8 MG/DL (ref 8.5–10.1)
CA-I BLD-MCNC: 8.4 MG/DL (ref 8.5–10.1)
CALCULATED P AXIS, ECG09: 63 DEGREES
CALCULATED R AXIS, ECG10: 48 DEGREES
CALCULATED T AXIS, ECG11: -158 DEGREES
CHLORIDE SERPL-SCNC: 100 MMOL/L (ref 97–108)
CHLORIDE SERPL-SCNC: 92 MMOL/L (ref 97–108)
CHLORIDE SERPL-SCNC: 93 MMOL/L (ref 97–108)
CO2 SERPL-SCNC: 23 MMOL/L (ref 21–32)
CO2 SERPL-SCNC: 25 MMOL/L (ref 21–32)
CO2 SERPL-SCNC: 27 MMOL/L (ref 21–32)
CREAT SERPL-MCNC: 1.64 MG/DL (ref 0.55–1.02)
CREAT SERPL-MCNC: 1.71 MG/DL (ref 0.55–1.02)
CREAT SERPL-MCNC: 1.92 MG/DL (ref 0.55–1.02)
DIAGNOSIS, 93000: NORMAL
ERYTHROCYTE [DISTWIDTH] IN BLOOD BY AUTOMATED COUNT: 14.4 % (ref 11.5–14.5)
GLUCOSE SERPL-MCNC: 128 MG/DL (ref 65–100)
GLUCOSE SERPL-MCNC: 130 MG/DL (ref 65–100)
GLUCOSE SERPL-MCNC: 133 MG/DL (ref 65–100)
HCT VFR BLD AUTO: 33.3 % (ref 35–47)
HGB BLD-MCNC: 11.6 G/DL (ref 11.5–16)
MAGNESIUM SERPL-MCNC: 1.6 MG/DL (ref 1.6–2.4)
MCH RBC QN AUTO: 30.9 PG (ref 26–34)
MCHC RBC AUTO-ENTMCNC: 34.8 G/DL (ref 30–36.5)
MCV RBC AUTO: 88.6 FL (ref 80–99)
NRBC # BLD: 0 K/UL (ref 0–0.01)
NRBC BLD-RTO: 0 PER 100 WBC
OSMOLALITY SERPL: 276 MOSM/KG H2O
OSMOLALITY UR: 256 MOSM/KG H2O
P-R INTERVAL, ECG05: 122 MS
PLATELET # BLD AUTO: 461 K/UL (ref 150–400)
PMV BLD AUTO: 9.7 FL (ref 8.9–12.9)
POTASSIUM SERPL-SCNC: 2.3 MMOL/L (ref 3.5–5.1)
POTASSIUM SERPL-SCNC: 2.5 MMOL/L (ref 3.5–5.1)
POTASSIUM SERPL-SCNC: 5.4 MMOL/L (ref 3.5–5.1)
Q-T INTERVAL, ECG07: 450 MS
QRS DURATION, ECG06: 82 MS
QTC CALCULATION (BEZET), ECG08: 625 MS
RBC # BLD AUTO: 3.76 M/UL (ref 3.8–5.2)
SODIUM SERPL-SCNC: 128 MMOL/L (ref 136–145)
SODIUM SERPL-SCNC: 130 MMOL/L (ref 136–145)
SODIUM SERPL-SCNC: 130 MMOL/L (ref 136–145)
VENTRICULAR RATE, ECG03: 116 BPM
WBC # BLD AUTO: 11.4 K/UL (ref 3.6–11)

## 2021-01-18 PROCEDURE — 96376 TX/PRO/DX INJ SAME DRUG ADON: CPT

## 2021-01-18 PROCEDURE — 96372 THER/PROPH/DIAG INJ SC/IM: CPT

## 2021-01-18 PROCEDURE — 80048 BASIC METABOLIC PNL TOTAL CA: CPT

## 2021-01-18 PROCEDURE — 74011250637 HC RX REV CODE- 250/637: Performed by: FAMILY MEDICINE

## 2021-01-18 PROCEDURE — 74011250636 HC RX REV CODE- 250/636: Performed by: INTERNAL MEDICINE

## 2021-01-18 PROCEDURE — 99218 HC RM OBSERVATION: CPT

## 2021-01-18 PROCEDURE — 85027 COMPLETE CBC AUTOMATED: CPT

## 2021-01-18 PROCEDURE — 74011250636 HC RX REV CODE- 250/636: Performed by: NURSE PRACTITIONER

## 2021-01-18 PROCEDURE — 94640 AIRWAY INHALATION TREATMENT: CPT

## 2021-01-18 PROCEDURE — 96375 TX/PRO/DX INJ NEW DRUG ADDON: CPT

## 2021-01-18 PROCEDURE — 74011250637 HC RX REV CODE- 250/637: Performed by: INTERNAL MEDICINE

## 2021-01-18 PROCEDURE — 65270000029 HC RM PRIVATE

## 2021-01-18 PROCEDURE — 36415 COLL VENOUS BLD VENIPUNCTURE: CPT

## 2021-01-18 PROCEDURE — 3E0F7SF INTRODUCTION OF OTHER GAS INTO RESPIRATORY TRACT, VIA NATURAL OR ARTIFICIAL OPENING: ICD-10-PCS | Performed by: HOSPITALIST

## 2021-01-18 PROCEDURE — 74176 CT ABD & PELVIS W/O CONTRAST: CPT

## 2021-01-18 PROCEDURE — 83735 ASSAY OF MAGNESIUM: CPT

## 2021-01-18 PROCEDURE — 74011250636 HC RX REV CODE- 250/636: Performed by: FAMILY MEDICINE

## 2021-01-18 RX ORDER — MAGNESIUM SULFATE HEPTAHYDRATE 40 MG/ML
2 INJECTION, SOLUTION INTRAVENOUS
Status: DISPENSED | OUTPATIENT
Start: 2021-01-18 | End: 2021-01-18

## 2021-01-18 RX ORDER — POTASSIUM CHLORIDE 750 MG/1
40 TABLET, FILM COATED, EXTENDED RELEASE ORAL ONCE
Status: COMPLETED | OUTPATIENT
Start: 2021-01-18 | End: 2021-01-18

## 2021-01-18 RX ORDER — DIPHENHYDRAMINE HYDROCHLORIDE 50 MG/ML
25 INJECTION, SOLUTION INTRAMUSCULAR; INTRAVENOUS
Status: DISCONTINUED | OUTPATIENT
Start: 2021-01-18 | End: 2021-01-23 | Stop reason: HOSPADM

## 2021-01-18 RX ORDER — POTASSIUM CHLORIDE 1.5 G/1.77G
40 POWDER, FOR SOLUTION ORAL 2 TIMES DAILY WITH MEALS
Status: DISCONTINUED | OUTPATIENT
Start: 2021-01-18 | End: 2021-01-19

## 2021-01-18 RX ORDER — DIPHENHYDRAMINE HYDROCHLORIDE 50 MG/ML
25 INJECTION, SOLUTION INTRAMUSCULAR; INTRAVENOUS EVERY 6 HOURS
Status: DISCONTINUED | OUTPATIENT
Start: 2021-01-18 | End: 2021-01-18

## 2021-01-18 RX ORDER — MAGNESIUM SULFATE HEPTAHYDRATE 40 MG/ML
INJECTION, SOLUTION INTRAVENOUS
Status: DISCONTINUED
Start: 2021-01-18 | End: 2021-01-18 | Stop reason: WASHOUT

## 2021-01-18 RX ORDER — POTASSIUM CHLORIDE 1.5 G/1.77G
80 POWDER, FOR SOLUTION ORAL
Status: COMPLETED | OUTPATIENT
Start: 2021-01-18 | End: 2021-01-18

## 2021-01-18 RX ORDER — BUDESONIDE AND FORMOTEROL FUMARATE DIHYDRATE 160; 4.5 UG/1; UG/1
2 AEROSOL RESPIRATORY (INHALATION)
Status: DISCONTINUED | OUTPATIENT
Start: 2021-01-18 | End: 2021-01-23 | Stop reason: HOSPADM

## 2021-01-18 RX ADMIN — ENOXAPARIN SODIUM 30 MG: 30 INJECTION SUBCUTANEOUS at 08:53

## 2021-01-18 RX ADMIN — PROMETHAZINE HYDROCHLORIDE 12.5 MG: 25 TABLET ORAL at 08:44

## 2021-01-18 RX ADMIN — LABETALOL HYDROCHLORIDE 400 MG: 100 TABLET, FILM COATED ORAL at 08:44

## 2021-01-18 RX ADMIN — LEVOTHYROXINE SODIUM 25 MCG: 0.03 TABLET ORAL at 13:46

## 2021-01-18 RX ADMIN — CLOPIDOGREL BISULFATE 75 MG: 75 TABLET ORAL at 08:45

## 2021-01-18 RX ADMIN — NIFEDIPINE 60 MG: 30 TABLET, FILM COATED, EXTENDED RELEASE ORAL at 08:45

## 2021-01-18 RX ADMIN — DIPHENHYDRAMINE HYDROCHLORIDE 25 MG: 50 INJECTION, SOLUTION INTRAMUSCULAR; INTRAVENOUS at 00:42

## 2021-01-18 RX ADMIN — POTASSIUM CHLORIDE 40 MEQ: 750 TABLET, FILM COATED, EXTENDED RELEASE ORAL at 05:36

## 2021-01-18 RX ADMIN — ATORVASTATIN CALCIUM 80 MG: 40 TABLET, FILM COATED ORAL at 08:45

## 2021-01-18 RX ADMIN — BUDESONIDE AND FORMOTEROL FUMARATE DIHYDRATE 2 PUFF: 160; 4.5 AEROSOL RESPIRATORY (INHALATION) at 20:03

## 2021-01-18 RX ADMIN — LABETALOL HYDROCHLORIDE 400 MG: 100 TABLET, FILM COATED ORAL at 22:55

## 2021-01-18 RX ADMIN — HYDRALAZINE HYDROCHLORIDE 100 MG: 50 TABLET, FILM COATED ORAL at 16:34

## 2021-01-18 RX ADMIN — LEVETIRACETAM 500 MG: 250 TABLET, FILM COATED ORAL at 08:45

## 2021-01-18 RX ADMIN — POTASSIUM CHLORIDE 40 MEQ: 1.5 FOR SOLUTION ORAL at 13:48

## 2021-01-18 RX ADMIN — HYDRALAZINE HYDROCHLORIDE 100 MG: 50 TABLET, FILM COATED ORAL at 08:45

## 2021-01-18 RX ADMIN — LEVETIRACETAM 500 MG: 250 TABLET, FILM COATED ORAL at 21:37

## 2021-01-18 RX ADMIN — HYDRALAZINE HYDROCHLORIDE 100 MG: 50 TABLET, FILM COATED ORAL at 22:55

## 2021-01-18 RX ADMIN — POTASSIUM CHLORIDE 40 MEQ: 1.5 FOR SOLUTION ORAL at 18:18

## 2021-01-18 RX ADMIN — POTASSIUM CHLORIDE 80 MEQ: 1.5 FOR SOLUTION ORAL at 13:48

## 2021-01-18 RX ADMIN — FAMOTIDINE 20 MG: 10 INJECTION, SOLUTION INTRAVENOUS at 00:40

## 2021-01-18 RX ADMIN — FAMOTIDINE 20 MG: 10 INJECTION, SOLUTION INTRAVENOUS at 08:49

## 2021-01-18 RX ADMIN — DIPHENHYDRAMINE HYDROCHLORIDE 25 MG: 50 INJECTION, SOLUTION INTRAMUSCULAR; INTRAVENOUS at 21:37

## 2021-01-18 RX ADMIN — METHYLPREDNISOLONE SODIUM SUCCINATE 125 MG: 125 INJECTION, POWDER, FOR SOLUTION INTRAMUSCULAR; INTRAVENOUS at 00:40

## 2021-01-18 RX ADMIN — ASPIRIN 81 MG: 81 TABLET, COATED ORAL at 08:45

## 2021-01-18 RX ADMIN — DIPHENHYDRAMINE HYDROCHLORIDE 25 MG: 50 INJECTION, SOLUTION INTRAMUSCULAR; INTRAVENOUS at 05:35

## 2021-01-18 RX ADMIN — METHYLPREDNISOLONE SODIUM SUCCINATE 40 MG: 40 INJECTION, POWDER, FOR SOLUTION INTRAMUSCULAR; INTRAVENOUS at 08:49

## 2021-01-18 RX ADMIN — SODIUM CHLORIDE 75 ML/HR: 9 INJECTION, SOLUTION INTRAVENOUS at 00:48

## 2021-01-18 RX ADMIN — LABETALOL HYDROCHLORIDE 400 MG: 100 TABLET, FILM COATED ORAL at 16:34

## 2021-01-18 RX ADMIN — METHYLPREDNISOLONE SODIUM SUCCINATE 40 MG: 40 INJECTION, POWDER, FOR SOLUTION INTRAMUSCULAR; INTRAVENOUS at 09:53

## 2021-01-18 RX ADMIN — METHYLPREDNISOLONE SODIUM SUCCINATE 40 MG: 40 INJECTION, POWDER, FOR SOLUTION INTRAMUSCULAR; INTRAVENOUS at 13:46

## 2021-01-18 RX ADMIN — SODIUM CHLORIDE 125 ML/HR: 9 INJECTION, SOLUTION INTRAVENOUS at 23:47

## 2021-01-18 RX ADMIN — METHYLPREDNISOLONE SODIUM SUCCINATE 40 MG: 40 INJECTION, POWDER, FOR SOLUTION INTRAMUSCULAR; INTRAVENOUS at 22:56

## 2021-01-18 NOTE — H&P
History and Physical    Patient: Bharat Chaidez MRN: [id number]  SSN: [id number]    YOB: 1965  Age: 54 y.o. Sex: female      Subjective:      Chief Complaint: Nausea, vomiting, decreased p.o. intake, generalized malaise. HPI: Bharat Chaidez is a 54 y.o. female with past medical history anxiety/depression, COPD with chronic hypoxic respiratory failure on oxygen, GERD, hypertension, hyperlipidemia and seizure disorder who presents to the ER with complaints of nausea, vomiting, decreased p.o. intake and generalized malaise. Ms. Bob Handley had right carotid endarterectomy approximately 1 month ago. She has been taking p.o. narcotics (recent surgery) and initially thought nausea and vomiting was a side effect. Ms. Bob Handley stopped narcotics but nausea and vomiting continued. Patient has developed generalized malaise, anorexia and has had decreased p.o. intake. She denies recent fever, chills, diarrhea, abdominal pain or known sick contacts. Ms. Bob Handley has been unable to take home medications because of recent nausea and vomiting (Last doses were on Tuesday). This evening she presented to the ER for further treatment and evaluation. On arrival to the ER, temperature is 97.6 °F, blood pressure 214/117, pulse 120, respirations 17 and oxygen saturation 100% on room air. Abnormal laboratory work includes sodium 125, potassium 2.1 and creatinine 1.81. Patient does not have a history of chronic kidney disease. Hypertensive urgency was treated with IV labetalol. Potassium was supplemented with 30 mEq IV. Hospital service has been asked to admit Ms. Bob Handley for further treatment and evaluation. Past medical history, past surgical history, family history, social history, home medication list was reviewed at the time of admission. Ms. Bob Handley is a former smoker. She currently denies any alcohol, illicit drug use or tobacco use. Ms. Bob Handley is a full code. Patient is a .   Her daughter, Miriam Obrien, can be reached at [phone] in the event of an emergency. Of note, this is patient's fourth admission to this facility in the past 8 weeks. Past Medical History:   Diagnosis Date    Anxiety 10/2/2020    Chronic anemia     Chronic respiratory failure (HCC)     COPD (chronic obstructive pulmonary disease) with emphysema (HCC) 10/2/2020    Depression     Diastolic CHF (HCC)     Dysphagia 10/2/2020    GERD (gastroesophageal reflux disease)     High cholesterol     Hypertension     Hypothyroid     Iron deficiency anemia 10/2/2020    Mitral valve regurgitation 10/2/2020    Renal artery stenosis (HCC)     Seizure disorder (HCC)      Past Surgical History:   Procedure Laterality Date    HX CAROTID ENDARTERECTOMY      HX CHOLECYSTECTOMY      HX RENAL ARTERY STENT      HX ROTATOR CUFF REPAIR Right     HX TUBAL LIGATION      IR THORACENTESIS CATH W IMAGE  11/24/2020    IR THORACENTESIS CATH W IMAGE  11/25/2020      Family History   Problem Relation Age of Onset    Hypertension Mother     Stroke Mother     Melanoma Mother     Stroke Father     Melanoma Brother     Melanoma Child      Social History     Tobacco Use    Smoking status: Former Smoker     Types: Cigarettes    Smokeless tobacco: Never Used    Tobacco comment: quit july 2020   Substance Use Topics    Alcohol use: Not Currently      Prior to Admission medications    Medication Sig Start Date End Date Taking? Authorizing Provider   potassium chloride (K-DUR, KLOR-CON) 20 mEq tablet Take 1 Tab by mouth daily for 4 days. 1/12/21 1/16/21  Aria Noyola NP   HYDROcodone-acetaminophen (NORCO) 5-325 mg per tablet Take 1 Tab by mouth every six (6) hours as needed for Pain for up to 5 days. Max Daily Amount: 4 Tabs. 1/12/21 1/17/21  Hema Noyola NP   esomeprazole (NexIUM) 40 mg capsule Take 40 mg by mouth. Indications: Berry's esophagus    Provider, Historical   spironolactone (ALDACTONE) 50 mg tablet Take 50 mg by mouth daily.  11/29/20 Provider, Historical   clopidogreL (PLAVIX) 75 mg tab Take 75 mg by mouth daily. 12/11/20   Provider, Historical   nortriptyline (PAMELOR) 50 mg capsule Take 50 mg by mouth nightly. Other, MD Iain   atorvastatin (LIPITOR) 80 mg tablet Take 80 mg by mouth daily. Other, MD Heladio Timmons Ellipta 100-62.5-25 mcg inhaler  11/12/20   Provider, Historical   albuterol (PROVENTIL HFA, VENTOLIN HFA, PROAIR HFA) 90 mcg/actuation inhaler  11/5/20   Provider, Historical   rosuvastatin (CRESTOR) 10 mg tablet  11/6/20   Provider, Historical   hydrALAZINE (APRESOLINE) 100 mg tablet Take 100 mg by mouth three (3) times daily. 11/6/20   Provider, Historical   NIFEdipine ER (PROCARDIA XL) 60 mg ER tablet Take 1 Tab by mouth daily. Provider, Historical   Oxygen 5 Devices as needed. Pt wears 5LPM as needed- states she uses it after an axiety attack    Provider, Historical   isosorbide dinitrate (ISORDIL) 40 mg tablet Take 1 Tab by mouth three (3) times daily. 10/2/20   Lormicky Alejandre MD   albuterol-ipratropium (DUO-NEB) 2.5 mg-0.5 mg/3 ml nebu 3 mL by Nebulization route every six (6) hours. 10/2/20   Dl Alejandre MD   labetaloL (NORMODYNE) 200 mg tablet Take 2 Tabs by mouth three (3) times daily. 10/2/20   Dl Alejandre MD   ALPRAZolam Alfreida Bottoms) 0.25 mg tablet Take 0.25 mg by mouth three (3) times daily as needed for Anxiety. Provider, Historical   sertraline (Zoloft) 25 mg tablet Take 25 mg by mouth daily. Provider, Historical   levothyroxine (SYNTHROID) 25 mcg tablet Take 25 mcg by mouth Daily (before breakfast). Provider, Historical   levETIRAcetam (Keppra) 500 mg tablet Take 500 mg by mouth two (2) times a day. Provider, Historical   carBAMazepine, mood stabiliz, 200 mg CM12 Take 200 mg by mouth two (2) times a day. Provider, Historical   ferrous sulfate 325 mg (65 mg iron) tablet Take 325 mg by mouth Daily (before breakfast).     Provider, Historical   folic acid (FOLVITE) 1 mg tablet Take 1 mg by mouth daily. Provider, Historical   aspirin delayed-release 81 mg tablet Take  by mouth daily. Provider, Historical   magnesium oxide (MAG-OX) 400 mg tablet Take 400 mg by mouth daily. Provider, Historical        Allergies   Allergen Reactions    Sulfa (Sulfonamide Antibiotics) Anaphylaxis       Review of Systems:  Constitutional: Positive for malaise and generalized weakness. Denies fevers, chills, fatigue, unexplained weight loss, night sweats. Head, Eyes, Ears, Nose, Mouth, Throat: Denies nasal congestion, sore throat, rhinorrhea, earache, ringing of the ears, difficulty hearing, facial pain, facial swelling. Respiratory: Denies shortness of breath, wheezing, cough, sputum production, hemoptysis. Denies use of oxygen at home. Cardiovascular: Denies chest pain, irregular heart beat, racing pulse, lower extremity edema, dizziness, dyspnea on exertion, orthopnea. Gastrointestinal: Positive for nausea, vomiting, decreased p.o. intake, anorexia. Denies nausea, vomiting, diarrhea, constipation, abdominal pain, loss of appetite, acid reflux, melena, hematochezia, change in bowel habits. Endocrine: Denies intolerance to heat or cold. Denies polyuria, polydipsia, polyphagia. Denies recent weight changes. Genitourinary: Denies increased urinary frequency, dysuria, hematuria, urinary incontinence, increased urinary frequency. Integument/Breast: Denies rash, itching or new skin lesions. Musculoskeletal: Denies joint swelling, joint pain, myalgias, neck pain, back pain. Neurological: Denies headaches, dizziness, confusion, tremors, numbness/tingling, paresthesias, weakness, problems with balance, loss of consciousness. Hematologic: Denies easy bleeding, easy bruising, lymphadenopathy. Behavioral/Psychiatric: Denies anxiety, depression, increased irritability, mood swings, delusions, hallucination, SI/HI.       Objective:     Vitals:    01/17/21 1811 01/17/21 1823 01/17/21 1908 01/17/21 1934   BP: (!) 210/134 (!) 174/108 (!) 216/104 (!) 177/110   Pulse: (!) 119 (!) 107 85 91   Resp:   20 16   Temp:       SpO2:  100% 100% 100%   Weight:       Height:            Physical Exam:  General: Alert and Oriented x 3. Cooperative and friendly. No acute distress. Nourished and well developed. Appears chronically ill. Patient is on oxygen supplementation via nasal cannula. Head/Eyes: Normocephalic, atraumatic, EOMI, PERRLA. Nose/Mouth: Turbinates within normal limits, No drainage. Mucous membranes are moist.  Throat and Neck: Posterior pharynx without erythema or exudate. No masses, JVD, thyromegaly or lymphadenopathy appreciated. Cervical spine has good range of motion without pain. Right surgical incision is clean dry and intact without erythema or exudate. There is some mild swelling at site. Lungs: Diminished breath sounds bilaterally without wheezes, rhonchi or crackles. Good air movement bilaterally. Symmetric chest rise with respirations. Heart: Regular rate and rhythm. Normal S1/S2. No appreciated murmurs, rubs or gallops. No lower extremity edema. Abdomen: Soft, non-tender, non-distended. Bowel sounds present in all four quadrants. No masses appreciated. Extremities:  Atraumatic. Able to move all extremities symmetrically. No abnormal bony protuberances appreciated. Back: No pain with palpation over spinous processes or paraspinal musculature. No CVA tenderness. Skin: Clean, dry and intact without appreciated lesions. Neurologic: A&Ox3. Cranial nerves 2-12 are grossly intact. Intact sensation and motor strength in all 4 extremities. No focal deficits. Psychiatric: Normal affect, normal thought process, good eye contact.      Recent Results (from the past 24 hour(s))   CBC WITH AUTOMATED DIFF    Collection Time: 01/17/21  5:00 PM   Result Value Ref Range    WBC 10.8 3.6 - 11.0 K/uL    RBC 4.94 3.80 - 5.20 M/uL    HGB 15.3 11.5 - 16.0 g/dL    HCT 43.5 35.0 - 47.0 %    MCV 88.1 80.0 - 99.0 FL    MCH 31.0 26.0 - 34.0 PG    MCHC 35.2 30.0 - 36.5 g/dL    RDW 14.2 11.5 - 14.5 %    PLATELET 509 (H) 356 - 400 K/uL    MPV 9.8 8.9 - 12.9 FL    NRBC 0.0 0  WBC    ABSOLUTE NRBC 0.00 0.00 - 0.01 K/uL    NEUTROPHILS 83 (H) 32 - 75 %    LYMPHOCYTES 8 (L) 12 - 49 %    MONOCYTES 8 5 - 13 %    EOSINOPHILS 0 0 - 7 %    BASOPHILS 0 0 - 1 %    IMMATURE GRANULOCYTES 1 (H) 0.0 - 0.5 %    ABS. NEUTROPHILS 9.1 (H) 1.8 - 8.0 K/UL    ABS. LYMPHOCYTES 0.8 0.8 - 3.5 K/UL    ABS. MONOCYTES 0.9 0.0 - 1.0 K/UL    ABS. EOSINOPHILS 0.0 0.0 - 0.4 K/UL    ABS. BASOPHILS 0.0 0.0 - 0.1 K/UL    ABS. IMM. GRANS. 0.1 (H) 0.00 - 0.04 K/UL    DF AUTOMATED     METABOLIC PANEL, COMPREHENSIVE    Collection Time: 01/17/21  5:00 PM   Result Value Ref Range    Sodium 125 (L) 136 - 145 mmol/L    Potassium 2.1 (LL) 3.5 - 5.1 mmol/L    Chloride 85 (L) 97 - 108 mmol/L    CO2 27 21 - 32 mmol/L    Anion gap 13 5 - 15 mmol/L    Glucose 143 (H) 65 - 100 mg/dL    BUN 30 (H) 6 - 20 mg/dL    Creatinine 1.81 (H) 0.55 - 1.02 mg/dL    BUN/Creatinine ratio 17 12 - 20      GFR est AA 35 (L) >60 ml/min/1.73m2    GFR est non-AA 29 (L) >60 ml/min/1.73m2    Calcium 9.2 8.5 - 10.1 mg/dL    Bilirubin, total 0.3 0.2 - 1.0 mg/dL    AST (SGOT) 32 15 - 37 U/L    ALT (SGPT) 30 12 - 78 U/L    Alk. phosphatase 163 (H) 45 - 117 U/L    Protein, total 8.0 6.4 - 8.2 g/dL    Albumin 2.6 (L) 3.5 - 5.0 g/dL    Globulin 5.4 (H) 2.0 - 4.0 g/dL    A-G Ratio 0.5 (L) 1.1 - 2.2     LIPASE    Collection Time: 01/17/21  5:00 PM   Result Value Ref Range    Lipase 201 73 - 393 U/L   GLUCOSE, POC    Collection Time: 01/17/21  6:11 PM   Result Value Ref Range    Glucose (POC) 152 (H) 65 - 100 mg/dL    Performed by JERRY BARNES        XR Results (maximum last 3):   Results from Hospital Encounter encounter on 01/17/21   XR CHEST PORT    Narrative Examination: XR CHEST PORT     History: SEPSIS    Comparison: Chest radiograph January 3, 2021    FINDINGS:    Single frontal portable view of the chest. Symmetric lung volumes without focal  airspace consolidation, pneumothorax, or significant pleural effusion. Cardiac  mediastinal silhouette is within normal limits for size. There is  atherosclerosis of the thoracic aorta. Remote healed right lower rib fractures. Impression IMPRESSION:  1. No findings of acute cardiopulmonary abnormality. Results from Hospital Encounter encounter on 01/03/21   XR CHEST PORT    Narrative HISTORY:  syncope    TECHNIQUE:  AP chest radiograph    COMPARISON: 11/25/2020  LIMITATIONS: None    TUBES/LINES: None    LUNG PARENCHYMA: Overall the interstitial markings do appear increased but have  improved since prior examination. No focal airspace disease  TRACHEA/BRONCHI: Normal  PULMONARY VESSELS: Normal  PLEURA: Normal  HEART: Normal  AORTIC SHADOW:Normal.    MEDIASTINUM: Normal  BONE/SOFT TISSUES: No acute abnormality. OTHER: None      Impression IMPRESSION: Considerations for baseline interstitial lung disease or mild  pulmonary edema. Results from [place] encounter on 11/18/20   XR CHEST PORT    Narrative Chest, frontal view, 11/25/2020    History: Right thoracentesis. Comparison: Including chest 11/18/2020. Findings: The cardiac silhouette is enlarged. The lungs are adequately  expanded. Pulmonary vascular congestion and hydrostatic edema are improved as  compared to chest 11/18/2020. Small appearing pleural effusions and bibasilar  atelectasis are noted. These findings are improved on the right. Aeration of  the left base is also mildly improved. No pneumothorax is definitively  identified. The osseous structures are stable. Impression Impression: Interval improvement in hydrostatic edema. Pleural effusions and  bibasilar atelectasis, improved. No pneumothorax definitively identified. CT Results (maximum last 3):   Results from [place] encounter on 01/17/21   CT HEAD WO CONT    Narrative Exam: CT Head without contrast    TECHNIQUE: Multiple transaxial CT images of the head were obtained without  contrast. Coronal and sagittal reformatted images were provided. Dose reduction: All CT scans at this facility are performed using dose reduction  optimization techniques as appropriate to a performed exam including the  following: Automated exposure control, adjustments of the mA and/or kV according  to patient size, or use of iterative reconstruction technique. HISTORY: HA and hypertensive. COMPARISON: Head CT January 4, 2021, December 27, 2019, May 20, 2015    FINDINGS:    Mild symmetric parenchymal volume loss with associated proportional ex vacuo  dilatation of the ventricles and extra-axial CSF spaces. No significant midline  shift or mass effect. There is mild periventricular white matter hypoattenuation  that is nonspecific but most commonly the result of chronic microangiopathy. A  small calcific density within the central aspect of the renzo appears similar to  prior. There is a punctate calcification in the right posterior temporal region  which may be extra-axial but was not seen on prior exam. No findings of acute  intracranial hemorrhage. Gray-white matter differentiation appears preserved. The basilar cisterns are preserved. Intracranial atherosclerosis. Mastoid air cells appear clear. Visualized paranasal sinuses appear clear. Globes and orbits appear unremarkable. Calvarium appears intact without findings  of acute abnormality. Impression IMPRESSION:  1. No findings of acute intracranial abnormality. 2. Calcification within the central renzo appearing similar to prior. There is an  additional punctate calcification within the right posterior temporal region. Further evaluation with non-emergent brain MRI should be considered. 3. Other chronic findings, including nonspecific white matter findings that are  most commonly the result of chronic microangiopathy.    Results from [facility] encounter on 01/03/21   CTA NECK    Narrative Study: Neck CTA without and with contrast.    Clinical Indication: Syncope and carotid artery stenosis. Comparison: Cervical spine MRI dated 7/30/2020. Technique: Routine unenhanced axial acquisition of the neck was performed. Subsequently, contiguous thin section axial acquisition of the neck was  performed in the arterial phase from the thoracic inlet to the skull base  following the intravenous administration of 100 mL Isovue-370. Coronal,  sagittal, and MIP reconstructions were generated and reviewed. Dose reduction:  All CT scans at this facility are performed using dose reduction optimization  techniques as appropriate to a performed exam including the following-automated  exposure control, adjustments of mA and/or Kv according to patient size, or use  of iterative reconstructive technique. Findings:    Calcified calcified plaque in the aortic arch and great vessel origins without  significant stenosis. Scattered calcified plaque in the proximal bilateral  subclavian arteries without significant stenosis. The common carotid arteries are patent without high-grade stenosis. Densely  calcified plaque in the bilateral carotid bulbs causing 60-65% stenosis of the  right internal carotid artery origin and 40-45% stenosis of the left internal  carotid artery origin based on NASCET criteria. Please note that the degree of  calcification somewhat limits precise measurement evaluation. The more distal  cervical internal carotid arteries are patent without high-grade stenosis. The  external carotid arteries are unremarkable. The cervical vertebral arteries are patent without high-grade stenosis. The paraspinal soft tissues are unremarkable. Trace centrilobular emphysema  Centrilobular emphysema. Multilevel cervical spondylosis. Impression Impression:  1. Moderate (60-65%) stenosis of the right ICA origin.   2.  Mild (40-45%) stenosis of the left ICA origin. Please note that degrees of carotid stenosis are measured in accordance with  NASCET criteria. CT HEAD WO CONT    Narrative Syncope. Comparison head CT 7/28/2020. Technique: Axial images  head without IV contrast. Multiplanar reformatting  performed. Dose reduction: All CT scans at this facility are performed using dose reduction  optimization techniques as appropriate to a performed exam including the  following: Automated exposure control, adjustments of the mA and/or kV according  to patient's size, or use of iterative reconstruction technique. Findings: Pontine calcifications similar to previous. Minimal bilateral  periventricular white matter hypoattenuation from chronic microangiopathy. No  mass effect, extra-axial fluid collection, hemorrhage. CSF-containing structures  normal size, shape, position. No calvarial fractures. Included facial sinuses  and mastoid air cells are unopacified. Impression Impression:   1. No acute intracranial findings. Assessment:     Rose Tijerina is a 54 y.o. female ending to the ER with nausea, vomiting, decreased p.o. intake, anorexia and malaise. COVID-19 results are pending at the time of admission. Admit for acute kidney injury, hyponatremia and hypokalemia. Electrolyte abnormalities are likely secondary to recent nausea and vomiting. Plan:     1. Admit to telemetry bed. 2. Check Urine potassium, Urine Sodium, Plasma osmolality, Urine Osmolality, TSH, levels. 3. Order nephrology consult in the morning if hyponatremia does not improve. Avoid nephrotoxic medications including NSAIDs, ACEi, ARDs, IV dye. Start NS IVF - recheck BMP at midnight. 4. Monitor I/Os and place fluid restriction. Order Q4H neuro checks. 5. Hold home medications that may be the cause of hyponatremia (zoloft). 6. For history of hypertension, continue home dose of hydralazine, labetalol and Nifedipine (with holding parameters).   7. For history of DCHF, continue home dose of isordil (with holding parameters). 8. For history of seizure disorder, continue home dose of Keppra and Tegretol. 9. For history of COPD and chronic hypoxic respiratory failure, continue home dose of Trelegy and order albuterol as needed for shortness of breath and wheezing. Continue oxygen supplementation via nasal cannula (uses oxygen at home). 10. For history of hyperlipidemia, continue home dose of Crestor. 11. For history of chronic anemia, continue home dose of FeSO4 and folic acid. 12. For history of hypothyroidism, continue home dose of levothyroxine. GI PPX: Diet ordered. DVT PPX: Heparin SQ.      Signed By: Sean Arredondo MD     January 17, 2021

## 2021-01-18 NOTE — CONSULTS
Nephrology Consult    Patient: Price Dumont MRN: [id number]  SSN: [id number]    YOB: 1965  Age: 54 y.o. Sex: female      Subjective: The pt is 53 yo woman with recent R Renal artery stent placement in Nov 2020, left atrophic kidney, s/p R internal carotid endarterectomy on 1/08/21, CKD III (last Cr 1.4 in Nov 2020) was admitted with N/V, /104, K 2.1, Na 125 and Cr 1.7. Was put on IVF. CT abd no acute process. No LE swelling. No chest complaints.      Past Medical History:   Diagnosis Date    Anxiety 10/2/2020    Chronic anemia     Chronic respiratory failure (HCC)     COPD (chronic obstructive pulmonary disease) with emphysema (HCC) 10/2/2020    Depression     Diastolic CHF (HCC)     Dysphagia 10/2/2020    GERD (gastroesophageal reflux disease)     High cholesterol     Hypertension     Hypothyroid     Iron deficiency anemia 10/2/2020    Mitral valve regurgitation 10/2/2020    Renal artery stenosis (HCC)     Seizure disorder (HCC)      Past Surgical History:   Procedure Laterality Date    HX CAROTID ENDARTERECTOMY      HX CHOLECYSTECTOMY      HX RENAL ARTERY STENT      HX ROTATOR CUFF REPAIR Right     HX TUBAL LIGATION      IR THORACENTESIS CATH W IMAGE  11/24/2020    IR THORACENTESIS CATH W IMAGE  11/25/2020      Family History   Problem Relation Age of Onset    Hypertension Mother     Stroke Mother     Melanoma Mother     Stroke Father     Melanoma Brother     Melanoma Child      Social History     Tobacco Use    Smoking status: Former Smoker     Types: Cigarettes    Smokeless tobacco: Never Used    Tobacco comment: quit july 2020   Substance Use Topics    Alcohol use: Not Currently      Current Facility-Administered Medications   Medication Dose Route Frequency Provider Last Rate Last Admin    potassium chloride (KLOR-CON) packet for solution 40 mEq  40 mEq Oral BID WITH MEALS Milagro Nix MD   40 mEq at 01/18/21 1348    methylPREDNISolone (PF) (SOLU-MEDROL) injection 40 mg  40 mg IntraVENous Q8H Milagro Nix MD   40 mg at 01/18/21 1346    diphenhydrAMINE (BENADRYL) injection 25 mg  25 mg IntraVENous Q6H PRN Jenny Rosario MD        budesonide-formoteroL [facility]) 160-4.5 mcg/actuation HFA inhaler 2 Puff  2 Puff Inhalation BID RT Abdullahi Munson MD        0.9% sodium chloride infusion  125 mL/hr IntraVENous CONTINUOUS Darcella Girt, NP   Stopped at 01/18/21 0048    aspirin delayed-release tablet 81 mg  81 mg Oral DAILY Abdullahi Munson MD   81 mg at 01/18/21 0845    atorvastatin (LIPITOR) tablet 80 mg  80 mg Oral DAILY Abdullahi Munson MD   80 mg at 01/18/21 0845    clopidogreL (PLAVIX) tablet 75 mg  75 mg Oral DAILY Abdullahi Munson MD   75 mg at 01/18/21 0845    hydrALAZINE (APRESOLINE) tablet 100 mg  100 mg Oral TID Abdullahi Munson MD   100 mg at 01/18/21 0845    levothyroxine (SYNTHROID) tablet 25 mcg  25 mcg Oral ACB Abdullahi Munson MD   25 mcg at 01/18/21 1346    levETIRAcetam (KEPPRA) tablet 500 mg  500 mg Oral BID Abdullahi Munson MD   500 mg at 01/18/21 0845    NIFEdipine ER (PROCARDIA XL) tablet 60 mg  60 mg Oral DAILY Abdullahi Munson MD   60 mg at 01/18/21 0845    labetaloL (NORMODYNE) tablet 400 mg  400 mg Oral TID Abdullahi Munson MD   400 mg at 01/18/21 0844    albuterol (PROVENTIL HFA, VENTOLIN HFA, PROAIR HFA) inhaler 2 Puff  2 Puff Inhalation Q4H PRN Abdullahi Munson MD        acetaminophen (TYLENOL) tablet 650 mg  650 mg Oral Q6H PRN Abdullahi Munson MD        Or    acetaminophen (TYLENOL) suppository 650 mg  650 mg Rectal Q6H PRN Abdullahi Munson MD        polyethylene glycol (MIRALAX) packet 17 g  17 g Oral DAILY PRN Abdullahi Munson MD        promethazine (PHENERGAN) tablet 12.5 mg  12.5 mg Oral Q6H PRN Abdullahi Munson MD   12.5 mg at 01/18/21 0844    Or    ondansetron (ZOFRAN) injection 4 mg  4 mg IntraVENous Q6H PRN Abdullahi Munson MD  enoxaparin (LOVENOX) injection 30 mg  30 mg SubCUTAneous DAILY Angela Reeves MD   30 mg at 01/18/21 1871     Current Outpatient Medications   Medication Sig Dispense Refill    esomeprazole (NexIUM) 40 mg capsule Take 40 mg by mouth. Indications: Berry's esophagus      spironolactone (ALDACTONE) 50 mg tablet Take 50 mg by mouth daily.  clopidogreL (PLAVIX) 75 mg tab Take 75 mg by mouth daily.  nortriptyline (PAMELOR) 50 mg capsule Take 50 mg by mouth nightly.  atorvastatin (LIPITOR) 80 mg tablet Take 80 mg by mouth daily.  Trelegy Ellipta 100-62.5-25 mcg inhaler       albuterol (PROVENTIL HFA, VENTOLIN HFA, PROAIR HFA) 90 mcg/actuation inhaler       rosuvastatin (CRESTOR) 10 mg tablet       hydrALAZINE (APRESOLINE) 100 mg tablet Take 100 mg by mouth three (3) times daily.  NIFEdipine ER (PROCARDIA XL) 60 mg ER tablet Take 1 Tab by mouth daily.  Oxygen 5 Devices as needed. Pt wears 5LPM as needed- states she uses it after an axiety attack      isosorbide dinitrate (ISORDIL) 40 mg tablet Take 1 Tab by mouth three (3) times daily. 90 Tab 0    albuterol-ipratropium (DUO-NEB) 2.5 mg-0.5 mg/3 ml nebu 3 mL by Nebulization route every six (6) hours. 120 Nebule 0    labetaloL (NORMODYNE) 200 mg tablet Take 2 Tabs by mouth three (3) times daily. 180 Tab 0    ALPRAZolam (XANAX) 0.25 mg tablet Take 0.25 mg by mouth three (3) times daily as needed for Anxiety.  sertraline (Zoloft) 25 mg tablet Take 25 mg by mouth daily.  levothyroxine (SYNTHROID) 25 mcg tablet Take 25 mcg by mouth Daily (before breakfast).  levETIRAcetam (Keppra) 500 mg tablet Take 500 mg by mouth two (2) times a day.  carBAMazepine, mood stabiliz, 200 mg CM12 Take 200 mg by mouth two (2) times a day.  ferrous sulfate 325 mg (65 mg iron) tablet Take 325 mg by mouth Daily (before breakfast).  folic acid (FOLVITE) 1 mg tablet Take 1 mg by mouth daily.       aspirin delayed-release 81 mg tablet Take  by mouth daily.  magnesium oxide (MAG-OX) 400 mg tablet Take 400 mg by mouth daily. Allergies   Allergen Reactions    Sulfa (Sulfonamide Antibiotics) Anaphylaxis       Review of Systems:  A comprehensive review of systems was negative except for that written in the History of Present Illness. Objective:     Vitals:    01/18/21 0514 01/18/21 0629 01/18/21 0700 01/18/21 0844   BP: (!) 150/81 (!) 163/94 (!) 175/79 (!) 172/82   Pulse: 72 77 74 75   Resp: 16 16 22    Temp:       SpO2: 97% 98% 100%    Weight:       Height:            Physical Exam:  General: NAD  Eyes: sclera anicteric  Oral Cavity: No thrush or ulcers  Neck: no JVD  Chest: Fair bilateral air entry  Heart: normal sounds  Abdomen: soft and non tender   : no llamas  Lower Extremities: no edema  Skin: no rash  Neuro: intact  Psychiatric: non-depressed            Assessment:     Hospital Problems  Date Reviewed: 1/14/2021          Codes Class Noted POA    Hyponatremia ICD-10-CM: E87.1  ICD-9-CM: 276.1  1/17/2021 Unknown        PEDRO PABLO (acute kidney injury) ([facility] [address] ICD-10-CM: N17.9  ICD-9-CM: 584.9  10/2/2020 Unknown        Hypokalemia ICD-10-CM: E87.6  ICD-9-CM: 276.8  10/2/2020 Unknown              Plan:     1-PEDRO PABLO on CKD III:  -2/2 pre renal from N/V  -Cr 1.7  -BL Cr seems to be 1.4 in Nov 2020  -looks volume down, UA bland sediment   -CT abd no hydronephrosis, atrophic left kidney  -will keep on IVF    2-severe Hypokalemia:  -r/o hyperaldo state  -K 2.1  -po KCL 80 meq daily    3-HTN:  -BP  216/104  -Now better  -on nifedipine/labetolol/hydralazine     4-Hyponatremia:  -of mod severity  -can be volume depletion  -Na has improved from 125->130  -will keep on NS    5-Renal artery stenosis:  -s/p RA stent placement in 11/2020  -left atrophic kidney  -will check renin, arabella  -will avoid ACE I/ARBs    Thank you for the consultation.        Signed By: Claudene Shores, MD     January 18, 2021

## 2021-01-18 NOTE — PROGRESS NOTES
Patient with complaints of tongue swelling. Tongue is slightly swollen. Patient can easily speak and is handling oral secretions well. No evidence of posterior pharynx edema. Start IV solumedrol, famotidine and benadryl.

## 2021-01-18 NOTE — PROGRESS NOTES
Reason for Admission:   PEDRO PABLO/Hypokalemia                   RUR Score:                   N/A  Plan for utilizing home health:    None/declined. PCP: First and Last name:  Merline Sham   Name of Practice:    Are you a current patient: Yes/No: Yes   Approximate date of last visit: Approx 3 mos ago. Can you participate in a virtual visit with your PCP:                   No  Current Advanced Directive/Advance Care Plan:                        No  Transition of Care Plan:       Discharge plan is for home. Boyfriend Yaquelin Danielle @ [phone]) to transport. Pt uses no DME & declined home health.

## 2021-01-18 NOTE — PROGRESS NOTES
Hospitalist Progress Note    NAME: Bharat Chaidez   :  1965   MRN:  [id number]       Subjective:     Chief Complaint / Reason for Physician Visit  Patient seen and evaluated at bedside, no more episodes of vomiting this a.m. however still has nausea. Discussed with RN events overnight. Review of Systems:  Symptom Y/N Comments  Symptom Y/N Comments   Fever/Chills N   Chest Pain N    Poor Appetite Y   Edema N    Cough N   Abdominal Pain N    Sputum N   Joint Pain N    SOB/TRAN N   Pruritis/Rash N    Nausea/vomit Y   Tolerating PT/OT NA    Diarrhea N   Tolerating Diet NA    Constipation N   Other       Could NOT obtain due to:    Patient denies any fevers chills lightheadedness dizziness dyspnea orthopnea paroxysmal nocturnal dyspnea chest pain palpitations headache focal weakness loss of sensation auditory or visual symptoms or stool or urinary complaints. Objective:     VITALS:   Last 24hrs VS reviewed since prior progress note. Most recent are:  Patient Vitals for the past 24 hrs:   Temp Pulse Resp BP SpO2   21 0844  75  (!) 172/82    21 0700  74 22 (!) 175/79 100 %   21 0629  77 16 (!) 163/94 98 %   21 0514  72 16 (!) 150/81 97 %   21 0052  75 19 (!) 159/75 100 %   21 2339  84 15 (!) 178/95 100 %   21 2230  78 17 (!) 175/102 100 %   21 2130  79 18 (!) 172/107 100 %   21 2031  79 16 (!) 187/107 100 %   21 1934  91 16 (!) 177/110 100 %   21 1908  85 20 (!) 216/104 100 %   21 1823  (!) 107  (!) 174/108 100 %   21 1811  (!) 119  (!) 210/134    21 1650 97.6 °F (36.4 °C) (!) 120 17 (!) 214/117 100 %       Intake/Output Summary (Last 24 hours) at 2021 0942  Last data filed at 2021 0048  Gross per 24 hour   Intake 1737.5 ml   Output    Net 1737.5 ml        PHYSICAL EXAM:  General: Patient appears comfortable   EENT:  EOMI. Anicteric sclerae.   Dry mucous membranes  Resp:  CTA bilaterally, no wheezing or rales. No accessory muscle use  CV:  Regular  rhythm, S1 plus S2, no murmurs rubs or gallops,  No edema  GI:  Soft, Non distended, Non tender. +Bowel sounds  Neurologic:  Alert and oriented X 3, normal speech,   Psych:   Good insight. Not anxious nor agitated  Skin:  No rashes. No jaundice    Procedures: see electronic medical records for all procedures/Xrays and details which were not copied into this note but were reviewed prior to creation of Plan. LABS:  I reviewed today's most current labs and imaging studies. Pertinent labs include:  Recent Labs     01/18/21 0425 01/17/21  1700   WBC 11.4* 10.8   HGB 11.6 15.3   HCT 33.3* 43.5   * 562*     Recent Labs     01/18/21 0425 01/18/21  0138 01/17/21  1700   * 128* 125*   K 2.3* 2.5* 2.1*   CL 93* 92* 85*   CO2 25 27 27   * 133* 143*   BUN 31* 31* 30*   CREA 1.71* 1.64* 1.81*   CA 7.8* 7.8* 9.2   MG 1.6  --   --    ALB  --   --  2.6*   TBILI  --   --  0.3   ALT  --   --  30       Signed: Catarina Rueda MD    CT head:IMPRESSION  IMPRESSION:  1. No findings of acute intracranial abnormality. 2. Calcification within the central renzo appearing similar to prior. There is an  additional punctate calcification within the right posterior temporal region. Further evaluation with non-emergent brain MRI should be considered. 3. Other chronic findings, including nonspecific white matter findings that are  most commonly the result of chronic microangiopathy.       Reviewed most current lab test results and cultures  YES  Reviewed most current radiology test results   YES  Review and summation of old records today    NO  Reviewed patient's current orders and MAR    YES  PMH/SH reviewed - no change compared to H&P      Assessment / Plan:  Intractable nausea and vomitingpatient presents with intractable nausea and vomiting, likely multifactorial including secondary to gastritis, no more episodes of vomiting however still has persistent nausea, doubt infectious in etiology  Obtain CT abdomen pelvis further evaluation  Maintenance IV fluids  Zofran/Phenergan as needed  Continue to monitor    Hypokalemiaaggressively replete potassium and recheck potassium    Hypomagnesemiaaggressively replete magnesium and recheck magnesium    Acute kidney injurylikely prerenal however differentials include renal versus post renal  Follow-up urine electrolytes to calculate fractional excretion of sodium  Continue maintenance IV fluids  Continue to trend serum creatinine  Obtain nephrology consult    Hyponatremialikely secondary to hypovolemic causes given the fact that patient was significantly dehydrated upon presentation, currently improving at satisfactory rate  Continue normal saline at 125 cc/h  Continue to trend serum sodium  Nephrology consult    Tongue swellingpatient found to have tongue swelling last night, currently appears to be slightly improved, patient protecting airway, and tolerating secretions however still has some tongue swelling  Continue methylprednisolone  Continue Benadryl as needed  Continue to monitor    Hypothyroidismcontinue Synthroid    Hyperlipidemiacontinue statin    Seizure disordercontinue medications  ProphylaxisHeparin subcu  FENclear liquid diet, advance as tolerated, maintenance IV fluids, replete potassium and magnesium  Full code  Surrogate decision-maker is her   Dispositioncontinued inpatient care pending clinical improvement      less than 18.5 Underweight / Body mass index is 16.99 kg/m². Code status: Full  Prophylaxis: Hep SQ  Recommended Disposition:  PT, OT, RN     ________________________________________________________________________  Care Plan discussed with:    Comments   Patient X    Family  X    RN X    Care Manager X    Consultant  X                     X Multidiciplinary team rounds were held today with , nursing, pharmacist and clinical coordinator.   Patient's plan of care was discussed; medications were reviewed and discharge planning was addressed.      ________________________________________________________________________  Total NON critical care TIME: 35  Minutes      Comments   >50% of visit spent in counseling and coordination of care X    ________________________________________________________________________  Kim Mood, MD

## 2021-01-18 NOTE — PROGRESS NOTES
1/18/21. PCP is Dr. Leon Player has f/u appt this Wednesday 1/20/21. Uses no DME & declined home health. D/C plan & home & boyfriend Ty De @ [phone]) to transport upon discharge.

## 2021-01-19 LAB
ALBUMIN SERPL-MCNC: 2.4 G/DL (ref 3.5–5)
ANION GAP SERPL CALC-SCNC: 9 MMOL/L (ref 5–15)
ANION GAP SERPL CALC-SCNC: 9 MMOL/L (ref 5–15)
BUN SERPL-MCNC: 29 MG/DL (ref 6–20)
BUN SERPL-MCNC: 29 MG/DL (ref 6–20)
BUN/CREAT SERPL: 19 (ref 12–20)
BUN/CREAT SERPL: 20 (ref 12–20)
CA-I BLD-MCNC: 8.2 MG/DL (ref 8.5–10.1)
CA-I BLD-MCNC: 8.3 MG/DL (ref 8.5–10.1)
CHLORIDE SERPL-SCNC: 103 MMOL/L (ref 97–108)
CHLORIDE SERPL-SCNC: 103 MMOL/L (ref 97–108)
CO2 SERPL-SCNC: 22 MMOL/L (ref 21–32)
CO2 SERPL-SCNC: 23 MMOL/L (ref 21–32)
CREAT SERPL-MCNC: 1.48 MG/DL (ref 0.55–1.02)
CREAT SERPL-MCNC: 1.51 MG/DL (ref 0.55–1.02)
ERYTHROCYTE [DISTWIDTH] IN BLOOD BY AUTOMATED COUNT: 15 % (ref 11.5–14.5)
GLUCOSE SERPL-MCNC: 116 MG/DL (ref 65–100)
GLUCOSE SERPL-MCNC: 117 MG/DL (ref 65–100)
HCT VFR BLD AUTO: 28.6 % (ref 35–47)
HGB BLD-MCNC: 9.7 G/DL (ref 11.5–16)
MAGNESIUM SERPL-MCNC: 1.7 MG/DL (ref 1.6–2.4)
MCH RBC QN AUTO: 31.2 PG (ref 26–34)
MCHC RBC AUTO-ENTMCNC: 33.9 G/DL (ref 30–36.5)
MCV RBC AUTO: 92 FL (ref 80–99)
PHOSPHATE SERPL-MCNC: 1.5 MG/DL (ref 2.6–4.7)
PLATELET # BLD AUTO: 363 K/UL (ref 150–400)
PMV BLD AUTO: 9.8 FL (ref 8.9–12.9)
POTASSIUM SERPL-SCNC: 4.2 MMOL/L (ref 3.5–5.1)
POTASSIUM SERPL-SCNC: 4.3 MMOL/L (ref 3.5–5.1)
RBC # BLD AUTO: 3.11 M/UL (ref 3.8–5.2)
SODIUM SERPL-SCNC: 134 MMOL/L (ref 136–145)
SODIUM SERPL-SCNC: 135 MMOL/L (ref 136–145)
WBC # BLD AUTO: 11.6 K/UL (ref 3.6–11)

## 2021-01-19 PROCEDURE — 80069 RENAL FUNCTION PANEL: CPT

## 2021-01-19 PROCEDURE — 94640 AIRWAY INHALATION TREATMENT: CPT

## 2021-01-19 PROCEDURE — 74011250636 HC RX REV CODE- 250/636: Performed by: FAMILY MEDICINE

## 2021-01-19 PROCEDURE — 74011250637 HC RX REV CODE- 250/637: Performed by: FAMILY MEDICINE

## 2021-01-19 PROCEDURE — 77010033678 HC OXYGEN DAILY

## 2021-01-19 PROCEDURE — 74011250636 HC RX REV CODE- 250/636: Performed by: INTERNAL MEDICINE

## 2021-01-19 PROCEDURE — 94760 N-INVAS EAR/PLS OXIMETRY 1: CPT

## 2021-01-19 PROCEDURE — 36415 COLL VENOUS BLD VENIPUNCTURE: CPT

## 2021-01-19 PROCEDURE — 80048 BASIC METABOLIC PNL TOTAL CA: CPT

## 2021-01-19 PROCEDURE — 85027 COMPLETE CBC AUTOMATED: CPT

## 2021-01-19 PROCEDURE — 65270000029 HC RM PRIVATE

## 2021-01-19 PROCEDURE — 82088 ASSAY OF ALDOSTERONE: CPT

## 2021-01-19 PROCEDURE — 74011250636 HC RX REV CODE- 250/636: Performed by: NURSE PRACTITIONER

## 2021-01-19 PROCEDURE — 83735 ASSAY OF MAGNESIUM: CPT

## 2021-01-19 RX ORDER — ALPRAZOLAM 0.25 MG/1
0.25 TABLET ORAL
Status: COMPLETED | OUTPATIENT
Start: 2021-01-19 | End: 2021-01-19

## 2021-01-19 RX ORDER — NORTRIPTYLINE HYDROCHLORIDE 25 MG/1
50 CAPSULE ORAL ONCE
Status: COMPLETED | OUTPATIENT
Start: 2021-01-19 | End: 2021-01-19

## 2021-01-19 RX ADMIN — LABETALOL HYDROCHLORIDE 400 MG: 100 TABLET, FILM COATED ORAL at 09:08

## 2021-01-19 RX ADMIN — NIFEDIPINE 60 MG: 30 TABLET, FILM COATED, EXTENDED RELEASE ORAL at 09:08

## 2021-01-19 RX ADMIN — METHYLPREDNISOLONE SODIUM SUCCINATE 40 MG: 40 INJECTION, POWDER, FOR SOLUTION INTRAMUSCULAR; INTRAVENOUS at 15:13

## 2021-01-19 RX ADMIN — LEVOTHYROXINE SODIUM 25 MCG: 0.03 TABLET ORAL at 05:47

## 2021-01-19 RX ADMIN — METHYLPREDNISOLONE SODIUM SUCCINATE 40 MG: 40 INJECTION, POWDER, FOR SOLUTION INTRAMUSCULAR; INTRAVENOUS at 05:46

## 2021-01-19 RX ADMIN — LEVETIRACETAM 500 MG: 250 TABLET, FILM COATED ORAL at 21:21

## 2021-01-19 RX ADMIN — HYDRALAZINE HYDROCHLORIDE 100 MG: 50 TABLET, FILM COATED ORAL at 21:20

## 2021-01-19 RX ADMIN — LABETALOL HYDROCHLORIDE 400 MG: 100 TABLET, FILM COATED ORAL at 21:20

## 2021-01-19 RX ADMIN — METHYLPREDNISOLONE SODIUM SUCCINATE 40 MG: 40 INJECTION, POWDER, FOR SOLUTION INTRAMUSCULAR; INTRAVENOUS at 21:21

## 2021-01-19 RX ADMIN — ENOXAPARIN SODIUM 30 MG: 30 INJECTION SUBCUTANEOUS at 09:07

## 2021-01-19 RX ADMIN — NORTRIPTYLINE HYDROCHLORIDE 50 MG: 25 CAPSULE ORAL at 21:20

## 2021-01-19 RX ADMIN — LEVETIRACETAM 500 MG: 250 TABLET, FILM COATED ORAL at 09:08

## 2021-01-19 RX ADMIN — BUDESONIDE AND FORMOTEROL FUMARATE DIHYDRATE 2 PUFF: 160; 4.5 AEROSOL RESPIRATORY (INHALATION) at 08:02

## 2021-01-19 RX ADMIN — ASPIRIN 81 MG: 81 TABLET, COATED ORAL at 09:08

## 2021-01-19 RX ADMIN — BUDESONIDE AND FORMOTEROL FUMARATE DIHYDRATE 2 PUFF: 160; 4.5 AEROSOL RESPIRATORY (INHALATION) at 20:19

## 2021-01-19 RX ADMIN — ATORVASTATIN CALCIUM 80 MG: 40 TABLET, FILM COATED ORAL at 09:08

## 2021-01-19 RX ADMIN — SODIUM CHLORIDE 125 ML/HR: 9 INJECTION, SOLUTION INTRAVENOUS at 05:51

## 2021-01-19 RX ADMIN — CLOPIDOGREL BISULFATE 75 MG: 75 TABLET ORAL at 09:08

## 2021-01-19 RX ADMIN — ALPRAZOLAM 0.25 MG: 0.25 TABLET ORAL at 21:21

## 2021-01-19 RX ADMIN — HYDRALAZINE HYDROCHLORIDE 100 MG: 50 TABLET, FILM COATED ORAL at 09:08

## 2021-01-19 NOTE — PROGRESS NOTES
Hospitalist Progress Note               Daily Progress Note: 1/19/2021      Subjective: The patient is seen for follow  up.   77-year-old female with past medical history of COPD, GERD, hypertension, hyperlipidemia and seizure disorder was admitted to the hospital with intractable nausea vomiting and decreased oral intake. Patient was found to have electrolyte abnormalities including hyponatremia, hypokalemia on admission. These have improved. Patient complains of dizziness when she stands up and walk around. She states her nausea and vomiting is improved and she is tolerating liquid diet well.     Medications reviewed  Current Facility-Administered Medications   Medication Dose Route Frequency    potassium phosphate 20 mmol in 0.9% sodium chloride 250 mL infusion   IntraVENous ONCE    methylPREDNISolone (PF) (SOLU-MEDROL) injection 40 mg  40 mg IntraVENous Q8H    diphenhydrAMINE (BENADRYL) injection 25 mg  25 mg IntraVENous Q6H PRN    budesonide-formoteroL (SYMBICORT) 160-4.5 mcg/actuation HFA inhaler 2 Puff  2 Puff Inhalation BID RT    0.9% sodium chloride infusion  100 mL/hr IntraVENous CONTINUOUS    aspirin delayed-release tablet 81 mg  81 mg Oral DAILY    atorvastatin (LIPITOR) tablet 80 mg  80 mg Oral DAILY    clopidogreL (PLAVIX) tablet 75 mg  75 mg Oral DAILY    hydrALAZINE (APRESOLINE) tablet 100 mg  100 mg Oral TID    levothyroxine (SYNTHROID) tablet 25 mcg  25 mcg Oral ACB    levETIRAcetam (KEPPRA) tablet 500 mg  500 mg Oral BID    NIFEdipine ER (PROCARDIA XL) tablet 60 mg  60 mg Oral DAILY    labetaloL (NORMODYNE) tablet 400 mg  400 mg Oral TID    albuterol (PROVENTIL HFA, VENTOLIN HFA, PROAIR HFA) inhaler 2 Puff  2 Puff Inhalation Q4H PRN    acetaminophen (TYLENOL) tablet 650 mg  650 mg Oral Q6H PRN    Or    acetaminophen (TYLENOL) suppository 650 mg  650 mg Rectal Q6H PRN    polyethylene glycol (MIRALAX) packet 17 g  17 g Oral DAILY PRN    promethazine (PHENERGAN) tablet 12.5 mg  12.5 mg Oral Q6H PRN    Or    ondansetron (ZOFRAN) injection 4 mg  4 mg IntraVENous Q6H PRN    enoxaparin (LOVENOX) injection 30 mg  30 mg SubCUTAneous DAILY       Review of Systems:   A comprehensive review of systems was negative except for that written in the HPI. Objective:   Physical Exam:     Visit Vitals  BP (!) 160/74 (BP 1 Location: Right arm, BP Patient Position: At rest)   Pulse 73   Temp 97.9 °F (36.6 °C)   Resp 18   Ht 5' 4\" (1.626 m)   Wt 46.2 kg (101 lb 13.6 oz)   SpO2 97%   BMI 17.48 kg/m²    O2 Flow Rate (L/min): 3 l/min O2 Device: Nasal cannula    Temp (24hrs), Av.1 °F (36.7 °C), Min:97.9 °F (36.6 °C), Max:98.2 °F (36.8 °C)    No intake/output data recorded.  1901 -  0700  In: 2540 [I.V.:2540]  Out: -     PHYSICAL EXAM:  General alert and awake, follows commands. Skin: Extremities and face reveal no rashes. HEENT: Sclerae anicteric. Extra-occular muscles are intact. No oral ulcers. No ENT discharge. Right carotid endarterectomy staples in situ, mild swelling noticed. Cardiovascular: Regular rate and rhythm. No murmurs, gallops, or rubs. PMI nondisplaced. Respiratory: Comfortable breathing with no accessory muscle use. Clear breath sounds with no wheezes, rales, or rhonchi. GI: Abdomen nondistended, soft, and nontender. Normal active bowel sounds. Rectal: Deferred   Musculoskeletal: No pitting edema of the lower legs. Extremities have good range of motion. No costovertebral tenderness. Neurological: Gross memory appears intact. Patient is alert and oriented. Power 5/5, cranial nerves II-XII grossly intact  Psychiatric: Mood appears appropriate with judgement intact. Lymphatic: No cervical or supraclavicular adenopathy.     Data Review:       Recent Days:  Recent Labs     21  0445 21  0425 21  1700   WBC 11.6* 11.4* 10.8   HGB 9.7* 11.6 15.3   HCT 28.6* 33.3* 43.5    461* 562*     Recent Labs     21  0445 21  8817 01/18/21  0425 01/17/21  1700 01/17/21  1700   *  134* 130* 130*   < > 125*   K 4.3  4.2 5.4* 2.3*   < > 2.1*     103 100 93*   < > 85*   CO2 23  22 23 25   < > 27   *  117* 130* 128*   < > 143*   BUN 29*  29* 36* 31*   < > 30*   CREA 1.51*  1.48* 1.92* 1.71*   < > 1.81*   CA 8.3*  8.2* 8.4* 7.8*   < > 9.2   MG 1.7  --  1.6  --   --    PHOS 1.5*  --   --   --   --    ALB 2.4*  --   --   --  2.6*   TBILI  --   --   --   --  0.3   ALT  --   --   --   --  30    < > = values in this interval not displayed. No results for input(s): PH, PCO2, PO2, HCO3, FIO2 in the last 72 hours. CT ABD PELV WO CONT   Final Result   IMPRESSION: Severe abdominal aorta atherosclerosis. Suspect infrarenal abdominal   aortic stenosis. Atrophy left kidney/hypertrophy right kidney. No hydronephrosis. No bowel obstruction. No CT evidence for appendicitis or diverticulitis. No   ascites. Prior cholecystectomy. CT HEAD WO CONT   Final Result   IMPRESSION:   1. No findings of acute intracranial abnormality. 2. Calcification within the central renzo appearing similar to prior. There is an   additional punctate calcification within the right posterior temporal region. Further evaluation with non-emergent brain MRI should be considered. 3. Other chronic findings, including nonspecific white matter findings that are   most commonly the result of chronic microangiopathy. XR CHEST PORT   Final Result   IMPRESSION:   1. No findings of acute cardiopulmonary abnormality.              Assessment/     Problem List:  Hospital Problems  Date Reviewed: 1/14/2021          Codes Class Noted POA    Hyponatremia ICD-10-CM: E87.1  ICD-9-CM: 276.1  1/17/2021 Unknown        [facility] ICD-10-CM: I50.9  ICD-9-CM: 428.0  11/2/2020 Yes        PEDRO PABLO (acute kidney injury) ([facility] [address] ICD-10-CM: N17.9  ICD-9-CM: 584.9  10/2/2020 Unknown        Seizure disorder ([facility] [address] (Chronic) ICD-10-CM: G40.909  ICD-9-CM: 345.90 10/2/2020 Yes        COPD (chronic obstructive pulmonary disease) with emphysema (HCC) (Chronic) ICD-10-CM: J43.9  ICD-9-CM: 492.8  10/2/2020 Yes        Hypokalemia ICD-10-CM: E87.6  ICD-9-CM: 276.8  10/2/2020 Unknown        Acquired hypothyroidism (Chronic) ICD-10-CM: E03.9  ICD-9-CM: 244.9  10/2/2020 Yes        Resistant hypertension ICD-10-CM: I10  ICD-9-CM: 401.9  9/21/2020 Yes                     Plan:  80-year-old female was admitted to the hospital with a complaint of intractable nausea and vomiting. 1.  Intractable nausea and vomiting: Patient's nausea vomiting is improved. We will try to advance patient's diet further. 2.  Diastolic chronic congestive heart failure. Patient's EF was 55% on a recent echocardiogram.  She was on spironolactone prior to admission. This is on hold due to patient's intractable nausea and vomiting. 3.  Resistant hypertension: Patient have a history of renal artery stenosis and is s/p renal artery stent placement in November 2020. Left atrophic kidney. Will avoid ACE inhibitor's and ARB's.  4.  Mild renal insufficiency: Will monitor patient's renal functions closely. Creatinine is improved from 1.81-  1.51.  5.  Hypokalemia and hyponatremia is resolved. 6.  COPD: Continue patient on Symbicort inhaler. 7.  Carotid artery disease s/p right carotid endarterectomy: Continue patient on aspirin and Plavix and Lipitor. Will get vascular surgery evaluation for follow-up of the patient. 8.  Hypothyroidism: Continue patient on levothyroxine  9. History of seizure disorder: Continue patient on Keppra  10. Suspected orthostatic hypotension: We will check patient orthostatic pressures. Continue patient on IV hydration and hold patient's diuretics for now.   Further plan of management will depend upon patient's clinical course in the hospital    Care Plan discussed with: Patient/Family, Nurse and       Julius Solomon MD

## 2021-01-19 NOTE — ED NOTES
TRANSFER - OUT REPORT:    Verbal report given to Bakari (name) on Dipika Singh  being transferred to  (unit) for routine progression of care       Report consisted of patients Situation, Background, Assessment and   Recommendations(SBAR). Information from the following report(s) SBAR, ED Summary, [facility] and Recent Results was reviewed with the receiving nurse. Lines:   Peripheral IV 01/17/21 Right Wrist (Active)   Site Assessment Clean, dry, & intact 01/17/21 2353   Phlebitis Assessment 0 01/17/21 2353   Infiltration Assessment 0 01/17/21 2353   Dressing Status Clean, dry, & intact 01/17/21 2353        Opportunity for questions and clarification was provided.       Patient transported with:   Monitor  Tech

## 2021-01-19 NOTE — PROGRESS NOTES
Took pt orthostatic vitals. No able abnormal finding. Vital signs are charted in the flow sheet. Pt still complain of weakness and dizziness when standing. Will continue to monitor.

## 2021-01-19 NOTE — PROGRESS NOTES
Nephrology Consult    Patient: Jairon Cerna MRN: [id number]  SSN: [id number]    YOB: 1965  Age: 54 y.o. Sex: female      Subjective:       The pt is seen in the room  Improving renal functions  Phos 1.5  K 4.3    Past Medical History:   Diagnosis Date    Anxiety 10/2/2020    Chronic anemia     Chronic respiratory failure (HCC)     COPD (chronic obstructive pulmonary disease) with emphysema ([address] 10/2/2020    Depression     Diastolic CHF (HCC)     Dysphagia 10/2/2020    GERD (gastroesophageal reflux disease)     High cholesterol     Hypertension     Hypothyroid     Iron deficiency anemia 10/2/2020    Mitral valve regurgitation 10/2/2020    Renal artery stenosis (HCC)     Seizure disorder (HCC)      Past Surgical History:   Procedure Laterality Date    HX CAROTID ENDARTERECTOMY      HX CHOLECYSTECTOMY      HX RENAL ARTERY STENT      HX ROTATOR CUFF REPAIR Right     HX TUBAL LIGATION      IR THORACENTESIS CATH W IMAGE  11/24/2020    IR THORACENTESIS CATH W IMAGE  11/25/2020      Family History   Problem Relation Age of Onset    Hypertension Mother     Stroke Mother     Melanoma Mother     Stroke Father     Melanoma Brother     Melanoma Child      Social History     Tobacco Use    Smoking status: Former Smoker     Types: Cigarettes    Smokeless tobacco: Never Used    Tobacco comment: quit july 2020   Substance Use Topics    Alcohol use: Not Currently      Current Facility-Administered Medications   Medication Dose Route Frequency Provider Last Rate Last Admin    potassium phosphate 20 mmol in 0.9% sodium chloride 250 mL infusion   IntraVENous Ebony Urena MD        methylPREDNISolone (PF) (SOLU-MEDROL) injection 40 mg  40 mg IntraVENous Q8H Milagro Nix MD   40 mg at 01/19/21 0546    diphenhydrAMINE (BENADRYL) injection 25 mg  25 mg IntraVENous Q6H PRN Vicky Dyer MD   25 mg at 01/18/21 5330    budesonide-formoteroL (SYMBICORT) 160-4.5 mcg/actuation HFA inhaler 2 Puff  2 Puff Inhalation BID RT Yordy Sherwood MD   2 Puff at 01/19/21 0802    0.9% sodium chloride infusion  125 mL/hr IntraVENous CONTINUOUS Idella Sheriet N,  mL/hr at 01/19/21 0551 125 mL/hr at 01/19/21 0551    aspirin delayed-release tablet 81 mg  81 mg Oral DAILY Yordy Sherwood MD   81 mg at 01/18/21 0845    atorvastatin (LIPITOR) tablet 80 mg  80 mg Oral DAILY Yordy Sherwood, MD   80 mg at 01/18/21 0845    clopidogreL (PLAVIX) tablet 75 mg  75 mg Oral DAILY Yordy Sherwood MD   75 mg at 01/18/21 0845    hydrALAZINE (APRESOLINE) tablet 100 mg  100 mg Oral TID Yordy Sherwood MD   100 mg at 01/18/21 2255    levothyroxine (SYNTHROID) tablet 25 mcg  25 mcg Oral ACB Yordy Sherwood MD   25 mcg at 01/19/21 0547    levETIRAcetam (KEPPRA) tablet 500 mg  500 mg Oral BID Yordy Sherwood MD   500 mg at 01/18/21 2137    NIFEdipine ER (PROCARDIA XL) tablet 60 mg  60 mg Oral DAILY Yordy Sherwood MD   60 mg at 01/18/21 0845    labetaloL (NORMODYNE) tablet 400 mg  400 mg Oral TID Yordy Sherwood MD   400 mg at 01/18/21 2255    albuterol (PROVENTIL HFA, VENTOLIN HFA, PROAIR HFA) inhaler 2 Puff  2 Puff Inhalation Q4H PRN Yordy Sherwood MD        acetaminophen (TYLENOL) tablet 650 mg  650 mg Oral Q6H PRN Yordy Sherwood MD        Or    acetaminophen (TYLENOL) suppository 650 mg  650 mg Rectal Q6H PRN Yordy Sherwood MD        polyethylene glycol (MIRALAX) packet 17 g  17 g Oral DAILY PRN Yordy Sherwood MD        promethazine (PHENERGAN) tablet 12.5 mg  12.5 mg Oral Q6H PRN Yordy Sherwood MD   12.5 mg at 01/18/21 0844    Or    ondansetron (ZOFRAN) injection 4 mg  4 mg IntraVENous Q6H PRN Yordy Sherwood MD        enoxaparin (LOVENOX) injection 30 mg  30 mg SubCUTAneous DAILY Yordy Sherwood MD   30 mg at 01/18/21 4326        Allergies   Allergen Reactions    Sulfa (Sulfonamide Antibiotics) Anaphylaxis       Review of Systems:  A comprehensive review of systems was negative except for that written in the History of Present Illness.     Objective:     Vitals:    01/19/21 0508 01/19/21 0716 01/19/21 0726 01/19/21 0802   BP: 130/71  (!) 160/74    Pulse: 69  73    Resp: 18  18    Temp: 98.2 °F (36.8 °C)  97.9 °F (36.6 °C)    SpO2: 99%  100% 97%   Weight:  46.2 kg (101 lb 13.6 oz)     Height:            Physical Exam:  General: NAD  Eyes: sclera anicteric  Oral Cavity: No thrush or ulcers  Neck: no JVD  Chest: Fair bilateral air entry  Heart: normal sounds  Abdomen: soft and non tender   : no llamas  Lower Extremities: no edema  Skin: no rash  Neuro: intact  Psychiatric: non-depressed            Assessment:     Hospital Problems  Date Reviewed: 1/14/2021          Codes Class Noted POA    Hyponatremia ICD-10-CM: E87.1  ICD-9-CM: 276.1  1/17/2021 Unknown        PEDRO PABLO (acute kidney injury) ([facility] [address] [address] ICD-10-CM: N17.9  ICD-9-CM: 584.9  10/2/2020 Unknown        Hypokalemia ICD-10-CM: E87.6  ICD-9-CM: 276.8  10/2/2020 Unknown              Plan:     1-PEDRO PABLO on CKD III:  -2/2 pre renal from N/V  -Cr 1.7-->1.5  -BL Cr seems to be 1.4 in Nov 2020  -looks volume down, UA bland sediment   -CT abd no hydronephrosis, atrophic left kidney  -will decrease  IVF    2-severe Hypokalemia:  -r/o hyperaldo state  -K 2.1-->4.3  -will dc scheduled po KCL     3-HTN:  -BP  216/104  -Now better  -on nifedipine/labetolol/hydralazine   -pending Arabella/renin    4-Hyponatremia:  -of mod severity  -can be volume depletion  -Na has improved from 125->130->135  -will keep on NS    5-Renal artery stenosis:  -s/p RA stent placement in 11/2020  -left atrophic kidney  -will check renin, arabella  -will avoid ACE I/ARBs    6-Hypophos:  -Phos 1.5  -iv K phos 20 mmol x 1   -pending Vit D      Signed By: Padmini Richards MD     January 19, 2021

## 2021-01-19 NOTE — PROGRESS NOTES
Comprehensive Nutrition Assessment    Type and Reason for Visit: Initial(low BMI, poor PO)    Nutrition Recommendations/Plan:     While on clear liquids, add Ensure Clear TID    Advance diet as tolerated to GI Soft     As advanced adjust to Ensure Enlive TID    Consider addition of antacid    Obtain weekkly measured wts  Nursing to document %meal and supplement intakes in I/Os    Nutrition Assessment:  Multiple recent admits for similar dx. This admit c/o dizziness with n/v and decreased oral intake. Nephrology following for PEDRO PABLO on CKD-III, renal fx improving. Ordered Clear liquids, no intakes yet available to assess. RN stated pt is agitated this AM, only taking sips of clears. RD to add supplements. RD familiar with pt from recent admits, usually agreeable to Ensure Enlive supplements (phuong preferred, any flavor tolerated), dislikes Magic cup and prefers reg ice cream. Labs: H/H: 8.7/28.6, Na 135, K WNL, CUN 29, Cr 1.51, , Ca 8.3, AlkPhos 163, Phos 1.5. Meds: IVF, Statin, keppra, solu-medrol, KCl, anti-emetic. Malnutrition Assessment:  Malnutrition Status: Moderate malnutrition    Context:  Acute illness     Findings of the 6 clinical characteristics of malnutrition:   Energy Intake:  1 - 75% or less of est energy req for 7 or more days  Weight Loss:  No significant weight loss(pt reports wt stability at 100lbs)     Body Fat Loss:  7 - Moderate body fat loss, Orbital, Fat overlying ribs, Triceps   Muscle Mass Loss:  1 - Mild muscle mass loss, Clavicles (pectoralis & deltoids), Thigh (quadraceps), Calf  Fluid Accumulation:  No significant fluid accumulation,      Estimated Daily Nutrient Needs:  Energy (kcal): 1480kcal (32kcal/kg); Weight Used for Energy Requirements: Current  Protein (g): 55g (1.2g/kg); Weight Used for Protein Requirements: Current  Fluid (ml/day): 1480mL; Method Used for Fluid Requirements: 1 ml/kcal      Nutrition Related Findings:  NFPE(1/4)  finding moderate muscle and fat losses. Poor dentition visualized, pt denied c/s difficulty. +Nausea. No emesis or c/d. BM documented 1/16. No edema. Wounds:    None       Current Nutrition Therapies:  DIET CLEAR LIQUID    Anthropometric Measures:  · Height:  5' 4.02\" (162.6 cm)  · Current Body Wt:  46.2 kg (101 lb 13.6 oz)(1/19)   · Admission Body Wt:  101 lb 13.6 oz    · Usual Body Wt:  45.4 kg (100 lb)(pt stated on 1/4)     · Ideal Body Wt:  120 lbs:  84.9 %   · BMI Category:  Underweight (BMI less than 18.5)     01/04/21 45.4 kg (100 lb 1.4 oz)   11/02/20 45.4 kg (100 lb)   10/19/20 49 kg (108 lb)   10/02/20 46.2 kg (101 lb 13.6 oz)   09/17/20 54.5 kg (120 lb 2.4 oz)   Wt appears to range 45-49kg on average. Overall wt stable xx2 months.     Nutrition Diagnosis:   · Underweight related to catabolic illness, inadequate protein-energy intake as evidenced by BMI, moderate loss of subcutaneous fat, mild muscle loss      Nutrition Interventions:   Food and/or Nutrient Delivery: Modify current diet, Start oral nutrition supplement  Nutrition Education and Counseling: No recommendations at this time  Coordination of Nutrition Care: Continue to monitor while inpatient    Goals:  Meet >75% EENs via PO  Wt gain 0.5kg per week  Lytes wnl       Nutrition Monitoring and Evaluation:   Behavioral-Environmental Outcomes: None identified  Food/Nutrient Intake Outcomes: Food and nutrient intake, Supplement intake, Diet advancement/tolerance  Physical Signs/Symptoms Outcomes: GI status, Nausea/vomiting, Weight, Nutrition focused physical findings    Discharge Planning:    Continue oral nutrition supplement     Electronically signed by Veronica Ariza on 1/19/2021 at 12:17 PM    Contact: [phone]

## 2021-01-20 LAB
ALBUMIN SERPL-MCNC: 2.6 G/DL (ref 3.5–5)
ANION GAP SERPL CALC-SCNC: 10 MMOL/L (ref 5–15)
ANION GAP SERPL CALC-SCNC: 10 MMOL/L (ref 5–15)
BASOPHILS # BLD: 0 K/UL (ref 0–0.1)
BASOPHILS NFR BLD: 0 % (ref 0–1)
BUN SERPL-MCNC: 19 MG/DL (ref 6–20)
BUN SERPL-MCNC: 21 MG/DL (ref 6–20)
BUN/CREAT SERPL: 15 (ref 12–20)
BUN/CREAT SERPL: 17 (ref 12–20)
CA-I BLD-MCNC: 7.6 MG/DL (ref 8.5–10.1)
CA-I BLD-MCNC: 7.9 MG/DL (ref 8.5–10.1)
CHLORIDE SERPL-SCNC: 101 MMOL/L (ref 97–108)
CHLORIDE SERPL-SCNC: 102 MMOL/L (ref 97–108)
CO2 SERPL-SCNC: 25 MMOL/L (ref 21–32)
CO2 SERPL-SCNC: 25 MMOL/L (ref 21–32)
CREAT SERPL-MCNC: 1.25 MG/DL (ref 0.55–1.02)
CREAT SERPL-MCNC: 1.25 MG/DL (ref 0.55–1.02)
DIFFERENTIAL METHOD BLD: ABNORMAL
EOSINOPHIL # BLD: 0 K/UL (ref 0–0.4)
EOSINOPHIL NFR BLD: 0 % (ref 0–7)
ERYTHROCYTE [DISTWIDTH] IN BLOOD BY AUTOMATED COUNT: 15 % (ref 11.5–14.5)
GLUCOSE SERPL-MCNC: 118 MG/DL (ref 65–100)
GLUCOSE SERPL-MCNC: 97 MG/DL (ref 65–100)
HCT VFR BLD AUTO: 30.7 % (ref 35–47)
HGB BLD-MCNC: 10.2 G/DL (ref 11.5–16)
IMM GRANULOCYTES # BLD AUTO: 0.1 K/UL (ref 0–0.04)
IMM GRANULOCYTES NFR BLD AUTO: 1 % (ref 0–0.5)
LYMPHOCYTES # BLD: 1.4 K/UL (ref 0.8–3.5)
LYMPHOCYTES NFR BLD: 13 % (ref 12–49)
MCH RBC QN AUTO: 30.8 PG (ref 26–34)
MCHC RBC AUTO-ENTMCNC: 33.2 G/DL (ref 30–36.5)
MCV RBC AUTO: 92.7 FL (ref 80–99)
MONOCYTES # BLD: 1 K/UL (ref 0–1)
MONOCYTES NFR BLD: 9 % (ref 5–13)
NEUTS SEG # BLD: 8.7 K/UL (ref 1.8–8)
NEUTS SEG NFR BLD: 77 % (ref 32–75)
NRBC # BLD: 0 K/UL (ref 0–0.01)
NRBC BLD-RTO: 0 PER 100 WBC
PHOSPHATE SERPL-MCNC: 1.7 MG/DL (ref 2.6–4.7)
PLATELET # BLD AUTO: 372 K/UL (ref 150–400)
PMV BLD AUTO: 9.9 FL (ref 8.9–12.9)
POTASSIUM SERPL-SCNC: 2.7 MMOL/L (ref 3.5–5.1)
POTASSIUM SERPL-SCNC: 2.8 MMOL/L (ref 3.5–5.1)
RBC # BLD AUTO: 3.31 M/UL (ref 3.8–5.2)
SODIUM SERPL-SCNC: 136 MMOL/L (ref 136–145)
SODIUM SERPL-SCNC: 137 MMOL/L (ref 136–145)
WBC # BLD AUTO: 11.1 K/UL (ref 3.6–11)

## 2021-01-20 PROCEDURE — 74011250637 HC RX REV CODE- 250/637: Performed by: HOSPITALIST

## 2021-01-20 PROCEDURE — 80069 RENAL FUNCTION PANEL: CPT

## 2021-01-20 PROCEDURE — 65270000029 HC RM PRIVATE

## 2021-01-20 PROCEDURE — 74011250636 HC RX REV CODE- 250/636: Performed by: INTERNAL MEDICINE

## 2021-01-20 PROCEDURE — 74011250637 HC RX REV CODE- 250/637: Performed by: FAMILY MEDICINE

## 2021-01-20 PROCEDURE — 80048 BASIC METABOLIC PNL TOTAL CA: CPT

## 2021-01-20 PROCEDURE — 94640 AIRWAY INHALATION TREATMENT: CPT

## 2021-01-20 PROCEDURE — 74011250636 HC RX REV CODE- 250/636: Performed by: FAMILY MEDICINE

## 2021-01-20 PROCEDURE — 36415 COLL VENOUS BLD VENIPUNCTURE: CPT

## 2021-01-20 PROCEDURE — 94760 N-INVAS EAR/PLS OXIMETRY 1: CPT

## 2021-01-20 PROCEDURE — 85025 COMPLETE CBC W/AUTO DIFF WBC: CPT

## 2021-01-20 RX ORDER — ALPRAZOLAM 0.25 MG/1
0.25 TABLET ORAL
Status: DISCONTINUED | OUTPATIENT
Start: 2021-01-20 | End: 2021-01-23 | Stop reason: HOSPADM

## 2021-01-20 RX ORDER — POTASSIUM CHLORIDE 1.5 G/1.77G
40 POWDER, FOR SOLUTION ORAL
Status: COMPLETED | OUTPATIENT
Start: 2021-01-20 | End: 2021-01-20

## 2021-01-20 RX ADMIN — ALPRAZOLAM 0.25 MG: 0.25 TABLET ORAL at 21:53

## 2021-01-20 RX ADMIN — LABETALOL HYDROCHLORIDE 400 MG: 100 TABLET, FILM COATED ORAL at 21:53

## 2021-01-20 RX ADMIN — BUDESONIDE AND FORMOTEROL FUMARATE DIHYDRATE 2 PUFF: 160; 4.5 AEROSOL RESPIRATORY (INHALATION) at 22:02

## 2021-01-20 RX ADMIN — CLOPIDOGREL BISULFATE 75 MG: 75 TABLET ORAL at 08:32

## 2021-01-20 RX ADMIN — HYDRALAZINE HYDROCHLORIDE 100 MG: 50 TABLET, FILM COATED ORAL at 15:31

## 2021-01-20 RX ADMIN — HYDRALAZINE HYDROCHLORIDE 100 MG: 50 TABLET, FILM COATED ORAL at 08:32

## 2021-01-20 RX ADMIN — ENOXAPARIN SODIUM 30 MG: 30 INJECTION SUBCUTANEOUS at 08:32

## 2021-01-20 RX ADMIN — ALPRAZOLAM 0.25 MG: 0.25 TABLET ORAL at 11:50

## 2021-01-20 RX ADMIN — LABETALOL HYDROCHLORIDE 400 MG: 100 TABLET, FILM COATED ORAL at 08:32

## 2021-01-20 RX ADMIN — METHYLPREDNISOLONE SODIUM SUCCINATE 40 MG: 40 INJECTION, POWDER, FOR SOLUTION INTRAMUSCULAR; INTRAVENOUS at 05:26

## 2021-01-20 RX ADMIN — BUDESONIDE AND FORMOTEROL FUMARATE DIHYDRATE 2 PUFF: 160; 4.5 AEROSOL RESPIRATORY (INHALATION) at 07:47

## 2021-01-20 RX ADMIN — HYDRALAZINE HYDROCHLORIDE 100 MG: 50 TABLET, FILM COATED ORAL at 21:53

## 2021-01-20 RX ADMIN — POTASSIUM CHLORIDE 40 MEQ: 1.5 FOR SOLUTION ORAL at 18:48

## 2021-01-20 RX ADMIN — LEVETIRACETAM 500 MG: 250 TABLET, FILM COATED ORAL at 08:32

## 2021-01-20 RX ADMIN — POTASSIUM CHLORIDE 40 MEQ: 1.5 FOR SOLUTION ORAL at 08:59

## 2021-01-20 RX ADMIN — LABETALOL HYDROCHLORIDE 400 MG: 100 TABLET, FILM COATED ORAL at 15:31

## 2021-01-20 RX ADMIN — LEVOTHYROXINE SODIUM 25 MCG: 0.03 TABLET ORAL at 05:26

## 2021-01-20 RX ADMIN — ASPIRIN 81 MG: 81 TABLET, COATED ORAL at 08:32

## 2021-01-20 RX ADMIN — NIFEDIPINE 60 MG: 30 TABLET, FILM COATED, EXTENDED RELEASE ORAL at 08:32

## 2021-01-20 RX ADMIN — LEVETIRACETAM 500 MG: 250 TABLET, FILM COATED ORAL at 21:53

## 2021-01-20 RX ADMIN — ATORVASTATIN CALCIUM 80 MG: 40 TABLET, FILM COATED ORAL at 08:32

## 2021-01-20 NOTE — PROGRESS NOTES
Physician Progress Note      Yo Boyd  CSN #:                  [id number]  :                       1965  ADMIT DATE:       2021 4:49 PM  [address] [place] DATE:  RESPONDING  PROVIDER #:        Adam Agarwal MD          QUERY TEXT:    Patient admitted with nausea, vomiting and decreased PO intake. If possible, please document in progress notes and discharge summary if you are evaluating and /or treating any of the following: The medical record reflects the following:  Risk Factors: 54year old female, BMI 17.47  Clinical Indicators:  H&P -presents to the ER with complaints of nausea, vomiting, decreased p.o. intake and generalized malaise.  Dietician consult -  Malnutrition Status: Moderate malnutrition. Body Fat Loss:  7 - Moderate body fat loss, Orbital, Fat overlying ribs, Triceps . Muscle Mass Loss:  1 - Mild muscle mass loss, Clavicles (pectoralis & deltoids), Thigh (quadraceps), Calf.  inadequate protein-energy intake as evidenced by BMI, moderate loss of subcutaneous fat, mild muscle loss  Treatment: While on clear liquids, add Ensure Clear TID  ? Advance diet as tolerated to GI Soft  As advanced adjust to Ensure Enlive TID  ? Consider addition of antacid  ? Obtain weekkly measured wts  Nursing to document %meal and supplement intakes in I/Os      Please call [phone] with any questions  Options provided:  -- Protein calorie malnutrition mild  -- Protein calorie malnutrition moderate  -- Protein calorie malnutrition severe  -- Other - I will add my own diagnosis  -- Disagree - Not applicable / Not valid  -- Disagree - Clinically unable to determine / Unknown  -- Refer to Clinical Documentation Reviewer    PROVIDER RESPONSE TEXT:    This patient has moderate protein calorie malnutrition. Query created by:  Daxa Fishman on 2021 4:54 PM      Electronically signed by:  Adam Agarwal MD 2021 5:56 PM

## 2021-01-20 NOTE — PROGRESS NOTES
Hospitalist Progress Note               Daily Progress Note: 1/20/2021      Subjective: The patient is seen for follow  up.   59-year-old female with past medical history of COPD, GERD, hypertension, hyperlipidemia and seizure disorder was admitted to the hospital with intractable nausea vomiting and decreased oral intake. Patient was found to have electrolyte abnormalities including hyponatremia, hypokalemia on admission. These have improved. Patients dizziness is better. She is tolerating clear liquid diet.      Medications reviewed  Current Facility-Administered Medications   Medication Dose Route Frequency    ALPRAZolam (XANAX) tablet 0.25 mg  0.25 mg Oral Q8H PRN    methylPREDNISolone (PF) (SOLU-MEDROL) injection 40 mg  40 mg IntraVENous Q8H    diphenhydrAMINE (BENADRYL) injection 25 mg  25 mg IntraVENous Q6H PRN    budesonide-formoteroL (SYMBICORT) 160-4.5 mcg/actuation HFA inhaler 2 Puff  2 Puff Inhalation BID RT    0.9% sodium chloride infusion  100 mL/hr IntraVENous CONTINUOUS    aspirin delayed-release tablet 81 mg  81 mg Oral DAILY    atorvastatin (LIPITOR) tablet 80 mg  80 mg Oral DAILY    clopidogreL (PLAVIX) tablet 75 mg  75 mg Oral DAILY    hydrALAZINE (APRESOLINE) tablet 100 mg  100 mg Oral TID    levothyroxine (SYNTHROID) tablet 25 mcg  25 mcg Oral ACB    levETIRAcetam (KEPPRA) tablet 500 mg  500 mg Oral BID    NIFEdipine ER (PROCARDIA XL) tablet 60 mg  60 mg Oral DAILY    labetaloL (NORMODYNE) tablet 400 mg  400 mg Oral TID    albuterol (PROVENTIL HFA, VENTOLIN HFA, PROAIR HFA) inhaler 2 Puff  2 Puff Inhalation Q4H PRN    acetaminophen (TYLENOL) tablet 650 mg  650 mg Oral Q6H PRN    Or    acetaminophen (TYLENOL) suppository 650 mg  650 mg Rectal Q6H PRN    polyethylene glycol (MIRALAX) packet 17 g  17 g Oral DAILY PRN    promethazine (PHENERGAN) tablet 12.5 mg  12.5 mg Oral Q6H PRN    Or    ondansetron (ZOFRAN) injection 4 mg  4 mg IntraVENous Q6H PRN    enoxaparin (LOVENOX) injection 30 mg  30 mg SubCUTAneous DAILY       Review of Systems:   A comprehensive review of systems was negative except for that written in the HPI. Objective:   Physical Exam:     Visit Vitals  BP (!) 180/76 (BP 1 Location: Right arm, BP Patient Position: At rest)   Pulse 72   Temp 97.8 °F (36.6 °C)   Resp 18   Ht 5' 4.02\" (1.626 m)   Wt 46.2 kg (101 lb 13.6 oz)   SpO2 100%   BMI 17.47 kg/m²    O2 Flow Rate (L/min): 3 l/min O2 Device: Room air    Temp (24hrs), Av.9 °F (36.6 °C), Min:97 °F (36.1 °C), Max:98.4 °F (36.9 °C)    No intake/output data recorded.  1901 -  0700  In: 3920.8 [P.O.:750; I.V.:3170.8]  Out: -     PHYSICAL EXAM:  General alert and awake, follows commands. Skin: Extremities and face reveal no rashes. HEENT: Sclerae anicteric. Extra-occular muscles are intact. No oral ulcers. No ENT discharge. Right carotid endarterectomy staples in situ, mild swelling noticed. Cardiovascular: Regular rate and rhythm. No murmurs, gallops, or rubs. PMI nondisplaced. Respiratory: Comfortable breathing with no accessory muscle use. Clear breath sounds with no wheezes, rales, or rhonchi. GI: Abdomen nondistended, soft, and nontender. Normal active bowel sounds. Rectal: Deferred   Musculoskeletal: No pitting edema of the lower legs. Extremities have good range of motion. No costovertebral tenderness. Neurological: Gross memory appears intact. Patient is alert and oriented. Power 5/5, cranial nerves II-XII grossly intact  Psychiatric: Mood appears appropriate with judgement intact. Lymphatic: No cervical or supraclavicular adenopathy.     Data Review:       Recent Days:  Recent Labs     21  0445 21  0425 21  1700   WBC 11.6* 11.4* 10.8   HGB 9.7* 11.6 15.3   HCT 28.6* 33.3* 43.5    461* 562*     Recent Labs     21  0725 21  0445 21  1630 21  0425 21  1700 21  1700    135*  134* 130* 130*   < > 125*   K 2.8* 4.3  4.2 5.4* 2.3*   < > 2.1*    103  103 100 93*   < > 85*   CO2 25 23  22 23 25   < > 27   * 116*  117* 130* 128*   < > 143*   BUN 21* 29*  29* 36* 31*   < > 30*   CREA 1.25* 1.51*  1.48* 1.92* 1.71*   < > 1.81*   CA 7.9* 8.3*  8.2* 8.4* 7.8*   < > 9.2   MG  --  1.7  --  1.6  --   --    PHOS 1.7* 1.5*  --   --   --   --    ALB 2.6* 2.4*  --   --   --  2.6*   TBILI  --   --   --   --   --  0.3   ALT  --   --   --   --   --  30    < > = values in this interval not displayed. No results for input(s): PH, PCO2, PO2, HCO3, FIO2 in the last 72 hours. CT ABD PELV WO CONT   Final Result   IMPRESSION: Severe abdominal aorta atherosclerosis. Suspect infrarenal abdominal   aortic stenosis. Atrophy left kidney/hypertrophy right kidney. No hydronephrosis. No bowel obstruction. No CT evidence for appendicitis or diverticulitis. No   ascites. Prior cholecystectomy. CT HEAD WO CONT   Final Result   IMPRESSION:   1. No findings of acute intracranial abnormality. 2. Calcification within the central renzo appearing similar to prior. There is an   additional punctate calcification within the right posterior temporal region. Further evaluation with non-emergent brain MRI should be considered. 3. Other chronic findings, including nonspecific white matter findings that are   most commonly the result of chronic microangiopathy. XR CHEST PORT   Final Result   IMPRESSION:   1. No findings of acute cardiopulmonary abnormality.              Assessment/     Problem List:  Hospital Problems  Date Reviewed: 1/14/2021          Codes Class Noted POA    Hyponatremia ICD-10-CM: E87.1  ICD-9-CM: 276.1  1/17/2021 Unknown        CHF exacerbation ([facility] ICD-10-CM: I50.9  ICD-9-CM: 428.0  11/2/2020 Yes        PEDRO PABLO (acute kidney injury) ([facility] ICD-10-CM: N17.9  ICD-9-CM: 584.9  10/2/2020 Unknown        Seizure disorder ([facility] [address] (Chronic) ICD-10-CM: G40.909  ICD-9-CM: 345.90  10/2/2020 Yes        COPD (chronic obstructive pulmonary disease) with emphysema (HCC) (Chronic) ICD-10-CM: J43.9  ICD-9-CM: 492.8  10/2/2020 Yes        Hypokalemia ICD-10-CM: E87.6  ICD-9-CM: 276.8  10/2/2020 Unknown        Acquired hypothyroidism (Chronic) ICD-10-CM: E03.9  ICD-9-CM: 244.9  10/2/2020 Yes        Resistant hypertension ICD-10-CM: I10  ICD-9-CM: 401.9  9/21/2020 Yes                     Plan:  27-year-old female was admitted to the hospital with a complaint of intractable nausea and vomiting. 1.  Intractable nausea and vomiting: Patient's nausea vomiting is improved. We will try to advance patient's diet further. 2.  Diastolic chronic congestive heart failure. Patient's EF was 55% on a recent echocardiogram.  She was on spironolactone prior to admission. This is on hold due to patient's intractable nausea and vomiting. 3.  Resistant hypertension: Patient have a history of renal artery stenosis and is s/p renal artery stent placement in November 2020. Left atrophic kidney. Will avoid ACE inhibitor's and ARB's.  4.  Mild renal insufficiency: Will monitor patient's renal functions closely. Creatinine is improved from 1.81-  1.51.  5.  Hypokalemia and hyponatremia. Again K is low, will give PO supplements. 6.  COPD: Continue patient on Symbicort inhaler. 7.  Carotid artery disease s/p right carotid endarterectomy: Continue patient on aspirin and Plavix and Lipitor. Will get vascular surgery evaluation for follow-up of the patient. 8.  Hypothyroidism: Continue patient on levothyroxine  9. History of seizure disorder: Continue patient on Keppra  10. Orthostatic BP was dropped on sitting but improved back on standing, will check again today.    Further plan of management will depend upon patient's clinical course in the hospital    Care Plan discussed with: Patient/Family, Nurse and       Reina Cervantes MD

## 2021-01-20 NOTE — PROGRESS NOTES
Pt has a critical potassium level of 2.7. Attending was called. Attending gave orders for P. O. Potassium. Orders received and entered. Will continue to monitor pt condition.

## 2021-01-21 LAB
ANION GAP SERPL CALC-SCNC: 8 MMOL/L (ref 5–15)
BUN SERPL-MCNC: 19 MG/DL (ref 6–20)
BUN/CREAT SERPL: 16 (ref 12–20)
CA-I BLD-MCNC: 7.8 MG/DL (ref 8.5–10.1)
CHLORIDE SERPL-SCNC: 106 MMOL/L (ref 97–108)
CO2 SERPL-SCNC: 25 MMOL/L (ref 21–32)
CREAT SERPL-MCNC: 1.21 MG/DL (ref 0.55–1.02)
ERYTHROCYTE [DISTWIDTH] IN BLOOD BY AUTOMATED COUNT: 14.9 % (ref 11.5–14.5)
GLUCOSE SERPL-MCNC: 81 MG/DL (ref 65–100)
HCT VFR BLD AUTO: 29.9 % (ref 35–47)
HGB BLD-MCNC: 9.7 G/DL (ref 11.5–16)
MAGNESIUM SERPL-MCNC: 0.9 MG/DL (ref 1.6–2.4)
MCH RBC QN AUTO: 30.3 PG (ref 26–34)
MCHC RBC AUTO-ENTMCNC: 32.4 G/DL (ref 30–36.5)
MCV RBC AUTO: 93.4 FL (ref 80–99)
PLATELET # BLD AUTO: 375 K/UL (ref 150–400)
PMV BLD AUTO: 9.8 FL (ref 8.9–12.9)
POTASSIUM SERPL-SCNC: 2.8 MMOL/L (ref 3.5–5.1)
RBC # BLD AUTO: 3.2 M/UL (ref 3.8–5.2)
SODIUM SERPL-SCNC: 139 MMOL/L (ref 136–145)
WBC # BLD AUTO: 10 K/UL (ref 3.6–11)

## 2021-01-21 PROCEDURE — 74011250637 HC RX REV CODE- 250/637: Performed by: HOSPITALIST

## 2021-01-21 PROCEDURE — 94760 N-INVAS EAR/PLS OXIMETRY 1: CPT

## 2021-01-21 PROCEDURE — 74011250636 HC RX REV CODE- 250/636: Performed by: HOSPITALIST

## 2021-01-21 PROCEDURE — 85027 COMPLETE CBC AUTOMATED: CPT

## 2021-01-21 PROCEDURE — 65270000029 HC RM PRIVATE

## 2021-01-21 PROCEDURE — 94640 AIRWAY INHALATION TREATMENT: CPT

## 2021-01-21 PROCEDURE — 36415 COLL VENOUS BLD VENIPUNCTURE: CPT

## 2021-01-21 PROCEDURE — 83735 ASSAY OF MAGNESIUM: CPT

## 2021-01-21 PROCEDURE — 80048 BASIC METABOLIC PNL TOTAL CA: CPT

## 2021-01-21 PROCEDURE — 74011250636 HC RX REV CODE- 250/636: Performed by: FAMILY MEDICINE

## 2021-01-21 PROCEDURE — 74011250637 HC RX REV CODE- 250/637: Performed by: FAMILY MEDICINE

## 2021-01-21 PROCEDURE — 77010033678 HC OXYGEN DAILY

## 2021-01-21 RX ORDER — MAGNESIUM SULFATE HEPTAHYDRATE 40 MG/ML
2 INJECTION, SOLUTION INTRAVENOUS ONCE
Status: COMPLETED | OUTPATIENT
Start: 2021-01-21 | End: 2021-01-21

## 2021-01-21 RX ORDER — POTASSIUM CHLORIDE 7.45 MG/ML
10 INJECTION INTRAVENOUS
Status: DISPENSED | OUTPATIENT
Start: 2021-01-21 | End: 2021-01-21

## 2021-01-21 RX ORDER — POTASSIUM CHLORIDE 7.45 MG/ML
10 INJECTION INTRAVENOUS
Status: COMPLETED | OUTPATIENT
Start: 2021-01-21 | End: 2021-01-22

## 2021-01-21 RX ORDER — POTASSIUM CHLORIDE 1.5 G/1.77G
20 POWDER, FOR SOLUTION ORAL 2 TIMES DAILY WITH MEALS
Status: DISCONTINUED | OUTPATIENT
Start: 2021-01-21 | End: 2021-01-22

## 2021-01-21 RX ADMIN — ENOXAPARIN SODIUM 30 MG: 30 INJECTION SUBCUTANEOUS at 09:28

## 2021-01-21 RX ADMIN — ASPIRIN 81 MG: 81 TABLET, COATED ORAL at 09:28

## 2021-01-21 RX ADMIN — HYDRALAZINE HYDROCHLORIDE 100 MG: 50 TABLET, FILM COATED ORAL at 15:40

## 2021-01-21 RX ADMIN — LEVETIRACETAM 500 MG: 250 TABLET, FILM COATED ORAL at 09:28

## 2021-01-21 RX ADMIN — LABETALOL HYDROCHLORIDE 400 MG: 100 TABLET, FILM COATED ORAL at 21:48

## 2021-01-21 RX ADMIN — POTASSIUM CHLORIDE 10 MEQ: 10 INJECTION, SOLUTION INTRAVENOUS at 22:00

## 2021-01-21 RX ADMIN — LABETALOL HYDROCHLORIDE 400 MG: 100 TABLET, FILM COATED ORAL at 09:39

## 2021-01-21 RX ADMIN — NIFEDIPINE 60 MG: 30 TABLET, FILM COATED, EXTENDED RELEASE ORAL at 09:27

## 2021-01-21 RX ADMIN — ALPRAZOLAM 0.25 MG: 0.25 TABLET ORAL at 09:28

## 2021-01-21 RX ADMIN — POTASSIUM CHLORIDE 10 MEQ: 10 INJECTION, SOLUTION INTRAVENOUS at 11:40

## 2021-01-21 RX ADMIN — LABETALOL HYDROCHLORIDE 400 MG: 100 TABLET, FILM COATED ORAL at 15:40

## 2021-01-21 RX ADMIN — BUDESONIDE AND FORMOTEROL FUMARATE DIHYDRATE 2 PUFF: 160; 4.5 AEROSOL RESPIRATORY (INHALATION) at 07:24

## 2021-01-21 RX ADMIN — HYDRALAZINE HYDROCHLORIDE 100 MG: 50 TABLET, FILM COATED ORAL at 22:00

## 2021-01-21 RX ADMIN — MAGNESIUM SULFATE HEPTAHYDRATE 2 G: 40 INJECTION, SOLUTION INTRAVENOUS at 10:41

## 2021-01-21 RX ADMIN — ALPRAZOLAM 0.25 MG: 0.25 TABLET ORAL at 21:48

## 2021-01-21 RX ADMIN — LEVOTHYROXINE SODIUM 25 MCG: 0.03 TABLET ORAL at 09:28

## 2021-01-21 RX ADMIN — ATORVASTATIN CALCIUM 80 MG: 40 TABLET, FILM COATED ORAL at 09:28

## 2021-01-21 RX ADMIN — BUDESONIDE AND FORMOTEROL FUMARATE DIHYDRATE 2 PUFF: 160; 4.5 AEROSOL RESPIRATORY (INHALATION) at 19:27

## 2021-01-21 RX ADMIN — POTASSIUM CHLORIDE 10 MEQ: 10 INJECTION, SOLUTION INTRAVENOUS at 09:28

## 2021-01-21 RX ADMIN — LEVETIRACETAM 500 MG: 250 TABLET, FILM COATED ORAL at 21:48

## 2021-01-21 RX ADMIN — HYDRALAZINE HYDROCHLORIDE 100 MG: 50 TABLET, FILM COATED ORAL at 09:27

## 2021-01-21 RX ADMIN — CLOPIDOGREL BISULFATE 75 MG: 75 TABLET ORAL at 09:28

## 2021-01-21 RX ADMIN — POTASSIUM CHLORIDE 10 MEQ: 10 INJECTION, SOLUTION INTRAVENOUS at 15:48

## 2021-01-21 NOTE — PROGRESS NOTES
DCP is for patient to return home with boyfriend Sana Ck [phone]). Patient declined HH. CM will continue to follow patient for discharge planning needs.

## 2021-01-21 NOTE — PROGRESS NOTES
Hospitalist Progress Note               Daily Progress Note: 1/21/2021      Subjective: The patient is seen for follow  up.   55-year-old female with past medical history of COPD, GERD, hypertension, hyperlipidemia and seizure disorder was admitted to the hospital with intractable nausea vomiting and decreased oral intake. Patient was found to have electrolyte abnormalities including hyponatremia, hypokalemia on admission. Still hypokalemic and hypomagnesemic  Patients dizziness is better.  She is tolerating diet    Medications reviewed  Current Facility-Administered Medications   Medication Dose Route Frequency    potassium chloride 10 mEq in 100 ml IVPB  10 mEq IntraVENous Q1H    [Held by provider] potassium chloride (KLOR-CON) packet for solution 20 mEq  20 mEq Oral BID WITH MEALS    magnesium sulfate 2 g/50 ml IVPB (premix or compounded)  2 g IntraVENous ONCE    ALPRAZolam (XANAX) tablet 0.25 mg  0.25 mg Oral Q8H PRN    diphenhydrAMINE (BENADRYL) injection 25 mg  25 mg IntraVENous Q6H PRN    budesonide-formoteroL (SYMBICORT) 160-4.5 mcg/actuation HFA inhaler 2 Puff  2 Puff Inhalation BID RT    aspirin delayed-release tablet 81 mg  81 mg Oral DAILY    atorvastatin (LIPITOR) tablet 80 mg  80 mg Oral DAILY    clopidogreL (PLAVIX) tablet 75 mg  75 mg Oral DAILY    hydrALAZINE (APRESOLINE) tablet 100 mg  100 mg Oral TID    levothyroxine (SYNTHROID) tablet 25 mcg  25 mcg Oral ACB    levETIRAcetam (KEPPRA) tablet 500 mg  500 mg Oral BID    NIFEdipine ER (PROCARDIA XL) tablet 60 mg  60 mg Oral DAILY    labetaloL (NORMODYNE) tablet 400 mg  400 mg Oral TID    albuterol (PROVENTIL HFA, VENTOLIN HFA, PROAIR HFA) inhaler 2 Puff  2 Puff Inhalation Q4H PRN    acetaminophen (TYLENOL) tablet 650 mg  650 mg Oral Q6H PRN    Or    acetaminophen (TYLENOL) suppository 650 mg  650 mg Rectal Q6H PRN    polyethylene glycol (MIRALAX) packet 17 g  17 g Oral DAILY PRN    promethazine (PHENERGAN) tablet 12.5 mg  12.5 mg Oral Q6H PRN    Or    ondansetron (ZOFRAN) injection 4 mg  4 mg IntraVENous Q6H PRN    enoxaparin (LOVENOX) injection 30 mg  30 mg SubCUTAneous DAILY       Review of Systems:   A comprehensive review of systems was negative except for that written in the HPI. Objective:   Physical Exam:     Visit Vitals  BP (!) 148/72 (BP 1 Location: Right arm, BP Patient Position: At rest)   Pulse 64   Temp 98.2 °F (36.8 °C)   Resp 18   Ht 5' 4.02\" (1.626 m)   Wt 46.2 kg (101 lb 13.6 oz)   SpO2 93%   BMI 17.47 kg/m²    O2 Flow Rate (L/min): 3 l/min O2 Device: Nasal cannula    Temp (24hrs), Av.3 °F (36.8 °C), Min:98 °F (36.7 °C), Max:98.5 °F (36.9 °C)    No intake/output data recorded.  1901 -  0700  In: 1200 [I.V.:1200]  Out: -     PHYSICAL EXAM:  General alert and awake, follows commands. Skin: Extremities and face reveal no rashes. HEENT: Sclerae anicteric. Extra-occular muscles are intact. No oral ulcers. No ENT discharge. Right carotid endarterectomy staples in situ, mild swelling noticed. Cardiovascular: Regular rate and rhythm. No murmurs, gallops, or rubs. PMI nondisplaced. Respiratory: Comfortable breathing with no accessory muscle use. Clear breath sounds with no wheezes, rales, or rhonchi. GI: Abdomen nondistended, soft, and nontender. Normal active bowel sounds. Rectal: Deferred   Musculoskeletal: No pitting edema of the lower legs. Extremities have good range of motion. No costovertebral tenderness. Neurological: Gross memory appears intact. Patient is alert and oriented. Power 5/5, cranial nerves II-XII grossly intact  Psychiatric: Mood appears appropriate with judgement intact. Lymphatic: No cervical or supraclavicular adenopathy.     Data Review:       Recent Days:  Recent Labs     21  0502 21  1620 21  0445   WBC 10.0 11.1* 11.6*   HGB 9.7* 10.2* 9.7*   HCT 29.9* 30.7* 28.6*    372 363     Recent Labs     21  0502 21  1620 21  0772 01/19/21  0445    136 137 135*  134*   K 2.8* 2.7* 2.8* 4.3  4.2    101 102 103  103   CO2 25 25 25 23  22   GLU 81 97 118* 116*  117*   BUN 19 19 21* 29*  29*   CREA 1.21* 1.25* 1.25* 1.51*  1.48*   CA 7.8* 7.6* 7.9* 8.3*  8.2*   MG 0.9*  --   --  1.7   PHOS  --   --  1.7* 1.5*   ALB  --   --  2.6* 2.4*     No results for input(s): PH, PCO2, PO2, HCO3, FIO2 in the last 72 hours. CT ABD PELV WO CONT   Final Result   IMPRESSION: Severe abdominal aorta atherosclerosis. Suspect infrarenal abdominal   aortic stenosis. Atrophy left kidney/hypertrophy right kidney. No hydronephrosis. No bowel obstruction. No CT evidence for appendicitis or diverticulitis. No   ascites. Prior cholecystectomy. CT HEAD WO CONT   Final Result   IMPRESSION:   1. No findings of acute intracranial abnormality. 2. Calcification within the central renzo appearing similar to prior. There is an   additional punctate calcification within the right posterior temporal region. Further evaluation with non-emergent brain MRI should be considered. 3. Other chronic findings, including nonspecific white matter findings that are   most commonly the result of chronic microangiopathy. XR CHEST PORT   Final Result   IMPRESSION:   1. No findings of acute cardiopulmonary abnormality.              Assessment/     Problem List:  Hospital Problems  Date Reviewed: 1/14/2021          Codes Class Noted POA    Hyponatremia ICD-10-CM: E87.1  ICD-9-CM: 276.1  1/17/2021 Unknown        [facility] ([facility] ICD-10-CM: I50.9  ICD-9-CM: 428.0  11/2/2020 Yes        PEDRO PABLO (acute kidney injury) ([facility] ICD-10-CM: N17.9  ICD-9-CM: 584.9  10/2/2020 Unknown        Seizure disorder ([facility] (Chronic) ICD-10-CM: G40.909  ICD-9-CM: 345.90  10/2/2020 Yes        COPD (chronic obstructive pulmonary disease) with emphysema ([facility] (Chronic) ICD-10-CM: J43.9  ICD-9-CM: 492.8  10/2/2020 Yes        Hypokalemia ICD-10-CM: E87.6  ICD-9-CM: 276.8  10/2/2020 Unknown        Acquired hypothyroidism (Chronic) ICD-10-CM: E03.9  ICD-9-CM: 244.9  10/2/2020 Yes        Resistant hypertension ICD-10-CM: I10  ICD-9-CM: 401.9  9/21/2020 Yes                     Plan:  49-year-old female was admitted to the hospital with a complaint of intractable nausea and vomiting. 1.  Intractable nausea and vomiting: resolved  2. Diastolic chronic congestive heart failure. Patient's EF was 55% on a recent echocardiogram.  She was on spironolactone prior to admission. This is on hold due to patient's intractable nausea and vomiting. 3.  Resistant hypertension: Patient have a history of renal artery stenosis and is s/p renal artery stent placement in November 2020. Left atrophic kidney. Will avoid ACE inhibitor's and ARB's.  4.  Mild renal insufficiency: Will monitor patient's renal functions closely. Creatinine is improved from 1.81-  1.21.  5.  Hypokalemia, Hypomagnesemia . Again K is low, will give IV K and Mg supplements. 6.  COPD: Continue patient on Symbicort inhaler. She is on 3lpm O2, She uses O2 at home as well. 7.  Carotid artery disease s/p right carotid endarterectomy: Continue patient on aspirin and Plavix and Lipitor. Vascular surgery eval appreciated. 8.  Hypothyroidism: Continue patient on levothyroxine  9. History of seizure disorder: Continue patient on Keppra  10.   Orthostatic BP improved  Further plan of management will depend upon patient's clinical course in the hospital     Care Plan discussed with: Patient/Family, Nurse and       Niya Anglin MD

## 2021-01-21 NOTE — PROGRESS NOTES
Comprehensive Nutrition Assessment    Type and Reason for Visit: Reassess(Interim)    Nutrition Recommendations/Plan:     Continue Cardiac diet  Continue Ensure Enlive TID    Consider addition of MVI, antacid     Obtain weekly measured wts  Nursing to document %meal and supplement intakes in I/Os    Nutrition Assessment:  Multiple recent admits for similar dx. This admit c/o dizziness with n/v and decreased oral intake. Nephrology following for PEDRO PABLO on CKD-III, renal fx improving. Advanced from Clear liquids to Cardiac diet yesterday. RD adjusted supps on f/u today. Spoke with pt at bedside who appeared very tired, reported being agreeable to Ensure Enlive and reg ice cream. Limited changes compared to previous visits on recent admits. Reported appetite is currently poor, did not eat much breakfast and not very hungry for lunch (recently arrived at time of visit). RD requested PO intake documentation in EMR, will monitor. Labs: H/H: 9.7/289.9, Na WNL, K 2.8, Cr 1.21, BG WNL, Ca 7.8, Mg 0.9. Meds: IVF, Statin, keppra, miralax, KCl, anti-emetic. Malnutrition Assessment:  Malnutrition Status: Moderate malnutrition    Context:  Acute illness       Estimated Daily Nutrient Needs:  Energy (kcal): 1480kcal (32kcal/kg); Weight Used for Energy Requirements: Current  Protein (g): 55g (1.2g/kg); Weight Used for Protein Requirements: Current  Fluid (ml/day): 1480mL; Method Used for Fluid Requirements: 1 ml/kcal      Nutrition Related Findings:  NFPE(1/4) finding moderate muscle and fat losses. Poor dentition visualized, pt denied c/s difficulty. No n/v or c/d. No recent BMs recorded in EMR. Pt reported BM yesterday. No edema.       Wounds:    None       Current Nutrition Therapies:  DIET CARDIAC Regular  DIET NUTRITIONAL SUPPLEMENTS Breakfast, Lunch, Dinner; Structure Vision (pt prefers phuong, rotate flavors as desired)    Anthropometric Measures:  · Height:  5' 4.02\" (162.6 cm)  · Current Body Wt:  46.2 kg (101 lb 13.6 oz)(1/19) · Admission Body Wt:  101 lb 13.6 oz    · Usual Body Wt:  45.4 kg (100 lb)(pt stated on 1/4)     · Ideal Body Wt:  120 lbs:  84.9 %   · BMI Category:  Underweight (BMI less than 18.5)     01/04/21 45.4 kg (100 lb 1.4 oz)   11/02/20 45.4 kg (100 lb)   10/19/20 49 kg (108 lb)   10/02/20 46.2 kg (101 lb 13.6 oz)   09/17/20 54.5 kg (120 lb 2.4 oz)   Wt appears to range 45-49kg on average. Overall wt stable x2 months.     Nutrition Diagnosis:   · Underweight related to catabolic illness, inadequate protein-energy intake as evidenced by BMI, moderate loss of subcutaneous fat, mild muscle loss      Nutrition Interventions:   Food and/or Nutrient Delivery: Continue current diet, Continue oral nutrition supplement  Nutrition Education and Counseling: No recommendations at this time  Coordination of Nutrition Care: Continue to monitor while inpatient    Goals:  Meet >75% EENs via PO (not progressing)  Wt gain 0.5kg per week (not progressing)  Lytes wnl (not progressing)    Nutrition Monitoring and Evaluation:   Behavioral-Environmental Outcomes: None identified  Food/Nutrient Intake Outcomes: Food and nutrient intake, Supplement intake  Physical Signs/Symptoms Outcomes: Biochemical data, GI status, Weight, Nutrition focused physical findings    Discharge Planning:    Continue oral nutrition supplement     Electronically signed by Grecia Holloway on 1/21/2021 at 5:24 PM    Contact: [phone]

## 2021-01-22 LAB
ANION GAP SERPL CALC-SCNC: 8 MMOL/L (ref 5–15)
BUN SERPL-MCNC: 19 MG/DL (ref 6–20)
BUN/CREAT SERPL: 18 (ref 12–20)
CA-I BLD-MCNC: 8.2 MG/DL (ref 8.5–10.1)
CHLORIDE SERPL-SCNC: 101 MMOL/L (ref 97–108)
CO2 SERPL-SCNC: 27 MMOL/L (ref 21–32)
CREAT SERPL-MCNC: 1.07 MG/DL (ref 0.55–1.02)
GLUCOSE SERPL-MCNC: 95 MG/DL (ref 65–100)
MAGNESIUM SERPL-MCNC: 1.3 MG/DL (ref 1.6–2.4)
PHOSPHATE SERPL-MCNC: 1.7 MG/DL (ref 2.6–4.7)
POTASSIUM SERPL-SCNC: 2.9 MMOL/L (ref 3.5–5.1)
SODIUM SERPL-SCNC: 136 MMOL/L (ref 136–145)

## 2021-01-22 PROCEDURE — 74011250637 HC RX REV CODE- 250/637: Performed by: HOSPITALIST

## 2021-01-22 PROCEDURE — 65270000029 HC RM PRIVATE

## 2021-01-22 PROCEDURE — 94640 AIRWAY INHALATION TREATMENT: CPT

## 2021-01-22 PROCEDURE — 74011250636 HC RX REV CODE- 250/636: Performed by: HOSPITALIST

## 2021-01-22 PROCEDURE — 83735 ASSAY OF MAGNESIUM: CPT

## 2021-01-22 PROCEDURE — 74011250637 HC RX REV CODE- 250/637: Performed by: FAMILY MEDICINE

## 2021-01-22 PROCEDURE — 74011000250 HC RX REV CODE- 250: Performed by: HOSPITALIST

## 2021-01-22 PROCEDURE — 84100 ASSAY OF PHOSPHORUS: CPT

## 2021-01-22 PROCEDURE — 74011250636 HC RX REV CODE- 250/636: Performed by: FAMILY MEDICINE

## 2021-01-22 PROCEDURE — 36415 COLL VENOUS BLD VENIPUNCTURE: CPT

## 2021-01-22 PROCEDURE — 80048 BASIC METABOLIC PNL TOTAL CA: CPT

## 2021-01-22 RX ORDER — ISOSORBIDE DINITRATE 20 MG/1
10 TABLET ORAL 2 TIMES DAILY
Status: DISCONTINUED | OUTPATIENT
Start: 2021-01-22 | End: 2021-01-23 | Stop reason: HOSPADM

## 2021-01-22 RX ORDER — SPIRONOLACTONE 25 MG/1
25 TABLET ORAL DAILY
Status: DISCONTINUED | OUTPATIENT
Start: 2021-01-22 | End: 2021-01-22

## 2021-01-22 RX ORDER — IPRATROPIUM BROMIDE AND ALBUTEROL SULFATE 2.5; .5 MG/3ML; MG/3ML
3 SOLUTION RESPIRATORY (INHALATION) EVERY 6 HOURS
Status: DISCONTINUED | OUTPATIENT
Start: 2021-01-22 | End: 2021-01-22

## 2021-01-22 RX ORDER — NORTRIPTYLINE HYDROCHLORIDE 25 MG/1
50 CAPSULE ORAL
Status: DISCONTINUED | OUTPATIENT
Start: 2021-01-22 | End: 2021-01-23 | Stop reason: HOSPADM

## 2021-01-22 RX ORDER — MAGNESIUM SULFATE HEPTAHYDRATE 40 MG/ML
2 INJECTION, SOLUTION INTRAVENOUS ONCE
Status: COMPLETED | OUTPATIENT
Start: 2021-01-22 | End: 2021-01-22

## 2021-01-22 RX ORDER — SPIRONOLACTONE 25 MG/1
50 TABLET ORAL DAILY
Status: DISCONTINUED | OUTPATIENT
Start: 2021-01-23 | End: 2021-01-22

## 2021-01-22 RX ORDER — POTASSIUM CHLORIDE 20 MEQ/1
60 TABLET, EXTENDED RELEASE ORAL
Status: COMPLETED | OUTPATIENT
Start: 2021-01-22 | End: 2021-01-22

## 2021-01-22 RX ORDER — POTASSIUM CHLORIDE 1.5 G/1.77G
40 POWDER, FOR SOLUTION ORAL 2 TIMES DAILY WITH MEALS
Status: DISCONTINUED | OUTPATIENT
Start: 2021-01-23 | End: 2021-01-23 | Stop reason: HOSPADM

## 2021-01-22 RX ORDER — MAGNESIUM SULFATE HEPTAHYDRATE 40 MG/ML
2 INJECTION, SOLUTION INTRAVENOUS ONCE
Status: DISCONTINUED | OUTPATIENT
Start: 2021-01-22 | End: 2021-01-22

## 2021-01-22 RX ORDER — IPRATROPIUM BROMIDE AND ALBUTEROL SULFATE 2.5; .5 MG/3ML; MG/3ML
3 SOLUTION RESPIRATORY (INHALATION)
Status: DISCONTINUED | OUTPATIENT
Start: 2021-01-22 | End: 2021-01-23

## 2021-01-22 RX ORDER — POTASSIUM CHLORIDE 20 MEQ/1
60 TABLET, EXTENDED RELEASE ORAL ONCE
Status: COMPLETED | OUTPATIENT
Start: 2021-01-23 | End: 2021-01-23

## 2021-01-22 RX ORDER — CARBAMAZEPINE 200 MG/1
200 CAPSULE, EXTENDED RELEASE ORAL 2 TIMES DAILY
Status: DISCONTINUED | OUTPATIENT
Start: 2021-01-22 | End: 2021-01-23 | Stop reason: HOSPADM

## 2021-01-22 RX ORDER — SPIRONOLACTONE 25 MG/1
25 TABLET ORAL DAILY
Status: DISCONTINUED | OUTPATIENT
Start: 2021-01-22 | End: 2021-01-23 | Stop reason: HOSPADM

## 2021-01-22 RX ORDER — PANTOPRAZOLE SODIUM 40 MG/1
40 TABLET, DELAYED RELEASE ORAL DAILY
Status: DISCONTINUED | OUTPATIENT
Start: 2021-01-23 | End: 2021-01-23 | Stop reason: HOSPADM

## 2021-01-22 RX ORDER — POTASSIUM CHLORIDE 20 MEQ/1
20 TABLET, EXTENDED RELEASE ORAL DAILY
Qty: 7 TAB | Refills: 0 | Status: SHIPPED | OUTPATIENT
Start: 2021-01-22 | End: 2021-01-29

## 2021-01-22 RX ADMIN — NIFEDIPINE 60 MG: 30 TABLET, FILM COATED, EXTENDED RELEASE ORAL at 08:45

## 2021-01-22 RX ADMIN — BUDESONIDE AND FORMOTEROL FUMARATE DIHYDRATE 2 PUFF: 160; 4.5 AEROSOL RESPIRATORY (INHALATION) at 19:26

## 2021-01-22 RX ADMIN — LEVETIRACETAM 500 MG: 250 TABLET, FILM COATED ORAL at 21:18

## 2021-01-22 RX ADMIN — POTASSIUM CHLORIDE 10 MEQ: 10 INJECTION, SOLUTION INTRAVENOUS at 04:00

## 2021-01-22 RX ADMIN — ALPRAZOLAM 0.25 MG: 0.25 TABLET ORAL at 18:25

## 2021-01-22 RX ADMIN — LEVOTHYROXINE SODIUM 25 MCG: 0.03 TABLET ORAL at 06:32

## 2021-01-22 RX ADMIN — ALPRAZOLAM 0.25 MG: 0.25 TABLET ORAL at 08:53

## 2021-01-22 RX ADMIN — CLOPIDOGREL BISULFATE 75 MG: 75 TABLET ORAL at 08:46

## 2021-01-22 RX ADMIN — HYDRALAZINE HYDROCHLORIDE 100 MG: 50 TABLET, FILM COATED ORAL at 08:45

## 2021-01-22 RX ADMIN — CARBAMAZEPINE 200 MG: 200 CAPSULE, EXTENDED RELEASE ORAL at 21:00

## 2021-01-22 RX ADMIN — MAGNESIUM SULFATE HEPTAHYDRATE 2 G: 40 INJECTION, SOLUTION INTRAVENOUS at 18:26

## 2021-01-22 RX ADMIN — ISOSORBIDE DINITRATE 10 MG: 20 TABLET ORAL at 21:18

## 2021-01-22 RX ADMIN — NORTRIPTYLINE HYDROCHLORIDE 50 MG: 25 CAPSULE ORAL at 21:18

## 2021-01-22 RX ADMIN — ENOXAPARIN SODIUM 30 MG: 30 INJECTION SUBCUTANEOUS at 08:45

## 2021-01-22 RX ADMIN — IPRATROPIUM BROMIDE AND ALBUTEROL SULFATE 3 ML: .5; 3 SOLUTION RESPIRATORY (INHALATION) at 19:26

## 2021-01-22 RX ADMIN — HYDRALAZINE HYDROCHLORIDE 100 MG: 50 TABLET, FILM COATED ORAL at 21:18

## 2021-01-22 RX ADMIN — BUDESONIDE AND FORMOTEROL FUMARATE DIHYDRATE 2 PUFF: 160; 4.5 AEROSOL RESPIRATORY (INHALATION) at 09:14

## 2021-01-22 RX ADMIN — SPIRONOLACTONE 25 MG: 25 TABLET ORAL at 18:26

## 2021-01-22 RX ADMIN — POTASSIUM CHLORIDE 60 MEQ: 1500 TABLET, EXTENDED RELEASE ORAL at 18:26

## 2021-01-22 RX ADMIN — ASPIRIN 81 MG: 81 TABLET, COATED ORAL at 08:46

## 2021-01-22 RX ADMIN — LEVETIRACETAM 500 MG: 250 TABLET, FILM COATED ORAL at 08:46

## 2021-01-22 RX ADMIN — LABETALOL HYDROCHLORIDE 400 MG: 100 TABLET, FILM COATED ORAL at 08:46

## 2021-01-22 RX ADMIN — ATORVASTATIN CALCIUM 80 MG: 40 TABLET, FILM COATED ORAL at 08:45

## 2021-01-22 RX ADMIN — LABETALOL HYDROCHLORIDE 400 MG: 100 TABLET, FILM COATED ORAL at 21:18

## 2021-01-22 NOTE — PROGRESS NOTES
Hospitalist Progress Note               Daily Progress Note: 1/22/2021      Subjective: The patient is seen for follow  up.   49-year-old female with past medical history of COPD, GERD, hypertension, hyperlipidemia and seizure disorder was admitted to the hospital with intractable nausea vomiting and decreased oral intake. Patient was found to have electrolyte abnormalities including hyponatremia, hypokalemia on admission. Still hypokalemic and hypomagnesemic  Patients dizziness is better.  She is tolerating diet    Medications reviewed  Current Facility-Administered Medications   Medication Dose Route Frequency    [Held by provider] potassium chloride (KLOR-CON) packet for solution 20 mEq  20 mEq Oral BID WITH MEALS    ALPRAZolam (XANAX) tablet 0.25 mg  0.25 mg Oral Q8H PRN    diphenhydrAMINE (BENADRYL) injection 25 mg  25 mg IntraVENous Q6H PRN    budesonide-formoteroL (SYMBICORT) 160-4.5 mcg/actuation HFA inhaler 2 Puff  2 Puff Inhalation BID RT    aspirin delayed-release tablet 81 mg  81 mg Oral DAILY    atorvastatin (LIPITOR) tablet 80 mg  80 mg Oral DAILY    clopidogreL (PLAVIX) tablet 75 mg  75 mg Oral DAILY    hydrALAZINE (APRESOLINE) tablet 100 mg  100 mg Oral TID    levothyroxine (SYNTHROID) tablet 25 mcg  25 mcg Oral ACB    levETIRAcetam (KEPPRA) tablet 500 mg  500 mg Oral BID    NIFEdipine ER (PROCARDIA XL) tablet 60 mg  60 mg Oral DAILY    labetaloL (NORMODYNE) tablet 400 mg  400 mg Oral TID    albuterol (PROVENTIL HFA, VENTOLIN HFA, PROAIR HFA) inhaler 2 Puff  2 Puff Inhalation Q4H PRN    acetaminophen (TYLENOL) tablet 650 mg  650 mg Oral Q6H PRN    Or    acetaminophen (TYLENOL) suppository 650 mg  650 mg Rectal Q6H PRN    polyethylene glycol (MIRALAX) packet 17 g  17 g Oral DAILY PRN    promethazine (PHENERGAN) tablet 12.5 mg  12.5 mg Oral Q6H PRN    Or    ondansetron (ZOFRAN) injection 4 mg  4 mg IntraVENous Q6H PRN    enoxaparin (LOVENOX) injection 30 mg  30 mg SubCUTAneous DAILY       Review of Systems:   A comprehensive review of systems was negative except for that written in the HPI.    Objective:   Physical Exam:     Visit Vitals  BP (!) 99/57   Pulse 91   Temp 98.6 °F (37 °C)   Resp 18   Ht 5' 4.02\" (1.626 m)   Wt 50.8 kg (111 lb 15.9 oz)   SpO2 98%   BMI 19.21 kg/m²    O2 Flow Rate (L/min): 3 l/min O2 Device: Room air    Temp (24hrs), Av.3 °F (36.8 °C), Min:98.1 °F (36.7 °C), Max:98.6 °F (37 °C)    No intake/output data recorded.    1901 -  0700  In: 1300 [P.O.:950; I.V.:350]  Out: -     PHYSICAL EXAM:  General alert and awake, follows commands.  Skin: Extremities and face reveal no rashes.   HEENT: Sclerae anicteric. Extra-occular muscles are intact. No oral ulcers. No ENT discharge.  Right carotid endarterectomy staples in situ, mild swelling noticed.  Cardiovascular: Regular rate and rhythm. No murmurs, gallops, or rubs. PMI nondisplaced.  Respiratory: Comfortable breathing with no accessory muscle use. Clear breath sounds with no wheezes, rales, or rhonchi.   GI: Abdomen nondistended, soft, and nontender. Normal active bowel sounds.   Rectal: Deferred   Musculoskeletal: No pitting edema of the lower legs. Extremities have good range of motion. No costovertebral tenderness.   Neurological: Gross memory appears intact. Patient is alert and oriented. Power 5/5, cranial nerves II-XII grossly intact  Psychiatric: Mood appears appropriate with judgement intact.   Lymphatic: No cervical or supraclavicular adenopathy.    Data Review:       Recent Days:  Recent Labs     21  0502 21  1620   WBC 10.0 11.1*   HGB 9.7* 10.2*   HCT 29.9* 30.7*    372     Recent Labs     21  1620 21  0502 21  1620 21  0725    139 136 137   K 2.9* 2.8* 2.7* 2.8*    106 101 102   CO2 27 25 25 25   GLU 95 81 97 118*   BUN 19 19 19 21*   CREA 1.07* 1.21* 1.25* 1.25*   CA 8.2* 7.8* 7.6* 7.9*   MG 1.3* 0.9*  --   --    PHOS 1.7*  --   --  1.7*  ALB  --   --   --  2.6*     No results for input(s): PH, PCO2, PO2, HCO3, FIO2 in the last 72 hours. CT ABD PELV WO CONT   Final Result   IMPRESSION: Severe abdominal aorta atherosclerosis. Suspect infrarenal abdominal   aortic stenosis. Atrophy left kidney/hypertrophy right kidney. No hydronephrosis. No bowel obstruction. No CT evidence for appendicitis or diverticulitis. No   ascites. Prior cholecystectomy. CT HEAD WO CONT   Final Result   IMPRESSION:   1. No findings of acute intracranial abnormality. 2. Calcification within the central renzo appearing similar to prior. There is an   additional punctate calcification within the right posterior temporal region. Further evaluation with non-emergent brain MRI should be considered. 3. Other chronic findings, including nonspecific white matter findings that are   most commonly the result of chronic microangiopathy. XR CHEST PORT   Final Result   IMPRESSION:   1. No findings of acute cardiopulmonary abnormality.              Assessment/     Problem List:  Hospital Problems  Date Reviewed: 1/14/2021          Codes Class Noted POA    Hyponatremia ICD-10-CM: E87.1  ICD-9-CM: 276.1  1/17/2021 Unknown        CHF exacerbation ([facility] ICD-10-CM: I50.9  ICD-9-CM: 428.0  11/2/2020 Yes        PEDRO PABLO (acute kidney injury) ([facility] ICD-10-CM: N17.9  ICD-9-CM: 584.9  10/2/2020 Unknown        [facility] disorder ([facility] (Chronic) ICD-10-CM: G40.909  ICD-9-CM: 345.90  10/2/2020 Yes        COPD (chronic obstructive pulmonary disease) with emphysema ([facility] (Chronic) ICD-10-CM: J43.9  ICD-9-CM: 492.8  10/2/2020 Yes        Hypokalemia ICD-10-CM: E87.6  ICD-9-CM: 276.8  10/2/2020 Unknown        Acquired hypothyroidism (Chronic) ICD-10-CM: E03.9  ICD-9-CM: 244.9  10/2/2020 Yes        Resistant hypertension ICD-10-CM: I10  ICD-9-CM: 401.9  9/21/2020 Yes                     Plan:  51-year-old female was admitted to the hospital with a complaint of intractable nausea and vomiting. 1.  Intractable nausea and vomiting: resolved  2. Diastolic chronic congestive heart failure. Patient's EF was 55% on a recent echocardiogram.  She was on spironolactone prior to admission. This is on hold due to patient's intractable nausea and vomiting. 3.  Resistant hypertension: Patient have a history of renal artery stenosis and is s/p renal artery stent placement in November 2020. Left atrophic kidney. Will avoid ACE inhibitor's and ARB's.  4.  Mild renal insufficiency: Will monitor patient's renal functions closely. Creatinine is improved from 1.81-  1.21.  5.  Hypokalemia, Hypomagnesemia . Again K is low, will give PO K and IV Mg supplements. Restart spironolactone  6. COPD: Continue patient on Symbicort inhaler. She is on 3lpm O2, She uses O2 at home as well. 7.  Carotid artery disease s/p right carotid endarterectomy: Continue patient on aspirin and Plavix and Lipitor. Vascular surgery eval appreciated. 8.  Hypothyroidism: Continue patient on levothyroxine  9. History of seizure disorder: Continue patient on Keppra  10.   Orthostatic BP improved  Further plan of management will depend upon patient's clinical course in the hospital     Care Plan discussed with: Patient/Family, Nurse and       Purvi Laurent MD

## 2021-01-22 NOTE — DISCHARGE INSTRUCTIONS
Patient Education        Hypokalemia: Care Instructions  Your Care Instructions     Hypokalemia (say \"dh-hj-[id number]-RUFINO-dudley-uh\") is a low level of potassium. The heart, muscles, kidneys, and nervous system all need potassium to work well. This problem has many different causes. Kidney problems, diet, and medicines like diuretics and laxatives can cause it. So can vomiting or diarrhea. In some cases, cancer is the cause. Your doctor may do tests to find the cause of your low potassium levels. You may need medicines to bring your potassium levels back to normal. You may also need regular blood tests to check your potassium. If you have very low potassium, you may need intravenous (IV) medicines. You also may need tests to check the electrical activity of your heart. Heart problems caused by low potassium levels can be very serious. Follow-up care is a key part of your treatment and safety. Be sure to make and go to all appointments, and call your doctor if you are having problems. It's also a good idea to know your test results and keep a list of the medicines you take. How can you care for yourself at home? · If your doctor recommends it, eat foods that have a lot of potassium. These include fresh fruits, juices, and vegetables. They also include nuts, beans, and milk. · Be safe with medicines. If your doctor prescribes medicines or potassium supplements, take them exactly as directed. Call your doctor if you have any problems with your medicines. · Get your potassium levels tested as often as your doctor tells you. When should you call for help? Call 911 anytime you think you may need emergency care. For example, call if:    · You feel like your heart is missing beats. Heart problems caused by low potassium can cause death.     · You passed out (lost consciousness).     · You have a seizure.    Call your doctor now or seek immediate medical care if:    · You feel weak or unusually tired.     · You have severe arm or leg cramps.     · You have tingling or numbness.     · You feel sick to your stomach, or you vomit.     · You have belly cramps.     · You feel bloated or constipated.     · You have to urinate a lot.     · You feel very thirsty most of the time.     · You are dizzy or lightheaded, or you feel like you may faint.     · You feel depressed, or you lose touch with reality. Watch closely for changes in your health, and be sure to contact your doctor if:    · You do not get better as expected. Where can you learn more? Go to http://www.gray.com/  Enter G358 in the search box to learn more about \"Hypokalemia: Care Instructions. \"  Current as of: March 31, 2020               Content Version: 12.6  © 2585-8264 [employer]. Care instructions adapted under license by [employer] (which disclaims liability or warranty for this information). If you have questions about a medical condition or this instruction, always ask your healthcare professional. [address] any warranty or liability for your use of this information.

## 2021-01-22 NOTE — DISCHARGE SUMMARY
HOSPITALIST DISCHARGE SUMMARY    NAME: Odell Washington   :  1965   MRN:  [id number]     Date/Time:  2021 10:42 AM    DISCHARGE DIAGNOSIS:  Active Problems:    Resistant hypertension (2020)      PEDRO PABLO (acute kidney injury) ([facility] [place] [facility] (10/2/2020)      Seizure disorder ([facility] [place] [facility] (10/2/2020)      COPD (chronic obstructive pulmonary disease) with emphysema ([facility] [place] [facility] (10/2/2020)      Hypokalemia (10/2/2020)      Acquired hypothyroidism (10/2/2020)      CHF exacerbation ([facility] [place] [facility] (2020)      Hyponatremia (2021)        Admission Diagnosis:  Nausea and vomiting    CONSULTATIONS: Nephrology    Procedures: see electronic medical records for all procedures/Xrays and details which were not copied into this note but were reviewed prior to creation of Plan. Please follow-up tests/labs that are still pendin. None     DISCHARGE SUMMARY/HOSPITAL COURSE: for full details see H&P, daily progress notes, labs, consult notes. Briefly As Per HPI:  59-year-old female with past medical history of COPD, GERD, hypertension, hyperlipidemia and seizure disorder was admitted to the hospital with intractable nausea vomiting and decreased oral intake. Patient was found to have electrolyte abnormalities including hyponatremia, hypokalemia on admission. Patient's nausea and vomiting is significantly improved. She was given IV potassium and magnesium supplements. Her electrolytes were corrected. Patient is able to take orally well now. She is stable to be discharged at this time. Was evaluated by vascular surgery as well and the staples from her neck were removed. Home meds adjusted to raise goal of BP control as d.w vascular surgery. _______________________________________________________________________   Patient seen and examined by me on day of discharge.   Pertinent findings are:  Vitals:  Visit Vitals  /79 (BP 1 Location: Right arm, BP Patient Position: At rest)   Pulse 95   Temp 97.9 °F (36.6 °C)   Resp 16   Ht 5' 4.02\" (1.626 m)   Wt 50.8 kg (111 lb 15.9 oz)   SpO2 98%   BMI 19.21 kg/m²     Temp (24hrs), Av.4 °F (36.9 °C), Min:97.5 °F (36.4 °C), Max:98.9 °F (37.2 °C)      Last 24hr Input/Output:    Intake/Output Summary (Last 24 hours) at 2021 1639  Last data filed at 2021 1516  Gross per 24 hour   Intake 3590 ml   Output 600 ml   Net 2990 ml        PHYSICAL EXAM:   General: Alert and awake  Skin: Extremities and face reveal no rashes. No martines erythema. No telangiectasias on the chest wall. HEENT: Sclerae anicteric. Extra-occular muscles are intact. No oral ulcers. No ENT discharge. The neck is supple. Cardiovascular: Regular rate and rhythm. No murmurs, gallops, or rubs. PMI nondisplaced. Carotids without bruits. Respiratory: Comfortable breathing with no accessory muscle use. Clear breath sounds with no wheezes, rales, or rhonchi. GI: Abdomen nondistended, soft, and nontender. Normal active bowel sounds. No enlargement of the liver or spleen. No masses palpable. Rectal: Deferred   Musculoskeletal: No pitting edema of the lower legs. Extremities have good range of motion. No costovertebral tenderness. Neurological: Gross memory appears intact. Patient is alert and oriented. Power 5/5  Psychiatric: Mood appears appropriate with judgement intact. Lymphatic: No cervical or supraclavicular adenopathy. See Discharge Instructions for further details. _______________________________________________________________________  DISPOSITION:    Home with Family: x   Home with HH/PT/OT/RN:    SNF/LTC:    WHITNEY:    OTHER:    ________________________________________________________________________  Medications Reviewed:    Current Discharge Medication List      CONTINUE these medications which have CHANGED    Details   hydrALAZINE (APRESOLINE) 50 mg tablet Take 1 Tab by mouth three (3) times daily.   Qty: 90 Tab, Refills: 0      isosorbide dinitrate (ISORDIL) 10 mg tablet Take 1 Tab by mouth two (2) times a day. Qty: 90 Tab, Refills: 1      labetaloL (NORMODYNE) 200 mg tablet Take 1 Tab by mouth three (3) times daily. Qty: 90 Tab, Refills: 1      !! levothyroxine (SYNTHROID) 50 mcg tablet Take 1 Tab by mouth Daily (before breakfast). Qty: 30 Tab, Refills: 0      spironolactone (ALDACTONE) 25 mg tablet Take 1 Tab by mouth daily. Qty: 30 Tab, Refills: 0      potassium chloride (K-DUR, KLOR-CON) 20 mEq tablet Take 1 Tab by mouth daily for 7 days. Qty: 7 Tab, Refills: 0       !! - Potential duplicate medications found. Please discuss with provider. CONTINUE these medications which have NOT CHANGED    Details   esomeprazole (NexIUM) 40 mg capsule Take 40 mg by mouth. Indications: Berry's esophagus      clopidogreL (PLAVIX) 75 mg tab Take 75 mg by mouth daily. nortriptyline (PAMELOR) 50 mg capsule Take 50 mg by mouth nightly. atorvastatin (LIPITOR) 80 mg tablet Take 80 mg by mouth daily. Trelegy Ellipta 100-62.5-25 mcg inhaler       albuterol (PROVENTIL HFA, VENTOLIN HFA, PROAIR HFA) 90 mcg/actuation inhaler       rosuvastatin (CRESTOR) 10 mg tablet       Oxygen 5 Devices as needed. Pt wears 5LPM as needed- states she uses it after an axiety attack      albuterol-ipratropium (DUO-NEB) 2.5 mg-0.5 mg/3 ml nebu 3 mL by Nebulization route every six (6) hours. Qty: 120 Nebule, Refills: 0      ALPRAZolam (XANAX) 0.25 mg tablet Take 0.25 mg by mouth three (3) times daily as needed for Anxiety. sertraline (Zoloft) 25 mg tablet Take 25 mg by mouth daily. !! levothyroxine (SYNTHROID) 25 mcg tablet Take 25 mcg by mouth Daily (before breakfast). levETIRAcetam (Keppra) 500 mg tablet Take 500 mg by mouth two (2) times a day. carBAMazepine, mood stabiliz, 200 mg CM12 Take 200 mg by mouth two (2) times a day. ferrous sulfate 325 mg (65 mg iron) tablet Take 325 mg by mouth Daily (before breakfast). folic acid (FOLVITE) 1 mg tablet Take 1 mg by mouth daily. aspirin delayed-release 81 mg tablet Take  by mouth daily. magnesium oxide (MAG-OX) 400 mg tablet Take 400 mg by mouth daily. !! - Potential duplicate medications found. Please discuss with provider. STOP taking these medications       HYDROcodone-acetaminophen (NORCO) 5-325 mg per tablet Comments:   Reason for Stopping:         NIFEdipine ER (PROCARDIA XL) 60 mg ER tablet Comments:   Reason for Stopping:               PMH/SH reviewed - no change compared to H&P  ________________________________________________________________________    Recommended diet:  Renal Diet  Recommended activity:Activity as tolerated       Follow Up: Follow-up Information     Follow up With Specialties Details Why Contact Gibson    [facility] [place] [address] On 2/3/2021 time at [address] [phone]      Marilee Dowd MD Nephrology, Internal Medicine On 2/1/2021 time at [address] [phone]      Jhoana Arnett MD Cardiology In 1 week  [facility] [address] [facility]  [phone]             ______________________________________________________________________  Total time spent in discharge (min): >35 min   ________________________________________________________________________    Care Plan discussed with:    Comments   Patient x    Family      RN x    Care Manager x                   Consultant:      ________________________________________________________________________  Attending Physician:  Reina Cervantes MD  ________________________________________________________________________  **Lab Data Reviewed:  Recent Results (from the past 24 hour(s))   CBC WITH AUTOMATED DIFF    Collection Time: 01/23/21  6:07 AM   Result Value Ref Range    WBC 12.6 (H) 3.6 - 11.0 K/uL    RBC 3.12 (L) 3.80 - 5.20 M/uL    HGB 9.7 (L) 11.5 - 16.0 g/dL    HCT 29.1 (L) 35.0 - 47.0 %    MCV 93.3 80.0 - 99.0 FL    MCH 31.1 26.0 - 34.0 PG    MCHC 33.3 30.0 - 36.5 g/dL    RDW 15.2 (H) 11.5 - 14.5 %    PLATELET 842 444 - 184 K/uL    MPV 9.9 8.9 - 12.9 FL    NEUTROPHILS 75 32 - 75 %    LYMPHOCYTES 13 12 - 49 %    MONOCYTES 8 5 - 13 %    EOSINOPHILS 3 0 - 7 %    BASOPHILS 0 0 - 1 %    IMMATURE GRANULOCYTES 1 (H) 0.0 - 0.5 %    ABS. NEUTROPHILS 9.7 (H) 1.8 - 8.0 K/UL    ABS. LYMPHOCYTES 1.6 0.8 - 3.5 K/UL    ABS. MONOCYTES 1.0 0.0 - 1.0 K/UL    ABS. EOSINOPHILS 0.3 0.0 - 0.4 K/UL    ABS. BASOPHILS 0.0 0.0 - 0.1 K/UL    ABS. IMM. GRANS. 0.1 (H) 0.00 - 0.04 K/UL    DF AUTOMATED     METABOLIC PANEL, BASIC    Collection Time: 01/23/21  6:07 AM   Result Value Ref Range    Sodium 139 136 - 145 mmol/L    Potassium 2.8 (L) 3.5 - 5.1 mmol/L    Chloride 103 97 - 108 mmol/L    CO2 29 21 - 32 mmol/L    Anion gap 7 5 - 15 mmol/L    Glucose 86 65 - 100 mg/dL    BUN 16 6 - 20 mg/dL    Creatinine 0.98 0.55 - 1.02 mg/dL    BUN/Creatinine ratio 16 12 - 20      GFR est AA >60 >60 ml/min/1.73m2    GFR est non-AA 59 (L) >60 ml/min/1.73m2    Calcium 8.1 (L) 8.5 - 60.1 mg/dL   METABOLIC PANEL, BASIC    Collection Time: 01/23/21  3:51 PM   Result Value Ref Range    Sodium 138 136 - 145 mmol/L    Potassium 3.6 3.5 - 5.1 mmol/L    Chloride 101 97 - 108 mmol/L    CO2 30 21 - 32 mmol/L    Anion gap 7 5 - 15 mmol/L    Glucose 111 (H) 65 - 100 mg/dL    BUN 16 6 - 20 mg/dL    Creatinine 1.07 (H) 0.55 - 1.02 mg/dL    BUN/Creatinine ratio 15 12 - 20      GFR est AA >60 >60 ml/min/1.73m2    GFR est non-AA 53 (L) >60 ml/min/1.73m2    Calcium 8.1 (L) 8.5 - 10.1 mg/dL   MAGNESIUM    Collection Time: 01/23/21  3:51 PM   Result Value Ref Range    Magnesium 1.4 (L) 1.6 - 2.4 mg/dL     CT ABD PELV WO CONT   Final Result   IMPRESSION: Severe abdominal aorta atherosclerosis. Suspect infrarenal abdominal   aortic stenosis. Atrophy left kidney/hypertrophy right kidney. No hydronephrosis. No bowel obstruction. No CT evidence for appendicitis or diverticulitis.  No ascites. Prior cholecystectomy. CT HEAD WO CONT   Final Result   IMPRESSION:   1. No findings of acute intracranial abnormality. 2. Calcification within the central renzo appearing similar to prior. There is an   additional punctate calcification within the right posterior temporal region. Further evaluation with non-emergent brain MRI should be considered. 3. Other chronic findings, including nonspecific white matter findings that are   most commonly the result of chronic microangiopathy. XR CHEST PORT   Final Result   IMPRESSION:   1. No findings of acute cardiopulmonary abnormality.

## 2021-01-22 NOTE — ADT AUTH CERT NOTES
Patient Demographics Patient Name Libby Matute  
[id number] Sex Female   
1965 Address [address] [address] [address] Phone [phone] (Home) [phone] (Mobile) *Preferred*  
CSN:  
[id number] Admit Date: Admit Time Room Bed 2021  4:49  [[id number]] 01 Carli Ji Attending Providers Provider Pager From To Sandie Appiah MD   21 Christal Cordoba MD  21 Jose C Kincaid MD  21 Emergency Contact(s) Name Relation Home Work Mobile Juhi Jones Boyfriend [phone] Darren Huff Daughter   [phone] Utilization Reviews 
 
  
Letter of Status Recommendation by Kenneth Pollard 
 
  
Review Entered Review Status 2021 15:04 In Primary  
  
Criteria Review  
slr keep in We recommend that the following pt's hospitalization under INPATIENT [101] status is APPROPRIATE Name: Rebecca Rehman : 1965 [facility]# : [id number] Insurance: [employer] Clinical summary Intractable n/v, dehydration, tongue swelling Vitals stable Labs and Imaging Severe hypokalemia, hyponatremia, PEDRO PABLO 
MCG criteria applies YES Comments Electrolyte derrangements persist. On IV steroids for tongue swelling? This chart was reviewed at 9:37 AM 2021 Trevor Lazar MD 
Physician Advisor [facility] Cell [phone]  
  
Renal Failure, Acute - Care Day 4 (2021) by Kenneth Pollard 
 
  
Review Entered Review Status 2021 13:26 Completed  
  
Criteria Review Care Day: 4 Care Date: 2021 Level of Care: Inpatient Floor Guideline Day 4 Clinical Status   
(X) * Hemodynamic stability 2021 13:26:26 EST by Anh Borrero   
  87 18 97% 129/66   
(X) * Mental status at baseline 2021 13:26:26 EST by Anh Borrero   Neurological: Gross memory appears intact. Patient is alert and oriented. Power 5/5, cranial nerves II-XII grossly intact Psychiatric: Mood appears appropriate with judgement intact.   
(X) * Tachypnea absent 1/22/2021 13:26:26 EST by John Bethea   
  Respiratory: Comfortable breathing with no accessory muscle use. Clear breath sounds with no wheezes, rales, or rhonchi.   
(X) * Hypoxemia absent 1/22/2021 13:26:26 EST by John Bethea   
  RA   
( ) * Etiology requiring inpatient treatment absent   
(X) * Electrolyte abnormalities absent or acceptable for next level of care 1/22/2021 13:26:26 EST by John Bethea   
  hyponatremia improved- labs wnl K still Low   
( ) * Acid-base abnormalities absent   
(X) * Volume status at baseline or acceptable for next level of care 1/22/2021 13:26:26 EST by Azael Rudolph tolerating cld ( ) * Diet tolerated ( ) * Dialysis not needed or access and plan established ( ) * Discharge plans and education understood Activity ( ) * Ambulatory or acceptable for next level of care [J] Routes   
(X) * Oral hydration, medications, and diet 1/22/2021 13:26:26 EST by John Bethea   
  CLD Interventions (X) Monitor electrolytes, renal function tests, acid-base, and volume status 1/22/2021 13:26:26 EST by Azael Rudolph labs below * Milestone Additional Notes 1/20 VS- 98 WBC 11.1 (H) RBC 3.31 (L) HGB 10.2 (L) HCT 30.7 (L) RDW 15.0 (H) PLATELET 817 NEUTROPHILS 77 (H) LYMPHOCYTES 13 IMMATURE GRANULOCYTES 1 (H)  
ABS. NEUTROPHILS 8.7 (H)  
ABS. IMM. GRANS. 0.1 (H)  
ABS. MONOCYTES 1.0 Potassium 2.8 (L) 2.7 (LL) Glucose 118 (H) 97 BUN 21 (H) 19 Creatinine 1.25 (H) 1.25 (H) Calcium 7.9 (L) 7.6 (L) Phosphorus 1.7 (L) GFR est non-AA 44 (L) 44 (L)  
GFR est AA 54 (L) 54 (L) Albumin 2.6 (L) Meds: Xanax 0.25mg po x2, asa 81mg po x1, Lipitor 80mg po x1, Plavix 75mg po x1, Lovenox 30mg sc x1, apresoline 100mg po x3, Keppra 500mg po x2, Procardia 60mg po x1, Klor Con 40 meq PO x2,  solumedrol 40mg iv x1 The patient is seen for follow  up.   
59-year-old female with past medical history of COPD, GERD, hypertension, hyperlipidemia and seizure disorder was admitted to the hospital with intractable nausea vomiting and decreased oral intake.  Patient was found to have electrolyte abnormalities including hyponatremia, hypokalemia on admission. These have improved. Patients dizziness is better. She is tolerating clear liquid diet.   
 PHYSICAL EXAM:  
General alert and awake, follows commands. Skin: Extremities and face reveal no rashes. HEENT: Sclerae anicteric. Extra-occular muscles are intact. No oral ulcers. No ENT discharge.  Right carotid endarterectomy staples in situ, mild swelling noticed. Cardiovascular: Regular rate and rhythm. No murmurs, gallops, or rubs. PMI nondisplaced. GI: Abdomen nondistended, soft, and nontender. Normal active bowel sounds. Rectal: Deferred Musculoskeletal: No pitting edema of the lower legs. Extremities have good range of motion. No costovertebral tenderness. Lymphatic: No cervical or supraclavicular adenopathy.  
   
   
Assessment/  
   
Problem List:  
  
Hospital Problems Date Reviewed: 1/14/2021  
  Codes Class Noted POA  
  Hyponatremia ICD-10-CM: E87.1 ICD-9-CM: 276.1 1/17/2021 Unknown  
    
  CHF exacerbation ([facility] ICD-10-CM: I50.9 ICD-9-CM: 428.0 11/2/2020 Yes  
    
  PEDRO PABLO (acute kidney injury) ([facility] ICD-10-CM: N17.9 ICD-9-CM: 584.9 10/2/2020 Unknown  
    
  Seizure disorder ([facility] (Chronic) ICD-10-CM: G16.541 ICD-9-CM: 345.90 10/2/2020 Yes  
    
  COPD (chronic obstructive pulmonary disease) with emphysema (HCC) (Chronic) ICD-10-CM: J43.9 ICD-9-CM: 492.8 10/2/2020 Yes  
    
  Hypokalemia ICD-10-CM: E87.6 ICD-9-CM: 276.8 10/2/2020 Unknown  
    
  Acquired hypothyroidism (Chronic) ICD-10-CM: E03.9 ICD-9-CM: 244.9 10/2/2020 Yes  
    
  Resistant hypertension ICD-10-CM: I10  
ICD-9-CM: 401.9 9/21/2020 Yes  
    
   
   
   
   
   
   
Plan:  
77-year-old female was admitted to the hospital with a complaint of intractable nausea and vomiting. 1.  Intractable nausea and vomiting: Patient's nausea vomiting is improved.  We will try to advance patient's diet further. 2.  Diastolic chronic congestive heart failure.  Patient's EF was 55% on a recent echocardiogram.  She was on spironolactone prior to admission.  This is on hold due to patient's intractable nausea and vomiting. 3.  Resistant hypertension: Patient have a history of renal artery stenosis and is s/p renal artery stent placement in November 2020.  Left atrophic kidney.  Will avoid ACE inhibitor's and ARB's. 4.  Mild renal insufficiency: Will monitor patient's renal functions closely.  Creatinine is improved from 1.81-  1.51.  
5.  Hypokalemia and hyponatremia. Again K is low, will give PO supplements. 6.  COPD: Continue patient on Symbicort inhaler. 7.  Carotid artery disease s/p right carotid endarterectomy: Continue patient on aspirin and Plavix and Lipitor.  Will get vascular surgery evaluation for follow-up of the patient. 8.  Hypothyroidism: Continue patient on levothyroxine 9.  History of seizure disorder: Continue patient on Keppra 10.  Orthostatic BP was dropped on sitting but improved back on standing, will check again today.    
Further plan of management will depend upon patient's clinical course in the hospital

## 2021-01-23 VITALS
WEIGHT: 111.99 LBS | HEIGHT: 64 IN | RESPIRATION RATE: 16 BRPM | OXYGEN SATURATION: 98 % | SYSTOLIC BLOOD PRESSURE: 130 MMHG | HEART RATE: 95 BPM | BODY MASS INDEX: 19.12 KG/M2 | DIASTOLIC BLOOD PRESSURE: 79 MMHG | TEMPERATURE: 97.9 F

## 2021-01-23 LAB
ANION GAP SERPL CALC-SCNC: 7 MMOL/L (ref 5–15)
ANION GAP SERPL CALC-SCNC: 7 MMOL/L (ref 5–15)
BASOPHILS # BLD: 0 K/UL (ref 0–0.1)
BASOPHILS NFR BLD: 0 % (ref 0–1)
BUN SERPL-MCNC: 16 MG/DL (ref 6–20)
BUN SERPL-MCNC: 16 MG/DL (ref 6–20)
BUN/CREAT SERPL: 15 (ref 12–20)
BUN/CREAT SERPL: 16 (ref 12–20)
CA-I BLD-MCNC: 8.1 MG/DL (ref 8.5–10.1)
CA-I BLD-MCNC: 8.1 MG/DL (ref 8.5–10.1)
CHLORIDE SERPL-SCNC: 101 MMOL/L (ref 97–108)
CHLORIDE SERPL-SCNC: 103 MMOL/L (ref 97–108)
CO2 SERPL-SCNC: 29 MMOL/L (ref 21–32)
CO2 SERPL-SCNC: 30 MMOL/L (ref 21–32)
CREAT SERPL-MCNC: 0.98 MG/DL (ref 0.55–1.02)
CREAT SERPL-MCNC: 1.07 MG/DL (ref 0.55–1.02)
DIFFERENTIAL METHOD BLD: ABNORMAL
EOSINOPHIL # BLD: 0.3 K/UL (ref 0–0.4)
EOSINOPHIL NFR BLD: 3 % (ref 0–7)
ERYTHROCYTE [DISTWIDTH] IN BLOOD BY AUTOMATED COUNT: 15.2 % (ref 11.5–14.5)
GLUCOSE SERPL-MCNC: 111 MG/DL (ref 65–100)
GLUCOSE SERPL-MCNC: 86 MG/DL (ref 65–100)
HCT VFR BLD AUTO: 29.1 % (ref 35–47)
HGB BLD-MCNC: 9.7 G/DL (ref 11.5–16)
IMM GRANULOCYTES # BLD AUTO: 0.1 K/UL (ref 0–0.04)
IMM GRANULOCYTES NFR BLD AUTO: 1 % (ref 0–0.5)
LYMPHOCYTES # BLD: 1.6 K/UL (ref 0.8–3.5)
LYMPHOCYTES NFR BLD: 13 % (ref 12–49)
MAGNESIUM SERPL-MCNC: 1.4 MG/DL (ref 1.6–2.4)
MCH RBC QN AUTO: 31.1 PG (ref 26–34)
MCHC RBC AUTO-ENTMCNC: 33.3 G/DL (ref 30–36.5)
MCV RBC AUTO: 93.3 FL (ref 80–99)
MONOCYTES # BLD: 1 K/UL (ref 0–1)
MONOCYTES NFR BLD: 8 % (ref 5–13)
NEUTS SEG # BLD: 9.7 K/UL (ref 1.8–8)
NEUTS SEG NFR BLD: 75 % (ref 32–75)
PLATELET # BLD AUTO: 394 K/UL (ref 150–400)
PMV BLD AUTO: 9.9 FL (ref 8.9–12.9)
POTASSIUM SERPL-SCNC: 2.8 MMOL/L (ref 3.5–5.1)
POTASSIUM SERPL-SCNC: 3.6 MMOL/L (ref 3.5–5.1)
RBC # BLD AUTO: 3.12 M/UL (ref 3.8–5.2)
SODIUM SERPL-SCNC: 138 MMOL/L (ref 136–145)
SODIUM SERPL-SCNC: 139 MMOL/L (ref 136–145)
WBC # BLD AUTO: 12.6 K/UL (ref 3.6–11)

## 2021-01-23 PROCEDURE — 74011250637 HC RX REV CODE- 250/637: Performed by: HOSPITALIST

## 2021-01-23 PROCEDURE — 94760 N-INVAS EAR/PLS OXIMETRY 1: CPT

## 2021-01-23 PROCEDURE — 36415 COLL VENOUS BLD VENIPUNCTURE: CPT

## 2021-01-23 PROCEDURE — 74011250637 HC RX REV CODE- 250/637: Performed by: FAMILY MEDICINE

## 2021-01-23 PROCEDURE — 80048 BASIC METABOLIC PNL TOTAL CA: CPT

## 2021-01-23 PROCEDURE — 94640 AIRWAY INHALATION TREATMENT: CPT

## 2021-01-23 PROCEDURE — 74011250637 HC RX REV CODE- 250/637: Performed by: INTERNAL MEDICINE

## 2021-01-23 PROCEDURE — 74011000250 HC RX REV CODE- 250: Performed by: HOSPITALIST

## 2021-01-23 PROCEDURE — 83735 ASSAY OF MAGNESIUM: CPT

## 2021-01-23 PROCEDURE — 74011250636 HC RX REV CODE- 250/636: Performed by: FAMILY MEDICINE

## 2021-01-23 PROCEDURE — 85025 COMPLETE CBC W/AUTO DIFF WBC: CPT

## 2021-01-23 RX ORDER — LABETALOL 200 MG/1
200 TABLET, FILM COATED ORAL 3 TIMES DAILY
Qty: 90 TAB | Refills: 1 | Status: SHIPPED | OUTPATIENT
Start: 2021-01-23 | End: 2021-04-14 | Stop reason: DRUGHIGH

## 2021-01-23 RX ORDER — LEVOTHYROXINE SODIUM 25 UG/1
50 TABLET ORAL
Status: DISCONTINUED | OUTPATIENT
Start: 2021-01-24 | End: 2021-01-23 | Stop reason: HOSPADM

## 2021-01-23 RX ORDER — LABETALOL 200 MG/1
200 TABLET, FILM COATED ORAL 3 TIMES DAILY
Status: DISCONTINUED | OUTPATIENT
Start: 2021-01-23 | End: 2021-01-23 | Stop reason: HOSPADM

## 2021-01-23 RX ORDER — IPRATROPIUM BROMIDE AND ALBUTEROL SULFATE 2.5; .5 MG/3ML; MG/3ML
3 SOLUTION RESPIRATORY (INHALATION)
Status: DISCONTINUED | OUTPATIENT
Start: 2021-01-23 | End: 2021-01-23 | Stop reason: HOSPADM

## 2021-01-23 RX ORDER — HYDRALAZINE HYDROCHLORIDE 50 MG/1
50 TABLET, FILM COATED ORAL 3 TIMES DAILY
Status: DISCONTINUED | OUTPATIENT
Start: 2021-01-23 | End: 2021-01-23 | Stop reason: HOSPADM

## 2021-01-23 RX ORDER — HYDRALAZINE HYDROCHLORIDE 50 MG/1
50 TABLET, FILM COATED ORAL 3 TIMES DAILY
Qty: 90 TAB | Refills: 0 | Status: SHIPPED | OUTPATIENT
Start: 2021-01-23 | End: 2021-04-14 | Stop reason: DRUGHIGH

## 2021-01-23 RX ORDER — ISOSORBIDE DINITRATE 10 MG/1
10 TABLET ORAL 2 TIMES DAILY
Qty: 90 TAB | Refills: 1 | Status: SHIPPED | OUTPATIENT
Start: 2021-01-23

## 2021-01-23 RX ORDER — SPIRONOLACTONE 25 MG/1
25 TABLET ORAL DAILY
Qty: 30 TAB | Refills: 0 | Status: SHIPPED | OUTPATIENT
Start: 2021-01-24 | End: 2021-04-14 | Stop reason: DRUGHIGH

## 2021-01-23 RX ORDER — LEVOTHYROXINE SODIUM 50 UG/1
50 TABLET ORAL
Qty: 30 TAB | Refills: 0 | Status: SHIPPED | OUTPATIENT
Start: 2021-01-24 | End: 2022-03-20

## 2021-01-23 RX ADMIN — NIFEDIPINE 60 MG: 30 TABLET, FILM COATED, EXTENDED RELEASE ORAL at 09:07

## 2021-01-23 RX ADMIN — BUDESONIDE AND FORMOTEROL FUMARATE DIHYDRATE 2 PUFF: 160; 4.5 AEROSOL RESPIRATORY (INHALATION) at 07:43

## 2021-01-23 RX ADMIN — DIBASIC SODIUM PHOSPHATE, MONOBASIC POTASSIUM PHOSPHATE AND MONOBASIC SODIUM PHOSPHATE 1 TABLET: 852; 155; 130 TABLET ORAL at 12:09

## 2021-01-23 RX ADMIN — POTASSIUM CHLORIDE 60 MEQ: 1500 TABLET, EXTENDED RELEASE ORAL at 09:07

## 2021-01-23 RX ADMIN — SPIRONOLACTONE 25 MG: 25 TABLET ORAL at 09:07

## 2021-01-23 RX ADMIN — POTASSIUM CHLORIDE 40 MEQ: 1.5 FOR SOLUTION ORAL at 09:06

## 2021-01-23 RX ADMIN — ATORVASTATIN CALCIUM 80 MG: 40 TABLET, FILM COATED ORAL at 09:06

## 2021-01-23 RX ADMIN — ISOSORBIDE DINITRATE 10 MG: 20 TABLET ORAL at 09:07

## 2021-01-23 RX ADMIN — LEVETIRACETAM 500 MG: 250 TABLET, FILM COATED ORAL at 09:07

## 2021-01-23 RX ADMIN — LABETALOL HYDROCHLORIDE 200 MG: 200 TABLET, FILM COATED ORAL at 16:43

## 2021-01-23 RX ADMIN — LABETALOL HYDROCHLORIDE 400 MG: 100 TABLET, FILM COATED ORAL at 09:07

## 2021-01-23 RX ADMIN — LEVOTHYROXINE SODIUM 25 MCG: 0.03 TABLET ORAL at 09:07

## 2021-01-23 RX ADMIN — CLOPIDOGREL BISULFATE 75 MG: 75 TABLET ORAL at 09:07

## 2021-01-23 RX ADMIN — ALPRAZOLAM 0.25 MG: 0.25 TABLET ORAL at 09:20

## 2021-01-23 RX ADMIN — PANTOPRAZOLE SODIUM 40 MG: 40 TABLET, DELAYED RELEASE ORAL at 09:07

## 2021-01-23 RX ADMIN — DIBASIC SODIUM PHOSPHATE, MONOBASIC POTASSIUM PHOSPHATE AND MONOBASIC SODIUM PHOSPHATE 1 TABLET: 852; 155; 130 TABLET ORAL at 16:43

## 2021-01-23 RX ADMIN — CARBAMAZEPINE 200 MG: 200 CAPSULE, EXTENDED RELEASE ORAL at 09:00

## 2021-01-23 RX ADMIN — POTASSIUM CHLORIDE 40 MEQ: 1.5 FOR SOLUTION ORAL at 16:42

## 2021-01-23 RX ADMIN — HYDRALAZINE HYDROCHLORIDE 50 MG: 50 TABLET, FILM COATED ORAL at 16:43

## 2021-01-23 RX ADMIN — HYDRALAZINE HYDROCHLORIDE 100 MG: 50 TABLET, FILM COATED ORAL at 09:06

## 2021-01-23 RX ADMIN — ASPIRIN 81 MG: 81 TABLET, COATED ORAL at 09:06

## 2021-01-23 RX ADMIN — ENOXAPARIN SODIUM 30 MG: 30 INJECTION SUBCUTANEOUS at 09:08

## 2021-01-23 RX ADMIN — IPRATROPIUM BROMIDE AND ALBUTEROL SULFATE 3 ML: .5; 3 SOLUTION RESPIRATORY (INHALATION) at 07:43

## 2021-01-23 NOTE — PROGRESS NOTES
Discharge plan of care/case management plan validated with provider discharge order. Removed patient IV with some bleeding, pressure applied. Tele box removed, no llamas catheter. Discharge to home self care, no complaints. Wheeled down to ground entrance. Transported via private car- present.

## 2021-01-23 NOTE — PROGRESS NOTES
Hospitalist Progress Note               Daily Progress Note: 1/23/2021      Subjective: The patient is seen for follow  up.   71-year-old female with past medical history of COPD, GERD, hypertension, hyperlipidemia and seizure disorder was admitted to the hospital with intractable nausea vomiting and decreased oral intake. Patient was found to have electrolyte abnormalities including hyponatremia, hypokalemia on admission. Still hypokalemic and hypomagnesemic  Patients reports improvement in weakness and dizziness. She is tolerating diet. She reports she has been having some neck tightness, but believes it is related to her surgical site from her carotid endarterectomy.     Medications reviewed  Current Facility-Administered Medications   Medication Dose Route Frequency    carBAMazepine ER (CARBATROL ER) capsule 200 mg  200 mg Oral BID    pantoprazole (PROTONIX) tablet 40 mg  40 mg Oral DAILY    isosorbide dinitrate (ISORDIL) tablet 10 mg  10 mg Oral BID    nortriptyline (PAMELOR) capsule 50 mg  50 mg Oral QHS    potassium chloride (KLOR-CON) packet for solution 40 mEq  40 mEq Oral BID WITH MEALS    spironolactone (ALDACTONE) tablet 25 mg  25 mg Oral DAILY    potassium chloride (K-DUR, KLOR-CON) SR tablet 60 mEq  60 mEq Oral ONCE    albuterol-ipratropium (DUO-NEB) 2.5 MG-0.5 MG/3 ML  3 mL Nebulization Q6H RT    ALPRAZolam (XANAX) tablet 0.25 mg  0.25 mg Oral Q8H PRN    diphenhydrAMINE (BENADRYL) injection 25 mg  25 mg IntraVENous Q6H PRN    budesonide-formoteroL (SYMBICORT) 160-4.5 mcg/actuation HFA inhaler 2 Puff  2 Puff Inhalation BID RT    aspirin delayed-release tablet 81 mg  81 mg Oral DAILY    atorvastatin (LIPITOR) tablet 80 mg  80 mg Oral DAILY    clopidogreL (PLAVIX) tablet 75 mg  75 mg Oral DAILY    hydrALAZINE (APRESOLINE) tablet 100 mg  100 mg Oral TID    levothyroxine (SYNTHROID) tablet 25 mcg  25 mcg Oral ACB    levETIRAcetam (KEPPRA) tablet 500 mg  500 mg Oral BID    NIFEdipine ER (PROCARDIA XL) tablet 60 mg  60 mg Oral DAILY    labetaloL (NORMODYNE) tablet 400 mg  400 mg Oral TID    albuterol (PROVENTIL HFA, VENTOLIN HFA, PROAIR HFA) inhaler 2 Puff  2 Puff Inhalation Q4H PRN    acetaminophen (TYLENOL) tablet 650 mg  650 mg Oral Q6H PRN    Or    acetaminophen (TYLENOL) suppository 650 mg  650 mg Rectal Q6H PRN    polyethylene glycol (MIRALAX) packet 17 g  17 g Oral DAILY PRN    promethazine (PHENERGAN) tablet 12.5 mg  12.5 mg Oral Q6H PRN    Or    ondansetron (ZOFRAN) injection 4 mg  4 mg IntraVENous Q6H PRN    enoxaparin (LOVENOX) injection 30 mg  30 mg SubCUTAneous DAILY       Review of Systems:   A comprehensive review of systems was negative except for that written in the HPI. Objective:   Physical Exam:     Visit Vitals  BP (!) 161/82 (BP 1 Location: Right arm, BP Patient Position: At rest)   Pulse 82   Temp 97.5 °F (36.4 °C)   Resp 16   Ht 5' 4.02\" (1.626 m)   Wt 50.8 kg (111 lb 15.9 oz)   SpO2 100%   BMI 19.21 kg/m²    O2 Flow Rate (L/min): 3 l/min O2 Device: Room air    Temp (24hrs), Av.3 °F (36.8 °C), Min:97.5 °F (36.4 °C), Max:98.8 °F (37.1 °C)    No intake/output data recorded.  1901 -  0700  In: 2840 [P.O.:2390; I.V.:450]  Out: -     PHYSICAL EXAM:  General alert and awake, follows commands. Skin: Extremities and face reveal no rashes. HEENT: Sclerae anicteric. Extra-occular muscles are intact. No oral ulcers. No ENT discharge. Right carotid endarterectomy, mild swelling noticed. Surgical site is C/D/I. Cardiovascular: Regular rate and rhythm. No murmurs, gallops, or rubs. PMI nondisplaced. Respiratory: Comfortable breathing with no accessory muscle use. Clear breath sounds with no wheezes, rales, or rhonchi. Currently on room air. GI: Abdomen nondistended, soft, and nontender. Normal active bowel sounds. Rectal: Deferred   Musculoskeletal: No pitting edema of the lower legs. Extremities have good range of motion.  No costovertebral tenderness. Neurological: Gross memory appears intact. Patient is alert and oriented. Power 5/5, cranial nerves II-XII grossly intact  Psychiatric: Mood appears appropriate with judgement intact. Lymphatic: No cervical or supraclavicular adenopathy. Data Review:       Recent Days:  Recent Labs     01/21/21  0502 01/20/21  1620   WBC 10.0 11.1*   HGB 9.7* 10.2*   HCT 29.9* 30.7*    372     Recent Labs     01/22/21  1620 01/21/21  0502 01/20/21  1620    139 136   K 2.9* 2.8* 2.7*    106 101   CO2 27 25 25   GLU 95 81 97   BUN 19 19 19   CREA 1.07* 1.21* 1.25*   CA 8.2* 7.8* 7.6*   MG 1.3* 0.9*  --    PHOS 1.7*  --   --      No results for input(s): PH, PCO2, PO2, HCO3, FIO2 in the last 72 hours. CT ABD PELV WO CONT   Final Result   IMPRESSION: Severe abdominal aorta atherosclerosis. Suspect infrarenal abdominal   aortic stenosis. Atrophy left kidney/hypertrophy right kidney. No hydronephrosis. No bowel obstruction. No CT evidence for appendicitis or diverticulitis. No   ascites. Prior cholecystectomy. CT HEAD WO CONT   Final Result   IMPRESSION:   1. No findings of acute intracranial abnormality. 2. Calcification within the central renzo appearing similar to prior. There is an   additional punctate calcification within the right posterior temporal region. Further evaluation with non-emergent brain MRI should be considered. 3. Other chronic findings, including nonspecific white matter findings that are   most commonly the result of chronic microangiopathy. XR CHEST PORT   Final Result   IMPRESSION:   1. No findings of acute cardiopulmonary abnormality.              Assessment/     Problem List:  Hospital Problems  Date Reviewed: 1/14/2021          Codes Class Noted POA    Hyponatremia ICD-10-CM: E87.1  ICD-9-CM: 276.1  1/17/2021 Unknown        CHF exacerbation ([facility] [address] ICD-10-CM: I50.9  ICD-9-CM: 428.0  11/2/2020 Yes        PEDRO PABLO (acute kidney injury) ([facility] [address] ICD-10-CM: N17.9  ICD-9-CM: 584.9  10/2/2020 Unknown        Seizure disorder ([address] (Chronic) ICD-10-CM: G40.909  ICD-9-CM: 345.90  10/2/2020 Yes        COPD (chronic obstructive pulmonary disease) with emphysema (HCC) (Chronic) ICD-10-CM: J43.9  ICD-9-CM: 492.8  10/2/2020 Yes        Hypokalemia ICD-10-CM: E87.6  ICD-9-CM: 276.8  10/2/2020 Unknown        Acquired hypothyroidism (Chronic) ICD-10-CM: E03.9  ICD-9-CM: 244.9  10/2/2020 Yes        Resistant hypertension ICD-10-CM: I10  ICD-9-CM: 401.9  9/21/2020 Yes                     Plan:  68-year-old female was admitted to the hospital with a complaint of intractable nausea and vomiting. 1.  Intractable nausea and vomiting: resolved  2. Diastolic chronic congestive heart failure. Patient's EF was 55% on a recent echocardiogram.  She was on spironolactone prior to admission. This is on hold due to patient's intractable nausea and vomiting. 3.  Resistant hypertension: Patient have a history of renal artery stenosis and is s/p renal artery stent placement in November 2020. Left atrophic kidney. Will avoid ACE inhibitor's and ARB's. D/w vascular - would like BP to stay 177-753N systolic. Will titrate meds to attain that goal.   4.  Mild renal insufficiency: Will monitor patient's renal functions closely. Creatinine is improved from 1.81-  1.21.  5.  Hypokalemia, Hypomagnesemia . Again K is low, will give PO K and IV Mg supplements. Restart spironolactone  6. COPD: Continue patient on Symbicort inhaler. She is on 3lpm O2 at night, She uses O2 at home as well. 7.  Carotid artery disease s/p right carotid endarterectomy: Continue patient on aspirin and Plavix and Lipitor. Vascular surgery eval appreciated. 8.  Hypothyroidism: Continue patient on levothyroxine  9. History of seizure disorder: Continue patient on Keppra  10.   Orthostatic BP improved  Further plan of management will depend upon patient's clinical course in the hospital     Care Plan discussed with: Patient/Family, Nurse and       Syd Sarkar ACNP- Student  Patient evaluated and examined by me  Supervised NP student  Lita Blair MD

## 2021-01-23 NOTE — PROGRESS NOTES
Renal Daily Progress Note    Admit Date: 1/17/2021      Subjective:   Pt of dr. Sandoval Madrid in the office. She was admitted with acute kidney injury which has resolved. Since then she has had hypokalemia and hypomagnesemia. In addition today she is hypophosphatemic. She is not vomiting. She denies diarrhea. She only feels weak and tired.       Current Facility-Administered Medications   Medication Dose Route Frequency    albuterol-ipratropium (DUO-NEB) 2.5 MG-0.5 MG/3 ML  3 mL Nebulization Q6H PRN    phosphorus (K PHOS NEUTRAL) 250 mg tablet 1 Tab  1 Tab Oral TID    [START ON 1/24/2021] levothyroxine (SYNTHROID) tablet 50 mcg  50 mcg Oral ACB    carBAMazepine ER (CARBATROL ER) capsule 200 mg  200 mg Oral BID    pantoprazole (PROTONIX) tablet 40 mg  40 mg Oral DAILY    isosorbide dinitrate (ISORDIL) tablet 10 mg  10 mg Oral BID    nortriptyline (PAMELOR) capsule 50 mg  50 mg Oral QHS    potassium chloride (KLOR-CON) packet for solution 40 mEq  40 mEq Oral BID WITH MEALS    spironolactone (ALDACTONE) tablet 25 mg  25 mg Oral DAILY    ALPRAZolam (XANAX) tablet 0.25 mg  0.25 mg Oral Q8H PRN    diphenhydrAMINE (BENADRYL) injection 25 mg  25 mg IntraVENous Q6H PRN    budesonide-formoteroL (SYMBICORT) 160-4.5 mcg/actuation HFA inhaler 2 Puff  2 Puff Inhalation BID RT    aspirin delayed-release tablet 81 mg  81 mg Oral DAILY    atorvastatin (LIPITOR) tablet 80 mg  80 mg Oral DAILY    clopidogreL (PLAVIX) tablet 75 mg  75 mg Oral DAILY    hydrALAZINE (APRESOLINE) tablet 100 mg  100 mg Oral TID    levETIRAcetam (KEPPRA) tablet 500 mg  500 mg Oral BID    NIFEdipine ER (PROCARDIA XL) tablet 60 mg  60 mg Oral DAILY    labetaloL (NORMODYNE) tablet 400 mg  400 mg Oral TID    albuterol (PROVENTIL HFA, VENTOLIN HFA, PROAIR HFA) inhaler 2 Puff  2 Puff Inhalation Q4H PRN    acetaminophen (TYLENOL) tablet 650 mg  650 mg Oral Q6H PRN    Or    acetaminophen (TYLENOL) suppository 650 mg  650 mg Rectal Q6H PRN    polyethylene glycol (MIRALAX) packet 17 g  17 g Oral DAILY PRN    promethazine (PHENERGAN) tablet 12.5 mg  12.5 mg Oral Q6H PRN    Or    ondansetron (ZOFRAN) injection 4 mg  4 mg IntraVENous Q6H PRN    enoxaparin (LOVENOX) injection 30 mg  30 mg SubCUTAneous DAILY        Review of Systems    Review of Systems   Constitutional: Negative for chills and fever. Respiratory: Negative for cough and shortness of breath. Cardiovascular: Negative for chest pain and leg swelling. Gastrointestinal: Negative for abdominal pain and nausea. Neurological: Positive for dizziness. Negative for headaches. Objective:     Patient Vitals for the past 8 hrs:   BP Temp Pulse Resp SpO2   01/23/21 1203 114/66 98.9 °F (37.2 °C) 89 16 97 %   01/23/21 0759 (!) 161/82 97.5 °F (36.4 °C) 82 16 100 %   01/23/21 0743     99 %     No intake/output data recorded. 01/21 1901 - 01/23 0700  In: 2840 [P.O.:2390; I.V.:450]  Out: -     Physical Exam:   Physical Exam   Constitutional: She is oriented to person, place, and time. Cardiovascular: Normal rate. Pulmonary/Chest: Breath sounds normal.   Abdominal: Soft. Bowel sounds are normal. There is no abdominal tenderness. Musculoskeletal:         General: No edema. Neurological: She is alert and oriented to person, place, and time. CT ABD PELV WO CONT   Final Result   IMPRESSION: Severe abdominal aorta atherosclerosis. Suspect infrarenal abdominal   aortic stenosis. Atrophy left kidney/hypertrophy right kidney. No hydronephrosis. No bowel obstruction. No CT evidence for appendicitis or diverticulitis. No   ascites. Prior cholecystectomy. CT HEAD WO CONT   Final Result   IMPRESSION:   1. No findings of acute intracranial abnormality. 2. Calcification within the central renzo appearing similar to prior. There is an   additional punctate calcification within the right posterior temporal region.    Further evaluation with non-emergent brain MRI should be considered. 3. Other chronic findings, including nonspecific white matter findings that are   most commonly the result of chronic microangiopathy. XR CHEST PORT   Final Result   IMPRESSION:   1. No findings of acute cardiopulmonary abnormality. Data Review   Recent Labs     01/23/21  0607 01/21/21  0502 01/20/21  1620   WBC 12.6* 10.0 11.1*   HGB 9.7* 9.7* 10.2*   HCT 29.1* 29.9* 30.7*    375 372     Recent Labs     01/23/21  0607 01/22/21  1620 01/21/21  0502    136 139   K 2.8* 2.9* 2.8*    101 106   CO2 29 27 25   GLU 86 95 81   BUN 16 19 19   CREA 0.98 1.07* 1.21*   CA 8.1* 8.2* 7.8*   MG  --  1.3* 0.9*   PHOS  --  1.7*  --      No components found for: GLPOC  No results for input(s): PH, PCO2, PO2, HCO3, FIO2 in the last 72 hours. No results for input(s): INR, INREXT, INREXT in the last 72 hours. Assessment:           Active Problems:    Resistant hypertension (9/21/2020)      PEDRO PABLO (acute kidney injury) ([facility] [address] (10/2/2020)      Seizure disorder ([facility] [address] (10/2/2020)      COPD (chronic obstructive pulmonary disease) with emphysema ([facility] [address] (10/2/2020)      Hypokalemia (10/2/2020)      Acquired hypothyroidism (10/2/2020)      CHF exacerbation ([address] (11/2/2020)      Hyponatremia (1/17/2021)    Hypophosphatemia  Hypomagnesemia  Pseudohypocalcemia    Plan:   Replace potassium and magnesium. Will add K-Phos Neutral orally. Needs to increase her dietary protein intake. Will recheck a renal panel tomorrow. Would strongly suggest discontinuing PPIs as this will keep the potassium  and magnesium low. She would probably not  continue on Aldactone at home. Will need to follow with Dr. Nayana Terrazas as an outpatient.

## 2021-01-28 LAB
ALDOST SERPL-MCNC: 70.5 NG/DL
RENIN PLAS-CCNC: 21.21 NG/ML/HR
SPECIMEN SOURCE: ABNORMAL

## 2021-03-09 ENCOUNTER — TELEPHONE (OUTPATIENT)
Dept: SURGERY | Age: 56
End: 2021-03-09

## 2021-03-15 ENCOUNTER — APPOINTMENT (OUTPATIENT)
Dept: GENERAL RADIOLOGY | Age: 56
End: 2021-03-15
Attending: EMERGENCY MEDICINE
Payer: COMMERCIAL

## 2021-03-15 ENCOUNTER — HOSPITAL ENCOUNTER (OUTPATIENT)
Age: 56
Setting detail: OBSERVATION
Discharge: HOME HEALTH | End: 2021-03-16
Attending: EMERGENCY MEDICINE | Admitting: FAMILY MEDICINE
Payer: COMMERCIAL

## 2021-03-15 DIAGNOSIS — E87.1 HYPONATREMIA: ICD-10-CM

## 2021-03-15 DIAGNOSIS — R06.09 DYSPNEA ON EXERTION: ICD-10-CM

## 2021-03-15 DIAGNOSIS — R06.2 WHEEZING: ICD-10-CM

## 2021-03-15 DIAGNOSIS — I95.1 ORTHOSTATIC SYNCOPE: ICD-10-CM

## 2021-03-15 DIAGNOSIS — G43.909 MIGRAINE WITHOUT STATUS MIGRAINOSUS, NOT INTRACTABLE, UNSPECIFIED MIGRAINE TYPE: ICD-10-CM

## 2021-03-15 DIAGNOSIS — N28.9 ACUTE RENAL INSUFFICIENCY: ICD-10-CM

## 2021-03-15 DIAGNOSIS — R55 SYNCOPE AND COLLAPSE: Primary | ICD-10-CM

## 2021-03-15 DIAGNOSIS — N19 ACUTE PRERENAL AZOTEMIA: ICD-10-CM

## 2021-03-15 DIAGNOSIS — E87.6 HYPOKALEMIA: ICD-10-CM

## 2021-03-15 LAB
ALBUMIN SERPL-MCNC: 3 G/DL (ref 3.5–5)
ALBUMIN/GLOB SERPL: 0.7 {RATIO} (ref 1.1–2.2)
ALP SERPL-CCNC: 108 U/L (ref 45–117)
ALT SERPL-CCNC: 20 U/L (ref 12–78)
ANION GAP SERPL CALC-SCNC: 16 MMOL/L (ref 5–15)
APTT PPP: 28.7 SEC (ref 23–35.7)
AST SERPL W P-5'-P-CCNC: 25 U/L (ref 15–37)
BASOPHILS # BLD: 0 K/UL (ref 0–0.1)
BASOPHILS NFR BLD: 0 % (ref 0–1)
BILIRUB SERPL-MCNC: 0.4 MG/DL (ref 0.2–1)
BUN SERPL-MCNC: 35 MG/DL (ref 6–20)
BUN/CREAT SERPL: 16 (ref 12–20)
CA-I BLD-MCNC: 9.2 MG/DL (ref 8.5–10.1)
CHLORIDE SERPL-SCNC: 92 MMOL/L (ref 97–108)
CK SERPL-CCNC: 61 NG/ML (ref 26–192)
CO2 SERPL-SCNC: 21 MMOL/L (ref 21–32)
CREAT SERPL-MCNC: 2.2 MG/DL (ref 0.55–1.02)
DIFFERENTIAL METHOD BLD: ABNORMAL
EOSINOPHIL # BLD: 0.1 K/UL (ref 0–0.4)
EOSINOPHIL NFR BLD: 1 % (ref 0–7)
ERYTHROCYTE [DISTWIDTH] IN BLOOD BY AUTOMATED COUNT: 14.9 % (ref 11.5–14.5)
GLOBULIN SER CALC-MCNC: 4.2 G/DL (ref 2–4)
GLUCOSE SERPL-MCNC: 86 MG/DL (ref 65–100)
HCT VFR BLD AUTO: 37 % (ref 35–47)
HGB BLD-MCNC: 12.8 G/DL (ref 11.5–16)
IMM GRANULOCYTES # BLD AUTO: 0 K/UL (ref 0–0.04)
IMM GRANULOCYTES NFR BLD AUTO: 0 % (ref 0–0.5)
INR PPP: 1 (ref 0.9–1.1)
LYMPHOCYTES # BLD: 1.3 K/UL (ref 0.8–3.5)
LYMPHOCYTES NFR BLD: 15 % (ref 12–49)
MCH RBC QN AUTO: 32.9 PG (ref 26–34)
MCHC RBC AUTO-ENTMCNC: 34.6 G/DL (ref 30–36.5)
MCV RBC AUTO: 95.1 FL (ref 80–99)
MONOCYTES # BLD: 0.6 K/UL (ref 0–1)
MONOCYTES NFR BLD: 7 % (ref 5–13)
NEUTS SEG # BLD: 6.8 K/UL (ref 1.8–8)
NEUTS SEG NFR BLD: 77 % (ref 32–75)
NRBC # BLD: 0 K/UL (ref 0–0.01)
NRBC BLD-RTO: 0 PER 100 WBC
PLATELET # BLD AUTO: 385 K/UL (ref 150–400)
PMV BLD AUTO: 9.4 FL (ref 8.9–12.9)
POTASSIUM SERPL-SCNC: 3.1 MMOL/L (ref 3.5–5.1)
PROT SERPL-MCNC: 7.2 G/DL (ref 6.4–8.2)
PROTHROMBIN TIME: 12.8 SEC (ref 11.9–14.7)
RBC # BLD AUTO: 3.89 M/UL (ref 3.8–5.2)
SODIUM SERPL-SCNC: 129 MMOL/L (ref 136–145)
THERAPEUTIC RANGE,PTTT: NORMAL SEC (ref 68–109)
TROPONIN I SERPL-MCNC: <0.05 NG/ML
WBC # BLD AUTO: 8.8 K/UL (ref 3.6–11)

## 2021-03-15 PROCEDURE — 74011000250 HC RX REV CODE- 250: Performed by: EMERGENCY MEDICINE

## 2021-03-15 PROCEDURE — 94640 AIRWAY INHALATION TREATMENT: CPT

## 2021-03-15 PROCEDURE — 74011250636 HC RX REV CODE- 250/636: Performed by: FAMILY MEDICINE

## 2021-03-15 PROCEDURE — 99218 HC RM OBSERVATION: CPT

## 2021-03-15 PROCEDURE — 74011250636 HC RX REV CODE- 250/636: Performed by: EMERGENCY MEDICINE

## 2021-03-15 PROCEDURE — 96372 THER/PROPH/DIAG INJ SC/IM: CPT

## 2021-03-15 PROCEDURE — 93005 ELECTROCARDIOGRAM TRACING: CPT

## 2021-03-15 PROCEDURE — 84484 ASSAY OF TROPONIN QUANT: CPT

## 2021-03-15 PROCEDURE — 74011250637 HC RX REV CODE- 250/637: Performed by: EMERGENCY MEDICINE

## 2021-03-15 PROCEDURE — 71045 X-RAY EXAM CHEST 1 VIEW: CPT

## 2021-03-15 PROCEDURE — 36415 COLL VENOUS BLD VENIPUNCTURE: CPT

## 2021-03-15 PROCEDURE — 85610 PROTHROMBIN TIME: CPT

## 2021-03-15 PROCEDURE — 73080 X-RAY EXAM OF ELBOW: CPT

## 2021-03-15 PROCEDURE — 80053 COMPREHEN METABOLIC PANEL: CPT

## 2021-03-15 PROCEDURE — 85730 THROMBOPLASTIN TIME PARTIAL: CPT

## 2021-03-15 PROCEDURE — 85025 COMPLETE CBC W/AUTO DIFF WBC: CPT

## 2021-03-15 PROCEDURE — 96374 THER/PROPH/DIAG INJ IV PUSH: CPT

## 2021-03-15 PROCEDURE — 82550 ASSAY OF CK (CPK): CPT

## 2021-03-15 PROCEDURE — 96361 HYDRATE IV INFUSION ADD-ON: CPT

## 2021-03-15 PROCEDURE — 74011250637 HC RX REV CODE- 250/637: Performed by: FAMILY MEDICINE

## 2021-03-15 PROCEDURE — 99285 EMERGENCY DEPT VISIT HI MDM: CPT

## 2021-03-15 RX ORDER — LEVETIRACETAM 500 MG/1
500 TABLET ORAL 2 TIMES DAILY
Status: DISCONTINUED | OUTPATIENT
Start: 2021-03-15 | End: 2021-03-16 | Stop reason: HOSPADM

## 2021-03-15 RX ORDER — ASPIRIN 81 MG/1
81 TABLET ORAL DAILY
Status: DISCONTINUED | OUTPATIENT
Start: 2021-03-16 | End: 2021-03-16 | Stop reason: HOSPADM

## 2021-03-15 RX ORDER — ACETAMINOPHEN 325 MG/1
650 TABLET ORAL
Status: DISCONTINUED | OUTPATIENT
Start: 2021-03-15 | End: 2021-03-16 | Stop reason: HOSPADM

## 2021-03-15 RX ORDER — ISOSORBIDE DINITRATE 10 MG/1
10 TABLET ORAL 2 TIMES DAILY
Status: DISCONTINUED | OUTPATIENT
Start: 2021-03-16 | End: 2021-03-16 | Stop reason: HOSPADM

## 2021-03-15 RX ORDER — CLOPIDOGREL BISULFATE 75 MG/1
75 TABLET ORAL DAILY
Status: DISCONTINUED | OUTPATIENT
Start: 2021-03-16 | End: 2021-03-16 | Stop reason: HOSPADM

## 2021-03-15 RX ORDER — POTASSIUM CHLORIDE 1.5 G/1.77G
60 POWDER, FOR SOLUTION ORAL
Status: COMPLETED | OUTPATIENT
Start: 2021-03-15 | End: 2021-03-15

## 2021-03-15 RX ORDER — BUDESONIDE 0.5 MG/2ML
500 INHALANT ORAL
Status: DISCONTINUED | OUTPATIENT
Start: 2021-03-15 | End: 2021-03-16 | Stop reason: HOSPADM

## 2021-03-15 RX ORDER — ATORVASTATIN CALCIUM 40 MG/1
80 TABLET, FILM COATED ORAL DAILY
Status: DISCONTINUED | OUTPATIENT
Start: 2021-03-15 | End: 2021-03-16 | Stop reason: HOSPADM

## 2021-03-15 RX ORDER — LEVOTHYROXINE SODIUM 25 UG/1
50 TABLET ORAL
Status: DISCONTINUED | OUTPATIENT
Start: 2021-03-16 | End: 2021-03-16 | Stop reason: HOSPADM

## 2021-03-15 RX ORDER — HYDRALAZINE HYDROCHLORIDE 50 MG/1
50 TABLET, FILM COATED ORAL 3 TIMES DAILY
Status: DISCONTINUED | OUTPATIENT
Start: 2021-03-15 | End: 2021-03-16 | Stop reason: HOSPADM

## 2021-03-15 RX ORDER — LANOLIN ALCOHOL/MO/W.PET/CERES
325 CREAM (GRAM) TOPICAL
Status: DISCONTINUED | OUTPATIENT
Start: 2021-03-16 | End: 2021-03-16 | Stop reason: HOSPADM

## 2021-03-15 RX ORDER — IPRATROPIUM BROMIDE AND ALBUTEROL SULFATE 2.5; .5 MG/3ML; MG/3ML
3 SOLUTION RESPIRATORY (INHALATION)
Status: COMPLETED | OUTPATIENT
Start: 2021-03-15 | End: 2021-03-15

## 2021-03-15 RX ORDER — POLYETHYLENE GLYCOL 3350 17 G/17G
17 POWDER, FOR SOLUTION ORAL DAILY PRN
Status: DISCONTINUED | OUTPATIENT
Start: 2021-03-15 | End: 2021-03-16 | Stop reason: HOSPADM

## 2021-03-15 RX ORDER — ROSUVASTATIN CALCIUM 5 MG/1
10 TABLET, COATED ORAL
Status: DISCONTINUED | OUTPATIENT
Start: 2021-03-15 | End: 2021-03-15

## 2021-03-15 RX ORDER — BUTALBITAL, ACETAMINOPHEN AND CAFFEINE 50; 325; 40 MG/1; MG/1; MG/1
2 TABLET ORAL
Status: COMPLETED | OUTPATIENT
Start: 2021-03-15 | End: 2021-03-15

## 2021-03-15 RX ORDER — ALBUTEROL SULFATE 90 UG/1
2 AEROSOL, METERED RESPIRATORY (INHALATION)
Status: DISCONTINUED | OUTPATIENT
Start: 2021-03-15 | End: 2021-03-16 | Stop reason: HOSPADM

## 2021-03-15 RX ORDER — ENOXAPARIN SODIUM 100 MG/ML
30 INJECTION SUBCUTANEOUS DAILY
Status: DISCONTINUED | OUTPATIENT
Start: 2021-03-16 | End: 2021-03-16 | Stop reason: HOSPADM

## 2021-03-15 RX ORDER — ONDANSETRON 2 MG/ML
4 INJECTION INTRAMUSCULAR; INTRAVENOUS
Status: COMPLETED | OUTPATIENT
Start: 2021-03-15 | End: 2021-03-15

## 2021-03-15 RX ORDER — IPRATROPIUM BROMIDE AND ALBUTEROL SULFATE 2.5; .5 MG/3ML; MG/3ML
3 SOLUTION RESPIRATORY (INHALATION)
Status: DISCONTINUED | OUTPATIENT
Start: 2021-03-15 | End: 2021-03-16 | Stop reason: HOSPADM

## 2021-03-15 RX ORDER — ONDANSETRON 2 MG/ML
4 INJECTION INTRAMUSCULAR; INTRAVENOUS
Status: DISCONTINUED | OUTPATIENT
Start: 2021-03-15 | End: 2021-03-16 | Stop reason: HOSPADM

## 2021-03-15 RX ORDER — FOLIC ACID 1 MG/1
1 TABLET ORAL DAILY
Status: DISCONTINUED | OUTPATIENT
Start: 2021-03-16 | End: 2021-03-16 | Stop reason: HOSPADM

## 2021-03-15 RX ORDER — ACETAMINOPHEN 650 MG/1
650 SUPPOSITORY RECTAL
Status: DISCONTINUED | OUTPATIENT
Start: 2021-03-15 | End: 2021-03-16 | Stop reason: HOSPADM

## 2021-03-15 RX ORDER — SODIUM CHLORIDE 9 MG/ML
75 INJECTION, SOLUTION INTRAVENOUS CONTINUOUS
Status: DISCONTINUED | OUTPATIENT
Start: 2021-03-15 | End: 2021-03-16

## 2021-03-15 RX ORDER — POTASSIUM CHLORIDE 1.5 G/1.77G
60 POWDER, FOR SOLUTION ORAL 2 TIMES DAILY WITH MEALS
Status: DISCONTINUED | OUTPATIENT
Start: 2021-03-16 | End: 2021-03-15

## 2021-03-15 RX ORDER — LABETALOL 200 MG/1
200 TABLET, FILM COATED ORAL 3 TIMES DAILY
Status: DISCONTINUED | OUTPATIENT
Start: 2021-03-15 | End: 2021-03-16 | Stop reason: HOSPADM

## 2021-03-15 RX ORDER — PROMETHAZINE HYDROCHLORIDE 25 MG/1
12.5 TABLET ORAL
Status: DISCONTINUED | OUTPATIENT
Start: 2021-03-15 | End: 2021-03-16 | Stop reason: HOSPADM

## 2021-03-15 RX ADMIN — POTASSIUM CHLORIDE 60 MEQ: 1.5 FOR SOLUTION ORAL at 19:22

## 2021-03-15 RX ADMIN — HYDRALAZINE HYDROCHLORIDE 50 MG: 50 TABLET, FILM COATED ORAL at 22:04

## 2021-03-15 RX ADMIN — BUTALBITAL, ACETAMINOPHEN, AND CAFFEINE 2 TABLET: 50; 325; 40 TABLET ORAL at 19:38

## 2021-03-15 RX ADMIN — SODIUM CHLORIDE 1000 ML: 9 INJECTION, SOLUTION INTRAVENOUS at 18:10

## 2021-03-15 RX ADMIN — IPRATROPIUM BROMIDE AND ALBUTEROL SULFATE 3 ML: .5; 3 SOLUTION RESPIRATORY (INHALATION) at 20:31

## 2021-03-15 RX ADMIN — SODIUM CHLORIDE 75 ML/HR: 9 INJECTION, SOLUTION INTRAVENOUS at 20:29

## 2021-03-15 RX ADMIN — LEVETIRACETAM 500 MG: 500 TABLET ORAL at 20:31

## 2021-03-15 RX ADMIN — ATORVASTATIN CALCIUM 80 MG: 40 TABLET, FILM COATED ORAL at 20:31

## 2021-03-15 RX ADMIN — LABETALOL HYDROCHLORIDE 200 MG: 100 TABLET, FILM COATED ORAL at 22:04

## 2021-03-15 RX ADMIN — ONDANSETRON 4 MG: 2 INJECTION INTRAMUSCULAR; INTRAVENOUS at 19:22

## 2021-03-15 NOTE — ED TRIAGE NOTES
Reports sob x a few days, symptoms are worsening, pt reports dizziness and weakness with standing, pt had syncopal episode and fell. EMS contacted, EMS reports pt is orthostatic.

## 2021-03-15 NOTE — H&P
History and Physical    Patient: Rebecca Rehman MRN: [id number]  SSN: [id number]    YOB: 1965  Age: 54 y.o. Sex: female      Subjective:      Chief Complaint: Syncope    HPI: Rebecca Rehman is a 54 y.o. female with past medical history of seizure disorder, COPD, congestive heart failure and hypothyroidism presenting to the ER with complaints of syncopal episode. Ms. Brenton Retana reports that she stood up from sitting on the couch. She had dizziness with subsequent syncopal episode. Roommate was able to catch Ms. Brenton Retana and prevented patient from hitting the floor. Ms. Brenton Retana denies any injuries and did not hit her head. .  Over the past several days, patient has had mild shortness of breath, intermittent wheezing, weakness and dizziness with standing. She has had decreased p.o. intake. She denies recent fever, chills, nausea, vomiting, diarrhea, abdominal pain, chest pain or palpitations. EMS was called after syncopal episode. Emergency medical services reported orthostatic vital signs at the time of their initial evaluation. Patient was brought here to the ER for further treatment and evaluation. Past medical history, past surgical history, family history, social history and home medication list was reviewed at the time of admission. Ms. Brenton Retana is a  and currently lives with a roommate. Daughter, Timoteo Luo, can be reached at [phone]. There is a remote history of smoking. Patient denies current tobacco, alcohol or illicit drug use. Ms. Brenton Retana is a full code.     Past Medical History:   Diagnosis Date    Anxiety 10/2/2020    Chronic anemia     Chronic respiratory failure (HCC)     COPD (chronic obstructive pulmonary disease) with emphysema ([address] 10/2/2020    Depression     Diastolic CHF (HCC)     Dysphagia 10/2/2020    GERD (gastroesophageal reflux disease)     High cholesterol     Hypertension     Hypothyroid     Iron deficiency anemia 10/2/2020    Mitral valve regurgitation 10/2/2020    Renal artery stenosis (HCC)     Seizure disorder (HCC)      Past Surgical History:   Procedure Laterality Date    HX CAROTID ENDARTERECTOMY      HX CHOLECYSTECTOMY      HX RENAL ARTERY STENT      HX ROTATOR CUFF REPAIR Right     HX TUBAL LIGATION      IR THORACENTESIS CATH W IMAGE  11/24/2020    IR THORACENTESIS CATH W IMAGE  11/25/2020      Family History   Problem Relation Age of Onset    Hypertension Mother     Stroke Mother     Melanoma Mother     Stroke Father     Melanoma Brother     Melanoma Child      Social History     Tobacco Use    Smoking status: Former Smoker     Types: Cigarettes    Smokeless tobacco: Never Used    Tobacco comment: quit july 2020   Substance Use Topics    Alcohol use: Not Currently      Prior to Admission medications    Medication Sig Start Date End Date Taking? Authorizing Provider   hydrALAZINE (APRESOLINE) 50 mg tablet Take 1 Tab by mouth three (3) times daily. 1/23/21   Kay Keating MD   isosorbide dinitrate (ISORDIL) 10 mg tablet Take 1 Tab by mouth two (2) times a day. 1/23/21   Kay Keating MD   labetaloL (NORMODYNE) 200 mg tablet Take 1 Tab by mouth three (3) times daily. 1/23/21   Kay Keating MD   levothyroxine (SYNTHROID) 50 mcg tablet Take 1 Tab by mouth Daily (before breakfast). 1/24/21   Kay Keating MD   spironolactone (ALDACTONE) 25 mg tablet Take 1 Tab by mouth daily. 1/24/21   Kay Keating MD   esomeprazole (NexIUM) 40 mg capsule Take 40 mg by mouth. Indications: Berry's esophagus    Provider, Historical   clopidogreL (PLAVIX) 75 mg tab Take 75 mg by mouth daily. 12/11/20   Provider, Historical   nortriptyline (PAMELOR) 50 mg capsule Take 50 mg by mouth nightly. Other, MD Iain   atorvastatin (LIPITOR) 80 mg tablet Take 80 mg by mouth daily.     Other, MD Iain   Trelenoel Ellipta 100-62.5-25 mcg inhaler  11/12/20   Provider, Historical   albuterol (PROVENTIL HFA, VENTOLIN HFA, PROAIR HFA) 90 mcg/actuation inhaler  11/5/20   Provider, Historical   rosuvastatin (CRESTOR) 10 mg tablet  11/6/20   Provider, Historical   Oxygen 5 Devices as needed. Pt wears 5LPM as needed- states she uses it after an axiety attack    Provider, Historical   albuterol-ipratropium (DUO-NEB) 2.5 mg-0.5 mg/3 ml nebu 3 mL by Nebulization route every six (6) hours. 10/2/20   Janee Bernardo MD   ALPRAZolam Rohini Marian) 0.25 mg tablet Take 0.25 mg by mouth three (3) times daily as needed for Anxiety. Provider, Historical   sertraline (Zoloft) 25 mg tablet Take 25 mg by mouth daily. Provider, Historical   levETIRAcetam (Keppra) 500 mg tablet Take 500 mg by mouth two (2) times a day. Provider, Historical   carBAMazepine, mood stabiliz, 200 mg CM12 Take 200 mg by mouth two (2) times a day. Provider, Historical   ferrous sulfate 325 mg (65 mg iron) tablet Take 325 mg by mouth Daily (before breakfast). Provider, Historical   folic acid (FOLVITE) 1 mg tablet Take 1 mg by mouth daily. Provider, Historical   aspirin delayed-release 81 mg tablet Take  by mouth daily. Provider, Historical   magnesium oxide (MAG-OX) 400 mg tablet Take 400 mg by mouth daily. Provider, Historical        Allergies   Allergen Reactions    Sulfa (Sulfonamide Antibiotics) Anaphylaxis    Sulfamethoxazole-Trimethoprim Unknown (comments)       Review of Systems:  Constitutional: Positive for weakness. Denies fevers, chills, fatigue, unexplained weight loss, night sweats. Head, Eyes, Ears, Nose, Mouth, Throat: Denies nasal congestion, sore throat, rhinorrhea, earache, ringing of the ears, difficulty hearing, facial pain, facial swelling. Respiratory: Positive for shortness of breath, wheezing. Denies cough, sputum production, hemoptysis. Denies use of oxygen at home. Cardiovascular: Denies chest pain, irregular heart beat, racing pulse, lower extremity edema, dizziness, dyspnea on exertion, orthopnea.   Gastrointestinal: Denies nausea, vomiting, diarrhea, constipation, abdominal pain, loss of appetite, acid reflux, melena, hematochezia, change in bowel habits. Endocrine: Denies intolerance to heat or cold. Denies polyuria, polydipsia, polyphagia. Denies recent weight changes. Genitourinary: Denies increased urinary frequency, dysuria, hematuria, urinary incontinence, increased urinary frequency. Integument/Breast: Denies rash, itching or new skin lesions. Musculoskeletal: Denies joint swelling, joint pain, myalgias, neck pain, back pain. Neurological: Positive for syncope, dizziness. Denies headaches, confusion, tremors, numbness/tingling, paresthesias, weakness, problems with balance. Hematologic: Denies easy bleeding, easy bruising, lymphadenopathy. Behavioral/Psychiatric: Denies anxiety, depression, increased irritability, mood swings, delusions, hallucination, SI/HI. Objective:     Vitals:    03/15/21 1812 03/15/21 1909   BP: (!) 167/96 (!) 160/92   Pulse: 93 80   Resp: 17    Temp: 98.1 °F (36.7 °C)    SpO2: 100% 100%        Physical Exam:  General: Alert and Oriented x 3. Cooperative and friendly. No acute distress. Appears chronically ill. Head/Eyes: Normocephalic, atraumatic, EOMI, PERRLA. Nose/Mouth: Turbinates within normal limits, No drainage. Mucous membranes are dry. Throat and Neck: No masses, JVD, thyromegaly or lymphadenopathy appreciated. Cervical spine has good range of motion without pain. Lungs: Faint expiratory wheezing, otherwise clear to auscultation bilaterally withot rhonchi or crackles. Good air movement bilaterally. Symmetric chest rise with respirations. Heart: Regular rate and rhythm. Normal S1/S2. No appreciated murmurs, rubs or gallops. No lower extremity edema. Abdomen: Soft, non-tender, non-distended. Bowel sounds present in all four quadrants. No masses appreciated. Extremities:  Atraumatic. Able to move all extremities symmetrically. No abnormal bony protuberances appreciated.   Back: No pain with palpation over spinous processes or paraspinal musculature.   Skin: Clean, dry and intact without appreciated lesions.   Neurologic: A&Ox3. Cranial nerves 2-12 are grossly intact. Intact sensation and motor strength in all 4 extremities. No focal deficits.   Psychiatric: Normal affect, normal thought process, good eye contact.     Recent Results (from the past 24 hour(s))   CBC WITH AUTOMATED DIFF    Collection Time: 03/15/21  5:45 PM   Result Value Ref Range    WBC 8.8 3.6 - 11.0 K/uL    RBC 3.89 3.80 - 5.20 M/uL    HGB 12.8 11.5 - 16.0 g/dL    HCT 37.0 35.0 - 47.0 %    MCV 95.1 80.0 - 99.0 FL    MCH 32.9 26.0 - 34.0 PG    MCHC 34.6 30.0 - 36.5 g/dL    RDW 14.9 (H) 11.5 - 14.5 %    PLATELET 385 150 - 400 K/uL    MPV 9.4 8.9 - 12.9 FL    NRBC 0.0 0  WBC    ABSOLUTE NRBC 0.00 0.00 - 0.01 K/uL    NEUTROPHILS 77 (H) 32 - 75 %    LYMPHOCYTES 15 12 - 49 %    MONOCYTES 7 5 - 13 %    EOSINOPHILS 1 0 - 7 %    BASOPHILS 0 0 - 1 %    IMMATURE GRANULOCYTES 0 0.0 - 0.5 %    ABS. NEUTROPHILS 6.8 1.8 - 8.0 K/UL    ABS. LYMPHOCYTES 1.3 0.8 - 3.5 K/UL    ABS. MONOCYTES 0.6 0.0 - 1.0 K/UL    ABS. EOSINOPHILS 0.1 0.0 - 0.4 K/UL    ABS. BASOPHILS 0.0 0.0 - 0.1 K/UL    ABS. IMM. GRANS. 0.0 0.00 - 0.04 K/UL    DF AUTOMATED     METABOLIC PANEL, COMPREHENSIVE    Collection Time: 03/15/21  5:45 PM   Result Value Ref Range    Sodium 129 (L) 136 - 145 mmol/L    Potassium 3.1 (L) 3.5 - 5.1 mmol/L    Chloride 92 (L) 97 - 108 mmol/L    CO2 21 21 - 32 mmol/L    Anion gap 16 (H) 5 - 15 mmol/L    Glucose 86 65 - 100 mg/dL    BUN 35 (H) 6 - 20 mg/dL    Creatinine 2.20 (H) 0.55 - 1.02 mg/dL    BUN/Creatinine ratio 16 12 - 20      GFR est AA 28 (L) >60 ml/min/1.73m2    GFR est non-AA 23 (L) >60 ml/min/1.73m2    Calcium 9.2 8.5 - 10.1 mg/dL    Bilirubin, total 0.4 0.2 - 1.0 mg/dL    AST (SGOT) 25 15 - 37 U/L    ALT (SGPT) 20 12 - 78 U/L    Alk. phosphatase 108 45 - 117 U/L    Protein, total 7.2 6.4 - 8.2 g/dL    Albumin 3.0 (L) 3.5 - 5.0 g/dL    Globulin 4.2  (H) 2.0 - 4.0 g/dL    A-G Ratio 0.7 (L) 1.1 - 2.2     CK W/ REFLX CKMB    Collection Time: 03/15/21  5:45 PM   Result Value Ref Range    CK 61.0 26 - 192 ng/mL   TROPONIN I    Collection Time: 03/15/21  5:45 PM   Result Value Ref Range    Troponin-I, Qt. <0.05 <0.05 ng/mL   PROTHROMBIN TIME + INR    Collection Time: 03/15/21  5:45 PM   Result Value Ref Range    Prothrombin time 12.8 11.9 - 14.7 sec    INR 1.0 0.9 - 1.1     PTT    Collection Time: 03/15/21  5:45 PM   Result Value Ref Range    aPTT 28.7 23.0 - 35.7 sec    aPTT, therapeutic range   68 - 109 sec       XR Results (maximum last 3): Results from [facility] encounter on 03/15/21   XR ELBOW LT MIN 3 V    Narrative History: Fall and injury    3 views of the left elbow were provided. A good true lateral view is not  provided. No fracture is seen. Joint space appears within normal limits. No  effusion identified. Impression No acute abnormality identified. XR CHEST PORT    Narrative History: Chest pain    Single view of the chest was obtained. Heart size is normal. Lungs are free of  focal consolidation. Some mild prominence of the interstitium in the right lung  base is noted. No effusions or pneumothorax. Impression Mild prominence of interstitium in the right lung base. Results from Hospital Encounter encounter on 01/17/21   XR CHEST PORT    Narrative Examination: XR CHEST PORT     History: SEPSIS    Comparison: Chest radiograph January 3, 2021    FINDINGS:    Single frontal portable view of the chest. Symmetric lung volumes without focal  airspace consolidation, pneumothorax, or significant pleural effusion. Cardiac  mediastinal silhouette is within normal limits for size. There is  atherosclerosis of the thoracic aorta. Remote healed right lower rib fractures. Impression IMPRESSION:  1. No findings of acute cardiopulmonary abnormality. CT Results (maximum last 3):   Results from [facility] encounter on 01/17/21   CT ABD PELV WO CONT    Narrative CT abdomen and pelvis without IV contrast    Comparison CT abdomen and pelvis November 4, 2020. Axial images are reviewed along with reformatted sagittal/coronal images. Dose reduction: All CT scans at this facility are performed using dose reduction  optimization techniques as appropriate to a performed exam including the  following-  automated exposure control, adjustments of mA and/or Kv according to patient  size, or use of iterative reconstructive technique. The lung bases are clear. No pleural effusion. Small pericardial effusion. Liver appears unremarkable on this nonenhanced study. Prior cholecystectomy. Pancreas, spleen, and bilateral adrenal glands appear unremarkable. Atrophy left  kidney. Hypertrophy right kidney. No hydronephrosis. Stomach and small bowel loops are decompressed. There is small volume stool and  air through decompressed colon. No ascites. Severe atherosclerotic change abdominal aorta and pelvic arteries. Suspect  significant grade infrarenal abdominal aortic stenosis. Lumbar spondylosis. Impression IMPRESSION: Severe abdominal aorta atherosclerosis. Suspect infrarenal abdominal  aortic stenosis. Atrophy left kidney/hypertrophy right kidney. No hydronephrosis. No bowel obstruction. No CT evidence for appendicitis or diverticulitis. No  ascites. Prior cholecystectomy. CT HEAD WO CONT    Narrative Exam: CT Head without contrast    TECHNIQUE: Multiple transaxial CT images of the head were obtained without  contrast. Coronal and sagittal reformatted images were provided. Dose reduction: All CT scans at this facility are performed using dose reduction  optimization techniques as appropriate to a performed exam including the  following: Automated exposure control, adjustments of the mA and/or kV according  to patient size, or use of iterative reconstruction technique. HISTORY: HA and hypertensive.     COMPARISON: Head CT January 4, 2021, December 27, 2019, May 20, 2015    FINDINGS:    Mild symmetric parenchymal volume loss with associated proportional ex vacuo  dilatation of the ventricles and extra-axial CSF spaces. No significant midline  shift or mass effect. There is mild periventricular white matter hypoattenuation  that is nonspecific but most commonly the result of chronic microangiopathy. A  small calcific density within the central aspect of the renzo appears similar to  prior. There is a punctate calcification in the right posterior temporal region  which may be extra-axial but was not seen on prior exam. No findings of acute  intracranial hemorrhage. Gray-white matter differentiation appears preserved. The basilar cisterns are preserved. Intracranial atherosclerosis. Mastoid air cells appear clear. Visualized paranasal sinuses appear clear. Globes and orbits appear unremarkable. Calvarium appears intact without findings  of acute abnormality. Impression IMPRESSION:  1. No findings of acute intracranial abnormality. 2. Calcification within the central renzo appearing similar to prior. There is an  additional punctate calcification within the right posterior temporal region. Further evaluation with non-emergent brain MRI should be considered. 3. Other chronic findings, including nonspecific white matter findings that are  most commonly the result of chronic microangiopathy. Results from Hospital Encounter encounter on 01/03/21   CTA NECK    Narrative Study: Neck CTA without and with contrast.    Clinical Indication: Syncope and carotid artery stenosis. Comparison: Cervical spine MRI dated 7/30/2020. Technique: Routine unenhanced axial acquisition of the neck was performed. Subsequently, contiguous thin section axial acquisition of the neck was  performed in the arterial phase from the thoracic inlet to the skull base  following the intravenous administration of 100 mL Isovue-370.  Coronal,  sagittal, and MIP reconstructions were generated and reviewed. Dose reduction:  All CT scans at this facility are performed using dose reduction optimization  techniques as appropriate to a performed exam including the following-automated  exposure control, adjustments of mA and/or Kv according to patient size, or use  of iterative reconstructive technique. Findings:    Calcified calcified plaque in the aortic arch and great vessel origins without  significant stenosis. Scattered calcified plaque in the proximal bilateral  subclavian arteries without significant stenosis. The common carotid arteries are patent without high-grade stenosis. Densely  calcified plaque in the bilateral carotid bulbs causing 60-65% stenosis of the  right internal carotid artery origin and 40-45% stenosis of the left internal  carotid artery origin based on NASCET criteria. Please note that the degree of  calcification somewhat limits precise measurement evaluation. The more distal  cervical internal carotid arteries are patent without high-grade stenosis. The  external carotid arteries are unremarkable. The cervical vertebral arteries are patent without high-grade stenosis. The paraspinal soft tissues are unremarkable. Trace centrilobular emphysema  Centrilobular emphysema. Multilevel cervical spondylosis. Impression Impression:  1. Moderate (60-65%) stenosis of the right ICA origin. 2.  Mild (40-45%) stenosis of the left ICA origin. Please note that degrees of carotid stenosis are measured in accordance with  NASCET criteria. Assessment:     Jairon Cerna is a 54 y.o. female who presents after syncopal episode. Syncope is likely related to dehydration. Admit for acute kidney injury and hypokalemia. Plan:     1. Admit to telemetry bed. 2. Check orthostatic vitals. Place on fall precautions. 3. Avoid nephrotoxic medications including home dose of Aldactone. 4. Order NS IVF 75 mL per hour.   Recheck creatinine in the morning. I have suspicion that acute kidney injury is secondary to acute dehydration in the setting of concomitant diuretic use and decreased PO intake. 5. For history of hyperlipidemia, continue home dose of Crestor. 6. For history of hypertension, continue home dose of hydralazine and labetalol (with holding parameters). 7. For history of seizure disorder, continue home dose of Keppra. 8. For history of COPD, continue home dose of Trelegy and order albuterol as needed for shortness of breath and wheezing. Continue oxygen supplementation via nasal cannula (uses oxygen at home). 9. History of depression, continue home dose of Zoloft. 10. History of hypothyroidism, continue home dose of levothyroxine. GI PPX: Heart Healthy Diet ordered. DVT PPX: Lovenox SQ.      Signed By: Randy Beth MD     March 15, 2021

## 2021-03-16 VITALS
WEIGHT: 108.47 LBS | RESPIRATION RATE: 18 BRPM | TEMPERATURE: 98.6 F | OXYGEN SATURATION: 100 % | SYSTOLIC BLOOD PRESSURE: 150 MMHG | BODY MASS INDEX: 18.07 KG/M2 | DIASTOLIC BLOOD PRESSURE: 78 MMHG | HEIGHT: 65 IN | HEART RATE: 85 BPM

## 2021-03-16 LAB
ANION GAP SERPL CALC-SCNC: 9 MMOL/L (ref 5–15)
ATRIAL RATE: 87 BPM
BUN SERPL-MCNC: 40 MG/DL (ref 6–20)
BUN/CREAT SERPL: 21 (ref 12–20)
CA-I BLD-MCNC: 8.6 MG/DL (ref 8.5–10.1)
CALCULATED P AXIS, ECG09: 90 DEGREES
CALCULATED R AXIS, ECG10: 60 DEGREES
CALCULATED T AXIS, ECG11: -150 DEGREES
CHLORIDE SERPL-SCNC: 102 MMOL/L (ref 97–108)
CO2 SERPL-SCNC: 25 MMOL/L (ref 21–32)
CREAT SERPL-MCNC: 1.95 MG/DL (ref 0.55–1.02)
DIAGNOSIS, 93000: NORMAL
ERYTHROCYTE [DISTWIDTH] IN BLOOD BY AUTOMATED COUNT: 15 % (ref 11.5–14.5)
GLUCOSE SERPL-MCNC: 111 MG/DL (ref 65–100)
HCT VFR BLD AUTO: 28 % (ref 35–47)
HGB BLD-MCNC: 9.4 G/DL (ref 11.5–16)
MAGNESIUM SERPL-MCNC: 1.8 MG/DL (ref 1.6–2.4)
MCH RBC QN AUTO: 32.3 PG (ref 26–34)
MCHC RBC AUTO-ENTMCNC: 33.6 G/DL (ref 30–36.5)
MCV RBC AUTO: 96.2 FL (ref 80–99)
P-R INTERVAL, ECG05: 94 MS
PHOSPHATE SERPL-MCNC: 3.1 MG/DL (ref 2.6–4.7)
PLATELET # BLD AUTO: 305 K/UL (ref 150–400)
PMV BLD AUTO: 9.5 FL (ref 8.9–12.9)
POTASSIUM SERPL-SCNC: 3.3 MMOL/L (ref 3.5–5.1)
Q-T INTERVAL, ECG07: 500 MS
QRS DURATION, ECG06: 120 MS
QTC CALCULATION (BEZET), ECG08: 601 MS
RBC # BLD AUTO: 2.91 M/UL (ref 3.8–5.2)
SODIUM SERPL-SCNC: 136 MMOL/L (ref 136–145)
TSH SERPL DL<=0.05 MIU/L-ACNC: 4.13 UIU/ML (ref 0.36–3.74)
VENTRICULAR RATE, ECG03: 87 BPM
WBC # BLD AUTO: 7.3 K/UL (ref 3.6–11)

## 2021-03-16 PROCEDURE — 83735 ASSAY OF MAGNESIUM: CPT

## 2021-03-16 PROCEDURE — 94640 AIRWAY INHALATION TREATMENT: CPT

## 2021-03-16 PROCEDURE — 77010033678 HC OXYGEN DAILY

## 2021-03-16 PROCEDURE — 99218 HC RM OBSERVATION: CPT

## 2021-03-16 PROCEDURE — 74011250636 HC RX REV CODE- 250/636: Performed by: FAMILY MEDICINE

## 2021-03-16 PROCEDURE — 94760 N-INVAS EAR/PLS OXIMETRY 1: CPT

## 2021-03-16 PROCEDURE — 80048 BASIC METABOLIC PNL TOTAL CA: CPT

## 2021-03-16 PROCEDURE — 74011000250 HC RX REV CODE- 250: Performed by: FAMILY MEDICINE

## 2021-03-16 PROCEDURE — 36415 COLL VENOUS BLD VENIPUNCTURE: CPT

## 2021-03-16 PROCEDURE — 84100 ASSAY OF PHOSPHORUS: CPT

## 2021-03-16 PROCEDURE — 85027 COMPLETE CBC AUTOMATED: CPT

## 2021-03-16 PROCEDURE — 97161 PT EVAL LOW COMPLEX 20 MIN: CPT

## 2021-03-16 PROCEDURE — 74011250637 HC RX REV CODE- 250/637: Performed by: FAMILY MEDICINE

## 2021-03-16 PROCEDURE — 74011250637 HC RX REV CODE- 250/637: Performed by: NURSE PRACTITIONER

## 2021-03-16 PROCEDURE — 97530 THERAPEUTIC ACTIVITIES: CPT

## 2021-03-16 PROCEDURE — 84443 ASSAY THYROID STIM HORMONE: CPT

## 2021-03-16 RX ORDER — NORTRIPTYLINE HYDROCHLORIDE 25 MG/1
50 CAPSULE ORAL
Status: DISCONTINUED | OUTPATIENT
Start: 2021-03-16 | End: 2021-03-16 | Stop reason: HOSPADM

## 2021-03-16 RX ORDER — POTASSIUM CHLORIDE 1.5 G/1.77G
20 POWDER, FOR SOLUTION ORAL 2 TIMES DAILY WITH MEALS
COMMUNITY
End: 2021-03-31

## 2021-03-16 RX ORDER — ALPRAZOLAM 0.5 MG/1
0.5 TABLET ORAL DAILY PRN
Status: DISCONTINUED | OUTPATIENT
Start: 2021-03-16 | End: 2021-03-16 | Stop reason: HOSPADM

## 2021-03-16 RX ORDER — FLUTICASONE PROPIONATE 110 UG/1
1 AEROSOL, METERED RESPIRATORY (INHALATION) EVERY 12 HOURS
COMMUNITY
End: 2021-04-14 | Stop reason: SDUPTHER

## 2021-03-16 RX ORDER — ACETAMINOPHEN 325 MG/1
650 TABLET ORAL
Qty: 180 TAB | Refills: 1 | Status: SHIPPED | OUTPATIENT
Start: 2021-03-16 | End: 2021-04-15

## 2021-03-16 RX ORDER — POTASSIUM CHLORIDE 20 MEQ/1
40 TABLET, EXTENDED RELEASE ORAL DAILY
Status: DISCONTINUED | OUTPATIENT
Start: 2021-03-17 | End: 2021-03-16 | Stop reason: HOSPADM

## 2021-03-16 RX ORDER — LANOLIN ALCOHOL/MO/W.PET/CERES
400 CREAM (GRAM) TOPICAL DAILY
Qty: 30 TAB | Refills: 0 | Status: SHIPPED | OUTPATIENT
Start: 2021-03-17 | End: 2021-04-16

## 2021-03-16 RX ORDER — BUTALBITAL, ASPIRIN, AND CAFFEINE 325; 50; 40 MG/1; MG/1; MG/1
1 CAPSULE ORAL
Qty: 20 CAP | Refills: 0 | Status: SHIPPED | OUTPATIENT
Start: 2021-03-16 | End: 2021-03-21

## 2021-03-16 RX ORDER — BUTALBITAL, ASPIRIN, AND CAFFEINE 325; 50; 40 MG/1; MG/1; MG/1
1 CAPSULE ORAL
Status: DISCONTINUED | OUTPATIENT
Start: 2021-03-16 | End: 2021-03-16 | Stop reason: HOSPADM

## 2021-03-16 RX ORDER — POTASSIUM CHLORIDE 20 MEQ/1
40 TABLET, EXTENDED RELEASE ORAL
Status: COMPLETED | OUTPATIENT
Start: 2021-03-16 | End: 2021-03-16

## 2021-03-16 RX ORDER — LANOLIN ALCOHOL/MO/W.PET/CERES
400 CREAM (GRAM) TOPICAL DAILY
Status: DISCONTINUED | OUTPATIENT
Start: 2021-03-17 | End: 2021-03-16 | Stop reason: HOSPADM

## 2021-03-16 RX ORDER — ERGOCALCIFEROL 1.25 MG/1
50000 CAPSULE ORAL
COMMUNITY
End: 2022-03-20

## 2021-03-16 RX ADMIN — ISOSORBIDE DINITRATE 10 MG: 10 TABLET ORAL at 09:27

## 2021-03-16 RX ADMIN — HYDRALAZINE HYDROCHLORIDE 50 MG: 50 TABLET, FILM COATED ORAL at 09:27

## 2021-03-16 RX ADMIN — FERROUS SULFATE TAB 325 MG (65 MG ELEMENTAL FE) 325 MG: 325 (65 FE) TAB at 06:37

## 2021-03-16 RX ADMIN — HYDRALAZINE HYDROCHLORIDE 50 MG: 50 TABLET, FILM COATED ORAL at 17:03

## 2021-03-16 RX ADMIN — ALPRAZOLAM 0.5 MG: 0.5 TABLET ORAL at 17:06

## 2021-03-16 RX ADMIN — LEVOTHYROXINE SODIUM 50 MCG: 0.03 TABLET ORAL at 06:37

## 2021-03-16 RX ADMIN — LABETALOL HYDROCHLORIDE 200 MG: 100 TABLET, FILM COATED ORAL at 09:27

## 2021-03-16 RX ADMIN — BUDESONIDE 500 MCG: 0.5 INHALANT RESPIRATORY (INHALATION) at 08:33

## 2021-03-16 RX ADMIN — ENOXAPARIN SODIUM 30 MG: 30 INJECTION SUBCUTANEOUS at 09:26

## 2021-03-16 RX ADMIN — POTASSIUM CHLORIDE 40 MEQ: 1500 TABLET, EXTENDED RELEASE ORAL at 09:38

## 2021-03-16 RX ADMIN — POTASSIUM CHLORIDE 40 MEQ: 1500 TABLET, EXTENDED RELEASE ORAL at 14:42

## 2021-03-16 RX ADMIN — IPRATROPIUM BROMIDE AND ALBUTEROL SULFATE 3 ML: .5; 3 SOLUTION RESPIRATORY (INHALATION) at 01:49

## 2021-03-16 RX ADMIN — ASPIRIN 81 MG: 81 TABLET, COATED ORAL at 09:27

## 2021-03-16 RX ADMIN — LABETALOL HYDROCHLORIDE 200 MG: 100 TABLET, FILM COATED ORAL at 17:03

## 2021-03-16 RX ADMIN — CLOPIDOGREL BISULFATE 75 MG: 75 TABLET ORAL at 09:27

## 2021-03-16 RX ADMIN — FOLIC ACID 1 MG: 1 TABLET ORAL at 09:27

## 2021-03-16 RX ADMIN — LEVETIRACETAM 500 MG: 500 TABLET ORAL at 09:27

## 2021-03-16 RX ADMIN — IPRATROPIUM BROMIDE AND ALBUTEROL SULFATE 3 ML: .5; 3 SOLUTION RESPIRATORY (INHALATION) at 08:33

## 2021-03-16 NOTE — PROGRESS NOTES
Primary Nurse Alayna Hernandez RN and Radha Flores RN performed a dual skin assessment on this patient. Nonblanchable redness noted to sacrum and covered with a mepilex. Small, red abrasion noted to left inner elbow and small scattered scabs on right forearm.

## 2021-03-16 NOTE — PROGRESS NOTES
Problem: Mobility Impaired (Adult and Pediatric)  Goal: *Acute Goals and Plan of Care (Insert Text)  Description: Physical Therapy Goals  Initiated 3/16/2021  1)  I with HEP in 7 days to improve overall functional mobility. 2)  Bed mobility and transfers I in 7 days to prevent skin breakdown. 3)  Pt to amb 300ft with LRAD and sup in 7 days with O2 sats >95%    Pt. Goal:  Pt to be able to walk safely without falls. Outcome: Not Met   PHYSICAL THERAPY EVALUATION  Patient: Marly Alejandro (84 y.o. female)  Date: 3/16/2021  Primary Diagnosis: PEDRO PABLO (acute kidney injury) ([facility] [address] [N17.9]  Hypokalemia [E87.6]        Precautions: fall, orthostatic hypotension      ASSESSMENT  Pt admitted for syncopal episode at home. Pt with hx of seizure, COPD, CHF, anemia, hypothyroidism, GERD, HLD, HTN, mitral valve regurgitation, aortic stenosis, diffuse peripheral vascular disease, renal artery stenosis, carotid endarterectomy. Pt reports she lives at home with her boyfriend who is gone during the day. Pt reports she lives in one story home with 4 steps to enter with no rails and is I with ADL's, other than bathing of which she waits until her boyfriend is home for safety. Pt reports she amb without AD but she does have a cane that she uses occasionally. Pt reports multiple falls at home due to syncopal episodes. Based on the objective data described below, the patient presents with decreased LE strength, impaired balance, decreased activity tolerance, able to perform all bed mobility with supervision and transfers with CGA. Pt reported dizziness upon sitting EOB, but this did improve the longer she sat. Pt did not have dizziness upon standing but did wait a minute before beginning to ambulate. Pt fatigued with ambulation and was SOB; however, O2 sats remained in the upper 90's throughout treatment.   Pt would benefit from rollator walker at home due to frequent falls due to dizziness that comes upon her suddenly during ambulation and she is unable to make it to a chair. A rollator walker will help to increase her safety while at home and encourage her to improve endurance knowing she has a place to sit if she needs it. Recommend d/c to home with family care and HHPT. Other factors to consider for discharge: impaired mobility, level of assist     Patient will benefit from skilled therapy intervention to address the above noted impairments. PLAN :  Recommendations and Planned Interventions: bed mobility training, transfer training, gait training, therapeutic exercises, patient and family training/education and therapeutic activities      Frequency/Duration: Patient will be followed by physical therapy:  5 times a week to address goals. Recommendation for discharge: (in order for the patient to meet his/her long term goals)  HHPT with rollator walker    This discharge recommendation:  Has been made in collaboration with the attending provider and/or case management    IF patient discharges home will need the following DME: rollator         SUBJECTIVE:   Patient stated I haven't received my breakfast yet.     OBJECTIVE DATA SUMMARY:   HISTORY:    Past Medical History:   Diagnosis Date    Anxiety 10/2/2020    Chronic anemia     Chronic respiratory failure (HCC)     COPD (chronic obstructive pulmonary disease) with emphysema ([address] 10/2/2020    Depression     Diastolic CHF ([address]     Dysphagia 10/2/2020    GERD (gastroesophageal reflux disease)     High cholesterol     Hypertension     Hypothyroid     Iron deficiency anemia 10/2/2020    Mitral valve regurgitation 10/2/2020    Renal artery stenosis (HCC)     Seizure disorder ([facility])      Past Surgical History:   Procedure Laterality Date    HX CAROTID ENDARTERECTOMY      HX CHOLECYSTECTOMY      HX RENAL ARTERY STENT      HX ROTATOR CUFF REPAIR Right     HX TUBAL LIGATION      IR THORACENTESIS CATH W IMAGE  11/24/2020    IR THORACENTESIS CATH W IMAGE 11/25/2020       Personal factors and/or comorbidities impacting plan of care: impaired mobility, frequent syncopal episodes    Home Situation  Home Environment: Private residence  # Steps to Enter: 4  Rails to Enter: No  One/Two Story Residence: One story  Living Alone: No  Support Systems: Spouse/Significant Other/Partner  Current DME Used/Available at Home: Cane, straight, Shower chair    PLOF: Pt I for ADLS/IADLS, I with mobility prior to admission. EXAMINATION/PRESENTATION/DECISION MAKING:   Critical Behavior:  Neurologic State: Alert  Orientation Level: Oriented X4  Cognition: Appropriate decision making, Appropriate safety awareness, Appropriate for age attention/concentration     Range Of Motion:  AROM: Within functional limits                       Strength:    Strength: Generally decreased, functional       4-/5 throughout B LE's                 Functional Mobility:  Bed Mobility:  Rolling: Stand-by assistance  Supine to Sit: Stand-by assistance  Sit to Supine: Stand-by assistance  Scooting: Stand-by assistance  Transfers:  Sit to Stand: Contact guard assistance  Stand to Sit: Contact guard assistance        Bed to Chair: Contact guard assistance              Balance:   Sitting: Intact  Standing: Impaired  Standing - Static: Fair  Standing - Dynamic : Fair  Ambulation/Gait Training:  Distance (ft): 160 Feet (ft)  Assistive Device: Gait belt  Ambulation - Level of Assistance: Contact guard assistance     Gait Description (WDL): Exceptions to WDL                 Speed/Dariana: Slow  Step Length: Right shortened;Left shortened                    Functional Measure:    [address] [facility] [address] [address] AM-PAC 6 Clicks         Basic Mobility Inpatient Short Form  How much difficulty does the patient currently have. .. Unable A Lot A Little None   1. Turning over in bed (including adjusting bedclothes, sheets and blankets)? [] 1   [] 2   [] 3   [x] 4   2.   Sitting down on and standing up from a chair with arms ( e.g., wheelchair, bedside commode, etc.)   [] 1   [] 2   [] 3   [x] 4   3. Moving from lying on back to sitting on the side of the bed? [] 1   [] 2   [] 3   [x] 4          How much help from another person does the patient currently need. .. Total A Lot A Little None   4. Moving to and from a bed to a chair (including a wheelchair)? [] 1   [] 2   [x] 3   [] 4   5. Need to walk in hospital room? [] 1   [] 2   [x] 3   [] 4   6. Climbing 3-5 steps with a railing? [] 1   [] 2   [x] 3   [] 4   © , Trustees of [facility] [employer], under license to [employer]. All rights reserved     Score:  Initial: 21 Most Recent: X (Date:3/16/21)   Interpretation of Tool:  Represents activities that are increasingly more difficult (i.e. Bed mobility, Transfers, Gait). Score 24 23 22-20 19-15 14-10 9-7 6   Modifier CH CI CJ CK CL CM CN          Physical Therapy Evaluation Charge Determination   History Examination Presentation Decision-Making   MEDIUM  Complexity : 1-2 comorbidities / personal factors will impact the outcome/ POC  MEDIUM Complexity : 3 Standardized tests and measures addressing body structure, function, activity limitation and / or participation in recreation  LOW Complexity : Stable, uncomplicated  Other Functional Measure [facility] 6 low      Based on the above components, the patient evaluation is determined to be of the following complexity level: LOW     Pain Ratin/10 in head    Activity Tolerance:   Fair, requires rest breaks, observed SOB with activity and signs and symptoms of orthostatic hypotension  Please refer to the flowsheet for vital signs taken during this treatment. After treatment patient left in no apparent distress:   Sitting in chair and Call bell within reach    COMMUNICATION/EDUCATION:   The patients plan of care was discussed with: Registered nurse. Patient/family agree to work toward stated goals and plan of care.     Thank you for this referral.  Bakari Ott Calculation: 35 mins

## 2021-03-16 NOTE — DISCHARGE SUMMARY
Hospitalist Discharge Summary     Patient ID:    Beatrice Akins  [id number]  51 y.o.  1965    Admit date: 3/15/2021    Discharge date : 3/16/2021    Chronic Diagnoses:    Problem List as of 3/16/2021 Date Reviewed: 1/14/2021          Codes Class Noted - Resolved    Hyponatremia ICD-10-CM: E87.1  ICD-9-CM: 276.1  1/17/2021 - Present        Syncope ICD-10-CM: R55  ICD-9-CM: 780.2  1/3/2021 - Present        Respiratory failure with hypoxia ([facility] ICD-10-CM: J96.91  ICD-9-CM: 518.81  11/18/2020 - Present        CHF exacerbation ([facility] ICD-10-CM: I50.9  ICD-9-CM: 428.0  11/2/2020 - Present        CHF (congestive heart failure) ([facility] ICD-10-CM: I50.9  ICD-9-CM: 428.0  10/2/2020 - Present    Overview Signed 10/2/2020  8:40 AM by Mike La MD     With preserved lvef, diastolic dysfunction, mitral valve regurgitation moderate.               Mitral valve regurgitation ICD-10-CM: I34.0  ICD-9-CM: 424.0  10/2/2020 - Present        PEDRO PABLO (acute kidney injury) ([facility] ICD-10-CM: N17.9  ICD-9-CM: 584.9  10/2/2020 - Present        Anxiety (Chronic) ICD-10-CM: F41.9  ICD-9-CM: 300.00  10/2/2020 - Present        Seizure disorder (HCC) (Chronic) ICD-10-CM: G40.909  ICD-9-CM: 345.90  10/2/2020 - Present        COPD (chronic obstructive pulmonary disease) with emphysema (HCC) (Chronic) ICD-10-CM: J43.9  ICD-9-CM: 492.8  10/2/2020 - Present        Dysphagia ICD-10-CM: R13.10  ICD-9-CM: 787.20  10/2/2020 - Present        Iron deficiency anemia (Chronic) ICD-10-CM: D50.9  ICD-9-CM: 280.9  10/2/2020 - Present        Hypokalemia ICD-10-CM: E87.6  ICD-9-CM: 276.8  10/2/2020 - Present        Acquired hypothyroidism (Chronic) ICD-10-CM: E03.9  ICD-9-CM: 244.9  10/2/2020 - Present        Acute respiratory failure (HCC) ICD-10-CM: J96.00  ICD-9-CM: 518.81  9/21/2020 - Present        Resistant hypertension ICD-10-CM: I10  ICD-9-CM: 401.9  9/21/2020 - Present        Renal artery stenosis ([facility] [address] ICD-10-CM: I70. 1  ICD-9-CM: 440.1  9/21/2020 - Present          22    Final Diagnoses: Active Problems:    PEDRO PABLO (acute kidney injury) ([facility] [address] (10/2/2020)      Hypokalemia (10/2/2020)        Reason for Hospitalization:   Nimo Marcial is a 54 y.o. female with past medical history of seizure disorder, COPD, congestive heart failure and hypothyroidism presenting to the ER with complaints of syncopal episode. Ms. Farrukh Castellanos reports that she stood up from sitting on the couch. She had dizziness with subsequent syncopal episode. Roommate was able to catch Ms. Farrukh Castellanos and prevented patient from hitting the floor. Ms. Farrukh Castellanos denies any injuries and did not hit her head. .  Over the past several days, patient has had mild shortness of breath, intermittent wheezing, weakness and dizziness with standing. She has had decreased p.o. intake. She denies recent fever, chills, nausea, vomiting, diarrhea, abdominal pain, chest pain or palpitations. EMS was called after syncopal episode. Emergency medical services reported orthostatic vital signs at the time of their initial evaluation. Patient was brought here to the ER for further treatment and evaluation.       Hospital Course:   Pt admitted for acute syncope, reported dizziness upon standing, did not fall. Pt has history of carotid stenosis with known moderte 60-65% stenosis of the right ICA and mild 40-45% stenosis of the left ICA. Orthostatic VS were taken and was not significant for orthostasis. . Pt to continue with current home medications and follow up with   PCP in 6 weeks. Pt was scheduled for vascular procedure at [facility] today, however, will need to reschedule within next 1-2 weeks. Discharge Medications:   Current Discharge Medication List      START taking these medications    Details   acetaminophen (TYLENOL) 325 mg tablet Take 2 Tabs by mouth every six (6) hours as needed for Pain or Fever for up to 30 days.   Qty: 180 Tab, Refills: 1      butalbital-aspirin-caffeine (FIORINAL) capsule Take 1 Cap by mouth every six (6) hours as needed for Headache for up to 5 days. Max Daily Amount: 4 Caps. Indications: a migraine headache  Qty: 20 Cap, Refills: 0    Associated Diagnoses: Migraine without status migrainosus, not intractable, unspecified migraine type      !! magnesium oxide (MAG-OX) 400 mg tablet Take 1 Tab by mouth daily for 30 days. Qty: 30 Tab, Refills: 0       !! - Potential duplicate medications found. Please discuss with provider. CONTINUE these medications which have NOT CHANGED    Details   fluticasone propionate (Flovent HFA) 110 mcg/actuation inhaler Take 1 Puff by inhalation every twelve (12) hours. potassium chloride (KLOR-CON) 20 mEq pack Take 20 mEq by mouth two (2) times daily (with meals). ergocalciferol (Vitamin D2) 1,250 mcg (50,000 unit) capsule Take 50,000 Units by mouth every seven (7) days. q7days wednesday      hydrALAZINE (APRESOLINE) 50 mg tablet Take 1 Tab by mouth three (3) times daily. Qty: 90 Tab, Refills: 0      isosorbide dinitrate (ISORDIL) 10 mg tablet Take 1 Tab by mouth two (2) times a day. Qty: 90 Tab, Refills: 1      labetaloL (NORMODYNE) 200 mg tablet Take 1 Tab by mouth three (3) times daily. Qty: 90 Tab, Refills: 1      levothyroxine (SYNTHROID) 50 mcg tablet Take 1 Tab by mouth Daily (before breakfast). Qty: 30 Tab, Refills: 0      spironolactone (ALDACTONE) 25 mg tablet Take 1 Tab by mouth daily. Qty: 30 Tab, Refills: 0      esomeprazole (NexIUM) 40 mg capsule Take 40 mg by mouth. Indications: Berry's esophagus      clopidogreL (PLAVIX) 75 mg tab Take 75 mg by mouth daily. nortriptyline (PAMELOR) 50 mg capsule Take 50 mg by mouth nightly. Trelegy Ellipta 100-62.5-25 mcg inhaler 1 Puff daily. ALPRAZolam (XANAX) 0.25 mg tablet Take 0.25 mg by mouth daily as needed for Anxiety. sertraline (Zoloft) 25 mg tablet Take 50 mg by mouth daily.       levETIRAcetam (Keppra) 500 mg tablet Take 500 mg by mouth two (2) times a day. carBAMazepine, mood stabiliz, 200 mg CM12 Take 200 mg by mouth two (2) times a day. ferrous sulfate 325 mg (65 mg iron) tablet Take 325 mg by mouth Daily (before breakfast). folic acid (FOLVITE) 1 mg tablet Take 1 mg by mouth daily. aspirin delayed-release 81 mg tablet Take  by mouth daily. atorvastatin (LIPITOR) 80 mg tablet Take 80 mg by mouth daily. albuterol (PROVENTIL HFA, VENTOLIN HFA, PROAIR HFA) 90 mcg/actuation inhaler       rosuvastatin (CRESTOR) 10 mg tablet       Oxygen 5 Devices as needed. Pt wears 5LPM as needed- states she uses it after an axiety attack      albuterol-ipratropium (DUO-NEB) 2.5 mg-0.5 mg/3 ml nebu 3 mL by Nebulization route every six (6) hours. Qty: 120 Nebule, Refills: 0      !! magnesium oxide (MAG-OX) 400 mg tablet Take 400 mg by mouth daily. !! - Potential duplicate medications found. Please discuss with provider. Follow up Care:    1. Ivy Goff in 1-2 weeks. Follow-up Information     Follow up With Specialties Details Why [address] [facility], [address] [place] [address] In 6 weeks  Blanche Cerrato  [place] [address]  [phone]              Patient Follow Up Instructions: Activity: Activity as tolerated  Diet:  Cardiac Diet    Condition at Discharge:  Stable  __________________________________________________________________    Disposition  Home Health Care [facility]  ____________________________________________________________________    Code Status:  Full Code  ___________________________________________________________________    Discharge Exam:  Patient seen and examined by me on discharge day. Physical Exam   Constitutional: She is oriented to person, place, and time. She appears well-developed. No distress. Neck: Normal range of motion. Neck supple. Cardiovascular: Normal rate, regular rhythm, normal heart sounds and intact distal pulses.    Pulmonary/Chest: Effort normal and breath sounds normal.   Abdominal: Soft. Bowel sounds are normal.   Musculoskeletal: Normal range of motion. Neurological: She is alert and oriented to person, place, and time. Skin: Skin is warm and dry. Psychiatric: She has a normal mood and affect. Her behavior is normal.        CONSULTATIONS: None    Significant Diagnostic Studies:   Recent Results (from the past 24 hour(s))   EKG, 12 LEAD, INITIAL    Collection Time: 03/15/21  5:36 PM   Result Value Ref Range    Ventricular Rate 87 BPM    Atrial Rate 87 BPM    P-R Interval 94 ms    QRS Duration 120 ms    Q-T Interval 500 ms    QTC Calculation (Bezet) 601 ms    Calculated P Axis 90 degrees    Calculated R Axis 60 degrees    Calculated T Axis -150 degrees    Diagnosis       Sinus rhythm with short WI  Right atrial enlargement  Left ventricular hypertrophy with QRS widening and repolarization abnormality  Possible Inferior infarct , age undetermined  Abnormal ECG  When compared with ECG of 17-JAN-2021 16:58,  Borderline criteria for Inferior infarct are now Present  ST no longer depressed in Anterior leads  T wave inversion less evident in Anterolateral leads  Confirmed by VINICIO YOUNG, Irma Josue ([id number]) on 3/16/2021 11:07:46 AM     CBC WITH AUTOMATED DIFF    Collection Time: 03/15/21  5:45 PM   Result Value Ref Range    WBC 8.8 3.6 - 11.0 K/uL    RBC 3.89 3.80 - 5.20 M/uL    HGB 12.8 11.5 - 16.0 g/dL    HCT 37.0 35.0 - 47.0 %    MCV 95.1 80.0 - 99.0 FL    MCH 32.9 26.0 - 34.0 PG    MCHC 34.6 30.0 - 36.5 g/dL    RDW 14.9 (H) 11.5 - 14.5 %    PLATELET 311 566 - 437 K/uL    MPV 9.4 8.9 - 12.9 FL    NRBC 0.0 0  WBC    ABSOLUTE NRBC 0.00 0.00 - 0.01 K/uL    NEUTROPHILS 77 (H) 32 - 75 %    LYMPHOCYTES 15 12 - 49 %    MONOCYTES 7 5 - 13 %    EOSINOPHILS 1 0 - 7 %    BASOPHILS 0 0 - 1 %    IMMATURE GRANULOCYTES 0 0.0 - 0.5 %    ABS. NEUTROPHILS 6.8 1.8 - 8.0 K/UL    ABS. LYMPHOCYTES 1.3 0.8 - 3.5 K/UL    ABS. MONOCYTES 0.6 0.0 - 1.0 K/UL    ABS.  EOSINOPHILS 0.1 0.0 - 0.4 K/UL ABS. BASOPHILS 0.0 0.0 - 0.1 K/UL    ABS. IMM. GRANS. 0.0 0.00 - 0.04 K/UL    DF AUTOMATED     METABOLIC PANEL, COMPREHENSIVE    Collection Time: 03/15/21  5:45 PM   Result Value Ref Range    Sodium 129 (L) 136 - 145 mmol/L    Potassium 3.1 (L) 3.5 - 5.1 mmol/L    Chloride 92 (L) 97 - 108 mmol/L    CO2 21 21 - 32 mmol/L    Anion gap 16 (H) 5 - 15 mmol/L    Glucose 86 65 - 100 mg/dL    BUN 35 (H) 6 - 20 mg/dL    Creatinine 2.20 (H) 0.55 - 1.02 mg/dL    BUN/Creatinine ratio 16 12 - 20      GFR est AA 28 (L) >60 ml/min/1.73m2    GFR est non-AA 23 (L) >60 ml/min/1.73m2    Calcium 9.2 8.5 - 10.1 mg/dL    Bilirubin, total 0.4 0.2 - 1.0 mg/dL    AST (SGOT) 25 15 - 37 U/L    ALT (SGPT) 20 12 - 78 U/L    Alk.  phosphatase 108 45 - 117 U/L    Protein, total 7.2 6.4 - 8.2 g/dL    Albumin 3.0 (L) 3.5 - 5.0 g/dL    Globulin 4.2 (H) 2.0 - 4.0 g/dL    A-G Ratio 0.7 (L) 1.1 - 2.2     CK W/ REFLX CKMB    Collection Time: 03/15/21  5:45 PM   Result Value Ref Range    CK 61.0 26 - 192 ng/mL   TROPONIN I    Collection Time: 03/15/21  5:45 PM   Result Value Ref Range    Troponin-I, Qt. <0.05 <0.05 ng/mL   PROTHROMBIN TIME + INR    Collection Time: 03/15/21  5:45 PM   Result Value Ref Range    Prothrombin time 12.8 11.9 - 14.7 sec    INR 1.0 0.9 - 1.1     PTT    Collection Time: 03/15/21  5:45 PM   Result Value Ref Range    aPTT 28.7 23.0 - 35.7 sec    aPTT, therapeutic range   68 - 173 sec   METABOLIC PANEL, BASIC    Collection Time: 03/16/21  1:00 PM   Result Value Ref Range    Sodium 136 136 - 145 mmol/L    Potassium 3.3 (L) 3.5 - 5.1 mmol/L    Chloride 102 97 - 108 mmol/L    CO2 25 21 - 32 mmol/L    Anion gap 9 5 - 15 mmol/L    Glucose 111 (H) 65 - 100 mg/dL    BUN 40 (H) 6 - 20 mg/dL    Creatinine 1.95 (H) 0.55 - 1.02 mg/dL    BUN/Creatinine ratio 21 (H) 12 - 20      GFR est AA 32 (L) >60 ml/min/1.73m2    GFR est non-AA 27 (L) >60 ml/min/1.73m2    Calcium 8.6 8.5 - 10.1 mg/dL   MAGNESIUM    Collection Time: 03/16/21  1:00 PM   Result Value Ref Range    Magnesium 1.8 1.6 - 2.4 mg/dL   CBC W/O DIFF    Collection Time: 03/16/21  1:00 PM   Result Value Ref Range    WBC 7.3 3.6 - 11.0 K/uL    RBC 2.91 (L) 3.80 - 5.20 M/uL    HGB 9.4 (L) 11.5 - 16.0 g/dL    HCT 28.0 (L) 35.0 - 47.0 %    MCV 96.2 80.0 - 99.0 FL    MCH 32.3 26.0 - 34.0 PG    MCHC 33.6 30.0 - 36.5 g/dL    RDW 15.0 (H) 11.5 - 14.5 %    PLATELET 858 042 - 672 K/uL    MPV 9.5 8.9 - 12.9 FL   PHOSPHORUS    Collection Time: 03/16/21  1:00 PM   Result Value Ref Range    Phosphorus 3.1 2.6 - 4.7 mg/dL   TSH 3RD GENERATION    Collection Time: 03/16/21  1:00 PM   Result Value Ref Range    TSH 4.13 (H) 0.36 - 3.74 uIU/mL     XR ELBOW LT MIN 3 V   Final Result   No acute abnormality identified. XR CHEST PORT   Final Result   Mild prominence of interstitium in the right lung base. Discharge: time spent 35 minutes in discharge  Education and counseling.      Signed:  Mago Dumont NP  3/16/2021  2:43 PM

## 2021-03-16 NOTE — ED PROVIDER NOTES
EMERGENCY DEPARTMENT HISTORY AND PHYSICAL EXAM        Date: 3/15/2021  Patient Name: Anson Sarkar    History of Presenting Illness     Chief Complaint   Patient presents with    Shortness of Breath    Dizziness    Syncope       10:48 PM    History Provided By: Patient    HPI: Anson Sarkar, 54 y.o. female with a history of multiple medical problems including:     COPD,    diffuse peripheral vascular disease,   Hypertension,   Renal artery stenosis with stent placement  Carotid endarterectomy  Seizure disorder    presents today with an episode of upon arising from a chair. She has had previous episodes of this she has been clinically related to her being dehydrated. States she is also been wheezing and short of breath feeling that there is been an exacerbation of her COPD. States that she did not fall and that her  caught her as she was leaning forward. Signs of an abrasion to her right forearm. Denies striking her head or any other trauma. Per EMS is that patient had a normal blood pressure while sitting but that when they stood her up her systolic blood pressure dropped into the 80s.     Review of systems is significant for currently complaining of a migraine headache that is consistent with her typical.    Systems is negative for fever, chills, nausea, vomiting, diarrhea, exposures, Covid with suggestive symptoms      PCP: Reynaldo Mcclure    Current Facility-Administered Medications   Medication Dose Route Frequency Provider Last Rate Last Admin    albuterol (PROVENTIL HFA, VENTOLIN HFA, PROAIR HFA) inhaler 2 Puff  2 Puff Inhalation Q4H PRN Zohaib Domingo MD        [START ON 3/16/2021] aspirin delayed-release tablet 81 mg  81 mg Oral DAILY Zohaib Domingo MD        atorvastatin (LIPITOR) tablet 80 mg  80 mg Oral DAILY Zohaib Domingo MD   80 mg at 03/15/21 2031    [START ON 3/16/2021] clopidogreL (PLAVIX) tablet 75 mg  75 mg Oral DAILY MD Camron Lopez ON 3/16/2021] ferrous sulfate tablet 325 mg  325 mg Oral ACB Zohaib Domingo MD        [START ON 8/54/7732] folic acid (FOLVITE) tablet 1 mg  1 mg Oral DAILY Zohaib Domingo MD        hydrALAZINE (APRESOLINE) tablet 50 mg  50 mg Oral TID Zohaib Domingo MD   50 mg at 03/15/21 2204    levETIRAcetam (KEPPRA) tablet 500 mg  500 mg Oral BID Zohaib Domingo MD   500 mg at 03/15/21 2031    [START ON 3/16/2021] levothyroxine (SYNTHROID) tablet 50 mcg  50 mcg Oral ACB Zohaib Domingo MD        albuterol-ipratropium (DUO-NEB) 2.5 MG-0.5 MG/3 ML  3 mL Nebulization Q6H RT Zohaib Domingo MD        [START ON 3/16/2021] isosorbide dinitrate (ISORDIL) tablet 10 mg  10 mg Oral BID Zohaib Domingo MD        labetaloL (NORMODYNE) tablet 200 mg  200 mg Oral TID Zohaib Domingo MD   200 mg at 03/15/21 2204    acetaminophen (TYLENOL) tablet 650 mg  650 mg Oral Q6H PRN Zohaib Domingo MD        Or    acetaminophen (TYLENOL) suppository 650 mg  650 mg Rectal Q6H PRN Zohaib Domingo MD        polyethylene glycol (MIRALAX) packet 17 g  17 g Oral DAILY PRN Zohaib Domingo MD        promethazine (PHENERGAN) tablet 12.5 mg  12.5 mg Oral Q6H PRN Zohaib Domingo MD        Or    [facility]ron [facility]) injection 4 mg  4 mg IntraVENous Q6H PRN Zohaib Domingo MD        [START ON 3/16/2021] enoxaparin (LOVENOX) injection 30 mg  30 mg SubCUTAneous DAILY Zohaib Domingo MD        0.9% sodium chloride infusion  75 mL/hr IntraVENous CONTINUOUS Zohaib Domingo MD 75 mL/hr at 03/15/21 2029 75 mL/hr at 03/15/21 2029    budesonide (PULMICORT) 500 mcg/2 ml nebulizer suspension  500 mcg Nebulization BID RT Zohaib Domingo MD         Current Outpatient Medications   Medication Sig Dispense Refill    hydrALAZINE (APRESOLINE) 50 mg tablet Take 1 Tab by mouth three (3) times daily. 90 Tab 0    isosorbide dinitrate (ISORDIL) 10 mg tablet Take 1 Tab by mouth two (2) times a day.  90 Tab 1    labetaloL (NORMODYNE) 200 mg tablet Take 1 Tab by mouth three (3) times daily. 90 Tab 1    levothyroxine (SYNTHROID) 50 mcg tablet Take 1 Tab by mouth Daily (before breakfast). 30 Tab 0    spironolactone (ALDACTONE) 25 mg tablet Take 1 Tab by mouth daily. 30 Tab 0    esomeprazole (NexIUM) 40 mg capsule Take 40 mg by mouth. Indications: Berry's esophagus      clopidogreL (PLAVIX) 75 mg tab Take 75 mg by mouth daily.  nortriptyline (PAMELOR) 50 mg capsule Take 50 mg by mouth nightly.  atorvastatin (LIPITOR) 80 mg tablet Take 80 mg by mouth daily.  Trelegy Ellipta 100-62.5-25 mcg inhaler       albuterol (PROVENTIL HFA, VENTOLIN HFA, PROAIR HFA) 90 mcg/actuation inhaler       rosuvastatin (CRESTOR) 10 mg tablet       Oxygen 5 Devices as needed. Pt wears 5LPM as needed- states she uses it after an axiety attack      albuterol-ipratropium (DUO-NEB) 2.5 mg-0.5 mg/3 ml nebu 3 mL by Nebulization route every six (6) hours. 120 Nebule 0    ALPRAZolam (XANAX) 0.25 mg tablet Take 0.25 mg by mouth three (3) times daily as needed for Anxiety.  sertraline (Zoloft) 25 mg tablet Take 25 mg by mouth daily.  levETIRAcetam (Keppra) 500 mg tablet Take 500 mg by mouth two (2) times a day.  carBAMazepine, mood stabiliz, 200 mg CM12 Take 200 mg by mouth two (2) times a day.  ferrous sulfate 325 mg (65 mg iron) tablet Take 325 mg by mouth Daily (before breakfast).  folic acid (FOLVITE) 1 mg tablet Take 1 mg by mouth daily.  aspirin delayed-release 81 mg tablet Take  by mouth daily.  magnesium oxide (MAG-OX) 400 mg tablet Take 400 mg by mouth daily.          Past History     Past Medical History:  Past Medical History:   Diagnosis Date    Anxiety 10/2/2020    Chronic anemia     Chronic respiratory failure (HCC)     COPD (chronic obstructive pulmonary disease) with emphysema ([facility] [address] 10/2/2020    Depression     Diastolic CHF (HCC)     Dysphagia 10/2/2020    GERD (gastroesophageal reflux disease)     High cholesterol     Hypertension     Hypothyroid     Iron deficiency anemia 10/2/2020    Mitral valve regurgitation 10/2/2020    Renal artery stenosis (HCC)     Seizure disorder (HCC)        Past Surgical History:  Past Surgical History:   Procedure Laterality Date    HX CAROTID ENDARTERECTOMY      HX CHOLECYSTECTOMY      HX RENAL ARTERY STENT      HX ROTATOR CUFF REPAIR Right     HX TUBAL LIGATION      IR THORACENTESIS CATH W IMAGE  11/24/2020    IR THORACENTESIS CATH W IMAGE  11/25/2020       Family History:  Family History   Problem Relation Age of Onset    Hypertension Mother     Stroke Mother     Melanoma Mother     Stroke Father     Melanoma Brother     Melanoma Child        Social History:  Social History     Tobacco Use    Smoking status: Former Smoker     Types: Cigarettes    Smokeless tobacco: Never Used    Tobacco comment: quit july 2020   Substance Use Topics    Alcohol use: Not Currently    Drug use: Never       Allergies: Allergies   Allergen Reactions    Sulfa (Sulfonamide Antibiotics) Anaphylaxis    Sulfamethoxazole-Trimethoprim Unknown (comments)       Review of Systems   Review of Systems   Constitutional: Negative for chills, diaphoresis and fever. HENT: Negative for congestion, sore throat and trouble swallowing. Eyes: Negative for visual disturbance. Respiratory: Negative for cough and shortness of breath. Cardiovascular: Negative for chest pain and palpitations. Gastrointestinal: Negative for abdominal pain, diarrhea, nausea and vomiting. Endocrine: Negative for polydipsia, polyphagia and polyuria. Genitourinary: Negative for dysuria, frequency, hematuria and urgency. Musculoskeletal: Negative for gait problem and neck pain. Skin: Negative for rash. Neurological: Positive for syncope and headaches. Negative for dizziness. Physical Exam   Physical Exam  Constitutional:       General: She is not in acute distress.      Appearance: She is not ill-appearing. Comments: Extremely thin and excessively fatigued appearing. Also pale   Mildly dyspneic with speech. HENT:      Head: Normocephalic and atraumatic. Comments: Well Healed carotid endarterectomy scar on the right with mild keloid     Nose: Nose normal.      Mouth/Throat:      Pharynx: No posterior oropharyngeal erythema. Comments: ED with multiple severely carried teeth   Eyes:      General: Vision grossly intact. Extraocular Movements: Extraocular movements intact. Conjunctiva/sclera: Conjunctivae normal.      Pupils: Pupils are equal, round, and reactive to light. Neck:      Musculoskeletal: Neck supple. Vascular: No JVD. Trachea: No tracheal deviation. Cardiovascular:      Rate and Rhythm: Normal rate and regular rhythm. Pulses: Normal pulses. Carotid pulses are 2+ on the right side and 2+ on the left side. Radial pulses are 2+ on the right side and 2+ on the left side. Femoral pulses are 2+ on the right side and 2+ on the left side. Popliteal pulses are 2+ on the right side and 2+ on the left side. Dorsalis pedis pulses are 2+ on the right side and 2+ on the left side. Posterior tibial pulses are 2+ on the right side and 2+ on the left side. Heart sounds: Normal heart sounds. Pulmonary:      Effort: Pulmonary effort is normal.      Breath sounds: Normal air entry. Decreased breath sounds and wheezing present. No rhonchi or rales. Abdominal:      General: Bowel sounds are normal.      Palpations: Abdomen is soft. Tenderness: There is no abdominal tenderness. There is no guarding or rebound. Comments: Scaphoid   Musculoskeletal:         General: No swelling or deformity. Right shoulder: She exhibits normal range of motion and no swelling. Right lower leg: No edema. Left lower leg: No edema. Skin:     General: Skin is warm and dry.       Capillary Refill: Capillary refill takes less than 2 seconds. Coloration: Skin is pale. Findings: No signs of injury or rash. Comments: 3+ tenting   Neurological:      General: No focal deficit present. Mental Status: She is alert and oriented to person, place, and time. Psychiatric:         Mood and Affect: Mood normal.         Behavior: Behavior normal. Behavior is cooperative. Diagnostic Study Results     Labs -     Recent Results (from the past 48 hour(s))   CBC WITH AUTOMATED DIFF    Collection Time: 03/15/21  5:45 PM   Result Value Ref Range    WBC 8.8 3.6 - 11.0 K/uL    RBC 3.89 3.80 - 5.20 M/uL    HGB 12.8 11.5 - 16.0 g/dL    HCT 37.0 35.0 - 47.0 %    MCV 95.1 80.0 - 99.0 FL    MCH 32.9 26.0 - 34.0 PG    MCHC 34.6 30.0 - 36.5 g/dL    RDW 14.9 (H) 11.5 - 14.5 %    PLATELET 567 106 - 078 K/uL    MPV 9.4 8.9 - 12.9 FL    NRBC 0.0 0  WBC    ABSOLUTE NRBC 0.00 0.00 - 0.01 K/uL    NEUTROPHILS 77 (H) 32 - 75 %    LYMPHOCYTES 15 12 - 49 %    MONOCYTES 7 5 - 13 %    EOSINOPHILS 1 0 - 7 %    BASOPHILS 0 0 - 1 %    IMMATURE GRANULOCYTES 0 0.0 - 0.5 %    ABS. NEUTROPHILS 6.8 1.8 - 8.0 K/UL    ABS. LYMPHOCYTES 1.3 0.8 - 3.5 K/UL    ABS. MONOCYTES 0.6 0.0 - 1.0 K/UL    ABS. EOSINOPHILS 0.1 0.0 - 0.4 K/UL    ABS. BASOPHILS 0.0 0.0 - 0.1 K/UL    ABS. IMM. GRANS. 0.0 0.00 - 0.04 K/UL    DF AUTOMATED     METABOLIC PANEL, COMPREHENSIVE    Collection Time: 03/15/21  5:45 PM   Result Value Ref Range    Sodium 129 (L) 136 - 145 mmol/L    Potassium 3.1 (L) 3.5 - 5.1 mmol/L    Chloride 92 (L) 97 - 108 mmol/L    CO2 21 21 - 32 mmol/L    Anion gap 16 (H) 5 - 15 mmol/L    Glucose 86 65 - 100 mg/dL    BUN 35 (H) 6 - 20 mg/dL    Creatinine 2.20 (H) 0.55 - 1.02 mg/dL    BUN/Creatinine ratio 16 12 - 20      GFR est AA 28 (L) >60 ml/min/1.73m2    GFR est non-AA 23 (L) >60 ml/min/1.73m2    Calcium 9.2 8.5 - 10.1 mg/dL    Bilirubin, total 0.4 0.2 - 1.0 mg/dL    AST (SGOT) 25 15 - 37 U/L    ALT (SGPT) 20 12 - 78 U/L    Alk.  phosphatase 108 45 - 117 U/L    Protein, total 7.2 6.4 - 8.2 g/dL    Albumin 3.0 (L) 3.5 - 5.0 g/dL    Globulin 4.2 (H) 2.0 - 4.0 g/dL    A-G Ratio 0.7 (L) 1.1 - 2.2     CK W/ REFLX CKMB    Collection Time: 03/15/21  5:45 PM   Result Value Ref Range    CK 61.0 26 - 192 ng/mL   TROPONIN I    Collection Time: 03/15/21  5:45 PM   Result Value Ref Range    Troponin-I, Qt. <0.05 <0.05 ng/mL   PROTHROMBIN TIME + INR    Collection Time: 03/15/21  5:45 PM   Result Value Ref Range    Prothrombin time 12.8 11.9 - 14.7 sec    INR 1.0 0.9 - 1.1     PTT    Collection Time: 03/15/21  5:45 PM   Result Value Ref Range    aPTT 28.7 23.0 - 35.7 sec    aPTT, therapeutic range   68 - 109 sec       Radiologic Studies -   XR ELBOW LT MIN 3 V   Final Result   No acute abnormality identified. XR CHEST PORT   Final Result   Mild prominence of interstitium in the right lung base. CT Results  (Last 48 hours)    None        CXR Results  (Last 48 hours)               03/15/21 1750  XR CHEST PORT Final result    Impression:  Mild prominence of interstitium in the right lung base. Narrative:  History: Chest pain       Single view of the chest was obtained. Heart size is normal. Lungs are free of   focal consolidation. Some mild prominence of the interstitium in the right lung   base is noted. No effusions or pneumothorax. Medical Decision Making and ED Course     I have also reviewed the vital signs, available nursing notes, past medical history, past surgical history, family history and social history. Vital Signs - Reviewed the patient's vital signs.   Patient Vitals for the past 12 hrs:   Temp Pulse Resp BP SpO2   03/15/21 2303  81  (!) 158/88    03/15/21 2204  83  (!) 159/90    03/15/21 2100  81 12  100 %   03/15/21 2000  81 11 (!) 167/83 100 %   03/15/21 1909  80  (!) 160/92 100 %   03/15/21 1812 98.1 °F (36.7 °C) 93 17 (!) 167/96 100 %       EKG interpretation: (Preliminary): Performed at 1736   Rhythm: normal sinus rhythm; and regular . Rate (approx.): 87 Axis: left axis deviation;  Other findings: left ventricular hypertrophy.    Records Reviewed: Nursing Notes, Old medical records and Ambulance Run Sheet as available.    Medical Decision Making/Diff Dx:  Differential diagnosis: Static syncope, near syncope, electrolyte abnormalities, dehydration, TIA, pneumonia, COVID-19 infection, MI NSTEMI, HF, hypertensive urgency, COPD exacerbation    55-year-old white female with remarkably serious medical problems despite her young age presents to the ED with an episode of witnessed syncope and near syncope.  Clinically patient appears to be clinically dehydrated as well as experiencing a COPD exacerbation and orthostasis by vital signs.  Patient with IV fluids, do screening labs chest x-ray treat patient with albuterol Atrovent nebulizer and plan admission.    ED course/Re-evaluation/Consultations/Other      Much more comfortable following IV fluids, albuterol nebulization and Fioricet for her migraine headache.  Only patient's lab results demonstrate electrolyte abnormalities including evidence of acute renal insufficiency that is striking since her last BUN and creatinine at this facility in January 2021.    Has been facilitated with Dr. Junior.    Procedures     N/A    Disposition     Admitted        Diagnosis     Clinical impression:   1. Syncope and collapse    2. Orthostatic syncope    3. Dyspnea on exertion    4. Wheezing    5. Hyponatremia    6. Acute prerenal azotemia    7. Acute renal insufficiency    8. Hypokalemia

## 2021-03-16 NOTE — PROGRESS NOTES
Patient to discharge today with home health and instructions to follow up with PCP and reschedule vascular procedure at [facility] within 1-2 weeks. Patient states that she does not feel appropriate to discharge and requested to speak to provider Ana Rosa Abrams NP. Communicated request to provider via telephone. Upon rounding on patient once again, patient states that she would no longer like to speak to Ana Rosa Abrams. She states \"I just want to go home and make follow up appointments. \" Informed Ana Rosa Abrams of patient's request.

## 2021-03-16 NOTE — DISCHARGE INSTRUCTIONS
Patient Education   Learning About Coronavirus [phone])  Coronavirus [phone]): Overview  What is coronavirus (DUJIA-46)? The coronavirus disease (COVID-19) is caused by a virus. It is an illness that was first found in [place], [place], in December 2019. It has since spread worldwide. The virus can cause fever, cough, and trouble breathing. In severe cases, it can cause pneumonia and make it hard to breathe without help. It can cause death. Coronaviruses are a large group of viruses. They cause the common cold. They also cause more serious illnesses like Middle East respiratory syndrome (MERS) and severe acute respiratory syndrome (SARS). COVID-19 is caused by a novel coronavirus. That means it's a new type that has not been seen in people before. This virus spreads person-to-person through droplets from coughing and sneezing. It can also spread when you are close to someone who is infected. And it can spread when you touch something that has the virus on it, such as a doorknob or a tabletop. What can you do to protect yourself from coronavirus (COVID-19)? The best way to protect yourself from getting sick is to:  · Avoid areas where there is an outbreak. · Avoid contact with people who may be infected. · Wash your hands often with soap or alcohol-based hand sanitizers. · Avoid crowds and try to stay at least 6 feet away from other people. · Wash your hands often, especially after you cough or sneeze. Use soap and water, and scrub for at least 20 seconds. If soap and water aren't available, use an alcohol-based hand . · Avoid touching your mouth, nose, and eyes. What can you do to avoid spreading the virus to others? To help avoid spreading the virus to others:  · Cover your mouth with a tissue when you cough or sneeze. Then throw the tissue in the trash. · Use a disinfectant to clean things that you touch often. · Stay home if you are sick or have been exposed to the virus.  Don't go to school, work, or public areas. And don't use public transportation. · If you are sick:  ? Leave your home only if you need to get medical care. But call the doctor's office first so they know you're coming. And wear a face mask, if you have one.  ? If you have a face mask, wear it whenever you're around other people. It can help stop the spread of the virus when you cough or sneeze. ? Clean and disinfect your home every day. Use household  and disinfectant wipes or sprays. Take special care to clean things that you grab with your hands. These include doorknobs, remote controls, phones, and handles on your refrigerator and microwave. And don't forget countertops, tabletops, bathrooms, and computer keyboards. When to call for help  Call 911 anytime you think you may need emergency care. For example, call if:  · You have severe trouble breathing. (You can't talk at all.)  · You have constant chest pain or pressure. · You are severely dizzy or lightheaded. · You are confused or can't think clearly. · Your face and lips have a blue color. · You pass out (lose consciousness) or are very hard to wake up. Call your doctor now if you develop symptoms such as:  · Shortness of breath. · Fever. · Cough. If you need to get care, call ahead to the doctor's office for instructions before you go. Make sure you wear a face mask, if you have one, to prevent exposing other people to the virus. Where can you get the latest information? The following health organizations are tracking and studying this virus. Their websites contain the most up-to-date information. Tam Tobias also learn what to do if you think you may have been exposed to the virus. · U.S. Centers for Disease Control and Prevention (CDC): The CDC provides updated news about the disease and travel advice. The website also tells you how to prevent the spread of infection.  www.cdc.gov  · World Health Organization [facility]): WHO offers information about the virus outbreaks. WHO also has travel advice. www.who.int  Current as of: April 1, 2020               Content Version: 12.4  © 2006-2020 [employer]. Care instructions adapted under license by your healthcare professional. If you have questions about a medical condition or this instruction, always ask your healthcare professional. [address] any warranty or liability for your use of this information. Patient Education        Fainting: Care Instructions  Your Care Instructions     When you faint, or pass out, you lose consciousness for a short time. A brief drop in blood flow to the brain often causes it. When you fall or lie down, more blood flows to your brain and you regain consciousness. Emotional stress, pain, or overheating--especially if you have been standing--can make you faint. In these cases, fainting is usually not serious. But fainting can be a sign of a more serious problem. Your doctor may want you to have more tests to rule out other causes. The treatment you need depends on the reason why you fainted. The doctor has checked you carefully, but problems can develop later. If you notice any problems or new symptoms, get medical treatment right away. Follow-up care is a key part of your treatment and safety. Be sure to make and go to all appointments, and call your doctor if you are having problems. It's also a good idea to know your test results and keep a list of the medicines you take. How can you care for yourself at home? · Drink plenty of fluids to prevent dehydration. If you have kidney, heart, or liver disease and have to limit fluids, talk with your doctor before you increase your fluid intake. When should you call for help? Call 911 anytime you think you may need emergency care. For example, call if:    · You have symptoms of a heart problem. These may include:  ? Chest pain or pressure. ? Severe trouble breathing.   ? A fast or irregular heartbeat. ? Lightheadedness or sudden weakness. ? Coughing up pink, foamy mucus. ? Passing out. After you call 911, the  may tell you to chew 1 adult-strength or 2 to 4 low-dose aspirin. Wait for an ambulance. Do not try to drive yourself.     · You have symptoms of a stroke. These may include:  ? Sudden numbness, tingling, weakness, or loss of movement in your face, arm, or leg, especially on only one side of your body. ? Sudden vision changes. ? Sudden trouble speaking. ? Sudden confusion or trouble understanding simple statements. ? Sudden problems with walking or balance. ? A sudden, severe headache that is different from past headaches.     · You passed out (lost consciousness) again. Watch closely for changes in your health, and be sure to contact your doctor if:    · You do not get better as expected. Where can you learn more? Go to http://www.gray.com/  Enter A848 in the search box to learn more about \"Fainting: Care Instructions. \"  Current as of: June 26, 2019               Content Version: 12.6  © 4694-5875 [employer]. Care instructions adapted under license by [employer] (which disclaims liability or warranty for this information). If you have questions about a medical condition or this instruction, always ask your healthcare professional. [address] any warranty or liability for your use of this information.

## 2021-03-16 NOTE — PROGRESS NOTES
Patient discharging home with boyfriend. Patient uses O2 chronically. Boyfriend did not bring O2 tank. Patient stated that she feels comfortable not having oxygen during ride home and that she would attempt to buy oxygen tank at medical supply store on the way home.

## 2021-03-16 NOTE — ED NOTES
Bedside shift change report given to Declan White (oncoming nurse) by april (offgoing nurse). Report included the following information SBAR, ED Summary, Intake/Output, MAR and Recent Results.

## 2021-03-16 NOTE — PROGRESS NOTES
CM met with patient in reference to discharge planning. She gave choice for [facility] for DME supplier for rolling walker and no preference for [facility] agency. CM notes patient has [employer] and not many agencies accept [employer]. Provider, Megan Soto, aware and verbalized understanding. Multiple HH referrals sent and DME order sent to [facility]. CM will follow for acceptance and any additional discharge planning needs. 4:14pm    CM notes [facility] has accepted the patient with an anticipated [facility] of 3/19/2020. Patient notified and verbalized understanding. Phone number for [facility] provided to the patient. CM notes not response from [facility] in reference to the walker. CM called [facility] and was told that they can deliver the walker to her here or she can pick it up on her way out. CM notified patient of these options and she stated she will pick it up when she leaves    Pt voiced concerns about being discharge and missing her appt for her ultrasound and is questioning what to do if she \"falls out again\". CM notified Provider, Megan Soto, of the patients' concerns via [address] [place] [address]. Patient reports that her boyfriend is aware of discharge and will be picking her up. Discharge plan of care/case management plan validated with provider discharge order. Discharge checklist completed with nurse, Josi Jurado.

## 2021-03-16 NOTE — ROUTINE PROCESS
TRANSFER - OUT REPORT: 
 
Verbal report given to Dave Love (name) on Rose Due  being transferred to [address] [place] [address] Rd (unit) for routine progression of care Report consisted of patients Situation, Background, Assessment and  
Recommendations(SBAR). Information from the following report(s) SBAR, ED Summary, MAR, Recent Results and Med Rec Status was reviewed with the receiving nurse. Lines:  
Peripheral IV 03/15/21 Left Forearm (Active) Site Assessment Clean, dry, & intact 03/15/21 1745 Phlebitis Assessment 0 03/15/21 1745 Infiltration Assessment 0 03/15/21 1745 Dressing Status Clean, dry, & intact 03/15/21 1745 Hub Color/Line Status Pink 03/15/21 1745 Peripheral IV 03/15/21 Right Forearm (Active) Site Assessment Clean, dry, & intact 03/15/21 1746 Phlebitis Assessment 0 03/15/21 1746 Infiltration Assessment 0 03/15/21 1746 Dressing Status Clean, dry, & intact 03/15/21 1746 Hub Color/Line Status Pink 03/15/21 1746 Opportunity for questions and clarification was provided. Patient transported with: 
 [employer]

## 2021-03-16 NOTE — ACP (ADVANCE CARE PLANNING)
Advance Care Planning     Advance Care Planning (ACP) Physician/NP/PA Conversation      Date of Conversation: 3/15/2021  Conducted with: Patient with Decision Making Capacity    Healthcare Decision Maker:     Click here to complete [employer] including selection of the [employer] Relationship (ie \"Primary\")  Today we documented Decision Maker(s) consistent with Legal Next of Kin hierarchy. Care Preferences:    Hospitalization: \"If your health worsens and it becomes clear that your chance of recovery is unlikely, what would be your preference regarding hospitalization? \"  The patient would prefer hospitalization. Ventilation: \"If you were unable to breathe on your own and your chance of recovery was unlikely, what would be your preference about the use of a ventilator (breathing machine) if it was available to you? \"   The patient would desire the use of a ventilator. Resuscitation: \"In the event your heart stopped as a result of an underlying serious health condition, would you want attempts to be made to restart your heart, or would you prefer a natural death? \"   Yes, attempt to resuscitate.     Additional topics discussed: resuscitation preferences    Conversation Outcomes / Follow-Up Plan:   ACP complete - no further action today  Reviewed DNR/DNI and patient elects Full Code (Attempt Resuscitation)     Length of Voluntary ACP Conversation in minutes:  <16 minutes (Non-Billable)    Robert Post MD

## 2021-03-19 LAB
ALDOST SERPL-MCNC: 64 NG/DL
RENIN PLAS-CCNC: 6.46 NG/ML/HR
SPECIMEN SOURCE: ABNORMAL

## 2021-03-26 ENCOUNTER — OFFICE VISIT (OUTPATIENT)
Dept: SURGERY | Age: 56
End: 2021-03-26
Payer: COMMERCIAL

## 2021-03-26 VITALS
TEMPERATURE: 98 F | HEIGHT: 64 IN | SYSTOLIC BLOOD PRESSURE: 155 MMHG | BODY MASS INDEX: 18.4 KG/M2 | HEART RATE: 96 BPM | DIASTOLIC BLOOD PRESSURE: 95 MMHG | OXYGEN SATURATION: 98 % | WEIGHT: 107.8 LBS

## 2021-03-26 DIAGNOSIS — R13.10 DYSPHAGIA, UNSPECIFIED TYPE: Primary | ICD-10-CM

## 2021-03-26 DIAGNOSIS — R55 SYNCOPE, UNSPECIFIED SYNCOPE TYPE: ICD-10-CM

## 2021-03-26 DIAGNOSIS — I65.23 BILATERAL CAROTID ARTERY STENOSIS: ICD-10-CM

## 2021-03-26 PROCEDURE — 99024 POSTOP FOLLOW-UP VISIT: CPT | Performed by: SURGERY

## 2021-03-26 RX ORDER — NIFEDIPINE 90 MG/1
90 TABLET, FILM COATED, EXTENDED RELEASE ORAL DAILY
COMMUNITY
End: 2022-03-20 | Stop reason: CLARIF

## 2021-03-29 PROBLEM — I65.23 BILATERAL CAROTID ARTERY STENOSIS: Status: ACTIVE | Noted: 2021-03-29

## 2021-03-29 NOTE — PROGRESS NOTES
Subjective:      Yvette Reid is a 54 y.o. female who presents today for wound check. Patient had right-sided carotid artery endarterectomy. Patient still having frequent dizzy spell. Her blood pressure has been normal.  Objective:     Visit Vitals  BP (!) 155/95 (BP 1 Location: Left upper arm, BP Patient Position: Sitting, BP Cuff Size: Adult)   Pulse 96   Temp 98 °F (36.7 °C) (Temporal)   Ht 5' 4\" (1.626 m)   Wt 107 lb 12.8 oz (48.9 kg)   SpO2 98%   BMI 18.50 kg/m²       Wound exam:  Right side neck incision is closed. Assessment:   Etiology of Wound:      Plan: We will repeat your carotid duplex ultrasound reexamine the left side carotid system. Last examination shows a 50 to 79% blockage CT angiogram shows about 60% blockage on the left internal carotid artery. We will repeat the carotid duplex ultrasound this month if any worse patient may require left side   carotid endarterectomy due to ongoing syncopal episode.

## 2021-03-31 ENCOUNTER — APPOINTMENT (OUTPATIENT)
Dept: GENERAL RADIOLOGY | Age: 56
End: 2021-03-31
Attending: EMERGENCY MEDICINE
Payer: COMMERCIAL

## 2021-03-31 ENCOUNTER — APPOINTMENT (OUTPATIENT)
Dept: CT IMAGING | Age: 56
End: 2021-03-31
Attending: EMERGENCY MEDICINE
Payer: COMMERCIAL

## 2021-03-31 ENCOUNTER — TELEPHONE (OUTPATIENT)
Dept: SURGERY | Age: 56
End: 2021-03-31

## 2021-03-31 ENCOUNTER — HOSPITAL ENCOUNTER (OUTPATIENT)
Age: 56
Setting detail: OBSERVATION
Discharge: HOME HEALTH | End: 2021-04-01
Attending: EMERGENCY MEDICINE | Admitting: INTERNAL MEDICINE
Payer: COMMERCIAL

## 2021-03-31 DIAGNOSIS — J44.1 COPD EXACERBATION (HCC): Primary | ICD-10-CM

## 2021-03-31 DIAGNOSIS — I50.9 ACUTE ON CHRONIC CONGESTIVE HEART FAILURE, UNSPECIFIED HEART FAILURE TYPE (HCC): ICD-10-CM

## 2021-03-31 DIAGNOSIS — E87.6 HYPOKALEMIA: ICD-10-CM

## 2021-03-31 LAB
ALBUMIN SERPL-MCNC: 2.9 G/DL (ref 3.5–5)
ALBUMIN/GLOB SERPL: 0.6 {RATIO} (ref 1.1–2.2)
ALP SERPL-CCNC: 136 U/L (ref 45–117)
ALT SERPL-CCNC: 21 U/L (ref 12–78)
ANION GAP SERPL CALC-SCNC: 12 MMOL/L (ref 5–15)
AST SERPL W P-5'-P-CCNC: 16 U/L (ref 15–37)
BASOPHILS # BLD: 0 K/UL (ref 0–0.1)
BASOPHILS NFR BLD: 0 % (ref 0–1)
BILIRUB SERPL-MCNC: 0.3 MG/DL (ref 0.2–1)
BNP SERPL-MCNC: 8843 PG/ML
BUN SERPL-MCNC: 21 MG/DL (ref 6–20)
BUN/CREAT SERPL: 11 (ref 12–20)
CA-I BLD-MCNC: 8.9 MG/DL (ref 8.5–10.1)
CHLORIDE SERPL-SCNC: 89 MMOL/L (ref 97–108)
CO2 SERPL-SCNC: 25 MMOL/L (ref 21–32)
COVID-19 RAPID TEST, COVR: NOT DETECTED
CREAT SERPL-MCNC: 1.91 MG/DL (ref 0.55–1.02)
DIFFERENTIAL METHOD BLD: ABNORMAL
EOSINOPHIL # BLD: 0.1 K/UL (ref 0–0.4)
EOSINOPHIL NFR BLD: 1 % (ref 0–7)
ERYTHROCYTE [DISTWIDTH] IN BLOOD BY AUTOMATED COUNT: 13.2 % (ref 11.5–14.5)
GLOBULIN SER CALC-MCNC: 4.5 G/DL (ref 2–4)
GLUCOSE SERPL-MCNC: 90 MG/DL (ref 65–100)
HCT VFR BLD AUTO: 37.6 % (ref 35–47)
HGB BLD-MCNC: 13.6 G/DL (ref 11.5–16)
IMM GRANULOCYTES # BLD AUTO: 0 K/UL (ref 0–0.04)
IMM GRANULOCYTES NFR BLD AUTO: 0 % (ref 0–0.5)
LYMPHOCYTES # BLD: 1.5 K/UL (ref 0.8–3.5)
LYMPHOCYTES NFR BLD: 16 % (ref 12–49)
MCH RBC QN AUTO: 33.1 PG (ref 26–34)
MCHC RBC AUTO-ENTMCNC: 36.2 G/DL (ref 30–36.5)
MCV RBC AUTO: 91.5 FL (ref 80–99)
MONOCYTES # BLD: 0.9 K/UL (ref 0–1)
MONOCYTES NFR BLD: 10 % (ref 5–13)
NEUTS SEG # BLD: 6.6 K/UL (ref 1.8–8)
NEUTS SEG NFR BLD: 73 % (ref 32–75)
NRBC # BLD: 0 K/UL (ref 0–0.01)
NRBC BLD-RTO: 0 PER 100 WBC
PLATELET # BLD AUTO: 429 K/UL (ref 150–400)
PMV BLD AUTO: 9.5 FL (ref 8.9–12.9)
POTASSIUM SERPL-SCNC: 1.9 MMOL/L (ref 3.5–5.1)
PROT SERPL-MCNC: 7.4 G/DL (ref 6.4–8.2)
RBC # BLD AUTO: 4.11 M/UL (ref 3.8–5.2)
SARS-COV-2, COV2: NORMAL
SODIUM SERPL-SCNC: 126 MMOL/L (ref 136–145)
SPECIMEN SOURCE: NORMAL
TROPONIN I SERPL-MCNC: <0.05 NG/ML
WBC # BLD AUTO: 9 K/UL (ref 3.6–11)

## 2021-03-31 PROCEDURE — 99285 EMERGENCY DEPT VISIT HI MDM: CPT

## 2021-03-31 PROCEDURE — 74011250637 HC RX REV CODE- 250/637: Performed by: INTERNAL MEDICINE

## 2021-03-31 PROCEDURE — 70450 CT HEAD/BRAIN W/O DYE: CPT

## 2021-03-31 PROCEDURE — 87635 SARS-COV-2 COVID-19 AMP PRB: CPT

## 2021-03-31 PROCEDURE — 74011250637 HC RX REV CODE- 250/637: Performed by: EMERGENCY MEDICINE

## 2021-03-31 PROCEDURE — 99218 HC RM OBSERVATION: CPT

## 2021-03-31 PROCEDURE — 83880 ASSAY OF NATRIURETIC PEPTIDE: CPT

## 2021-03-31 PROCEDURE — 96375 TX/PRO/DX INJ NEW DRUG ADDON: CPT

## 2021-03-31 PROCEDURE — 96374 THER/PROPH/DIAG INJ IV PUSH: CPT

## 2021-03-31 PROCEDURE — 36415 COLL VENOUS BLD VENIPUNCTURE: CPT

## 2021-03-31 PROCEDURE — 74011250636 HC RX REV CODE- 250/636: Performed by: INTERNAL MEDICINE

## 2021-03-31 PROCEDURE — 93005 ELECTROCARDIOGRAM TRACING: CPT

## 2021-03-31 PROCEDURE — 84484 ASSAY OF TROPONIN QUANT: CPT

## 2021-03-31 PROCEDURE — 80053 COMPREHEN METABOLIC PANEL: CPT

## 2021-03-31 PROCEDURE — 85025 COMPLETE CBC W/AUTO DIFF WBC: CPT

## 2021-03-31 PROCEDURE — 71045 X-RAY EXAM CHEST 1 VIEW: CPT

## 2021-03-31 PROCEDURE — 94761 N-INVAS EAR/PLS OXIMETRY MLT: CPT

## 2021-03-31 PROCEDURE — 74011250636 HC RX REV CODE- 250/636: Performed by: EMERGENCY MEDICINE

## 2021-03-31 PROCEDURE — 94640 AIRWAY INHALATION TREATMENT: CPT

## 2021-03-31 RX ORDER — LEVETIRACETAM 500 MG/1
500 TABLET ORAL 2 TIMES DAILY
Status: DISCONTINUED | OUTPATIENT
Start: 2021-03-31 | End: 2021-04-01 | Stop reason: HOSPADM

## 2021-03-31 RX ORDER — LABETALOL 200 MG/1
600 TABLET, FILM COATED ORAL 3 TIMES DAILY
Status: DISCONTINUED | OUTPATIENT
Start: 2021-03-31 | End: 2021-04-01 | Stop reason: HOSPADM

## 2021-03-31 RX ORDER — POTASSIUM CHLORIDE 7.45 MG/ML
10 INJECTION INTRAVENOUS ONCE
Status: DISCONTINUED | OUTPATIENT
Start: 2021-03-31 | End: 2021-03-31

## 2021-03-31 RX ORDER — SODIUM CHLORIDE 0.9 % (FLUSH) 0.9 %
5-40 SYRINGE (ML) INJECTION AS NEEDED
Status: DISCONTINUED | OUTPATIENT
Start: 2021-03-31 | End: 2021-04-01 | Stop reason: HOSPADM

## 2021-03-31 RX ORDER — ACETAMINOPHEN 325 MG/1
650 TABLET ORAL
Status: DISCONTINUED | OUTPATIENT
Start: 2021-03-31 | End: 2021-04-01 | Stop reason: HOSPADM

## 2021-03-31 RX ORDER — ERGOCALCIFEROL 1.25 MG/1
50000 CAPSULE ORAL
Status: DISCONTINUED | OUTPATIENT
Start: 2021-04-07 | End: 2021-04-01 | Stop reason: HOSPADM

## 2021-03-31 RX ORDER — ENOXAPARIN SODIUM 100 MG/ML
30 INJECTION SUBCUTANEOUS DAILY
Status: DISCONTINUED | OUTPATIENT
Start: 2021-04-01 | End: 2021-04-01 | Stop reason: HOSPADM

## 2021-03-31 RX ORDER — POTASSIUM CHLORIDE 750 MG/1
60 TABLET, FILM COATED, EXTENDED RELEASE ORAL
Status: COMPLETED | OUTPATIENT
Start: 2021-03-31 | End: 2021-03-31

## 2021-03-31 RX ORDER — BUTALBITAL, ACETAMINOPHEN AND CAFFEINE 50; 325; 40 MG/1; MG/1; MG/1
1 TABLET ORAL
Status: DISCONTINUED | OUTPATIENT
Start: 2021-03-31 | End: 2021-04-01 | Stop reason: HOSPADM

## 2021-03-31 RX ORDER — ALPRAZOLAM 0.25 MG/1
0.25 TABLET ORAL 3 TIMES DAILY
Status: DISCONTINUED | OUTPATIENT
Start: 2021-03-31 | End: 2021-04-01 | Stop reason: HOSPADM

## 2021-03-31 RX ORDER — IPRATROPIUM BROMIDE AND ALBUTEROL SULFATE 2.5; .5 MG/3ML; MG/3ML
3 SOLUTION RESPIRATORY (INHALATION)
Status: DISCONTINUED | OUTPATIENT
Start: 2021-03-31 | End: 2021-04-01 | Stop reason: HOSPADM

## 2021-03-31 RX ORDER — HYDRALAZINE HYDROCHLORIDE 50 MG/1
100 TABLET, FILM COATED ORAL 3 TIMES DAILY
Status: DISCONTINUED | OUTPATIENT
Start: 2021-03-31 | End: 2021-04-01 | Stop reason: HOSPADM

## 2021-03-31 RX ORDER — ONDANSETRON 2 MG/ML
4 INJECTION INTRAMUSCULAR; INTRAVENOUS
Status: COMPLETED | OUTPATIENT
Start: 2021-03-31 | End: 2021-03-31

## 2021-03-31 RX ORDER — ALBUTEROL SULFATE 90 UG/1
2 AEROSOL, METERED RESPIRATORY (INHALATION)
Status: DISCONTINUED | OUTPATIENT
Start: 2021-03-31 | End: 2021-04-01 | Stop reason: HOSPADM

## 2021-03-31 RX ORDER — POTASSIUM CHLORIDE 1.5 G/1.77G
40 POWDER, FOR SOLUTION ORAL
Status: DISPENSED | OUTPATIENT
Start: 2021-03-31 | End: 2021-03-31

## 2021-03-31 RX ORDER — CLOPIDOGREL BISULFATE 75 MG/1
75 TABLET ORAL DAILY
Status: DISCONTINUED | OUTPATIENT
Start: 2021-04-01 | End: 2021-04-01 | Stop reason: HOSPADM

## 2021-03-31 RX ORDER — POLYETHYLENE GLYCOL 3350 17 G/17G
17 POWDER, FOR SOLUTION ORAL DAILY PRN
Status: DISCONTINUED | OUTPATIENT
Start: 2021-03-31 | End: 2021-04-01 | Stop reason: HOSPADM

## 2021-03-31 RX ORDER — ASPIRIN 81 MG/1
81 TABLET ORAL DAILY
Status: DISCONTINUED | OUTPATIENT
Start: 2021-04-01 | End: 2021-04-01 | Stop reason: HOSPADM

## 2021-03-31 RX ORDER — BUTALBITAL, ACETAMINOPHEN AND CAFFEINE 50; 325; 40 MG/1; MG/1; MG/1
2 TABLET ORAL
Status: COMPLETED | OUTPATIENT
Start: 2021-03-31 | End: 2021-03-31

## 2021-03-31 RX ORDER — SODIUM CHLORIDE 0.9 % (FLUSH) 0.9 %
5-40 SYRINGE (ML) INJECTION EVERY 8 HOURS
Status: DISCONTINUED | OUTPATIENT
Start: 2021-03-31 | End: 2021-04-01 | Stop reason: HOSPADM

## 2021-03-31 RX ORDER — CLONIDINE HYDROCHLORIDE 0.1 MG/1
0.1 TABLET ORAL
Status: COMPLETED | OUTPATIENT
Start: 2021-03-31 | End: 2021-03-31

## 2021-03-31 RX ORDER — ALBUTEROL SULFATE 90 UG/1
2 AEROSOL, METERED RESPIRATORY (INHALATION)
Status: DISCONTINUED | OUTPATIENT
Start: 2021-03-31 | End: 2021-03-31

## 2021-03-31 RX ORDER — FLUTICASONE PROPIONATE 110 UG/1
1 AEROSOL, METERED RESPIRATORY (INHALATION) EVERY 12 HOURS
Status: DISCONTINUED | OUTPATIENT
Start: 2021-03-31 | End: 2021-04-01 | Stop reason: HOSPADM

## 2021-03-31 RX ORDER — LEVOTHYROXINE SODIUM 25 UG/1
50 TABLET ORAL
Status: DISCONTINUED | OUTPATIENT
Start: 2021-04-01 | End: 2021-04-01 | Stop reason: HOSPADM

## 2021-03-31 RX ORDER — FUROSEMIDE 10 MG/ML
40 INJECTION INTRAMUSCULAR; INTRAVENOUS
Status: COMPLETED | OUTPATIENT
Start: 2021-03-31 | End: 2021-03-31

## 2021-03-31 RX ORDER — ACETAMINOPHEN 650 MG/1
650 SUPPOSITORY RECTAL
Status: DISCONTINUED | OUTPATIENT
Start: 2021-03-31 | End: 2021-04-01 | Stop reason: HOSPADM

## 2021-03-31 RX ORDER — PANTOPRAZOLE SODIUM 20 MG/1
40 TABLET, DELAYED RELEASE ORAL DAILY
Status: DISCONTINUED | OUTPATIENT
Start: 2021-04-01 | End: 2021-04-01 | Stop reason: HOSPADM

## 2021-03-31 RX ORDER — ONDANSETRON 2 MG/ML
4 INJECTION INTRAMUSCULAR; INTRAVENOUS
Status: DISCONTINUED | OUTPATIENT
Start: 2021-03-31 | End: 2021-04-01 | Stop reason: HOSPADM

## 2021-03-31 RX ORDER — LANOLIN ALCOHOL/MO/W.PET/CERES
400 CREAM (GRAM) TOPICAL DAILY
Status: DISCONTINUED | OUTPATIENT
Start: 2021-04-01 | End: 2021-04-01 | Stop reason: HOSPADM

## 2021-03-31 RX ORDER — FOLIC ACID 1 MG/1
1 TABLET ORAL DAILY
Status: DISCONTINUED | OUTPATIENT
Start: 2021-04-01 | End: 2021-04-01 | Stop reason: HOSPADM

## 2021-03-31 RX ORDER — ISOSORBIDE DINITRATE 10 MG/1
10 TABLET ORAL 2 TIMES DAILY
Status: DISCONTINUED | OUTPATIENT
Start: 2021-03-31 | End: 2021-04-01 | Stop reason: HOSPADM

## 2021-03-31 RX ORDER — ALPRAZOLAM 0.25 MG/1
0.25 TABLET ORAL
Status: DISCONTINUED | OUTPATIENT
Start: 2021-03-31 | End: 2021-04-01 | Stop reason: HOSPADM

## 2021-03-31 RX ORDER — POTASSIUM CHLORIDE AND SODIUM CHLORIDE 900; 300 MG/100ML; MG/100ML
INJECTION, SOLUTION INTRAVENOUS CONTINUOUS
Status: DISCONTINUED | OUTPATIENT
Start: 2021-03-31 | End: 2021-04-01 | Stop reason: HOSPADM

## 2021-03-31 RX ORDER — IPRATROPIUM BROMIDE AND ALBUTEROL SULFATE 2.5; .5 MG/3ML; MG/3ML
3 SOLUTION RESPIRATORY (INHALATION) EVERY 6 HOURS
Status: DISCONTINUED | OUTPATIENT
Start: 2021-03-31 | End: 2021-03-31

## 2021-03-31 RX ORDER — PROMETHAZINE HYDROCHLORIDE 25 MG/1
12.5 TABLET ORAL
Status: DISCONTINUED | OUTPATIENT
Start: 2021-03-31 | End: 2021-04-01 | Stop reason: HOSPADM

## 2021-03-31 RX ORDER — CARBAMAZEPINE 200 MG/1
200 TABLET ORAL 2 TIMES DAILY
Status: DISCONTINUED | OUTPATIENT
Start: 2021-03-31 | End: 2021-04-01 | Stop reason: HOSPADM

## 2021-03-31 RX ADMIN — ALBUTEROL SULFATE 2 PUFF: 108 AEROSOL, METERED RESPIRATORY (INHALATION) at 15:00

## 2021-03-31 RX ADMIN — Medication 10 ML: at 15:47

## 2021-03-31 RX ADMIN — POTASSIUM CHLORIDE 60 MEQ: 750 TABLET, FILM COATED, EXTENDED RELEASE ORAL at 15:43

## 2021-03-31 RX ADMIN — ONDANSETRON 4 MG: 2 INJECTION INTRAMUSCULAR; INTRAVENOUS at 14:22

## 2021-03-31 RX ADMIN — CLONIDINE HYDROCHLORIDE 0.1 MG: 0.1 TABLET ORAL at 14:22

## 2021-03-31 RX ADMIN — POTASSIUM CHLORIDE 40 MEQ: 1.5 FOR SOLUTION ORAL at 18:28

## 2021-03-31 RX ADMIN — FUROSEMIDE 40 MG: 10 INJECTION, SOLUTION INTRAMUSCULAR; INTRAVENOUS at 15:44

## 2021-03-31 RX ADMIN — LABETALOL HYDROCHLORIDE 600 MG: 200 TABLET, FILM COATED ORAL at 18:28

## 2021-03-31 RX ADMIN — ACETAMINOPHEN 650 MG: 325 TABLET, FILM COATED ORAL at 20:08

## 2021-03-31 RX ADMIN — POTASSIUM CHLORIDE AND SODIUM CHLORIDE: 900; 300 INJECTION, SOLUTION INTRAVENOUS at 15:52

## 2021-03-31 RX ADMIN — METHYLPREDNISOLONE SODIUM SUCCINATE 125 MG: 125 INJECTION, POWDER, FOR SOLUTION INTRAMUSCULAR; INTRAVENOUS at 14:22

## 2021-03-31 RX ADMIN — BUTALBITAL, ACETAMINOPHEN, AND CAFFEINE 2 TABLET: 50; 325; 40 TABLET ORAL at 14:22

## 2021-03-31 RX ADMIN — HYDRALAZINE HYDROCHLORIDE 100 MG: 50 TABLET, FILM COATED ORAL at 18:28

## 2021-03-31 RX ADMIN — POTASSIUM CHLORIDE 40 MEQ: 1.5 FOR SOLUTION ORAL at 16:30

## 2021-03-31 RX ADMIN — BUTALBITAL, ACETAMINOPHEN, AND CAFFEINE 1 TABLET: 50; 325; 40 TABLET ORAL at 18:28

## 2021-03-31 NOTE — ED NOTES
Called 5W to attempt to give report for pt to go to assigned admit Rm [id number]. Admit nurse unable to take report at this time. Awaiting call back.

## 2021-03-31 NOTE — H&P
History & Physical    Primary Care Provider: Quincy Chacon  Source of Information: Patient/family     History of Presenting Illness:   Tonja Vegas is a 54 y.o. female with a multitude of medical problems including COPD, PAD, cerebrovascular disease, diastolic heart failure, renal artery stenosis, hypertension and seizure disorder who presents to the ED today with worsening shortness of breath. Complains of feeling very weak and has had elevated blood pressure. She denies fever. Patient admits to nausea and vomiting recently. Also complaining of a migraine headache which has been ongoing for the past year. She has had a lot of muscle cramps especially in her feet recently. Patient states her PCP increased her potassium up to 3 times daily recently. However, she saw her nephrologist last week Thursday and her potassium supplement was discontinued    Labs in the ED showed a potassium of only 1.9. Rapid COVID-19 test is negative. Chest x-ray shows no CHF or infiltrate. Blood pressure was 201/109 on presentation    She appears to have chronic kidney disease with a baseline creatinine of around 2 although it was 1.0 in January. This is unchanged from 2 weeks ago. Patient also states her PCP Srinivasa Ayala has made a recent referral for hospice evaluation, although they have yet to evaluate her. Patient was just admitted 2 weeks ago overnight for syncope, discharged home the next day. Review of Systems:  A comprehensive review of systems was negative except for that written in the History of Present Illness.      Past Medical History:   Diagnosis Date    Anxiety 10/2/2020    Carotid stenosis     Chronic anemia     Chronic respiratory failure (HCC)     COPD (chronic obstructive pulmonary disease) with emphysema (HCC) 10/2/2020    Depression     Diastolic CHF (HCC)     Dysphagia 10/2/2020    GERD (gastroesophageal reflux disease)     High cholesterol     Hypertension     Hypothyroid     Iron deficiency anemia 10/2/2020    Mitral valve regurgitation 10/2/2020    Renal artery stenosis (HCC)     Seizure disorder (HCC)    Nonfunctioning left kidney    Past Surgical History:   Procedure Laterality Date    HX CAROTID ENDARTERECTOMY      HX CHOLECYSTECTOMY      HX RENAL ARTERY STENT      HX ROTATOR CUFF REPAIR Right     HX TUBAL LIGATION      IR THORACENTESIS CATH W IMAGE  11/24/2020    IR THORACENTESIS CATH W IMAGE  11/25/2020       Prior to Admission medications    Medication Sig Start Date End Date Taking? Authorizing Provider   NIFEdipine ER (ADALAT CC) 90 mg ER tablet Take 90 mg by mouth daily. Provider, Historical   fluticasone propionate (Flovent HFA) 110 mcg/actuation inhaler Take 1 Puff by inhalation every twelve (12) hours. Provider, Historical   ergocalciferol (Vitamin D2) 1,250 mcg (50,000 unit) capsule Take 50,000 Units by mouth every seven (7) days. q7days wednesday    Provider, Historical   acetaminophen (TYLENOL) 325 mg tablet Take 2 Tabs by mouth every six (6) hours as needed for Pain or Fever for up to 30 days. 3/16/21 4/15/21  Benjamin Carrillo NP   magnesium oxide (MAG-OX) 400 mg tablet Take 1 Tab by mouth daily for 30 days. 3/17/21 4/16/21  Benjamin Carrillo NP   hydrALAZINE (APRESOLINE) 50 mg tablet Take 1 Tab by mouth three (3) times daily. Patient taking differently: Take 100 mg by mouth three (3) times daily. 1/23/21   Tomas Mae MD   isosorbide dinitrate (ISORDIL) 10 mg tablet Take 1 Tab by mouth two (2) times a day. 1/23/21   Tomas Mae MD   labetaloL (NORMODYNE) 200 mg tablet Take 1 Tab by mouth three (3) times daily. Patient taking differently: Take 600 mg by mouth three (3) times daily. 1/23/21   Tomas Mae MD   levothyroxine (SYNTHROID) 50 mcg tablet Take 1 Tab by mouth Daily (before breakfast). 1/24/21   Tomas Mae MD   spironolactone (ALDACTONE) 25 mg tablet Take 1 Tab by mouth daily.   Patient taking differently: Take 50 mg by mouth daily. 1/24/21   Viviana Judge MD   esomeprazole (NexIUM) 40 mg capsule Take 40 mg by mouth. Indications: Berry's esophagus    Provider, Historical   clopidogreL (PLAVIX) 75 mg tab Take 75 mg by mouth daily. 12/11/20   Provider, Historical   nortriptyline (PAMELOR) 50 mg capsule Take 50 mg by mouth nightly. Other, MD Heladio Timmons Ellippaulina 100-62.5-25 mcg inhaler 1 Puff daily. 11/12/20   Provider, Historical   albuterol (PROVENTIL HFA, VENTOLIN HFA, PROAIR HFA) 90 mcg/actuation inhaler  11/5/20   Provider, Historical   rosuvastatin (CRESTOR) 10 mg tablet  11/6/20   Provider, Historical   Oxygen 5 Devices as needed. Pt wears 5LPM as needed- states she uses it after an axiety attack    Provider, Historical   albuterol-ipratropium (DUO-NEB) 2.5 mg-0.5 mg/3 ml nebu 3 mL by Nebulization route every six (6) hours. 10/2/20   Kaela Alan MD   ALPRAZolam Columba Mccain) 0.25 mg tablet Take 0.25 mg by mouth daily as needed for Anxiety. Provider, Historical   sertraline (Zoloft) 25 mg tablet Take 50 mg by mouth daily. Provider, Historical   levETIRAcetam (Keppra) 500 mg tablet Take 500 mg by mouth two (2) times a day. Provider, Historical   carBAMazepine, mood stabiliz, 200 mg CM12 Take 200 mg by mouth two (2) times a day. Provider, Historical   ferrous sulfate 325 mg (65 mg iron) tablet Take 325 mg by mouth Daily (before breakfast). Provider, Historical   folic acid (FOLVITE) 1 mg tablet Take 1 mg by mouth daily. Provider, Historical   aspirin delayed-release 81 mg tablet Take  by mouth daily. Provider, Historical   magnesium oxide (MAG-OX) 400 mg tablet Take 400 mg by mouth daily.     Provider, Historical       Allergies   Allergen Reactions    Sulfa (Sulfonamide Antibiotics) Anaphylaxis    Sulfamethoxazole-Trimethoprim Unknown (comments)        Family History   Problem Relation Age of Onset    Hypertension Mother     Stroke Mother     Melanoma Mother    Homar Grier Stroke Father     Melanoma Brother     Melanoma Child         Social History     Socioeconomic History    Marital status: SINGLE     Spouse name: Not on file    Number of children: Not on file    Years of education: Not on file    Highest education level: Not on file   Tobacco Use    Smoking status: Former Smoker     Types: Cigarettes    Smokeless tobacco: Never Used    Tobacco comment: quit july 2020   Substance and Sexual Activity    Alcohol use: Not Currently    Drug use: Never   Other Topics Concern            CODE STATUS:  DNR x   Full    Other      Objective:     Physical Exam:     Visit Vitals  BP (!) 201/109   Pulse (!) 114   Temp 98.4 °F (36.9 °C)   Resp 20   Ht 5' 4\" (1.626 m)   Wt 44.9 kg (99 lb)   SpO2 100%   BMI 16.99 kg/m²    O2 Flow Rate (L/min): 4 l/min O2 Device: Nasal cannula    General:  Alert, cooperative, no distress, cachectic in appearance. Head:  Normocephalic, without obvious abnormality, atraumatic. Eyes:  Conjunctivae/corneas clear. PERRL, EOMs intact. Nose: Nares normal. Septum midline. Mucosa normal. No drainage or sinus tenderness. Throat: Lips, mucosa, and tongue normal. Teeth and gums normal.   Neck: Supple, symmetrical, trachea midline, no adenopathy, thyroid: no enlargement/tenderness/nodules, no carotid bruit and no JVD. Back:   Symmetric, no curvature. ROM normal. No CVA tenderness. Lungs:   Clear to auscultation bilaterally. Chest wall:  No tenderness or deformity. Heart:  Regular rate and rhythm, S1, S2 normal, no murmur, click, rub or gallop. Abdomen:   Soft, non-tender. Bowel sounds normal. No masses,  No organomegaly. Extremities: Extremities normal, atraumatic, no cyanosis or edema. Pulses: 2+ and symmetric all extremities. Skin: Skin turgor is diminished. No rashes or lesions   Neurologic: CNII-XII intact. No motor or sensory deficits.         24 Hour Results:    Recent Results (from the past 24 hour(s))   CBC WITH AUTOMATED DIFF Collection Time: 03/31/21  2:10 PM   Result Value Ref Range    WBC 9.0 3.6 - 11.0 K/uL    RBC 4.11 3.80 - 5.20 M/uL    HGB 13.6 11.5 - 16.0 g/dL    HCT 37.6 35.0 - 47.0 %    MCV 91.5 80.0 - 99.0 FL    MCH 33.1 26.0 - 34.0 PG    MCHC 36.2 30.0 - 36.5 g/dL    RDW 13.2 11.5 - 14.5 %    PLATELET 061 (H) 960 - 400 K/uL    MPV 9.5 8.9 - 12.9 FL    NRBC 0.0 0  WBC    ABSOLUTE NRBC 0.00 0.00 - 0.01 K/uL    NEUTROPHILS 73 32 - 75 %    LYMPHOCYTES 16 12 - 49 %    MONOCYTES 10 5 - 13 %    EOSINOPHILS 1 0 - 7 %    BASOPHILS 0 0 - 1 %    IMMATURE GRANULOCYTES 0 0.0 - 0.5 %    ABS. NEUTROPHILS 6.6 1.8 - 8.0 K/UL    ABS. LYMPHOCYTES 1.5 0.8 - 3.5 K/UL    ABS. MONOCYTES 0.9 0.0 - 1.0 K/UL    ABS. EOSINOPHILS 0.1 0.0 - 0.4 K/UL    ABS. BASOPHILS 0.0 0.0 - 0.1 K/UL    ABS. IMM. GRANS. 0.0 0.00 - 0.04 K/UL    DF AUTOMATED     METABOLIC PANEL, COMPREHENSIVE    Collection Time: 03/31/21  2:10 PM   Result Value Ref Range    Sodium 126 (L) 136 - 145 mmol/L    Potassium 1.9 (LL) 3.5 - 5.1 mmol/L    Chloride 89 (L) 97 - 108 mmol/L    CO2 25 21 - 32 mmol/L    Anion gap 12 5 - 15 mmol/L    Glucose 90 65 - 100 mg/dL    BUN 21 (H) 6 - 20 mg/dL    Creatinine 1.91 (H) 0.55 - 1.02 mg/dL    BUN/Creatinine ratio 11 (L) 12 - 20      GFR est AA 33 (L) >60 ml/min/1.73m2    GFR est non-AA 27 (L) >60 ml/min/1.73m2    Calcium 8.9 8.5 - 10.1 mg/dL    Bilirubin, total 0.3 0.2 - 1.0 mg/dL    AST (SGOT) 16 15 - 37 U/L    ALT (SGPT) 21 12 - 78 U/L    Alk.  phosphatase 136 (H) 45 - 117 U/L    Protein, total 7.4 6.4 - 8.2 g/dL    Albumin 2.9 (L) 3.5 - 5.0 g/dL    Globulin 4.5 (H) 2.0 - 4.0 g/dL    A-G Ratio 0.6 (L) 1.1 - 2.2     TROPONIN I    Collection Time: 03/31/21  2:10 PM   Result Value Ref Range    Troponin-I, Qt. <0.05 <0.05 ng/mL   BNP    Collection Time: 03/31/21  2:10 PM   Result Value Ref Range    NT pro-BNP 8,843 (H) <125 pg/mL   SARS-COV-2    Collection Time: 03/31/21  2:15 PM   Result Value Ref Range    SARS-CoV-2 Nasopharyngeal     COVID-19 RAPID TEST    Collection Time: 03/31/21  2:15 PM   Result Value Ref Range    Specimen source Nasopharyngeal      COVID-19 rapid test Not Detected Not Detected           Imaging:   CT HEAD WO CONT   Final Result   No acute intracranial process. XR CHEST SNGL V   Final Result   No plain film evidence for CHF or pneumonia. Assessment:   Severe hypokalemia, symptomatic    Accelerated hypertension    Dehydration due to to nausea and vomiting    Likely acute kidney injury on chronic kidney disease due to hypovolemia    Hyponatremia due to hypovolemia    Advanced COPD without clear exacerbation    Unilateral functioning right kidney with chronic kidney disease    History of carotid and renal artery stenosis    Chronic diastolic heart failure    Seizure disorder    GERD    Hypothyroidism    Chronic anemia    Hyperlipidemia    Mitral insufficiency        Plan:   Patient will be admitted as observation to medical telemetry  She does not want IV potassium runs due to severe intolerance of same. Instead I will give her 36 M EQ of potassium in a liter of saline at 100 mL/h  Also, give oral potassium hourly x4 doses  Recheck labs in a.m.   Check magnesium  Continue home medications    Patient request DO NOT RESUSCITATE CODE STATUS    She states her boyfriend and daughter are surrogate decision makers    She is interested in hospice referral so we will consult [facility] as she already has [address] medications were reviewed    Signed By: Duane Webber MD     March 31, 2021

## 2021-03-31 NOTE — ED PROVIDER NOTES
EMERGENCY DEPARTMENT HISTORY AND PHYSICAL EXAM      Date: 3/31/2021  Patient Name: Shaun Kilgore    History of Presenting Illness     Chief Complaint   Patient presents with    Shortness of Breath    COPD       History Provided By: Patient and EMS    HPI: Shaun Kilgore, 54 y.o. female with a past medical history significant COPD presents to the ED with chief complaint of Shortness of Breath and COPD  . 43-year-old female referred from [facility] cardiology to pulmonology. Also possible hospice over the last month. Worsening cough congestion shortness of breath. History of COPD not requiring increased oxygen at home. Concerned about her blood pressure and heart rate also being elevated. No swelling. Feels very weak. There are no other complaints, changes, or physical findings at this time. PCP: Whitney Hess    Current Facility-Administered Medications   Medication Dose Route Frequency Provider Last Rate Last Admin    albuterol (PROVENTIL HFA, VENTOLIN HFA, PROAIR HFA) inhaler 2 Puff  2 Puff Inhalation Q4H RT Warner Nath MD   2 Puff at 03/31/21 1500    furosemide (LASIX) injection 40 mg  40 mg IntraVENous NOW Mayra Smith MD        potassium chloride 10 mEq in 100 ml IVPB  10 mEq IntraVENous ONCE Mayra Smith MD        potassium chloride 10 mEq in 100 ml IVPB  10 mEq IntraVENous ONCE Mayra Smith MD        potassium chloride 10 mEq in 100 ml IVPB  10 mEq IntraVENous ONCE Mayra Smith MD        potassium chloride 10 mEq in 100 ml IVPB  10 mEq IntraVENous ONCE Mayra Smith MD        potassium chloride SR (KLOR-CON 10) tablet 60 mEq  60 mEq Oral NOW Mayra Smith MD         Current Outpatient Medications   Medication Sig Dispense Refill    NIFEdipine ER (ADALAT CC) 90 mg ER tablet Take 90 mg by mouth daily.  fluticasone propionate (Flovent HFA) 110 mcg/actuation inhaler Take 1 Puff by inhalation every twelve (12) hours.       potassium chloride (KLOR-CON) 20 mEq pack Take 20 mEq by mouth two (2) times daily (with meals).  ergocalciferol (Vitamin D2) 1,250 mcg (50,000 unit) capsule Take 50,000 Units by mouth every seven (7) days. q7days wednesday      acetaminophen (TYLENOL) 325 mg tablet Take 2 Tabs by mouth every six (6) hours as needed for Pain or Fever for up to 30 days. 180 Tab 1    magnesium oxide (MAG-OX) 400 mg tablet Take 1 Tab by mouth daily for 30 days. 30 Tab 0    hydrALAZINE (APRESOLINE) 50 mg tablet Take 1 Tab by mouth three (3) times daily. (Patient taking differently: Take 100 mg by mouth three (3) times daily.) 90 Tab 0    isosorbide dinitrate (ISORDIL) 10 mg tablet Take 1 Tab by mouth two (2) times a day. 90 Tab 1    labetaloL (NORMODYNE) 200 mg tablet Take 1 Tab by mouth three (3) times daily. (Patient taking differently: Take 600 mg by mouth three (3) times daily.) 90 Tab 1    levothyroxine (SYNTHROID) 50 mcg tablet Take 1 Tab by mouth Daily (before breakfast). 30 Tab 0    spironolactone (ALDACTONE) 25 mg tablet Take 1 Tab by mouth daily. (Patient taking differently: Take 50 mg by mouth daily.) 30 Tab 0    esomeprazole (NexIUM) 40 mg capsule Take 40 mg by mouth. Indications: Berry's esophagus      clopidogreL (PLAVIX) 75 mg tab Take 75 mg by mouth daily.  nortriptyline (PAMELOR) 50 mg capsule Take 50 mg by mouth nightly.  atorvastatin (LIPITOR) 80 mg tablet Take 80 mg by mouth daily.  Trelegy Ellipta 100-62.5-25 mcg inhaler 1 Puff daily.  albuterol (PROVENTIL HFA, VENTOLIN HFA, PROAIR HFA) 90 mcg/actuation inhaler       rosuvastatin (CRESTOR) 10 mg tablet       Oxygen 5 Devices as needed. Pt wears 5LPM as needed- states she uses it after an axiety attack      albuterol-ipratropium (DUO-NEB) 2.5 mg-0.5 mg/3 ml nebu 3 mL by Nebulization route every six (6) hours. 120 Nebule 0    ALPRAZolam (XANAX) 0.25 mg tablet Take 0.25 mg by mouth daily as needed for Anxiety.       sertraline (Zoloft) 25 mg tablet Take 50 mg by mouth daily.  levETIRAcetam (Keppra) 500 mg tablet Take 500 mg by mouth two (2) times a day.  carBAMazepine, mood stabiliz, 200 mg CM12 Take 200 mg by mouth two (2) times a day.  ferrous sulfate 325 mg (65 mg iron) tablet Take 325 mg by mouth Daily (before breakfast).  folic acid (FOLVITE) 1 mg tablet Take 1 mg by mouth daily.  aspirin delayed-release 81 mg tablet Take  by mouth daily.  magnesium oxide (MAG-OX) 400 mg tablet Take 400 mg by mouth daily. Past History     Past Medical History:  Past Medical History:   Diagnosis Date    Anxiety 10/2/2020    Chronic anemia     Chronic respiratory failure (HCC)     COPD (chronic obstructive pulmonary disease) with emphysema ([address] 10/2/2020    Depression     Diastolic CHF (HCC)     Dysphagia 10/2/2020    GERD (gastroesophageal reflux disease)     High cholesterol     Hypertension     Hypothyroid     Iron deficiency anemia 10/2/2020    Mitral valve regurgitation 10/2/2020    Renal artery stenosis (HCC)     Seizure disorder (HCC)        Past Surgical History:  Past Surgical History:   Procedure Laterality Date    HX CAROTID ENDARTERECTOMY      HX CHOLECYSTECTOMY      HX RENAL ARTERY STENT      HX ROTATOR CUFF REPAIR Right     HX TUBAL LIGATION      IR THORACENTESIS CATH W IMAGE  11/24/2020    IR THORACENTESIS CATH W IMAGE  11/25/2020       Family History:  Family History   Problem Relation Age of Onset    Hypertension Mother     Stroke Mother     Melanoma Mother     Stroke Father     Melanoma Brother     Melanoma Child        Social History:  Social History     Tobacco Use    Smoking status: Former Smoker     Types: Cigarettes    Smokeless tobacco: Never Used    Tobacco comment: quit july 2020   Substance Use Topics    Alcohol use: Not Currently    Drug use: Never       Allergies:   Allergies   Allergen Reactions    Sulfa (Sulfonamide Antibiotics) Anaphylaxis    Sulfamethoxazole-Trimethoprim Unknown (comments)         Review of Systems   Review of Systems   Constitutional: Negative for chills, diaphoresis and fatigue. HENT: Positive for congestion. Negative for ear pain, nosebleeds and sore throat. Eyes: Negative. Negative for pain, discharge and redness. Respiratory: Positive for cough, shortness of breath and wheezing. Negative for chest tightness. Cardiovascular: Negative. Negative for chest pain, palpitations and leg swelling. Gastrointestinal: Negative. Negative for abdominal pain, constipation, diarrhea, nausea and vomiting. Endocrine: Negative. Negative for cold intolerance. Genitourinary: Negative. Negative for difficulty urinating, dysuria and hematuria. Musculoskeletal: Negative. Negative for arthralgias, joint swelling and neck pain. Skin: Negative. Negative for color change, pallor, rash and wound. Allergic/Immunologic: Negative. Neurological: Negative. Negative for dizziness, syncope, weakness, light-headedness and headaches. Hematological: Negative. Does not bruise/bleed easily. Psychiatric/Behavioral: Negative. Negative for behavioral problems, confusion and suicidal ideas. All other systems reviewed and are negative. Physical Exam   Physical Exam  Vitals signs and nursing note reviewed. Exam conducted with a chaperone present. Constitutional:       General: She is in acute distress. Appearance: Normal appearance. She is normal weight. HENT:      Head: Normocephalic and atraumatic. Nose: Nose normal.      Mouth/Throat:      Mouth: Mucous membranes are moist.      Pharynx: Oropharynx is clear. Eyes:      Extraocular Movements: Extraocular movements intact. Conjunctiva/sclera: Conjunctivae normal.      Pupils: Pupils are equal, round, and reactive to light. Neck:      Musculoskeletal: Normal range of motion and neck supple.    Cardiovascular:      Rate and Rhythm: Normal rate and regular rhythm. Pulses: Normal pulses. Heart sounds: Normal heart sounds. Pulmonary:      Effort: Tachypnea and respiratory distress present. Breath sounds: Normal breath sounds. Abdominal:      General: Abdomen is flat. Bowel sounds are normal. There is no distension. Palpations: Abdomen is soft. Tenderness: There is no abdominal tenderness. There is no guarding. Musculoskeletal: Normal range of motion. General: No swelling, tenderness, deformity or signs of injury. Right lower leg: No edema. Left lower leg: No edema. Skin:     General: Skin is warm and dry. Capillary Refill: Capillary refill takes less than 2 seconds. Findings: No lesion or rash. Neurological:      General: No focal deficit present. Mental Status: She is alert and oriented to person, place, and time. Mental status is at baseline. Cranial Nerves: No cranial nerve deficit. Psychiatric:         Mood and Affect: Mood normal.         Behavior: Behavior normal.         Thought Content: Thought content normal.         Judgment: Judgment normal.         Diagnostic Study Results     Labs -     Recent Results (from the past 12 hour(s))   CBC WITH AUTOMATED DIFF    Collection Time: 03/31/21  2:10 PM   Result Value Ref Range    WBC 9.0 3.6 - 11.0 K/uL    RBC 4.11 3.80 - 5.20 M/uL    HGB 13.6 11.5 - 16.0 g/dL    HCT 37.6 35.0 - 47.0 %    MCV 91.5 80.0 - 99.0 FL    MCH 33.1 26.0 - 34.0 PG    MCHC 36.2 30.0 - 36.5 g/dL    RDW 13.2 11.5 - 14.5 %    PLATELET 339 (H) 785 - 400 K/uL    MPV 9.5 8.9 - 12.9 FL    NRBC 0.0 0  WBC    ABSOLUTE NRBC 0.00 0.00 - 0.01 K/uL    NEUTROPHILS 73 32 - 75 %    LYMPHOCYTES 16 12 - 49 %    MONOCYTES 10 5 - 13 %    EOSINOPHILS 1 0 - 7 %    BASOPHILS 0 0 - 1 %    IMMATURE GRANULOCYTES 0 0.0 - 0.5 %    ABS. NEUTROPHILS 6.6 1.8 - 8.0 K/UL    ABS. LYMPHOCYTES 1.5 0.8 - 3.5 K/UL    ABS. MONOCYTES 0.9 0.0 - 1.0 K/UL    ABS. EOSINOPHILS 0.1 0.0 - 0.4 K/UL    ABS. BASOPHILS 0.0 0.0 - 0.1 K/UL    ABS. IMM. GRANS. 0.0 0.00 - 0.04 K/UL    DF AUTOMATED     METABOLIC PANEL, COMPREHENSIVE    Collection Time: 03/31/21  2:10 PM   Result Value Ref Range    Sodium 126 (L) 136 - 145 mmol/L    Potassium 1.9 (LL) 3.5 - 5.1 mmol/L    Chloride 89 (L) 97 - 108 mmol/L    CO2 25 21 - 32 mmol/L    Anion gap 12 5 - 15 mmol/L    Glucose 90 65 - 100 mg/dL    BUN 21 (H) 6 - 20 mg/dL    Creatinine 1.91 (H) 0.55 - 1.02 mg/dL    BUN/Creatinine ratio 11 (L) 12 - 20      GFR est AA 33 (L) >60 ml/min/1.73m2    GFR est non-AA 27 (L) >60 ml/min/1.73m2    Calcium 8.9 8.5 - 10.1 mg/dL    Bilirubin, total 0.3 0.2 - 1.0 mg/dL    AST (SGOT) 16 15 - 37 U/L    ALT (SGPT) 21 12 - 78 U/L    Alk. phosphatase 136 (H) 45 - 117 U/L    Protein, total 7.4 6.4 - 8.2 g/dL    Albumin 2.9 (L) 3.5 - 5.0 g/dL    Globulin 4.5 (H) 2.0 - 4.0 g/dL    A-G Ratio 0.6 (L) 1.1 - 2.2     TROPONIN I    Collection Time: 03/31/21  2:10 PM   Result Value Ref Range    Troponin-I, Qt. <0.05 <0.05 ng/mL   BNP    Collection Time: 03/31/21  2:10 PM   Result Value Ref Range    NT pro-BNP 8,843 (H) <125 pg/mL   SARS-COV-2    Collection Time: 03/31/21  2:15 PM   Result Value Ref Range    SARS-CoV-2 Nasopharyngeal     COVID-19 RAPID TEST    Collection Time: 03/31/21  2:15 PM   Result Value Ref Range    Specimen source Nasopharyngeal      COVID-19 rapid test Not Detected Not Detected         Radiologic Studies -   XR CHEST SNGL V    (Results Pending)   CT HEAD WO CONT    (Results Pending)     CT Results  (Last 48 hours)    None        CXR Results  (Last 48 hours)    None          Medical Decision Making and ED Course   I am the first provider for this patient. I reviewed the vital signs, available nursing notes, past medical history, past surgical history, family history and social history. Vital Signs-Reviewed the patient's vital signs.   Patient Vitals for the past 12 hrs:   Temp Pulse Resp BP SpO2   03/31/21 1503     100 %   03/31/21 1347 98.4 °F (36.9 °C) (!) 114 20 (!) 201/109 100 %       EKG interpretation:   G at 1417. Sinus tachycardia rate of 108. Prolonged QTC.  LVH. No ST changes. Reason rule out dysrhythmia. Interpreted by ER physician. Records Reviewed: Previous Hospital chart. EMS run report      ED Course:   Initial assessment performed. The patients presenting problems have been discussed, and they are in agreement with the care plan formulated and outlined with them. I have encouraged them to ask questions as they arise throughout their visit. Orders Placed This Encounter    COVID-19 RAPID TEST     Standing Status:   Standing     Number of Occurrences:   1    XR CHEST SNGL V     Standing Status:   Standing     Number of Occurrences:   1     Order Specific Question:   Transport     Answer:   BED [2]     Order Specific Question:   Reason for Exam     Answer:   sob    CT HEAD WO CONT     Standing Status:   Standing     Number of Occurrences:   1     Order Specific Question:   Transport     Answer:   Stretcher [5]     Order Specific Question:   Reason for Exam     Answer:   headache     Order Specific Question:   Decision Support Exception     Answer:   Emergency Medical Condition (MA) [1]    CBC WITH AUTOMATED DIFF     Standing Status:   Standing     Number of Occurrences:   1    METABOLIC PANEL, COMPREHENSIVE     Standing Status:   Standing     Number of Occurrences:   1    TROPONIN I     Standing Status:   Standing     Number of Occurrences:   1    BNP     Standing Status:   Standing     Number of Occurrences:   1    URINALYSIS W/ REFLEX CULTURE     Standing Status:   Standing     Number of Occurrences:   1    SARS-COV-2     rapid     Standing Status:   Standing     Number of Occurrences:   1     Order Specific Question:   Specimen source     Answer:   NASOPHARYNGEAL SWAB [650]     Order Specific Question:   Is this test for diagnosis or screening?      Answer:   Diagnosis of ill patient     Order Specific Question: Symptomatic for COVID-19 as defined by CDC? Answer:   Yes     Order Specific Question:   Date of Symptom Onset     Answer:   3/31/2021     Order Specific Question:   Hospitalized for COVID-19? Answer:   No     Order Specific Question:   Admitted to ICU for COVID-19? Answer:   No     Order Specific Question:   Employed in healthcare setting? Answer:   No     Order Specific Question:   Resident in a congregate (group) care setting? Answer:   No     Order Specific Question:   Pregnant? Answer:   No     Order Specific Question:   Previously tested for COVID-19? Answer: Yes    NOTIFY PROVIDER: SPECIFY Notify provider on pt's arrival to floor ONE TIME STAT     Standing Status:   Standing     Number of Occurrences:   1     Order Specific Question:   Please describe the test or procedure you would like to order. Answer:   Notify provider on pt's arrival to floor    EKG 12 LEAD INITIAL     Standing Status:   Standing     Number of Occurrences:   1     Order Specific Question:   Reason for Exam:     Answer:   dizzy    cloNIDine HCL (CATAPRES) tablet 0.1 mg    butalbital-acetaminophen-caffeine (FIORICET, ESGIC) -40 mg per tablet 2 Tab    ondansetron (ZOFRAN) injection 4 mg    methylPREDNISolone (PF) (Solu-MEDROL) injection 125 mg    albuterol (PROVENTIL HFA, VENTOLIN HFA, PROAIR HFA) inhaler 2 Puff     Order Specific Question:   MODE OF DELIVERY     Answer:   Spacer    furosemide (LASIX) injection 40 mg    potassium chloride 10 mEq in 100 ml IVPB    potassium chloride 10 mEq in 100 ml IVPB    potassium chloride 10 mEq in 100 ml IVPB    potassium chloride 10 mEq in 100 ml IVPB    potassium chloride SR (KLOR-CON 10) tablet 60 mEq    IP CONSULT TO HOSPITALIST     Standing Status:   Standing     Number of Occurrences:   1     Order Specific Question:   Reason for Consult: Answer:   TO ADMIT     Order Specific Question:   Did you call or speak to the consulting provider? Answer: Yes    IP CONSULT TO PULMONOLOGY     Standing Status:   Standing     Number of Occurrences:   1     Order Specific Question:   Reason for Consult: Answer:   copd     Order Specific Question:   Did you call or speak to the consulting provider? Answer:   No     Order Specific Question:   Consult To     Answer:   danielito mesa     Order Specific Question:   Schedule When? Answer:   TODAY    IP CONSULT TO CASE MANAGEMENT     Standing Status:   Standing     Number of Occurrences:   1     Order Specific Question:   Reason for Consult: Answer:   hospice              CONSULTANTS:  Consults  verito admit  Provider Notes (Medical Decision Making):   63-year-old CHF COPD being referred to hospice with active nausea vomiting cannot tolerate any liquids. Patient has significantly low potassium. Rechecking. Plan to replete. Will need ICU admission. Also active treatment of her shortness of breath. Plan to rule out Covid versus pneumonia. Aggressive treatment of her elevated blood pressure and diuresis although also monitoring her potassium levels. Discussed the case with the hospitalist.      Procedures         CRITICAL CARE NOTE :  3:11 PM  Amount of Critical Care Time: 45 minutes    IMPENDING DETERIORATION -Metabolic  ASSOCIATED RISK FACTORS - Metabolic changes  MANAGEMENT- Bedside Assessment and Supervision of Care  INTERPRETATION -  Cardiac Output Measures   INTERVENTIONS - hemodynamic mngmt  CASE REVIEW - Hospitalist/Intensivist  TREATMENT RESPONSE -Improved  PERFORMED BY - Self    NOTES   :  I have spent critical care time involved in lab review, consultations with specialist, family decision- making, bedside attention and documentation. This time excludes time spent in any separate billed procedures. During this entire length of time I was immediately available to the patient .     Isamar Sapp MD                  Disposition       Emergency Department Disposition: admit    Diagnosis     Clinical Impression:   1. COPD exacerbation ([address]    2. Acute on chronic congestive heart failure, unspecified heart failure type ([address]    3. Hypokalemia        Attestations:    Britany Means MD    Please note that this dictation was completed with Fastnote, the computer voice recognition software. Quite often unanticipated grammatical, syntax, homophones, and other interpretive errors are inadvertently transcribed by the computer software. Please disregard these errors. Please excuse any errors that have escaped final proofreading. Thank you.

## 2021-03-31 NOTE — ED TRIAGE NOTES
End stage COPD called for sob that has been worsening for several days 100% on 4l at home, increased to 8l per ems with no relief still 100% pcp has advised patient to seek hospice. Patient states since last wed shes been unable to keep anything down.  So she hasnt been taking her medications

## 2021-03-31 NOTE — PROGRESS NOTES
3/31/21. Pt informed of SON notification , verbalized understanding, & signed. Copy to pt, copy placed in chart, & original to HIM for scanning into EMR.

## 2021-03-31 NOTE — CONSULTS
Pulmonary/ CC Consult    Subjective:   Date of Consultation:  March 31, 2021  Referring Physician: ER physician Christal Beckman MD    Patient is a 54 y.o. female who is known to have history of COPD, hypoxia, severe peripheral vascular disease and on going smoking. She is admitted in hospital for increasing shortness of breath with minimal response to bronchodilators. As her symptoms got worse she became more dyspneic. She is known to us from previous admission. She denied any fever, chills or any chest pains. EMS noted her O2 sTS WERE 77%. Her Covid 19 test is negative.     Patient Active Problem List   Diagnosis Code    Acute respiratory failure ([facility] J96.00    Resistant hypertension I10    Renal artery stenosis (HCC) I70.1    CHF (congestive heart failure) ([facility]) I50.9    Mitral valve regurgitation I34.0    PEDRO PABLO (acute kidney injury) ([facility] N17.9    Anxiety F41.9    Seiz[facility] disorder ([facility] G40.909    COPD (chronic obstructive pulmonary disease) with emphysema (HCC) J43.9    Dysphagia R13.10    Iron deficiency anemia D50.9    Hypokalemia E87.6    Acquired hypothyroidism E03.9    CHF exacerbation ([facility]) I50.9    Respiratory failure with hypoxia ([facility] J96.91    Syncope R55    Hyponatremia E87.1    Bilateral carotid artery stenosis I65.23     Past Medical History:   Diagnosis Date    Anxiety 10/2/2020    Carotid stenosis     Chronic anemia     Chronic respiratory failure (HCC)     COPD (chronic obstructive pulmonary disease) with emphysema (HCC) 10/2/2020    Depression     Diastolic CHF (HCC)     Dysphagia 10/2/2020    GERD (gastroesophageal reflux disease)     High cholesterol     Hypertension     Hypothyroid     Iron deficiency anemia 10/2/2020    Mitral valve regurgitation 10/2/2020    Renal artery stenosis (HCC)     Seizure disorder (HCC)       Family History   Problem Relation Age of Onset    Hypertension Mother     Stroke Mother     Melanoma Mother     Stroke Father    Lia Cushing Melanoma Brother     Melanoma Child       Social History     Tobacco Use    Smoking status: Former Smoker     Types: Cigarettes    Smokeless tobacco: Never Used    Tobacco comment: quit july 2020   Substance Use Topics    Alcohol use: Not Currently     Past Surgical History:   Procedure Laterality Date    HX CAROTID ENDARTERECTOMY      HX CHOLECYSTECTOMY      HX RENAL ARTERY STENT      HX ROTATOR CUFF REPAIR Right     HX TUBAL LIGATION      IR THORACENTESIS CATH W IMAGE  11/24/2020    IR THORACENTESIS CATH W IMAGE  11/25/2020      Prior to Admission medications    Medication Sig Start Date End Date Taking? Authorizing Provider   NIFEdipine ER (ADALAT CC) 90 mg ER tablet Take 90 mg by mouth daily. Provider, Historical   fluticasone propionate (Flovent HFA) 110 mcg/actuation inhaler Take 1 Puff by inhalation every twelve (12) hours. Provider, Historical   ergocalciferol (Vitamin D2) 1,250 mcg (50,000 unit) capsule Take 50,000 Units by mouth every seven (7) days. q7days wednesday    Provider, Historical   acetaminophen (TYLENOL) 325 mg tablet Take 2 Tabs by mouth every six (6) hours as needed for Pain or Fever for up to 30 days. 3/16/21 4/15/21  Benjamin Carrillo NP   magnesium oxide (MAG-OX) 400 mg tablet Take 1 Tab by mouth daily for 30 days. 3/17/21 4/16/21  Benjamin Carrillo NP   hydrALAZINE (APRESOLINE) 50 mg tablet Take 1 Tab by mouth three (3) times daily. Patient taking differently: Take 100 mg by mouth three (3) times daily. 1/23/21   Corey Vallejo MD   isosorbide dinitrate (ISORDIL) 10 mg tablet Take 1 Tab by mouth two (2) times a day. 1/23/21   Corey Vallejo MD   labetaloL (NORMODYNE) 200 mg tablet Take 1 Tab by mouth three (3) times daily. Patient taking differently: Take 600 mg by mouth three (3) times daily. 1/23/21   Corey Vallejo MD   levothyroxine (SYNTHROID) 50 mcg tablet Take 1 Tab by mouth Daily (before breakfast).  1/24/21   Corey Vallejo MD   spironolactone (ALDACTONE) 25 mg tablet Take 1 Tab by mouth daily. Patient taking differently: Take 50 mg by mouth daily. 1/24/21   France Ceballos MD   esomeprazole (NexIUM) 40 mg capsule Take 40 mg by mouth. Indications: Berry's esophagus    Provider, Historical   clopidogreL (PLAVIX) 75 mg tab Take 75 mg by mouth daily. 12/11/20   Provider, Historical   nortriptyline (PAMELOR) 50 mg capsule Take 50 mg by mouth nightly. Other, MD Heladio Timmons 100-62.5-25 mcg inhaler 1 Puff daily. 11/12/20   Provider, Historical   albuterol (PROVENTIL HFA, VENTOLIN HFA, PROAIR HFA) 90 mcg/actuation inhaler  11/5/20   Provider, Historical   rosuvastatin (CRESTOR) 10 mg tablet  11/6/20   Provider, Historical   Oxygen 5 Devices as needed. Pt wears 5LPM as needed- states she uses it after an axiety attack    Provider, Historical   albuterol-ipratropium (DUO-NEB) 2.5 mg-0.5 mg/3 ml nebu 3 mL by Nebulization route every six (6) hours. 10/2/20   Aleah Maloney MD   ALPRAZolam Clorinda Sheylazijuli) 0.25 mg tablet Take 0.25 mg by mouth daily as needed for Anxiety. Provider, Historical   sertraline (Zoloft) 25 mg tablet Take 50 mg by mouth daily. Provider, Historical   levETIRAcetam (Keppra) 500 mg tablet Take 500 mg by mouth two (2) times a day. Provider, Historical   carBAMazepine, mood stabiliz, 200 mg CM12 Take 200 mg by mouth two (2) times a day. Provider, Historical   ferrous sulfate 325 mg (65 mg iron) tablet Take 325 mg by mouth Daily (before breakfast). Provider, Historical   folic acid (FOLVITE) 1 mg tablet Take 1 mg by mouth daily. Provider, Historical   aspirin delayed-release 81 mg tablet Take  by mouth daily. Provider, Historical   magnesium oxide (MAG-OX) 400 mg tablet Take 400 mg by mouth daily.     Provider, Historical     Allergies   Allergen Reactions    Sulfa (Sulfonamide Antibiotics) Anaphylaxis    Sulfamethoxazole-Trimethoprim Unknown (comments)        Review of Systems:  All 10 systems were reviewed and pertinent finding are mentioned above. Objective:   Blood pressure (!) 185/106, pulse (!) 103, temperature 98.4 °F (36.9 °C), resp. rate 20, height 5' 4\" (1.626 m), weight 44.9 kg (99 lb), SpO2 100 %. Temp (24hrs), Av.4 °F (36.9 °C), Min:98.4 °F (36.9 °C), Max:98.4 °F (36.9 °C)    CT HEAD WO CONT   Final Result   No acute intracranial process. XR CHEST SNGL V   Final Result   No plain film evidence for CHF or pneumonia. Data Review:   Current Facility-Administered Medications   Medication Dose Route Frequency    albuterol (PROVENTIL HFA, VENTOLIN HFA, PROAIR HFA) inhaler 2 Puff  2 Puff Inhalation Q4H RT    0.9% sodium chloride with KCl 40 mEq/L infusion   IntraVENous CONTINUOUS    potassium chloride (KLOR-CON) packet for solution 40 mEq  40 mEq Oral Q1H    sodium chloride (NS) flush 5-40 mL  5-40 mL IntraVENous Q8H    sodium chloride (NS) flush 5-40 mL  5-40 mL IntraVENous PRN    acetaminophen (TYLENOL) tablet 650 mg  650 mg Oral Q6H PRN    Or    acetaminophen (TYLENOL) suppository 650 mg  650 mg Rectal Q6H PRN    polyethylene glycol (MIRALAX) packet 17 g  17 g Oral DAILY PRN    promethazine (PHENERGAN) tablet 12.5 mg  12.5 mg Oral Q6H PRN    Or    ondansetron (ZOFRAN) injection 4 mg  4 mg IntraVENous Q6H PRN    [START ON 2021] enoxaparin (LOVENOX) injection 30 mg  30 mg SubCUTAneous DAILY     Current Outpatient Medications   Medication Sig    NIFEdipine ER (ADALAT CC) 90 mg ER tablet Take 90 mg by mouth daily.  fluticasone propionate (Flovent HFA) 110 mcg/actuation inhaler Take 1 Puff by inhalation every twelve (12) hours.  ergocalciferol (Vitamin D2) 1,250 mcg (50,000 unit) capsule Take 50,000 Units by mouth every seven (7) days. q    acetaminophen (TYLENOL) 325 mg tablet Take 2 Tabs by mouth every six (6) hours as needed for Pain or Fever for up to 30 days.  magnesium oxide (MAG-OX) 400 mg tablet Take 1 Tab by mouth daily for 30 days.     hydrALAZINE (APRESOLINE) 50 mg tablet Take 1 Tab by mouth three (3) times daily. (Patient taking differently: Take 100 mg by mouth three (3) times daily.)    isosorbide dinitrate (ISORDIL) 10 mg tablet Take 1 Tab by mouth two (2) times a day.  labetaloL (NORMODYNE) 200 mg tablet Take 1 Tab by mouth three (3) times daily. (Patient taking differently: Take 600 mg by mouth three (3) times daily.)    levothyroxine (SYNTHROID) 50 mcg tablet Take 1 Tab by mouth Daily (before breakfast).  spironolactone (ALDACTONE) 25 mg tablet Take 1 Tab by mouth daily. (Patient taking differently: Take 50 mg by mouth daily.)    esomeprazole (NexIUM) 40 mg capsule Take 40 mg by mouth. Indications: Berry's esophagus    clopidogreL (PLAVIX) 75 mg tab Take 75 mg by mouth daily.  nortriptyline (PAMELOR) 50 mg capsule Take 50 mg by mouth nightly.  Trelegy Ellipta 100-62.5-25 mcg inhaler 1 Puff daily.  albuterol (PROVENTIL HFA, VENTOLIN HFA, PROAIR HFA) 90 mcg/actuation inhaler     rosuvastatin (CRESTOR) 10 mg tablet     Oxygen 5 Devices as needed. Pt wears 5LPM as needed- states she uses it after an axiety attack    albuterol-ipratropium (DUO-NEB) 2.5 mg-0.5 mg/3 ml nebu 3 mL by Nebulization route every six (6) hours.  ALPRAZolam (XANAX) 0.25 mg tablet Take 0.25 mg by mouth daily as needed for Anxiety.  sertraline (Zoloft) 25 mg tablet Take 50 mg by mouth daily.  levETIRAcetam (Keppra) 500 mg tablet Take 500 mg by mouth two (2) times a day.  carBAMazepine, mood stabiliz, 200 mg CM12 Take 200 mg by mouth two (2) times a day.  ferrous sulfate 325 mg (65 mg iron) tablet Take 325 mg by mouth Daily (before breakfast).  folic acid (FOLVITE) 1 mg tablet Take 1 mg by mouth daily.  aspirin delayed-release 81 mg tablet Take  by mouth daily.  magnesium oxide (MAG-OX) 400 mg tablet Take 400 mg by mouth daily. Exam:   Middle aged female on nasaal canula O2.   JVD is absent. No cervical lymphadenopathy.   Chest: bilateral expiratoy wheezing. No chest wall tenderness. Heart: S1, S2 normal  Abdomen: soft, non tender, no visceromegaly  Neuro: No focal motor deficit. Ext: no edema, cynosis or clubbing. Recent Results (from the past 24 hour(s))   CBC WITH AUTOMATED DIFF    Collection Time: 03/31/21  2:10 PM   Result Value Ref Range    WBC 9.0 3.6 - 11.0 K/uL    RBC 4.11 3.80 - 5.20 M/uL    HGB 13.6 11.5 - 16.0 g/dL    HCT 37.6 35.0 - 47.0 %    MCV 91.5 80.0 - 99.0 FL    MCH 33.1 26.0 - 34.0 PG    MCHC 36.2 30.0 - 36.5 g/dL    RDW 13.2 11.5 - 14.5 %    PLATELET 009 (H) 533 - 400 K/uL    MPV 9.5 8.9 - 12.9 FL    NRBC 0.0 0  WBC    ABSOLUTE NRBC 0.00 0.00 - 0.01 K/uL    NEUTROPHILS 73 32 - 75 %    LYMPHOCYTES 16 12 - 49 %    MONOCYTES 10 5 - 13 %    EOSINOPHILS 1 0 - 7 %    BASOPHILS 0 0 - 1 %    IMMATURE GRANULOCYTES 0 0.0 - 0.5 %    ABS. NEUTROPHILS 6.6 1.8 - 8.0 K/UL    ABS. LYMPHOCYTES 1.5 0.8 - 3.5 K/UL    ABS. MONOCYTES 0.9 0.0 - 1.0 K/UL    ABS. EOSINOPHILS 0.1 0.0 - 0.4 K/UL    ABS. BASOPHILS 0.0 0.0 - 0.1 K/UL    ABS. IMM. GRANS. 0.0 0.00 - 0.04 K/UL    DF AUTOMATED     METABOLIC PANEL, COMPREHENSIVE    Collection Time: 03/31/21  2:10 PM   Result Value Ref Range    Sodium 126 (L) 136 - 145 mmol/L    Potassium 1.9 (LL) 3.5 - 5.1 mmol/L    Chloride 89 (L) 97 - 108 mmol/L    CO2 25 21 - 32 mmol/L    Anion gap 12 5 - 15 mmol/L    Glucose 90 65 - 100 mg/dL    BUN 21 (H) 6 - 20 mg/dL    Creatinine 1.91 (H) 0.55 - 1.02 mg/dL    BUN/Creatinine ratio 11 (L) 12 - 20      GFR est AA 33 (L) >60 ml/min/1.73m2    GFR est non-AA 27 (L) >60 ml/min/1.73m2    Calcium 8.9 8.5 - 10.1 mg/dL    Bilirubin, total 0.3 0.2 - 1.0 mg/dL    AST (SGOT) 16 15 - 37 U/L    ALT (SGPT) 21 12 - 78 U/L    Alk.  phosphatase 136 (H) 45 - 117 U/L    Protein, total 7.4 6.4 - 8.2 g/dL    Albumin 2.9 (L) 3.5 - 5.0 g/dL    Globulin 4.5 (H) 2.0 - 4.0 g/dL    A-G Ratio 0.6 (L) 1.1 - 2.2     TROPONIN I    Collection Time: 03/31/21  2:10 PM   Result Value Ref Range    Troponin-I, Qt. <0.05 <0.05 ng/mL   BNP    Collection Time: 03/31/21  2:10 PM   Result Value Ref Range    NT pro-BNP 8,843 (H) <125 pg/mL   SARS-COV-2    Collection Time: 03/31/21  2:15 PM   Result Value Ref Range    SARS-CoV-2 Nasopharyngeal     COVID-19 RAPID TEST    Collection Time: 03/31/21  2:15 PM   Result Value Ref Range    Specimen source Nasopharyngeal      COVID-19 rapid test Not Detected Not Detected           Impression: This is a middle aged female admitted for increasing shortness of breath and wheezing. CXR is unremarkable for any infiltrates. Plan:   1. Acute hypoxia:  From AECOPD. Patient is started on nebulized albuterol and atrovent. Continue supplemental O2 at 4L min. 2. AECOPD:  From ongoing smolking. Started on nebulized albuterol, atrovent and systemoc steroids, abx.   3. PVD:  S/P stent placement in left femoral artery. stent. Pt will be on DVT prophylaxis during her hospital stay.       Parisa Pereira MD  [facility]

## 2021-04-01 VITALS
HEART RATE: 77 BPM | WEIGHT: 99 LBS | BODY MASS INDEX: 16.9 KG/M2 | SYSTOLIC BLOOD PRESSURE: 139 MMHG | OXYGEN SATURATION: 100 % | TEMPERATURE: 97.7 F | DIASTOLIC BLOOD PRESSURE: 74 MMHG | HEIGHT: 64 IN | RESPIRATION RATE: 18 BRPM

## 2021-04-01 LAB
ANION GAP SERPL CALC-SCNC: 10 MMOL/L (ref 5–15)
ATRIAL RATE: 108 BPM
BUN SERPL-MCNC: 36 MG/DL (ref 6–20)
BUN/CREAT SERPL: 15 (ref 12–20)
CA-I BLD-MCNC: 8.2 MG/DL (ref 8.5–10.1)
CALCULATED P AXIS, ECG09: 78 DEGREES
CALCULATED R AXIS, ECG10: 71 DEGREES
CALCULATED T AXIS, ECG11: -105 DEGREES
CHLORIDE SERPL-SCNC: 99 MMOL/L (ref 97–108)
CO2 SERPL-SCNC: 22 MMOL/L (ref 21–32)
CREAT SERPL-MCNC: 2.43 MG/DL (ref 0.55–1.02)
DIAGNOSIS, 93000: NORMAL
GLUCOSE SERPL-MCNC: 99 MG/DL (ref 65–100)
P-R INTERVAL, ECG05: 152 MS
POTASSIUM SERPL-SCNC: 4.1 MMOL/L (ref 3.5–5.1)
Q-T INTERVAL, ECG07: 392 MS
QRS DURATION, ECG06: 84 MS
QTC CALCULATION (BEZET), ECG08: 525 MS
SODIUM SERPL-SCNC: 131 MMOL/L (ref 136–145)
VENTRICULAR RATE, ECG03: 108 BPM

## 2021-04-01 PROCEDURE — 77010033678 HC OXYGEN DAILY

## 2021-04-01 PROCEDURE — 94640 AIRWAY INHALATION TREATMENT: CPT

## 2021-04-01 PROCEDURE — 74011000250 HC RX REV CODE- 250: Performed by: INTERNAL MEDICINE

## 2021-04-01 PROCEDURE — 94760 N-INVAS EAR/PLS OXIMETRY 1: CPT

## 2021-04-01 PROCEDURE — 96361 HYDRATE IV INFUSION ADD-ON: CPT

## 2021-04-01 PROCEDURE — 99218 HC RM OBSERVATION: CPT

## 2021-04-01 PROCEDURE — 80048 BASIC METABOLIC PNL TOTAL CA: CPT

## 2021-04-01 PROCEDURE — 96372 THER/PROPH/DIAG INJ SC/IM: CPT

## 2021-04-01 PROCEDURE — 74011250637 HC RX REV CODE- 250/637: Performed by: INTERNAL MEDICINE

## 2021-04-01 PROCEDURE — 36415 COLL VENOUS BLD VENIPUNCTURE: CPT

## 2021-04-01 PROCEDURE — 74011250636 HC RX REV CODE- 250/636: Performed by: INTERNAL MEDICINE

## 2021-04-01 RX ORDER — PREDNISONE 20 MG/1
20 TABLET ORAL DAILY
Qty: 5 TAB | Refills: 0 | Status: SHIPPED | OUTPATIENT
Start: 2021-04-01 | End: 2021-04-14

## 2021-04-01 RX ORDER — POTASSIUM CHLORIDE 1.5 G/1.77G
20 POWDER, FOR SOLUTION ORAL 2 TIMES DAILY WITH MEALS
Qty: 60 PACKET | Refills: 0 | Status: ON HOLD
Start: 2021-04-01 | End: 2022-03-22 | Stop reason: SDUPTHER

## 2021-04-01 RX ADMIN — LEVOTHYROXINE SODIUM 50 MCG: 0.03 TABLET ORAL at 08:14

## 2021-04-01 RX ADMIN — LEVETIRACETAM 500 MG: 500 TABLET ORAL at 08:14

## 2021-04-01 RX ADMIN — IPRATROPIUM BROMIDE AND ALBUTEROL SULFATE 3 ML: .5; 2.5 SOLUTION RESPIRATORY (INHALATION) at 01:27

## 2021-04-01 RX ADMIN — ISOSORBIDE DINITRATE 10 MG: 10 TABLET ORAL at 08:14

## 2021-04-01 RX ADMIN — POTASSIUM CHLORIDE AND SODIUM CHLORIDE: 900; 300 INJECTION, SOLUTION INTRAVENOUS at 01:02

## 2021-04-01 RX ADMIN — POTASSIUM CHLORIDE AND SODIUM CHLORIDE: 900; 300 INJECTION, SOLUTION INTRAVENOUS at 12:25

## 2021-04-01 RX ADMIN — ENOXAPARIN SODIUM 30 MG: 30 INJECTION SUBCUTANEOUS at 08:16

## 2021-04-01 RX ADMIN — PANTOPRAZOLE SODIUM 40 MG: 20 TABLET, DELAYED RELEASE ORAL at 08:14

## 2021-04-01 RX ADMIN — BUTALBITAL, ACETAMINOPHEN, AND CAFFEINE 1 TABLET: 50; 325; 40 TABLET ORAL at 12:24

## 2021-04-01 RX ADMIN — HYDRALAZINE HYDROCHLORIDE 100 MG: 50 TABLET, FILM COATED ORAL at 01:04

## 2021-04-01 RX ADMIN — Medication 10 ML: at 05:16

## 2021-04-01 RX ADMIN — LABETALOL HYDROCHLORIDE 600 MG: 200 TABLET, FILM COATED ORAL at 08:14

## 2021-04-01 RX ADMIN — LABETALOL HYDROCHLORIDE 600 MG: 200 TABLET, FILM COATED ORAL at 01:03

## 2021-04-01 RX ADMIN — CLOPIDOGREL BISULFATE 75 MG: 75 TABLET ORAL at 08:14

## 2021-04-01 RX ADMIN — LABETALOL HYDROCHLORIDE 600 MG: 200 TABLET, FILM COATED ORAL at 15:42

## 2021-04-01 RX ADMIN — HYDRALAZINE HYDROCHLORIDE 100 MG: 50 TABLET, FILM COATED ORAL at 15:42

## 2021-04-01 RX ADMIN — IPRATROPIUM BROMIDE AND ALBUTEROL SULFATE 3 ML: .5; 2.5 SOLUTION RESPIRATORY (INHALATION) at 07:26

## 2021-04-01 RX ADMIN — ISOSORBIDE DINITRATE 10 MG: 10 TABLET ORAL at 01:04

## 2021-04-01 RX ADMIN — IPRATROPIUM BROMIDE AND ALBUTEROL SULFATE 3 ML: .5; 2.5 SOLUTION RESPIRATORY (INHALATION) at 13:31

## 2021-04-01 RX ADMIN — FOLIC ACID 1 MG: 1 TABLET ORAL at 08:14

## 2021-04-01 RX ADMIN — ALPRAZOLAM 0.25 MG: 0.25 TABLET ORAL at 15:42

## 2021-04-01 RX ADMIN — Medication 10 ML: at 15:58

## 2021-04-01 RX ADMIN — ALPRAZOLAM 0.25 MG: 0.25 TABLET ORAL at 01:04

## 2021-04-01 RX ADMIN — LEVETIRACETAM 500 MG: 500 TABLET ORAL at 01:03

## 2021-04-01 RX ADMIN — ALPRAZOLAM 0.25 MG: 0.25 TABLET ORAL at 12:24

## 2021-04-01 RX ADMIN — ASPIRIN 81 MG: 81 TABLET, COATED ORAL at 08:15

## 2021-04-01 RX ADMIN — ALPRAZOLAM 0.25 MG: 0.25 TABLET ORAL at 08:14

## 2021-04-01 RX ADMIN — HYDRALAZINE HYDROCHLORIDE 100 MG: 50 TABLET, FILM COATED ORAL at 08:15

## 2021-04-01 RX ADMIN — CARBAMAZEPINE 200 MG: 200 TABLET ORAL at 08:14

## 2021-04-01 RX ADMIN — Medication 10 ML: at 01:04

## 2021-04-01 RX ADMIN — CARBAMAZEPINE 200 MG: 200 TABLET ORAL at 01:03

## 2021-04-01 NOTE — DISCHARGE SUMMARY
Physician Discharge Summary     Patient ID:    Albania Arroyo  [id number]  78 y.o.  1965    Admit date: 3/31/2021    Discharge date : 4/1/2021    Chronic Diagnoses:    Problem List as of 4/1/2021 Date Reviewed: 3/29/2021          Codes Class Noted - Resolved    Bilateral carotid artery stenosis ICD-10-CM: I65.23  ICD-9-CM: 433.10, 433.30  3/29/2021 - Present        Hyponatremia ICD-10-CM: E87.1  ICD-9-CM: 276.1  1/17/2021 - Present        Syncope ICD-10-CM: R55  ICD-9-CM: 780.2  1/3/2021 - Present        Respiratory failure with hypoxia ([facility] ICD-10-CM: J96.91  ICD-9-CM: 518.81  11/18/2020 - Present        CHF exacerbation ([facility] ICD-10-CM: I50.9  ICD-9-CM: 428.0  11/2/2020 - Present        CHF (congestive heart failure) ([facility] ICD-10-CM: I50.9  ICD-9-CM: 428.0  10/2/2020 - Present    Overview Signed 10/2/2020  8:40 AM by Pastor Naman MD     With preserved lvef, diastolic dysfunction, mitral valve regurgitation moderate.               Mitral valve regurgitation ICD-10-CM: I34.0  ICD-9-CM: 424.0  10/2/2020 - Present        PEDRO PABLO (acute kidney injury) ([facility] ICD-10-CM: N17.9  ICD-9-CM: 584.9  10/2/2020 - Present        Anxiety (Chronic) ICD-10-CM: F41.9  ICD-9-CM: 300.00  10/2/2020 - Present        Seizure disorder (HCC) (Chronic) ICD-10-CM: G40.909  ICD-9-CM: 345.90  10/2/2020 - Present        COPD (chronic obstructive pulmonary disease) with emphysema (HCC) (Chronic) ICD-10-CM: J43.9  ICD-9-CM: 492.8  10/2/2020 - Present        Dysphagia ICD-10-CM: R13.10  ICD-9-CM: 787.20  10/2/2020 - Present        Iron deficiency anemia (Chronic) ICD-10-CM: D50.9  ICD-9-CM: 280.9  10/2/2020 - Present        Hypokalemia ICD-10-CM: E87.6  ICD-9-CM: 276.8  10/2/2020 - Present        Acquired hypothyroidism (Chronic) ICD-10-CM: E03.9  ICD-9-CM: 244.9  10/2/2020 - Present        Acute respiratory failure (HCC) ICD-10-CM: J96.00  ICD-9-CM: 518.81  9/21/2020 - Present        Resistant hypertension ICD-10-CM: I10  ICD-9-CM: 401.9  9/21/2020 - Present        Renal artery stenosis (HCC) ICD-10-CM: I70.1  ICD-9-CM: 440.1  9/21/2020 - Present          22    Final Diagnoses:   Hypokalemia [E87.6]  Severe hypokalemia, symptomatic     Accelerated hypertension     Dehydration due to to nausea and vomiting     Likely acute kidney injury on chronic kidney disease due to hypovolemia     Hyponatremia due to hypovolemia     Advanced COPD without clear exacerbation     Unilateral functioning right kidney with chronic kidney disease     History of carotid and renal artery stenosis     Chronic diastolic heart failure     Seizure disorder     GERD     Hypothyroidism     Chronic anemia     Hyperlipidemia     Mitral insufficiency    Reason for Hospitalization:   54 y.o. female with a multitude of medical problems including COPD, PAD, cerebrovascular disease, diastolic heart failure, renal artery stenosis, hypertension and seizure disorder who presents to the ED today with worsening shortness of breath. Complains of feeling very weak and has had elevated blood pressure. She denies fever. Patient admits to nausea and vomiting recently. Also complaining of a migraine headache which has been ongoing for the past year. She has had a lot of muscle cramps especially in her feet recently.     Patient states her PCP increased her potassium up to 3 times daily recently. However, she saw her nephrologist last week Thursday and her potassium supplement was discontinued     Labs in the ED showed a potassium of only 1.9.       Rapid COVID-19 test is negative. Chest x-ray shows no CHF or infiltrate. Blood pressure was 201/109 on presentation     She appears to have chronic kidney disease with a baseline creatinine of around 2 although it was 1.0 in January.   This is unchanged from 2 weeks ago.     Patient also states her PCP Kirill Sylvester has made a recent referral for hospice evaluation, although they have yet to evaluate her.     Patient was just admitted 2 weeks ago overnight for syncope, discharged home the next day. Hospital Course:   Patient was admitted overnight as observation. She received intravenous and oral potassium supplementation and the next day potassium was up to 4.1    Her pulmonary status improved    Patient was felt stable for discharge home the next day. Case management is initiating a hospice referral which sounds very appropriate for this patient with advanced COPD and numerous comorbidities                  Discharge Medications:   Current Discharge Medication List      START taking these medications    Details   potassium chloride (KLOR-CON) 20 mEq pack Take 1 Packet by mouth two (2) times daily (with meals). Qty: 60 Packet, Refills: 0         CONTINUE these medications which have NOT CHANGED    Details   NIFEdipine ER (ADALAT CC) 90 mg ER tablet Take 90 mg by mouth daily. fluticasone propionate (Flovent HFA) 110 mcg/actuation inhaler Take 1 Puff by inhalation every twelve (12) hours. ergocalciferol (Vitamin D2) 1,250 mcg (50,000 unit) capsule Take 50,000 Units by mouth every seven (7) days. q7days wednesday      acetaminophen (TYLENOL) 325 mg tablet Take 2 Tabs by mouth every six (6) hours as needed for Pain or Fever for up to 30 days. Qty: 180 Tab, Refills: 1      !! magnesium oxide (MAG-OX) 400 mg tablet Take 1 Tab by mouth daily for 30 days. Qty: 30 Tab, Refills: 0      hydrALAZINE (APRESOLINE) 50 mg tablet Take 1 Tab by mouth three (3) times daily. Qty: 90 Tab, Refills: 0      isosorbide dinitrate (ISORDIL) 10 mg tablet Take 1 Tab by mouth two (2) times a day. Qty: 90 Tab, Refills: 1      labetaloL (NORMODYNE) 200 mg tablet Take 1 Tab by mouth three (3) times daily. Qty: 90 Tab, Refills: 1      levothyroxine (SYNTHROID) 50 mcg tablet Take 1 Tab by mouth Daily (before breakfast). Qty: 30 Tab, Refills: 0      spironolactone (ALDACTONE) 25 mg tablet Take 1 Tab by mouth daily.   Qty: 30 Tab, Refills: 0 esomeprazole (NexIUM) 40 mg capsule Take 40 mg by mouth. Indications: Berry's esophagus      clopidogreL (PLAVIX) 75 mg tab Take 75 mg by mouth daily. nortriptyline (PAMELOR) 50 mg capsule Take 50 mg by mouth nightly. Trelegy Ellipta 100-62.5-25 mcg inhaler 1 Puff daily. albuterol (PROVENTIL HFA, VENTOLIN HFA, PROAIR HFA) 90 mcg/actuation inhaler       rosuvastatin (CRESTOR) 10 mg tablet       Oxygen 5 Devices as needed. Pt wears 5LPM as needed- states she uses it after an axiety attack      albuterol-ipratropium (DUO-NEB) 2.5 mg-0.5 mg/3 ml nebu 3 mL by Nebulization route every six (6) hours. Qty: 120 Nebule, Refills: 0      ALPRAZolam (XANAX) 0.25 mg tablet Take 0.25 mg by mouth daily as needed for Anxiety. sertraline (Zoloft) 25 mg tablet Take 50 mg by mouth daily. levETIRAcetam (Keppra) 500 mg tablet Take 500 mg by mouth two (2) times a day. carBAMazepine, mood stabiliz, 200 mg CM12 Take 200 mg by mouth two (2) times a day. ferrous sulfate 325 mg (65 mg iron) tablet Take 325 mg by mouth Daily (before breakfast). folic acid (FOLVITE) 1 mg tablet Take 1 mg by mouth daily. aspirin delayed-release 81 mg tablet Take  by mouth daily. !! magnesium oxide (MAG-OX) 400 mg tablet Take 400 mg by mouth daily. !! - Potential duplicate medications found. Please discuss with provider. Follow up Care:    1Jasvir Landers in 1-2 weeks. Please call to set up an appointment shortly after discharge. Diet:  Regular Diet    Disposition:  Home. Advanced Directive:   FULL    DNR x     Discharge Exam:  General:  Alert, cooperative, no distress, appears stated age. Lungs:   Clear to auscultation bilaterally. Chest wall:  No tenderness or deformity. Heart:  Regular rate and rhythm, S1, S2 normal, no murmur, click, rub or gallop. Abdomen:   Soft, non-tender. Bowel sounds normal. No masses,  No organomegaly.    Extremities: Extremities normal, atraumatic, no cyanosis or edema. Pulses: 2+ and symmetric all extremities. Skin: Skin color, texture, turgor normal. No rashes or lesions   Neurologic: CNII-XII intact. No gross sensory or motor deficits        CONSULTATIONS: Pulmonary/Intensive care    Significant Diagnostic Studies:   3/31/2021: BUN 21 mg/dL* (Ref range: 6 - 20 mg/dL); Calcium 8.9 mg/dL (Ref range: 8.5 - 10.1 mg/dL); CO2 25 mmol/L (Ref range: 21 - 32 mmol/L); Creatinine 1.91 mg/dL* (Ref range: 0.55 - 1.02 mg/dL); Glucose 90 mg/dL (Ref range: 65 - 100 mg/dL); HCT 37.6 % (Ref range: 35.0 - 47.0 %); HGB 13.6 g/dL (Ref range: 11.5 - 16.0 g/dL); Potassium 1.9 mmol/L* (Ref range: 3.5 - 5.1 mmol/L); Sodium 126 mmol/L* (Ref range: 136 - 145 mmol/L)  4/1/2021: BUN 36 mg/dL* (Ref range: 6 - 20 mg/dL); Calcium 8.2 mg/dL* (Ref range: 8.5 - 10.1 mg/dL); CO2 22 mmol/L (Ref range: 21 - 32 mmol/L); Creatinine 2.43 mg/dL* (Ref range: 0.55 - 1.02 mg/dL); Glucose 99 mg/dL (Ref range: 65 - 100 mg/dL); Potassium 4.1 mmol/L (Ref range: 3.5 - 5.1 mmol/L); Sodium 131 mmol/L* (Ref range: 136 - 145 mmol/L)  Recent Labs     03/31/21  1410   WBC 9.0   HGB 13.6   HCT 37.6   *     Recent Labs     04/01/21  0730 03/31/21  1410   * 126*   K 4.1 1.9*   CL 99 89*   CO2 22 25   BUN 36* 21*   CREA 2.43* 1.91*   GLU 99 90   CA 8.2* 8.9     Recent Labs     03/31/21  1410   ALT 21   *   TBILI 0.3   TP 7.4   ALB 2.9*   GLOB 4.5*     No results for input(s): INR, PTP, APTT, INREXT in the last 72 hours. No results for input(s): FE, TIBC, PSAT, FERR in the last 72 hours. No results for input(s): PH, PCO2, PO2 in the last 72 hours. No results for input(s): CPK, CKMB in the last 72 hours.     No lab exists for component: TROPONINI  Lab Results   Component Value Date/Time    Glucose (POC) 152 (H) 01/17/2021 06:11 PM    Glucose (POC) 108 (H) 01/12/2021 04:25 AM    Glucose (POC) 166 (H) 11/04/2020 05:01 PM       Discharge time spent 35 minutes    Signed:  Farrukh Loera Kathleen George MD  4/1/2021  10:09 AM

## 2021-04-01 NOTE — PROGRESS NOTES
Problem: Falls - Risk of  Goal: *Absence of Falls  Description: Document Chanel Tay Fall Risk and appropriate interventions in the flowsheet.   Outcome: Progressing Towards Goal  Note: Fall Risk Interventions:  Mobility Interventions: Bed/chair exit alarm, Patient to call before getting OOB         Medication Interventions: Bed/chair exit alarm, Patient to call before getting OOB, Teach patient to arise slowly    Elimination Interventions: Bed/chair exit alarm, Call light in reach    History of Falls Interventions: Bed/chair exit alarm

## 2021-04-01 NOTE — PROGRESS NOTES
Discharge plan of care/case management plan validated with provider discharge order. Discharge instructions reviewed with patient verbalized understanding. Telebox and IV removed with cath intact. All belonging sent with patient. Pt discharged with home health.

## 2021-04-01 NOTE — PROGRESS NOTES
Patient is examined at bedside  Patient is doing well. Shortness of breath and wheezing has resolved. She will be able to go home today  We will have her in office follow-up next week.     Thank you for involving me in the management of your patient

## 2021-04-01 NOTE — PROGRESS NOTES
CM met with patient at bedside to discuss discharge planning. Hospice Consult is present. Patient informs CM that she does not have a understanding of what hospice is and it was just recommended by her PCP. CM gave a brief info session on Hospice and which services they provide, but patient will benefit from a Hospice Liaison info session. [facility] admissions liaison notified. Patient also had questions about why her potassium decreased and now they are trying to discharge her from the hospital.  She had questions about why her headaches are progressively getting worse and why she couldn't breathe yesterday. CM notified attending physician via perfect serve. For now, Patient agrees for [facility] to be set up but only wants an info session for hospice services. Patients HCDM is [facility] [phone] per patient. Patient also states, She would like Chest compressions, but no intubation or life support. Disposition is pending.

## 2021-04-01 NOTE — ED NOTES
TRANSFER - OUT REPORT:    Verbal report given to accepting admit nurseReynaldo(name) on Jazmyn Dyer  being transferred to [facility]72(unit) for routine progression of care       Report consisted of patients Situation, Background, Assessment and   Recommendations(SBAR). Information from the following report(s) SBAR was reviewed with the receiving nurse & care transferred. Lines:   Peripheral IV 03/31/21 Left Antecubital (Active)        Opportunity for questions and clarification was provided. Patient transported by ED Tech on stretcher with:   Monitor  O2 @ 4 liters   IV Pump (see MAR)    All belongings accompanied patient to floor in labeled belongings bag(s).

## 2021-04-01 NOTE — DISCHARGE INSTRUCTIONS
Patient Education        Migraine Headache: Care Instructions  Overview     Migraines are painful, throbbing headaches that often start on one side of the head. They may cause nausea and vomiting and make you sensitive to light, sound, or smell. Without treatment, migraines can last from 4 hours to a few days. Medicines can help prevent migraines or stop them after they have started. Your doctor can help you find which ones work best for you. Follow-up care is a key part of your treatment and safety. Be sure to make and go to all appointments, and call your doctor if you are having problems. It's also a good idea to know your test results and keep a list of the medicines you take. How can you care for yourself at home? · Do not drive if you have taken a prescription pain medicine. · Rest in a quiet, dark room until your headache is gone. Close your eyes, and try to relax or go to sleep. Don't watch TV or read. · Put a cold, moist cloth or cold pack on the painful area for 10 to 20 minutes at a time. Put a thin cloth between the cold pack and your skin. · Use a warm, moist towel or a heating pad set on low to relax tight shoulder and neck muscles. · Have someone gently massage your neck and shoulders. · Take your medicines exactly as prescribed. Call your doctor if you think you are having a problem with your medicine. You will get more details on the specific medicines your doctor prescribes. · Don't take medicine for headache pain too often. Talk to your doctor if you are taking medicine more than 2 days a week to stop a headache. Taking too much pain medicine can lead to more headaches. These are called medicine-overuse headaches. To prevent migraines  · Keep a headache diary so you can figure out what triggers your headaches. Avoiding triggers may help you prevent headaches. Record when each headache began, how long it lasted, and what the pain was like.  Write down any other symptoms you had with the headache, such as nausea, flashing lights or dark spots, or sensitivity to bright light or loud noise. Note if the headache occurred near your period. List anything that might have triggered the headache. Triggers may include certain foods (chocolate, cheese, wine) or odors, smoke, bright light, stress, or lack of sleep. · If your doctor has prescribed medicine for your migraines, take it as directed. You may have medicine that you take only when you get a migraine and medicine that you take all the time to help prevent migraines. ? If your doctor has prescribed medicine for when you get a headache, take it at the first sign of a migraine, unless your doctor has given you other instructions. ? If your doctor has prescribed medicine to prevent migraines, take it exactly as prescribed. Call your doctor if you think you are having a problem with your medicine. · Find healthy ways to deal with stress. Migraines are most common during or right after stressful times. Try finding ways to reduce stress like practicing mindfulness or deep breathing exercises. · Get plenty of sleep and exercise. But be careful to not push yourself too hard during exercise. It may trigger a headache. · Eat meals on a regular schedule. Avoid foods and drinks that often trigger migraines. These include chocolate, alcohol (especially red wine and port), aspartame, monosodium glutamate (MSG), and some additives found in foods (such as hot dogs, mccall, cold cuts, aged cheeses, and pickled foods). · Limit caffeine. Don't drink too much coffee, tea, or soda. But don't quit caffeine suddenly. That can also give you migraines. · Do not smoke or allow others to smoke around you. If you need help quitting, talk to your doctor about stop-smoking programs and medicines. These can increase your chances of quitting for good.   · If you are taking birth control pills or hormone therapy, talk to your doctor about whether they are triggering your migraines. When should you call for help? Call 911 anytime you think you may need emergency care. For example, call if:    · You have signs of a stroke. These may include:  ? Sudden numbness, paralysis, or weakness in your face, arm, or leg, especially on only one side of your body. ? Sudden vision changes. ? Sudden trouble speaking. ? Sudden confusion or trouble understanding simple statements. ? Sudden problems with walking or balance. ? A sudden, severe headache that is different from past headaches. Call your doctor now or seek immediate medical care if:    · You have new or worse nausea and vomiting.     · You have a new or higher fever.     · Your headache gets much worse. Watch closely for changes in your health, and be sure to contact your doctor if:    · You are not getting better after 2 days (48 hours). Where can you learn more? Go to http://www.gray.com/  Enter L333 in the search box to learn more about \"Migraine Headache: Care Instructions. \"  Current as of: August 4, 2020               Content Version: 12.8  © 2006-2021 [employer]. Care instructions adapted under license by [employer] (which disclaims liability or warranty for this information). If you have questions about a medical condition or this instruction, always ask your healthcare professional. [address] any warranty or liability for your use of this information.

## 2021-04-05 ENCOUNTER — HOSPITAL ENCOUNTER (OUTPATIENT)
Dept: NON INVASIVE DIAGNOSTICS | Age: 56
Discharge: HOME | End: 2021-04-05
Attending: SURGERY
Payer: COMMERCIAL

## 2021-04-05 DIAGNOSIS — I65.23 BILATERAL CAROTID ARTERY STENOSIS: ICD-10-CM

## 2021-04-05 DIAGNOSIS — R55 SYNCOPE, UNSPECIFIED SYNCOPE TYPE: ICD-10-CM

## 2021-04-05 PROCEDURE — 93880 EXTRACRANIAL BILAT STUDY: CPT

## 2021-04-06 ENCOUNTER — DOCUMENTATION ONLY (OUTPATIENT)
Dept: SURGERY | Age: 56
End: 2021-04-06

## 2021-04-06 LAB
LEFT CCA DIST DIAS: 22.2 CM/S
LEFT CCA DIST SYS: 70.3 CM/S
LEFT CCA PROX DIAS: 0 CM/S
LEFT CCA PROX SYS: 87.8 CM/S
LEFT ECA DIAS: 0 CM/S
LEFT ECA SYS: 93.2 CM/S
LEFT ICA DIST DIAS: 31.3 CM/S
LEFT ICA DIST SYS: 86.3 CM/S
LEFT ICA PROX DIAS: 22.9 CM/S
LEFT ICA PROX SYS: 88.6 CM/S
LEFT SUBCLAVIAN DIAS: 0 CM/S
LEFT SUBCLAVIAN SYS: 113 CM/S
LEFT VERTEBRAL DIAS: 21.4 CM/S
LEFT VERTEBRAL SYS: 68.7 CM/S
RIGHT CCA DIST DIAS: 21.4 CM/S
RIGHT CCA DIST SYS: 84 CM/S
RIGHT CCA PROX DIAS: 22.2 CM/S
RIGHT CCA PROX SYS: 96.2 CM/S
RIGHT ECA DIAS: 21.4 CM/S
RIGHT ECA SYS: 115 CM/S
RIGHT ICA DIST DIAS: 26 CM/S
RIGHT ICA DIST SYS: 82.9 CM/S
RIGHT ICA PROX DIAS: 112 CM/S
RIGHT ICA PROX SYS: 344 CM/S
RIGHT SUBCLAVIAN DIAS: 0 CM/S
RIGHT SUBCLAVIAN SYS: 118 CM/S
RIGHT VERTEBRAL DIAS: 15.9 CM/S
RIGHT VERTEBRAL SYS: 55.4 CM/S

## 2021-04-13 ENCOUNTER — APPOINTMENT (OUTPATIENT)
Dept: CT IMAGING | Age: 56
End: 2021-04-13
Attending: NURSE PRACTITIONER
Payer: MEDICAID

## 2021-04-13 ENCOUNTER — HOSPITAL ENCOUNTER (OUTPATIENT)
Age: 56
Setting detail: OBSERVATION
Discharge: HOSPICE | End: 2021-04-14
Attending: HOSPITALIST | Admitting: HOSPITALIST
Payer: MEDICAID

## 2021-04-13 ENCOUNTER — APPOINTMENT (OUTPATIENT)
Dept: GENERAL RADIOLOGY | Age: 56
End: 2021-04-13
Attending: NURSE PRACTITIONER
Payer: MEDICAID

## 2021-04-13 DIAGNOSIS — J44.9 CHRONIC OBSTRUCTIVE PULMONARY DISEASE, UNSPECIFIED COPD TYPE (HCC): ICD-10-CM

## 2021-04-13 DIAGNOSIS — I95.1 ORTHOSTATIC HYPOTENSION: ICD-10-CM

## 2021-04-13 DIAGNOSIS — R55 SYNCOPE AND COLLAPSE: Primary | ICD-10-CM

## 2021-04-13 DIAGNOSIS — N18.4 CKD (CHRONIC KIDNEY DISEASE), STAGE IV (HCC): ICD-10-CM

## 2021-04-13 LAB
ALBUMIN SERPL-MCNC: 2.5 G/DL (ref 3.5–5)
ALBUMIN/GLOB SERPL: 0.7 {RATIO} (ref 1.1–2.2)
ALP SERPL-CCNC: 113 U/L (ref 45–117)
ALT SERPL-CCNC: 24 U/L (ref 12–78)
ANION GAP SERPL CALC-SCNC: 11 MMOL/L (ref 5–15)
APPEARANCE UR: ABNORMAL
AST SERPL W P-5'-P-CCNC: 14 U/L (ref 15–37)
BACTERIA URNS QL MICRO: NEGATIVE /HPF
BASOPHILS # BLD: 0 K/UL (ref 0–0.1)
BASOPHILS NFR BLD: 0 % (ref 0–1)
BILIRUB SERPL-MCNC: 0.2 MG/DL (ref 0.2–1)
BILIRUB UR QL: NEGATIVE
BUN SERPL-MCNC: 27 MG/DL (ref 6–20)
BUN/CREAT SERPL: 12 (ref 12–20)
CA-I BLD-MCNC: 8.2 MG/DL (ref 8.5–10.1)
CHLORIDE SERPL-SCNC: 100 MMOL/L (ref 97–108)
CO2 SERPL-SCNC: 22 MMOL/L (ref 21–32)
COLOR UR: ABNORMAL
CREAT SERPL-MCNC: 2.33 MG/DL (ref 0.55–1.02)
DIFFERENTIAL METHOD BLD: ABNORMAL
EOSINOPHIL # BLD: 0.1 K/UL (ref 0–0.4)
EOSINOPHIL NFR BLD: 1 % (ref 0–7)
ERYTHROCYTE [DISTWIDTH] IN BLOOD BY AUTOMATED COUNT: 13.1 % (ref 11.5–14.5)
GLOBULIN SER CALC-MCNC: 3.6 G/DL (ref 2–4)
GLUCOSE BLD STRIP.AUTO-MCNC: 131 MG/DL (ref 65–100)
GLUCOSE SERPL-MCNC: 94 MG/DL (ref 65–100)
GLUCOSE UR STRIP.AUTO-MCNC: NEGATIVE MG/DL
HCT VFR BLD AUTO: 30.7 % (ref 35–47)
HGB BLD-MCNC: 10.6 G/DL (ref 11.5–16)
HGB UR QL STRIP: NEGATIVE
HYALINE CASTS URNS QL MICRO: ABNORMAL /LPF (ref 0–5)
IMM GRANULOCYTES # BLD AUTO: 0.1 K/UL (ref 0–0.04)
IMM GRANULOCYTES NFR BLD AUTO: 0 % (ref 0–0.5)
KETONES UR QL STRIP.AUTO: 5 MG/DL
LACTATE SERPL-SCNC: 0.3 MMOL/L (ref 0.4–2)
LEUKOCYTE ESTERASE UR QL STRIP.AUTO: NEGATIVE
LYMPHOCYTES # BLD: 1 K/UL (ref 0.8–3.5)
LYMPHOCYTES NFR BLD: 7 % (ref 12–49)
MCH RBC QN AUTO: 33.3 PG (ref 26–34)
MCHC RBC AUTO-ENTMCNC: 34.5 G/DL (ref 30–36.5)
MCV RBC AUTO: 96.5 FL (ref 80–99)
MONOCYTES # BLD: 0.8 K/UL (ref 0–1)
MONOCYTES NFR BLD: 5 % (ref 5–13)
MUCOUS THREADS URNS QL MICRO: ABNORMAL /LPF
NEUTS SEG # BLD: 12.3 K/UL (ref 1.8–8)
NEUTS SEG NFR BLD: 87 % (ref 32–75)
NITRITE UR QL STRIP.AUTO: NEGATIVE
NRBC # BLD: 0 K/UL (ref 0–0.01)
NRBC BLD-RTO: 0 PER 100 WBC
PERFORMED BY, TECHID: ABNORMAL
PH UR STRIP: 5 [PH] (ref 5–8)
PLATELET # BLD AUTO: 377 K/UL (ref 150–400)
PMV BLD AUTO: 9.2 FL (ref 8.9–12.9)
POTASSIUM SERPL-SCNC: 3.2 MMOL/L (ref 3.5–5.1)
PROT SERPL-MCNC: 6.1 G/DL (ref 6.4–8.2)
PROT UR STRIP-MCNC: >300 MG/DL
RBC # BLD AUTO: 3.18 M/UL (ref 3.8–5.2)
RBC #/AREA URNS HPF: ABNORMAL /HPF (ref 0–5)
SODIUM SERPL-SCNC: 133 MMOL/L (ref 136–145)
SP GR UR REFRACTOMETRY: 1.02 (ref 1–1.03)
TROPONIN I SERPL-MCNC: <0.05 NG/ML
UROBILINOGEN UR QL STRIP.AUTO: 0.1 EU/DL (ref 0.1–1)
WBC # BLD AUTO: 14.1 K/UL (ref 3.6–11)
WBC URNS QL MICRO: ABNORMAL /HPF (ref 0–4)
YEAST URNS QL MICRO: PRESENT

## 2021-04-13 PROCEDURE — 99285 EMERGENCY DEPT VISIT HI MDM: CPT

## 2021-04-13 PROCEDURE — 82962 GLUCOSE BLOOD TEST: CPT

## 2021-04-13 PROCEDURE — 71045 X-RAY EXAM CHEST 1 VIEW: CPT

## 2021-04-13 PROCEDURE — 74011000250 HC RX REV CODE- 250: Performed by: NURSE PRACTITIONER

## 2021-04-13 PROCEDURE — 93005 ELECTROCARDIOGRAM TRACING: CPT

## 2021-04-13 PROCEDURE — 84484 ASSAY OF TROPONIN QUANT: CPT

## 2021-04-13 PROCEDURE — 80053 COMPREHEN METABOLIC PANEL: CPT

## 2021-04-13 PROCEDURE — 70450 CT HEAD/BRAIN W/O DYE: CPT

## 2021-04-13 PROCEDURE — 36415 COLL VENOUS BLD VENIPUNCTURE: CPT

## 2021-04-13 PROCEDURE — 72125 CT NECK SPINE W/O DYE: CPT

## 2021-04-13 PROCEDURE — 85025 COMPLETE CBC W/AUTO DIFF WBC: CPT

## 2021-04-13 PROCEDURE — 81001 URINALYSIS AUTO W/SCOPE: CPT

## 2021-04-13 PROCEDURE — 87040 BLOOD CULTURE FOR BACTERIA: CPT

## 2021-04-13 PROCEDURE — 96374 THER/PROPH/DIAG INJ IV PUSH: CPT

## 2021-04-13 PROCEDURE — 83605 ASSAY OF LACTIC ACID: CPT

## 2021-04-13 PROCEDURE — 74011250636 HC RX REV CODE- 250/636: Performed by: NURSE PRACTITIONER

## 2021-04-13 RX ORDER — POTASSIUM CHLORIDE 750 MG/1
40 TABLET, FILM COATED, EXTENDED RELEASE ORAL
Status: COMPLETED | OUTPATIENT
Start: 2021-04-13 | End: 2021-04-14

## 2021-04-13 RX ADMIN — SODIUM CHLORIDE 1000 ML: 9 INJECTION, SOLUTION INTRAVENOUS at 23:24

## 2021-04-13 RX ADMIN — CEFTRIAXONE 1 G: 1 INJECTION, POWDER, FOR SOLUTION INTRAMUSCULAR; INTRAVENOUS at 23:24

## 2021-04-13 RX ADMIN — SODIUM CHLORIDE 1000 ML: 9 INJECTION, SOLUTION INTRAVENOUS at 18:30

## 2021-04-13 NOTE — ED TRIAGE NOTES
Diarrhea since this morning. Denies abdominal pain or nausea/vomiting. Slipped and fell prior to arrival.  Unable to get up so called rescue. Rescue team stood patient up to go to [facility] when she had a syncopal episode. Lost radial pulses but had carotid pulses. Placed on [place]. Given 400 mL fluid enroute. States hit the back of her head when she fell. Denies LOC. Alert and oriented on arrival.    On 4 liters per nasal cannula continuous home oxygen.     Revoked her hospice to go to the hospital.

## 2021-04-13 NOTE — ED NOTES
1730  Patient cleaned up from episode of diarrhea prior to arrival.    Bed pan given and had another loose stool. [address] and sent. 1817  Orthostatics done lying and sitting. Did not stand patient due to syncope event prior to arrival.  Nurse Practitioner noted of results of orthostatics. 1830  IV fluids infusing at this time. 1910  Bedside and Verbal shift change report given to [address] (oncoming nurse) by Fidelina Stringer (offgoing nurse). Report included the following information SBAR, Kardex, ED Summary, MAR, Recent Results and Cardiac Rhythm NSR. Oncoming RN notified patient still needs a urine sample.

## 2021-04-14 VITALS
TEMPERATURE: 98 F | BODY MASS INDEX: 17.07 KG/M2 | HEIGHT: 64 IN | DIASTOLIC BLOOD PRESSURE: 58 MMHG | OXYGEN SATURATION: 100 % | RESPIRATION RATE: 18 BRPM | HEART RATE: 80 BPM | WEIGHT: 100 LBS | SYSTOLIC BLOOD PRESSURE: 138 MMHG

## 2021-04-14 PROBLEM — R55 SYNCOPE AND COLLAPSE: Status: ACTIVE | Noted: 2021-04-14

## 2021-04-14 LAB
ATRIAL RATE: 73 BPM
CALCULATED P AXIS, ECG09: 74 DEGREES
CALCULATED R AXIS, ECG10: 67 DEGREES
CALCULATED T AXIS, ECG11: 76 DEGREES
COVID-19 RAPID TEST, COVR: NOT DETECTED
DIAGNOSIS, 93000: NORMAL
P-R INTERVAL, ECG05: 130 MS
Q-T INTERVAL, ECG07: 516 MS
QRS DURATION, ECG06: 86 MS
QTC CALCULATION (BEZET), ECG08: 568 MS
SPECIMEN SOURCE: NORMAL
VENTRICULAR RATE, ECG03: 73 BPM

## 2021-04-14 PROCEDURE — 65270000029 HC RM PRIVATE

## 2021-04-14 PROCEDURE — 99218 HC RM OBSERVATION: CPT

## 2021-04-14 PROCEDURE — 87635 SARS-COV-2 COVID-19 AMP PRB: CPT

## 2021-04-14 PROCEDURE — 74011000250 HC RX REV CODE- 250: Performed by: HOSPITALIST

## 2021-04-14 PROCEDURE — 74011250637 HC RX REV CODE- 250/637: Performed by: NURSE PRACTITIONER

## 2021-04-14 PROCEDURE — 94760 N-INVAS EAR/PLS OXIMETRY 1: CPT

## 2021-04-14 PROCEDURE — 74011250637 HC RX REV CODE- 250/637: Performed by: HOSPITALIST

## 2021-04-14 PROCEDURE — 94640 AIRWAY INHALATION TREATMENT: CPT

## 2021-04-14 PROCEDURE — 77010033678 HC OXYGEN DAILY

## 2021-04-14 RX ORDER — CLOPIDOGREL BISULFATE 75 MG/1
75 TABLET ORAL DAILY
Status: DISCONTINUED | OUTPATIENT
Start: 2021-04-14 | End: 2021-04-14 | Stop reason: HOSPADM

## 2021-04-14 RX ORDER — SPIRONOLACTONE 50 MG/1
50 TABLET, FILM COATED ORAL DAILY
COMMUNITY
Start: 2021-03-31 | End: 2021-04-14

## 2021-04-14 RX ORDER — CARBAMAZEPINE 200 MG/1
200 TABLET ORAL 2 TIMES DAILY
COMMUNITY
Start: 2021-02-22 | End: 2022-03-20

## 2021-04-14 RX ORDER — SODIUM CHLORIDE 0.9 % (FLUSH) 0.9 %
5-40 SYRINGE (ML) INJECTION AS NEEDED
Status: DISCONTINUED | OUTPATIENT
Start: 2021-04-14 | End: 2021-04-14 | Stop reason: HOSPADM

## 2021-04-14 RX ORDER — ACETAMINOPHEN 325 MG/1
650 TABLET ORAL
Status: DISCONTINUED | OUTPATIENT
Start: 2021-04-14 | End: 2021-04-14 | Stop reason: HOSPADM

## 2021-04-14 RX ORDER — LABETALOL 100 MG/1
200 TABLET, FILM COATED ORAL 2 TIMES DAILY
Status: DISCONTINUED | OUTPATIENT
Start: 2021-04-14 | End: 2021-04-14

## 2021-04-14 RX ORDER — POTASSIUM CHLORIDE 1.5 G/1.77G
20 POWDER, FOR SOLUTION ORAL 2 TIMES DAILY WITH MEALS
Status: DISCONTINUED | OUTPATIENT
Start: 2021-04-14 | End: 2021-04-14 | Stop reason: HOSPADM

## 2021-04-14 RX ORDER — ALPRAZOLAM 0.5 MG/1
0.25 TABLET ORAL
Status: DISCONTINUED | OUTPATIENT
Start: 2021-04-14 | End: 2021-04-14 | Stop reason: HOSPADM

## 2021-04-14 RX ORDER — ALPRAZOLAM 0.5 MG/1
0.25 TABLET ORAL
Status: COMPLETED | OUTPATIENT
Start: 2021-04-14 | End: 2021-04-14

## 2021-04-14 RX ORDER — SERTRALINE HYDROCHLORIDE 50 MG/1
50 TABLET, FILM COATED ORAL DAILY
COMMUNITY
Start: 2021-02-11 | End: 2022-03-20

## 2021-04-14 RX ORDER — ALBUTEROL SULFATE 90 UG/1
2 AEROSOL, METERED RESPIRATORY (INHALATION)
Status: DISCONTINUED | OUTPATIENT
Start: 2021-04-14 | End: 2021-04-14 | Stop reason: HOSPADM

## 2021-04-14 RX ORDER — CARBAMAZEPINE 200 MG/1
200 TABLET ORAL 2 TIMES DAILY
Status: DISCONTINUED | OUTPATIENT
Start: 2021-04-14 | End: 2021-04-14 | Stop reason: HOSPADM

## 2021-04-14 RX ORDER — LEVOTHYROXINE SODIUM 100 UG/1
50 TABLET ORAL
Status: DISCONTINUED | OUTPATIENT
Start: 2021-04-14 | End: 2021-04-14 | Stop reason: HOSPADM

## 2021-04-14 RX ORDER — LABETALOL 300 MG/1
300 TABLET, FILM COATED ORAL 2 TIMES DAILY
COMMUNITY
Start: 2021-03-03 | End: 2022-03-17

## 2021-04-14 RX ORDER — PANTOPRAZOLE SODIUM 40 MG/1
40 TABLET, DELAYED RELEASE ORAL
Status: DISCONTINUED | OUTPATIENT
Start: 2021-04-14 | End: 2021-04-14 | Stop reason: HOSPADM

## 2021-04-14 RX ORDER — SPIRONOLACTONE 25 MG/1
50 TABLET ORAL DAILY
Status: DISCONTINUED | OUTPATIENT
Start: 2021-04-14 | End: 2021-04-14

## 2021-04-14 RX ORDER — SERTRALINE HYDROCHLORIDE 25 MG/1
50 TABLET, FILM COATED ORAL DAILY
Status: DISCONTINUED | OUTPATIENT
Start: 2021-04-14 | End: 2021-04-14 | Stop reason: HOSPADM

## 2021-04-14 RX ORDER — SODIUM CHLORIDE 0.9 % (FLUSH) 0.9 %
5-40 SYRINGE (ML) INJECTION EVERY 8 HOURS
Status: DISCONTINUED | OUTPATIENT
Start: 2021-04-14 | End: 2021-04-14 | Stop reason: HOSPADM

## 2021-04-14 RX ORDER — ASPIRIN 81 MG/1
81 TABLET ORAL DAILY
Status: DISCONTINUED | OUTPATIENT
Start: 2021-04-14 | End: 2021-04-14 | Stop reason: HOSPADM

## 2021-04-14 RX ORDER — HYDRALAZINE HYDROCHLORIDE 100 MG/1
100 TABLET, FILM COATED ORAL 3 TIMES DAILY
COMMUNITY
Start: 2021-03-26 | End: 2021-04-14

## 2021-04-14 RX ORDER — FOLIC ACID 1 MG/1
1 TABLET ORAL DAILY
Status: DISCONTINUED | OUTPATIENT
Start: 2021-04-14 | End: 2021-04-14 | Stop reason: HOSPADM

## 2021-04-14 RX ORDER — LEVETIRACETAM 250 MG/1
500 TABLET ORAL 2 TIMES DAILY
Status: DISCONTINUED | OUTPATIENT
Start: 2021-04-14 | End: 2021-04-14 | Stop reason: HOSPADM

## 2021-04-14 RX ORDER — SODIUM CHLORIDE 9 MG/ML
100 INJECTION, SOLUTION INTRAVENOUS CONTINUOUS
Status: DISCONTINUED | OUTPATIENT
Start: 2021-04-14 | End: 2021-04-14 | Stop reason: HOSPADM

## 2021-04-14 RX ORDER — IPRATROPIUM BROMIDE AND ALBUTEROL SULFATE 2.5; .5 MG/3ML; MG/3ML
3 SOLUTION RESPIRATORY (INHALATION)
Status: DISCONTINUED | OUTPATIENT
Start: 2021-04-14 | End: 2021-04-14 | Stop reason: HOSPADM

## 2021-04-14 RX ADMIN — ASPIRIN 81 MG: 81 TABLET, COATED ORAL at 08:45

## 2021-04-14 RX ADMIN — LEVETIRACETAM 500 MG: 250 TABLET, FILM COATED ORAL at 08:44

## 2021-04-14 RX ADMIN — ALPRAZOLAM 0.25 MG: 0.5 TABLET ORAL at 00:44

## 2021-04-14 RX ADMIN — LEVOTHYROXINE SODIUM 50 MCG: 100 TABLET ORAL at 08:44

## 2021-04-14 RX ADMIN — CLOPIDOGREL BISULFATE 75 MG: 75 TABLET ORAL at 08:44

## 2021-04-14 RX ADMIN — Medication 10 ML: at 06:00

## 2021-04-14 RX ADMIN — SERTRALINE HYDROCHLORIDE 50 MG: 25 TABLET ORAL at 08:44

## 2021-04-14 RX ADMIN — PANTOPRAZOLE SODIUM 40 MG: 40 TABLET, DELAYED RELEASE ORAL at 08:44

## 2021-04-14 RX ADMIN — POTASSIUM CHLORIDE 40 MEQ: 750 TABLET, FILM COATED, EXTENDED RELEASE ORAL at 00:32

## 2021-04-14 RX ADMIN — POTASSIUM CHLORIDE 20 MEQ: 1.5 FOR SOLUTION ORAL at 08:44

## 2021-04-14 RX ADMIN — FOLIC ACID 1 MG: 1 TABLET ORAL at 08:45

## 2021-04-14 NOTE — CONSULTS
Nephrology Consult    Patient: Rama Rucker MRN: [id number]  SSN: [id number]    YOB: 1965  Age: 54 y.o. Sex: female      Subjective:   Reason for the consultation: PEDRO PABLO   HPI: The pt is 53 yo woman with COPD, GERD, Seizure disorder was admitted with fall, syncope, CT head no acute process, mild leucocytosis, Cxray clear, UA clear. On arrival was hypotensive, Cr 2.3, K 3.2. She c/o loose stools but no N/V. No urinary sx. No LE swelling. No sob.      Past Medical History:   Diagnosis Date    Anxiety 10/2/2020    Carotid stenosis     Chronic anemia     Chronic respiratory failure (HCC)     COPD (chronic obstructive pulmonary disease) with emphysema (HCC) 10/2/2020    Depression     Diastolic CHF (HCC)     Dysphagia 10/2/2020    GERD (gastroesophageal reflux disease)     High cholesterol     Hypertension     Hypothyroid     Iron deficiency anemia 10/2/2020    Mitral valve regurgitation 10/2/2020    Renal artery stenosis (HCC)     Seizure disorder (HCC)      Past Surgical History:   Procedure Laterality Date    HX CAROTID ENDARTERECTOMY      HX CHOLECYSTECTOMY      HX RENAL ARTERY STENT      HX ROTATOR CUFF REPAIR Right     HX TUBAL LIGATION      IR THORACENTESIS CATH W IMAGE  11/24/2020    IR THORACENTESIS CATH W IMAGE  11/25/2020      Family History   Problem Relation Age of Onset    Hypertension Mother     Stroke Mother     Melanoma Mother     Stroke Father     Melanoma Brother     Melanoma Child      Social History     Tobacco Use    Smoking status: Former Smoker     Types: Cigarettes    Smokeless tobacco: Never Used    Tobacco comment: quit july 2020   Substance Use Topics    Alcohol use: Not Currently      Current Facility-Administered Medications   Medication Dose Route Frequency Provider Last Rate Last Admin    albuterol (PROVENTIL HFA, VENTOLIN HFA, PROAIR HFA) inhaler 2 Puff  2 Puff Inhalation Q4H PRN Aurora Colon MD       [facility] albuterol-ipratropium (DUO-NEB) 2.5 MG-0.5 MG/3 ML  3 mL Nebulization QID RT Val Pittman MD        [facility]am [facility]) tablet 0.25 mg  0.25 mg Oral DAILY PRN Val Pittman MD        aspirin delayed-release tablet 81 mg  81 mg Oral DAILY Val Pittman MD        carBAMazepine (TEGretol) tablet 200 mg  200 mg Oral BID Val Pittman MD        clopidogreL (PLAVIX) tablet 75 mg  75 mg Oral DAILY Val Pittman MD        pantoprazole (PROTONIX) tablet 40 mg  40 mg Oral ACB Val Pittman MD        folic acid (FOLVITE) tablet 1 mg  1 mg Oral DAILY Val Pittman MD        labetaloL (NORMODYNE) tablet 200 mg  200 mg Oral BID Val Pittman MD        levETIRAcetam (KEPPRA) tablet 500 mg  500 mg Oral BID Val Pittman MD        levothyroxine (SYNTHROID) tablet 50 mcg  50 mcg Oral ACB Val Pittman MD        potassium chloride (KLOR-CON) packet for solution 20 mEq  20 mEq Oral BID WITH MEALS Val Pittman MD        sertraline (ZOLOFT) tablet 50 mg  50 mg Oral DAILY Val Pittman MD        . PHARMACY TO SUBSTITUTE PER PROTOCOL (Reordered from: Trelegy Ellipta 100-62.5-25 mcg inhaler)    Per Protocol Val Pittman MD        sodium chloride (NS) flush 5-40 mL  5-40 mL IntraVENous Q8H Val Pittman MD        sodium chloride (NS) flush 5-40 mL  5-40 mL IntraVENous PRN Val Pittman MD        acetaminophen (TYLENOL) tablet 650 mg  650 mg Oral Q6H PRN Val Pittman MD        0.9% sodium chloride infusion  100 mL/hr IntraVENous CONTINUOUS Shauna Pizarro MD        sodium chloride 0.9 % bolus infusion 1,000 mL  1,000 mL IntraVENous ONCE Stephanie January, NP   Stopped at 04/13/21 2111     Current Outpatient Medications   Medication Sig Dispense Refill    hydrALAZINE (APRESOLINE) 100 mg tablet Take 100 mg by mouth three (3) times daily.       labetaloL (NORMODYNE) 300 mg tablet Take 300 mg by mouth two (2) times a day.  carBAMazepine (TEGretol) 200 mg tablet Take 200 mg by mouth two (2) times a day.  spironolactone (ALDACTONE) 50 mg tablet Take 50 mg by mouth daily.  sertraline (ZOLOFT) 50 mg tablet Take 50 mg by mouth daily.  potassium chloride (KLOR-CON) 20 mEq pack Take 1 Packet by mouth two (2) times daily (with meals). 60 Packet 0    NIFEdipine ER (ADALAT CC) 90 mg ER tablet Take 90 mg by mouth daily.  ergocalciferol (Vitamin D2) 1,250 mcg (50,000 unit) capsule Take 50,000 Units by mouth every seven (7) days. q7days wednesday      acetaminophen (TYLENOL) 325 mg tablet Take 2 Tabs by mouth every six (6) hours as needed for Pain or Fever for up to 30 days. 180 Tab 1    magnesium oxide (MAG-OX) 400 mg tablet Take 1 Tab by mouth daily for 30 days. 30 Tab 0    isosorbide dinitrate (ISORDIL) 10 mg tablet Take 1 Tab by mouth two (2) times a day. 90 Tab 1    levothyroxine (SYNTHROID) 50 mcg tablet Take 1 Tab by mouth Daily (before breakfast). 30 Tab 0    esomeprazole (NexIUM) 40 mg capsule Take 40 mg by mouth. Indications: Berry's esophagus      clopidogreL (PLAVIX) 75 mg tab Take 75 mg by mouth daily.  nortriptyline (PAMELOR) 50 mg capsule Take 50 mg by mouth nightly.  Trelegy Ellipta 100-62.5-25 mcg inhaler 1 Puff daily.  albuterol (PROVENTIL HFA, VENTOLIN HFA, PROAIR HFA) 90 mcg/actuation inhaler       rosuvastatin (CRESTOR) 10 mg tablet       Oxygen 5 Devices as needed. Pt wears 5LPM as needed- states she uses it after an axiety attack      albuterol-ipratropium (DUO-NEB) 2.5 mg-0.5 mg/3 ml nebu 3 mL by Nebulization route every six (6) hours. 120 Nebule 0    ALPRAZolam (XANAX) 0.25 mg tablet Take 0.25 mg by mouth daily as needed for Anxiety.  levETIRAcetam (Keppra) 500 mg tablet Take 500 mg by mouth two (2) times a day.  ferrous sulfate 325 mg (65 mg iron) tablet Take 325 mg by mouth Daily (before breakfast).       folic acid (FOLVITE) 1 mg tablet Take 1 mg by mouth daily.  aspirin delayed-release 81 mg tablet Take  by mouth daily. Allergies   Allergen Reactions    Sulfa (Sulfonamide Antibiotics) Anaphylaxis    Sulfamethoxazole-Trimethoprim Unknown (comments)       Review of Systems:  A comprehensive review of systems was negative except for that written in the History of Present Illness. Objective:     Vitals:    04/13/21 2300 04/14/21 0005 04/14/21 0300 04/14/21 0600   BP: 132/67  (!) 142/62 (!) 146/62   Pulse: 78  88 79   Resp: 15  16 15   Temp:       SpO2: 100% 100% 100% 99%   Weight:       Height:            Physical Exam:  General: NAD  Eyes: sclera anicteric  Oral Cavity: No thrush or ulcers  Neck: no JVD  Chest: Fair bilateral air entry  Heart: normal sounds  Abdomen: soft and non tender   : no llamas  Lower Extremities: no edema  Skin: no rash  Neuro: intact  Psychiatric: non-depressed            Assessment:     Hospital Problems  Date Reviewed: 4/14/2021          Codes Class Noted POA    Syncope and collapse ICD-10-CM: R55  ICD-9-CM: 780.2  4/14/2021 Unknown        Syncope ICD-10-CM: R55  ICD-9-CM: 780.2  1/3/2021 Yes        Hypokalemia ICD-10-CM: E87.6  ICD-9-CM: 276.8  10/2/2020 Unknown              Plan:   1-PEDRO PABLO:  -2/2 pre renal azotemia from hypotension/volume depletion  -Cr 2.3  -last Cr was 1.0 on 1/23/21 (her BL)  -looks volume down, Cxray clear  -UA is bland, will send UPCR  -will dc aldactone  -will put on IVF    2-Hypokalemia:  -from low intake  -will check Mg  -K 3.2  -po KCL replacement    3-Hyponatremia:  -mild  -from volume depletion  -Na 133  -will put on isotonic saline    4-Fall/syncope:  -CT head/cervical spine no acute process  -r/o orthostatic hypotension  -IVF  -will hold BP meds    5-Hx of seizure disorder:  -on keppra and Carbamezapine      Thank you for the consultation.      Signed By: Burt Dandy, MD     April 14, 2021

## 2021-04-14 NOTE — PROGRESS NOTES
4/14/21. MD asked CM to inquire if pt was still on hospice. [facility] pt [facility] CM spoke with ( Ryan Milligan @ [facility]) stated pt was transferred to Ed via nonemergent call for transfer assist & while EMTs present - questionable seizure - so EMTs transferred to ED. Per Ryan Milligan pt revoked  &  will need referral sent to reinstate. Choice Letter signed by pt. Referral sent via [facility]. Nsg aware to call hospice upon discharge.

## 2021-04-14 NOTE — ED PROVIDER NOTES
EMERGENCY DEPARTMENT HISTORY AND PHYSICAL EXAM      Date: 4/13/2021  Patient Name: Moe Henriquez      History of Presenting Illness     Chief Complaint   Patient presents with    Diarrhea     since this morning  accucheck 126    Head Injury     hit head no LOC    Fall     slipped and fell at home hit the back of her head. denies LOC    Syncope     when EMS stood patient to get to [facility] given 400 mL fluid       History Provided By: Patient    HPI: Moe Henriquez, 54 y.o. female with a past medical history significant Coronary artery disease with stenosis, syncope, congestive heart failure, mitral valve regurgitation, seizure, COPD, Berry's esophagus, syncope, kalemia, stenosis of the Carotid, intermittent use of 02 presents to the ED with cc of syncope with closed head injury, generalized weakness, diarrhea. Patient reports woke up feeling fine ate soup developed diarrhea. She reports standing up became lightheaded, short of breath, dizzy, chest pain and fell to the floor. To weak to get off the floor. She reports being found by her  who called 911 patient reports was being evaluated for hospice due to worsening COPD with comorbidities however  decided to discontinue hospice at this time and will re-evaluate once d/c. She reports living alone. Scared to go home due to feeling dizzy upon standing. Currently on 4 L of 02  Denies any active chest pain at this time, worsening shortness of breath, fever, chills, and nausea, vomiting, abdominal pain, urinary frequency urgency or burning. There are no other complaints, changes, or physical findings at this time.     PCP: Hector Quintero    Current Facility-Administered Medications   Medication Dose Route Frequency Provider Last Rate Last Admin    sodium chloride 0.9 % bolus infusion 1,000 mL  1,000 mL IntraVENous ONCE Belgica Jansen NP 1,000 mL/hr at 04/13/21 2324 1,000 mL at 04/13/21 2324    sodium chloride 0.9 % bolus infusion 1,000 mL  1,000 mL IntraVENous ONCE Ramsey Jansen, NP         Current Outpatient Medications   Medication Sig Dispense Refill    potassium chloride (KLOR-CON) 20 mEq pack Take 1 Packet by mouth two (2) times daily (with meals). 60 Packet 0    predniSONE (DELTASONE) 20 mg tablet Take 20 mg by mouth daily. 5 Tab 0    NIFEdipine ER (ADALAT CC) 90 mg ER tablet Take 90 mg by mouth daily.  fluticasone propionate (Flovent HFA) 110 mcg/actuation inhaler Take 1 Puff by inhalation every twelve (12) hours.  ergocalciferol (Vitamin D2) 1,250 mcg (50,000 unit) capsule Take 50,000 Units by mouth every seven (7) days. q7days wednesday      acetaminophen (TYLENOL) 325 mg tablet Take 2 Tabs by mouth every six (6) hours as needed for Pain or Fever for up to 30 days. 180 Tab 1    magnesium oxide (MAG-OX) 400 mg tablet Take 1 Tab by mouth daily for 30 days. 30 Tab 0    hydrALAZINE (APRESOLINE) 50 mg tablet Take 1 Tab by mouth three (3) times daily. (Patient taking differently: Take 100 mg by mouth three (3) times daily.) 90 Tab 0    isosorbide dinitrate (ISORDIL) 10 mg tablet Take 1 Tab by mouth two (2) times a day. 90 Tab 1    labetaloL (NORMODYNE) 200 mg tablet Take 1 Tab by mouth three (3) times daily. (Patient taking differently: Take 600 mg by mouth three (3) times daily.) 90 Tab 1    levothyroxine (SYNTHROID) 50 mcg tablet Take 1 Tab by mouth Daily (before breakfast). 30 Tab 0    spironolactone (ALDACTONE) 25 mg tablet Take 1 Tab by mouth daily. (Patient taking differently: Take 50 mg by mouth daily.) 30 Tab 0    esomeprazole (NexIUM) 40 mg capsule Take 40 mg by mouth. Indications: Berry's esophagus      clopidogreL (PLAVIX) 75 mg tab Take 75 mg by mouth daily.  nortriptyline (PAMELOR) 50 mg capsule Take 50 mg by mouth nightly.  Trelegy Ellipta 100-62.5-25 mcg inhaler 1 Puff daily.       albuterol (PROVENTIL HFA, VENTOLIN HFA, PROAIR HFA) 90 mcg/actuation inhaler       rosuvastatin (CRESTOR) 10 mg tablet       Oxygen 5 Devices as needed. Pt wears 5LPM as needed- states she uses it after an axiety attack      albuterol-ipratropium (DUO-NEB) 2.5 mg-0.5 mg/3 ml nebu 3 mL by Nebulization route every six (6) hours. 120 Nebule 0    ALPRAZolam (XANAX) 0.25 mg tablet Take 0.25 mg by mouth daily as needed for Anxiety.  sertraline (Zoloft) 25 mg tablet Take 50 mg by mouth daily.  levETIRAcetam (Keppra) 500 mg tablet Take 500 mg by mouth two (2) times a day.  carBAMazepine, mood stabiliz, 200 mg CM12 Take 200 mg by mouth two (2) times a day.  ferrous sulfate 325 mg (65 mg iron) tablet Take 325 mg by mouth Daily (before breakfast).  folic acid (FOLVITE) 1 mg tablet Take 1 mg by mouth daily.  aspirin delayed-release 81 mg tablet Take  by mouth daily.  magnesium oxide (MAG-OX) 400 mg tablet Take 400 mg by mouth daily.          Past History     Past Medical History:  Past Medical History:   Diagnosis Date    Anxiety 10/2/2020    Carotid stenosis     Chronic anemia     Chronic respiratory failure (HCC)     COPD (chronic obstructive pulmonary disease) with emphysema (HCC) 10/2/2020    Depression     Diastolic CHF (HCC)     Dysphagia 10/2/2020    GERD (gastroesophageal reflux disease)     High cholesterol     Hypertension     Hypothyroid     Iron deficiency anemia 10/2/2020    Mitral valve regurgitation 10/2/2020    Renal artery stenosis (HCC)     Seizure disorder (HCC)        Past Surgical History:  Past Surgical History:   Procedure Laterality Date    HX CAROTID ENDARTERECTOMY      HX CHOLECYSTECTOMY      HX RENAL ARTERY STENT      HX ROTATOR CUFF REPAIR Right     HX TUBAL LIGATION      IR THORACENTESIS CATH W IMAGE  11/24/2020    IR THORACENTESIS CATH W IMAGE  11/25/2020       Family History:  Family History   Problem Relation Age of Onset    Hypertension Mother     Stroke Mother     Melanoma Mother     Stroke Father     Melanoma Brother    [facility] Melanoma Child        Social History:  Social History     Tobacco Use    Smoking status: Former Smoker     Types: Cigarettes    Smokeless tobacco: Never Used    Tobacco comment: quit july 2020   Substance Use Topics    Alcohol use: Not Currently    Drug use: Never       Allergies: Allergies   Allergen Reactions    Sulfa (Sulfonamide Antibiotics) Anaphylaxis    Sulfamethoxazole-Trimethoprim Unknown (comments)         Review of Systems     Review of Systems   Constitutional: Negative for chills and fever. HENT: Negative for congestion, sinus pressure and sinus pain. Respiratory: Positive for shortness of breath. Negative for cough and wheezing. Chronic SOB   Cardiovascular: Negative for chest pain and leg swelling. Gastrointestinal: Positive for diarrhea. Negative for abdominal pain, nausea and vomiting. Genitourinary: Negative for dysuria, frequency and urgency. Musculoskeletal: Negative for arthralgias and myalgias. Skin: Negative. Neurological: Positive for dizziness, syncope and light-headedness. Negative for weakness, numbness and headaches. Psychiatric/Behavioral: Negative. Physical Exam     Physical Exam  Vitals signs and nursing note reviewed. Constitutional:       General: She is not in acute distress. Appearance: Normal appearance. She is underweight. She is ill-appearing. She is not toxic-appearing. HENT:      Head: Normocephalic and atraumatic. Right Ear: Hearing normal.      Left Ear: Hearing normal.      Nose: Nose normal.      Mouth/Throat:      Mouth: Mucous membranes are moist.   Eyes:      General: Lids are normal.      Extraocular Movements: Extraocular movements intact. Pupils: Pupils are equal, round, and reactive to light. Neck:      Musculoskeletal: Normal range of motion and neck supple. No muscular tenderness. Cardiovascular:      Rate and Rhythm: Normal rate and regular rhythm. Pulses: Normal pulses.            Radial pulses are 2+ on the right side and 2+ on the left side. Dorsalis pedis pulses are 2+ on the right side and 2+ on the left side. Pulmonary:      Effort: Pulmonary effort is normal. No accessory muscle usage or respiratory distress. Breath sounds: Normal breath sounds. No wheezing or rhonchi. Comments: 4 L nasal canula present  Abdominal:      General: Bowel sounds are normal.      Palpations: Abdomen is soft. Tenderness: There is no abdominal tenderness. There is no right CVA tenderness or left CVA tenderness. Musculoskeletal: Normal range of motion. Right lower leg: No edema. Left lower leg: No edema. Feet:      Right foot:      Skin integrity: No skin breakdown. Left foot:      Skin integrity: No skin breakdown. Skin:     General: Skin is warm and dry. Capillary Refill: Capillary refill takes less than 2 seconds. Findings: No abrasion, bruising, ecchymosis, erythema or signs of injury. Neurological:      Mental Status: She is alert and oriented to person, place, and time. GCS: GCS eye subscore is 4. GCS verbal subscore is 5. GCS motor subscore is 6. Cranial Nerves: Cranial nerves are intact. Sensory: Sensation is intact. Motor: Weakness present. Comments: Generalized weakness, gait not observed   Psychiatric:         Attention and Perception: Attention normal.         Mood and Affect: Mood normal.         Behavior: Behavior normal. Behavior is cooperative.          Cognition and Memory: Cognition normal.         Lab and Diagnostic Study Results     Labs -     Recent Results (from the past 12 hour(s))   CBC WITH AUTOMATED DIFF    Collection Time: 04/13/21  5:45 PM   Result Value Ref Range    WBC 14.1 (H) 3.6 - 11.0 K/uL    RBC 3.18 (L) 3.80 - 5.20 M/uL    HGB 10.6 (L) 11.5 - 16.0 g/dL    HCT 30.7 (L) 35.0 - 47.0 %    MCV 96.5 80.0 - 99.0 FL    MCH 33.3 26.0 - 34.0 PG    MCHC 34.5 30.0 - 36.5 g/dL    RDW 13.1 11.5 - 14.5 %    PLATELET 952 724 - 400 K/uL    MPV 9.2 8.9 - 12.9 FL    NRBC 0.0 0  WBC    ABSOLUTE NRBC 0.00 0.00 - 0.01 K/uL    NEUTROPHILS 87 (H) 32 - 75 %    LYMPHOCYTES 7 (L) 12 - 49 %    MONOCYTES 5 5 - 13 %    EOSINOPHILS 1 0 - 7 %    BASOPHILS 0 0 - 1 %    IMMATURE GRANULOCYTES 0 0.0 - 0.5 %    ABS. NEUTROPHILS 12.3 (H) 1.8 - 8.0 K/UL    ABS. LYMPHOCYTES 1.0 0.8 - 3.5 K/UL    ABS. MONOCYTES 0.8 0.0 - 1.0 K/UL    ABS. EOSINOPHILS 0.1 0.0 - 0.4 K/UL    ABS. BASOPHILS 0.0 0.0 - 0.1 K/UL    ABS. IMM. GRANS. 0.1 (H) 0.00 - 0.04 K/UL    DF AUTOMATED     METABOLIC PANEL, COMPREHENSIVE    Collection Time: 04/13/21  5:45 PM   Result Value Ref Range    Sodium 133 (L) 136 - 145 mmol/L    Potassium 3.2 (L) 3.5 - 5.1 mmol/L    Chloride 100 97 - 108 mmol/L    CO2 22 21 - 32 mmol/L    Anion gap 11 5 - 15 mmol/L    Glucose 94 65 - 100 mg/dL    BUN 27 (H) 6 - 20 mg/dL    Creatinine 2.33 (H) 0.55 - 1.02 mg/dL    BUN/Creatinine ratio 12 12 - 20      GFR est AA 26 (L) >60 ml/min/1.73m2    GFR est non-AA 22 (L) >60 ml/min/1.73m2    Calcium 8.2 (L) 8.5 - 10.1 mg/dL    Bilirubin, total 0.2 0.2 - 1.0 mg/dL    AST (SGOT) 14 (L) 15 - 37 U/L    ALT (SGPT) 24 12 - 78 U/L    Alk.  phosphatase 113 45 - 117 U/L    Protein, total 6.1 (L) 6.4 - 8.2 g/dL    Albumin 2.5 (L) 3.5 - 5.0 g/dL    Globulin 3.6 2.0 - 4.0 g/dL    A-G Ratio 0.7 (L) 1.1 - 2.2     TROPONIN I    Collection Time: 04/13/21  5:45 PM   Result Value Ref Range    Troponin-I, Qt. <0.05 <0.05 ng/mL   GLUCOSE, POC    Collection Time: 04/13/21  7:17 PM   Result Value Ref Range    Glucose (POC) 131 (H) 65 - 100 mg/dL    Performed by [employer] ZAMAN/ [facility]OS[employer]    Collection Time: 04/13/21 10:29 PM   Result Value Ref Range    Color Yellow/Straw      Appearance Turbid (A) Clear      Specific gravity 1.019 1.003 - 1.030      pH (UA) 5.0 5.0 - 8.0      Protein >300 (A) Negative mg/dL    Glucose Negative Negative mg/dL    Ketone 5 (A) Negative mg/dL    Bilirubin Negative Negative Blood Negative Negative      Urobilinogen 0.1 0.1 - 1.0 EU/dL    Nitrites Negative Negative      Leukocyte Esterase Negative Negative     LACTIC ACID    Collection Time: 04/13/21 10:29 PM   Result Value Ref Range    Lactic acid 0.3 (L) 0.4 - 2.0 mmol/L   URINE MICROSCOPIC    Collection Time: 04/13/21 10:29 PM   Result Value Ref Range    WBC 5-10 0 - 4 /hpf    RBC 0-5 0 - 5 /hpf    Bacteria Negative Negative /hpf    Mucus Trace (A) Negative /lpf    Hyaline cast 5-10 0 - 5 /lpf    Yeast Present (A) Negative         Radiologic Studies -   [unfilled]  CT Results  (Last 48 hours)               04/13/21 2005  CT HEAD WO CONT Final result    Impression:  Head CT:   No acute intracranial abnormality. Cervical spine CT:   1. No acute fracture of the cervical spine. 2.  Multilevel cervical spondylosis. 3.  Emphysema. Narrative:  Studies:   Head CT without contrast.   Cervical spine CT without contrast.       Clinical Indication: Fall. Comparison: Head CT dated 3/31/2021 and neck CTA dated 1/6/2021. Technique: Routine volume acquisition of the head and cervical spine was   performed without contrast using soft tissue and bone kernels. Coronal and   sagittal reconstructions were generated and reviewed. A 3-D volume rendering of   the cervical spine was also generated and reviewed. Dose reduction: All CT scans   at this facility are performed using dose reduction optimization techniques as   appropriate to a performed exam including the following-automated exposure   control, adjustments of mA and/or Kv according to patient size, or use of   iterative reconstructive technique. Findings:       Head CT:   The ventricles are normal in size and configuration. No midline shift. The basal cisterns are patent. No acute hemorrhage, abnormal   extra-axial fluid, or evidence of large territorial ischemia. Unchanged   calcification in the central renzo.  Scattered vague foci of hypodensity involving   the cerebral hemispheric white matter are nonspecific but most commonly   associated with chronic small vessel ischemic changes. Unremarkable orbits. The included paranasal sinuses and mastoid air cells are   clear. No acute fracture. Unremarkable extracalvarial soft tissues. Cervical spine CT:   Anatomic alignment. Vertebral body heights are preserved. No evidence of an   acute fracture. Intervertebral disc height loss throughout the cervical spine with associated   degenerative endplate changes, anterior osteophytes, and broad-based disc   osteophyte complex is. Multilevel facet and uncovertebral hypertrophy. Mild   spinal canal stenosis at C4-C5 and C5-C6. Varying degrees of neuroforaminal   narrowing throughout the cervical spine. Densely calcified plaque in the left carotid bulb. Evidence of prior right   carotid endarterectomy. Centrilobular emphysema. 04/13/21 2005  CT SPINE CERV WO CONT Final result    Impression:  Head CT:   No acute intracranial abnormality. Cervical spine CT:   1. No acute fracture of the cervical spine. 2.  Multilevel cervical spondylosis. 3.  Emphysema. Narrative:  Studies:   Head CT without contrast.   Cervical spine CT without contrast.       Clinical Indication: Fall. Comparison: Head CT dated 3/31/2021 and neck CTA dated 1/6/2021. Technique: Routine volume acquisition of the head and cervical spine was   performed without contrast using soft tissue and bone kernels. Coronal and   sagittal reconstructions were generated and reviewed. A 3-D volume rendering of   the cervical spine was also generated and reviewed.  Dose reduction: All CT scans   at this facility are performed using dose reduction optimization techniques as   appropriate to a performed exam including the following-automated exposure   control, adjustments of mA and/or Kv according to patient size, or use of   iterative reconstructive technique. Findings:       Head CT:   The ventricles are normal in size and configuration. No midline shift. The basal cisterns are patent. No acute hemorrhage, abnormal   extra-axial fluid, or evidence of large territorial ischemia. Unchanged   calcification in the central renzo. Scattered vague foci of hypodensity involving   the cerebral hemispheric white matter are nonspecific but most commonly   associated with chronic small vessel ischemic changes. Unremarkable orbits. The included paranasal sinuses and mastoid air cells are   clear. No acute fracture. Unremarkable extracalvarial soft tissues. Cervical spine CT:   Anatomic alignment. Vertebral body heights are preserved. No evidence of an   acute fracture. Intervertebral disc height loss throughout the cervical spine with associated   degenerative endplate changes, anterior osteophytes, and broad-based disc   osteophyte complex is. Multilevel facet and uncovertebral hypertrophy. Mild   spinal canal stenosis at C4-C5 and C5-C6. Varying degrees of neuroforaminal   narrowing throughout the cervical spine. Densely calcified plaque in the left carotid bulb. Evidence of prior right   carotid endarterectomy. Centrilobular emphysema. CXR Results  (Last 48 hours)               04/13/21 1815  XR CHEST PORT Final result    Impression:  No acute process. Narrative:  Portable chest, 10 hours, compared with 2021. The heart, mediastinum, and pulmonary vasculature appear within normal limits. No evidence of pulmonary edema, air space pneumonia, or pleural effusion. No   evidence of pneumothorax. Calcified aortic knob. Clips overlie soft tissues of   the right lower neck. Medical Decision Making and ED Course   - I am the first and primary provider for this patient AND AM THE PRIMARY PROVIDER OF RECORD.     - I reviewed the vital signs, available nursing notes, past medical history, past surgical history, family history and social history. - Initial assessment performed. The patients presenting problems have been discussed, and the staff are in agreement with the care plan formulated and outlined with them. I have encouraged them to ask questions as they arise throughout their visit. Vital Signs-Reviewed the patient's vital signs. Patient Vitals for the past 12 hrs:   Temp Pulse Resp BP SpO2   04/13/21 1817  76  109/64    04/13/21 1815  74 16 125/64    04/13/21 1724 97.6 °F (36.4 °C) 76 16 (!) 105/59 100 %       EKG interpretation: (Preliminary): Performed at 1818, and read at 1822  Rhythm: normal sinus rhythm; and regular . Rate (approx.): 73; Axis: normal; MD interval: normal; QRS interval: normal ; ST/T wave: T wave inverted; Other findings: abnormal ekg. Prolonged QT      Records Reviewed: Old Medical Records    The patient presents with dizziness with a differential diagnosis of  cardiac dysrhythm, dizziness/vertigo, generalized weakness, syncope/loss of consciousness and vasovagal episode    ED Course:       ED Course as of Apr 13 2347   Tue Apr 13, 2021 2127 Pt reports she is DNR    [AM]      ED Course User Index  [AM] Bryce Teran NP         Provider Notes (Medical Decision Making):   Patient with syncope episode closed head injury was found by  who called 911. On assessment patient alert hemodynamically stable orthostatic vital signs obtained patient noted to be 125/64 supine however when transition to sitting blood pressure dropped to 105/59 patient was scared to stay in thinking she was going to have another acute syncope episode patient has extensive cardiac history. Labs at patient's baseline with CKD and potassium. Patient admitted to Dr. Kyler frank to services for observation due to continued symptomatic living alone at home and extensive cardiac history. X-ray of the chest negative, CT of the head and neck with arthritis noted.   Patient updated on plan of care verbalized understanding stable at this time      Consultations:       Consultations: Dr. Reynaldo Wilder        Procedures and Papa Chaparro, NP        Disposition     Disposition: Admitted to  the case was discussed with the admitting physician vernon      DISCHARGE PLAN:  1. Current Discharge Medication List      CONTINUE these medications which have NOT CHANGED    Details   potassium chloride (KLOR-CON) 20 mEq pack Take 1 Packet by mouth two (2) times daily (with meals). Qty: 60 Packet, Refills: 0      predniSONE (DELTASONE) 20 mg tablet Take 20 mg by mouth daily. Qty: 5 Tab, Refills: 0      NIFEdipine ER (ADALAT CC) 90 mg ER tablet Take 90 mg by mouth daily. fluticasone propionate (Flovent HFA) 110 mcg/actuation inhaler Take 1 Puff by inhalation every twelve (12) hours. ergocalciferol (Vitamin D2) 1,250 mcg (50,000 unit) capsule Take 50,000 Units by mouth every seven (7) days. q7days wednesday      acetaminophen (TYLENOL) 325 mg tablet Take 2 Tabs by mouth every six (6) hours as needed for Pain or Fever for up to 30 days. Qty: 180 Tab, Refills: 1      !! magnesium oxide (MAG-OX) 400 mg tablet Take 1 Tab by mouth daily for 30 days. Qty: 30 Tab, Refills: 0      hydrALAZINE (APRESOLINE) 50 mg tablet Take 1 Tab by mouth three (3) times daily. Qty: 90 Tab, Refills: 0      isosorbide dinitrate (ISORDIL) 10 mg tablet Take 1 Tab by mouth two (2) times a day. Qty: 90 Tab, Refills: 1      labetaloL (NORMODYNE) 200 mg tablet Take 1 Tab by mouth three (3) times daily. Qty: 90 Tab, Refills: 1      levothyroxine (SYNTHROID) 50 mcg tablet Take 1 Tab by mouth Daily (before breakfast). Qty: 30 Tab, Refills: 0      spironolactone (ALDACTONE) 25 mg tablet Take 1 Tab by mouth daily. Qty: 30 Tab, Refills: 0      esomeprazole (NexIUM) 40 mg capsule Take 40 mg by mouth.  Indications: Berry's esophagus      clopidogreL (PLAVIX) 75 mg tab Take 75 mg by mouth daily.      nortriptyline (PAMELOR) 50 mg capsule Take 50 mg by mouth nightly. Trelegy Ellipta 100-62.5-25 mcg inhaler 1 Puff daily. albuterol (PROVENTIL HFA, VENTOLIN HFA, PROAIR HFA) 90 mcg/actuation inhaler       rosuvastatin (CRESTOR) 10 mg tablet       Oxygen 5 Devices as needed. Pt wears 5LPM as needed- states she uses it after an axiety attack      albuterol-ipratropium (DUO-NEB) 2.5 mg-0.5 mg/3 ml nebu 3 mL by Nebulization route every six (6) hours. Qty: 120 Nebule, Refills: 0      ALPRAZolam (XANAX) 0.25 mg tablet Take 0.25 mg by mouth daily as needed for Anxiety. sertraline (Zoloft) 25 mg tablet Take 50 mg by mouth daily. levETIRAcetam (Keppra) 500 mg tablet Take 500 mg by mouth two (2) times a day. carBAMazepine, mood stabiliz, 200 mg CM12 Take 200 mg by mouth two (2) times a day. ferrous sulfate 325 mg (65 mg iron) tablet Take 325 mg by mouth Daily (before breakfast). folic acid (FOLVITE) 1 mg tablet Take 1 mg by mouth daily. aspirin delayed-release 81 mg tablet Take  by mouth daily. !! magnesium oxide (MAG-OX) 400 mg tablet Take 400 mg by mouth daily. !! - Potential duplicate medications found. Please discuss with provider. 2.   Follow-up Information    None       3. Return to ED if worse   4. Current Discharge Medication List          Diagnosis     Clinical Impression:   1. Syncope and collapse    2. Orthostatic hypotension    3. CKD (chronic kidney disease), stage IV ([facility] [address]        Attestations:    Steve Guerrero, NP    Please note that this dictation was completed with Singspiel, the [employer] voice recognition software. Quite often unanticipated grammatical, syntax, homophones, and other interpretive errors are inadvertently transcribed by the computer software. Please disregard these errors. Please excuse any errors that have escaped final proofreading. Thank you.

## 2021-04-14 NOTE — ED NOTES
PIV removed, catheter tip intact, bleeding  Controlled at this time.  Taken to [place] via wheelchair with SO for D/C

## 2021-04-14 NOTE — H&P
History and Physical              Subjective :   Chief Complaint : Syncope with fall    Source of information : Patient, old medical records    History of present illness:   54 y.o. female with multiple medical problems is brought to the emergency room with an episode of syncope. Complains whenever she is standing up feeling lightheaded and dizzy, associated with shortness of breath. And when she stood up suddenly felt dizzy and passed out. She denies any chest pain, palpitations. Complains of diarrhea that is worse today, denies any abdominal pain. Denies any trouble with urination. Admits appetite is not great. She denies any fever or chills. On home oxygen, in the emergency room on 2 L saturating 99%.     Past Medical History:   Diagnosis Date    Anxiety 10/2/2020    Carotid stenosis     Chronic anemia     Chronic respiratory failure (HCC)     COPD (chronic obstructive pulmonary disease) with emphysema (HCC) 10/2/2020    Depression     Diastolic CHF (HCC)     Dysphagia 10/2/2020    GERD (gastroesophageal reflux disease)     High cholesterol     Hypertension     Hypothyroid     Iron deficiency anemia 10/2/2020    Mitral valve regurgitation 10/2/2020    Renal artery stenosis (HCC)     Seizure disorder (HCC)      Past Surgical History:   Procedure Laterality Date    HX CAROTID ENDARTERECTOMY      HX CHOLECYSTECTOMY      HX RENAL ARTERY STENT      HX ROTATOR CUFF REPAIR Right     HX TUBAL LIGATION      IR THORACENTESIS CATH W IMAGE  11/24/2020    IR THORACENTESIS CATH W IMAGE  11/25/2020     Family History   Problem Relation Age of Onset    Hypertension Mother     Stroke Mother     Melanoma Mother     Stroke Father     Melanoma Brother     Melanoma Child       Social History     Tobacco Use    Smoking status: Former Smoker     Types: Cigarettes    Smokeless tobacco: Never Used    Tobacco comment: quit july 2020   Substance Use Topics    Alcohol use: Not Currently The sheath is inserted into the right internal jugular vein.  Prior to Admission medications    Medication Sig Start Date End Date Taking? Authorizing Provider   hydrALAZINE (APRESOLINE) 100 mg tablet Take 100 mg by mouth three (3) times daily. 3/26/21   Provider, Historical   labetaloL (NORMODYNE) 300 mg tablet Take 300 mg by mouth two (2) times a day. 3/3/21   Provider, Historical   carBAMazepine (TEGretol) 200 mg tablet Take 200 mg by mouth two (2) times a day. 2/22/21   Provider, Historical   spironolactone (ALDACTONE) 50 mg tablet Take 50 mg by mouth daily. 3/31/21   Provider, Historical   sertraline (ZOLOFT) 50 mg tablet Take 50 mg by mouth daily. 2/11/21   Provider, Historical   potassium chloride (KLOR-CON) 20 mEq pack Take 1 Packet by mouth two (2) times daily (with meals). 4/1/21   Lynn Díaz MD   NIFEdipine ER (ADALAT CC) 90 mg ER tablet Take 90 mg by mouth daily. Provider, Historical   ergocalciferol (Vitamin D2) 1,250 mcg (50,000 unit) capsule Take 50,000 Units by mouth every seven (7) days. q7days wednesday    Provider, Historical   acetaminophen (TYLENOL) 325 mg tablet Take 2 Tabs by mouth every six (6) hours as needed for Pain or Fever for up to 30 days. 3/16/21 4/15/21  Benjamin Carrillo NP   magnesium oxide (MAG-OX) 400 mg tablet Take 1 Tab by mouth daily for 30 days. 3/17/21 4/16/21  Benjamin Carrillo NP   isosorbide dinitrate (ISORDIL) 10 mg tablet Take 1 Tab by mouth two (2) times a day. 1/23/21   Juan Longo MD   levothyroxine (SYNTHROID) 50 mcg tablet Take 1 Tab by mouth Daily (before breakfast). 1/24/21   Juan Longo MD   esomeprazole (NexIUM) 40 mg capsule Take 40 mg by mouth. Indications: Berry's esophagus    Provider, Historical   clopidogreL (PLAVIX) 75 mg tab Take 75 mg by mouth daily. 12/11/20   Provider, Historical   nortriptyline (PAMELOR) 50 mg capsule Take 50 mg by mouth nightly. Other, Phys, MD   Trelegy Ellipta 100-62.5-25 mcg inhaler 1 Puff daily.  11/12/20   Provider, Historical   albuterol (PROVENTIL HFA, VENTOLIN HFA, PROAIR HFA) 90 mcg/actuation inhaler  11/5/20   Provider, Historical   rosuvastatin (CRESTOR) 10 mg tablet  11/6/20   Provider, Historical   Oxygen 5 Devices as needed. Pt wears 5LPM as needed- states she uses it after an axiety attack    Provider, Historical   albuterol-ipratropium (DUO-NEB) 2.5 mg-0.5 mg/3 ml nebu 3 mL by Nebulization route every six (6) hours. 10/2/20   Ivory Stone MD   ALPRAZolam [place]) 0.25 mg tablet Take 0.25 mg by mouth daily as needed for Anxiety. Provider, Historical   levETIRAcetam (Keppra) 500 mg tablet Take 500 mg by mouth two (2) times a day. Provider, Historical   ferrous sulfate 325 mg (65 mg iron) tablet Take 325 mg by mouth Daily (before breakfast). Provider, Historical   folic acid (FOLVITE) 1 mg tablet Take 1 mg by mouth daily. Provider, Historical   aspirin delayed-release 81 mg tablet Take  by mouth daily. Provider, Historical     Allergies   Allergen Reactions    Sulfa (Sulfonamide Antibiotics) Anaphylaxis    Sulfamethoxazole-Trimethoprim Unknown (comments)             Review of Systems:  Constitutional: Appetite is not good, admits weight loss, no fever, no chills, no night sweats. Eye: No recent visual disturbances, no discharge, no double vision. Ear/nose/mouth/throat : No hearing disturbance, no ear pain, no nasal congestion, no sore throat, no trouble swallowing. Respiratory : No trouble breathing, no cough, ++ shortness of breath, no hemoptysis, no wheezing. Cardiovascular : No chest pain, no palpitation,  no orthopnea,  no peripheral edema. Gastrointestinal : No nausea, no vomiting, +++ diarrhea,  No heartburn, No abdominal pain. Genitourinary : No dysuria, no hematuria,  No incontinence. Lymphatics : No swollen glands -Neck, axillary, inguinal.  Endocrine : No excessive thirst, No polyuria No cold intolerance, No heat intolerance. Immunologic : No hives, urticaria, No seasonal allergies.    Musculoskeletal : No joint swelling, No pain, No effusion. Integumentary : No rash, No pruritus, No ecchymosis. Hematology : No petechiae, No easy bruising,  No tendency to bleed easy. Neurology : Denies change in mental status, No abnormal balance,  No confusion, No numbness or tingling. Psychiatric : No mood swings, No anxiety, No depression. Vitals:     Patient Vitals for the past 12 hrs:   Temp Pulse Resp BP SpO2   04/14/21 0005     100 %   04/13/21 2300  78 15 132/67 100 %   04/13/21 2100  78 15 112/77 100 %   04/13/21 1930  77 16 (!) 154/68 100 %   04/13/21 1817  76  109/64    04/13/21 1815  74 16 125/64    04/13/21 1724 97.6 °F (36.4 °C) 76 16 (!) 105/59 100 %       Physical Exam:   General : Looks tired, very weak. Looks malnourished. HEENT : PERRLA, normal oral mucosa, atraumatic normocephalic, Normal ear and nose. Neck : Supple, no JVD, no masses noted, no carotid bruit. Lungs : Breath sounds with moderate air entry bilaterally, prolonged expirations, no active wheezing noted. Not using accessory muscles. CVS : Rhythm rate regular, S1+, S2+, no murmur or gallop. Abdomen : Soft, nontender, , bowel sounds active. Extremities : No edema noted,  pedal pulses palpable. Musculoskeletal : Fair range of motion, no joint swelling or effusion, generalized wasting of muscle mass. Skin : Dry, poor skin turgor. No pathological rash. Lymphatic : No cervical lymphadenopathy. Neurological : Awake, alert, oriented to time place person. No neurological deficits. Psychiatric : Mood and affect appears appropriate to the situation.        Data Review:   Recent Results (from the past 24 hour(s))   CBC WITH AUTOMATED DIFF    Collection Time: 04/13/21  5:45 PM   Result Value Ref Range    WBC 14.1 (H) 3.6 - 11.0 K/uL    RBC 3.18 (L) 3.80 - 5.20 M/uL    HGB 10.6 (L) 11.5 - 16.0 g/dL    HCT 30.7 (L) 35.0 - 47.0 %    MCV 96.5 80.0 - 99.0 FL    MCH 33.3 26.0 - 34.0 PG    MCHC 34.5 30.0 - 36.5 g/dL    RDW 13.1 11.5 - 14.5 %    PLATELET 726 150 - 400 K/uL    MPV 9.2 8.9 - 12.9 FL    NRBC 0.0 0  WBC    ABSOLUTE NRBC 0.00 0.00 - 0.01 K/uL    NEUTROPHILS 87 (H) 32 - 75 %    LYMPHOCYTES 7 (L) 12 - 49 %    MONOCYTES 5 5 - 13 %    EOSINOPHILS 1 0 - 7 %    BASOPHILS 0 0 - 1 %    IMMATURE GRANULOCYTES 0 0.0 - 0.5 %    ABS. NEUTROPHILS 12.3 (H) 1.8 - 8.0 K/UL    ABS. LYMPHOCYTES 1.0 0.8 - 3.5 K/UL    ABS. MONOCYTES 0.8 0.0 - 1.0 K/UL    ABS. EOSINOPHILS 0.1 0.0 - 0.4 K/UL    ABS. BASOPHILS 0.0 0.0 - 0.1 K/UL    ABS. IMM. GRANS. 0.1 (H) 0.00 - 0.04 K/UL    DF AUTOMATED     METABOLIC PANEL, COMPREHENSIVE    Collection Time: 04/13/21  5:45 PM   Result Value Ref Range    Sodium 133 (L) 136 - 145 mmol/L    Potassium 3.2 (L) 3.5 - 5.1 mmol/L    Chloride 100 97 - 108 mmol/L    CO2 22 21 - 32 mmol/L    Anion gap 11 5 - 15 mmol/L    Glucose 94 65 - 100 mg/dL    BUN 27 (H) 6 - 20 mg/dL    Creatinine 2.33 (H) 0.55 - 1.02 mg/dL    BUN/Creatinine ratio 12 12 - 20      GFR est AA 26 (L) >60 ml/min/1.73m2    GFR est non-AA 22 (L) >60 ml/min/1.73m2    Calcium 8.2 (L) 8.5 - 10.1 mg/dL    Bilirubin, total 0.2 0.2 - 1.0 mg/dL    AST (SGOT) 14 (L) 15 - 37 U/L    ALT (SGPT) 24 12 - 78 U/L    Alk.  phosphatase 113 45 - 117 U/L    Protein, total 6.1 (L) 6.4 - 8.2 g/dL    Albumin 2.5 (L) 3.5 - 5.0 g/dL    Globulin 3.6 2.0 - 4.0 g/dL    A-G Ratio 0.7 (L) 1.1 - 2.2     TROPONIN I    Collection Time: 04/13/21  5:45 PM   Result Value Ref Range    Troponin-I, Qt. <0.05 <0.05 ng/mL   GLUCOSE, POC    Collection Time: 04/13/21  7:17 PM   Result Value Ref Range    Glucose (POC) 131 (H) 65 - 100 mg/dL    Performed by Christophe Brunner W/ [facility] MICROSCOPIC    Collection Time: 04/13/21 10:29 PM   Result Value Ref Range    Color Yellow/Straw      Appearance Turbid (A) Clear      Specific gravity 1.019 1.003 - 1.030      pH (UA) 5.0 5.0 - 8.0      Protein >300 (A) Negative mg/dL    Glucose Negative Negative mg/dL    Ketone 5 (A) Negative mg/dL    Bilirubin Negative Negative Blood Negative Negative      Urobilinogen 0.1 0.1 - 1.0 EU/dL    Nitrites Negative Negative      Leukocyte Esterase Negative Negative     LACTIC ACID    Collection Time: 04/13/21 10:29 PM   Result Value Ref Range    Lactic acid 0.3 (L) 0.4 - 2.0 mmol/L   URINE MICROSCOPIC    Collection Time: 04/13/21 10:29 PM   Result Value Ref Range    WBC 5-10 0 - 4 /hpf    RBC 0-5 0 - 5 /hpf    Bacteria Negative Negative /hpf    Mucus Trace (A) Negative /lpf    Hyaline cast 5-10 0 - 5 /lpf    Yeast Present (A) Negative         Radiologic Studies :   CT Results  (Last 48 hours)               04/13/21 2005  CT HEAD WO CONT Final result    Impression:  Head CT:   No acute intracranial abnormality. Cervical spine CT:   1. No acute fracture of the cervical spine. 2.  Multilevel cervical spondylosis. 3.  Emphysema. Narrative:  Studies:   Head CT without contrast.   Cervical spine CT without contrast.       Clinical Indication: Fall. Comparison: Head CT dated 3/31/2021 and neck CTA dated 1/6/2021. Technique: Routine volume acquisition of the head and cervical spine was   performed without contrast using soft tissue and bone kernels. Coronal and   sagittal reconstructions were generated and reviewed. A 3-D volume rendering of   the cervical spine was also generated and reviewed. Dose reduction: All CT scans   at this facility are performed using dose reduction optimization techniques as   appropriate to a performed exam including the following-automated exposure   control, adjustments of mA and/or Kv according to patient size, or use of   iterative reconstructive technique. Findings:       Head CT:   The ventricles are normal in size and configuration. No midline shift. The basal cisterns are patent. No acute hemorrhage, abnormal   extra-axial fluid, or evidence of large territorial ischemia. Unchanged   calcification in the central renzo.  Scattered vague foci of hypodensity involving   the cerebral hemispheric white matter are nonspecific but most commonly   associated with chronic small vessel ischemic changes. Unremarkable orbits. The included paranasal sinuses and mastoid air cells are   clear. No acute fracture. Unremarkable extracalvarial soft tissues. Cervical spine CT:   Anatomic alignment. Vertebral body heights are preserved. No evidence of an   acute fracture. Intervertebral disc height loss throughout the cervical spine with associated   degenerative endplate changes, anterior osteophytes, and broad-based disc   osteophyte complex is. Multilevel facet and uncovertebral hypertrophy. Mild   spinal canal stenosis at C4-C5 and C5-C6. Varying degrees of neuroforaminal   narrowing throughout the cervical spine. Densely calcified plaque in the left carotid bulb. Evidence of prior right   carotid endarterectomy. Centrilobular emphysema. 04/13/21 2005  CT SPINE CERV WO CONT Final result    Impression:  Head CT:   No acute intracranial abnormality. Cervical spine CT:   1. No acute fracture of the cervical spine. 2.  Multilevel cervical spondylosis. 3.  Emphysema. Narrative:  Studies:   Head CT without contrast.   Cervical spine CT without contrast.       Clinical Indication: Fall. Comparison: Head CT dated 3/31/2021 and neck CTA dated 1/6/2021. Technique: Routine volume acquisition of the head and cervical spine was   performed without contrast using soft tissue and bone kernels. Coronal and   sagittal reconstructions were generated and reviewed. A 3-D volume rendering of   the cervical spine was also generated and reviewed. Dose reduction: All CT scans   at this facility are performed using dose reduction optimization techniques as   appropriate to a performed exam including the following-automated exposure   control, adjustments of mA and/or Kv according to patient size, or use of   iterative reconstructive technique. Findings:       Head CT:   The ventricles are normal in size and configuration. No midline shift. The basal cisterns are patent. No acute hemorrhage, abnormal   extra-axial fluid, or evidence of large territorial ischemia. Unchanged   calcification in the central renzo. Scattered vague foci of hypodensity involving   the cerebral hemispheric white matter are nonspecific but most commonly   associated with chronic small vessel ischemic changes. Unremarkable orbits. The included paranasal sinuses and mastoid air cells are   clear. No acute fracture. Unremarkable extracalvarial soft tissues. Cervical spine CT:   Anatomic alignment. Vertebral body heights are preserved. No evidence of an   acute fracture. Intervertebral disc height loss throughout the cervical spine with associated   degenerative endplate changes, anterior osteophytes, and broad-based disc   osteophyte complex is. Multilevel facet and uncovertebral hypertrophy. Mild   spinal canal stenosis at C4-C5 and C5-C6. Varying degrees of neuroforaminal   narrowing throughout the cervical spine. Densely calcified plaque in the left carotid bulb. Evidence of prior right   carotid endarterectomy. Centrilobular emphysema. CXR Results  (Last 48 hours)               04/13/21 1815  XR CHEST PORT Final result    Impression:  No acute process. Narrative:  Portable chest, 10 hours, compared with 2021. The heart, mediastinum, and pulmonary vasculature appear within normal limits. No evidence of pulmonary edema, air space pneumonia, or pleural effusion. No   evidence of pneumothorax. Calcified aortic knob. Clips overlie soft tissues of   the right lower neck. Assessment and Plan :     Syncope: Secondary to orthostatic hypotension, in the emergency room she clearly had orthostatic hypotensive changes.   Also there is a possibility of hypotension from multiple medication she is taking. Hypertension benign essential with renovascular etiology : Seems to have fluctuating blood pressures and issues of hypotension. Requested nephrology consultation will follow with recommendations. Did not restart all her home blood pressure medications, I will follow up with nephrology recommendations. Hypokalemia: On supplementation still having trouble with potassium supplements. Started on Aldactone, can increase the dose. Previous lab work suggestive of hyperaldosteronism. Chronic obstructive pulmonary disease: No evidence of exacerbation, we will continue home maintenance medications and oxygen    History of seizures: On carbamazepine and Keppra which we will continue    Hypothyroidism: We will start continue supplementation, need to check TSH. History of gastritis: On Protonix which we will continue    Patient is admitted to medical telemetry, full CODE STATUS, home medications reviewed and documented. She has no advance medical directives. CC : [place]  Signed By: Melissa Solomon MD     April 14, 2021      This dictation was done by dragon, computer voice recognition software. Often unanticipated grammatical, syntax, [place] phones and other interpretive errors are inadvertently transcribed. Please excuse errors that have escaped final proofreading.

## 2021-04-14 NOTE — PROGRESS NOTES
History & Physical    Primary Care Provider: Martell Cherry  Source of Information: Patient/family     History of Presenting Illness:   Humberto Avitia is a 53 yo female, with a past medical history significant for multitude of medical issues including CAD, CHF, COPD, hypertension, hyperlipidemia, renal artery stenosis, mitral valve regurgitation, and seizure disorder, who presented to the ED on 04/13/21 with cc of syncope with closed head injury, generalized weakness, and diarrhea. Patient developed diarrhea on the morning of admission and had a fall when she became lightheaded with SOB and chest pain. She was found by her  who called 911. EMS gave patient 400 mL of fluids. The patient was recently hospitalized with similar problems and discharged on 04/01/21 discharged to hospice. Patient is seen in the ED this morning. She's currently on 2 L of 02 and in no acute distress. The patient denies any active chest pain at this time, worsening SOB, fever, chills, nausea, vomiting, abdominal pain, urinary frequency urgency or burning. She still complains of weakness and headache. Patient's potassium (3.2) and sodium (133) are low. Her BUN (27) and creatinine (2.33) are elevated. UA shows more than 300 mg/dL protein and 5 mg/dL ketones. Lactic acid is 0.3. Albumin is reduced (2.5). CBC indicates RBC (3.18), HGB (10.6), HCT (30.7) likely owing to chronic anemia. X ray is clear. CT showed no acute process. Review of Systems:  A comprehensive review of systems was negative except for that written in the History of Present Illness.      Past Medical History:   Diagnosis Date    Anxiety 10/2/2020    Carotid stenosis     Chronic anemia     Chronic respiratory failure (HCC)     COPD (chronic obstructive pulmonary disease) with emphysema (HCC) 10/2/2020    Depression     Diastolic CHF (HCC)     Dysphagia 10/2/2020    GERD (gastroesophageal reflux disease)     High cholesterol     Hypertension     Hypothyroid     Iron deficiency anemia 10/2/2020    Mitral valve regurgitation 10/2/2020    Renal artery stenosis (HCC)     Seizure disorder (HCC)         Past Surgical History:   Procedure Laterality Date    HX CAROTID ENDARTERECTOMY      HX CHOLECYSTECTOMY      HX RENAL ARTERY STENT      HX ROTATOR CUFF REPAIR Right     HX TUBAL LIGATION      IR THORACENTESIS CATH W IMAGE  11/24/2020    IR THORACENTESIS CATH W IMAGE  11/25/2020       Prior to Admission medications    Medication Sig Start Date End Date Taking? Authorizing Provider   hydrALAZINE (APRESOLINE) 100 mg tablet Take 100 mg by mouth three (3) times daily. 3/26/21   Provider, Historical   labetaloL (NORMODYNE) 300 mg tablet Take 300 mg by mouth two (2) times a day. 3/3/21   Provider, Historical   carBAMazepine (TEGretol) 200 mg tablet Take 200 mg by mouth two (2) times a day. 2/22/21   Provider, Historical   spironolactone (ALDACTONE) 50 mg tablet Take 50 mg by mouth daily. 3/31/21   Provider, Historical   sertraline (ZOLOFT) 50 mg tablet Take 50 mg by mouth daily. 2/11/21   Provider, Historical   potassium chloride (KLOR-CON) 20 mEq pack Take 1 Packet by mouth two (2) times daily (with meals). 4/1/21   Vannesa Díaz Do, MD   NIFEdipine ER (ADALAT CC) 90 mg ER tablet Take 90 mg by mouth daily. Provider, Historical   ergocalciferol (Vitamin D2) 1,250 mcg (50,000 unit) capsule Take 50,000 Units by mouth every seven (7) days. q7days wednesday    Provider, Historical   acetaminophen (TYLENOL) 325 mg tablet Take 2 Tabs by mouth every six (6) hours as needed for Pain or Fever for up to 30 days. 3/16/21 4/15/21  Benjamin Carrillo NP   magnesium oxide (MAG-OX) 400 mg tablet Take 1 Tab by mouth daily for 30 days. 3/17/21 4/16/21  Benjamin Carrillo NP   isosorbide dinitrate (ISORDIL) 10 mg tablet Take 1 Tab by mouth two (2) times a day.  1/23/21   Bhavesh Hayden MD   levothyroxine (SYNTHROID) 50 mcg tablet Take 1 Tab by mouth Daily (before breakfast). 1/24/21   Kell Howe MD   esomeprazole (NexIUM) 40 mg capsule Take 40 mg by mouth. Indications: Berry's esophagus    Provider, Historical   clopidogreL (PLAVIX) 75 mg tab Take 75 mg by mouth daily. 12/11/20   Provider, Historical   nortriptyline (PAMELOR) 50 mg capsule Take 50 mg by mouth nightly. Other, MD Heladio Timmons Ellipta 100-62.5-25 mcg inhaler 1 Puff daily. 11/12/20   Provider, Historical   albuterol (PROVENTIL HFA, VENTOLIN HFA, PROAIR HFA) 90 mcg/actuation inhaler  11/5/20   Provider, Historical   rosuvastatin (CRESTOR) 10 mg tablet  11/6/20   Provider, Historical   Oxygen 5 Devices as needed. Pt wears 5LPM as needed- states she uses it after an axiety attack    Provider, Historical   albuterol-ipratropium (DUO-NEB) 2.5 mg-0.5 mg/3 ml nebu 3 mL by Nebulization route every six (6) hours. 10/2/20   Starr Wen MD   ALPRAZolam Severo Rhymes) 0.25 mg tablet Take 0.25 mg by mouth daily as needed for Anxiety. Provider, Historical   levETIRAcetam (Keppra) 500 mg tablet Take 500 mg by mouth two (2) times a day. Provider, Historical   ferrous sulfate 325 mg (65 mg iron) tablet Take 325 mg by mouth Daily (before breakfast). Provider, Historical   folic acid (FOLVITE) 1 mg tablet Take 1 mg by mouth daily. Provider, Historical   aspirin delayed-release 81 mg tablet Take  by mouth daily.     Provider, Historical       Allergies   Allergen Reactions    Sulfa (Sulfonamide Antibiotics) Anaphylaxis    Sulfamethoxazole-Trimethoprim Unknown (comments)        Family History   Problem Relation Age of Onset    Hypertension Mother     Stroke Mother     Melanoma Mother     Stroke Father     Melanoma Brother     Melanoma Child         Social History     Socioeconomic History    Marital status: SINGLE     Spouse name: Not on file    Number of children: Not on file    Years of education: Not on file    Highest education level: Not on file   Tobacco Use    Smoking status: Former Smoker     Types: Cigarettes    Smokeless tobacco: Never Used    Tobacco comment: quit july 2020   Substance and Sexual Activity    Alcohol use: Not Currently    Drug use: Never   Other Topics Concern   Social History Narrative    Lives at home with clarissa. CODE STATUS:  DNR    Full X   Other      Objective:     Physical Exam:     Visit Vitals  BP (!) 146/62   Pulse 79   Temp 97.6 °F (36.4 °C)   Resp 15   Ht 5' 4\" (1.626 m)   Wt 45.4 kg (100 lb)   SpO2 99%   BMI 17.16 kg/m²    O2 Flow Rate (L/min): 3 l/min O2 Device: Nasal cannula    General:  Alert, cooperative, no distress, appears stated age. Head:  Normocephalic, without obvious abnormality, atraumatic. Eyes:  Conjunctivae/corneas clear. PERRL, EOMs intact. Nose: Nares normal. Septum midline. Mucosa normal. No drainage or sinus tenderness. Throat: Lips, mucosa, and tongue normal. Teeth and gums normal.   Neck: Supple, symmetrical, trachea midline, no adenopathy, thyroid: no enlargement/tenderness/nodules, no carotid bruit and no JVD. Back:   Symmetric, no curvature. ROM normal. No CVA tenderness. Lungs:   Clear to auscultation bilaterally. On 4L nasal canula. Chest wall:  No tenderness or deformity. Heart:  Regular rate and rhythm, S1, S2 normal, no murmur, click, rub or gallop. Abdomen:   Soft, non-tender. Bowel sounds normal. No masses,  No organomegaly. Extremities: Extremities normal, atraumatic, no cyanosis or edema. Pulses: 2+ and symmetric all extremities. Skin: Skin color, texture, turgor normal. No rashes or lesions   Neurologic: CNII-XII intact. No sensory deficits. Generalized weakness.         24 Hour Results:    Recent Results (from the past 24 hour(s))   CBC WITH AUTOMATED DIFF    Collection Time: 04/13/21  5:45 PM   Result Value Ref Range    WBC 14.1 (H) 3.6 - 11.0 K/uL    RBC 3.18 (L) 3.80 - 5.20 M/uL    HGB 10.6 (L) 11.5 - 16.0 g/dL    HCT 30.7 (L) 35.0 - 47.0 %    MCV 96.5 80.0 - 99.0 FL MCH 33.3 26.0 - 34.0 PG    MCHC 34.5 30.0 - 36.5 g/dL    RDW 13.1 11.5 - 14.5 %    PLATELET 973 201 - 737 K/uL    MPV 9.2 8.9 - 12.9 FL    NRBC 0.0 0  WBC    ABSOLUTE NRBC 0.00 0.00 - 0.01 K/uL    NEUTROPHILS 87 (H) 32 - 75 %    LYMPHOCYTES 7 (L) 12 - 49 %    MONOCYTES 5 5 - 13 %    EOSINOPHILS 1 0 - 7 %    BASOPHILS 0 0 - 1 %    IMMATURE GRANULOCYTES 0 0.0 - 0.5 %    ABS. NEUTROPHILS 12.3 (H) 1.8 - 8.0 K/UL    ABS. LYMPHOCYTES 1.0 0.8 - 3.5 K/UL    ABS. MONOCYTES 0.8 0.0 - 1.0 K/UL    ABS. EOSINOPHILS 0.1 0.0 - 0.4 K/UL    ABS. BASOPHILS 0.0 0.0 - 0.1 K/UL    ABS. IMM. GRANS. 0.1 (H) 0.00 - 0.04 K/UL    DF AUTOMATED     METABOLIC PANEL, COMPREHENSIVE    Collection Time: 04/13/21  5:45 PM   Result Value Ref Range    Sodium 133 (L) 136 - 145 mmol/L    Potassium 3.2 (L) 3.5 - 5.1 mmol/L    Chloride 100 97 - 108 mmol/L    CO2 22 21 - 32 mmol/L    Anion gap 11 5 - 15 mmol/L    Glucose 94 65 - 100 mg/dL    BUN 27 (H) 6 - 20 mg/dL    Creatinine 2.33 (H) 0.55 - 1.02 mg/dL    BUN/Creatinine ratio 12 12 - 20      GFR est AA 26 (L) >60 ml/min/1.73m2    GFR est non-AA 22 (L) >60 ml/min/1.73m2    Calcium 8.2 (L) 8.5 - 10.1 mg/dL    Bilirubin, total 0.2 0.2 - 1.0 mg/dL    AST (SGOT) 14 (L) 15 - 37 U/L    ALT (SGPT) 24 12 - 78 U/L    Alk.  phosphatase 113 45 - 117 U/L    Protein, total 6.1 (L) 6.4 - 8.2 g/dL    Albumin 2.5 (L) 3.5 - 5.0 g/dL    Globulin 3.6 2.0 - 4.0 g/dL    A-G Ratio 0.7 (L) 1.1 - 2.2     TROPONIN I    Collection Time: 04/13/21  5:45 PM   Result Value Ref Range    Troponin-I, Qt. <0.05 <0.05 ng/mL   EKG, 12 LEAD, INITIAL    Collection Time: 04/13/21  6:18 PM   Result Value Ref Range    Ventricular Rate 73 BPM    Atrial Rate 73 BPM    P-R Interval 130 ms    QRS Duration 86 ms    Q-T Interval 516 ms    QTC Calculation (Bezet) 568 ms    Calculated P Axis 74 degrees    Calculated R Axis 67 degrees    Calculated T Axis 76 degrees    Diagnosis       Normal sinus rhythm  Possible Left atrial enlargement  Left ventricular hypertrophy  Prolonged QT  Abnormal ECG  When compared with ECG of 31-MAR-2021 14:17,  Non-specific change in ST segment in Inferior leads  ST no longer depressed in Anterolateral leads  T wave inversion no longer evident in Inferior leads  T wave inversion less evident in Anterolateral leads  Confirmed by Marcos Parekh MD, Abhijeet Dykes ([id number]) on 4/14/2021 6:54:25 AM     GLUCOSE, POC    Collection Time: 04/13/21  7:17 PM   Result Value Ref Range    Glucose (POC) 131 (H) 65 - 100 mg/dL    Performed by Brad De Luna W/ RFLX MICROSCOPIC    Collection Time: 04/13/21 10:29 PM   Result Value Ref Range    Color Yellow/Straw      Appearance Turbid (A) Clear      Specific gravity 1.019 1.003 - 1.030      pH (UA) 5.0 5.0 - 8.0      Protein >300 (A) Negative mg/dL    Glucose Negative Negative mg/dL    Ketone 5 (A) Negative mg/dL    Bilirubin Negative Negative      Blood Negative Negative      Urobilinogen 0.1 0.1 - 1.0 EU/dL    Nitrites Negative Negative      Leukocyte Esterase Negative Negative     LACTIC ACID    Collection Time: 04/13/21 10:29 PM   Result Value Ref Range    Lactic acid 0.3 (L) 0.4 - 2.0 mmol/L   URINE MICROSCOPIC    Collection Time: 04/13/21 10:29 PM   Result Value Ref Range    WBC 5-10 0 - 4 /hpf    RBC 0-5 0 - 5 /hpf    Bacteria Negative Negative /hpf    Mucus Trace (A) Negative /lpf    Hyaline cast 5-10 0 - 5 /lpf    Yeast Present (A) Negative           Imaging:   CT HEAD WO CONT   Final Result   Head CT:   No acute intracranial abnormality. Cervical spine CT:   1. No acute fracture of the cervical spine. 2.  Multilevel cervical spondylosis. 3.  Emphysema. CT SPINE CERV WO CONT   Final Result   Head CT:   No acute intracranial abnormality. Cervical spine CT:   1. No acute fracture of the cervical spine. 2.  Multilevel cervical spondylosis. 3.  Emphysema. XR CHEST PORT   Final Result   No acute process.                  Assessment:     Hypokalemia, symptomatic     Dehydration due to to nausea and vomiting     Likely acute kidney injury on chronic kidney disease due to hypovolemia     Hyponatremia due to hypovolemia    Hypertension     Advanced COPD without clear exacerbation     Unilateral functioning right kidney with chronic kidney disease     History of carotid and renal artery stenosis     Chronic diastolic heart failure     Seizure disorder     GERD     Hypothyroidism     Chronic anemia     Hyperlipidemia     Mitral insufficiency    Plan:     Admit the patient for observation  IV fluids and supportive care  Start the patient on PO potassium    Home medications were reviewed    Signed By: Bernadette Bailey     April 14, 2021       *ATTENTION:  This note has been created by a medical student for educational purposes only. Please do not refer to the content of this note for clinical decision-making, billing, or other purposes. Please see attending physicians note to obtain clinical information on this patient. *

## 2021-04-14 NOTE — DISCHARGE SUMMARY
Discharge Summary    Primary Care Provider: James Emerson  Source of Information: Patient/family     History of Presenting Illness:   Addie Candelaria is a 53 yo female, with a past medical history significant for multitude of medical issues including CAD, CHF, COPD, hypertension, hyperlipidemia, renal artery stenosis, mitral valve regurgitation, and seizure disorder, who presented to the ED on 04/13/21 with cc of syncope with closed head injury, generalized weakness, and diarrhea. Patient developed diarrhea on the morning of admission and had a fall when she became lightheaded with SOB and chest pain. She was found by her  who called 911. EMS gave patient 400 mL of fluids. The patient was recently hospitalized with similar problems and discharged on 04/01/21 discharged to hospice. Apparently the  for [facility] arrange for the patient to go to the ED, reasons unclear. Patient is seen in the ED this morning. She's currently on 2 L of 02 and in no acute distress. The patient denies any active chest pain at this time, worsening SOB, fever, chills, nausea, vomiting, abdominal pain, urinary frequency urgency or burning. She still complains of weakness and headache. Patient's potassium (3.2) and sodium (133) are low. Her BUN (27) and creatinine (2.33) are elevated. UA shows more than 300 mg/dL protein and 5 mg/dL ketones. Lactic acid is 0.3. Albumin is reduced (2.5). CBC indicates RBC (3.18), HGB (10.6), HCT (30.7) likely owing to chronic anemia. X ray is clear. CT showed no acute process. Review of Systems:  A comprehensive review of systems was negative except for that written in the History of Present Illness.      Past Medical History:   Diagnosis Date    Anxiety 10/2/2020    Carotid stenosis     Chronic anemia     Chronic respiratory failure (HCC)     COPD (chronic obstructive pulmonary disease) with emphysema ([facility] [address] 10/2/2020    Depression  Diastolic CHF ([facility] [address]     Dysphagia 10/2/2020    GERD (gastroesophageal reflux disease)     High cholesterol     Hypertension     Hypothyroid     Iron deficiency anemia 10/2/2020    Mitral valve regurgitation 10/2/2020    Renal artery stenosis (HCC)     Seizure disorder (HCC)         Past Surgical History:   Procedure Laterality Date    HX CAROTID ENDARTERECTOMY      HX CHOLECYSTECTOMY      HX RENAL ARTERY STENT      HX ROTATOR CUFF REPAIR Right     HX TUBAL LIGATION      IR THORACENTESIS CATH W IMAGE  11/24/2020    IR THORACENTESIS CATH W IMAGE  11/25/2020       Prior to Admission medications    Medication Sig Start Date End Date Taking? Authorizing Provider   labetaloL (NORMODYNE) 300 mg tablet Take 300 mg by mouth two (2) times a day. 3/3/21   Provider, Historical   carBAMazepine (TEGretol) 200 mg tablet Take 200 mg by mouth two (2) times a day. 2/22/21   Provider, Historical   sertraline (ZOLOFT) 50 mg tablet Take 50 mg by mouth daily. 2/11/21   Provider, Historical   potassium chloride (KLOR-CON) 20 mEq pack Take 1 Packet by mouth two (2) times daily (with meals). 4/1/21   Frelier, Merline Speedy, MD   NIFEdipine ER (ADALAT CC) 90 mg ER tablet Take 90 mg by mouth daily. Provider, Historical   ergocalciferol (Vitamin D2) 1,250 mcg (50,000 unit) capsule Take 50,000 Units by mouth every seven (7) days. q7days wednesday    Provider, Historical   acetaminophen (TYLENOL) 325 mg tablet Take 2 Tabs by mouth every six (6) hours as needed for Pain or Fever for up to 30 days. 3/16/21 4/15/21  Benjamin Carrillo NP   magnesium oxide (MAG-OX) 400 mg tablet Take 1 Tab by mouth daily for 30 days. 3/17/21 4/16/21  Benjamin Carrillo NP   isosorbide dinitrate (ISORDIL) 10 mg tablet Take 1 Tab by mouth two (2) times a day. 1/23/21   Kyree Goldstein MD   levothyroxine (SYNTHROID) 50 mcg tablet Take 1 Tab by mouth Daily (before breakfast).  1/24/21   Kyree Goldstein MD   esomeprazole (NexIUM) 40 mg capsule Take 40 mg by mouth. Indications: Berry's esophagus    Provider, Historical   clopidogreL (PLAVIX) 75 mg tab Take 75 mg by mouth daily. 12/11/20   Provider, Historical   nortriptyline (PAMELOR) 50 mg capsule Take 50 mg by mouth nightly. Other, MD Heladio Timmons 100-62.5-25 mcg inhaler 1 Puff daily. 11/12/20   Provider, Historical   albuterol (PROVENTIL HFA, VENTOLIN HFA, PROAIR HFA) 90 mcg/actuation inhaler  11/5/20   Provider, Historical   rosuvastatin (CRESTOR) 10 mg tablet  11/6/20   Provider, Historical   Oxygen 5 Devices as needed. Pt wears 5LPM as needed- states she uses it after an axiety attack    Provider, Historical   albuterol-ipratropium (DUO-NEB) 2.5 mg-0.5 mg/3 ml nebu 3 mL by Nebulization route every six (6) hours. 10/2/20   Blake Marie MD   ALPRAZolam Jeffrey Santamaria) 0.25 mg tablet Take 0.25 mg by mouth daily as needed for Anxiety. Provider, Historical   levETIRAcetam (Keppra) 500 mg tablet Take 500 mg by mouth two (2) times a day. Provider, Historical   ferrous sulfate 325 mg (65 mg iron) tablet Take 325 mg by mouth Daily (before breakfast). Provider, Historical   folic acid (FOLVITE) 1 mg tablet Take 1 mg by mouth daily. Provider, Historical   aspirin delayed-release 81 mg tablet Take  by mouth daily.     Provider, Historical       Allergies   Allergen Reactions    Sulfa (Sulfonamide Antibiotics) Anaphylaxis    Sulfamethoxazole-Trimethoprim Unknown (comments)        Family History   Problem Relation Age of Onset    Hypertension Mother     Stroke Mother     Melanoma Mother     Stroke Father     Melanoma Brother     Melanoma Child         Social History     Socioeconomic History    Marital status: SINGLE     Spouse name: Not on file    Number of children: Not on file    Years of education: Not on file    Highest education level: Not on file   Tobacco Use    Smoking status: Former Smoker     Types: Cigarettes    Smokeless tobacco: Never Used    Tobacco comment: quit july 2020 Substance and Sexual Activity    Alcohol use: Not Currently    Drug use: Never   Other Topics Concern   Social History Narrative    Lives at home with clarissa. CODE STATUS:  DNR    Full X   Other      Objective:     Physical Exam:     Visit Vitals  BP (!) 146/62   Pulse 79   Temp 97.6 °F (36.4 °C)   Resp 15   Ht 5' 4\" (1.626 m)   Wt 45.4 kg (100 lb)   SpO2 100%   BMI 17.16 kg/m²    O2 Flow Rate (L/min): 2 l/min O2 Device: Nasal cannula    General:  Alert, cooperative, no distress, appears stated age. Head:  Normocephalic, without obvious abnormality, atraumatic. Eyes:  Conjunctivae/corneas clear. PERRL, EOMs intact. Nose: Nares normal. Septum midline. Mucosa normal. No drainage or sinus tenderness. Throat: Lips, mucosa, and tongue normal. Teeth and gums normal.   Neck: Supple, symmetrical, trachea midline, no adenopathy, thyroid: no enlargement/tenderness/nodules, no carotid bruit and no JVD. Back:   Symmetric, no curvature. ROM normal. No CVA tenderness. Lungs:   Clear to auscultation bilaterally. On 4L nasal canula. Chest wall:  No tenderness or deformity. Heart:  Regular rate and rhythm, S1, S2 normal, no murmur, click, rub or gallop. Abdomen:   Soft, non-tender. Bowel sounds normal. No masses,  No organomegaly. Extremities: Extremities normal, atraumatic, no cyanosis or edema. Pulses: 2+ and symmetric all extremities. Skin: Skin color, texture, turgor normal. No rashes or lesions   Neurologic: CNII-XII intact. No sensory deficits. Generalized weakness.         24 Hour Results:    Recent Results (from the past 24 hour(s))   CBC WITH AUTOMATED DIFF    Collection Time: 04/13/21  5:45 PM   Result Value Ref Range    WBC 14.1 (H) 3.6 - 11.0 K/uL    RBC 3.18 (L) 3.80 - 5.20 M/uL    HGB 10.6 (L) 11.5 - 16.0 g/dL    HCT 30.7 (L) 35.0 - 47.0 %    MCV 96.5 80.0 - 99.0 FL    MCH 33.3 26.0 - 34.0 PG    MCHC 34.5 30.0 - 36.5 g/dL    RDW 13.1 11.5 - 14.5 %    PLATELET 147 663 - 541 K/uL MPV 9.2 8.9 - 12.9 FL    NRBC 0.0 0  WBC    ABSOLUTE NRBC 0.00 0.00 - 0.01 K/uL    NEUTROPHILS 87 (H) 32 - 75 %    LYMPHOCYTES 7 (L) 12 - 49 %    MONOCYTES 5 5 - 13 %    EOSINOPHILS 1 0 - 7 %    BASOPHILS 0 0 - 1 %    IMMATURE GRANULOCYTES 0 0.0 - 0.5 %    ABS. NEUTROPHILS 12.3 (H) 1.8 - 8.0 K/UL    ABS. LYMPHOCYTES 1.0 0.8 - 3.5 K/UL    ABS. MONOCYTES 0.8 0.0 - 1.0 K/UL    ABS. EOSINOPHILS 0.1 0.0 - 0.4 K/UL    ABS. BASOPHILS 0.0 0.0 - 0.1 K/UL    ABS. IMM. GRANS. 0.1 (H) 0.00 - 0.04 K/UL    DF AUTOMATED     METABOLIC PANEL, COMPREHENSIVE    Collection Time: 04/13/21  5:45 PM   Result Value Ref Range    Sodium 133 (L) 136 - 145 mmol/L    Potassium 3.2 (L) 3.5 - 5.1 mmol/L    Chloride 100 97 - 108 mmol/L    CO2 22 21 - 32 mmol/L    Anion gap 11 5 - 15 mmol/L    Glucose 94 65 - 100 mg/dL    BUN 27 (H) 6 - 20 mg/dL    Creatinine 2.33 (H) 0.55 - 1.02 mg/dL    BUN/Creatinine ratio 12 12 - 20      GFR est AA 26 (L) >60 ml/min/1.73m2    GFR est non-AA 22 (L) >60 ml/min/1.73m2    Calcium 8.2 (L) 8.5 - 10.1 mg/dL    Bilirubin, total 0.2 0.2 - 1.0 mg/dL    AST (SGOT) 14 (L) 15 - 37 U/L    ALT (SGPT) 24 12 - 78 U/L    Alk.  phosphatase 113 45 - 117 U/L    Protein, total 6.1 (L) 6.4 - 8.2 g/dL    Albumin 2.5 (L) 3.5 - 5.0 g/dL    Globulin 3.6 2.0 - 4.0 g/dL    A-G Ratio 0.7 (L) 1.1 - 2.2     TROPONIN I    Collection Time: 04/13/21  5:45 PM   Result Value Ref Range    Troponin-I, Qt. <0.05 <0.05 ng/mL   EKG, 12 LEAD, INITIAL    Collection Time: 04/13/21  6:18 PM   Result Value Ref Range    Ventricular Rate 73 BPM    Atrial Rate 73 BPM    P-R Interval 130 ms    QRS Duration 86 ms    Q-T Interval 516 ms    QTC Calculation (Bezet) 568 ms    Calculated P Axis 74 degrees    Calculated R Axis 67 degrees    Calculated T Axis 76 degrees    Diagnosis       Normal sinus rhythm  Possible Left atrial enlargement  Left ventricular hypertrophy  Prolonged QT  Abnormal ECG  When compared with ECG of 31-MAR-2021 14:17,  Non-specific change in ST segment in Inferior leads  ST no longer depressed in Anterolateral leads  T wave inversion no longer evident in Inferior leads  T wave inversion less evident in Anterolateral leads  Confirmed by Mehul Castillo MD, [facility] ([id number]) on 4/14/2021 6:54:25 AM     GLUCOSE, POC    Collection Time: 04/13/21  7:17 PM   Result Value Ref Range    Glucose (POC) 131 (H) 65 - 100 mg/dL    Performed by Marilyn Robison W/ RFLX MICROSCOPIC    Collection Time: 04/13/21 10:29 PM   Result Value Ref Range    Color Yellow/Straw      Appearance Turbid (A) Clear      Specific gravity 1.019 1.003 - 1.030      pH (UA) 5.0 5.0 - 8.0      Protein >300 (A) Negative mg/dL    Glucose Negative Negative mg/dL    Ketone 5 (A) Negative mg/dL    Bilirubin Negative Negative      Blood Negative Negative      Urobilinogen 0.1 0.1 - 1.0 EU/dL    Nitrites Negative Negative      Leukocyte Esterase Negative Negative     CULTURE, BLOOD, PAIRED    Collection Time: 04/13/21 10:29 PM    Specimen: Blood   Result Value Ref Range    Special Requests: No Special Requests      Culture result: No growth after 1 hour     LACTIC ACID    Collection Time: 04/13/21 10:29 PM   Result Value Ref Range    Lactic acid 0.3 (L) 0.4 - 2.0 mmol/L   URINE MICROSCOPIC    Collection Time: 04/13/21 10:29 PM   Result Value Ref Range    WBC 5-10 0 - 4 /hpf    RBC 0-5 0 - 5 /hpf    Bacteria Negative Negative /hpf    Mucus Trace (A) Negative /lpf    Hyaline cast 5-10 0 - 5 /lpf    Yeast Present (A) Negative     COVID-19 RAPID TEST    Collection Time: 04/14/21  9:30 AM   Result Value Ref Range    Specimen source Nasopharyngeal      COVID-19 rapid test Not Detected Not Detected           Imaging:   CT HEAD WO CONT   Final Result   Head CT:   No acute intracranial abnormality. Cervical spine CT:   1. No acute fracture of the cervical spine. 2.  Multilevel cervical spondylosis. 3.  Emphysema.       CT SPINE CERV WO CONT   Final Result   Head CT:   No acute intracranial abnormality. Cervical spine CT:   1. No acute fracture of the cervical spine. 2.  Multilevel cervical spondylosis. 3.  Emphysema. XR CHEST PORT   Final Result   No acute process.                  Assessment:         Likely acute kidney injury on chronic kidney disease due to hypovolemia     Hyponatremia due to hypovolemia    Hypertension     Advanced COPD without clear exacerbation     Unilateral functioning right kidney with chronic kidney disease     History of carotid and renal artery stenosis     Chronic diastolic heart failure     Seizure disorder     GERD     Hypothyroidism     Chronic anemia     Hyperlipidemia     Mitral insufficiency    Plan:   Patient will be discharged back home to home hospice  Discontinue diuretics, discontinue hydralazine  Continue other home medications as before    Signed By: Asia Obrien MD     April 14, 2021

## 2021-04-14 NOTE — ED NOTES
Patient resting with lights down, medicated per MAR. She requested peanut butter crackers and was given them. She denies any further needs at this time. Call bell is within reach.

## 2021-04-16 ENCOUNTER — TELEPHONE (OUTPATIENT)
Dept: SURGERY | Age: 56
End: 2021-04-16

## 2021-04-16 NOTE — TELEPHONE ENCOUNTER
Ms. Tomasz Garrick called to cancel her appointment with Dr. Shahana Diaz for Monday. She is going into hospice. She asked if someone could call her with results of her ultrasound. Thanks!

## 2021-04-20 LAB
BACTERIA SPEC CULT: NORMAL
SPECIAL REQUESTS,SREQ: NORMAL

## 2021-05-03 ENCOUNTER — DOCUMENTATION ONLY (OUTPATIENT)
Dept: SURGERY | Age: 56
End: 2021-05-03

## 2021-06-10 ENCOUNTER — HOSPITAL ENCOUNTER (EMERGENCY)
Age: 56
Discharge: HOSPICE | End: 2021-06-10
Attending: EMERGENCY MEDICINE
Payer: MEDICAID

## 2021-06-10 VITALS
SYSTOLIC BLOOD PRESSURE: 187 MMHG | BODY MASS INDEX: 17.69 KG/M2 | RESPIRATION RATE: 20 BRPM | HEIGHT: 64 IN | OXYGEN SATURATION: 99 % | WEIGHT: 103.62 LBS | TEMPERATURE: 99.5 F | HEART RATE: 87 BPM | DIASTOLIC BLOOD PRESSURE: 97 MMHG

## 2021-06-10 DIAGNOSIS — Z51.5 HOSPICE CARE PATIENT: ICD-10-CM

## 2021-06-10 DIAGNOSIS — G43.911 INTRACTABLE MIGRAINE WITH STATUS MIGRAINOSUS, UNSPECIFIED MIGRAINE TYPE: Primary | ICD-10-CM

## 2021-06-10 DIAGNOSIS — I16.0 HYPERTENSIVE URGENCY: ICD-10-CM

## 2021-06-10 DIAGNOSIS — E87.6 HYPOKALEMIA: ICD-10-CM

## 2021-06-10 LAB
ALBUMIN SERPL-MCNC: 2.8 G/DL (ref 3.5–5)
ALBUMIN/GLOB SERPL: 0.8 {RATIO} (ref 1.1–2.2)
ALP SERPL-CCNC: 121 U/L (ref 45–117)
ALT SERPL-CCNC: 17 U/L (ref 12–78)
ANION GAP SERPL CALC-SCNC: 9 MMOL/L (ref 5–15)
AST SERPL W P-5'-P-CCNC: 14 U/L (ref 15–37)
BASOPHILS # BLD: 0 K/UL (ref 0–0.1)
BASOPHILS NFR BLD: 0 % (ref 0–1)
BILIRUB SERPL-MCNC: 0.3 MG/DL (ref 0.2–1)
BUN SERPL-MCNC: 16 MG/DL (ref 6–20)
BUN/CREAT SERPL: 6 (ref 12–20)
CA-I BLD-MCNC: 8.6 MG/DL (ref 8.5–10.1)
CHLORIDE SERPL-SCNC: 95 MMOL/L (ref 97–108)
CO2 SERPL-SCNC: 28 MMOL/L (ref 21–32)
CREAT SERPL-MCNC: 2.48 MG/DL (ref 0.55–1.02)
DIFFERENTIAL METHOD BLD: NORMAL
EOSINOPHIL # BLD: 0.2 K/UL (ref 0–0.4)
EOSINOPHIL NFR BLD: 2 % (ref 0–7)
ERYTHROCYTE [DISTWIDTH] IN BLOOD BY AUTOMATED COUNT: 12.6 % (ref 11.5–14.5)
GLOBULIN SER CALC-MCNC: 3.7 G/DL (ref 2–4)
GLUCOSE SERPL-MCNC: 94 MG/DL (ref 65–100)
HCT VFR BLD AUTO: 37.2 % (ref 35–47)
HGB BLD-MCNC: 12.8 G/DL (ref 11.5–16)
IMM GRANULOCYTES # BLD AUTO: 0 K/UL (ref 0–0.04)
IMM GRANULOCYTES NFR BLD AUTO: 0 % (ref 0–0.5)
LYMPHOCYTES # BLD: 1.6 K/UL (ref 0.8–3.5)
LYMPHOCYTES NFR BLD: 17 % (ref 12–49)
MCH RBC QN AUTO: 33 PG (ref 26–34)
MCHC RBC AUTO-ENTMCNC: 34.4 G/DL (ref 30–36.5)
MCV RBC AUTO: 95.9 FL (ref 80–99)
MONOCYTES # BLD: 0.8 K/UL (ref 0–1)
MONOCYTES NFR BLD: 8 % (ref 5–13)
NEUTS SEG # BLD: 6.9 K/UL (ref 1.8–8)
NEUTS SEG NFR BLD: 73 % (ref 32–75)
NRBC # BLD: 0 K/UL (ref 0–0.01)
NRBC BLD-RTO: 0 PER 100 WBC
PLATELET # BLD AUTO: 357 K/UL (ref 150–400)
PMV BLD AUTO: 10 FL (ref 8.9–12.9)
POTASSIUM SERPL-SCNC: 2.2 MMOL/L (ref 3.5–5.1)
PROT SERPL-MCNC: 6.5 G/DL (ref 6.4–8.2)
RBC # BLD AUTO: 3.88 M/UL (ref 3.8–5.2)
SODIUM SERPL-SCNC: 132 MMOL/L (ref 136–145)
WBC # BLD AUTO: 9.4 K/UL (ref 3.6–11)

## 2021-06-10 PROCEDURE — 96375 TX/PRO/DX INJ NEW DRUG ADDON: CPT

## 2021-06-10 PROCEDURE — 85025 COMPLETE CBC W/AUTO DIFF WBC: CPT

## 2021-06-10 PROCEDURE — 74011250636 HC RX REV CODE- 250/636

## 2021-06-10 PROCEDURE — 74011250637 HC RX REV CODE- 250/637: Performed by: EMERGENCY MEDICINE

## 2021-06-10 PROCEDURE — 99285 EMERGENCY DEPT VISIT HI MDM: CPT

## 2021-06-10 PROCEDURE — 80053 COMPREHEN METABOLIC PANEL: CPT

## 2021-06-10 PROCEDURE — 74011636637 HC RX REV CODE- 636/637: Performed by: EMERGENCY MEDICINE

## 2021-06-10 PROCEDURE — 74011250636 HC RX REV CODE- 250/636: Performed by: EMERGENCY MEDICINE

## 2021-06-10 PROCEDURE — 96374 THER/PROPH/DIAG INJ IV PUSH: CPT

## 2021-06-10 PROCEDURE — 96372 THER/PROPH/DIAG INJ SC/IM: CPT

## 2021-06-10 PROCEDURE — 36415 COLL VENOUS BLD VENIPUNCTURE: CPT

## 2021-06-10 RX ORDER — ONDANSETRON 2 MG/ML
4 INJECTION INTRAMUSCULAR; INTRAVENOUS
Status: COMPLETED | OUTPATIENT
Start: 2021-06-10 | End: 2021-06-10

## 2021-06-10 RX ORDER — POTASSIUM CHLORIDE 750 MG/1
40 TABLET, FILM COATED, EXTENDED RELEASE ORAL
Status: COMPLETED | OUTPATIENT
Start: 2021-06-10 | End: 2021-06-10

## 2021-06-10 RX ORDER — CLONIDINE HYDROCHLORIDE 0.1 MG/1
0.1 TABLET ORAL
Status: COMPLETED | OUTPATIENT
Start: 2021-06-10 | End: 2021-06-10

## 2021-06-10 RX ORDER — ONDANSETRON 2 MG/ML
INJECTION INTRAMUSCULAR; INTRAVENOUS
Status: COMPLETED
Start: 2021-06-10 | End: 2021-06-10

## 2021-06-10 RX ORDER — LORAZEPAM 2 MG/ML
0.5 INJECTION INTRAMUSCULAR ONCE
Status: COMPLETED | OUTPATIENT
Start: 2021-06-10 | End: 2021-06-10

## 2021-06-10 RX ORDER — SUMATRIPTAN 6 MG/.5ML
6 INJECTION, SOLUTION SUBCUTANEOUS
Status: COMPLETED | OUTPATIENT
Start: 2021-06-10 | End: 2021-06-10

## 2021-06-10 RX ADMIN — SUMATRIPTAN 6 MG: 6 INJECTION, SOLUTION SUBCUTANEOUS at 20:11

## 2021-06-10 RX ADMIN — ONDANSETRON 4 MG: 2 INJECTION INTRAMUSCULAR; INTRAVENOUS at 20:35

## 2021-06-10 RX ADMIN — CLONIDINE HYDROCHLORIDE 0.1 MG: 0.1 TABLET ORAL at 21:27

## 2021-06-10 RX ADMIN — POTASSIUM CHLORIDE 40 MEQ: 750 TABLET, FILM COATED, EXTENDED RELEASE ORAL at 21:34

## 2021-06-10 RX ADMIN — LORAZEPAM 0.5 MG: 2 INJECTION INTRAMUSCULAR; INTRAVENOUS at 21:26

## 2021-06-10 RX ADMIN — POTASSIUM CHLORIDE 40 MEQ: 750 TABLET, FILM COATED, EXTENDED RELEASE ORAL at 21:26

## 2021-06-10 NOTE — ED TRIAGE NOTES
Pt stated she started having a headache since 0100 this morning. Pt stated she took firorcet with no relief. Pt stated thought it was her blood pressure and took extra but blood pressure was ok for her. Pt just wants to get it checked out.

## 2021-06-11 NOTE — ED PROVIDER NOTES
EMERGENCY DEPARTMENT HISTORY AND PHYSICAL EXAM      Date: 6/10/2021  Patient Name: Carlos Salazar    History of Presenting Illness     Chief Complaint   Patient presents with    Headache       History Provided By: Patient    HPI: Carlos Salazar, 54 y.o. female presents to the ED with cc of   Chief Complaint   Patient presents with    Headache   Pt stated she started having a headache since 0100 this morning. Pt stated she took firorcet with no relief. Pt stated thought it was her blood pressure and took extra but blood pressure was ok for her. Pt just wants to get it checked out. Patient denies any fever, shortness of breath or any other symptoms  Is a hospice patient had a negative CT head a few months ago      There are no other complaints, changes, or physical findings at this time. PCP: None    No current facility-administered medications on file prior to encounter. Current Outpatient Medications on File Prior to Encounter   Medication Sig Dispense Refill    labetaloL (NORMODYNE) 300 mg tablet Take 300 mg by mouth two (2) times a day.  carBAMazepine (TEGretol) 200 mg tablet Take 200 mg by mouth two (2) times a day.  sertraline (ZOLOFT) 50 mg tablet Take 50 mg by mouth daily.  potassium chloride (KLOR-CON) 20 mEq pack Take 1 Packet by mouth two (2) times daily (with meals). 60 Packet 0    NIFEdipine ER (ADALAT CC) 90 mg ER tablet Take 90 mg by mouth daily.  ergocalciferol (Vitamin D2) 1,250 mcg (50,000 unit) capsule Take 50,000 Units by mouth every seven (7) days. q7days wednesday      isosorbide dinitrate (ISORDIL) 10 mg tablet Take 1 Tab by mouth two (2) times a day. 90 Tab 1    levothyroxine (SYNTHROID) 50 mcg tablet Take 1 Tab by mouth Daily (before breakfast). 30 Tab 0    esomeprazole (NexIUM) 40 mg capsule Take 40 mg by mouth. Indications: Berry's esophagus      clopidogreL (PLAVIX) 75 mg tab Take 75 mg by mouth daily.       nortriptyline (PAMELOR) 50 mg capsule Take 50 mg by mouth nightly.  Trelegy Ellipta 100-62.5-25 mcg inhaler 1 Puff daily.  albuterol (PROVENTIL HFA, VENTOLIN HFA, PROAIR HFA) 90 mcg/actuation inhaler       rosuvastatin (CRESTOR) 10 mg tablet       Oxygen 5 Devices as needed. Pt wears 5LPM as needed- states she uses it after an axiety attack      albuterol-ipratropium (DUO-NEB) 2.5 mg-0.5 mg/3 ml nebu 3 mL by Nebulization route every six (6) hours. 120 Nebule 0    ALPRAZolam (XANAX) 0.25 mg tablet Take 0.25 mg by mouth daily as needed for Anxiety.  levETIRAcetam (Keppra) 500 mg tablet Take 500 mg by mouth two (2) times a day.  ferrous sulfate 325 mg (65 mg iron) tablet Take 325 mg by mouth Daily (before breakfast).  folic acid (FOLVITE) 1 mg tablet Take 1 mg by mouth daily.  aspirin delayed-release 81 mg tablet Take  by mouth daily.          Past History     Past Medical History:  Past Medical History:   Diagnosis Date    Anxiety 10/2/2020    Carotid stenosis     Chronic anemia     Chronic respiratory failure (HCC)     COPD (chronic obstructive pulmonary disease) with emphysema (HCC) 10/2/2020    Depression     Diastolic CHF (HCC)     Dysphagia 10/2/2020    GERD (gastroesophageal reflux disease)     High cholesterol     Hypertension     Hypothyroid     Iron deficiency anemia 10/2/2020    Mitral valve regurgitation 10/2/2020    Renal artery stenosis (HCC)     Seizure disorder (HCC)        Past Surgical History:  Past Surgical History:   Procedure Laterality Date    HX CAROTID ENDARTERECTOMY      HX CHOLECYSTECTOMY      HX RENAL ARTERY STENT      HX ROTATOR CUFF REPAIR Right     HX TUBAL LIGATION      IR THORACENTESIS CATH W IMAGE  11/24/2020    IR THORACENTESIS CATH W IMAGE  11/25/2020       Family History:  Family History   Problem Relation Age of Onset    Hypertension Mother     Stroke Mother     Melanoma Mother     Stroke Father     Melanoma Brother     Melanoma Child        Social History:  Social History     Tobacco Use    Smoking status: Former Smoker     Types: Cigarettes    Smokeless tobacco: Never Used    Tobacco comment: quit july 2020   Substance Use Topics    Alcohol use: Not Currently    Drug use: Never       Allergies: Allergies   Allergen Reactions    Sulfa (Sulfonamide Antibiotics) Anaphylaxis    Sulfamethoxazole-Trimethoprim Unknown (comments)         Review of Systems   Review of Systems   Constitutional: Negative. HENT: Negative for congestion, facial swelling, rhinorrhea and sore throat. Eyes: Negative. Negative for photophobia and pain. Respiratory: Negative for cough, shortness of breath and wheezing. Cardiovascular: Negative. Negative for chest pain. Gastrointestinal: Negative for abdominal distention and abdominal pain. Genitourinary: Negative. Musculoskeletal: Negative. Allergic/Immunologic: Negative for immunocompromised state. Neurological: Negative. Negative for syncope and weakness. Hematological: Negative. Psychiatric/Behavioral: Negative. Physical Exam   Physical Exam  Vitals and nursing note reviewed. Constitutional:       Appearance: Normal appearance. She is normal weight. HENT:      Head: Normocephalic and atraumatic. Nose: No congestion or rhinorrhea. Eyes:      Extraocular Movements: Extraocular movements intact. Pupils: Pupils are equal, round, and reactive to light. Cardiovascular:      Rate and Rhythm: Normal rate and regular rhythm. Pulmonary:      Effort: Pulmonary effort is normal.      Breath sounds: Normal breath sounds. Abdominal:      General: Abdomen is flat. Bowel sounds are normal. There is no distension. Tenderness: There is no abdominal tenderness. There is no guarding. Musculoskeletal:         General: Normal range of motion. Cervical back: Normal range of motion and neck supple. Skin:     General: Skin is warm and dry.    Neurological:      General: No focal deficit present. Mental Status: She is alert and oriented to person, place, and time. Psychiatric:         Mood and Affect: Mood normal.         Diagnostic Study Results     Labs -     Recent Results (from the past 12 hour(s))   METABOLIC PANEL, COMPREHENSIVE    Collection Time: 06/10/21  7:30 PM   Result Value Ref Range    Sodium 132 (L) 136 - 145 mmol/L    Potassium 2.2 (LL) 3.5 - 5.1 mmol/L    Chloride 95 (L) 97 - 108 mmol/L    CO2 28 21 - 32 mmol/L    Anion gap 9 5 - 15 mmol/L    Glucose 94 65 - 100 mg/dL    BUN 16 6 - 20 mg/dL    Creatinine 2.48 (H) 0.55 - 1.02 mg/dL    BUN/Creatinine ratio 6 (L) 12 - 20      GFR est AA 24 (L) >60 ml/min/1.73m2    GFR est non-AA 20 (L) >60 ml/min/1.73m2    Calcium 8.6 8.5 - 10.1 mg/dL    Bilirubin, total 0.3 0.2 - 1.0 mg/dL    AST (SGOT) 14 (L) 15 - 37 U/L    ALT (SGPT) 17 12 - 78 U/L    Alk. phosphatase 121 (H) 45 - 117 U/L    Protein, total 6.5 6.4 - 8.2 g/dL    Albumin 2.8 (L) 3.5 - 5.0 g/dL    Globulin 3.7 2.0 - 4.0 g/dL    A-G Ratio 0.8 (L) 1.1 - 2.2     CBC WITH AUTOMATED DIFF    Collection Time: 06/10/21  7:30 PM   Result Value Ref Range    WBC 9.4 3.6 - 11.0 K/uL    RBC 3.88 3.80 - 5.20 M/uL    HGB 12.8 11.5 - 16.0 g/dL    HCT 37.2 35.0 - 47.0 %    MCV 95.9 80.0 - 99.0 FL    MCH 33.0 26.0 - 34.0 PG    MCHC 34.4 30.0 - 36.5 g/dL    RDW 12.6 11.5 - 14.5 %    PLATELET 253 909 - 416 K/uL    MPV 10.0 8.9 - 12.9 FL    NRBC 0.0 0.0  WBC    ABSOLUTE NRBC 0.00 0.00 - 0.01 K/uL    NEUTROPHILS 73 32 - 75 %    LYMPHOCYTES 17 12 - 49 %    MONOCYTES 8 5 - 13 %    EOSINOPHILS 2 0 - 7 %    BASOPHILS 0 0 - 1 %    IMMATURE GRANULOCYTES 0 0 - 0.5 %    ABS. NEUTROPHILS 6.9 1.8 - 8.0 K/UL    ABS. LYMPHOCYTES 1.6 0.8 - 3.5 K/UL    ABS. MONOCYTES 0.8 0.0 - 1.0 K/UL    ABS. EOSINOPHILS 0.2 0.0 - 0.4 K/UL    ABS. BASOPHILS 0.0 0.0 - 0.1 K/UL    ABS. IMM.  GRANS. 0.0 0.00 - 0.04 K/UL    DF AUTOMATED         Labs reviewed by me    Radiologic Studies -   No orders to display     CT Results  (Last 48 hours)    None        CXR Results  (Last 48 hours)    None            Medical Decision Making     I am the first provider for this patient. I reviewed the vital signs, available nursing notes, past medical history, past surgical history, family history and social history. RADIOLOGY report and LABS reviewed by me    Vital Signs-Reviewed the patient's vital signs. Patient Vitals for the past 12 hrs:   Temp Pulse Resp BP SpO2   06/10/21 2048  81  (!) 208/107 99 %   06/10/21 1936     100 %   06/10/21 1932 99.5 °F (37.5 °C) 80 20 (!) 158/92 97 %       EKG interpretation: (Preliminary)      Records Reviewed: Nurse's note. Provider Notes (Medical Decision Making):    Patient presents with DIFF DX : Hypokalemia, migraine headache, hypertension        ED Course:   Initial assessment performed. The patients presenting problems have been discussed, and they are in agreement with the care plan formulated and outlined with them. I have encouraged them to ask questions as they arise throughout their visit. Discussed with hospice nurse [facility], advised to send patient home once headache is better will continue hospice care and medication for hypokalemia and hypertension at home            [address] [place] [address] Avenue, MD      Disposition:     Diagnostic tests were reviewed and questions answered. Diagnosis, care plan and treatment options were discussed. The patient understand instructions and will follow up as directed. Condition stable    Admitting Provider:  No admitting provider for patient encounter. Consulting Provider:  No ref. provider found       DISCHARGE PLAN:  1. Current Discharge Medication List        2. Follow-up Information    None       3. Return to ED if worse     Diagnosis     Clinical Impression:     ICD-10-CM ICD-9-CM    1. Intractable migraine with status migrainosus, unspecified migraine type  G43.911 346.93    2.  Hypertensive urgency  I16.0 401.9    3. Hypokalemia  E87.6 276.8    4. Hospice care patient  Z51.5 V66.7         Attestations:    Alaina Alfred MD    Please note that this dictation was completed with [employer], the computer voice recognition software. Quite often unanticipated grammatical, syntax, homophones, and other interpretive errors are inadvertently transcribed by the computer software. Please disregard these errors. Please excuse any errors that have escaped final proofreading. Thank you.

## 2021-06-11 NOTE — DISCHARGE INSTRUCTIONS
Thank you! Thank you for allowing me to care for you in the emergency department. I sincerely hope that you are satisfied with your visit today. It is my goal to provide you with excellent care. Below you will find a list of your labs and imaging from your visit today. Should you have any questions regarding these results please do not hesitate to call the emergency department. Labs -     Recent Results (from the past 12 hour(s))   METABOLIC PANEL, COMPREHENSIVE    Collection Time: 06/10/21  7:30 PM   Result Value Ref Range    Sodium 132 (L) 136 - 145 mmol/L    Potassium 2.2 (LL) 3.5 - 5.1 mmol/L    Chloride 95 (L) 97 - 108 mmol/L    CO2 28 21 - 32 mmol/L    Anion gap 9 5 - 15 mmol/L    Glucose 94 65 - 100 mg/dL    BUN 16 6 - 20 mg/dL    Creatinine 2.48 (H) 0.55 - 1.02 mg/dL    BUN/Creatinine ratio 6 (L) 12 - 20      GFR est AA 24 (L) >60 ml/min/1.73m2    GFR est non-AA 20 (L) >60 ml/min/1.73m2    Calcium 8.6 8.5 - 10.1 mg/dL    Bilirubin, total 0.3 0.2 - 1.0 mg/dL    AST (SGOT) 14 (L) 15 - 37 U/L    ALT (SGPT) 17 12 - 78 U/L    Alk. phosphatase 121 (H) 45 - 117 U/L    Protein, total 6.5 6.4 - 8.2 g/dL    Albumin 2.8 (L) 3.5 - 5.0 g/dL    Globulin 3.7 2.0 - 4.0 g/dL    A-G Ratio 0.8 (L) 1.1 - 2.2     CBC WITH AUTOMATED DIFF    Collection Time: 06/10/21  7:30 PM   Result Value Ref Range    WBC 9.4 3.6 - 11.0 K/uL    RBC 3.88 3.80 - 5.20 M/uL    HGB 12.8 11.5 - 16.0 g/dL    HCT 37.2 35.0 - 47.0 %    MCV 95.9 80.0 - 99.0 FL    MCH 33.0 26.0 - 34.0 PG    MCHC 34.4 30.0 - 36.5 g/dL    RDW 12.6 11.5 - 14.5 %    PLATELET 145 254 - 917 K/uL    MPV 10.0 8.9 - 12.9 FL    NRBC 0.0 0.0  WBC    ABSOLUTE NRBC 0.00 0.00 - 0.01 K/uL    NEUTROPHILS 73 32 - 75 %    LYMPHOCYTES 17 12 - 49 %    MONOCYTES 8 5 - 13 %    EOSINOPHILS 2 0 - 7 %    BASOPHILS 0 0 - 1 %    IMMATURE GRANULOCYTES 0 0 - 0.5 %    ABS. NEUTROPHILS 6.9 1.8 - 8.0 K/UL    ABS. LYMPHOCYTES 1.6 0.8 - 3.5 K/UL    ABS.  MONOCYTES 0.8 0.0 - 1.0 K/UL ABS. EOSINOPHILS 0.2 0.0 - 0.4 K/UL    ABS. BASOPHILS 0.0 0.0 - 0.1 K/UL    ABS. IMM. GRANS. 0.0 0.00 - 0.04 K/UL    DF AUTOMATED         Radiologic Studies -   No orders to display     CT Results  (Last 48 hours)      None          CXR Results  (Last 48 hours)      None               If you feel that you have not received excellent quality care or timely care, please ask to speak to the nurse manager. Please choose us in the future for your continued health care needs. ------------------------------------------------------------------------------------------------------------  The exam and treatment you received in the Emergency Department were for an urgent problem and are not intended as complete care. It is important that you follow-up with a doctor, nurse practitioner, or physician assistant to:  (1) confirm your diagnosis,  (2) re-evaluation of changes in your illness and treatment, and  (3) for ongoing care. If your symptoms become worse or you do not improve as expected and you are unable to reach your usual health care provider, you should return to the Emergency Department. We are available 24 hours a day. Please take your discharge instructions with you when you go to your follow-up appointment. If you have any problem arranging a follow-up appointment, contact the Emergency Department immediately. If a prescription has been provided, please have it filled as soon as possible to prevent a delay in treatment. Read the entire medication instruction sheet provided to you by the pharmacy. If you have any questions or reservations about taking the medication due to side effects or interactions with other medications, please call your primary care physician or contact the ER to speak with the charge nurse. Make an appointment with your family doctor or the physician you were referred to for follow-up of this visit as instructed on your discharge paperwork, as this is a mandatory follow-up. Return to the ER if you are unable to be seen or if you are unable to be seen in a timely manner. If you have any problem arranging the follow-up visit, contact the Emergency Department immediately.

## 2021-08-03 PROBLEM — R55 SYNCOPE: Status: RESOLVED | Noted: 2021-01-03 | Resolved: 2021-08-03

## 2022-03-15 ENCOUNTER — APPOINTMENT (OUTPATIENT)
Dept: GENERAL RADIOLOGY | Age: 57
DRG: 140 | End: 2022-03-15
Attending: STUDENT IN AN ORGANIZED HEALTH CARE EDUCATION/TRAINING PROGRAM
Payer: MEDICAID

## 2022-03-15 ENCOUNTER — APPOINTMENT (OUTPATIENT)
Dept: CT IMAGING | Age: 57
DRG: 140 | End: 2022-03-15
Attending: STUDENT IN AN ORGANIZED HEALTH CARE EDUCATION/TRAINING PROGRAM
Payer: MEDICAID

## 2022-03-15 ENCOUNTER — HOSPITAL ENCOUNTER (INPATIENT)
Age: 57
LOS: 2 days | Discharge: HOSPICE | DRG: 140 | End: 2022-03-17
Attending: STUDENT IN AN ORGANIZED HEALTH CARE EDUCATION/TRAINING PROGRAM | Admitting: FAMILY MEDICINE
Payer: MEDICAID

## 2022-03-15 DIAGNOSIS — N17.9 AKI (ACUTE KIDNEY INJURY) (HCC): ICD-10-CM

## 2022-03-15 DIAGNOSIS — R41.82 ALTERED MENTAL STATUS, UNSPECIFIED ALTERED MENTAL STATUS TYPE: ICD-10-CM

## 2022-03-15 DIAGNOSIS — E87.6 HYPOKALEMIA: Primary | ICD-10-CM

## 2022-03-15 PROBLEM — J44.9 COPD (CHRONIC OBSTRUCTIVE PULMONARY DISEASE) (HCC): Status: ACTIVE | Noted: 2022-03-15

## 2022-03-15 PROBLEM — G93.40 ENCEPHALOPATHY: Status: ACTIVE | Noted: 2022-03-15

## 2022-03-15 PROBLEM — G93.41 METABOLIC ENCEPHALOPATHY: Status: ACTIVE | Noted: 2022-03-15

## 2022-03-15 LAB
ABO + RH BLD: NORMAL
ALBUMIN SERPL-MCNC: 3.3 G/DL (ref 3.5–5)
ALBUMIN/GLOB SERPL: 0.9 {RATIO} (ref 1.1–2.2)
ALP SERPL-CCNC: 89 U/L (ref 45–117)
ALT SERPL-CCNC: 44 U/L (ref 12–78)
AMMONIA PLAS-SCNC: <10 UMOL/L
AMPHET UR QL SCN: NEGATIVE
ANION GAP SERPL CALC-SCNC: 12 MMOL/L (ref 5–15)
ANION GAP SERPL CALC-SCNC: 8 MMOL/L (ref 5–15)
APPEARANCE UR: CLEAR
AST SERPL W P-5'-P-CCNC: 41 U/L (ref 15–37)
BACTERIA URNS QL MICRO: NEGATIVE /HPF
BARBITURATES UR QL SCN: POSITIVE
BASOPHILS # BLD: 0 K/UL (ref 0–0.1)
BASOPHILS NFR BLD: 0 % (ref 0–1)
BENZODIAZ UR QL: NEGATIVE
BILIRUB SERPL-MCNC: 0.4 MG/DL (ref 0.2–1)
BILIRUB UR QL: NEGATIVE
BLOOD GROUP ANTIBODIES SERPL: NEGATIVE
BNP SERPL-MCNC: ABNORMAL PG/ML
BUN SERPL-MCNC: 22 MG/DL (ref 6–20)
BUN SERPL-MCNC: 25 MG/DL (ref 6–20)
BUN/CREAT SERPL: 7 (ref 12–20)
BUN/CREAT SERPL: 8 (ref 12–20)
CA-I BLD-MCNC: 8.2 MG/DL (ref 8.5–10.1)
CA-I BLD-MCNC: 9.1 MG/DL (ref 8.5–10.1)
CANNABINOIDS UR QL SCN: POSITIVE
CHLORIDE SERPL-SCNC: 95 MMOL/L (ref 97–108)
CHLORIDE SERPL-SCNC: 99 MMOL/L (ref 97–108)
CK SERPL-CCNC: 78 U/L (ref 26–192)
CO2 SERPL-SCNC: 21 MMOL/L (ref 21–32)
CO2 SERPL-SCNC: 25 MMOL/L (ref 21–32)
COCAINE UR QL SCN: NEGATIVE
COLOR UR: ABNORMAL
COVID-19 RAPID TEST, COVR: NOT DETECTED
CREAT SERPL-MCNC: 2.99 MG/DL (ref 0.55–1.02)
CREAT SERPL-MCNC: 3.3 MG/DL (ref 0.55–1.02)
DIFFERENTIAL METHOD BLD: ABNORMAL
DRUG SCRN COMMENT,DRGCM: ABNORMAL
EOSINOPHIL # BLD: 0.1 K/UL (ref 0–0.4)
EOSINOPHIL NFR BLD: 2 % (ref 0–7)
ERYTHROCYTE [DISTWIDTH] IN BLOOD BY AUTOMATED COUNT: 12 % (ref 11.5–14.5)
GLOBULIN SER CALC-MCNC: 3.6 G/DL (ref 2–4)
GLUCOSE SERPL-MCNC: 104 MG/DL (ref 65–100)
GLUCOSE SERPL-MCNC: 90 MG/DL (ref 65–100)
GLUCOSE UR STRIP.AUTO-MCNC: NEGATIVE MG/DL
HCT VFR BLD AUTO: 33.1 % (ref 35–47)
HGB BLD-MCNC: 12 G/DL (ref 11.5–16)
HGB UR QL STRIP: NEGATIVE
IMM GRANULOCYTES # BLD AUTO: 0 K/UL (ref 0–0.04)
IMM GRANULOCYTES NFR BLD AUTO: 0 % (ref 0–0.5)
INR PPP: 0.8 (ref 0.9–1.1)
KETONES UR QL STRIP.AUTO: NEGATIVE MG/DL
LACTATE SERPL-SCNC: 1.5 MMOL/L (ref 0.4–2)
LEUKOCYTE ESTERASE UR QL STRIP.AUTO: NEGATIVE
LYMPHOCYTES # BLD: 1.2 K/UL (ref 0.8–3.5)
LYMPHOCYTES NFR BLD: 19 % (ref 12–49)
MAGNESIUM SERPL-MCNC: 1.8 MG/DL (ref 1.6–2.4)
MCH RBC QN AUTO: 33.3 PG (ref 26–34)
MCHC RBC AUTO-ENTMCNC: 36.3 G/DL (ref 30–36.5)
MCV RBC AUTO: 91.9 FL (ref 80–99)
METHADONE UR QL: NEGATIVE
MONOCYTES # BLD: 0.7 K/UL (ref 0–1)
MONOCYTES NFR BLD: 11 % (ref 5–13)
NEUTS SEG # BLD: 4.4 K/UL (ref 1.8–8)
NEUTS SEG NFR BLD: 68 % (ref 32–75)
NITRITE UR QL STRIP.AUTO: NEGATIVE
NRBC # BLD: 0 K/UL (ref 0–0.01)
NRBC BLD-RTO: 0 PER 100 WBC
OPIATES UR QL: POSITIVE
PCP UR QL: NEGATIVE
PH UR STRIP: 6 [PH]
PLATELET # BLD AUTO: 267 K/UL (ref 150–400)
PMV BLD AUTO: 10.3 FL (ref 8.9–12.9)
POTASSIUM SERPL-SCNC: 2.4 MMOL/L (ref 3.5–5.1)
POTASSIUM SERPL-SCNC: 2.9 MMOL/L (ref 3.5–5.1)
PROT SERPL-MCNC: 6.9 G/DL (ref 6.4–8.2)
PROT UR STRIP-MCNC: >300 MG/DL
PROTHROMBIN TIME: 11.2 SEC (ref 11.9–14.6)
RBC # BLD AUTO: 3.6 M/UL (ref 3.8–5.2)
RBC #/AREA URNS HPF: ABNORMAL /HPF (ref 0–5)
SODIUM SERPL-SCNC: 128 MMOL/L (ref 136–145)
SODIUM SERPL-SCNC: 132 MMOL/L (ref 136–145)
SP GR UR REFRACTOMETRY: 1.01 (ref 1–1.03)
SPECIMEN EXP DATE BLD: NORMAL
TROPONIN-HIGH SENSITIVITY: 25 NG/L (ref 0–51)
TSH SERPL DL<=0.05 MIU/L-ACNC: 7.99 UIU/ML (ref 0.36–3.74)
UA: UC IF INDICATED,UAUC: ABNORMAL
UROBILINOGEN UR QL STRIP.AUTO: 0.1 EU/DL (ref 0.2–1)
WBC # BLD AUTO: 6.5 K/UL (ref 3.6–11)
WBC URNS QL MICRO: ABNORMAL /HPF (ref 0–4)

## 2022-03-15 PROCEDURE — 84481 FREE ASSAY (FT-3): CPT

## 2022-03-15 PROCEDURE — 96375 TX/PRO/DX INJ NEW DRUG ADDON: CPT

## 2022-03-15 PROCEDURE — 82140 ASSAY OF AMMONIA: CPT

## 2022-03-15 PROCEDURE — 86900 BLOOD TYPING SEROLOGIC ABO: CPT

## 2022-03-15 PROCEDURE — 74011250637 HC RX REV CODE- 250/637: Performed by: FAMILY MEDICINE

## 2022-03-15 PROCEDURE — 74011250636 HC RX REV CODE- 250/636: Performed by: FAMILY MEDICINE

## 2022-03-15 PROCEDURE — 83880 ASSAY OF NATRIURETIC PEPTIDE: CPT

## 2022-03-15 PROCEDURE — 71045 X-RAY EXAM CHEST 1 VIEW: CPT

## 2022-03-15 PROCEDURE — 80307 DRUG TEST PRSMV CHEM ANLYZR: CPT

## 2022-03-15 PROCEDURE — 99285 EMERGENCY DEPT VISIT HI MDM: CPT

## 2022-03-15 PROCEDURE — 83735 ASSAY OF MAGNESIUM: CPT

## 2022-03-15 PROCEDURE — 65270000029 HC RM PRIVATE

## 2022-03-15 PROCEDURE — 74011250636 HC RX REV CODE- 250/636: Performed by: STUDENT IN AN ORGANIZED HEALTH CARE EDUCATION/TRAINING PROGRAM

## 2022-03-15 PROCEDURE — 80053 COMPREHEN METABOLIC PANEL: CPT

## 2022-03-15 PROCEDURE — 85025 COMPLETE CBC W/AUTO DIFF WBC: CPT

## 2022-03-15 PROCEDURE — 84443 ASSAY THYROID STIM HORMONE: CPT

## 2022-03-15 PROCEDURE — 84439 ASSAY OF FREE THYROXINE: CPT

## 2022-03-15 PROCEDURE — 96374 THER/PROPH/DIAG INJ IV PUSH: CPT

## 2022-03-15 PROCEDURE — 74011000250 HC RX REV CODE- 250: Performed by: FAMILY MEDICINE

## 2022-03-15 PROCEDURE — 80048 BASIC METABOLIC PNL TOTAL CA: CPT

## 2022-03-15 PROCEDURE — 82550 ASSAY OF CK (CPK): CPT

## 2022-03-15 PROCEDURE — 83605 ASSAY OF LACTIC ACID: CPT

## 2022-03-15 PROCEDURE — 87635 SARS-COV-2 COVID-19 AMP PRB: CPT

## 2022-03-15 PROCEDURE — 36415 COLL VENOUS BLD VENIPUNCTURE: CPT

## 2022-03-15 PROCEDURE — 80177 DRUG SCRN QUAN LEVETIRACETAM: CPT

## 2022-03-15 PROCEDURE — 81001 URINALYSIS AUTO W/SCOPE: CPT

## 2022-03-15 PROCEDURE — 94761 N-INVAS EAR/PLS OXIMETRY MLT: CPT

## 2022-03-15 PROCEDURE — 70450 CT HEAD/BRAIN W/O DYE: CPT

## 2022-03-15 PROCEDURE — 85610 PROTHROMBIN TIME: CPT

## 2022-03-15 PROCEDURE — 84484 ASSAY OF TROPONIN QUANT: CPT

## 2022-03-15 PROCEDURE — 94640 AIRWAY INHALATION TREATMENT: CPT

## 2022-03-15 RX ORDER — ONDANSETRON 4 MG/1
4 TABLET, ORALLY DISINTEGRATING ORAL
Status: DISCONTINUED | OUTPATIENT
Start: 2022-03-15 | End: 2022-03-17 | Stop reason: HOSPADM

## 2022-03-15 RX ORDER — ISOSORBIDE DINITRATE 10 MG/1
10 TABLET ORAL 2 TIMES DAILY
Status: DISCONTINUED | OUTPATIENT
Start: 2022-03-15 | End: 2022-03-17 | Stop reason: HOSPADM

## 2022-03-15 RX ORDER — MAGNESIUM SULFATE HEPTAHYDRATE 40 MG/ML
2 INJECTION, SOLUTION INTRAVENOUS
Status: COMPLETED | OUTPATIENT
Start: 2022-03-15 | End: 2022-03-15

## 2022-03-15 RX ORDER — LORAZEPAM 2 MG/ML
0.5 INJECTION INTRAMUSCULAR ONCE
Status: COMPLETED | OUTPATIENT
Start: 2022-03-15 | End: 2022-03-15

## 2022-03-15 RX ORDER — HEPARIN SODIUM 5000 [USP'U]/ML
5000 INJECTION, SOLUTION INTRAVENOUS; SUBCUTANEOUS EVERY 8 HOURS
Status: DISCONTINUED | OUTPATIENT
Start: 2022-03-15 | End: 2022-03-17 | Stop reason: HOSPADM

## 2022-03-15 RX ORDER — POTASSIUM CHLORIDE 7.45 MG/ML
10 INJECTION INTRAVENOUS
Status: COMPLETED | OUTPATIENT
Start: 2022-03-15 | End: 2022-03-15

## 2022-03-15 RX ORDER — DIPHENHYDRAMINE HYDROCHLORIDE 50 MG/ML
25 INJECTION, SOLUTION INTRAMUSCULAR; INTRAVENOUS
Status: COMPLETED | OUTPATIENT
Start: 2022-03-15 | End: 2022-03-15

## 2022-03-15 RX ORDER — LEVOTHYROXINE SODIUM 25 UG/1
50 TABLET ORAL
Status: DISCONTINUED | OUTPATIENT
Start: 2022-03-16 | End: 2022-03-17 | Stop reason: HOSPADM

## 2022-03-15 RX ORDER — POLYETHYLENE GLYCOL 3350 17 G/17G
17 POWDER, FOR SOLUTION ORAL DAILY PRN
Status: DISCONTINUED | OUTPATIENT
Start: 2022-03-15 | End: 2022-03-17 | Stop reason: HOSPADM

## 2022-03-15 RX ORDER — LANOLIN ALCOHOL/MO/W.PET/CERES
325 CREAM (GRAM) TOPICAL
Status: DISCONTINUED | OUTPATIENT
Start: 2022-03-16 | End: 2022-03-17 | Stop reason: HOSPADM

## 2022-03-15 RX ORDER — ASPIRIN 81 MG/1
81 TABLET ORAL DAILY
Status: DISCONTINUED | OUTPATIENT
Start: 2022-03-16 | End: 2022-03-17 | Stop reason: HOSPADM

## 2022-03-15 RX ORDER — FOLIC ACID 1 MG/1
1 TABLET ORAL DAILY
Status: DISCONTINUED | OUTPATIENT
Start: 2022-03-16 | End: 2022-03-17 | Stop reason: HOSPADM

## 2022-03-15 RX ORDER — PANTOPRAZOLE SODIUM 40 MG/1
40 GRANULE, DELAYED RELEASE ORAL
Status: DISCONTINUED | OUTPATIENT
Start: 2022-03-16 | End: 2022-03-17 | Stop reason: HOSPADM

## 2022-03-15 RX ORDER — CARBAMAZEPINE 200 MG/1
200 TABLET ORAL 2 TIMES DAILY
Status: DISCONTINUED | OUTPATIENT
Start: 2022-03-15 | End: 2022-03-17 | Stop reason: HOSPADM

## 2022-03-15 RX ORDER — SODIUM CHLORIDE 9 MG/ML
50 INJECTION, SOLUTION INTRAVENOUS CONTINUOUS
Status: DISCONTINUED | OUTPATIENT
Start: 2022-03-15 | End: 2022-03-16

## 2022-03-15 RX ORDER — LEVETIRACETAM 500 MG/1
500 TABLET ORAL 2 TIMES DAILY
Status: DISCONTINUED | OUTPATIENT
Start: 2022-03-15 | End: 2022-03-17 | Stop reason: HOSPADM

## 2022-03-15 RX ORDER — ATORVASTATIN CALCIUM 20 MG/1
20 TABLET, FILM COATED ORAL DAILY
Status: DISCONTINUED | OUTPATIENT
Start: 2022-03-16 | End: 2022-03-17 | Stop reason: HOSPADM

## 2022-03-15 RX ORDER — ACETAMINOPHEN 325 MG/1
650 TABLET ORAL
Status: DISCONTINUED | OUTPATIENT
Start: 2022-03-15 | End: 2022-03-17 | Stop reason: HOSPADM

## 2022-03-15 RX ORDER — ONDANSETRON 2 MG/ML
4 INJECTION INTRAMUSCULAR; INTRAVENOUS
Status: DISCONTINUED | OUTPATIENT
Start: 2022-03-15 | End: 2022-03-17 | Stop reason: HOSPADM

## 2022-03-15 RX ORDER — POTASSIUM CHLORIDE 1.5 G/1.77G
20 POWDER, FOR SOLUTION ORAL 2 TIMES DAILY WITH MEALS
Status: DISCONTINUED | OUTPATIENT
Start: 2022-03-16 | End: 2022-03-16

## 2022-03-15 RX ORDER — ERGOCALCIFEROL 1.25 MG/1
50000 CAPSULE ORAL
Status: DISCONTINUED | OUTPATIENT
Start: 2022-03-16 | End: 2022-03-17 | Stop reason: HOSPADM

## 2022-03-15 RX ORDER — CLOPIDOGREL BISULFATE 75 MG/1
75 TABLET ORAL DAILY
Status: DISCONTINUED | OUTPATIENT
Start: 2022-03-16 | End: 2022-03-17 | Stop reason: HOSPADM

## 2022-03-15 RX ORDER — HALOPERIDOL 5 MG/ML
3 INJECTION INTRAMUSCULAR ONCE
Status: COMPLETED | OUTPATIENT
Start: 2022-03-15 | End: 2022-03-15

## 2022-03-15 RX ORDER — ONDANSETRON 2 MG/ML
4 INJECTION INTRAMUSCULAR; INTRAVENOUS
Status: COMPLETED | OUTPATIENT
Start: 2022-03-15 | End: 2022-03-15

## 2022-03-15 RX ORDER — IPRATROPIUM BROMIDE AND ALBUTEROL SULFATE 2.5; .5 MG/3ML; MG/3ML
3 SOLUTION RESPIRATORY (INHALATION) EVERY 6 HOURS
Status: DISCONTINUED | OUTPATIENT
Start: 2022-03-15 | End: 2022-03-17 | Stop reason: HOSPADM

## 2022-03-15 RX ORDER — ACETAMINOPHEN 650 MG/1
650 SUPPOSITORY RECTAL
Status: DISCONTINUED | OUTPATIENT
Start: 2022-03-15 | End: 2022-03-17 | Stop reason: HOSPADM

## 2022-03-15 RX ORDER — SERTRALINE HYDROCHLORIDE 50 MG/1
50 TABLET, FILM COATED ORAL DAILY
Status: DISCONTINUED | OUTPATIENT
Start: 2022-03-16 | End: 2022-03-17 | Stop reason: HOSPADM

## 2022-03-15 RX ADMIN — LEVETIRACETAM 500 MG: 500 TABLET, FILM COATED ORAL at 22:44

## 2022-03-15 RX ADMIN — MAGNESIUM SULFATE HEPTAHYDRATE 2 G: 40 INJECTION, SOLUTION INTRAVENOUS at 18:37

## 2022-03-15 RX ADMIN — ACETAMINOPHEN 650 MG: 325 TABLET ORAL at 22:44

## 2022-03-15 RX ADMIN — ISOSORBIDE DINITRATE 10 MG: 10 TABLET ORAL at 22:44

## 2022-03-15 RX ADMIN — CARBAMAZEPINE 200 MG: 200 TABLET ORAL at 22:44

## 2022-03-15 RX ADMIN — IPRATROPIUM BROMIDE AND ALBUTEROL SULFATE 3 ML: .5; 3 SOLUTION RESPIRATORY (INHALATION) at 19:41

## 2022-03-15 RX ADMIN — POTASSIUM CHLORIDE 10 MEQ: 7.46 INJECTION, SOLUTION INTRAVENOUS at 22:44

## 2022-03-15 RX ADMIN — POTASSIUM CHLORIDE 10 MEQ: 7.46 INJECTION, SOLUTION INTRAVENOUS at 18:42

## 2022-03-15 RX ADMIN — POTASSIUM CHLORIDE 10 MEQ: 7.46 INJECTION, SOLUTION INTRAVENOUS at 20:19

## 2022-03-15 RX ADMIN — SODIUM CHLORIDE 50 ML/HR: 9 INJECTION, SOLUTION INTRAVENOUS at 20:19

## 2022-03-15 RX ADMIN — DIPHENHYDRAMINE HYDROCHLORIDE 25 MG: 50 INJECTION, SOLUTION INTRAMUSCULAR; INTRAVENOUS at 16:46

## 2022-03-15 RX ADMIN — POTASSIUM CHLORIDE 10 MEQ: 7.46 INJECTION, SOLUTION INTRAVENOUS at 20:54

## 2022-03-15 RX ADMIN — LORAZEPAM 0.5 MG: 2 INJECTION INTRAMUSCULAR; INTRAVENOUS at 15:38

## 2022-03-15 RX ADMIN — HEPARIN SODIUM 5000 UNITS: 5000 INJECTION INTRAVENOUS; SUBCUTANEOUS at 22:44

## 2022-03-15 RX ADMIN — ONDANSETRON 4 MG: 2 INJECTION INTRAMUSCULAR; INTRAVENOUS at 16:46

## 2022-03-15 RX ADMIN — SODIUM CHLORIDE 1000 ML: 9 INJECTION, SOLUTION INTRAVENOUS at 18:37

## 2022-03-15 RX ADMIN — HALOPERIDOL LACTATE 3 MG: 5 INJECTION, SOLUTION INTRAMUSCULAR at 15:49

## 2022-03-15 NOTE — ED PROVIDER NOTES
[id number]  EMERGENCY DEPARTMENT ENCOUNTER NOTE    Date: 3/15/2022  Patient Name: Adonis Salgado    History of Presenting Illness     Chief Complaint   Patient presents with    Extremity Weakness     HPI: Adonis Salgado, 64 y.o. female with a past medical history and outpatient medications as listed and reviewed below  presents for AMS. History was taken from EMS and . The patient has multiple past medical history and COPD and was referred to hospice by her PCP around 8 months ago for \" COPD with multiple medical comorbidities\". Since then, the patient has been with hospice. For the past 2 days, the  reported that she has been more fatigued than usual and today at around 1 PM, the hospice team was at home when she was initially normal and then started becoming more altered. The hospice team made his decision to call EMS to get her to the emergency department. On EMSs arrival, history was limited. She has not had this confusion in the past.  She did not have any deficits in the upper or lower extremities. Vitals and POC glucose were normal.  The patient was within the window for TPA on arrival.  She had revoked hospice. Additional history was taken from the . She does not have any history of recent strokes. No history of hemorrhage. No contraindications for TPA. No metastatic cancer or history of cancer.     Medical History   I reviewed the medical, surgical, family, and social history, as well as allergies:    PCP: None    Past Medical History:  Past Medical History:   Diagnosis Date    Anxiety 10/2/2020    Carotid stenosis     Chronic anemia     Chronic respiratory failure ([address]     COPD (chronic obstructive pulmonary disease) with emphysema [facility] 10/2/2020    Depression     Diastolic CHF ([address]     Dysphagia 10/2/2020    GERD (gastroesophageal reflux disease)     High cholesterol     Hypertension     Hypothyroid     Iron deficiency anemia 10/2/2020    Mitral valve regurgitation 10/2/2020    Renal artery stenosis (HCC)     Seizure disorder (HCC)      Past Surgical History:  Past Surgical History:   Procedure Laterality Date    HX CAROTID ENDARTERECTOMY      HX CHOLECYSTECTOMY      HX RENAL ARTERY STENT      HX ROTATOR CUFF REPAIR Right     HX TUBAL LIGATION      IR THORACENTESIS CATH W IMAGE  11/24/2020    IR THORACENTESIS CATH W IMAGE  11/25/2020     Current Outpatient Medications:  Current Outpatient Medications   Medication Instructions    albuterol (PROVENTIL HFA, VENTOLIN HFA, PROAIR HFA) 90 mcg/actuation inhaler No dose, route, or frequency recorded.  albuterol-ipratropium (DUO-NEB) 2.5 mg-0.5 mg/3 ml nebu 3 mL, Nebulization, EVERY 6 HOURS    ALPRAZolam (XANAX) 0.25 mg, Oral, DAILY AS NEEDED    aspirin delayed-release 81 mg tablet Oral, DAILY    carBAMazepine (TEGRETOL) 200 mg, Oral, 2 TIMES DAILY    clopidogreL (PLAVIX) 75 mg, Oral, DAILY    ergocalciferol (VITAMIN D2) 50,000 Units, Oral, EVERY 7 DAYS, q7days wednesday    esomeprazole (NEXIUM) 40 mg, Oral    ferrous sulfate 325 mg, Oral, DAILY BEFORE BREAKFAST    folic acid (FOLVITE) 1 mg, Oral, DAILY    isosorbide dinitrate (ISORDIL) 10 mg, Oral, 2 TIMES DAILY    labetaloL (NORMODYNE) 300 mg, Oral, 2 TIMES DAILY    levETIRAcetam (KEPPRA) 500 mg, Oral, 2 TIMES DAILY    levothyroxine (SYNTHROID) 50 mcg, Oral, DAILY BEFORE BREAKFAST    NIFEdipine ER (ADALAT CC) 90 mg, Oral, DAILY    nortriptyline (PAMELOR) 50 mg, Oral, EVERY BEDTIME    Oxygen 5 Devices, AS NEEDED, Pt wears 5LPM as needed- states she uses it after an axiety attack     potassium chloride (KLOR-CON) 20 mEq pack 20 mEq, Oral, 2 TIMES DAILY WITH MEALS    rosuvastatin (CRESTOR) 10 mg tablet No dose, route, or frequency recorded.     sertraline (ZOLOFT) 50 mg, Oral, DAILY    Trelegy Ellipta 100-62.5-25 mcg inhaler 1 Puff, DAILY      Family History:  Family History   Problem Relation Age of Onset    Hypertension Mother    [facility] Stroke Mother     Melanoma Mother     Stroke Father     Melanoma Brother     Melanoma Child      Social History:  Social History     Tobacco Use    Smoking status: Former Smoker     Types: Cigarettes    Smokeless tobacco: Never Used    Tobacco comment: quit july 2020   Substance Use Topics    Alcohol use: Not Currently    Drug use: Never     Allergies: Allergies   Allergen Reactions    Sulfa (Sulfonamide Antibiotics) Anaphylaxis    Sulfamethoxazole-Trimethoprim Unknown (comments)       Review of Systems     Review of Systems  Negative: All other systems negative. Physical Exam and Vital Signs   Vital Signs - Reviewed the patient's vital signs. Patient Vitals for the past 12 hrs:   Temp Pulse Resp BP SpO2   03/15/22 1630 97.8 °F (36.6 °C) 80 18 (!) 152/85 100 %     Physical Exam:    GENERAL: altered, GCS 13  HEENT:  * Pupils equal, EOMI  * Head atraumatic  CV:  * regular rhythm  * warm and perfused extremities bilaterally  PULMONARY: Good air movement, no wheezes or crackles  ABDOMEN: soft, not distended, no tenderness to palpation  EXTREMITIES/BACK: warm and perfused, no tenderness, no edema  SKIN: no rashes or signs of trauma  NEURO:   * GCS = 13 (E=3, V=4, M=6)  * Awake, alert, oriented x 1  * CNs II-XII intact  * Strength 5/5 in all extremities  * Sensory exam grossly normal  * No pronator drift or dysmetria      Medical Decision Making   - I am the first and primary provider for this patient and am the primary provider of record. - I reviewed the vital signs, available nursing notes, past medical history, past surgical history, family history and social history. - Initial assessment performed. The patients presenting problems have been discussed, and the staff are in agreement with the care plan formulated and outlined with them. I have encouraged them to ask questions as they arise throughout their visit.   - Available medical records, nursing notes, old EKGs, and EMS run sheets (if patient was EMS transported) were reviewed    MDM:   Patient is a 64 y.o. female presenting for stroke like symptoms with last known normal time of 1pm. Vitals reveal no significant abnormalities, glucose was noraml, and physical exam revealed AMS. EKG reveals no significant abnormalities. Based on the history, physical exam, risk factors, and vitals signs, I favor the following differential diagnoses: ischemic stroke, hemorrhagic stroke, ICH, seizure, TIA, vertebrobasilar insufficiency, carotid stenosis, ps metabolic encephalopathy. Given the progression over the past 2 days and history of hospice, it was felt that the patient had more so for metabolic encephalopathy however she still qualifies for TPA at this point. We had a very long discussion with the  along with teleneurology giving him the risks and the benefits and after a prolonged discussion, the  felt that the patient would be better served without getting TPA given the fact that she was on hospice and would not appreciate aggressive interventions especially with the absence of any focal deficits on exam and the fact that she has had progressive symptoms over the past 2 days prior to confusion onset which seems more likely metabolic in nature.  agrees with this and he reports that he would not prefer to push TPA at this point due to the risk of bleeding. Patient presented within the TPA window. A history was obtained and the TPA contraindications were reviewed with the patient. It was decided that the patient is a TPA candidate. Patient was promptly transported to the CT scan where the non-contrasted CT was done and read my myself as not having an intracranial hemorrhage. CTA was done to rule out LVO. TPA was not given per discussion    See ED Course and Reassessment sections for results, interventions, and interpretations.     Results     Labs:  Recent Results (from the past 12 hour(s))   CBC WITH AUTOMATED DIFF    Collection Time: 03/15/22  4:18 PM   Result Value Ref Range    WBC 6.5 3.6 - 11.0 K/uL    RBC 3.60 (L) 3.80 - 5.20 M/uL    HGB 12.0 11.5 - 16.0 g/dL    HCT 33.1 (L) 35.0 - 47.0 %    MCV 91.9 80.0 - 99.0 FL    MCH 33.3 26.0 - 34.0 PG    MCHC 36.3 30.0 - 36.5 g/dL    RDW 12.0 11.5 - 14.5 %    PLATELET 635 068 - 853 K/uL    MPV 10.3 8.9 - 12.9 FL    NRBC 0.0 0.0  WBC    ABSOLUTE NRBC 0.00 0.00 - 0.01 K/uL    NEUTROPHILS 68 32 - 75 %    LYMPHOCYTES 19 12 - 49 %    MONOCYTES 11 5 - 13 %    EOSINOPHILS 2 0 - 7 %    BASOPHILS 0 0 - 1 %    IMMATURE GRANULOCYTES 0 0 - 0.5 %    ABS. NEUTROPHILS 4.4 1.8 - 8.0 K/UL    ABS. LYMPHOCYTES 1.2 0.8 - 3.5 K/UL    ABS. MONOCYTES 0.7 0.0 - 1.0 K/UL    ABS. EOSINOPHILS 0.1 0.0 - 0.4 K/UL    ABS. BASOPHILS 0.0 0.0 - 0.1 K/UL    ABS. IMM. GRANS. 0.0 0.00 - 0.04 K/UL    DF AUTOMATED     TYPE & SCREEN    Collection Time: 03/15/22  4:18 PM   Result Value Ref Range    Crossmatch Expiration 03/18/2022,2359     ABO/Rh(D) O Positive     Antibody screen Negative    PROTHROMBIN TIME + INR    Collection Time: 03/15/22  4:18 PM   Result Value Ref Range    Prothrombin time 11.2 (L) 11.9 - 14.6 sec    INR 0.8 (L) 0.9 - 1.1     METABOLIC PANEL, COMPREHENSIVE    Collection Time: 03/15/22  4:18 PM   Result Value Ref Range    Sodium 128 (L) 136 - 145 mmol/L    Potassium 2.4 (LL) 3.5 - 5.1 mmol/L    Chloride 95 (L) 97 - 108 mmol/L    CO2 21 21 - 32 mmol/L    Anion gap 12 5 - 15 mmol/L    Glucose 104 (H) 65 - 100 mg/dL    BUN 25 (H) 6 - 20 mg/dL    Creatinine 3.30 (H) 0.55 - 1.02 mg/dL    BUN/Creatinine ratio 8 (L) 12 - 20      GFR est AA 18 (L) >60 ml/min/1.73m2    GFR est non-AA 14 (L) >60 ml/min/1.73m2    Calcium 9.1 8.5 - 10.1 mg/dL    Bilirubin, total 0.4 0.2 - 1.0 mg/dL    AST (SGOT) 41 (H) 15 - 37 U/L    ALT (SGPT) 44 12 - 78 U/L    Alk.  phosphatase 89 45 - 117 U/L    Protein, total 6.9 6.4 - 8.2 g/dL    Albumin 3.3 (L) 3.5 - 5.0 g/dL    Globulin 3.6 2.0 - 4.0 g/dL    A-G Ratio 0.9 (L) 1.1 - 2.2     NT-PRO BNP    Collection Time: 03/15/22  4:18 PM   Result Value Ref Range    NT pro-BNP 10,308 (H) <125 pg/mL   TROPONIN-HIGH SENSITIVITY    Collection Time: 03/15/22  4:18 PM   Result Value Ref Range    Troponin-High Sensitivity 25 0 - 51 ng/L   LACTIC ACID    Collection Time: 03/15/22  4:18 PM   Result Value Ref Range    Lactic acid 1.5 0.4 - 2.0 mmol/L   MAGNESIUM    Collection Time: 03/15/22  4:18 PM   Result Value Ref Range    Magnesium 1.8 1.6 - 2.4 mg/dL   CK    Collection Time: 03/15/22  4:18 PM   Result Value Ref Range    CK 78 26 - 192 U/L   TSH 3RD GENERATION    Collection Time: 03/15/22  4:18 PM   Result Value Ref Range    TSH 7.99 (H) 0.36 - 3.74 uIU/mL   AMMONIA    Collection Time: 03/15/22  4:18 PM   Result Value Ref Range    Ammonia, plasma <10 <32 umol/L   COVID-19 RAPID TEST    Collection Time: 03/15/22  4:18 PM   Result Value Ref Range    COVID-19 rapid test Not Detected Not Detected       Radiologic Studies:  CT Results  (Last 48 hours)               03/15/22 1558  CT CODE NEURO HEAD WO CONTRAST Final result    Impression:  Accelerated and progressive chronic small vessel disease. No acute   findings       Narrative:  CT dose reduction was achieved through use of a standardized protocol tailored   for this examination and automatic exposure control for dose modulation. Noncontrast study shows generalized volume loss and typical periventricular   white matter disease, greater than expected for age and greater than seen on a   baseline study of 1 year ago. Central pontine calcifications are unchanged. No   new abnormality seen in gray or white matter. No mass effect or hemorrhage. Ventricles are symmetric and unchanged. No significant bone finding.  I gave this   report to the ordering physician               CXR Results  (Last 48 hours)               03/15/22 1659  XR CHEST PORT Final result    Impression:  The cardiomediastinal silhouette is appropriate for age, technique,   and lung expansion. Pulmonary vasculature is not congested. The lungs are   essentially clear. No effusion or pneumothorax is seen. Narrative:  1 view comparison April 13               Medications ordered:  Medications   potassium chloride 10 mEq in 100 ml IVPB (has no administration in time range)   sodium chloride 0.9 % bolus infusion 1,000 mL (has no administration in time range)   magnesium sulfate 2 g/50 ml IVPB (premix or compounded) (has no administration in time range)   LORazepam (ATIVAN) injection 0.5 mg (0.5 mg IntraVENous Given 3/15/22 1538)   haloperidol lactate (HALDOL) injection 3 mg (3 mg IntraVENous Given 3/15/22 1549)   ondansetron (ZOFRAN) injection 4 mg (4 mg IntraVENous Given 3/15/22 1646)   diphenhydrAMINE (BENADRYL) injection 25 mg (25 mg IntraVENous Given 3/15/22 1646)     ED Course and Reassessment     ED Course:     ED Course as of 03/15/22 1823   Tue Mar 15, 2022   1628 The non-contrasted head CT was negative for acute process making acute intracranial bleed unlikely. No evidence of large subacute stroke, ventriculomegaly, or masses. [SS]   8360 CBC does not show any evidence of acute process. Leukocytosis not present to suggest infection. Hemoglobin not suggestive of acute anemia. Platelet count is normal.   [SS]   1724 Chest x-ray negative for acute pathology: no concern for pulmonary edema, pleural effusion, pneumothorax, or pneumonia. [SS]   1724 Ammonia level without significant elevation.   [SS]   1733 COVID-19 testing is negative. Lactate is within normal limits. No concern for severe sepsis, septic shock, or ischemia. [SS]   1754 Potassium is low, will replace. Will give IV fluids as the patient has an PEDRO PABLO of 3.30 from baseline of 2.8. No rhabdo. TSH is elevated, with some T3 and T4. Will admit for altered mental status due to hypokalemia and PEDRO PABLO. [SS]      ED Course User Index  [SS] Edy Pyle MD       Reassessment:    Will admit.     Final Disposition     Admission: [place] [id number]    After completion of ED workup and discussion of results and diagnoses, patient was admitted to the hospital. Case was discussed with admitting provider. ED Critical Care   and Critical Care  CNS CRITICAL CARE NOTE :  6:22 PM    Critical care time is being documented due to the fulfillment of at least one of the following:    - Critical conditions: condition that acutely impairs one or more vital organ systems such that there is a high probability of imminent or life-threatening deterioration in condition. Examples are diagnoses including but not limited to Afib RVR, DKA, PE, Etc. .    - Critical interventions: an action whose failure to initiate would likely allow a sudden, clinically significant decline in the patient's condition. These include   Requirement of transfer or ICU admission   Contemplation or provision of tPA   Drip initiation (pressors, antiarrhythmics, heparin, etc.)   Antidotes given (narcan, charcoal, epi for anaphylaxis, etc..)   >=2L fluid bolus   >=3 Duonebs   >1 IV/IM doses of sedatives, antiepileptics, BP meds, rate control meds, adenosine.  Procedures that are suggestive of critical care: chest tubes, cardioversion, BiPAP, IO, etc..    Critical care time is documented based on continuous or non-continuous provision of care that includes face-to-face time, placing orders, chart review, documentation, discussion with consultants, discussion with family. This time calculation is a best approximation and does not include time spent on CPR, EKG interpretation, central line placement, intubation, laceration repairs, and other separately billed procedures. Amount of Critical Care Time: 65minutes    Details of critical care provision is documented above.  A general summary is listed below:    IMPENDING DETERIORATION - CNS  ASSOCIATED RISK FACTORS - CNS Decompensation  MANAGEMENT- Bedside Assessment  INTERPRETATION -  CT Scan, ECG and Blood Pressure  INTERVENTIONS - Neurologic/Metabolic interventions  CASE REVIEW - Hospitalist/Intensivist  TREATMENT RESPONSE - Stable  PERFORMED BY - Self    NOTES   :  During this entire length of time I was immediately available to the patient . Eli Hicks MD    Diagnosis     Clinical Impression:   1. Hypokalemia    2. Altered mental status, unspecified altered mental status type    3. PEDRO PABLO (acute kidney injury) [facility])      Attestations:    Eli Hicks MD    Please note that this dictation was completed with [employer], the computer voice recognition software. Quite often unanticipated grammatical, syntax, homophones, and other interpretive errors are inadvertently transcribed by the computer software. Please disregard these errors. Please excuse any errors that have escaped final proofreading. Thank you.

## 2022-03-15 NOTE — H&P
History and Physical    NAME: Glen Hanson   :  1965   MRN:  [id number]     Date/Time:  3/15/2022 7:43 PM    Patient PCP: None  ______________________________________________________________________             Subjective:     CHIEF COMPLAINT:     Generalized weakness altered mental status    HISTORY OF PRESENT ILLNESS:       Patient is a 64y.o. year old female past medical history of chronic kidney disease hyponatremia hypertension advanced COPD unilateral functioning of the right kidney history of carotid and renal artery stenosis chronic diastolic heart failure seizure disorder GERD hypothyroidism chronic anemia mitral insufficiency came to the ER with altered mental status patient was in hospice was revoked  Patient was on hospice services for last 8-month today patient family sent to the ER and revoked hospice and saw the patient patient lying in the bed confused initial stroke alert was called no TPA was given patient was admitted for further evaluation and treatment CT of the head was done     no acute process no bleed    Past Medical History:   Diagnosis Date    Anxiety 10/2/2020    Carotid stenosis     Chronic anemia     Chronic respiratory failure ([address]     COPD (chronic obstructive pulmonary disease) with emphysema ([address] 10/2/2020    Depression     Diastolic CHF ([address]     Dysphagia 10/2/2020    GERD (gastroesophageal reflux disease)     High cholesterol     Hypertension     Hypothyroid     Iron deficiency anemia 10/2/2020    Mitral valve regurgitation 10/2/2020    Renal artery stenosis (HCC)     Seizure disorder ([address]         Past Surgical History:   Procedure Laterality Date    HX CAROTID ENDARTERECTOMY      HX CHOLECYSTECTOMY      HX RENAL ARTERY STENT      HX ROTATOR CUFF REPAIR Right     HX TUBAL LIGATION      IR THORACENTESIS CATH W IMAGE  2020    IR THORACENTESIS CATH W IMAGE  2020       Social History     Tobacco Use    Smoking status: Former Smoker Types: Cigarettes    Smokeless tobacco: Never Used    Tobacco comment: quit july 2020   Substance Use Topics    Alcohol use: Not Currently        Family History   Problem Relation Age of Onset    Hypertension Mother     Stroke Mother     Melanoma Mother     Stroke Father     Melanoma Brother     Melanoma Child        Allergies   Allergen Reactions    Sulfa (Sulfonamide Antibiotics) Anaphylaxis    Sulfamethoxazole-Trimethoprim Unknown (comments)        Prior to Admission medications    Medication Sig Start Date End Date Taking? Authorizing Provider   labetaloL (NORMODYNE) 300 mg tablet Take 300 mg by mouth two (2) times a day. 3/3/21   Provider, Historical   carBAMazepine (TEGretol) 200 mg tablet Take 200 mg by mouth two (2) times a day. 2/22/21   Provider, Historical   sertraline (ZOLOFT) 50 mg tablet Take 50 mg by mouth daily. 2/11/21   Provider, Historical   potassium chloride (KLOR-CON) 20 mEq pack Take 1 Packet by mouth two (2) times daily (with meals). 4/1/21   Marlin Díaz MD   NIFEdipine ER (ADALAT CC) 90 mg ER tablet Take 90 mg by mouth daily. Provider, Historical   ergocalciferol (Vitamin D2) 1,250 mcg (50,000 unit) capsule Take 50,000 Units by mouth every seven (7) days. q7days wednesday    Provider, Historical   isosorbide dinitrate (ISORDIL) 10 mg tablet Take 1 Tab by mouth two (2) times a day. 1/23/21   Zandra Marie MD   levothyroxine (SYNTHROID) 50 mcg tablet Take 1 Tab by mouth Daily (before breakfast). 1/24/21   Zandra Marie MD   esomeprazole (NexIUM) 40 mg capsule Take 40 mg by mouth. Indications: Berry's esophagus    Provider, Historical   clopidogreL (PLAVIX) 75 mg tab Take 75 mg by mouth daily. 12/11/20   Provider, Historical   nortriptyline (PAMELOR) 50 mg capsule Take 50 mg by mouth nightly. Other, MD Heladio Timmons 100-62.5-25 mcg inhaler 1 Puff daily.  11/12/20   Provider, Historical   albuterol (PROVENTIL HFA, VENTOLIN HFA, PROAIR HFA) 90 mcg/actuation inhaler  11/5/20   Provider, Historical   rosuvastatin (CRESTOR) 10 mg tablet  11/6/20   Provider, Historical   Oxygen 5 Devices as needed. Pt wears 5LPM as needed- states she uses it after an axiety attack    Provider, Historical   albuterol-ipratropium (DUO-NEB) 2.5 mg-0.5 mg/3 ml nebu 3 mL by Nebulization route every six (6) hours. 10/2/20   Oh Benites MD   ALPRAZolam Judd Paci) 0.25 mg tablet Take 0.25 mg by mouth daily as needed for Anxiety. Provider, Historical   levETIRAcetam (Keppra) 500 mg tablet Take 500 mg by mouth two (2) times a day. Provider, Historical   ferrous sulfate 325 mg (65 mg iron) tablet Take 325 mg by mouth Daily (before breakfast). Provider, Historical   folic acid (FOLVITE) 1 mg tablet Take 1 mg by mouth daily. Provider, Historical   aspirin delayed-release 81 mg tablet Take  by mouth daily.     Provider, Historical         Current Facility-Administered Medications:     potassium chloride 10 mEq in 100 ml IVPB, 10 mEq, IntraVENous, Q1H, Lizbeth Winter MD, Last Rate: 100 mL/hr at 03/15/22 1842, 10 mEq at 03/15/22 1842    magnesium sulfate 2 g/50 ml IVPB (premix or compounded), 2 g, IntraVENous, NOW, Inderjit Blanco MD, Last Rate: 25 mL/hr at 03/15/22 1837, 2 g at 03/15/22 1837    albuterol-ipratropium (DUO-NEB) 2.5 MG-0.5 MG/3 ML, 3 mL, Nebulization, Q6H, Anju Winter MD    isosorbide dinitrate (ISORDIL) tablet 10 mg, 10 mg, Oral, BID, Anju Winter MD Dwan Jensen  [START ON 3/16/2022] levothyroxine (SYNTHROID) tablet 50 mcg, 50 mcg, Oral, ACB, Anju Winter MD Dwan Jensen  [START ON 3/16/2022] potassium chloride (KLOR-CON) packet for solution 20 mEq, 20 mEq, Oral, BID WITH MEALS, Anju Winter MD Dwan Jensen  [START ON 3/16/2022] aspirin delayed-release tablet 81 mg, 81 mg, Oral, DAILY, Lizbeth Winter MD    carBAMazepine (TEGretol) tablet 200 mg, 200 mg, Oral, BID, Lizbeth Winter MD Dwan Jensen  [START ON 3/16/2022] clopidogreL (PLAVIX) tablet 75 mg, 75 mg, Oral, DAILY, Maggy Rosana Maxwell MD    ergocalciferol capsule 50,000 Units, 50,000 Units, Oral, Q7D, Rosana Winter MD  [employer]  [START ON 3/16/2022] pantoprazole (PROTONIX) granules for oral suspension 40 mg, 40 mg, Per NG tube, ACB, Rosana Winter MD  [employer]  [START ON 3/16/2022] ferrous sulfate tablet 325 mg, 325 mg, Oral, ACB, Rosana Winter MD  [employer]  [START ON 5/94/0148] folic acid (FOLVITE) tablet 1 mg, 1 mg, Oral, DAILY, Rosana Winter MD    levETIRAcetam (KEPPRA) tablet 500 mg, 500 mg, Oral, BID, Rosana Winter MD  [employer]  [START ON 3/16/2022] rosuvastatin (CRESTOR) tablet 10 mg, 10 mg, Oral, DAILY, Rosana Winter MD  [employer]  [START ON 3/16/2022] sertraline (ZOLOFT) tablet 50 mg, 50 mg, Oral, DAILY, Rosana Winter MD    [START ON 3/16/2022] fluticasone-umeclidinium-vilanterol (TRELEGY ELLIPTA) inhaler 1 Puff, 1 Puff, Inhalation, DAILY, Rosana Winter MD    acetaminophen (TYLENOL) tablet 650 mg, 650 mg, Oral, Q6H PRN **OR** acetaminophen (TYLENOL) suppository 650 mg, 650 mg, Rectal, Q6H PRN, Rosana Winter MD    polyethylene glycol (MIRALAX) packet 17 g, 17 g, Oral, DAILY PRN, Rosana Winter MD    ondansetron (ZOFRAN ODT) tablet 4 mg, 4 mg, Oral, Q8H PRN **OR** ondansetron (ZOFRAN) injection 4 mg, 4 mg, IntraVENous, Q6H PRN, Lizbeth Winter MD    0.9% sodium chloride infusion, 50 mL/hr, IntraVENous, CONTINUOUS, Rosana Winter MD    heparin (porcine) injection 5,000 Units, 5,000 Units, SubCUTAneous, Q8H, Lizbeth Winter MD    Current Outpatient Medications:     labetaloL (NORMODYNE) 300 mg tablet, Take 300 mg by mouth two (2) times a day., Disp: , Rfl:     carBAMazepine (TEGretol) 200 mg tablet, Take 200 mg by mouth two (2) times a day., Disp: , Rfl:     sertraline (ZOLOFT) 50 mg tablet, Take 50 mg by mouth daily. , Disp: , Rfl:     potassium chloride (KLOR-CON) 20 mEq pack, Take 1 Packet by mouth two (2) times daily (with meals). , Disp: 60 Packet, Rfl: 0    NIFEdipine ER (ADALAT CC) 90 mg ER tablet, Take 90 mg by mouth daily. , Disp: , Rfl:     ergocalciferol (Vitamin D2) 1,250 mcg (50,000 unit) capsule, Take 50,000 Units by mouth every seven (7) days. q7days wednesday, Disp: , Rfl:     isosorbide dinitrate (ISORDIL) 10 mg tablet, Take 1 Tab by mouth two (2) times a day., Disp: 90 Tab, Rfl: 1    levothyroxine (SYNTHROID) 50 mcg tablet, Take 1 Tab by mouth Daily (before breakfast). , Disp: 30 Tab, Rfl: 0    esomeprazole (NexIUM) 40 mg capsule, Take 40 mg by mouth. Indications: Berry's esophagus, Disp: , Rfl:     clopidogreL (PLAVIX) 75 mg tab, Take 75 mg by mouth daily. , Disp: , Rfl:     nortriptyline (PAMELOR) 50 mg capsule, Take 50 mg by mouth nightly., Disp: , Rfl:     Trelegy Ellipta 100-62.5-25 mcg inhaler, 1 Puff daily. , Disp: , Rfl:     albuterol (PROVENTIL HFA, VENTOLIN HFA, PROAIR HFA) 90 mcg/actuation inhaler, , Disp: , Rfl:     rosuvastatin (CRESTOR) 10 mg tablet, , Disp: , Rfl:     Oxygen, 5 Devices as needed. Pt wears 5LPM as needed- states she uses it after an axiety attack, Disp: , Rfl:     albuterol-ipratropium (DUO-NEB) 2.5 mg-0.5 mg/3 ml nebu, 3 mL by Nebulization route every six (6) hours. , Disp: 120 Nebule, Rfl: 0    ALPRAZolam (XANAX) 0.25 mg tablet, Take 0.25 mg by mouth daily as needed for Anxiety. , Disp: , Rfl:     levETIRAcetam (Keppra) 500 mg tablet, Take 500 mg by mouth two (2) times a day., Disp: , Rfl:     ferrous sulfate 325 mg (65 mg iron) tablet, Take 325 mg by mouth Daily (before breakfast). , Disp: , Rfl:     folic acid (FOLVITE) 1 mg tablet, Take 1 mg by mouth daily. , Disp: , Rfl:     aspirin delayed-release 81 mg tablet, Take  by mouth daily. , Disp: , Rfl:     LAB DATA REVIEWED:    Recent Results (from the past 24 hour(s))   CBC WITH AUTOMATED DIFF    Collection Time: 03/15/22  4:18 PM   Result Value Ref Range    WBC 6.5 3.6 - 11.0 K/uL    RBC 3.60 (L) 3.80 - 5.20 M/uL    HGB 12.0 11.5 - 16.0 g/dL    HCT 33.1 (L) 35.0 - 47.0 %    MCV 91.9 80.0 - 99.0 FL    MCH 33.3 26.0 - 34.0 PG    MCHC 36.3 30.0 - 36.5 g/dL    RDW 12.0 11.5 - 14.5 %    PLATELET 103 670 - 713 K/uL    MPV 10.3 8.9 - 12.9 FL    NRBC 0.0 0.0  WBC    ABSOLUTE NRBC 0.00 0.00 - 0.01 K/uL    NEUTROPHILS 68 32 - 75 %    LYMPHOCYTES 19 12 - 49 %    MONOCYTES 11 5 - 13 %    EOSINOPHILS 2 0 - 7 %    BASOPHILS 0 0 - 1 %    IMMATURE GRANULOCYTES 0 0 - 0.5 %    ABS. NEUTROPHILS 4.4 1.8 - 8.0 K/UL    ABS. LYMPHOCYTES 1.2 0.8 - 3.5 K/UL    ABS. MONOCYTES 0.7 0.0 - 1.0 K/UL    ABS. EOSINOPHILS 0.1 0.0 - 0.4 K/UL    ABS. BASOPHILS 0.0 0.0 - 0.1 K/UL    ABS. IMM. GRANS. 0.0 0.00 - 0.04 K/UL    DF AUTOMATED     TYPE & SCREEN    Collection Time: 03/15/22  4:18 PM   Result Value Ref Range    Crossmatch Expiration 03/18/2022,2359     ABO/Rh(D) O Positive     Antibody screen Negative    PROTHROMBIN TIME + INR    Collection Time: 03/15/22  4:18 PM   Result Value Ref Range    Prothrombin time 11.2 (L) 11.9 - 14.6 sec    INR 0.8 (L) 0.9 - 1.1     METABOLIC PANEL, COMPREHENSIVE    Collection Time: 03/15/22  4:18 PM   Result Value Ref Range    Sodium 128 (L) 136 - 145 mmol/L    Potassium 2.4 (LL) 3.5 - 5.1 mmol/L    Chloride 95 (L) 97 - 108 mmol/L    CO2 21 21 - 32 mmol/L    Anion gap 12 5 - 15 mmol/L    Glucose 104 (H) 65 - 100 mg/dL    BUN 25 (H) 6 - 20 mg/dL    Creatinine 3.30 (H) 0.55 - 1.02 mg/dL    BUN/Creatinine ratio 8 (L) 12 - 20      GFR est AA 18 (L) >60 ml/min/1.73m2    GFR est non-AA 14 (L) >60 ml/min/1.73m2    Calcium 9.1 8.5 - 10.1 mg/dL    Bilirubin, total 0.4 0.2 - 1.0 mg/dL    AST (SGOT) 41 (H) 15 - 37 U/L    ALT (SGPT) 44 12 - 78 U/L    Alk.  phosphatase 89 45 - 117 U/L    Protein, total 6.9 6.4 - 8.2 g/dL    Albumin 3.3 (L) 3.5 - 5.0 g/dL    Globulin 3.6 2.0 - 4.0 g/dL    A-G Ratio 0.9 (L) 1.1 - 2.2     NT-PRO BNP    Collection Time: 03/15/22  4:18 PM   Result Value Ref Range    NT pro-BNP 10,308 (H) <125 pg/mL   TROPONIN-HIGH SENSITIVITY    Collection Time: 03/15/22  4:18 PM   Result Value Ref Range    Troponin-High Sensitivity 25 0 - 51 ng/L   LACTIC ACID    Collection Time: 03/15/22  4:18 PM   Result Value Ref Range    Lactic acid 1.5 0.4 - 2.0 mmol/L   MAGNESIUM    Collection Time: 03/15/22  4:18 PM   Result Value Ref Range    Magnesium 1.8 1.6 - 2.4 mg/dL   CK    Collection Time: 03/15/22  4:18 PM   Result Value Ref Range    CK 78 26 - 192 U/L   TSH 3RD GENERATION    Collection Time: 03/15/22  4:18 PM   Result Value Ref Range    TSH 7.99 (H) 0.36 - 3.74 uIU/mL   AMMONIA    Collection Time: 03/15/22  4:18 PM   Result Value Ref Range    Ammonia, plasma <10 <32 umol/L   COVID-19 RAPID TEST    Collection Time: 03/15/22  4:18 PM   Result Value Ref Range    COVID-19 rapid test Not Detected Not Detected         XR Results (most recent):  Results from Hospital Encounter encounter on 03/15/22    XR CHEST PORT    Narrative  1 view comparison April 13    Impression  The cardiomediastinal silhouette is appropriate for age, technique,  and lung expansion. Pulmonary vasculature is not congested. The lungs are  essentially clear. No effusion or pneumothorax is seen. XR CHEST PORT   Final Result   The cardiomediastinal silhouette is appropriate for age, technique,   and lung expansion. Pulmonary vasculature is not congested. The lungs are   essentially clear. No effusion or pneumothorax is seen. CT CODE NEURO HEAD WO CONTRAST   Final Result   Accelerated and progressive chronic small vessel disease. No acute   findings           Review of Systems:  Patient not a good historian. Objective:   VITALS:    Visit Vitals  BP (!) 175/91   Pulse 83   Temp 97.8 °F (36.6 °C)   Resp 18   Ht 5' 6\" (1.676 m)   Wt 56.2 kg (124 lb)   SpO2 100%   BMI 20.01 kg/m²       Physical Exam:   Constitutional: Awake head: Normocephalic and atraumatic. Eyes: Pupils are equal, round, and reactive to light. EOM are normal.   Cardiovascular: Normal rate, regular rhythm and normal heart sounds. Pulmonary/Chest: Breath sounds normal. No wheezes. No rales. Exhibits no tenderness. Abdominal: Soft. Bowel sounds are normal. There is no abdominal tenderness. There is no rebound and no guarding. Musculoskeletal: Normal range of motion.    Neurological: Patient awake move all extremity    ASSESSMENT & PLAN:    Acute metabolic encephalopathy  Hyponatremia hypokalemia  Acute on chronic kidney disease  Advanced COPD  Unilateral functioning right kidney  History of carotid and renal artery stenosis  Chronic diastolic heart failure  Seizure disorder  GERD  Hypothyroidism  Hyperlipidemia    Patient admitted to medical telemetry floor start on nebulizer treatment every 6 hours aspirin 81 mg daily, subpoenaed 100 mg twice a day Plavix and 5 mg daily vitamin D 50,000 units weekly ferrous sulfate 3 2 5 mg daily Trelegy 1 puff daily folic acid 1 mg daily DVT prophylaxis with heparin 5000 every 8 hours isosorbide dinitrate 10 mg twice a day Keppra 500 twice a day levothyroxine 50 mcg daily magnesium sulfate 2 g now given the ER Protonix 40 mg daily replace potassium Crestor 10 mg daily Zoloft 50 mg daily start on IV fluids    Neurology consult pulmonary consult and nephrology con    Repeat the labs in the am     Current Facility-Administered Medications:     potassium chloride 10 mEq in 100 ml IVPB, 10 mEq, IntraVENous, Q1H, Lizbeth Winter MD, Last Rate: 100 mL/hr at 03/15/22 1842, 10 mEq at 03/15/22 1842    magnesium sulfate 2 g/50 ml IVPB (premix or compounded), 2 g, IntraVENous, NOW, Inderjit Blanco MD, Last Rate: 25 mL/hr at 03/15/22 1837, 2 g at 03/15/22 1837    albuterol-ipratropium (DUO-NEB) 2.5 MG-0.5 MG/3 ML, 3 mL, Nebulization, Q6H, Renee Winter MD    isosorbide dinitrate (ISORDIL) tablet 10 mg, 10 mg, Oral, BID, Renee Winter MD  [facility]  [START ON 3/16/2022] levothyroxine (SYNTHROID) tablet 50 mcg, 50 mcg, Oral, ACB, Lizbeth Winter MD  [facility]  [START ON 3/16/2022] potassium chloride (KLOR-CON) packet for solution 20 mEq, 20 mEq, Oral, BID WITH MEALS, MD Richard Pham ON 3/16/2022] aspirin delayed-release tablet 81 mg, 81 mg, Oral, DAILY, Monse Winter MD    carBAMazepine (TEGretol) tablet 200 mg, 200 mg, Oral, BID, Monse Winter MD [facility]  [START ON 3/16/2022] clopidogreL (PLAVIX) tablet 75 mg, 75 mg, Oral, DAILY, Monse Winter MD    ergocalciferol capsule 50,000 Units, 50,000 Units, Oral, Q7D, Monse Winter MD [facility]  [START ON 3/16/2022] pantoprazole (PROTONIX) granules for oral suspension 40 mg, 40 mg, Per NG tube, ACB, Monse Winter MD [facility]  [START ON 3/16/2022] ferrous sulfate tablet 325 mg, 325 mg, Oral, ACB, Monse Winter MD [facility]  [START ON 9/44/9453] folic acid (FOLVITE) tablet 1 mg, 1 mg, Oral, DAILY, Monse Winter MD    levETIRAcetam (KEPPRA) tablet 500 mg, 500 mg, Oral, BID, Monse Winter MD [facility]  [START ON 3/16/2022] rosuvastatin (CRESTOR) tablet 10 mg, 10 mg, Oral, DAILY, Monse Winter MD [facility]  [START ON 3/16/2022] sertraline (ZOLOFT) tablet 50 mg, 50 mg, Oral, DAILY, Monse Winter MD    [START ON 3/16/2022] fluticasone-umeclidinium-vilanterol (TRELEGY ELLIPTA) inhaler 1 Puff, 1 Puff, Inhalation, DAILY, Monse Winter MD    acetaminophen (TYLENOL) tablet 650 mg, 650 mg, Oral, Q6H PRN **OR** acetaminophen (TYLENOL) suppository 650 mg, 650 mg, Rectal, Q6H PRN, Monse Winter MD    polyethylene glycol (MIRALAX) packet 17 g, 17 g, Oral, DAILY PRN, Monse Winter MD    ondansetron (ZOFRAN ODT) tablet 4 mg, 4 mg, Oral, Q8H PRN **OR** ondansetron (ZOFRAN) injection 4 mg, 4 mg, IntraVENous, Q6H PRN, Lizbeth Winter MD    0.9% sodium chloride infusion, 50 mL/hr, IntraVENous, CONTINUOUS, Monse Winter MD    heparin (porcine) injection 5,000 Units, 5,000 Units, SubCUTAneous, Q8H, Lizbeth Winter MD    Current Outpatient Medications:     labetaloL (NORMODYNE) 300 mg tablet, Take 300 mg by mouth two (2) times a day., Disp: , Rfl:     carBAMazepine (TEGretol) 200 mg tablet, Take 200 mg by mouth two (2) times a day., Disp: , Rfl:     sertraline (ZOLOFT) 50 mg tablet, Take 50 mg by mouth daily. , Disp: , Rfl:     potassium chloride (KLOR-CON) 20 mEq pack, Take 1 Packet by mouth two (2) times daily (with meals). , Disp: 60 Packet, Rfl: 0    NIFEdipine ER (ADALAT CC) 90 mg ER tablet, Take 90 mg by mouth daily. , Disp: , Rfl:     ergocalciferol (Vitamin D2) 1,250 mcg (50,000 unit) capsule, Take 50,000 Units by mouth every seven (7) days. q7days wednesday, Disp: , Rfl:     isosorbide dinitrate (ISORDIL) 10 mg tablet, Take 1 Tab by mouth two (2) times a day., Disp: 90 Tab, Rfl: 1    levothyroxine (SYNTHROID) 50 mcg tablet, Take 1 Tab by mouth Daily (before breakfast). , Disp: 30 Tab, Rfl: 0    esomeprazole (NexIUM) 40 mg capsule, Take 40 mg by mouth. Indications: Berry's esophagus, Disp: , Rfl:     clopidogreL (PLAVIX) 75 mg tab, Take 75 mg by mouth daily. , Disp: , Rfl:     nortriptyline (PAMELOR) 50 mg capsule, Take 50 mg by mouth nightly., Disp: , Rfl:     Trelegy Ellipta 100-62.5-25 mcg inhaler, 1 Puff daily. , Disp: , Rfl:     albuterol (PROVENTIL HFA, VENTOLIN HFA, PROAIR HFA) 90 mcg/actuation inhaler, , Disp: , Rfl:     rosuvastatin (CRESTOR) 10 mg tablet, , Disp: , Rfl:     Oxygen, 5 Devices as needed. Pt wears 5LPM as needed- states she uses it after an axiety attack, Disp: , Rfl:     albuterol-ipratropium (DUO-NEB) 2.5 mg-0.5 mg/3 ml nebu, 3 mL by Nebulization route every six (6) hours. , Disp: 120 Nebule, Rfl: 0    ALPRAZolam (XANAX) 0.25 mg tablet, Take 0.25 mg by mouth daily as needed for Anxiety. , Disp: , Rfl:     levETIRAcetam (Keppra) 500 mg tablet, Take 500 mg by mouth two (2) times a day., Disp: , Rfl:     ferrous sulfate 325 mg (65 mg iron) tablet, Take 325 mg by mouth Daily (before breakfast). , Disp: , Rfl:     folic acid (FOLVITE) 1 mg tablet, Take 1 mg by mouth daily. , Disp: , Rfl:     aspirin delayed-release 81 mg tablet, Take  by mouth daily. , Disp: , Rfl: ________________________________________________________________________    Signed: Steffen Page, MD

## 2022-03-15 NOTE — Clinical Note
Status[de-identified] INPATIENT [101]   Type of Bed: Telemetry [19]   Cardiac Monitoring Required?: Yes   Inpatient Hospitalization Certified Necessary for the Following Reasons: 3.  Patient receiving treatment that can only be provided in an inpatient setting (further clarification in H&P documentation)   Admitting Diagnosis: Encephalopathy [875459]   Admitting Physician: Van Marquez [[id number]]   Attending Physician: Van Marquez [[id number]]   Estimated Length of Stay: 2 Midnights   Discharge Plan[de-identified] Home with Office Follow-up

## 2022-03-15 NOTE — ED TRIAGE NOTES
Stroke alert  LKWT 1300, hospice nurse noticed that she was becoming more altered.  Hx of CVA with left side deficits

## 2022-03-16 LAB
ALBUMIN SERPL-MCNC: 2.7 G/DL (ref 3.5–5)
ALBUMIN/GLOB SERPL: 0.8 {RATIO} (ref 1.1–2.2)
ALP SERPL-CCNC: 71 U/L (ref 45–117)
ALT SERPL-CCNC: 34 U/L (ref 12–78)
ANION GAP SERPL CALC-SCNC: 7 MMOL/L (ref 5–15)
AST SERPL W P-5'-P-CCNC: 24 U/L (ref 15–37)
BASOPHILS # BLD: 0 K/UL (ref 0–0.1)
BASOPHILS NFR BLD: 0 % (ref 0–1)
BILIRUB SERPL-MCNC: 0.3 MG/DL (ref 0.2–1)
BNP SERPL-MCNC: 5842 PG/ML
BUN SERPL-MCNC: 19 MG/DL (ref 6–20)
BUN/CREAT SERPL: 7 (ref 12–20)
CA-I BLD-MCNC: 8.2 MG/DL (ref 8.5–10.1)
CHLORIDE SERPL-SCNC: 100 MMOL/L (ref 97–108)
CO2 SERPL-SCNC: 25 MMOL/L (ref 21–32)
CREAT SERPL-MCNC: 2.9 MG/DL (ref 0.55–1.02)
DIFFERENTIAL METHOD BLD: ABNORMAL
EOSINOPHIL # BLD: 0.2 K/UL (ref 0–0.4)
EOSINOPHIL NFR BLD: 3 % (ref 0–7)
ERYTHROCYTE [DISTWIDTH] IN BLOOD BY AUTOMATED COUNT: 12.5 % (ref 11.5–14.5)
GLOBULIN SER CALC-MCNC: 3.2 G/DL (ref 2–4)
GLUCOSE SERPL-MCNC: 80 MG/DL (ref 65–100)
HCT VFR BLD AUTO: 29.2 % (ref 35–47)
HGB BLD-MCNC: 10 G/DL (ref 11.5–16)
IMM GRANULOCYTES # BLD AUTO: 0 K/UL (ref 0–0.04)
IMM GRANULOCYTES NFR BLD AUTO: 0 % (ref 0–0.5)
LYMPHOCYTES # BLD: 1.7 K/UL (ref 0.8–3.5)
LYMPHOCYTES NFR BLD: 26 % (ref 12–49)
MAGNESIUM SERPL-MCNC: 2.1 MG/DL (ref 1.6–2.4)
MCH RBC QN AUTO: 33.2 PG (ref 26–34)
MCHC RBC AUTO-ENTMCNC: 34.2 G/DL (ref 30–36.5)
MCV RBC AUTO: 97 FL (ref 80–99)
MONOCYTES # BLD: 0.7 K/UL (ref 0–1)
MONOCYTES NFR BLD: 11 % (ref 5–13)
NEUTS SEG # BLD: 3.9 K/UL (ref 1.8–8)
NEUTS SEG NFR BLD: 60 % (ref 32–75)
NRBC # BLD: 0 K/UL (ref 0–0.01)
NRBC BLD-RTO: 0 PER 100 WBC
PLATELET # BLD AUTO: 210 K/UL (ref 150–400)
PMV BLD AUTO: 10.6 FL (ref 8.9–12.9)
POTASSIUM SERPL-SCNC: 2.4 MMOL/L (ref 3.5–5.1)
POTASSIUM SERPL-SCNC: 3.1 MMOL/L (ref 3.5–5.1)
PROT SERPL-MCNC: 5.9 G/DL (ref 6.4–8.2)
RBC # BLD AUTO: 3.01 M/UL (ref 3.8–5.2)
SODIUM SERPL-SCNC: 132 MMOL/L (ref 136–145)
T3FREE SERPL-MCNC: 3 PG/ML (ref 2.2–4)
T4 FREE SERPL-MCNC: 1 NG/DL (ref 0.8–1.5)
WBC # BLD AUTO: 6.5 K/UL (ref 3.6–11)

## 2022-03-16 PROCEDURE — 74011250637 HC RX REV CODE- 250/637: Performed by: FAMILY MEDICINE

## 2022-03-16 PROCEDURE — 74011000250 HC RX REV CODE- 250: Performed by: FAMILY MEDICINE

## 2022-03-16 PROCEDURE — 74011250636 HC RX REV CODE- 250/636: Performed by: INTERNAL MEDICINE

## 2022-03-16 PROCEDURE — 80177 DRUG SCRN QUAN LEVETIRACETAM: CPT

## 2022-03-16 PROCEDURE — 95816 EEG AWAKE AND DROWSY: CPT | Performed by: PSYCHIATRY & NEUROLOGY

## 2022-03-16 PROCEDURE — 85025 COMPLETE CBC W/AUTO DIFF WBC: CPT

## 2022-03-16 PROCEDURE — 84132 ASSAY OF SERUM POTASSIUM: CPT

## 2022-03-16 PROCEDURE — 74011250637 HC RX REV CODE- 250/637: Performed by: INTERNAL MEDICINE

## 2022-03-16 PROCEDURE — 83735 ASSAY OF MAGNESIUM: CPT

## 2022-03-16 PROCEDURE — 94640 AIRWAY INHALATION TREATMENT: CPT

## 2022-03-16 PROCEDURE — 65270000029 HC RM PRIVATE

## 2022-03-16 PROCEDURE — 36415 COLL VENOUS BLD VENIPUNCTURE: CPT

## 2022-03-16 PROCEDURE — 83880 ASSAY OF NATRIURETIC PEPTIDE: CPT

## 2022-03-16 PROCEDURE — 94760 N-INVAS EAR/PLS OXIMETRY 1: CPT

## 2022-03-16 PROCEDURE — 74011250636 HC RX REV CODE- 250/636: Performed by: FAMILY MEDICINE

## 2022-03-16 RX ORDER — LABETALOL HCL 20 MG/4 ML
20 SYRINGE (ML) INTRAVENOUS ONCE
Status: COMPLETED | OUTPATIENT
Start: 2022-03-16 | End: 2022-03-16

## 2022-03-16 RX ORDER — POTASSIUM CHLORIDE 7.45 MG/ML
10 INJECTION INTRAVENOUS ONCE
Status: COMPLETED | OUTPATIENT
Start: 2022-03-16 | End: 2022-03-16

## 2022-03-16 RX ORDER — POTASSIUM CHLORIDE 1.5 G/1.77G
20 POWDER, FOR SOLUTION ORAL 3 TIMES DAILY
Status: DISCONTINUED | OUTPATIENT
Start: 2022-03-16 | End: 2022-03-17 | Stop reason: HOSPADM

## 2022-03-16 RX ORDER — HYDRALAZINE HYDROCHLORIDE 20 MG/ML
10 INJECTION INTRAMUSCULAR; INTRAVENOUS
Status: DISCONTINUED | OUTPATIENT
Start: 2022-03-16 | End: 2022-03-17 | Stop reason: HOSPADM

## 2022-03-16 RX ORDER — POTASSIUM CHLORIDE 1.5 G/1.77G
20 POWDER, FOR SOLUTION ORAL 3 TIMES DAILY
Status: DISCONTINUED | OUTPATIENT
Start: 2022-03-16 | End: 2022-03-16

## 2022-03-16 RX ORDER — POTASSIUM CHLORIDE 7.45 MG/ML
10 INJECTION INTRAVENOUS
Status: DISPENSED | OUTPATIENT
Start: 2022-03-16 | End: 2022-03-16

## 2022-03-16 RX ORDER — SODIUM CHLORIDE 9 MG/ML
50 INJECTION, SOLUTION INTRAVENOUS CONTINUOUS
Status: DISCONTINUED | OUTPATIENT
Start: 2022-03-16 | End: 2022-03-17

## 2022-03-16 RX ADMIN — CARBAMAZEPINE 200 MG: 200 TABLET ORAL at 09:11

## 2022-03-16 RX ADMIN — SODIUM CHLORIDE 50 ML/HR: 9 INJECTION, SOLUTION INTRAVENOUS at 23:29

## 2022-03-16 RX ADMIN — POTASSIUM CHLORIDE 20 MEQ: 1.5 FOR SOLUTION ORAL at 18:00

## 2022-03-16 RX ADMIN — POTASSIUM CHLORIDE 10 MEQ: 7.46 INJECTION, SOLUTION INTRAVENOUS at 18:17

## 2022-03-16 RX ADMIN — IPRATROPIUM BROMIDE AND ALBUTEROL SULFATE 3 ML: .5; 3 SOLUTION RESPIRATORY (INHALATION) at 01:08

## 2022-03-16 RX ADMIN — CLOPIDOGREL BISULFATE 75 MG: 75 TABLET ORAL at 09:11

## 2022-03-16 RX ADMIN — ERGOCALCIFEROL 50000 UNITS: 1.25 CAPSULE ORAL at 09:11

## 2022-03-16 RX ADMIN — POTASSIUM CHLORIDE 10 MEQ: 7.46 INJECTION, SOLUTION INTRAVENOUS at 10:30

## 2022-03-16 RX ADMIN — HYDRALAZINE HYDROCHLORIDE 10 MG: 20 INJECTION, SOLUTION INTRAMUSCULAR; INTRAVENOUS at 17:27

## 2022-03-16 RX ADMIN — METHYLPREDNISOLONE SODIUM SUCCINATE 40 MG: 40 INJECTION, POWDER, FOR SOLUTION INTRAMUSCULAR; INTRAVENOUS at 13:00

## 2022-03-16 RX ADMIN — PANTOPRAZOLE SODIUM 40 MG: 40 GRANULE, DELAYED RELEASE ORAL at 09:26

## 2022-03-16 RX ADMIN — SERTRALINE HYDROCHLORIDE 50 MG: 50 TABLET ORAL at 09:11

## 2022-03-16 RX ADMIN — IPRATROPIUM BROMIDE AND ALBUTEROL SULFATE 3 ML: .5; 3 SOLUTION RESPIRATORY (INHALATION) at 13:01

## 2022-03-16 RX ADMIN — POTASSIUM CHLORIDE 20 MEQ: 1.5 FOR SOLUTION ORAL at 23:52

## 2022-03-16 RX ADMIN — POTASSIUM CHLORIDE 10 MEQ: 7.46 INJECTION, SOLUTION INTRAVENOUS at 09:10

## 2022-03-16 RX ADMIN — HEPARIN SODIUM 5000 UNITS: 5000 INJECTION INTRAVENOUS; SUBCUTANEOUS at 05:07

## 2022-03-16 RX ADMIN — LABETALOL HYDROCHLORIDE 20 MG: 5 INJECTION, SOLUTION INTRAVENOUS at 23:51

## 2022-03-16 RX ADMIN — CARBAMAZEPINE 200 MG: 200 TABLET ORAL at 23:55

## 2022-03-16 RX ADMIN — ASPIRIN 81 MG: 81 TABLET, COATED ORAL at 09:11

## 2022-03-16 RX ADMIN — IPRATROPIUM BROMIDE AND ALBUTEROL SULFATE 3 ML: .5; 3 SOLUTION RESPIRATORY (INHALATION) at 20:41

## 2022-03-16 RX ADMIN — POTASSIUM CHLORIDE 10 MEQ: 7.46 INJECTION, SOLUTION INTRAVENOUS at 12:30

## 2022-03-16 RX ADMIN — FERROUS SULFATE TAB 325 MG (65 MG ELEMENTAL FE) 325 MG: 325 (65 FE) TAB at 09:26

## 2022-03-16 RX ADMIN — ATORVASTATIN CALCIUM 20 MG: 20 TABLET, FILM COATED ORAL at 09:11

## 2022-03-16 RX ADMIN — POTASSIUM CHLORIDE 20 MEQ: 1.5 POWDER, FOR SOLUTION ORAL at 09:26

## 2022-03-16 RX ADMIN — ISOSORBIDE DINITRATE 10 MG: 10 TABLET ORAL at 09:11

## 2022-03-16 RX ADMIN — METHYLPREDNISOLONE SODIUM SUCCINATE 40 MG: 40 INJECTION, POWDER, FOR SOLUTION INTRAMUSCULAR; INTRAVENOUS at 23:50

## 2022-03-16 RX ADMIN — LEVETIRACETAM 500 MG: 500 TABLET, FILM COATED ORAL at 23:55

## 2022-03-16 RX ADMIN — HEPARIN SODIUM 5000 UNITS: 5000 INJECTION INTRAVENOUS; SUBCUTANEOUS at 23:50

## 2022-03-16 RX ADMIN — IPRATROPIUM BROMIDE AND ALBUTEROL SULFATE 3 ML: .5; 3 SOLUTION RESPIRATORY (INHALATION) at 07:58

## 2022-03-16 RX ADMIN — FOLIC ACID 1 MG: 1 TABLET ORAL at 09:11

## 2022-03-16 RX ADMIN — LEVETIRACETAM 500 MG: 500 TABLET, FILM COATED ORAL at 09:11

## 2022-03-16 RX ADMIN — LEVOTHYROXINE SODIUM 50 MCG: 0.03 TABLET ORAL at 09:26

## 2022-03-16 NOTE — PROGRESS NOTES
Reason for Admission:  Metabolic encephalopathy, COPD                   RUR Score:  13%                   Plan for utilizing home health:          PCP: First and Last name:  None   Patient states the hospice MD manages her care. Name of Practice:    Are you a current patient: Yes/No:    Approximate date of last visit:    Can you participate in a virtual visit with your PCP:                     Current Advanced Directive/Advance Care Plan: Full Code    Healthcare Decision Maker:   Click here to complete [address] including selection of the Healthcare Decision Maker Relationship (ie \"Primary\")           Boyfriend: Suman Conner- [phone]  -Patient states that she is in the process of making her boyfriend her POA. Transition of Care Plan: CM met with patient at bedside to complete assessment. Address verified on chart. Patient lives with boyfriend. DME: cane, walker-only uses them sometimes. Patient does not drive, boyfriend provides transportation. Requires assistance with some ADL's. Patient wants to resume with [facility] upon DC. CM sent referral.    DC plan: home under hospice care via [address] and boyfriend will provide transportation upon.

## 2022-03-16 NOTE — CONSULTS
Renal Consult Note    Admit Date: 3/15/2022      HPI:   I was asked to see this 24-year-old lady with history of chronic kidney disease with a baseline creatinine of about 2.4 mg as of June 2021. In January last year, her creatinine was 1.07 mg. She was admitted now with a creatinine of 2.9 mg and a potassium of 2.4 mg. Her BNP was 5842. The patient is confused. She is known to have an atrophic left kidney. She is also known to have longstanding hypertension and CT scan of the abdomen in the past has shown diffuse calcification of the aorta, calcification of the renal and iliac arteries. She is known to be hypothyroid as well. No specific history is elicitable from the patient. She has been seen by neurology for seizures.       Current Facility-Administered Medications   Medication Dose Route Frequency    methylPREDNISolone (PF) (SOLU-MEDROL) injection 40 mg  40 mg IntraVENous Q8H    0.9% sodium chloride infusion  50 mL/hr IntraVENous CONTINUOUS    hydrALAZINE (APRESOLINE) 20 mg/mL injection 10 mg  10 mg IntraVENous Q6H PRN    albuterol-ipratropium (DUO-NEB) 2.5 MG-0.5 MG/3 ML  3 mL Nebulization Q6H    isosorbide dinitrate (ISORDIL) tablet 10 mg  10 mg Oral BID    levothyroxine (SYNTHROID) tablet 50 mcg  50 mcg Oral ACB    potassium chloride (KLOR-CON) packet for solution 20 mEq  20 mEq Oral BID WITH MEALS    aspirin delayed-release tablet 81 mg  81 mg Oral DAILY    carBAMazepine (TEGretol) tablet 200 mg  200 mg Oral BID    clopidogreL (PLAVIX) tablet 75 mg  75 mg Oral DAILY    ergocalciferol capsule 50,000 Units  50,000 Units Oral Q7D    pantoprazole (PROTONIX) granules for oral suspension 40 mg  40 mg Per NG tube ACB    ferrous sulfate tablet 325 mg  325 mg Oral ACB    folic acid (FOLVITE) tablet 1 mg  1 mg Oral DAILY    levETIRAcetam (KEPPRA) tablet 500 mg  500 mg Oral BID    atorvastatin (LIPITOR) tablet 20 mg  20 mg Oral DAILY    sertraline (ZOLOFT) tablet 50 mg  50 mg Oral DAILY    fluticasone-umeclidinium-vilanterol (TRELEGY ELLIPTA) inhaler 1 Puff  1 Puff Inhalation DAILY    acetaminophen (TYLENOL) tablet 650 mg  650 mg Oral Q6H PRN    Or    acetaminophen (TYLENOL) suppository 650 mg  650 mg Rectal Q6H PRN    polyethylene glycol (MIRALAX) packet 17 g  17 g Oral DAILY PRN    ondansetron (ZOFRAN ODT) tablet 4 mg  4 mg Oral Q8H PRN    Or    ondansetron (ZOFRAN) injection 4 mg  4 mg IntraVENous Q6H PRN    heparin (porcine) injection 5,000 Units  5,000 Units SubCUTAneous Q8H        Past Medical History:   Diagnosis Date    Anxiety 10/2/2020    Carotid stenosis     Chronic anemia     Chronic respiratory failure (HCC)     COPD (chronic obstructive pulmonary disease) with emphysema (HCC) 10/2/2020    Depression     Diastolic CHF (HCC)     Dysphagia 10/2/2020    GERD (gastroesophageal reflux disease)     High cholesterol     Hypertension     Hypothyroid     Iron deficiency anemia 10/2/2020    Mitral valve regurgitation 10/2/2020    Renal artery stenosis (HCC)     Seizure disorder (HCC)       Past Surgical History:   Procedure Laterality Date    HX CAROTID ENDARTERECTOMY      HX CHOLECYSTECTOMY      HX RENAL ARTERY STENT      HX ROTATOR CUFF REPAIR Right     HX TUBAL LIGATION      IR THORACENTESIS CATH W IMAGE  11/24/2020    IR THORACENTESIS CATH W IMAGE  11/25/2020     Family History   Problem Relation Age of Onset    Hypertension Mother     Stroke Mother     Melanoma Mother     Stroke Father     Melanoma Brother     Melanoma Child       Social History     Tobacco Use    Smoking status: Former Smoker     Types: Cigarettes    Smokeless tobacco: Never Used    Tobacco comment: quit july 2020   Substance Use Topics    Alcohol use: Not Currently         Review of Systems    Review of Systems   Gastrointestinal: Positive for diarrhea.       Not reliably elicitable due to patient's mental status    Physical Exam:     Physical Exam  Cardiovascular:      Rate and Rhythm: Regular rhythm. Pulmonary:      Breath sounds: Normal breath sounds. Abdominal:      General: Bowel sounds are normal.      Palpations: Abdomen is soft. Skin:     General: Skin is warm. Neurological:      Mental Status: She is alert. She is disoriented. Bladder not distended, no abdominal bruits  No edema  Oral mucosa was dry      Patient Vitals for the past 8 hrs:   BP Temp Pulse Resp   03/16/22 1623 (!) 196/89 98.3 °F (36.8 °C) 90 17   03/16/22 1200   82      No intake/output data recorded. 03/14 1901 - 03/16 0700  In: -   Out: 600 [Urine:600]          XR CHEST PORT   Final Result   The cardiomediastinal silhouette is appropriate for age, technique,   and lung expansion. Pulmonary vasculature is not congested. The lungs are   essentially clear. No effusion or pneumothorax is seen. CT CODE NEURO HEAD WO CONTRAST   Final Result   Accelerated and progressive chronic small vessel disease. No acute   findings           Data Review   Recent Labs     03/16/22  0647 03/15/22  1618   WBC 6.5 6.5   HGB 10.0* 12.0   HCT 29.2* 33.1*    267     Recent Labs     03/16/22  0946 03/16/22  0647 03/15/22  2008 03/15/22  1618   NA  --  132* 132* 128*   K  --  2.4* 2.9* 2.4*   CL  --  100 99 95*   CO2  --  25 25 21   GLU  --  80 90 104*   BUN  --  19 22* 25*   CREA  --  2.90* 2.99* 3.30*   CA  --  8.2* 8.2* 9.1   MG 2.1  --   --  1.8   ALB  --  2.7*  --  3.3*   ALT  --  34  --  44   INR  --   --   --  0.8*     No components found for: GLPOC  No results for input(s): PH, PCO2, PO2, HCO3, FIO2 in the last 72 hours. Recent Labs     03/15/22  1618   INR 0.8*         Assessment:           Active Problems:    Hyponatremia (1/17/2021)      Encephalopathy (3/70/3616)      Metabolic encephalopathy (7/70/3893)      COPD (chronic obstructive pulmonary disease) ([facility] (3/15/2022)    Acute kidney injury on chronic kidney disease. Creatinine was 3.3 mg on admission and is down to 2.9 mg today.   Her baseline creatinine is about 2.4 mg based on a creatinine done in June last year. She has an atrophic left kidney. Hypokalemia. This could be due to diarrhea. Hypothyroidism    Hypertension    Chronic obstructive pulmonary disease    Seizure disorder    Plan:   She has received 30 mEq of IV potassium chloride thus far. She is currently on 20 mEq twice daily of oral potassium. Magnesium has been replaced. Suspect that she would need another 50 mEq of potassium to replace intracellular stores  IV fluids for recovery of acute kidney injury.   Avoid NSAIDs  Dose medications for eGFR of 20 mL/min  Thank you for this consult

## 2022-03-16 NOTE — CONSULTS
PULMONARY CONSULT  [facility]    Name: Sienna Bower MRN: [id number]   : 1965 Hospital: [address] [place] [address]   Date: 3/16/2022  Admission date: 3/15/2022 Hospital Day: 2       HPI:     Hospital Problems  Date Reviewed: 2021          Codes Class Noted POA    Encephalopathy ICD-10-CM: G93.40  ICD-9-CM: 348.30  3/15/2022 Unknown        Metabolic encephalopathy DJA-68-BB: G93.41  ICD-9-CM: 348.31  3/15/2022 Unknown        COPD (chronic obstructive pulmonary disease) ([facility] ICD-10-CM: J44.9  ICD-9-CM: 173  3/15/2022 Unknown        Hyponatremia ICD-10-CM: E87.1  ICD-9-CM: 276.1  2021 Unknown                   [x] High complexity decision making was performed  [x] See my orders for details      Subjective/Initial History:     I was asked by Elvis Clayton MD to see Sienna Bower  a 64 y.o.  female in consultation     Excerpts from admission 3/15/2022 or consult notes as follows:   60-year-old lady came in because of generalized weakness also having shortness of breath dyspnea significant past medical history of COPD chronic kidney disease chronic hypertension secondary to renal artery stenosis diastolic heart failure reflux disease chronic anemia mitral insufficiency she is complaining about dizziness lightheadedness for the past couple of days before coming to the hospital but her blood pressure is elevated patient was in hospice revoked hospice stroke alert was called no TPA was given patient still feel weak and tired still has shortness of breath on oxygen at home so admitted and pulmonary consult was called for further evaluation.       Allergies   Allergen Reactions    Sulfa (Sulfonamide Antibiotics) Anaphylaxis    Sulfamethoxazole-Trimethoprim Unknown (comments)        MAR reviewed and pertinent medications noted or modified as needed     Current Facility-Administered Medications   Medication    potassium chloride 10 mEq in 100 ml IVPB    albuterol-ipratropium (DUO-NEB) 2.5 MG-0.5 MG/3 ML    isosorbide dinitrate (ISORDIL) tablet 10 mg    levothyroxine (SYNTHROID) tablet 50 mcg    potassium chloride (KLOR-CON) packet for solution 20 mEq    aspirin delayed-release tablet 81 mg    carBAMazepine (TEGretol) tablet 200 mg    clopidogreL (PLAVIX) tablet 75 mg    ergocalciferol capsule 50,000 Units    pantoprazole (PROTONIX) granules for oral suspension 40 mg    ferrous sulfate tablet 597 mg    folic acid (FOLVITE) tablet 1 mg    levETIRAcetam (KEPPRA) tablet 500 mg    atorvastatin (LIPITOR) tablet 20 mg    sertraline (ZOLOFT) tablet 50 mg    fluticasone-umeclidinium-vilanterol (TRELEGY ELLIPTA) inhaler 1 Puff    acetaminophen (TYLENOL) tablet 650 mg    Or    acetaminophen (TYLENOL) suppository 650 mg    polyethylene glycol (MIRALAX) packet 17 g    ondansetron (ZOFRAN ODT) tablet 4 mg    Or    ondansetron (ZOFRAN) injection 4 mg    0.9% sodium chloride infusion    heparin (porcine) injection 5,000 Units      Patient PCP: None  PMH:  has a past medical history of Anxiety (10/2/2020), Carotid stenosis, Chronic anemia, Chronic respiratory failure (HCC), COPD (chronic obstructive pulmonary disease) with emphysema ([address] (10/2/2020), Depression, Diastolic CHF ([address] Dysphagia (10/2/2020), GERD (gastroesophageal reflux disease), High cholesterol, Hypertension, Hypothyroid, Iron deficiency anemia (10/2/2020), Mitral valve regurgitation (10/2/2020), Renal artery stenosis ([address] and Seizure disorder ([address] PSH:   has a past surgical history that includes hx rotator cuff repair (Right); hx tubal ligation; ir thoracentesis cath w image (11/24/2020); ir thoracentesis cath w image (11/25/2020); hx renal artery stent; hx cholecystectomy; and hx carotid endarterectomy. FHX: family history includes Hypertension in her mother; Melanoma in her brother, child, and mother; Stroke in her father and mother. SHX:  reports that she has quit smoking.  Her smoking use included cigarettes. She has never used smokeless tobacco. She reports previous alcohol use. She reports that she does not use drugs. ROS:    Review of Systems   Constitutional: Positive for malaise/fatigue. HENT: Negative. Eyes: Negative. Respiratory: Positive for shortness of breath. Cardiovascular: Negative. Gastrointestinal: Negative. Genitourinary: Negative. Musculoskeletal: Negative. Skin: Negative. Neurological: Positive for weakness. Psychiatric/Behavioral: Negative. Objective:     Vital Signs: Telemetry:    normal sinus rhythm Intake/Output:   Visit Vitals  BP (!) 187/88 (BP 1 Location: Right upper arm, BP Patient Position: At rest;Semi fowlers) Comment: RN notified   Pulse 87   Temp 97.6 °F (36.4 °C)   Resp 18   Ht 5' 4\" (1.626 m)   Wt 52.4 kg (115 lb 8.3 oz)   SpO2 95%   BMI 19.83 kg/m²       Temp (24hrs), Av.8 °F (36.6 °C), Min:97.6 °F (36.4 °C), Max:98 °F (36.7 °C)        O2 Device: None (Room air)         Wt Readings from Last 4 Encounters:   03/15/22 52.4 kg (115 lb 8.3 oz)   06/10/21 47 kg (103 lb 9.9 oz)   21 45.4 kg (100 lb)   21 44.9 kg (99 lb)          Intake/Output Summary (Last 24 hours) at 3/16/2022 0934  Last data filed at 3/15/2022 2039  Gross per 24 hour   Intake    Output 600 ml   Net -600 ml       Last shift:      No intake/output data recorded. Last 3 shifts:  1901 -  0700  In: -   Out: 600 [Urine:600]       Physical Exam:     Physical Exam  Constitutional:       Appearance: She is ill-appearing. HENT:      Head: Normocephalic and atraumatic. Nose: Nose normal.      Mouth/Throat:      Mouth: Mucous membranes are moist.   Eyes:      Pupils: Pupils are equal, round, and reactive to light. Cardiovascular:      Rate and Rhythm: Normal rate and regular rhythm. Pulses: Normal pulses. Pulmonary:      Effort: Pulmonary effort is normal.      Breath sounds: Wheezing and rhonchi present.    Abdominal:      General: Abdomen is flat. Bowel sounds are normal.      Palpations: Abdomen is soft. Musculoskeletal:         General: Normal range of motion. Cervical back: Normal range of motion and neck supple. Skin:     General: Skin is warm. Neurological:      Mental Status: She is alert. Motor: Weakness present. Psychiatric:         Mood and Affect: Mood normal.          Labs:    Recent Labs     03/16/22  0647 03/15/22  1618   WBC 6.5 6.5   HGB 10.0* 12.0    267   INR  --  0.8*     Recent Labs     03/16/22  0647 03/15/22  2008 03/15/22  1618   * 132* 128*   K 2.4* 2.9* 2.4*    99 95*   CO2 25 25 21   GLU 80 90 104*   BUN 19 22* 25*   CREA 2.90* 2.99* 3.30*   CA 8.2* 8.2* 9.1   MG  --   --  1.8   LAC  --   --  1.5   ALB 2.7*  --  3.3*   ALT 34  --  44     No results for input(s): PH, PCO2, PO2, HCO3, FIO2 in the last 72 hours. Recent Labs     03/15/22  1618   CPK 78     No results found for: BNPP, BNP   Lab Results   Component Value Date/Time    Culture result: No growth 6 days 04/13/2021 10:29 PM    Culture result: No growth 4 days 11/24/2020 12:08 PM    Culture result: MRSA not present 09/22/2020 04:55 AM     Lab Results   Component Value Date/Time    TSH 7.99 (H) 03/15/2022 04:18 PM       Imaging:    CXR Results  (Last 48 hours)               03/15/22 1659  XR CHEST PORT Final result    Impression:  The cardiomediastinal silhouette is appropriate for age, technique,   and lung expansion. Pulmonary vasculature is not congested. The lungs are   essentially clear. No effusion or pneumothorax is seen. Narrative:  1 view comparison April 13               Results from Hospital Encounter encounter on 03/15/22    XR CHEST PORT    Narrative  1 view comparison April 13    Impression  The cardiomediastinal silhouette is appropriate for age, technique,  and lung expansion. Pulmonary vasculature is not congested. The lungs are  essentially clear. No effusion or pneumothorax is seen.       Results from Hospital Encounter encounter on 04/13/21    XR CHEST PORT    Narrative  Portable chest, 10 hours, compared with 2021. The heart, mediastinum, and pulmonary vasculature appear within normal limits. No evidence of pulmonary edema, air space pneumonia, or pleural effusion. No  evidence of pneumothorax. Calcified aortic knob. Clips overlie soft tissues of  the right lower neck. Impression  No acute process. Results from [place] encounter on 03/31/21    XR CHEST SNGL V    Narrative  Chest single view. Comparison single view chest March 15, 2021    Increased lung volumes. Coarse reticular markings through lungs, basilar lung  predominance. Similar appearance compared to prior imaging. No gross  interstitial or alveolar pulmonary edema. Cardiac and mediastinal structures  unchanged noting thoracic aorta atherosclerosis. No pneumothorax or sizable  pleural effusion. Impression  No plain film evidence for CHF or pneumonia. Results from [place] encounter on 03/15/22    CT CODE NEURO HEAD WO CONTRAST    Narrative  CT dose reduction was achieved through use of a standardized protocol tailored  for this examination and automatic exposure control for dose modulation. Noncontrast study shows generalized volume loss and typical periventricular  white matter disease, greater than expected for age and greater than seen on a  baseline study of 1 year ago. Central pontine calcifications are unchanged. No  new abnormality seen in gray or white matter. No mass effect or hemorrhage. Ventricles are symmetric and unchanged. No significant bone finding. I gave this  report to the ordering physician    Impression  Accelerated and progressive chronic small vessel disease. No acute  findings        IMPRESSION:     1. Chronic Obstructive Pulmonary Disease with Severe Acute Exacerbation requiring inpatient hospitalization and management; has very poor airway clearance.  Increased work of breathing  2. Body mass index is 19.83 kg/m². 3. Metabolic encephalopathy  4. Hyponatremia  5. Hypokalemia  6. Carotid and renal artery stenosis  7. Gastroesophageal reflux disease  8. Acute on chronic kidney disease  9. Pt is requiring Drug therapy requiring intensive monitoring for toxicity  10. Pt is unstable, unpredictable needing inpatient monitoring; is acutely ill and at high risk of sudden decline and decompensation with severe consequenses and continued end organ dysfunction and failure  11. Prognosis guarded       RECOMMENDATIONS/PLAN:     3 80-year-old lady came in because of generalized weakness shortness of breath dyspnea she is 50-pack-year smoking history continues to smoke 1 pack/day not on any inhalers we will start patient on Symbicort and nebulizer treatment oxygen per protocol   2. Supplement potassium sodium is also low will start patient on IV fluid  3. We will get magnesium level  4. She was complaining about dizziness CAT scan of the head was negative fluctuating in  blood pressure now is elevated continue with the blood pressure medication  5. Chest x-ray no acute infiltrate  6. We will give 3 doses of IV Solu-Medrol  7. Will get arterial blood gases to see the PCO2 level  8. Supplemental O2 to keep sats > 93%  9. Aspiration precautions  10. Labs to follow electrolytes, renal function and and blood counts  11. Glucose monitoring and SSI  12. Bronchial hygiene with respiratory therapy techniques, bronchodilators  13. DVT, SUP prophylaxis  14. Smoking cessation counseling done  15. Pt needs IV fluids with additives and Drug therapy requiring intensive monitoring for toxicity  16.  Prescription drug management with home med reconciliation reviewed       This care involved high complexity medical decision making: I personally:  · Reviewed the flowsheet and previous days notes  · Reviewed and summarized records or history from previous days note or discussions with staff, family  · High Risk Drug therapy requiring intensive monitoring for toxicity: eg steroids, pressors, antibiotics  · Reviewed and/or ordered Clinical lab tests  · Reviewed images and/or ordered Radiology tests  · Reviewed the patients ECG / Telemetry  · Reviewed and/or adjusted NiPPV settings  · Called and arranged for Radiologic procedures or interventions  · performed or ordered Diagnostic endoscopies with identified risk factors.   · discussed my assessment/management with : Nursing, Hospitalist and Family for coordination of care          Alfredo Perez MD

## 2022-03-16 NOTE — ROUTINE PROCESS
.. TRANSFER - OUT REPORT:    Verbal report given to Monique(name) on [facility]  being transferred to [facility] (unit) for routine progression of care       Report consisted of patients Situation, Background, Assessment and   Recommendations(SBAR). Information from the following report(s) SBAR was reviewed with the receiving nurse. Lines:   Peripheral IV 03/15/22 Anterior;Right Forearm (Active)        Opportunity for questions and clarification was provided.       Patient transported with:   Monitor  Tech

## 2022-03-16 NOTE — CONSULTS
NEURO CONSULT      REASON FOR ADMISSION:  Generalized weakness mental status change      HISTORY: Ms. Baljinder Adames is 64years old with a history of seizures, chronic kidney disease, hyponatremia, hypertension, advanced COPD and other medical conditions who is consulted to neurology for seizures. Patient also has generalized weakness but I think this may be related to her multiple medical problems. ROS: As per above    General:                     No fever, no chills, no sweats, no generalized weakness, no weight loss/gain,                                       No loss of appetite   Eyes:                           No blurred vision, no eye pain, no loss of vision, no double vision  ENT:                            rhinorrhea, no pharyngitis   Respiratory:               No cough, no sputum production, no SOB, no TRAN, no wheezing, no pleuritic pain   Cardiology:                No chest pain, no palpitations, no orthopnea, no PND, no edema, no syncope   Gastrointestinal:       No abdominal pain , no N/V, no diarrhea, no dysphagia, no constipation, no bleeding   Genitourinary:           frequency, no urgency, no dysuria, no hematuria, no incontinence   Muskuloskeletal :      No arthralgia, no myalgia, no back pain  Hematology:              No easy bruising, no nose or gum bleeding, no lymphadenopathy   Dermatological:         No rash, no ulceration, no pruritis, no color change / jaundice  Endocrine:                 hot flashes or polydipsia   Neurological:             No headache, no dizziness, no confusion, no focal weakness, no paresthesia,                                      No Speech difficulties, no memory loss, no gait difficulty  Psychological:          No neelings of anxiety, no depression, no agitation      NEURO EXAM:    Mental status: Slightly lethargic but oriented to day month year and is not aphasic.     Cranial nerves: Cranial nerve exam intact    Motor exam: Thin built, no focality    Sensory exam: No tactile extinction    Coordination: Fair    Gait and Station: Patient patient was not ambulated    ASSESSMENT:  Seizures, history of seizures. Generalized weakness. It is my impression that the generalized weakness is secondary to multiple medical problems causing generalized debilitation. PLAN:  Seizure precautions  Keppra level  EEG  PT/OT  Other medical problems have been addressed.       ALLERGIES:    Allergies   Allergen Reactions    Sulfa (Sulfonamide Antibiotics) Anaphylaxis    Sulfamethoxazole-Trimethoprim Unknown (comments)       MEDS:      Current Facility-Administered Medications:     potassium chloride 10 mEq in 100 ml IVPB, 10 mEq, IntraVENous, Q1H, Monse Winter MD    albuterol-ipratropium (DUO-NEB) 2.5 MG-0.5 MG/3 ML, 3 mL, Nebulization, Q6H, Lizbeth Winter MD, 3 mL at 03/16/22 0758    isosorbide dinitrate (ISORDIL) tablet 10 mg, 10 mg, Oral, BID, Lizbeth Winter MD, 10 mg at 03/15/22 2244    levothyroxine (SYNTHROID) tablet 50 mcg, 50 mcg, Oral, ACREILLY, Monse Winter MD    potassium chloride (KLOR-CON) packet for solution 20 mEq, 20 mEq, Oral, BID WITH MEALS, Monse Winter MD    aspirin delayed-release tablet 81 mg, 81 mg, Oral, DAILY, Monse Winter MD    carBAMazepine (TEGretol) tablet 200 mg, 200 mg, Oral, BID, Lizbeth Winter MD, 200 mg at 03/15/22 2244    clopidogreL (PLAVIX) tablet 75 mg, 75 mg, Oral, DAILY, Monse Winter MD    ergocalciferol capsule 50,000 Units, 50,000 Units, Oral, Q7D, Lizbeth Winter MD    pantoprazole (PROTONIX) granules for oral suspension 40 mg, 40 mg, Per NG tube, Maggy ROSE Thomasina Beckwith, MD    ferrous sulfate tablet 325 mg, 325 mg, Oral, ACREILLY, Lizbeth Winter MD    folic acid (FOLVITE) tablet 1 mg, 1 mg, Oral, DAILY, Lizbeth Winter MD    levETIRAcetam (KEPPRA) tablet 500 mg, 500 mg, Oral, BID, Lizbeth Winter MD, 500 mg at 03/15/22 0124    atorvastatin (LIPITOR) tablet 20 mg, 20 mg, Oral, DAILY, Mono Winter MD    sertraline (ZOLOFT) tablet 50 mg, 50 mg, Oral, DAILY, Mono Winter MD    fluticasone-umeclidinium-vilanterol (TRELEGY ELLIPTA) inhaler 1 Puff, 1 Puff, Inhalation, DAILY, Mono Winter MD    acetaminophen (TYLENOL) tablet 650 mg, 650 mg, Oral, Q6H PRN, 650 mg at 03/15/22 2244 **OR** acetaminophen (TYLENOL) suppository 650 mg, 650 mg, Rectal, Q6H PRN, Lizbeth Winter MD    polyethylene glycol (MIRALAX) packet 17 g, 17 g, Oral, DAILY PRN, Mono Winter MD    ondansetron (ZOFRAN ODT) tablet 4 mg, 4 mg, Oral, Q8H PRN **OR** ondansetron (ZOFRAN) injection 4 mg, 4 mg, IntraVENous, Q6H PRN, Lizbeth Winter MD    0.9% sodium chloride infusion, 50 mL/hr, IntraVENous, CONTINUOUS, Lizbeth Winter MD, Last Rate: 50 mL/hr at 03/15/22 2019, 50 mL/hr at 03/15/22 2019    heparin (porcine) injection 5,000 Units, 5,000 Units, SubCUTAneous, Q8H, Lizbeth Winter MD, 5,000 Units at 03/16/22 0507    LABS:  Recent Results (from the past 24 hour(s))   CBC WITH AUTOMATED DIFF    Collection Time: 03/15/22  4:18 PM   Result Value Ref Range    WBC 6.5 3.6 - 11.0 K/uL    RBC 3.60 (L) 3.80 - 5.20 M/uL    HGB 12.0 11.5 - 16.0 g/dL    HCT 33.1 (L) 35.0 - 47.0 %    MCV 91.9 80.0 - 99.0 FL    MCH 33.3 26.0 - 34.0 PG    MCHC 36.3 30.0 - 36.5 g/dL    RDW 12.0 11.5 - 14.5 %    PLATELET 763 006 - 935 K/uL    MPV 10.3 8.9 - 12.9 FL    NRBC 0.0 0.0  WBC    ABSOLUTE NRBC 0.00 0.00 - 0.01 K/uL    NEUTROPHILS 68 32 - 75 %    LYMPHOCYTES 19 12 - 49 %    MONOCYTES 11 5 - 13 %    EOSINOPHILS 2 0 - 7 %    BASOPHILS 0 0 - 1 %    IMMATURE GRANULOCYTES 0 0 - 0.5 %    ABS. NEUTROPHILS 4.4 1.8 - 8.0 K/UL    ABS. LYMPHOCYTES 1.2 0.8 - 3.5 K/UL    ABS. MONOCYTES 0.7 0.0 - 1.0 K/UL    ABS. EOSINOPHILS 0.1 0.0 - 0.4 K/UL    ABS. BASOPHILS 0.0 0.0 - 0.1 K/UL    ABS. IMM.  GRANS. 0.0 0.00 - 0.04 K/UL    DF AUTOMATED     TYPE & SCREEN    Collection Time: 03/15/22  4:18 PM   Result Value Ref Range    Crossmatch Expiration 03/18/2022,2358     ABO/Rh(D) O Positive     Antibody screen Negative    PROTHROMBIN TIME + INR    Collection Time: 03/15/22  4:18 PM   Result Value Ref Range    Prothrombin time 11.2 (L) 11.9 - 14.6 sec    INR 0.8 (L) 0.9 - 1.1     METABOLIC PANEL, COMPREHENSIVE    Collection Time: 03/15/22  4:18 PM   Result Value Ref Range    Sodium 128 (L) 136 - 145 mmol/L    Potassium 2.4 (LL) 3.5 - 5.1 mmol/L    Chloride 95 (L) 97 - 108 mmol/L    CO2 21 21 - 32 mmol/L    Anion gap 12 5 - 15 mmol/L    Glucose 104 (H) 65 - 100 mg/dL    BUN 25 (H) 6 - 20 mg/dL    Creatinine 3.30 (H) 0.55 - 1.02 mg/dL    BUN/Creatinine ratio 8 (L) 12 - 20      GFR est AA 18 (L) >60 ml/min/1.73m2    GFR est non-AA 14 (L) >60 ml/min/1.73m2    Calcium 9.1 8.5 - 10.1 mg/dL    Bilirubin, total 0.4 0.2 - 1.0 mg/dL    AST (SGOT) 41 (H) 15 - 37 U/L    ALT (SGPT) 44 12 - 78 U/L    Alk.  phosphatase 89 45 - 117 U/L    Protein, total 6.9 6.4 - 8.2 g/dL    Albumin 3.3 (L) 3.5 - 5.0 g/dL    Globulin 3.6 2.0 - 4.0 g/dL    A-G Ratio 0.9 (L) 1.1 - 2.2     NT-PRO BNP    Collection Time: 03/15/22  4:18 PM   Result Value Ref Range    NT pro-BNP 10,308 (H) <125 pg/mL   TROPONIN-HIGH SENSITIVITY    Collection Time: 03/15/22  4:18 PM   Result Value Ref Range    Troponin-High Sensitivity 25 0 - 51 ng/L   LACTIC ACID    Collection Time: 03/15/22  4:18 PM   Result Value Ref Range    Lactic acid 1.5 0.4 - 2.0 mmol/L   MAGNESIUM    Collection Time: 03/15/22  4:18 PM   Result Value Ref Range    Magnesium 1.8 1.6 - 2.4 mg/dL   CK    Collection Time: 03/15/22  4:18 PM   Result Value Ref Range    CK 78 26 - 192 U/L   TSH 3RD GENERATION    Collection Time: 03/15/22  4:18 PM   Result Value Ref Range    TSH 7.99 (H) 0.36 - 3.74 uIU/mL   AMMONIA    Collection Time: 03/15/22  4:18 PM   Result Value Ref Range    Ammonia, plasma <10 <32 umol/L   COVID-19 RAPID TEST    Collection Time: 03/15/22  4:18 PM   Result Value Ref Range    COVID-19 rapid test Not Detected Not Detected URINALYSIS W/ REFLEX CULTURE    Collection Time: 03/15/22  8:08 PM    Specimen: Urine   Result Value Ref Range    Color Yellow/Straw      Appearance Clear Clear      Specific gravity 1.010 1.003 - 1.030      pH (UA) 6.0      Protein >300 (A) Negative mg/dL    Glucose Negative Negative mg/dL    Ketone Negative Negative mg/dL    Bilirubin Negative Negative      Blood Negative Negative      Urobilinogen 0.1 (L) 0.2 - 1.0 EU/dL    Nitrites Negative Negative      Leukocyte Esterase Negative Negative      UA:UC IF INDICATED Culture not indicated by UA result Culture not indicated by UA result      WBC 0-4 0 - 4 /hpf    RBC 0-5 0 - 5 /hpf    Bacteria Negative Negative /hpf   METABOLIC PANEL, BASIC    Collection Time: 03/15/22  8:08 PM   Result Value Ref Range    Sodium 132 (L) 136 - 145 mmol/L    Potassium 2.9 (L) 3.5 - 5.1 mmol/L    Chloride 99 97 - 108 mmol/L    CO2 25 21 - 32 mmol/L    Anion gap 8 5 - 15 mmol/L    Glucose 90 65 - 100 mg/dL    BUN 22 (H) 6 - 20 mg/dL    Creatinine 2.99 (H) 0.55 - 1.02 mg/dL    BUN/Creatinine ratio 7 (L) 12 - 20      GFR est AA 20 (L) >60 ml/min/1.73m2    GFR est non-AA 16 (L) >60 ml/min/1.73m2    Calcium 8.2 (L) 8.5 - 10.1 mg/dL   DRUG SCREEN, URINE    Collection Time: 03/15/22  8:08 PM   Result Value Ref Range    AMPHETAMINES Negative Negative      BARBITURATES Positive (A) Negative      BENZODIAZEPINES Negative Negative      COCAINE Negative Negative      METHADONE Negative Negative      OPIATES Positive (A) Negative      PCP(PHENCYCLIDINE) Negative Negative      THC (TH-CANNABINOL) Positive (A) Negative      Drug screen comment        This test is a screen for drugs of abuse in a medical setting only (i.e., they are unconfirmed results and as such must not be used for non-medical purposes, e.g.,employment testing, legal testing). Due to its inherent nature, false positive (FP) and false negative (FN) results may be obtained.  Therefore, if necessary for medical care, recommend confirmation of positive findings by GC/MS. METABOLIC PANEL, COMPREHENSIVE    Collection Time: 03/16/22  6:47 AM   Result Value Ref Range    Sodium 132 (L) 136 - 145 mmol/L    Potassium 2.4 (LL) 3.5 - 5.1 mmol/L    Chloride 100 97 - 108 mmol/L    CO2 25 21 - 32 mmol/L    Anion gap 7 5 - 15 mmol/L    Glucose 80 65 - 100 mg/dL    BUN 19 6 - 20 mg/dL    Creatinine 2.90 (H) 0.55 - 1.02 mg/dL    BUN/Creatinine ratio 7 (L) 12 - 20      GFR est AA 20 (L) >60 ml/min/1.73m2    GFR est non-AA 17 (L) >60 ml/min/1.73m2    Calcium 8.2 (L) 8.5 - 10.1 mg/dL    Bilirubin, total 0.3 0.2 - 1.0 mg/dL    AST (SGOT) 24 15 - 37 U/L    ALT (SGPT) 34 12 - 78 U/L    Alk. phosphatase 71 45 - 117 U/L    Protein, total 5.9 (L) 6.4 - 8.2 g/dL    Albumin 2.7 (L) 3.5 - 5.0 g/dL    Globulin 3.2 2.0 - 4.0 g/dL    A-G Ratio 0.8 (L) 1.1 - 2.2     CBC WITH AUTOMATED DIFF    Collection Time: 03/16/22  6:47 AM   Result Value Ref Range    WBC 6.5 3.6 - 11.0 K/uL    RBC 3.01 (L) 3.80 - 5.20 M/uL    HGB 10.0 (L) 11.5 - 16.0 g/dL    HCT 29.2 (L) 35.0 - 47.0 %    MCV 97.0 80.0 - 99.0 FL    MCH 33.2 26.0 - 34.0 PG    MCHC 34.2 30.0 - 36.5 g/dL    RDW 12.5 11.5 - 14.5 %    PLATELET 831 944 - 690 K/uL    MPV 10.6 8.9 - 12.9 FL    NRBC 0.0 0.0  WBC    ABSOLUTE NRBC 0.00 0.00 - 0.01 K/uL    NEUTROPHILS 60 32 - 75 %    LYMPHOCYTES 26 12 - 49 %    MONOCYTES 11 5 - 13 %    EOSINOPHILS 3 0 - 7 %    BASOPHILS 0 0 - 1 %    IMMATURE GRANULOCYTES 0 0 - 0.5 %    ABS. NEUTROPHILS 3.9 1.8 - 8.0 K/UL    ABS. LYMPHOCYTES 1.7 0.8 - 3.5 K/UL    ABS. MONOCYTES 0.7 0.0 - 1.0 K/UL    ABS. EOSINOPHILS 0.2 0.0 - 0.4 K/UL    ABS. BASOPHILS 0.0 0.0 - 0.1 K/UL    ABS. IMM.  GRANS. 0.0 0.00 - 0.04 K/UL    DF AUTOMATED     NT-PRO BNP    Collection Time: 03/16/22  6:47 AM   Result Value Ref Range    NT pro-BNP 5,842 (H) <125 pg/mL       Visit Vitals  BP (!) 148/78   Pulse 80   Temp 98 °F (36.7 °C)   Resp 18   Ht 5' 4\" (1.626 m)   Wt 52.4 kg (115 lb 8.3 oz)   SpO2 97%   BMI 19.83 kg/m²       Imaging:  XR CHEST PORT   Final Result   The cardiomediastinal silhouette is appropriate for age, technique,   and lung expansion. Pulmonary vasculature is not congested. The lungs are   essentially clear. No effusion or pneumothorax is seen. CT CODE NEURO HEAD WO CONTRAST   Final Result   Accelerated and progressive chronic small vessel disease.  No acute   findings

## 2022-03-16 NOTE — PROGRESS NOTES
Administered prn Hydralazine 10 mg IV to patient per order [id number], new bp 207/92, P100. Dr. Davis Fam aware.     Orders in place

## 2022-03-16 NOTE — PROGRESS NOTES
Dr. Rojas Swift made aware of patient's increased confusion and bp 196/89, orders in place,  at bedside

## 2022-03-16 NOTE — PROGRESS NOTES
Patient's boyfriend Janes Helen called and updated per patient's wishes. Patient privacy code also given to Jasvir Karan Shivam per patient's wishes as well.

## 2022-03-16 NOTE — PROGRESS NOTES
OT eval order received and acknowledged and attempted at 1510 however pt off floor for testing. Will continue to follow pt and attempt OT eval at a later time. Thank you.

## 2022-03-16 NOTE — PROGRESS NOTES
PROGRESS NOTE    Patient: Antoine Rivera MRN: [id number]  SSN: [id number]    YOB: 1965  Age: 64 y.o. Sex: female      Admit Date: 3/15/2022    LOS: 1 day       Subjective     Chief Complaint   Patient presents with    Extremity Weakness       HPI: Patient is a 64y.o. year old female past medical history of chronic kidney disease hyponatremia hypertension advanced COPD unilateral functioning of the right kidney history of carotid and renal artery stenosis chronic diastolic heart failure seizure disorder GERD hypothyroidism chronic anemia mitral insufficiency came to the ER with altered mental status patient was in hospice was revoked  Patient was on hospice services for last 8-month today patient family sent to the ER and revoked hospice and saw the patient patient lying in the bed confused initial stroke alert was called no TPA was given patient was admitted for further evaluation and treatment CT of the head was done    3/16:  Patient awake, A&O x 3, sitting on the edge of her bed talking with her boyfriend. Mental status is much clearer according to family. She states she has been having some balance issues lately but denies any injury at home. She initially presented after home hospice nurse found her altered, as she was attempting to make a phone call with a TV remote. Notes she was off of [facility] for a long time but recently restarted. CT was unremarkable. Labs remarkable for K 2.4, BNP 5842, and Creat 2.9, which is consistent with her baseline. Neurology consulted- notes weakness most likely secondary to chronic medical problems. Plans to order EEG, Keppra level, and seizure precautions.     Past Medical History:   Diagnosis Date    Anxiety 10/2/2020    Carotid stenosis      Chronic anemia      Chronic respiratory failure (HCC)      COPD (chronic obstructive pulmonary disease) with emphysema ([facility] [address] 10/2/2020    Depression      Diastolic CHF ([facility] [address]      Dysphagia 10/2/2020    GERD (gastroesophageal reflux disease)      High cholesterol      Hypertension      Hypothyroid      Iron deficiency anemia 10/2/2020    Mitral valve regurgitation 10/2/2020    Renal artery stenosis (HCC)      Seizure disorder (HCC)                 Past Surgical History:   Procedure Laterality Date    HX CAROTID ENDARTERECTOMY        HX CHOLECYSTECTOMY        HX RENAL ARTERY STENT        HX ROTATOR CUFF REPAIR Right      HX TUBAL LIGATION        IR THORACENTESIS CATH W IMAGE   11/24/2020    IR THORACENTESIS CATH W IMAGE   11/25/2020         Social History            Tobacco Use    Smoking status: Former Smoker       Types: Cigarettes    Smokeless tobacco: Never Used    Tobacco comment: quit july 2020   Substance Use Topics    Alcohol use: Not Currently               Family History   Problem Relation Age of Onset    Hypertension Mother      Stroke Mother      Melanoma Mother      Stroke Father      Melanoma Brother      Melanoma Child                Allergies   Allergen Reactions    Sulfa (Sulfonamide Antibiotics) Anaphylaxis    Sulfamethoxazole-Trimethoprim Unknown (comments)           Review of Systems   Constitutional: Negative for chills and fever. Respiratory: Negative for cough, shortness of breath and wheezing. Cardiovascular: Negative for chest pain and palpitations. Gastrointestinal: Negative for abdominal pain, constipation, diarrhea, nausea and vomiting. Neurological: Positive for headaches. Negative for dizziness, seizures and loss of consciousness. Objective     Visit Vitals  BP (!) 187/88 (BP 1 Location: Right upper arm, BP Patient Position: At rest;Semi fowlers) Comment: RN notified   Pulse 87   Temp 97.6 °F (36.4 °C)   Resp 18   Ht 5' 4\" (1.626 m)   Wt 52.4 kg (115 lb 8.3 oz)   SpO2 95%   BMI 19.83 kg/m²      O2 Device: None (Room air)    Physical Exam:   Constitutional: Awake head: Normocephalic and atraumatic.    Eyes: Pupils are equal, round, and reactive to light. EOM are normal.   Cardiovascular: Normal rate, regular rhythm and normal heart sounds. Pulmonary/Chest: Breath sounds normal. No wheezes. No rales. Exhibits no tenderness. Abdominal: Soft. Bowel sounds are normal. There is no abdominal tenderness. There is no rebound and no guarding. Musculoskeletal: Normal range of motion. Neurological: Patient awake move all extremity    Intake & Output:  Current Shift: No intake/output data recorded. Last three shifts: 03/14 1901 - 03/16 0700  In: -   Out: 600 [Urine:600]    Lab/Data Review: All labs reviewed. 24 Hour Results:    Recent Results (from the past 24 hour(s))   CBC WITH AUTOMATED DIFF    Collection Time: 03/15/22  4:18 PM   Result Value Ref Range    WBC 6.5 3.6 - 11.0 K/uL    RBC 3.60 (L) 3.80 - 5.20 M/uL    HGB 12.0 11.5 - 16.0 g/dL    HCT 33.1 (L) 35.0 - 47.0 %    MCV 91.9 80.0 - 99.0 FL    MCH 33.3 26.0 - 34.0 PG    MCHC 36.3 30.0 - 36.5 g/dL    RDW 12.0 11.5 - 14.5 %    PLATELET 463 784 - 215 K/uL    MPV 10.3 8.9 - 12.9 FL    NRBC 0.0 0.0  WBC    ABSOLUTE NRBC 0.00 0.00 - 0.01 K/uL    NEUTROPHILS 68 32 - 75 %    LYMPHOCYTES 19 12 - 49 %    MONOCYTES 11 5 - 13 %    EOSINOPHILS 2 0 - 7 %    BASOPHILS 0 0 - 1 %    IMMATURE GRANULOCYTES 0 0 - 0.5 %    ABS. NEUTROPHILS 4.4 1.8 - 8.0 K/UL    ABS. LYMPHOCYTES 1.2 0.8 - 3.5 K/UL    ABS. MONOCYTES 0.7 0.0 - 1.0 K/UL    ABS. EOSINOPHILS 0.1 0.0 - 0.4 K/UL    ABS. BASOPHILS 0.0 0.0 - 0.1 K/UL    ABS. IMM.  GRANS. 0.0 0.00 - 0.04 K/UL    DF AUTOMATED     TYPE & SCREEN    Collection Time: 03/15/22  4:18 PM   Result Value Ref Range    Crossmatch Expiration 03/18/2022,2359     ABO/Rh(D) Austyn Speaks Positive     Antibody screen Negative    PROTHROMBIN TIME + INR    Collection Time: 03/15/22  4:18 PM   Result Value Ref Range    Prothrombin time 11.2 (L) 11.9 - 14.6 sec    INR 0.8 (L) 0.9 - 1.1     METABOLIC PANEL, COMPREHENSIVE    Collection Time: 03/15/22  4:18 PM   Result Value Ref Range    Sodium 128 (L) 136 - 145 mmol/L    Potassium 2.4 (LL) 3.5 - 5.1 mmol/L    Chloride 95 (L) 97 - 108 mmol/L    CO2 21 21 - 32 mmol/L    Anion gap 12 5 - 15 mmol/L    Glucose 104 (H) 65 - 100 mg/dL    BUN 25 (H) 6 - 20 mg/dL    Creatinine 3.30 (H) 0.55 - 1.02 mg/dL    BUN/Creatinine ratio 8 (L) 12 - 20      GFR est AA 18 (L) >60 ml/min/1.73m2    GFR est non-AA 14 (L) >60 ml/min/1.73m2    Calcium 9.1 8.5 - 10.1 mg/dL    Bilirubin, total 0.4 0.2 - 1.0 mg/dL    AST (SGOT) 41 (H) 15 - 37 U/L    ALT (SGPT) 44 12 - 78 U/L    Alk.  phosphatase 89 45 - 117 U/L    Protein, total 6.9 6.4 - 8.2 g/dL    Albumin 3.3 (L) 3.5 - 5.0 g/dL    Globulin 3.6 2.0 - 4.0 g/dL    A-G Ratio 0.9 (L) 1.1 - 2.2     NT-PRO BNP    Collection Time: 03/15/22  4:18 PM   Result Value Ref Range    NT pro-BNP 10,308 (H) <125 pg/mL   TROPONIN-HIGH SENSITIVITY    Collection Time: 03/15/22  4:18 PM   Result Value Ref Range    Troponin-High Sensitivity 25 0 - 51 ng/L   LACTIC ACID    Collection Time: 03/15/22  4:18 PM   Result Value Ref Range    Lactic acid 1.5 0.4 - 2.0 mmol/L   MAGNESIUM    Collection Time: 03/15/22  4:18 PM   Result Value Ref Range    Magnesium 1.8 1.6 - 2.4 mg/dL   CK    Collection Time: 03/15/22  4:18 PM   Result Value Ref Range    CK 78 26 - 192 U/L   TSH 3RD GENERATION    Collection Time: 03/15/22  4:18 PM   Result Value Ref Range    TSH 7.99 (H) 0.36 - 3.74 uIU/mL   AMMONIA    Collection Time: 03/15/22  4:18 PM   Result Value Ref Range    Ammonia, plasma <10 <32 umol/L   COVID-19 RAPID TEST    Collection Time: 03/15/22  4:18 PM   Result Value Ref Range    COVID-19 rapid test Not Detected Not Detected     URINALYSIS W/ REFLEX CULTURE    Collection Time: 03/15/22  8:08 PM    Specimen: Urine   Result Value Ref Range    Color Yellow/Straw      Appearance Clear Clear      Specific gravity 1.010 1.003 - 1.030      pH (UA) 6.0      Protein >300 (A) Negative mg/dL    Glucose Negative Negative mg/dL    Ketone Negative Negative mg/dL    Bilirubin Negative Negative Blood Negative Negative      Urobilinogen 0.1 (L) 0.2 - 1.0 EU/dL    Nitrites Negative Negative      Leukocyte Esterase Negative Negative      UA:UC IF INDICATED Culture not indicated by UA result Culture not indicated by UA result      WBC 0-4 0 - 4 /hpf    RBC 0-5 0 - 5 /hpf    Bacteria Negative Negative /hpf   METABOLIC PANEL, BASIC    Collection Time: 03/15/22  8:08 PM   Result Value Ref Range    Sodium 132 (L) 136 - 145 mmol/L    Potassium 2.9 (L) 3.5 - 5.1 mmol/L    Chloride 99 97 - 108 mmol/L    CO2 25 21 - 32 mmol/L    Anion gap 8 5 - 15 mmol/L    Glucose 90 65 - 100 mg/dL    BUN 22 (H) 6 - 20 mg/dL    Creatinine 2.99 (H) 0.55 - 1.02 mg/dL    BUN/Creatinine ratio 7 (L) 12 - 20      GFR est AA 20 (L) >60 ml/min/1.73m2    GFR est non-AA 16 (L) >60 ml/min/1.73m2    Calcium 8.2 (L) 8.5 - 10.1 mg/dL   DRUG SCREEN, URINE    Collection Time: 03/15/22  8:08 PM   Result Value Ref Range    AMPHETAMINES Negative Negative      BARBITURATES Positive (A) Negative      BENZODIAZEPINES Negative Negative      COCAINE Negative Negative      METHADONE Negative Negative      OPIATES Positive (A) Negative      PCP(PHENCYCLIDINE) Negative Negative      THC (TH-CANNABINOL) Positive (A) Negative      Drug screen comment        This test is a screen for drugs of abuse in a medical setting only (i.e., they are unconfirmed results and as such must not be used for non-medical purposes, e.g.,employment testing, legal testing). Due to its inherent nature, false positive (FP) and false negative (FN) results may be obtained. Therefore, if necessary for medical care, recommend confirmation of positive findings by GC/MS.    METABOLIC PANEL, COMPREHENSIVE    Collection Time: 03/16/22  6:47 AM   Result Value Ref Range    Sodium 132 (L) 136 - 145 mmol/L    Potassium 2.4 (LL) 3.5 - 5.1 mmol/L    Chloride 100 97 - 108 mmol/L    CO2 25 21 - 32 mmol/L    Anion gap 7 5 - 15 mmol/L    Glucose 80 65 - 100 mg/dL    BUN 19 6 - 20 mg/dL    Creatinine 2.90 (H) 0.55 - 1.02 mg/dL    BUN/Creatinine ratio 7 (L) 12 - 20      GFR est AA 20 (L) >60 ml/min/1.73m2    GFR est non-AA 17 (L) >60 ml/min/1.73m2    Calcium 8.2 (L) 8.5 - 10.1 mg/dL    Bilirubin, total 0.3 0.2 - 1.0 mg/dL    AST (SGOT) 24 15 - 37 U/L    ALT (SGPT) 34 12 - 78 U/L    Alk. phosphatase 71 45 - 117 U/L    Protein, total 5.9 (L) 6.4 - 8.2 g/dL    Albumin 2.7 (L) 3.5 - 5.0 g/dL    Globulin 3.2 2.0 - 4.0 g/dL    A-G Ratio 0.8 (L) 1.1 - 2.2     CBC WITH AUTOMATED DIFF    Collection Time: 03/16/22  6:47 AM   Result Value Ref Range    WBC 6.5 3.6 - 11.0 K/uL    RBC 3.01 (L) 3.80 - 5.20 M/uL    HGB 10.0 (L) 11.5 - 16.0 g/dL    HCT 29.2 (L) 35.0 - 47.0 %    MCV 97.0 80.0 - 99.0 FL    MCH 33.2 26.0 - 34.0 PG    MCHC 34.2 30.0 - 36.5 g/dL    RDW 12.5 11.5 - 14.5 %    PLATELET 110 815 - 072 K/uL    MPV 10.6 8.9 - 12.9 FL    NRBC 0.0 0.0  WBC    ABSOLUTE NRBC 0.00 0.00 - 0.01 K/uL    NEUTROPHILS 60 32 - 75 %    LYMPHOCYTES 26 12 - 49 %    MONOCYTES 11 5 - 13 %    EOSINOPHILS 3 0 - 7 %    BASOPHILS 0 0 - 1 %    IMMATURE GRANULOCYTES 0 0 - 0.5 %    ABS. NEUTROPHILS 3.9 1.8 - 8.0 K/UL    ABS. LYMPHOCYTES 1.7 0.8 - 3.5 K/UL    ABS. MONOCYTES 0.7 0.0 - 1.0 K/UL    ABS. EOSINOPHILS 0.2 0.0 - 0.4 K/UL    ABS. BASOPHILS 0.0 0.0 - 0.1 K/UL    ABS. IMM. GRANS. 0.0 0.00 - 0.04 K/UL    DF AUTOMATED     NT-PRO BNP    Collection Time: 03/16/22  6:47 AM   Result Value Ref Range    NT pro-BNP 5,842 (H) <125 pg/mL   MAGNESIUM    Collection Time: 03/16/22  9:46 AM   Result Value Ref Range    Magnesium 2.1 1.6 - 2.4 mg/dL         Imaging:    XR CHEST PORT   Final Result   The cardiomediastinal silhouette is appropriate for age, technique,   and lung expansion. Pulmonary vasculature is not congested. The lungs are   essentially clear. No effusion or pneumothorax is seen. CT CODE NEURO HEAD WO CONTRAST   Final Result   Accelerated and progressive chronic small vessel disease.  No acute   findings           Assessment Acute metabolic encephalopathy  Hyponatremia  hypokalemia  Acute on chronic kidney disease  Advanced COPD  Unilateral functioning right kidney  History of carotid and renal artery stenosis  Chronic diastolic heart failure  Seizure disorder  GERD  Hypothyroidism  Hyperlipidemia  Positive for opiates and marijuana      Plan     Replace potassium  Repeat the labs  Monitor blood pressure  PT OT consult  Norvasc 5 mg daily  Continue aspirin 81 mg daily  Lipitor 20 mg daily  Tegretol 200 mg twice a day  Plavix 75 mg daily  trelegy inhaler 1 puff daily  Keppra 500 twice daily  Levothyroxine 50 mcg daily  IV Solu-Medrol 40 mg every 8 hours  Protonix 40 daily      Repeat the labs PT OT consults      Current Facility-Administered Medications:     potassium chloride 10 mEq in 100 ml IVPB, 10 mEq, IntraVENous, Q1H, Lizbeth Winter MD, Last Rate: 100 mL/hr at 03/16/22 0910, 10 mEq at 03/16/22 0910    methylPREDNISolone (PF) (SOLU-MEDROL) injection 40 mg, 40 mg, IntraVENous, Q8H, Arnold Bravo MD    albuterol-ipratropium (DUO-NEB) 2.5 MG-0.5 MG/3 ML, 3 mL, Nebulization, Q6H, Lizbeth Winter MD, 3 mL at 03/16/22 0758    isosorbide dinitrate (ISORDIL) tablet 10 mg, 10 mg, Oral, BID, Lizbeth Winter MD, 10 mg at 03/16/22 0911    levothyroxine (SYNTHROID) tablet 50 mcg, 50 mcg, Oral, ACB, Lizbeth Winter MD, 50 mcg at 03/16/22 5162    potassium chloride (KLOR-CON) packet for solution 20 mEq, 20 mEq, Oral, BID WITH MEALS, Lizbeth Winter MD, 20 mEq at 03/16/22 7130    aspirin delayed-release tablet 81 mg, 81 mg, Oral, DAILY, Lizbeth Winter MD, 81 mg at 03/16/22 0911    carBAMazepine (TEGretol) tablet 200 mg, 200 mg, Oral, BID, Lizbeth Winter MD, 200 mg at 03/16/22 0911    clopidogreL (PLAVIX) tablet 75 mg, 75 mg, Oral, DAILY, Lizbeth Winter MD, 75 mg at 03/16/22 0911    ergocalciferol capsule 50,000 Units, 50,000 Units, Oral, Q7D, Lizbeth Winter MD, 50,000 Units at 03/16/22 0911    pantoprazole (PROTONIX) granules for oral suspension 40 mg, 40 mg, Per NG tube, ACB, Ariel Winter MD, 40 mg at 03/16/22 8694    ferrous sulfate tablet 325 mg, 325 mg, Oral, ACB, Ariel Winter MD, 325 mg at 22/84/31 6077    folic acid (FOLVITE) tablet 1 mg, 1 mg, Oral, DAILY, Lizbeth Winter MD, 1 mg at 03/16/22 0911    levETIRAcetam (KEPPRA) tablet 500 mg, 500 mg, Oral, BID, Ariel Winter MD, 500 mg at 03/16/22 0911    atorvastatin (LIPITOR) tablet 20 mg, 20 mg, Oral, DAILY, Lizbeth Winter MD, 20 mg at 03/16/22 0911    sertraline (ZOLOFT) tablet 50 mg, 50 mg, Oral, DAILY, Lizbeth Winter MD, 50 mg at 03/16/22 0911    fluticasone-umeclidinium-vilanterol (TRELEGY ELLIPTA) inhaler 1 Puff, 1 Puff, Inhalation, DAILY, Lizbeth Winter MD    acetaminophen (TYLENOL) tablet 650 mg, 650 mg, Oral, Q6H PRN, 650 mg at 03/15/22 2244 **OR** acetaminophen (TYLENOL) suppository 650 mg, 650 mg, Rectal, Q6H PRN, Ariel Winter MD    polyethylene glycol (MIRALAX) packet 17 g, 17 g, Oral, DAILY PRN, Ariel Winter MD    ondansetron (ZOFRAN ODT) tablet 4 mg, 4 mg, Oral, Q8H PRN **OR** ondansetron (ZOFRAN) injection 4 mg, 4 mg, IntraVENous, Q6H PRN, Lizbeth Winter MD    0.9% sodium chloride infusion, 50 mL/hr, IntraVENous, CONTINUOUS, Lizbeth Winter MD, Last Rate: 50 mL/hr at 03/15/22 2019, 50 mL/hr at 03/15/22 2019    heparin (porcine) injection 5,000 Units, 5,000 Units, SubCUTAneous, Q8H, Lizbeth Winter MD, 5,000 Units at 03/16/22 0507    Current Outpatient Medications   Medication Instructions    albuterol (PROVENTIL HFA, VENTOLIN HFA, PROAIR HFA) 90 mcg/actuation inhaler No dose, route, or frequency recorded.     albuterol-ipratropium (DUO-NEB) 2.5 mg-0.5 mg/3 ml nebu 3 mL, Nebulization, EVERY 6 HOURS    ALPRAZolam (XANAX) 0.25 mg, Oral, DAILY AS NEEDED    aspirin delayed-release 81 mg tablet Oral, DAILY    carBAMazepine (TEGRETOL) 200 mg, Oral, 2 TIMES DAILY    clopidogreL (PLAVIX) 75 mg, Oral, DAILY    ergocalciferol (VITAMIN D2) 50,000 Units, Oral, EVERY 7 DAYS, q7days wednesday    esomeprazole (NEXIUM) 40 mg, Oral    ferrous sulfate 325 mg, Oral, DAILY BEFORE BREAKFAST    folic acid (FOLVITE) 1 mg, Oral, DAILY    isosorbide dinitrate (ISORDIL) 10 mg, Oral, 2 TIMES DAILY    labetaloL (NORMODYNE) 300 mg, Oral, 2 TIMES DAILY    levETIRAcetam (KEPPRA) 500 mg, Oral, 2 TIMES DAILY    levothyroxine (SYNTHROID) 50 mcg, Oral, DAILY BEFORE BREAKFAST    NIFEdipine ER (ADALAT CC) 90 mg, Oral, DAILY    nortriptyline (PAMELOR) 50 mg, Oral, EVERY BEDTIME    Oxygen 5 Devices, AS NEEDED, Pt wears 5LPM as needed- states she uses it after an axiety attack     potassium chloride (KLOR-CON) 20 mEq pack 20 mEq, Oral, 2 TIMES DAILY WITH MEALS    rosuvastatin (CRESTOR) 10 mg tablet No dose, route, or frequency recorded.     sertraline (ZOLOFT) 50 mg, Oral, DAILY    Trelegy Ellipta 100-62.5-25 mcg inhaler 1 Puff, DAILY         Signed By: Parnell Opitz, MD     March 16, 2022

## 2022-03-17 ENCOUNTER — APPOINTMENT (OUTPATIENT)
Dept: NON INVASIVE DIAGNOSTICS | Age: 57
DRG: 140 | End: 2022-03-17
Attending: FAMILY MEDICINE
Payer: MEDICAID

## 2022-03-17 VITALS
TEMPERATURE: 98.5 F | SYSTOLIC BLOOD PRESSURE: 197 MMHG | RESPIRATION RATE: 20 BRPM | DIASTOLIC BLOOD PRESSURE: 92 MMHG | BODY MASS INDEX: 19.72 KG/M2 | HEART RATE: 107 BPM | OXYGEN SATURATION: 100 % | WEIGHT: 115.52 LBS | HEIGHT: 64 IN

## 2022-03-17 LAB
ALBUMIN SERPL-MCNC: 3.2 G/DL (ref 3.5–5)
ALBUMIN/GLOB SERPL: 0.9 {RATIO} (ref 1.1–2.2)
ALP SERPL-CCNC: 76 U/L (ref 45–117)
ALT SERPL-CCNC: 31 U/L (ref 12–78)
ANION GAP SERPL CALC-SCNC: 10 MMOL/L (ref 5–15)
AST SERPL W P-5'-P-CCNC: 22 U/L (ref 15–37)
BILIRUB SERPL-MCNC: 0.3 MG/DL (ref 0.2–1)
BNP SERPL-MCNC: ABNORMAL PG/ML
BUN SERPL-MCNC: 15 MG/DL (ref 6–20)
BUN/CREAT SERPL: 7 (ref 12–20)
CA-I BLD-MCNC: 8.9 MG/DL (ref 8.5–10.1)
CHLORIDE SERPL-SCNC: 99 MMOL/L (ref 97–108)
CO2 SERPL-SCNC: 23 MMOL/L (ref 21–32)
CREAT SERPL-MCNC: 2.27 MG/DL (ref 0.55–1.02)
ECHO AO ROOT DIAM: 3.1 CM
ECHO AO ROOT INDEX: 2 CM/M2
ECHO AV AREA PEAK VELOCITY: 3.1 CM2
ECHO AV AREA VTI: 2.9 CM2
ECHO AV AREA/BSA PEAK VELOCITY: 2 CM2/M2
ECHO AV AREA/BSA VTI: 1.9 CM2/M2
ECHO AV MEAN GRADIENT: 5 MMHG
ECHO AV MEAN VELOCITY: 1.1 M/S
ECHO AV PEAK GRADIENT: 10 MMHG
ECHO AV PEAK VELOCITY: 1.6 M/S
ECHO AV VELOCITY RATIO: 0.88
ECHO AV VTI: 27.1 CM
ECHO EST RA PRESSURE: 3 MMHG
ECHO LA AREA 2C: 7.8 CM2
ECHO LA AREA 4C: 6.2 CM2
ECHO LA DIAMETER INDEX: 2 CM/M2
ECHO LA DIAMETER: 3.1 CM
ECHO LA MAJOR AXIS: 2.8 CM
ECHO LA MINOR AXIS: 3.2 CM
ECHO LA TO AORTIC ROOT RATIO: 1
ECHO LA VOL BP: 14 ML (ref 22–52)
ECHO LA VOL/BSA BIPLANE: 9 ML/M2 (ref 16–34)
ECHO LV E' SEPTAL VELOCITY: 3 CM/S
ECHO LV EDV A2C: 33 ML
ECHO LV EDV A4C: 45 ML
ECHO LV EDV INDEX A4C: 29 ML/M2
ECHO LV EDV NDEX A2C: 21 ML/M2
ECHO LV EJECTION FRACTION A4C: 64 %
ECHO LV ESV A4C: 16 ML
ECHO LV ESV INDEX A4C: 10 ML/M2
ECHO LV FRACTIONAL SHORTENING: 45 % (ref 28–44)
ECHO LV INTERNAL DIMENSION DIASTOLE INDEX: 2.58 CM/M2
ECHO LV INTERNAL DIMENSION DIASTOLIC: 4 CM (ref 3.9–5.3)
ECHO LV INTERNAL DIMENSION SYSTOLIC INDEX: 1.42 CM/M2
ECHO LV INTERNAL DIMENSION SYSTOLIC: 2.2 CM
ECHO LV IVSD: 1.2 CM (ref 0.6–0.9)
ECHO LV MASS 2D: 155.4 G (ref 67–162)
ECHO LV MASS INDEX 2D: 100.3 G/M2 (ref 43–95)
ECHO LV POSTERIOR WALL DIASTOLIC: 1.1 CM (ref 0.6–0.9)
ECHO LV RELATIVE WALL THICKNESS RATIO: 0.55
ECHO LVOT AREA: 3.5 CM2
ECHO LVOT AV VTI INDEX: 0.84
ECHO LVOT DIAM: 2.1 CM
ECHO LVOT MEAN GRADIENT: 4 MMHG
ECHO LVOT PEAK GRADIENT: 8 MMHG
ECHO LVOT PEAK VELOCITY: 1.4 M/S
ECHO LVOT STROKE VOLUME INDEX: 50.7 ML/M2
ECHO LVOT SV: 78.6 ML
ECHO LVOT VTI: 22.7 CM
ECHO MV A VELOCITY: 1.25 M/S
ECHO MV E VELOCITY: 0.96 M/S
ECHO MV E/A RATIO: 0.77
ECHO MV E/E' SEPTAL: 32
ECHO MV REGURGITANT PEAK GRADIENT: 74 MMHG
ECHO MV REGURGITANT PEAK VELOCITY: 4.3 M/S
ECHO MV REGURGITANT VTIA: 120 CM
ECHO PV MAX VELOCITY: 1.1 M/S
ECHO PV MEAN GRADIENT: 3 MMHG
ECHO PV MEAN VELOCITY: 0.9 M/S
ECHO PV PEAK GRADIENT: 5 MMHG
ECHO PV VTI: 19.6 CM
ECHO RIGHT VENTRICULAR SYSTOLIC PRESSURE (RVSP): 42 MMHG
ECHO TV REGURGITANT MAX VELOCITY: 3.12 M/S
ECHO TV REGURGITANT PEAK GRADIENT: 39 MMHG
ERYTHROCYTE [DISTWIDTH] IN BLOOD BY AUTOMATED COUNT: 12.5 % (ref 11.5–14.5)
GLOBULIN SER CALC-MCNC: 3.6 G/DL (ref 2–4)
GLUCOSE SERPL-MCNC: 170 MG/DL (ref 65–100)
HCT VFR BLD AUTO: 30.2 % (ref 35–47)
HGB BLD-MCNC: 10.6 G/DL (ref 11.5–16)
MCH RBC QN AUTO: 33.3 PG (ref 26–34)
MCHC RBC AUTO-ENTMCNC: 35.1 G/DL (ref 30–36.5)
MCV RBC AUTO: 95 FL (ref 80–99)
NRBC # BLD: 0 K/UL (ref 0–0.01)
NRBC BLD-RTO: 0 PER 100 WBC
PLATELET # BLD AUTO: 275 K/UL (ref 150–400)
PMV BLD AUTO: 10.2 FL (ref 8.9–12.9)
POTASSIUM SERPL-SCNC: 3.3 MMOL/L (ref 3.5–5.1)
PROT SERPL-MCNC: 6.8 G/DL (ref 6.4–8.2)
RBC # BLD AUTO: 3.18 M/UL (ref 3.8–5.2)
SODIUM SERPL-SCNC: 132 MMOL/L (ref 136–145)
WBC # BLD AUTO: 6 K/UL (ref 3.6–11)

## 2022-03-17 PROCEDURE — 74011250637 HC RX REV CODE- 250/637: Performed by: FAMILY MEDICINE

## 2022-03-17 PROCEDURE — 36415 COLL VENOUS BLD VENIPUNCTURE: CPT

## 2022-03-17 PROCEDURE — 74011250636 HC RX REV CODE- 250/636: Performed by: INTERNAL MEDICINE

## 2022-03-17 PROCEDURE — 97530 THERAPEUTIC ACTIVITIES: CPT

## 2022-03-17 PROCEDURE — 94640 AIRWAY INHALATION TREATMENT: CPT

## 2022-03-17 PROCEDURE — 74011250636 HC RX REV CODE- 250/636: Performed by: FAMILY MEDICINE

## 2022-03-17 PROCEDURE — 83880 ASSAY OF NATRIURETIC PEPTIDE: CPT

## 2022-03-17 PROCEDURE — 74011250637 HC RX REV CODE- 250/637: Performed by: INTERNAL MEDICINE

## 2022-03-17 PROCEDURE — 85027 COMPLETE CBC AUTOMATED: CPT

## 2022-03-17 PROCEDURE — 80053 COMPREHEN METABOLIC PANEL: CPT

## 2022-03-17 PROCEDURE — 94760 N-INVAS EAR/PLS OXIMETRY 1: CPT

## 2022-03-17 PROCEDURE — 93306 TTE W/DOPPLER COMPLETE: CPT

## 2022-03-17 PROCEDURE — 97161 PT EVAL LOW COMPLEX 20 MIN: CPT

## 2022-03-17 RX ORDER — PREDNISONE 20 MG/1
20 TABLET ORAL
Status: DISCONTINUED | OUTPATIENT
Start: 2022-03-17 | End: 2022-03-17 | Stop reason: HOSPADM

## 2022-03-17 RX ORDER — HYDRALAZINE HYDROCHLORIDE 25 MG/1
25 TABLET, FILM COATED ORAL 3 TIMES DAILY
Qty: 60 TABLET | Refills: 0 | Status: SHIPPED | OUTPATIENT
Start: 2022-03-17 | End: 2022-03-20

## 2022-03-17 RX ORDER — HYDRALAZINE HYDROCHLORIDE 25 MG/1
25 TABLET, FILM COATED ORAL 3 TIMES DAILY
Status: DISCONTINUED | OUTPATIENT
Start: 2022-03-17 | End: 2022-03-17 | Stop reason: HOSPADM

## 2022-03-17 RX ORDER — LORAZEPAM 2 MG/ML
0.5 INJECTION INTRAMUSCULAR
Status: DISCONTINUED | OUTPATIENT
Start: 2022-03-17 | End: 2022-03-17 | Stop reason: HOSPADM

## 2022-03-17 RX ORDER — AMLODIPINE BESYLATE 5 MG/1
10 TABLET ORAL DAILY
Status: DISCONTINUED | OUTPATIENT
Start: 2022-03-17 | End: 2022-03-17 | Stop reason: HOSPADM

## 2022-03-17 RX ORDER — PREDNISONE 20 MG/1
20 TABLET ORAL
Qty: 10 TABLET | Refills: 0 | Status: SHIPPED | OUTPATIENT
Start: 2022-03-17 | End: 2022-03-20

## 2022-03-17 RX ADMIN — FLUTICASONE FUROATE, UMECLIDINIUM BROMIDE AND VILANTEROL TRIFENATATE 1 PUFF: 100; 62.5; 25 POWDER RESPIRATORY (INHALATION) at 07:52

## 2022-03-17 RX ADMIN — CARBAMAZEPINE 200 MG: 200 TABLET ORAL at 08:32

## 2022-03-17 RX ADMIN — IPRATROPIUM BROMIDE AND ALBUTEROL SULFATE 3 ML: .5; 3 SOLUTION RESPIRATORY (INHALATION) at 13:43

## 2022-03-17 RX ADMIN — ASPIRIN 81 MG: 81 TABLET, COATED ORAL at 08:32

## 2022-03-17 RX ADMIN — PANTOPRAZOLE SODIUM 40 MG: 40 GRANULE, DELAYED RELEASE ORAL at 08:32

## 2022-03-17 RX ADMIN — SERTRALINE HYDROCHLORIDE 50 MG: 50 TABLET ORAL at 08:32

## 2022-03-17 RX ADMIN — LEVOTHYROXINE SODIUM 50 MCG: 0.03 TABLET ORAL at 08:32

## 2022-03-17 RX ADMIN — HYDRALAZINE HYDROCHLORIDE 10 MG: 20 INJECTION, SOLUTION INTRAMUSCULAR; INTRAVENOUS at 03:38

## 2022-03-17 RX ADMIN — CLOPIDOGREL BISULFATE 75 MG: 75 TABLET ORAL at 08:32

## 2022-03-17 RX ADMIN — AMLODIPINE BESYLATE 10 MG: 5 TABLET ORAL at 12:00

## 2022-03-17 RX ADMIN — LEVETIRACETAM 500 MG: 500 TABLET, FILM COATED ORAL at 08:33

## 2022-03-17 RX ADMIN — ISOSORBIDE DINITRATE 10 MG: 10 TABLET ORAL at 00:11

## 2022-03-17 RX ADMIN — HYDRALAZINE HYDROCHLORIDE 10 MG: 20 INJECTION, SOLUTION INTRAMUSCULAR; INTRAVENOUS at 08:32

## 2022-03-17 RX ADMIN — ISOSORBIDE DINITRATE 10 MG: 10 TABLET ORAL at 08:33

## 2022-03-17 RX ADMIN — ATORVASTATIN CALCIUM 20 MG: 20 TABLET, FILM COATED ORAL at 08:33

## 2022-03-17 RX ADMIN — METHYLPREDNISOLONE SODIUM SUCCINATE 40 MG: 40 INJECTION, POWDER, FOR SOLUTION INTRAMUSCULAR; INTRAVENOUS at 06:00

## 2022-03-17 RX ADMIN — IPRATROPIUM BROMIDE AND ALBUTEROL SULFATE 3 ML: .5; 3 SOLUTION RESPIRATORY (INHALATION) at 07:53

## 2022-03-17 RX ADMIN — HEPARIN SODIUM 5000 UNITS: 5000 INJECTION INTRAVENOUS; SUBCUTANEOUS at 06:41

## 2022-03-17 RX ADMIN — FERROUS SULFATE TAB 325 MG (65 MG ELEMENTAL FE) 325 MG: 325 (65 FE) TAB at 08:32

## 2022-03-17 RX ADMIN — POTASSIUM CHLORIDE 20 MEQ: 1.5 FOR SOLUTION ORAL at 08:32

## 2022-03-17 RX ADMIN — FOLIC ACID 1 MG: 1 TABLET ORAL at 08:32

## 2022-03-17 NOTE — PROGRESS NOTES
OT eval order received and acknowledged. Pt screened and demonstrating baseline independence for self care tasks and functional mobility/transfers. Pt observed INDly ambulating around room w/ no LOB and no DME. Pt reports no concerns and no need for skilled OT services at this time. OT evaluation order will therefore be discontinued this pt has no acute OT needs. Please reorder OT if pt's functional status changes. Thank you.

## 2022-03-17 NOTE — DISCHARGE INSTRUCTIONS
Discharge Instructions       PATIENT ID: Trisha Magana  MRN: [id number]   YOB: 1965    DATE OF ADMISSION: 3/15/2022  3:35 PM    DATE OF DISCHARGE: 3/17/2022    PRIMARY CARE PROVIDER: None     ATTENDING PHYSICIAN: Bernard Saldivar MD  DISCHARGING PROVIDER: Rudolph Carr MD    To contact this individual call [phone] and ask the  to page. If unavailable ask to be transferred the Adult Hospitalist Department. DISCHARGE DIAGNOSES COPD/metabolic encephalopathy    CONSULTATIONS: IP CONSULT TO PULMONOLOGY  IP CONSULT TO NEPHROLOGY  IP CONSULT TO NEUROLOGY    PROCEDURES/SURGERIES: * No surgery found *    PENDING TEST RESULTS:   At the time of discharge the following test results are still pending: None    FOLLOW UP APPOINTMENTS:   Follow-up Information     Follow up With Specialties Details Why Mariann Stark MD Family Medicine In 1 week  [address]  [phone]             ADDITIONAL CARE RECOMMENDATIONS: Going home with hospice    DIET: Cardiac Diet      ACTIVITY: Activity as tolerated    Wound care: Wound Care Order: submitted to [facility] Please view [email or web address]    EQUIPMENT needed: None      DISCHARGE MEDICATIONS:   See Medication Reconciliation Form    · It is important that you take the medication exactly as they are prescribed. · Keep your medication in the bottles provided by the pharmacist and keep a list of the medication names, dosages, and times to be taken in your wallet. · Do not take other medications without consulting your doctor. NOTIFY YOUR PHYSICIAN FOR ANY OF THE FOLLOWING:   Fever over 101 degrees for 24 hours. Chest pain, shortness of breath, fever, chills, nausea, vomiting, diarrhea, change in mentation, falling, weakness, bleeding. Severe pain or pain not relieved by medications. Or, any other signs or symptoms that you may have questions about.       DISPOSITION: Home With:   OT  PT  [place]  RN       SNF/Inpatient Rehab/LTAC    Independent/assisted living    Hospice    Other:         PROBLEM LIST Updated:  Yes ***       Signed:   Ira Chen MD  3/17/2022  11:52 AM

## 2022-03-17 NOTE — PROGRESS NOTES
OT eval order received and acknowledged, attempted at 1000 however pt off the floor at [facility]. Will continue to follow pt and attempt OT eval at a later time as medically appropriate. Thank you.

## 2022-03-17 NOTE — PROGRESS NOTES
NEURO PROGRESS NOTE        SUBJECTIVE:   Seizures  Generalized weakness  No status changes      EXAM:  Awake, more alert today. Oriented to location and year.   No seizures reported  No new focality      ASSESSMENT/PLAN:  Current work-up and treatment continues    ALLERGIES:    Allergies   Allergen Reactions    Sulfa (Sulfonamide Antibiotics) Anaphylaxis    Sulfamethoxazole-Trimethoprim Unknown (comments)       MEDS:      Current Facility-Administered Medications:     LORazepam (ATIVAN) injection 0.5 mg, 0.5 mg, IntraVENous, Q4H PRN, Lizbeth Winter MD    methylPREDNISolone (PF) (SOLU-MEDROL) injection 40 mg, 40 mg, IntraVENous, Q8H, Arnold Bravo MD, 40 mg at 03/17/22 0600    0.9% sodium chloride infusion, 50 mL/hr, IntraVENous, CONTINUOUS, Lizbeth Winter MD, Last Rate: 50 mL/hr at 03/16/22 2329, 50 mL/hr at 03/16/22 2329    hydrALAZINE (APRESOLINE) 20 mg/mL injection 10 mg, 10 mg, IntraVENous, Q6H PRN, Lizbeth Winter MD, 10 mg at 03/17/22 0783    potassium chloride (KLOR-CON) packet for solution 20 mEq, 20 mEq, Oral, TID, [facility] Hospital[facility] MD, 20 mEq at 03/17/22 0832    albuterol-ipratropium (DUO-NEB) 2.5 MG-0.5 MG/3 ML, 3 mL, Nebulization, Q6H, Lizbeth Winter MD, 3 mL at 03/17/22 0753    isosorbide dinitrate (ISORDIL) tablet 10 mg, 10 mg, Oral, BID, Lizbeth Winter MD, 10 mg at 03/17/22 7059    levothyroxine (SYNTHROID) tablet 50 mcg, 50 mcg, Oral, ACB, Lizbeth Winter MD, 50 mcg at 03/17/22 0807    aspirin delayed-release tablet 81 mg, 81 mg, Oral, DAILY, Lizbeth Winter MD, 81 mg at 03/17/22 8299    carBAMazepine (TEGretol) tablet 200 mg, 200 mg, Oral, BID, Lizbeth Winter MD, 200 mg at 03/17/22 2323    clopidogreL (PLAVIX) tablet 75 mg, 75 mg, Oral, DAILY, Lizbeth Winter MD, 75 mg at 03/17/22 4196    ergocalciferol capsule 50,000 Units, 50,000 Units, Oral, Q7D, Lizbeth Winter MD, 50,000 Units at 03/16/22 0911    pantoprazole (PROTONIX) granules for oral suspension 40 mg, 40 mg, Per NG tube, ACMaggy GROVER Jenice Hooker, MD, 40 mg at 03/17/22 1098    ferrous sulfate tablet 325 mg, 325 mg, Oral, ACB, Mattie Winter MD, 325 mg at 08/68/54 4086    folic acid (FOLVITE) tablet 1 mg, 1 mg, Oral, DAILY, Mattie Winter MD, 1 mg at 03/17/22 4713    levETIRAcetam (KEPPRA) tablet 500 mg, 500 mg, Oral, BID, Mattie Winter MD, 500 mg at 03/17/22 0909    atorvastatin (LIPITOR) tablet 20 mg, 20 mg, Oral, DAILY, Lizbeth Winter MD, 20 mg at 03/17/22 4641    sertraline (ZOLOFT) tablet 50 mg, 50 mg, Oral, DAILY, Lizbeth Winter MD, 50 mg at 03/17/22 6151    fluticasone-umeclidinium-vilanterol (TRELEGY ELLIPTA) inhaler 1 Puff, 1 Puff, Inhalation, DAILY, Lizbeth Winter MD, 1 Puff at 03/17/22 7487    acetaminophen (TYLENOL) tablet 650 mg, 650 mg, Oral, Q6H PRN, 650 mg at 03/15/22 2244 **OR** acetaminophen (TYLENOL) suppository 650 mg, 650 mg, Rectal, Q6H PRN, Mattie Winter MD    polyethylene glycol (MIRALAX) packet 17 g, 17 g, Oral, DAILY PRN, Mattie Winter MD    ondansetron (ZOFRAN ODT) tablet 4 mg, 4 mg, Oral, Q8H PRN **OR** ondansetron (ZOFRAN) injection 4 mg, 4 mg, IntraVENous, Q6H PRN, Lizbeth Winter MD    heparin (porcine) injection 5,000 Units, 5,000 Units, SubCUTAneous, Q8H, Lizbeth Winter MD, 5,000 Units at 03/17/22 1496    LABS:  Recent Results (from the past 24 hour(s))   MAGNESIUM    Collection Time: 03/16/22  9:46 AM   Result Value Ref Range    Magnesium 2.1 1.6 - 2.4 mg/dL   POTASSIUM    Collection Time: 03/16/22  6:35 PM   Result Value Ref Range    Potassium 3.1 (L) 3.5 - 5.1 mmol/L       Visit Vitals  BP (!) 195/86 (BP 1 Location: Left upper arm, BP Patient Position: At rest;Sitting)   Pulse 66   Temp 98.5 °F (36.9 °C)   Resp 20   Ht 5' 4\" (1.626 m)   Wt 52.4 kg (115 lb 8.3 oz)   SpO2 100%   BMI 19.83 kg/m²       Imaging:  XR CHEST PORT   Final Result   The cardiomediastinal silhouette is appropriate for age, technique,   and lung expansion. Pulmonary vasculature is not congested. The lungs are   essentially clear. No effusion or pneumothorax is seen. CT CODE NEURO HEAD WO CONTRAST   Final Result   Accelerated and progressive chronic small vessel disease.  No acute   findings

## 2022-03-17 NOTE — PROGRESS NOTES
PHYSICAL THERAPY EVALUATION  Patient: Clare Mccullough (80 y.o. female)  Date: 3/17/2022  Primary Diagnosis: Encephalopathy [K79.42]  Metabolic encephalopathy [D56.89]  COPD (chronic obstructive pulmonary disease) (HCC) [J44.9]  Hyponatremia [E87.1]        Precautions: fall     ASSESSMENT  As per patient's DC notes(Patient is a 64y.o. year old female past medical history of chronic kidney disease hyponatremia hypertension advanced COPD unilateral functioning of the right kidney history of carotid and renal artery stenosis chronic diastolic heart failure seizure disorder GERD hypothyroidism chronic anemia mitral insufficiency came to the ER with altered mental status patient was in hospice was revoked  Patient was on hospice services for last 8-month today patient family sent to the ER and revoked hospice and saw the patient patient lying in the bed confused initial stroke alert was called no TPA was given patient was admitted for further evaluation and treatment CT of the head was done  3/16:  Patient awake, A&O x 3, sitting on the edge of her bed talking with her boyfriend. Mental status is much clearer according to family. She states she has been having some balance issues lately but denies any injury at home. She initially presented after home hospice nurse found her altered, as she was attempting to make a phone call with a TV remote. Notes she was off of [employer] for a long time but recently restarted. CT was unremarkable. Labs remarkable for K 2.4, BNP 5842, and Creat 2.9, which is consistent with her baseline. Neurology consulted- notes weakness most likely secondary to chronic medical problems. Plans to order EEG, Keppra level, and seizure precautions. AS Per patient's DC NOTES  Based on the objective data described below, the patient presents with gen decreased strength and endurance to activities. Other factors to consider for discharge: Home with family.    Patient will benefit from skilled therapy intervention to address the above noted impairments.        PLAN :  Recommendations and Planned Interventions: DC PT after eval      Frequency/Duration: Patient will be followed by physical therapy: eval and treat only  Recommendation for discharge: (in order for the patient to meet his/her long term goals)  Home with Family Care    This discharge recommendation:  Has been made in collaboration with the attending provider and/or case management    IF patient discharges home will need the following DME: none         SUBJECTIVE:   Patient stated I am doing ok    OBJECTIVE DATA SUMMARY:   HISTORY:    Past Medical History:   Diagnosis Date    Anxiety 10/2/2020    Carotid stenosis     Chronic anemia     Chronic respiratory failure (HCC)     COPD (chronic obstructive pulmonary disease) with emphysema ([facility] 10/2/2020    Depression     Diastolic CHF ([facility] [address]     Dysphagia 10/2/2020    GERD (gastroesophageal reflux disease)     High cholesterol     Hypertension     Hypothyroid     Iron deficiency anemia 10/2/2020    Mitral valve regurgitation 10/2/2020    Renal artery stenosis (HCC)     Seizure disorder ([facility])      Past Surgical History:   Procedure Laterality Date    HX CAROTID ENDARTERECTOMY      HX CHOLECYSTECTOMY      HX RENAL ARTERY STENT      HX ROTATOR CUFF REPAIR Right     HX TUBAL LIGATION      IR THORACENTESIS CATH W IMAGE  11/24/2020    IR THORACENTESIS CATH W IMAGE  11/25/2020       Personal factors and/or comorbidities impacting plan of care: Home Situation  Home Environment: Private residence  # Steps to Enter: 4  One/Two Story Residence: One story  Living Alone: No  Support Systems: Spouse/Significant Other  Patient Expects to be Discharged to[de-identified] Home  Current DME Used/Available at Home: None    EXAMINATION/PRESENTATION/DECISION MAKING:   Critical Behavior:  Neurologic State: Alert  Orientation Level: Oriented to person,Oriented to place  Cognition: Decreased attention/concentration Hearing: Auditory  Auditory Impairment: None  Range Of Motion:  AROM: Within functional limits                       Strength:    Strength: Within functional limits                    Tone & Sensation:   Tone: Normal                          Functional Mobility:  Bed Mobility:  Rolling: Independent  Supine to Sit: Independent  Sit to Supine: Independent  Scooting: Independent  Transfers:  Sit to Stand: Independent  Stand to Sit: Independent                       Balance:   Sitting: Intact  Standing: Intact  Ambulation/Gait Training:  Distance (ft): 60 Feet (ft)  Assistive Device: Gait belt; Other (comment) (HHA)  Ambulation - Level of Assistance: Independent     Gait Description (WDL): Exceptions to [facility]                                                  Functional Measure:  [facility] [address] [address] AM-PAC 6 Clicks         Basic Mobility Inpatient Short Form  How much difficulty does the patient currently have. .. Unable A Lot A Little None   1. Turning over in bed (including adjusting bedclothes, sheets and blankets)? [] 1   [] 2   [] 3   [x] 4   2. Sitting down on and standing up from a chair with arms ( e.g., wheelchair, bedside commode, etc.)   [] 1   [] 2   [] 3   [x] 4   3. Moving from lying on back to sitting on the side of the bed? [] 1   [] 2   [] 3   [x] 4          How much help from another person does the patient currently need. .. Total A Lot A Little None   4. Moving to and from a bed to a chair (including a wheelchair)? [] 1   [] 2   [] 3   [x] 4   5. Need to walk in hospital room? [] 1   [] 2   [] 3   [x] 4   6. Climbing 3-5 steps with a railing? [] 1   [] 2   [] 3   [x] 4   © 2007, Trustees of [address] [facility] [address] [address], under license to [employer]. All rights reserved     Score:  Initial: 24/24 Most Recent: X (Date:03/17/2022 )   Interpretation of Tool:  Represents activities that are increasingly more difficult (i.e. Bed mobility, Transfers, Gait).   Score 24 23 22-20 19-15 14-10 9-7 6   Modifier CH CI CJ CK CL CM CN           Physical Therapy Evaluation Charge Determination   History Examination Presentation Decision-Making   LOW Complexity : Zero comorbidities / personal factors that will impact the outcome / POC LOW Complexity : 1-2 Standardized tests and measures addressing body structure, function, activity limitation and / or participation in recreation  LOW Complexity : Stable, uncomplicated  LOW Complexity : FOTO score of       Based on the above components, the patient evaluation is determined to be of the following complexity level: LOW     Pain Ratin/10    Activity Tolerance:   Good  Please refer to the flowsheet for vital signs taken during this treatment. After treatment patient left in no apparent distress:   Supine in bed, Call bell within reach, Bed / chair alarm activated, and Caregiver / family present   NO GOALS  COMMUNICATION/EDUCATION:   The patients plan of care was discussed with: Occupational therapist, Registered nurse, and Case management. Fall prevention education was provided and the patient/caregiver indicated understanding., Patient/family have participated as able in goal setting and plan of care. , and Patient/family agree to work toward stated goals and plan of care.     Thank you for this referral.  Jama Bloom PT   Time Calculation: 20 mins

## 2022-03-17 NOTE — DISCHARGE SUMMARY
Discharge Summary       PATIENT ID: Madeleine Doe  MRN: [id number]   YOB: 1965    DATE OF ADMISSION: 3/15/2022  3:35 PM    DATE OF DISCHARGE:   PRIMARY CARE PROVIDER: None     ATTENDING PHYSICIAN: Lizbeth Winter  DISCHARGING PROVIDER: Georges Winter      CONSULTATIONS: IP CONSULT TO PULMONOLOGY  IP CONSULT TO NEPHROLOGY  IP CONSULT TO NEUROLOGY    PROCEDURES/SURGERIES: * No surgery found *    ADMITTING DIAGNOSES:    Patient Active Problem List    Diagnosis Date Noted    Encephalopathy 19/72/9240    Metabolic encephalopathy 06/96/1090    COPD (chronic obstructive pulmonary disease) ([facility] [address] 03/15/2022    Syncope and collapse 04/14/2021    Bilateral carotid artery stenosis 03/29/2021    Hyponatremia 01/17/2021    Respiratory failure with hypoxia ([address] 11/18/2020    CHF exacerbation ([address] 11/02/2020    CHF (congestive heart failure) ([address] 10/02/2020    Mitral valve regurgitation 10/02/2020    PEDRO PABLO (acute kidney injury) ([address] 10/02/2020    Anxiety 10/02/2020    Seizure disorder ([address] 10/02/2020    COPD (chronic obstructive pulmonary disease) with emphysema ([address] 10/02/2020    Dysphagia 10/02/2020    Iron deficiency anemia 10/02/2020    Hypokalemia 10/02/2020    Acquired hypothyroidism 10/02/2020    Acute respiratory failure ([address] 09/21/2020    Resistant hypertension 09/21/2020    Renal artery stenosis ([address] 09/21/2020       DISCHARGE DIAGNOSES / PLAN:        Acute metabolic encephalopathy  Hyponatremia  hypokalemia  Acute on chronic kidney disease  Advanced COPD  Unilateral functioning right kidney  History of carotid and renal artery stenosis  Chronic diastolic heart failure  Seizure disorder  GERD  Hypothyroidism  Hyperlipidemia  Positive for opiates and marijuana  Elevated BNP        DISCHARGE MEDICATIONS:  Current Discharge Medication List      START taking these medications    Details   hydrALAZINE (APRESOLINE) 25 mg tablet Take 1 Tablet by mouth three (3) times daily.   Qty: 60 Tablet, Refills: 0  Start date: 3/17/2022      predniSONE (DELTASONE) 20 mg tablet Take 20 mg by mouth daily (with breakfast). Qty: 10 Tablet, Refills: 0  Start date: 3/17/2022         CONTINUE these medications which have NOT CHANGED    Details   carBAMazepine (TEGretol) 200 mg tablet Take 200 mg by mouth two (2) times a day. sertraline (ZOLOFT) 50 mg tablet Take 50 mg by mouth daily. potassium chloride (KLOR-CON) 20 mEq pack Take 1 Packet by mouth two (2) times daily (with meals). Qty: 60 Packet, Refills: 0      NIFEdipine ER (ADALAT CC) 90 mg ER tablet Take 90 mg by mouth daily. ergocalciferol (Vitamin D2) 1,250 mcg (50,000 unit) capsule Take 50,000 Units by mouth every seven (7) days. q7days wednesday      isosorbide dinitrate (ISORDIL) 10 mg tablet Take 1 Tab by mouth two (2) times a day. Qty: 90 Tab, Refills: 1      levothyroxine (SYNTHROID) 50 mcg tablet Take 1 Tab by mouth Daily (before breakfast). Qty: 30 Tab, Refills: 0      esomeprazole (NexIUM) 40 mg capsule Take 40 mg by mouth. Indications: Berry's esophagus      clopidogreL (PLAVIX) 75 mg tab Take 75 mg by mouth daily. nortriptyline (PAMELOR) 50 mg capsule Take 50 mg by mouth nightly. Trelegy Ellipta 100-62.5-25 mcg inhaler 1 Puff daily. albuterol (PROVENTIL HFA, VENTOLIN HFA, PROAIR HFA) 90 mcg/actuation inhaler       rosuvastatin (CRESTOR) 10 mg tablet       Oxygen 5 Devices as needed. Pt wears 5LPM as needed- states she uses it after an axiety attack      albuterol-ipratropium (DUO-NEB) 2.5 mg-0.5 mg/3 ml nebu 3 mL by Nebulization route every six (6) hours. Qty: 120 Nebule, Refills: 0      levETIRAcetam (Keppra) 500 mg tablet Take 500 mg by mouth two (2) times a day. ferrous sulfate 325 mg (65 mg iron) tablet Take 325 mg by mouth Daily (before breakfast). folic acid (FOLVITE) 1 mg tablet Take 1 mg by mouth daily. aspirin delayed-release 81 mg tablet Take  by mouth daily.          STOP taking these medications       labetaloL (NORMODYNE) 300 mg tablet Comments:   Reason for Stopping:         ALPRAZolam (XANAX) 0.25 mg tablet Comments:   Reason for Stopping:                 NOTIFY YOUR PHYSICIAN FOR ANY OF THE FOLLOWING:   Fever over 101 degrees for 24 hours. Chest pain, shortness of breath, fever, chills, nausea, vomiting, diarrhea, change in mentation, falling, weakness, bleeding. Severe pain or pain not relieved by medications. Or, any other signs or symptoms that you may have questions about. DISPOSITION:  x  Home With:   OT  PT  HH  RN       Long term SNF/Inpatient Rehab    Independent/assisted living    Hospice    Other:       PATIENT CONDITION AT DISCHARGE: Stable      PHYSICAL EXAMINATION AT DISCHARGE:  General:          Alert, cooperative, no distress, appears stated age. HEENT:           Atraumatic, anicteric sclerae, pink conjunctivae                          No oral ulcers, mucosa moist, throat clear, dentition fair  Neck:               Supple, symmetrical  Lungs:             Clear to auscultation bilaterally. No Wheezing or Rhonchi. No rales. Chest wall:      No tenderness  No Accessory muscle use. Heart:              Regular  rhythm,  No  murmur   No edema  Abdomen:        Soft, non-tender. Not distended. Bowel sounds normal  Extremities:     No cyanosis. No clubbing,                            Skin turgor normal, Capillary refill normal  Skin:                Not pale. Not Jaundiced  No rashes   Psych:             Not anxious or agitated. Neurologic:      Alert, moves all extremities, answers questions appropriately and responds to commands     XR CHEST PORT   Final Result   The cardiomediastinal silhouette is appropriate for age, technique,   and lung expansion. Pulmonary vasculature is not congested. The lungs are   essentially clear. No effusion or pneumothorax is seen.          CT CODE NEURO HEAD WO CONTRAST   Final Result   Accelerated and progressive chronic small vessel disease. No acute   findings           Recent Results (from the past 24 hour(s))   POTASSIUM    Collection Time: 03/16/22  6:35 PM   Result Value Ref Range    Potassium 3.1 (L) 3.5 - 5.1 mmol/L   CBC W/O DIFF    Collection Time: 03/17/22  9:24 AM   Result Value Ref Range    WBC 6.0 3.6 - 11.0 K/uL    RBC 3.18 (L) 3.80 - 5.20 M/uL    HGB 10.6 (L) 11.5 - 16.0 g/dL    HCT 30.2 (L) 35.0 - 47.0 %    MCV 95.0 80.0 - 99.0 FL    MCH 33.3 26.0 - 34.0 PG    MCHC 35.1 30.0 - 36.5 g/dL    RDW 12.5 11.5 - 14.5 %    PLATELET 692 207 - 157 K/uL    MPV 10.2 8.9 - 12.9 FL    NRBC 0.0 0.0  WBC    ABSOLUTE NRBC 0.00 0.00 - 0.92 K/uL   METABOLIC PANEL, COMPREHENSIVE    Collection Time: 03/17/22  9:24 AM   Result Value Ref Range    Sodium 132 (L) 136 - 145 mmol/L    Potassium 3.3 (L) 3.5 - 5.1 mmol/L    Chloride 99 97 - 108 mmol/L    CO2 23 21 - 32 mmol/L    Anion gap 10 5 - 15 mmol/L    Glucose 170 (H) 65 - 100 mg/dL    BUN 15 6 - 20 mg/dL    Creatinine 2.27 (H) 0.55 - 1.02 mg/dL    BUN/Creatinine ratio 7 (L) 12 - 20      GFR est AA 27 (L) >60 ml/min/1.73m2    GFR est non-AA 22 (L) >60 ml/min/1.73m2    Calcium 8.9 8.5 - 10.1 mg/dL    Bilirubin, total 0.3 0.2 - 1.0 mg/dL    AST (SGOT) 22 15 - 37 U/L    ALT (SGPT) 31 12 - 78 U/L    Alk.  phosphatase 76 45 - 117 U/L    Protein, total 6.8 6.4 - 8.2 g/dL    Albumin 3.2 (L) 3.5 - 5.0 g/dL    Globulin 3.6 2.0 - 4.0 g/dL    A-G Ratio 0.9 (L) 1.1 - 2.2     NT-PRO BNP    Collection Time: 03/17/22  9:24 AM   Result Value Ref Range    NT pro-BNP 21,813 (H) <125 pg/mL   ECHO ADULT COMPLETE    Collection Time: 03/17/22 11:43 AM   Result Value Ref Range    LV EDV A2C 33 mL    LV EDV A4C 45 mL    LV ESV A4C 16 mL    IVSd 1.2 (A) 0.6 - 0.9 cm    LVIDd 4.0 3.9 - 5.3 cm    LVIDs 2.2 cm    LVOT Diameter 2.1 cm    LVOT Mean Gradient 4 mmHg    LVOT VTI 22.7 cm    LVOT Peak Velocity 1.4 m/s    LVOT Peak Gradient 8 mmHg    LVPWd 1.1 (A) 0.6 - 0.9 cm    LV E' Septal Velocity 3 cm/s    LV Ejection Fraction A4C 64 %    LVOT Area 3.5 cm2    LVOT SV 79.0 ml    LA Minor Axis 3.2 cm    LA Major [place] 2.8 cm    LA Area 2C 7.8 cm2    LA Area 4C 6.2 cm2    LA Volume BP 14 (A) 22 - 52 mL    LA Diameter 3.1 cm    AV Mean Gradient 5 mmHg    AV VTI 27.1 cm    AV Mean Velocity 1.1 m/s    AV Peak Velocity 1.6 m/s    AV Peak Gradient 10 mmHg    AV Area by VTI 2.9 cm2    AV Area by Peak Velocity 3.1 cm2    Aortic Root 3.1 cm    MR .0 cm    MR Peak Velocity 4.3 m/s    MR Peak Gradient 74 mmHg    MV A Velocity 1.25 m/s    MV E Velocity 0.96 m/s    MV Area by PHT 10.0 cm2    PV Mean Gradient 3 mmHg    PV VTI 19.6 cm    PV Mean Velocity 0.9 m/s    PV Max Velocity 1.1 m/s    PV Peak Gradient 5 mmHg    TR Max Velocity 3.12 m/s    TR Peak Gradient 39 mmHg          HOSPITAL COURSE:    Patient is a 62 y.o. year old female past medical history of chronic kidney disease hyponatremia hypertension advanced COPD unilateral functioning of the right kidney history of carotid and renal artery stenosis chronic diastolic heart failure seizure disorder GERD hypothyroidism chronic anemia mitral insufficiency came to the ER with altered mental status patient was in hospice was revoked  Patient was on hospice services for last 8-month today patient family sent to the ER and revoked hospice and saw the patient patient lying in the bed confused initial stroke alert was called no TPA was given patient was admitted for further evaluation and treatment CT of the head was done     3/16:  Patient awake, A&O x 3, sitting on the edge of her bed talking with her boyfriend. Mental status is much clearer according to family. She states she has been having some balance issues lately but denies any injury at home. She initially presented after home hospice nurse found her altered, as she was attempting to make a phone call with a TV remote. Notes she was off of [employer] for a long time but recently restarted.  CT was unremarkable.     Labs remarkable for K 2.4, BNP 5842, and Creat 2.9, which is consistent with her baseline.     Neurology consulted- notes weakness most likely secondary to chronic medical problems. Plans to order EEG, Keppra level, and seizure precautions.     3/17:  Patient seen awake, sitting in bed and talking with family. Had increased confusion last night. Is able to answer questions today but is only oriented to self. Is not agitated. She also had HTN overnight which remains 195/86.  She c/o headache and    Discussed with the patient and patient boyfriend at bedside they want to go back home with hospice    Blood pressure medications were adjusted before discharge  Follow-up blood pressure as an outpatient    Patient is DNR    Medication reconciliation done    Time discharge patient 35 minutes 50% time spent counseling and coordination of care          Signed:   Mathew Stearns MD  3/17/2022  11:53 AM

## 2022-03-17 NOTE — PROGRESS NOTES
PULMONARY NOTE  [facility]    Name: Ines Hussein MRN: [id number]   : 1965 Hospital: [address] [place] [address]   Date: 3/17/2022  Admission date: 3/15/2022 Hospital Day: 3       HPI:     Hospital Problems  Date Reviewed: 2021          Codes Class Noted POA    Encephalopathy ICD-10-CM: G93.40  ICD-9-CM: 348.30  3/15/2022 Unknown        Metabolic encephalopathy LKO-70-MA: G93.41  ICD-9-CM: 348.31  3/15/2022 Unknown        COPD (chronic obstructive pulmonary disease) ([facility] ICD-10-CM: J44.9  ICD-9-CM: 623  3/15/2022 Unknown        Hyponatremia ICD-10-CM: E87.1  ICD-9-CM: 276.1  2021 Unknown                   [x] High complexity decision making was performed  [x] See my orders for details      Subjective/Initial History:     I was asked by Mathew Stearns MD to see Ines Hussein  a 64 y.o.  female in consultation     Excerpts from admission 3/15/2022 or consult notes as follows:   55-year-old lady came in because of generalized weakness also having shortness of breath dyspnea significant past medical history of COPD chronic kidney disease chronic hypertension secondary to renal artery stenosis diastolic heart failure reflux disease chronic anemia mitral insufficiency she is complaining about dizziness lightheadedness for the past couple of days before coming to the hospital but her blood pressure is elevated patient was in hospice revoked hospice stroke alert was called no TPA was given patient still feel weak and tired still has shortness of breath on oxygen at home so admitted and pulmonary consult was called for further evaluation.       Allergies   Allergen Reactions    Sulfa (Sulfonamide Antibiotics) Anaphylaxis    Sulfamethoxazole-Trimethoprim Unknown (comments)        MAR reviewed and pertinent medications noted or modified as needed     Current Facility-Administered Medications   Medication    LORazepam (ATIVAN) injection 0.5 mg    methylPREDNISolone (PF) (SOLU-MEDROL) injection 40 mg    0.9% sodium chloride infusion    hydrALAZINE (APRESOLINE) 20 mg/mL injection 10 mg    potassium chloride (KLOR-CON) packet for solution 20 mEq    albuterol-ipratropium (DUO-NEB) 2.5 MG-0.5 MG/3 ML    isosorbide dinitrate (ISORDIL) tablet 10 mg    levothyroxine (SYNTHROID) tablet 50 mcg    aspirin delayed-release tablet 81 mg    carBAMazepine (TEGretol) tablet 200 mg    clopidogreL (PLAVIX) tablet 75 mg    ergocalciferol capsule 50,000 Units    pantoprazole (PROTONIX) granules for oral suspension 40 mg    ferrous sulfate tablet 565 mg    folic acid (FOLVITE) tablet 1 mg    levETIRAcetam (KEPPRA) tablet 500 mg    atorvastatin (LIPITOR) tablet 20 mg    sertraline (ZOLOFT) tablet 50 mg    fluticasone-umeclidinium-vilanterol (TRELEGY ELLIPTA) inhaler 1 Puff    acetaminophen (TYLENOL) tablet 650 mg    Or    acetaminophen (TYLENOL) suppository 650 mg    polyethylene glycol (MIRALAX) packet 17 g    ondansetron (ZOFRAN ODT) tablet 4 mg    Or    ondansetron (ZOFRAN) injection 4 mg    heparin (porcine) injection 5,000 Units      Patient PCP: None  PMH:  has a past medical history of Anxiety (10/2/2020), Carotid stenosis, Chronic anemia, Chronic respiratory failure (HCC), COPD (chronic obstructive pulmonary disease) with emphysema ([address] (10/2/2020), Depression, Diastolic CHF ([address] Dysphagia (10/2/2020), GERD (gastroesophageal reflux disease), High cholesterol, Hypertension, Hypothyroid, Iron deficiency anemia (10/2/2020), Mitral valve regurgitation (10/2/2020), Renal artery stenosis ([address] and Seizure disorder ([address] PSH:   has a past surgical history that includes hx rotator cuff repair (Right); hx tubal ligation; ir thoracentesis cath w image (11/24/2020); ir thoracentesis cath w image (11/25/2020); hx renal artery stent; hx cholecystectomy; and hx carotid endarterectomy.    FHX: family history includes Hypertension in her mother; Melanoma in her brother, child, and mother; Stroke in her father and mother. SHX:  reports that she has quit smoking. Her smoking use included cigarettes. She has never used smokeless tobacco. She reports previous alcohol use. She reports that she does not use drugs. ROS:    Review of Systems   Constitutional: Positive for malaise/fatigue. HENT: Negative. Eyes: Negative. Respiratory: Positive for shortness of breath. Cardiovascular: Negative. Gastrointestinal: Negative. Genitourinary: Negative. Musculoskeletal: Negative. Skin: Negative. Neurological: Positive for weakness. Psychiatric/Behavioral: Negative. Objective:     Vital Signs: Telemetry:    normal sinus rhythm Intake/Output:   Visit Vitals  BP (!) 195/86 (BP 1 Location: Left upper arm, BP Patient Position: At rest;Sitting)   Pulse 66   Temp 98.5 °F (36.9 °C)   Resp 20   Ht 5' 4\" (1.626 m)   Wt 52.4 kg (115 lb 8.3 oz)   SpO2 100%   BMI 19.83 kg/m²       Temp (24hrs), Av.9 °F (37.2 °C), Min:98.3 °F (36.8 °C), Max:99.5 °F (37.5 °C)        O2 Device: None (Room air)         Wt Readings from Last 4 Encounters:   03/15/22 52.4 kg (115 lb 8.3 oz)   06/10/21 47 kg (103 lb 9.9 oz)   21 45.4 kg (100 lb)   21 44.9 kg (99 lb)        No intake or output data in the 24 hours ending 22 0954    Last shift:      No intake/output data recorded. Last 3 shifts: 03/15 1901 -  0700  In: -   Out: 600 [Urine:600]       Physical Exam:     Physical Exam  Constitutional:       Appearance: She is ill-appearing. HENT:      Head: Normocephalic and atraumatic. Nose: Nose normal.      Mouth/Throat:      Mouth: Mucous membranes are moist.   Eyes:      Pupils: Pupils are equal, round, and reactive to light. Cardiovascular:      Rate and Rhythm: Normal rate and regular rhythm. Pulses: Normal pulses. Pulmonary:      Effort: Pulmonary effort is normal.      Breath sounds: Wheezing and rhonchi present. Abdominal:      General: Abdomen is flat. Bowel sounds are normal.      Palpations: Abdomen is soft. Musculoskeletal:         General: Normal range of motion. Cervical back: Normal range of motion and neck supple. Skin:     General: Skin is warm. Neurological:      Mental Status: She is alert. Motor: Weakness present. Psychiatric:         Mood and Affect: Mood normal.          Labs:    Recent Labs     03/16/22  0647 03/15/22  1618   WBC 6.5 6.5   HGB 10.0* 12.0    267   INR  --  0.8*     Recent Labs     03/16/22  1835 03/16/22  0946 03/16/22  0647 03/15/22  2008 03/15/22  1618 03/15/22  1618   NA  --   --  132* 132*  --  128*   K 3.1*  --  2.4* 2.9*   < > 2.4*   CL  --   --  100 99  --  95*   CO2  --   --  25 25  --  21   GLU  --   --  80 90  --  104*   BUN  --   --  19 22*  --  25*   CREA  --   --  2.90* 2.99*  --  3.30*   CA  --   --  8.2* 8.2*  --  9.1   MG  --  2.1  --   --   --  1.8   LAC  --   --   --   --   --  1.5   ALB  --   --  2.7*  --   --  3.3*   ALT  --   --  34  --   --  44    < > = values in this interval not displayed. No results for input(s): PH, PCO2, PO2, HCO3, FIO2 in the last 72 hours. Recent Labs     03/15/22  1618   CPK 78     No results found for: BNPP, BNP   Lab Results   Component Value Date/Time    Culture result: No growth 6 days 04/13/2021 10:29 PM    Culture result: No growth 4 days 11/24/2020 12:08 PM    Culture result: MRSA not present 09/22/2020 04:55 AM     Lab Results   Component Value Date/Time    TSH 7.99 (H) 03/15/2022 04:18 PM       Imaging:    CXR Results  (Last 48 hours)               03/15/22 1659  XR CHEST PORT Final result    Impression:  The cardiomediastinal silhouette is appropriate for age, technique,   and lung expansion. Pulmonary vasculature is not congested. The lungs are   essentially clear. No effusion or pneumothorax is seen.            Narrative:  1 view comparison April 13               Results from Hospital Encounter encounter on 03/15/22    XR CHEST PORT    Narrative  1 view comparison April 13    Impression  The cardiomediastinal silhouette is appropriate for age, technique,  and lung expansion. Pulmonary vasculature is not congested. The lungs are  essentially clear. No effusion or pneumothorax is seen. Results from [place] encounter on 04/13/21    XR CHEST PORT    Narrative  Portable chest, 10 hours, compared with 2021. The heart, mediastinum, and pulmonary vasculature appear within normal limits. No evidence of pulmonary edema, air space pneumonia, or pleural effusion. No  evidence of pneumothorax. Calcified aortic knob. Clips overlie soft tissues of  the right lower neck. Impression  No acute process. Results from [place] encounter on 03/31/21    XR CHEST SNGL V    Narrative  Chest single view. Comparison single view chest March 15, 2021    Increased lung volumes. Coarse reticular markings through lungs, basilar lung  predominance. Similar appearance compared to prior imaging. No gross  interstitial or alveolar pulmonary edema. Cardiac and mediastinal structures  unchanged noting thoracic aorta atherosclerosis. No pneumothorax or sizable  pleural effusion. Impression  No plain film evidence for CHF or pneumonia. Results from [place] encounter on 03/15/22    CT CODE NEURO HEAD WO CONTRAST    Narrative  CT dose reduction was achieved through use of a standardized protocol tailored  for this examination and automatic exposure control for dose modulation. Noncontrast study shows generalized volume loss and typical periventricular  white matter disease, greater than expected for age and greater than seen on a  baseline study of 1 year ago. Central pontine calcifications are unchanged. No  new abnormality seen in gray or white matter. No mass effect or hemorrhage. Ventricles are symmetric and unchanged. No significant bone finding.  I gave this  report to the ordering physician    Impression  Accelerated and progressive chronic small vessel disease. No acute  findings        IMPRESSION:     1. Chronic Obstructive Pulmonary Disease   2. Hyponatremia  3. Hypokalemia  4. Carotid and renal artery stenosis  5. Gastroesophageal reflux disease  6. Acute on chronic kidney disease      RECOMMENDATIONS/PLAN:     3 51-year-old lady came in because of generalized weakness shortness of breath dyspnea she is 50-pack-year smoking history continues to smoke 1 pack/day not on any inhalers we will start patient on Symbicort and nebulizer treatment oxygen per protocol   2. Supplement potassium sodium is also low will start patient on IV fluid  3. We will get magnesium level  4. She was complaining about dizziness CAT scan of the head was negative fluctuating in  blood pressure now is elevated continue with the blood pressure medication  5. Chest x-ray no acute infiltrate  6. Recieved 3 doses of IV Solu-Medrol I will start her on prednisone  7. Arterial blood gases shows PCO2 48 PO2 78 2 L nasal cannula now she is on room air   8. Supplemental O2 to keep sats > 93%  9. Aspiration precautions  10. Labs to follow electrolytes, renal function and and blood counts  11. Glucose monitoring and SSI  12. Bronchial hygiene with respiratory therapy techniques, bronchodilators  13. DVT, SUP prophylaxis  14. Smoking cessation counseling done  15. Pt needs IV fluids with additives and Drug therapy requiring intensive monitoring for toxicity  16.  Prescription drug management with home med reconciliation reviewed               Anamaria Payan MD

## 2022-03-17 NOTE — PROGRESS NOTES
Patient discharging home on hospice via [address] date 3/18/22. Family will provide transportation upon DC. Discharge plan of care/case management plan validated with provider discharge order.

## 2022-03-17 NOTE — PROGRESS NOTES
Chart reviewed. DC plan: home under hospice care via [facility] and boyfriend will provide transportation upon. CM will follow.

## 2022-03-17 NOTE — PROGRESS NOTES
PROGRESS NOTE    Patient: Aubrey Leggett MRN: [id number]  SSN: [id number]    YOB: 1965  Age: 64 y.o. Sex: female      Admit Date: 3/15/2022    LOS: 2 days       Subjective     Chief Complaint   Patient presents with    Extremity Weakness       HPI: Patient is a 64y.o. year old female past medical history of chronic kidney disease hyponatremia hypertension advanced COPD unilateral functioning of the right kidney history of carotid and renal artery stenosis chronic diastolic heart failure seizure disorder GERD hypothyroidism chronic anemia mitral insufficiency came to the ER with altered mental status patient was in hospice was revoked  Patient was on hospice services for last 8-month today patient family sent to the ER and revoked hospice and saw the patient patient lying in the bed confused initial stroke alert was called no TPA was given patient was admitted for further evaluation and treatment CT of the head was done    3/16:  Patient awake, A&O x 3, sitting on the edge of her bed talking with her boyfriend. Mental status is much clearer according to family. She states she has been having some balance issues lately but denies any injury at home. She initially presented after home hospice nurse found her altered, as she was attempting to make a phone call with a TV remote. Notes she was off of fintonic for a long time but recently restarted. CT was unremarkable. Labs remarkable for K 2.4, BNP 5842, and Creat 2.9, which is consistent with her baseline. Neurology consulted- notes weakness most likely secondary to chronic medical problems. Plans to order EEG, Keppra level, and seizure precautions. 3/17:  Patient seen awake, sitting in bed and talking with family. Had increased confusion last night. Is able to answer questions today but is only oriented to self. Is not agitated. She also had HTN overnight which remains 195/86.  She c/o headache and dizziness with this but denies any sx currently, CP, palpitations, SOB. Labs remarkable for:  K 3.3 (increase from hypokalemia)  Creatinine 2.27 (decrease)  BNP 33370  Keppra level pending    Neurology has seen pt, who ordered 2D echo and EEG. Both have been completed, results are pending at this time. Past Medical History:   Diagnosis Date    Anxiety 10/2/2020    Carotid stenosis      Chronic anemia      Chronic respiratory failure (HCC)      COPD (chronic obstructive pulmonary disease) with emphysema (HCC) 10/2/2020    Depression      Diastolic CHF (HCC)      Dysphagia 10/2/2020    GERD (gastroesophageal reflux disease)      High cholesterol      Hypertension      Hypothyroid      Iron deficiency anemia 10/2/2020    Mitral valve regurgitation 10/2/2020    Renal artery stenosis (HCC)      Seizure disorder (HCC)                 Past Surgical History:   Procedure Laterality Date    HX CAROTID ENDARTERECTOMY        HX CHOLECYSTECTOMY        HX RENAL ARTERY STENT        HX ROTATOR CUFF REPAIR Right      HX TUBAL LIGATION        IR THORACENTESIS CATH W IMAGE   11/24/2020    IR THORACENTESIS CATH W IMAGE   11/25/2020         Social History            Tobacco Use    Smoking status: Former Smoker       Types: Cigarettes    Smokeless tobacco: Never Used    Tobacco comment: quit july 2020   Substance Use Topics    Alcohol use: Not Currently               Family History   Problem Relation Age of Onset    Hypertension Mother      Stroke Mother      Melanoma Mother      Stroke Father      Melanoma Brother      Melanoma Child                Allergies   Allergen Reactions    Sulfa (Sulfonamide Antibiotics) Anaphylaxis    Sulfamethoxazole-Trimethoprim Unknown (comments)           Review of Systems   Constitutional: Negative for chills and fever. Respiratory: Negative for cough, shortness of breath and wheezing. Cardiovascular: Negative for chest pain and palpitations.    Gastrointestinal: Negative for abdominal pain, constipation, diarrhea, nausea and vomiting. Neurological: Positive for headaches. Negative for dizziness, seizures and loss of consciousness. Objective     Visit Vitals  BP (!) 195/86 (BP 1 Location: Left upper arm, BP Patient Position: At rest;Sitting)   Pulse 66   Temp 98.5 °F (36.9 °C)   Resp 20   Ht 5' 4\" (1.626 m)   Wt 115 lb 8.3 oz (52.4 kg)   SpO2 100%   BMI 19.83 kg/m²      O2 Device: None (Room air)    Physical Exam:   Constitutional: Awake head: Normocephalic and atraumatic. Eyes: Pupils are equal, round, and reactive to light. EOM are normal.   Cardiovascular: Normal rate, regular rhythm and normal heart sounds. Pulmonary/Chest: Breath sounds normal. No wheezes. No rales. Exhibits no tenderness. Abdominal: Soft. Bowel sounds are normal. There is no abdominal tenderness. There is no rebound and no guarding. Musculoskeletal: Normal range of motion. Neurological: Patient awake move all extremity    Intake & Output:  Current Shift: No intake/output data recorded. Last three shifts: 03/15 1901 - 03/17 0700  In: -   Out: 600 [Urine:600]    Lab/Data Review: All labs reviewed.       24 Hour Results:    Recent Results (from the past 24 hour(s))   POTASSIUM    Collection Time: 03/16/22  6:35 PM   Result Value Ref Range    Potassium 3.1 (L) 3.5 - 5.1 mmol/L   CBC W/O DIFF    Collection Time: 03/17/22  9:24 AM   Result Value Ref Range    WBC 6.0 3.6 - 11.0 K/uL    RBC 3.18 (L) 3.80 - 5.20 M/uL    HGB 10.6 (L) 11.5 - 16.0 g/dL    HCT 30.2 (L) 35.0 - 47.0 %    MCV 95.0 80.0 - 99.0 FL    MCH 33.3 26.0 - 34.0 PG    MCHC 35.1 30.0 - 36.5 g/dL    RDW 12.5 11.5 - 14.5 %    PLATELET 671 171 - 451 K/uL    MPV 10.2 8.9 - 12.9 FL    NRBC 0.0 0.0  WBC    ABSOLUTE NRBC 0.00 0.00 - 5.96 K/uL   METABOLIC PANEL, COMPREHENSIVE    Collection Time: 03/17/22  9:24 AM   Result Value Ref Range    Sodium 132 (L) 136 - 145 mmol/L    Potassium 3.3 (L) 3.5 - 5.1 mmol/L    Chloride 99 97 - 108 mmol/L    CO2 23 21 - 32 mmol/L    Anion gap 10 5 - 15 mmol/L    Glucose 170 (H) 65 - 100 mg/dL    BUN 15 6 - 20 mg/dL    Creatinine 2.27 (H) 0.55 - 1.02 mg/dL    BUN/Creatinine ratio 7 (L) 12 - 20      GFR est AA 27 (L) >60 ml/min/1.73m2    GFR est non-AA 22 (L) >60 ml/min/1.73m2    Calcium 8.9 8.5 - 10.1 mg/dL    Bilirubin, total 0.3 0.2 - 1.0 mg/dL    AST (SGOT) 22 15 - 37 U/L    ALT (SGPT) 31 12 - 78 U/L    Alk. phosphatase 76 45 - 117 U/L    Protein, total 6.8 6.4 - 8.2 g/dL    Albumin 3.2 (L) 3.5 - 5.0 g/dL    Globulin 3.6 2.0 - 4.0 g/dL    A-G Ratio 0.9 (L) 1.1 - 2.2     NT-PRO BNP    Collection Time: 03/17/22  9:24 AM   Result Value Ref Range    NT pro-BNP 21,813 (H) <125 pg/mL         Imaging:    XR CHEST PORT   Final Result   The cardiomediastinal silhouette is appropriate for age, technique,   and lung expansion. Pulmonary vasculature is not congested. The lungs are   essentially clear. No effusion or pneumothorax is seen. CT CODE NEURO HEAD WO CONTRAST   Final Result   Accelerated and progressive chronic small vessel disease.  No acute   findings         2D echo completed, results pending  EEG completed, results pending      Assessment     Acute metabolic encephalopathy  Hyponatremia  hypokalemia  Acute on chronic kidney disease  Advanced COPD  Unilateral functioning right kidney  History of carotid and renal artery stenosis  Chronic diastolic heart failure  Seizure disorder  GERD  Hypothyroidism  Hyperlipidemia  Positive for opiates and marijuana  Elevated BNP      Plan     Replace potassium  Repeat the labs  Monitor blood pressure  PT OT consult  Norvasc 5 mg daily  Continue aspirin 81 mg daily  Lipitor 20 mg daily  Tegretol 200 mg twice a day  Plavix 75 mg daily  trelegy inhaler 1 puff daily  Keppra 500 twice daily  Levothyroxine 50 mcg daily  IV Solu-Medrol 40 mg every 8 hours  Protonix 40 daily  Seizure precautions    2D echo    Current Facility-Administered Medications:     LORazepam (ATIVAN) injection 0.5 mg, 0.5 mg, IntraVENous, Q4H PRN, Lizbeth Winter MD    predniSONE (DELTASONE) tablet 20 mg, 20 mg, Oral, DAILY WITH BREAKFAST, Arnold Bravo MD    0.9% sodium chloride infusion, 50 mL/hr, IntraVENous, CONTINUOUS, Lizbeth Winter MD, Last Rate: 50 mL/hr at 03/16/22 2329, 50 mL/hr at 03/16/22 2329    hydrALAZINE (APRESOLINE) 20 mg/mL injection 10 mg, 10 mg, IntraVENous, Q6H PRN, Lizbeth Winter MD, 10 mg at 03/17/22 3167    potassium chloride (KLOR-CON) packet for solution 20 mEq, 20 mEq, Oral, TID, [facility] MD, 20 mEq at 03/17/22 0832    albuterol-ipratropium (DUO-NEB) 2.5 MG-0.5 MG/3 ML, 3 mL, Nebulization, Q6H, Lizbeth Winter MD, 3 mL at 03/17/22 0753    isosorbide dinitrate (ISORDIL) tablet 10 mg, 10 mg, Oral, BID, Lizbeth Winter MD, 10 mg at 03/17/22 3150    levothyroxine (SYNTHROID) tablet 50 mcg, 50 mcg, Oral, ACB, Lizbeth Winter MD, 50 mcg at 03/17/22 5679    aspirin delayed-release tablet 81 mg, 81 mg, Oral, DAILY, Lizbeth Winter MD, 81 mg at 03/17/22 0826    carBAMazepine (TEGretol) tablet 200 mg, 200 mg, Oral, BID, Lizbeth Winter MD, 200 mg at 03/17/22 2734    clopidogreL (PLAVIX) tablet 75 mg, 75 mg, Oral, DAILY, Lizbeth Winter MD, 75 mg at 03/17/22 9122    ergocalciferol capsule 50,000 Units, 50,000 Units, Oral, Q7D, Lizbeth Winter MD, 50,000 Units at 03/16/22 0911    pantoprazole (PROTONIX) granules for oral suspension 40 mg, 40 mg, Per NG tube, ACB, Maggy, Lizbeth, MD, 40 mg at 03/17/22 5937    ferrous sulfate tablet 325 mg, 325 mg, Oral, Maggy ROSE Abdul, MD, 325 mg at 05/32/73 6573    folic acid (FOLVITE) tablet 1 mg, 1 mg, Oral, DAILY, Lizbeth Winter MD, 1 mg at 03/17/22 6487    levETIRAcetam (KEPPRA) tablet 500 mg, 500 mg, Oral, BID, Lizbeth Winter MD, 500 mg at 03/17/22 9545    atorvastatin (LIPITOR) tablet 20 mg, 20 mg, Oral, DAILY, Lizbeth Winter MD, 20 mg at 03/17/22 9558    sertraline (ZOLOFT) tablet 50 mg, 50 mg, Oral, Ratna Erwin MD, 50 mg at 03/17/22 1927    fluticasone-umeclidinium-vilanterol (TRELEGY ELLIPTA) inhaler 1 Puff, 1 Puff, Inhalation, DAILY, Lizbeth Winter MD, 1 Puff at 03/17/22 0752    acetaminophen (TYLENOL) tablet 650 mg, 650 mg, Oral, Q6H PRN, 650 mg at 03/15/22 2244 **OR** acetaminophen (TYLENOL) suppository 650 mg, 650 mg, Rectal, Q6H PRN, Gallito Winter MD    polyethylene glycol (MIRALAX) packet 17 g, 17 g, Oral, DAILY PRN, Gallito Winter MD    ondansetron (ZOFRAN ODT) tablet 4 mg, 4 mg, Oral, Q8H PRN **OR** ondansetron (ZOFRAN) injection 4 mg, 4 mg, IntraVENous, Q6H PRN, Lizbeth Winter MD    heparin (porcine) injection 5,000 Units, 5,000 Units, SubCUTAneous, Q8H, Lizbeth Winter MD, 5,000 Units at 03/17/22 8802    Current Outpatient Medications   Medication Instructions    albuterol (PROVENTIL HFA, VENTOLIN HFA, PROAIR HFA) 90 mcg/actuation inhaler No dose, route, or frequency recorded.     albuterol-ipratropium (DUO-NEB) 2.5 mg-0.5 mg/3 ml nebu 3 mL, Nebulization, EVERY 6 HOURS    ALPRAZolam (XANAX) 0.25 mg, Oral, DAILY AS NEEDED    aspirin delayed-release 81 mg tablet Oral, DAILY    carBAMazepine (TEGRETOL) 200 mg, Oral, 2 TIMES DAILY    clopidogreL (PLAVIX) 75 mg, Oral, DAILY    ergocalciferol (VITAMIN D2) 50,000 Units, Oral, EVERY 7 DAYS, q7days wednesday    esomeprazole (NEXIUM) 40 mg, Oral    ferrous sulfate 325 mg, Oral, DAILY BEFORE BREAKFAST    folic acid (FOLVITE) 1 mg, Oral, DAILY    isosorbide dinitrate (ISORDIL) 10 mg, Oral, 2 TIMES DAILY    labetaloL (NORMODYNE) 300 mg, Oral, 2 TIMES DAILY    levETIRAcetam (KEPPRA) 500 mg, Oral, 2 TIMES DAILY    levothyroxine (SYNTHROID) 50 mcg, Oral, DAILY BEFORE BREAKFAST    NIFEdipine ER (ADALAT CC) 90 mg, Oral, DAILY    nortriptyline (PAMELOR) 50 mg, Oral, EVERY BEDTIME    Oxygen 5 Devices, AS NEEDED, Pt wears 5LPM as needed- states she uses it after an axiety attack     potassium chloride (KLOR-CON) 20 mEq pack 20 mEq, Oral, 2 TIMES DAILY WITH MEALS    rosuvastatin (CRESTOR) 10 mg tablet No dose, route, or frequency recorded.     sertraline (ZOLOFT) 50 mg, Oral, DAILY    Trelegy Ellipta 100-62.5-25 mcg inhaler 1 Puff, DAILY         Signed By: Mason De Leon     March 17, 2022

## 2022-03-18 PROBLEM — I34.0 MITRAL VALVE REGURGITATION: Status: ACTIVE | Noted: 2020-10-02

## 2022-03-18 PROBLEM — E87.1 HYPONATREMIA: Status: ACTIVE | Noted: 2021-01-17

## 2022-03-18 NOTE — PROCEDURES
[facility]  EEG    Name:  Link Matamoros  MR#:  [id number]  :  1965  ACCOUNT #:  [de-identified]  DATE OF SERVICE:    DIAGNOSIS:  Seizures. DESCRIPTION:  Recording is done digitally on computer. Electrodes are placed in a 10-20 international system. Initial recording is equipment calibration followed by biocalibration. Initial montages are bipolar montages. TECHNIQUE:  Tracing started with the patient having a background frequency of fast beta activity of more than 15 Hz. As tracing continues, the patient is hooked up to an EKG machine that shows a heart rate of 126. Tracings continue with the patient being exposed to both photic stimulation and hyperventilation without any seizures or abnormality. IMPRESSION:  This is an EEG showing the patient with a background frequency of fast beta activity likely secondary to exposure to central nervous system-acting drugs.       Montez Acosta MD      [id number]  D:  2022 14:34  T:  2022 15:57  JOB #:  [id number]

## 2022-03-19 PROBLEM — I10 RESISTANT HYPERTENSION: Status: ACTIVE | Noted: 2020-09-21

## 2022-03-19 PROBLEM — E03.9 ACQUIRED HYPOTHYROIDISM: Status: ACTIVE | Noted: 2020-10-02

## 2022-03-19 PROBLEM — J96.00 ACUTE RESPIRATORY FAILURE (HCC): Status: ACTIVE | Noted: 2020-09-21

## 2022-03-19 PROBLEM — G40.909 SEIZURE DISORDER (HCC): Status: ACTIVE | Noted: 2020-10-02

## 2022-03-19 PROBLEM — I50.9 CHF (CONGESTIVE HEART FAILURE) (HCC): Status: ACTIVE | Noted: 2020-10-02

## 2022-03-19 PROBLEM — I1A.0 RESISTANT HYPERTENSION: Status: ACTIVE | Noted: 2020-09-21

## 2022-03-19 PROBLEM — I50.9 CHF EXACERBATION (HCC): Status: ACTIVE | Noted: 2020-11-02

## 2022-03-19 PROBLEM — I65.23 BILATERAL CAROTID ARTERY STENOSIS: Status: ACTIVE | Noted: 2021-03-29

## 2022-03-19 PROBLEM — N17.9 AKI (ACUTE KIDNEY INJURY) (HCC): Status: ACTIVE | Noted: 2020-10-02

## 2022-03-19 PROBLEM — R55 SYNCOPE AND COLLAPSE: Status: ACTIVE | Noted: 2021-04-14

## 2022-03-19 PROBLEM — E87.6 HYPOKALEMIA: Status: ACTIVE | Noted: 2020-10-02

## 2022-03-19 PROBLEM — I70.1 RENAL ARTERY STENOSIS (HCC): Status: ACTIVE | Noted: 2020-09-21

## 2022-03-20 ENCOUNTER — APPOINTMENT (OUTPATIENT)
Dept: GENERAL RADIOLOGY | Age: 57
End: 2022-03-20
Attending: EMERGENCY MEDICINE
Payer: COMMERCIAL

## 2022-03-20 ENCOUNTER — HOSPITAL ENCOUNTER (OUTPATIENT)
Age: 57
Setting detail: OBSERVATION
Discharge: HOSPICE | End: 2022-03-22
Attending: EMERGENCY MEDICINE | Admitting: HOSPITALIST
Payer: COMMERCIAL

## 2022-03-20 ENCOUNTER — APPOINTMENT (OUTPATIENT)
Dept: CT IMAGING | Age: 57
End: 2022-03-20
Attending: EMERGENCY MEDICINE
Payer: COMMERCIAL

## 2022-03-20 DIAGNOSIS — E87.6 HYPOKALEMIA: Primary | ICD-10-CM

## 2022-03-20 PROBLEM — D50.9 IRON DEFICIENCY ANEMIA: Status: ACTIVE | Noted: 2020-10-02

## 2022-03-20 PROBLEM — J96.91 RESPIRATORY FAILURE WITH HYPOXIA (HCC): Status: ACTIVE | Noted: 2020-11-18

## 2022-03-20 PROBLEM — R13.10 DYSPHAGIA: Status: ACTIVE | Noted: 2020-10-02

## 2022-03-20 PROBLEM — F41.9 ANXIETY: Status: ACTIVE | Noted: 2020-10-02

## 2022-03-20 PROBLEM — J43.9 COPD (CHRONIC OBSTRUCTIVE PULMONARY DISEASE) WITH EMPHYSEMA (HCC): Status: ACTIVE | Noted: 2020-10-02

## 2022-03-20 LAB
ALBUMIN SERPL-MCNC: 2.9 G/DL (ref 3.5–5)
ALBUMIN/GLOB SERPL: 0.9 {RATIO} (ref 1.1–2.2)
ALP SERPL-CCNC: 82 U/L (ref 45–117)
ALT SERPL-CCNC: 24 U/L (ref 12–78)
ANION GAP SERPL CALC-SCNC: 10 MMOL/L (ref 5–15)
AST SERPL W P-5'-P-CCNC: 25 U/L (ref 15–37)
BASOPHILS # BLD: 0 K/UL (ref 0–0.1)
BASOPHILS NFR BLD: 0 % (ref 0–1)
BILIRUB SERPL-MCNC: 0.2 MG/DL (ref 0.2–1)
BNP SERPL-MCNC: ABNORMAL PG/ML
BUN SERPL-MCNC: 20 MG/DL (ref 6–20)
BUN/CREAT SERPL: 7 (ref 12–20)
CA-I BLD-MCNC: 8.5 MG/DL (ref 8.5–10.1)
CHLORIDE SERPL-SCNC: 95 MMOL/L (ref 97–108)
CO2 SERPL-SCNC: 27 MMOL/L (ref 21–32)
CREAT SERPL-MCNC: 2.74 MG/DL (ref 0.55–1.02)
DIFFERENTIAL METHOD BLD: ABNORMAL
EOSINOPHIL # BLD: 0.1 K/UL (ref 0–0.4)
EOSINOPHIL NFR BLD: 2 % (ref 0–7)
ERYTHROCYTE [DISTWIDTH] IN BLOOD BY AUTOMATED COUNT: 12.5 % (ref 11.5–14.5)
GLOBULIN SER CALC-MCNC: 3.2 G/DL (ref 2–4)
GLUCOSE SERPL-MCNC: 104 MG/DL (ref 65–100)
HCT VFR BLD AUTO: 32.1 % (ref 35–47)
HGB BLD-MCNC: 11.5 G/DL (ref 11.5–16)
IMM GRANULOCYTES # BLD AUTO: 0 K/UL (ref 0–0.04)
IMM GRANULOCYTES NFR BLD AUTO: 0 % (ref 0–0.5)
LEVETIRACETAM SERPL-MCNC: 21.1 UG/ML (ref 10–40)
LEVETIRACETAM SERPL-MCNC: 6 UG/ML (ref 10–40)
LYMPHOCYTES # BLD: 1 K/UL (ref 0.8–3.5)
LYMPHOCYTES NFR BLD: 12 % (ref 12–49)
MCH RBC QN AUTO: 33.7 PG (ref 26–34)
MCHC RBC AUTO-ENTMCNC: 35.8 G/DL (ref 30–36.5)
MCV RBC AUTO: 94.1 FL (ref 80–99)
MONOCYTES # BLD: 0.7 K/UL (ref 0–1)
MONOCYTES NFR BLD: 8 % (ref 5–13)
NEUTS SEG # BLD: 6.4 K/UL (ref 1.8–8)
NEUTS SEG NFR BLD: 78 % (ref 32–75)
NRBC # BLD: 0 K/UL (ref 0–0.01)
NRBC BLD-RTO: 0 PER 100 WBC
PLATELET # BLD AUTO: 311 K/UL (ref 150–400)
PMV BLD AUTO: 9.9 FL (ref 8.9–12.9)
POTASSIUM SERPL-SCNC: 2.7 MMOL/L (ref 3.5–5.1)
PROT SERPL-MCNC: 6.1 G/DL (ref 6.4–8.2)
RBC # BLD AUTO: 3.41 M/UL (ref 3.8–5.2)
SODIUM SERPL-SCNC: 132 MMOL/L (ref 136–145)
TROPONIN-HIGH SENSITIVITY: 28 NG/L (ref 0–51)
WBC # BLD AUTO: 8.2 K/UL (ref 3.6–11)

## 2022-03-20 PROCEDURE — 70450 CT HEAD/BRAIN W/O DYE: CPT

## 2022-03-20 PROCEDURE — 93005 ELECTROCARDIOGRAM TRACING: CPT

## 2022-03-20 PROCEDURE — 74011250636 HC RX REV CODE- 250/636: Performed by: EMERGENCY MEDICINE

## 2022-03-20 PROCEDURE — 84484 ASSAY OF TROPONIN QUANT: CPT

## 2022-03-20 PROCEDURE — 74011000250 HC RX REV CODE- 250: Performed by: HOSPITALIST

## 2022-03-20 PROCEDURE — G0378 HOSPITAL OBSERVATION PER HR: HCPCS

## 2022-03-20 PROCEDURE — 94640 AIRWAY INHALATION TREATMENT: CPT

## 2022-03-20 PROCEDURE — 80053 COMPREHEN METABOLIC PANEL: CPT

## 2022-03-20 PROCEDURE — 83880 ASSAY OF NATRIURETIC PEPTIDE: CPT

## 2022-03-20 PROCEDURE — 74011250637 HC RX REV CODE- 250/637: Performed by: EMERGENCY MEDICINE

## 2022-03-20 PROCEDURE — 36415 COLL VENOUS BLD VENIPUNCTURE: CPT

## 2022-03-20 PROCEDURE — 65270000029 HC RM PRIVATE

## 2022-03-20 PROCEDURE — 71045 X-RAY EXAM CHEST 1 VIEW: CPT

## 2022-03-20 PROCEDURE — 74011250637 HC RX REV CODE- 250/637: Performed by: HOSPITALIST

## 2022-03-20 PROCEDURE — 85025 COMPLETE CBC W/AUTO DIFF WBC: CPT

## 2022-03-20 PROCEDURE — 99285 EMERGENCY DEPT VISIT HI MDM: CPT

## 2022-03-20 RX ORDER — TRAZODONE HYDROCHLORIDE 50 MG/1
50 TABLET ORAL
Status: DISCONTINUED | OUTPATIENT
Start: 2022-03-20 | End: 2022-03-22 | Stop reason: HOSPADM

## 2022-03-20 RX ORDER — POTASSIUM CHLORIDE 1.5 G/1.77G
20 POWDER, FOR SOLUTION ORAL 2 TIMES DAILY WITH MEALS
Status: DISCONTINUED | OUTPATIENT
Start: 2022-03-21 | End: 2022-03-22 | Stop reason: HOSPADM

## 2022-03-20 RX ORDER — POTASSIUM CHLORIDE 7.45 MG/ML
10 INJECTION INTRAVENOUS ONCE
Status: DISCONTINUED | OUTPATIENT
Start: 2022-03-20 | End: 2022-03-20

## 2022-03-20 RX ORDER — CLOPIDOGREL BISULFATE 75 MG/1
75 TABLET ORAL DAILY
Status: DISCONTINUED | OUTPATIENT
Start: 2022-03-21 | End: 2022-03-22 | Stop reason: HOSPADM

## 2022-03-20 RX ORDER — LORAZEPAM 0.5 MG/1
0.5 TABLET ORAL
Status: DISCONTINUED | OUTPATIENT
Start: 2022-03-20 | End: 2022-03-22 | Stop reason: HOSPADM

## 2022-03-20 RX ORDER — NORTRIPTYLINE HYDROCHLORIDE 50 MG/1
50 CAPSULE ORAL
COMMUNITY

## 2022-03-20 RX ORDER — LABETALOL 200 MG/1
400 TABLET, FILM COATED ORAL 3 TIMES DAILY
COMMUNITY
Start: 2022-02-22

## 2022-03-20 RX ORDER — ALBUTEROL SULFATE 90 UG/1
2 AEROSOL, METERED RESPIRATORY (INHALATION)
Status: DISCONTINUED | OUTPATIENT
Start: 2022-03-20 | End: 2022-03-22 | Stop reason: HOSPADM

## 2022-03-20 RX ORDER — FOLIC ACID 1 MG/1
1 TABLET ORAL DAILY
Status: DISCONTINUED | OUTPATIENT
Start: 2022-03-21 | End: 2022-03-22 | Stop reason: HOSPADM

## 2022-03-20 RX ORDER — IPRATROPIUM BROMIDE AND ALBUTEROL SULFATE 2.5; .5 MG/3ML; MG/3ML
3 SOLUTION RESPIRATORY (INHALATION) EVERY 6 HOURS
Status: DISCONTINUED | OUTPATIENT
Start: 2022-03-20 | End: 2022-03-21

## 2022-03-20 RX ORDER — ACETAMINOPHEN 650 MG/1
650 SUPPOSITORY RECTAL
Status: DISCONTINUED | OUTPATIENT
Start: 2022-03-20 | End: 2022-03-22 | Stop reason: HOSPADM

## 2022-03-20 RX ORDER — ISOSORBIDE MONONITRATE 30 MG/1
30 TABLET, EXTENDED RELEASE ORAL DAILY
Status: DISCONTINUED | OUTPATIENT
Start: 2022-03-21 | End: 2022-03-22 | Stop reason: HOSPADM

## 2022-03-20 RX ORDER — SODIUM CHLORIDE 0.9 % (FLUSH) 0.9 %
5-40 SYRINGE (ML) INJECTION AS NEEDED
Status: DISCONTINUED | OUTPATIENT
Start: 2022-03-20 | End: 2022-03-22 | Stop reason: HOSPADM

## 2022-03-20 RX ORDER — ONDANSETRON 2 MG/ML
4 INJECTION INTRAMUSCULAR; INTRAVENOUS
Status: DISCONTINUED | OUTPATIENT
Start: 2022-03-20 | End: 2022-03-22 | Stop reason: HOSPADM

## 2022-03-20 RX ORDER — ONDANSETRON 4 MG/1
4 TABLET, ORALLY DISINTEGRATING ORAL
Status: DISCONTINUED | OUTPATIENT
Start: 2022-03-20 | End: 2022-03-22 | Stop reason: HOSPADM

## 2022-03-20 RX ORDER — BUTALBITAL, ACETAMINOPHEN AND CAFFEINE 50; 325; 40 MG/1; MG/1; MG/1
2 TABLET ORAL
Status: COMPLETED | OUTPATIENT
Start: 2022-03-20 | End: 2022-03-20

## 2022-03-20 RX ORDER — CLONIDINE HYDROCHLORIDE 0.1 MG/1
0.1 TABLET ORAL 3 TIMES DAILY
COMMUNITY
Start: 2022-02-22

## 2022-03-20 RX ORDER — AMLODIPINE BESYLATE 5 MG/1
5 TABLET ORAL DAILY
COMMUNITY
Start: 2022-02-22

## 2022-03-20 RX ORDER — AMLODIPINE BESYLATE 5 MG/1
5 TABLET ORAL DAILY
Status: DISCONTINUED | OUTPATIENT
Start: 2022-03-21 | End: 2022-03-22 | Stop reason: HOSPADM

## 2022-03-20 RX ORDER — CARVEDILOL 3.12 MG/1
6.25 TABLET ORAL 2 TIMES DAILY WITH MEALS
Status: DISCONTINUED | OUTPATIENT
Start: 2022-03-21 | End: 2022-03-20

## 2022-03-20 RX ORDER — TRAZODONE HYDROCHLORIDE 50 MG/1
50 TABLET ORAL
COMMUNITY
Start: 2022-03-08

## 2022-03-20 RX ORDER — LORAZEPAM 0.5 MG/1
0.5 TABLET ORAL
COMMUNITY
Start: 2022-03-18

## 2022-03-20 RX ORDER — ASPIRIN 81 MG/1
81 TABLET ORAL DAILY
Status: DISCONTINUED | OUTPATIENT
Start: 2022-03-21 | End: 2022-03-22 | Stop reason: HOSPADM

## 2022-03-20 RX ORDER — FLUTICASONE FUROATE, UMECLIDINIUM BROMIDE AND VILANTEROL TRIFENATATE 200; 62.5; 25 UG/1; UG/1; UG/1
1 POWDER RESPIRATORY (INHALATION) 2 TIMES DAILY
COMMUNITY
Start: 2022-02-22

## 2022-03-20 RX ORDER — CLONIDINE HYDROCHLORIDE 0.1 MG/1
0.1 TABLET ORAL 3 TIMES DAILY
Status: DISCONTINUED | OUTPATIENT
Start: 2022-03-20 | End: 2022-03-22 | Stop reason: HOSPADM

## 2022-03-20 RX ORDER — POTASSIUM CHLORIDE 750 MG/1
40 TABLET, FILM COATED, EXTENDED RELEASE ORAL
Status: COMPLETED | OUTPATIENT
Start: 2022-03-20 | End: 2022-03-20

## 2022-03-20 RX ORDER — LEVOTHYROXINE SODIUM 100 UG/1
50 TABLET ORAL
Status: DISCONTINUED | OUTPATIENT
Start: 2022-03-21 | End: 2022-03-20

## 2022-03-20 RX ORDER — NORTRIPTYLINE HYDROCHLORIDE 50 MG/1
50 CAPSULE ORAL
Status: DISCONTINUED | OUTPATIENT
Start: 2022-03-21 | End: 2022-03-22 | Stop reason: HOSPADM

## 2022-03-20 RX ORDER — LANOLIN ALCOHOL/MO/W.PET/CERES
325 CREAM (GRAM) TOPICAL
Status: DISCONTINUED | OUTPATIENT
Start: 2022-03-21 | End: 2022-03-22 | Stop reason: HOSPADM

## 2022-03-20 RX ORDER — SPIRONOLACTONE 25 MG/1
50 TABLET ORAL DAILY
COMMUNITY
Start: 2022-02-22

## 2022-03-20 RX ORDER — SPIRONOLACTONE 25 MG/1
25 TABLET ORAL 2 TIMES DAILY
Status: DISCONTINUED | OUTPATIENT
Start: 2022-03-20 | End: 2022-03-22 | Stop reason: HOSPADM

## 2022-03-20 RX ORDER — ACETAMINOPHEN 325 MG/1
650 TABLET ORAL
Status: DISCONTINUED | OUTPATIENT
Start: 2022-03-20 | End: 2022-03-22 | Stop reason: HOSPADM

## 2022-03-20 RX ORDER — POTASSIUM CHLORIDE AND SODIUM CHLORIDE 900; 300 MG/100ML; MG/100ML
INJECTION, SOLUTION INTRAVENOUS ONCE
Status: DISCONTINUED | OUTPATIENT
Start: 2022-03-20 | End: 2022-03-20

## 2022-03-20 RX ORDER — SODIUM CHLORIDE 0.9 % (FLUSH) 0.9 %
5-40 SYRINGE (ML) INJECTION EVERY 8 HOURS
Status: DISCONTINUED | OUTPATIENT
Start: 2022-03-20 | End: 2022-03-22 | Stop reason: HOSPADM

## 2022-03-20 RX ORDER — BUTALBITAL, ASPIRIN, AND CAFFEINE 325; 50; 40 MG/1; MG/1; MG/1
1 CAPSULE ORAL
COMMUNITY
Start: 2022-01-19

## 2022-03-20 RX ADMIN — BUTALBITAL, ACETAMINOPHEN, AND CAFFEINE 2 TABLET: 50; 325; 40 TABLET ORAL at 19:10

## 2022-03-20 RX ADMIN — CLONIDINE HYDROCHLORIDE 0.1 MG: 0.1 TABLET ORAL at 22:41

## 2022-03-20 RX ADMIN — IPRATROPIUM BROMIDE AND ALBUTEROL SULFATE 3 ML: .5; 2.5 SOLUTION RESPIRATORY (INHALATION) at 21:18

## 2022-03-20 RX ADMIN — POTASSIUM CHLORIDE 40 MEQ: 750 TABLET, FILM COATED, EXTENDED RELEASE ORAL at 18:47

## 2022-03-20 RX ADMIN — SPIRONOLACTONE 25 MG: 25 TABLET ORAL at 22:41

## 2022-03-20 RX ADMIN — POTASSIUM CHLORIDE 40 MEQ: 750 TABLET, FILM COATED, EXTENDED RELEASE ORAL at 22:40

## 2022-03-20 RX ADMIN — LORAZEPAM 0.5 MG: 0.5 TABLET ORAL at 22:41

## 2022-03-20 RX ADMIN — TRAZODONE HYDROCHLORIDE 50 MG: 50 TABLET ORAL at 22:41

## 2022-03-20 RX ADMIN — SODIUM CHLORIDE, PRESERVATIVE FREE 10 ML: 5 INJECTION INTRAVENOUS at 22:42

## 2022-03-20 NOTE — H&P
History and Physical              Subjective :   Chief Complaint : Episode of nonresponsiveness. Gait disturbance    Source of information : Patient, ED provider, previous records. History of present illness:   64 y.o. female with history of significant atherosclerosis with a right carotid endarterectomy, history of hypertension renovascular with atherosclerosis and atrophic kidney, mitral regurgitation, heart failure with preserved ejection fraction presents to the emergency room complaining of an episode of unresponsiveness. Patient's boyfriend noticed that patient became unresponsive while she was sitting in the sofa. But patient was able to talk and alert oriented when she arrived to the emergency room, but when tried to ambulate she almost fell on the floor. Did not have any seizure-like activity noticed by EMS are here. She is found with elevated BNP, hypokalemia. Due to complicated history admitted for further management.     Past Medical History:   Diagnosis Date    Anxiety 10/2/2020    Carotid stenosis     Chronic anemia     Chronic respiratory failure (HCC)     COPD (chronic obstructive pulmonary disease) with emphysema (HCC) 10/2/2020    Depression     Diastolic CHF (HCC)     Dysphagia 10/2/2020    GERD (gastroesophageal reflux disease)     High cholesterol     Hypertension     Hypothyroid     Iron deficiency anemia 10/2/2020    Mitral valve regurgitation 10/2/2020    Renal artery stenosis (HCC)     Seizure disorder (HCC)      Past Surgical History:   Procedure Laterality Date    HX CAROTID ENDARTERECTOMY      HX CHOLECYSTECTOMY      HX RENAL ARTERY STENT      HX ROTATOR CUFF REPAIR Right     HX TUBAL LIGATION      IR THORACENTESIS CATH W IMAGE  11/24/2020    IR THORACENTESIS CATH W IMAGE  11/25/2020     Family History   Problem Relation Age of Onset    Hypertension Mother     Stroke Mother     Melanoma Mother     Stroke Father     Melanoma Brother     Melanoma Child Social History     Tobacco Use    Smoking status: Former Smoker     Types: Cigarettes    Smokeless tobacco: Never Used    Tobacco comment: quit july 2020   Substance Use Topics    Alcohol use: Not Currently       Prior to Admission medications    Medication Sig Start Date End Date Taking? Authorizing Provider   butalbital-aspirin-caffeine [facility]) capsule Take 1 Capsule by mouth every six (6) hours as needed for Headache. 1/19/22   Provider, Historical   spironolactone (ALDACTONE) 25 mg tablet Take 25 mg by mouth two (2) times a day. 2/22/22   Provider, Historical   Trelegy Ellipta 200-62.5-25 mcg inhaler Take 1 Puff by inhalation two (2) times a day. 2/22/22   Provider, Historical   amLODIPine (NORVASC) 5 mg tablet Take 5 mg by mouth daily. 2/22/22   Provider, Historical   labetaloL (NORMODYNE) 200 mg tablet Take 400 mg by mouth three (3) times daily. 2/22/22   Provider, Historical   LORazepam (ATIVAN) 0.5 mg tablet Take 0.5 mg by mouth two (2) times daily as needed for Anxiety or Agitation. 3/18/22   Provider, Historical   traZODone (DESYREL) 50 mg tablet Take 50 mg by mouth nightly. 3/8/22   Provider, Historical   hydrALAZINE (APRESOLINE) 25 mg tablet Take 1 Tablet by mouth three (3) times daily. 3/17/22   Leatha Winter MD   potassium chloride (KLOR-CON) 20 mEq pack Take 1 Packet by mouth two (2) times daily (with meals). 4/1/21   Erinn Robison MD   ergocalciferol (Vitamin D2) 1,250 mcg (50,000 unit) capsule Take 50,000 Units by mouth every seven (7) days. q7days wednesday    Provider, Historical   isosorbide dinitrate (ISORDIL) 10 mg tablet Take 1 Tab by mouth two (2) times a day. 1/23/21   Willi Delacruz MD   levothyroxine (SYNTHROID) 50 mcg tablet Take 1 Tab by mouth Daily (before breakfast). 1/24/21   Willi Delacruz MD   clopidogreL (PLAVIX) 75 mg tab Take 75 mg by mouth daily.  12/11/20   Provider, Historical   albuterol (PROVENTIL HFA, VENTOLIN HFA, PROAIR HFA) 90 mcg/actuation inhaler Take 2 Puffs by inhalation every four (4) hours as needed for Wheezing or Shortness of Breath. 11/5/20   Provider, Historical   albuterol-ipratropium (DUO-NEB) 2.5 mg-0.5 mg/3 ml nebu 3 mL by Nebulization route every six (6) hours. 10/2/20   Juan Licea MD   ferrous sulfate 325 mg (65 mg iron) tablet Take 325 mg by mouth Daily (before breakfast). Provider, Historical   folic acid (FOLVITE) 1 mg tablet Take 1 mg by mouth daily. Provider, Historical   aspirin delayed-release 81 mg tablet Take 81 mg by mouth daily. Provider, Historical     Allergies   Allergen Reactions    Sulfa (Sulfonamide Antibiotics) Anaphylaxis    Sulfamethoxazole-Trimethoprim Unknown (comments)             Review of Systems:  Constitutional: Appetite is not good. Admits weight loss. No fever, no chills, no night sweats. Eye: No recent visual disturbances, no discharge, no double vision. Ear/nose/mouth/throat : No hearing disturbance, no ear pain, no nasal congestion, no sore throat, no trouble swallowing. Respiratory : No trouble breathing, no cough, no shortness of breath, no wheezing. Cardiovascular : No chest pain, no palpitation, no orthopnea,  no peripheral edema. Gastrointestinal : No nausea, no vomiting, no diarrhea,  No abdominal pain. Genitourinary : No dysuria, no hematuria, no increased frequency, No incontinence. Lymphatics : No swollen glands -Neck, axillary, inguinal.  Endocrine : No excessive thirst, No polyuria . Immunologic : Denies seasonal allergies. Musculoskeletal : No joint swelling or effusion. Admits her activity is limited, denies any complaints at this time. Integumentary : Denies any rash. But she was scratching her skin a lot as it is itching. .  Hematology : She has multiple areas of bruising like from scratching. Neurology : Denies change in mental status, No abnormal balance, ++ headache, No confusion, No numbness or tingling.   Psychiatric : She has anxiety and depression. Vitals:     Patient Vitals for the past 12 hrs:   Temp Pulse Resp BP SpO2   03/20/22 1940  89 13 (!) 171/96 99 %   03/20/22 1740     99 %   03/20/22 1646 99 °F (37.2 °C) 84 16 127/72 99 %       Physical Exam:   General : Malnourished looking, thin built, cachectic  HEENT : PERRLA, dry oral mucosa, atraumatic normocephalic, Normal ear and nose. Neck : Supple, no JVD, no masses noted, no carotid bruit. Lungs : Breath sounds with moderate air entry bilaterally, decreased breath sounds at bases. Occasional wheezing noted. CVS : Rhythm rate regular, S1+, S2+, no murmur or gallop. Abdomen : Soft, nontender,  bowel sounds active. Extremities : No edema noted,  pedal pulses palpable. Musculoskeletal : Generalized wasting of muscle mass. Fair range of motion, no joint swelling or effusion, poor muscle tone and power. Skin : Dry, poor skin turgor. Lymphatic : No cervical lymphadenopathy. Neurological : Awake, alert, oriented to time place person. Cranial nerves intact, deep tendon reflexes active, no sensory disturbance, no cerebellar deficits. Psychiatric : Unable to evaluate. .  She is able to carry on a conversation and accepting with facts. Data Review:   Recent Results (from the past 24 hour(s))   CBC WITH AUTOMATED DIFF    Collection Time: 03/20/22  5:41 PM   Result Value Ref Range    WBC 8.2 3.6 - 11.0 K/uL    RBC 3.41 (L) 3.80 - 5.20 M/uL    HGB 11.5 11.5 - 16.0 g/dL    HCT 32.1 (L) 35.0 - 47.0 %    MCV 94.1 80.0 - 99.0 FL    MCH 33.7 26.0 - 34.0 PG    MCHC 35.8 30.0 - 36.5 g/dL    RDW 12.5 11.5 - 14.5 %    PLATELET 508 571 - 394 K/uL    MPV 9.9 8.9 - 12.9 FL    NRBC 0.0 0.0  WBC    ABSOLUTE NRBC 0.00 0.00 - 0.01 K/uL    NEUTROPHILS 78 (H) 32 - 75 %    LYMPHOCYTES 12 12 - 49 %    MONOCYTES 8 5 - 13 %    EOSINOPHILS 2 0 - 7 %    BASOPHILS 0 0 - 1 %    IMMATURE GRANULOCYTES 0 0 - 0.5 %    ABS. NEUTROPHILS 6.4 1.8 - 8.0 K/UL    ABS. LYMPHOCYTES 1.0 0.8 - 3.5 K/UL    ABS. MONOCYTES 0.7 0.0 - 1.0 K/UL    ABS. EOSINOPHILS 0.1 0.0 - 0.4 K/UL    ABS. BASOPHILS 0.0 0.0 - 0.1 K/UL    ABS. IMM. GRANS. 0.0 0.00 - 0.04 K/UL    DF AUTOMATED     METABOLIC PANEL, COMPREHENSIVE    Collection Time: 03/20/22  5:41 PM   Result Value Ref Range    Sodium 132 (L) 136 - 145 mmol/L    Potassium 2.7 (LL) 3.5 - 5.1 mmol/L    Chloride 95 (L) 97 - 108 mmol/L    CO2 27 21 - 32 mmol/L    Anion gap 10 5 - 15 mmol/L    Glucose 104 (H) 65 - 100 mg/dL    BUN 20 6 - 20 mg/dL    Creatinine 2.74 (H) 0.55 - 1.02 mg/dL    BUN/Creatinine ratio 7 (L) 12 - 20      GFR est AA 22 (L) >60 ml/min/1.73m2    GFR est non-AA 18 (L) >60 ml/min/1.73m2    Calcium 8.5 8.5 - 10.1 mg/dL    Bilirubin, total 0.2 0.2 - 1.0 mg/dL    AST (SGOT) 25 15 - 37 U/L    ALT (SGPT) 24 12 - 78 U/L    Alk. phosphatase 82 45 - 117 U/L    Protein, total 6.1 (L) 6.4 - 8.2 g/dL    Albumin 2.9 (L) 3.5 - 5.0 g/dL    Globulin 3.2 2.0 - 4.0 g/dL    A-G Ratio 0.9 (L) 1.1 - 2.2     NT-PRO BNP    Collection Time: 03/20/22  5:41 PM   Result Value Ref Range    NT pro-BNP 19,501 (H) <125 pg/mL   TROPONIN-HIGH SENSITIVITY    Collection Time: 03/20/22  5:41 PM   Result Value Ref Range    Troponin-High Sensitivity 28 0 - 51 ng/L       Radiologic Studies :   CT Results  (Last 48 hours)               03/20/22 1659  CT HEAD WO CONT Final result    Impression:      No acute abnormality. No change from recent prior imaging               Narrative:      Technique: axial noncontrast images were obtained from the skull base through   the vertex. All CT scans at this facility are performed using dose reduction optimization   techniques as appropriate to a performed exam including the following: Automated   exposure control, adjustments to the MA and/or KV according to patient size or   use of iterative reconstruction technique       Comparison: 3/15/2022. 4/13/2021       Findings:       Global cerebral volume loss, upper normal for age.  There is mild compensatory dilatation of the ventricles. No hemorrhage, midline shift, herniation or   hydrocephalus. There are foci of decreased attenuation in the supratentorial white matter. More   discrete hypodensity in the right frontal corona radiata is unchanged. Cortical   gray-white differentiation is maintained. Punctate cortical calcification at the   right posterior temporal lobe and right frontoparietal junction, stable and   likely vascular. There is coarse calcification within the renzo which is also   unchanged. Chronic lacunar infarct in the left thalamus       The paranasal sinuses and mastoid air cells are clear. The osseous structures   are intact. The orbits are unremarkable. CXR Results  (Last 48 hours)               03/20/22 1719  XR CHEST PORT Final result    Narrative:  Portable AP view at 1710 hours. Comparison 15 March. No acute process is evident. Clear lungs. No edema or infiltrate. Unchanged cardiomediastinal silhouette. Heart size normal.               Assessment and Plan :     Hypokalemia: She is supposed to be on 20 mEq twice a day, but did not have any refills. On questioning admits that she is not taking medication and it is not in her bottles. Explained importance of taking medications regularly. Supplementation ordered by mouth as she refuses IV we did not do any more IV infusion. History of seizures by history, but she is not taking any medications and no recent episodes of seizures. Benign essential hypertension: Again she is very noncompliant with medications. Taking labetalol only 1 tablet daily, clonidine only 1 tablet daily, Isordil also she is not regular, Norvasc recently filled, but not sure if she is taking. She is explained about importance of taking medications regularly so physician can adjust the medication without harming her. .  We had a long discussion about this. And she understood I feel, so I changed medications.   Need to adjust as condition dictates while she is in the hospital, so we will have an idea of how much she really needs. History of severe atherosclerosis with right carotid endarterectomy and renal vascular stenosis, supposed to take aspirin and Plavix. She is not taking either one of them. We will start continue the medication    Chronic obstructive pulmonary disease: We will keep her on DuoNeb treatments 4 times a day. States that she is using oxygen at home, we will continue. Anxiety/depression: On medications at home, states that helping her so we will continue    Admitted to medical telemetry, full CODE STATUS, home medications reviewed but she is noncompliant and not taking them regularly. Has advanced medical directives. This patient is on hospice for long time, she was told whenever she comes to the hospital she can revoke and restart when she gets discharged. May need to reevaluate the justification of this. CC : None  Signed By: Jerome Ventura MD     March 20, 2022      This dictation was done by dragon, computer voice recognition software. Often unanticipated grammatical, syntax, [place] phones and other interpretive errors are inadvertently transcribed. Please excuse errors that have escaped final proofreading.

## 2022-03-20 NOTE — ED TRIAGE NOTES
Pt comes in with complaints of a headache. Pt boyfriend stated she was possibly having a seizure. Pt was on sofa and became non responsive. Upon EMS was called. Pt was alert and oriented x4 but when the patient attempted to ambulated, she became weak and almost fell on the floor. No seizure activity noted by EMS. Hx of headaches, renal arterial stenosis. Vitals stable on the ambulance. No meds given. 20g IV RFA started on ambulance. Pt is a hospice patient.

## 2022-03-20 NOTE — DISCHARGE INSTRUCTIONS
Thank you! Thank you for allowing me to care for you in the emergency department. I sincerely hope that you are satisfied with your visit today. It is my goal to provide you with excellent care. Below you will find a list of your labs and imaging from your visit today. Should you have any questions regarding these results please do not hesitate to call the emergency department. Labs -     Recent Results (from the past 12 hour(s))   CBC WITH AUTOMATED DIFF    Collection Time: 03/20/22  5:41 PM   Result Value Ref Range    WBC 8.2 3.6 - 11.0 K/uL    RBC 3.41 (L) 3.80 - 5.20 M/uL    HGB 11.5 11.5 - 16.0 g/dL    HCT 32.1 (L) 35.0 - 47.0 %    MCV 94.1 80.0 - 99.0 FL    MCH 33.7 26.0 - 34.0 PG    MCHC 35.8 30.0 - 36.5 g/dL    RDW 12.5 11.5 - 14.5 %    PLATELET 940 286 - 614 K/uL    MPV 9.9 8.9 - 12.9 FL    NRBC 0.0 0.0  WBC    ABSOLUTE NRBC 0.00 0.00 - 0.01 K/uL    NEUTROPHILS 78 (H) 32 - 75 %    LYMPHOCYTES 12 12 - 49 %    MONOCYTES 8 5 - 13 %    EOSINOPHILS 2 0 - 7 %    BASOPHILS 0 0 - 1 %    IMMATURE GRANULOCYTES 0 0 - 0.5 %    ABS. NEUTROPHILS 6.4 1.8 - 8.0 K/UL    ABS. LYMPHOCYTES 1.0 0.8 - 3.5 K/UL    ABS. MONOCYTES 0.7 0.0 - 1.0 K/UL    ABS. EOSINOPHILS 0.1 0.0 - 0.4 K/UL    ABS. BASOPHILS 0.0 0.0 - 0.1 K/UL    ABS. IMM. GRANS. 0.0 0.00 - 0.04 K/UL    DF AUTOMATED     METABOLIC PANEL, COMPREHENSIVE    Collection Time: 03/20/22  5:41 PM   Result Value Ref Range    Sodium 132 (L) 136 - 145 mmol/L    Potassium 2.7 (LL) 3.5 - 5.1 mmol/L    Chloride 95 (L) 97 - 108 mmol/L    CO2 27 21 - 32 mmol/L    Anion gap 10 5 - 15 mmol/L    Glucose 104 (H) 65 - 100 mg/dL    BUN 20 6 - 20 mg/dL    Creatinine 2.74 (H) 0.55 - 1.02 mg/dL    BUN/Creatinine ratio 7 (L) 12 - 20      GFR est AA 22 (L) >60 ml/min/1.73m2    GFR est non-AA 18 (L) >60 ml/min/1.73m2    Calcium 8.5 8.5 - 10.1 mg/dL    Bilirubin, total 0.2 0.2 - 1.0 mg/dL    AST (SGOT) 25 15 - 37 U/L    ALT (SGPT) 24 12 - 78 U/L    Alk.  phosphatase 82 45 - 117 U/L    Protein, total 6.1 (L) 6.4 - 8.2 g/dL    Albumin 2.9 (L) 3.5 - 5.0 g/dL    Globulin 3.2 2.0 - 4.0 g/dL    A-G Ratio 0.9 (L) 1.1 - 2.2     NT-PRO BNP    Collection Time: 03/20/22  5:41 PM   Result Value Ref Range    NT pro-BNP 19,501 (H) <125 pg/mL   TROPONIN-HIGH SENSITIVITY    Collection Time: 03/20/22  5:41 PM   Result Value Ref Range    Troponin-High Sensitivity 28 0 - 51 ng/L       Radiologic Studies -   XR CHEST PORT   Final Result      CT HEAD WO CONT   Final Result      No acute abnormality. No change from recent prior imaging              CT Results  (Last 48 hours)                 03/20/22 1659  CT HEAD WO CONT Final result    Impression:      No acute abnormality. No change from recent prior imaging               Narrative:      Technique: axial noncontrast images were obtained from the skull base through   the vertex. All CT scans at this facility are performed using dose reduction optimization   techniques as appropriate to a performed exam including the following: Automated   exposure control, adjustments to the MA and/or KV according to patient size or   use of iterative reconstruction technique       Comparison: 3/15/2022. 4/13/2021       Findings:       Global cerebral volume loss, upper normal for age. There is mild compensatory   dilatation of the ventricles. No hemorrhage, midline shift, herniation or   hydrocephalus. There are foci of decreased attenuation in the supratentorial white matter. More   discrete hypodensity in the right frontal corona radiata is unchanged. Cortical   gray-white differentiation is maintained. Punctate cortical calcification at the   right posterior temporal lobe and right frontoparietal junction, stable and   likely vascular. There is coarse calcification within the renzo which is also   unchanged. Chronic lacunar infarct in the left thalamus       The paranasal sinuses and mastoid air cells are clear. The osseous structures   are intact.   The orbits are unremarkable. CXR Results  (Last 48 hours)                 03/20/22 1719  XR CHEST PORT Final result    Narrative:  Portable AP view at 1710 hours. Comparison 15 March. No acute process is evident. Clear lungs. No edema or infiltrate. Unchanged cardiomediastinal silhouette. Heart size normal.                  If you feel that you have not received excellent quality care or timely care, please ask to speak to the nurse manager. Please choose us in the future for your continued health care needs. ------------------------------------------------------------------------------------------------------------  The exam and treatment you received in the Emergency Department were for an urgent problem and are not intended as complete care. It is important that you follow-up with a doctor, nurse practitioner, or physician assistant to:  (1) confirm your diagnosis,  (2) re-evaluation of changes in your illness and treatment, and  (3) for ongoing care. If your symptoms become worse or you do not improve as expected and you are unable to reach your usual health care provider, you should return to the Emergency Department. We are available 24 hours a day. Please take your discharge instructions with you when you go to your follow-up appointment. If you have any problem arranging a follow-up appointment, contact the Emergency Department immediately. If a prescription has been provided, please have it filled as soon as possible to prevent a delay in treatment. Read the entire medication instruction sheet provided to you by the pharmacy. If you have any questions or reservations about taking the medication due to side effects or interactions with other medications, please call your primary care physician or contact the ER to speak with the charge nurse.      Make an appointment with your family doctor or the physician you were referred to for follow-up of this visit as instructed on your discharge paperwork, as this is a mandatory follow-up. Return to the ER if you are unable to be seen or if you are unable to be seen in a timely manner. If you have any problem arranging the follow-up visit, contact the Emergency Department immediately.

## 2022-03-20 NOTE — ED NOTES
Pt refusing NS with 40 meq kcl, states its burning to much. Dr. Gudino Hunting updated and aware.  Will hold for now due to pt's refusal .

## 2022-03-21 ENCOUNTER — APPOINTMENT (OUTPATIENT)
Dept: ULTRASOUND IMAGING | Age: 57
End: 2022-03-21
Attending: INTERNAL MEDICINE
Payer: COMMERCIAL

## 2022-03-21 LAB
ALBUMIN SERPL-MCNC: 2.6 G/DL (ref 3.5–5)
ANION GAP SERPL CALC-SCNC: 8 MMOL/L (ref 5–15)
APPEARANCE UR: CLEAR
ATRIAL RATE: 77 BPM
BACTERIA URNS QL MICRO: NEGATIVE /HPF
BILIRUB UR QL: NEGATIVE
BUN SERPL-MCNC: 37 MG/DL (ref 6–20)
BUN/CREAT SERPL: 11 (ref 12–20)
CA-I BLD-MCNC: 8.3 MG/DL (ref 8.5–10.1)
CALCULATED P AXIS, ECG09: 72 DEGREES
CALCULATED R AXIS, ECG10: 63 DEGREES
CALCULATED T AXIS, ECG11: 110 DEGREES
CHLORIDE SERPL-SCNC: 100 MMOL/L (ref 97–108)
CO2 SERPL-SCNC: 26 MMOL/L (ref 21–32)
COLOR UR: ABNORMAL
CREAT SERPL-MCNC: 3.3 MG/DL (ref 0.55–1.02)
CREAT UR-MCNC: 126 MG/DL
DIAGNOSIS, 93000: NORMAL
GLUCOSE SERPL-MCNC: 95 MG/DL (ref 65–100)
GLUCOSE UR STRIP.AUTO-MCNC: NEGATIVE MG/DL
HGB UR QL STRIP: NEGATIVE
IRON SATN MFR SERPL: 27 % (ref 20–50)
IRON SERPL-MCNC: 41 UG/DL (ref 35–150)
KETONES UR QL STRIP.AUTO: NEGATIVE MG/DL
LEUKOCYTE ESTERASE UR QL STRIP.AUTO: NEGATIVE
MAGNESIUM SERPL-MCNC: 1.9 MG/DL (ref 1.6–2.4)
NITRITE UR QL STRIP.AUTO: NEGATIVE
P-R INTERVAL, ECG05: 130 MS
PH UR STRIP: 5 [PH] (ref 5–8)
PHOSPHATE SERPL-MCNC: 4.5 MG/DL (ref 2.6–4.7)
POTASSIUM SERPL-SCNC: 3.1 MMOL/L (ref 3.5–5.1)
PROT UR STRIP-MCNC: >300 MG/DL
PROT UR-MCNC: 300 MG/DL (ref 0–11.9)
PROT/CREAT UR-RTO: 2.4
Q-T INTERVAL, ECG07: 482 MS
QRS DURATION, ECG06: 84 MS
QTC CALCULATION (BEZET), ECG08: 545 MS
RBC #/AREA URNS HPF: ABNORMAL /HPF (ref 0–5)
SODIUM SERPL-SCNC: 134 MMOL/L (ref 136–145)
SP GR UR REFRACTOMETRY: 1.02 (ref 1–1.03)
TIBC SERPL-MCNC: 151 UG/DL (ref 250–450)
URATE SERPL-MCNC: 7.2 MG/DL (ref 2.6–6)
UROBILINOGEN UR QL STRIP.AUTO: 0.1 EU/DL (ref 0.1–1)
VENTRICULAR RATE, ECG03: 77 BPM
WBC URNS QL MICRO: ABNORMAL /HPF (ref 0–4)

## 2022-03-21 PROCEDURE — 84550 ASSAY OF BLOOD/URIC ACID: CPT

## 2022-03-21 PROCEDURE — 84156 ASSAY OF PROTEIN URINE: CPT

## 2022-03-21 PROCEDURE — 76770 US EXAM ABDO BACK WALL COMP: CPT

## 2022-03-21 PROCEDURE — 74011000250 HC RX REV CODE- 250: Performed by: HOSPITALIST

## 2022-03-21 PROCEDURE — G0378 HOSPITAL OBSERVATION PER HR: HCPCS

## 2022-03-21 PROCEDURE — 94760 N-INVAS EAR/PLS OXIMETRY 1: CPT

## 2022-03-21 PROCEDURE — 94640 AIRWAY INHALATION TREATMENT: CPT

## 2022-03-21 PROCEDURE — 82306 VITAMIN D 25 HYDROXY: CPT

## 2022-03-21 PROCEDURE — 65270000029 HC RM PRIVATE

## 2022-03-21 PROCEDURE — 74011250637 HC RX REV CODE- 250/637: Performed by: STUDENT IN AN ORGANIZED HEALTH CARE EDUCATION/TRAINING PROGRAM

## 2022-03-21 PROCEDURE — 81001 URINALYSIS AUTO W/SCOPE: CPT

## 2022-03-21 PROCEDURE — 83735 ASSAY OF MAGNESIUM: CPT

## 2022-03-21 PROCEDURE — 74011250637 HC RX REV CODE- 250/637: Performed by: HOSPITALIST

## 2022-03-21 PROCEDURE — 80069 RENAL FUNCTION PANEL: CPT

## 2022-03-21 PROCEDURE — 74011250636 HC RX REV CODE- 250/636: Performed by: STUDENT IN AN ORGANIZED HEALTH CARE EDUCATION/TRAINING PROGRAM

## 2022-03-21 PROCEDURE — 74011250636 HC RX REV CODE- 250/636: Performed by: INTERNAL MEDICINE

## 2022-03-21 PROCEDURE — 36415 COLL VENOUS BLD VENIPUNCTURE: CPT

## 2022-03-21 PROCEDURE — 83540 ASSAY OF IRON: CPT

## 2022-03-21 RX ORDER — BUTALBITAL, ACETAMINOPHEN AND CAFFEINE 50; 325; 40 MG/1; MG/1; MG/1
1 TABLET ORAL
Status: DISCONTINUED | OUTPATIENT
Start: 2022-03-21 | End: 2022-03-22 | Stop reason: HOSPADM

## 2022-03-21 RX ORDER — IBUPROFEN 200 MG
1 TABLET ORAL DAILY
Status: DISCONTINUED | OUTPATIENT
Start: 2022-03-21 | End: 2022-03-22 | Stop reason: HOSPADM

## 2022-03-21 RX ORDER — LABETALOL 200 MG/1
200 TABLET, FILM COATED ORAL 2 TIMES DAILY
Status: DISCONTINUED | OUTPATIENT
Start: 2022-03-21 | End: 2022-03-22 | Stop reason: HOSPADM

## 2022-03-21 RX ORDER — POTASSIUM CHLORIDE 750 MG/1
20 TABLET, FILM COATED, EXTENDED RELEASE ORAL
Status: COMPLETED | OUTPATIENT
Start: 2022-03-21 | End: 2022-03-21

## 2022-03-21 RX ORDER — POTASSIUM CHLORIDE 750 MG/1
40 TABLET, FILM COATED, EXTENDED RELEASE ORAL
Status: COMPLETED | OUTPATIENT
Start: 2022-03-21 | End: 2022-03-21

## 2022-03-21 RX ORDER — IPRATROPIUM BROMIDE AND ALBUTEROL SULFATE 2.5; .5 MG/3ML; MG/3ML
3 SOLUTION RESPIRATORY (INHALATION)
Status: DISCONTINUED | OUTPATIENT
Start: 2022-03-21 | End: 2022-03-22 | Stop reason: HOSPADM

## 2022-03-21 RX ORDER — DOCUSATE SODIUM 100 MG/1
100 CAPSULE, LIQUID FILLED ORAL DAILY
Status: DISCONTINUED | OUTPATIENT
Start: 2022-03-21 | End: 2022-03-22 | Stop reason: HOSPADM

## 2022-03-21 RX ORDER — MELATONIN
1000 DAILY
Status: DISCONTINUED | OUTPATIENT
Start: 2022-03-22 | End: 2022-03-22 | Stop reason: HOSPADM

## 2022-03-21 RX ORDER — SODIUM CHLORIDE 9 MG/ML
100 INJECTION, SOLUTION INTRAVENOUS CONTINUOUS
Status: DISCONTINUED | OUTPATIENT
Start: 2022-03-21 | End: 2022-03-22 | Stop reason: HOSPADM

## 2022-03-21 RX ORDER — POTASSIUM CHLORIDE 7.45 MG/ML
10 INJECTION INTRAVENOUS
Status: DISPENSED | OUTPATIENT
Start: 2022-03-21 | End: 2022-03-21

## 2022-03-21 RX ADMIN — CLONIDINE HYDROCHLORIDE 0.1 MG: 0.1 TABLET ORAL at 16:55

## 2022-03-21 RX ADMIN — CLOPIDOGREL BISULFATE 75 MG: 75 TABLET ORAL at 08:17

## 2022-03-21 RX ADMIN — FOLIC ACID 1 MG: 1 TABLET ORAL at 08:17

## 2022-03-21 RX ADMIN — SPIRONOLACTONE 25 MG: 25 TABLET ORAL at 21:27

## 2022-03-21 RX ADMIN — ASPIRIN 81 MG: 81 TABLET, COATED ORAL at 08:17

## 2022-03-21 RX ADMIN — IPRATROPIUM BROMIDE AND ALBUTEROL SULFATE 3 ML: .5; 2.5 SOLUTION RESPIRATORY (INHALATION) at 20:00

## 2022-03-21 RX ADMIN — AMLODIPINE BESYLATE 5 MG: 5 TABLET ORAL at 08:17

## 2022-03-21 RX ADMIN — LORAZEPAM 0.5 MG: 0.5 TABLET ORAL at 16:55

## 2022-03-21 RX ADMIN — BUTALBITAL, ACETAMINOPHEN, AND CAFFEINE 1 TABLET: 50; 325; 40 TABLET ORAL at 11:18

## 2022-03-21 RX ADMIN — TRAZODONE HYDROCHLORIDE 50 MG: 50 TABLET ORAL at 21:27

## 2022-03-21 RX ADMIN — LABETALOL HYDROCHLORIDE 200 MG: 200 TABLET, FILM COATED ORAL at 21:27

## 2022-03-21 RX ADMIN — SODIUM CHLORIDE, PRESERVATIVE FREE 10 ML: 5 INJECTION INTRAVENOUS at 14:14

## 2022-03-21 RX ADMIN — SODIUM CHLORIDE 100 ML/HR: 9 INJECTION, SOLUTION INTRAVENOUS at 16:58

## 2022-03-21 RX ADMIN — CLONIDINE HYDROCHLORIDE 0.1 MG: 0.1 TABLET ORAL at 08:17

## 2022-03-21 RX ADMIN — SODIUM CHLORIDE, PRESERVATIVE FREE 10 ML: 5 INJECTION INTRAVENOUS at 21:27

## 2022-03-21 RX ADMIN — POTASSIUM CHLORIDE 20 MEQ: 1.5 POWDER, FOR SOLUTION ORAL at 08:17

## 2022-03-21 RX ADMIN — CLONIDINE HYDROCHLORIDE 0.1 MG: 0.1 TABLET ORAL at 21:27

## 2022-03-21 RX ADMIN — IPRATROPIUM BROMIDE AND ALBUTEROL SULFATE 3 ML: .5; 2.5 SOLUTION RESPIRATORY (INHALATION) at 13:23

## 2022-03-21 RX ADMIN — POTASSIUM CHLORIDE 10 MEQ: 7.46 INJECTION, SOLUTION INTRAVENOUS at 11:12

## 2022-03-21 RX ADMIN — SPIRONOLACTONE 25 MG: 25 TABLET ORAL at 08:17

## 2022-03-21 RX ADMIN — LABETALOL HYDROCHLORIDE 200 MG: 200 TABLET, FILM COATED ORAL at 11:12

## 2022-03-21 RX ADMIN — NORTRIPTYLINE HYDROCHLORIDE 50 MG: 50 CAPSULE ORAL at 16:55

## 2022-03-21 RX ADMIN — ISOSORBIDE MONONITRATE 30 MG: 30 TABLET, EXTENDED RELEASE ORAL at 08:17

## 2022-03-21 RX ADMIN — FERROUS SULFATE TAB 325 MG (65 MG ELEMENTAL FE) 325 MG: 325 (65 FE) TAB at 08:17

## 2022-03-21 RX ADMIN — IPRATROPIUM BROMIDE AND ALBUTEROL SULFATE 3 ML: .5; 2.5 SOLUTION RESPIRATORY (INHALATION) at 08:55

## 2022-03-21 RX ADMIN — DOCUSATE SODIUM 100 MG: 100 CAPSULE, LIQUID FILLED ORAL at 11:12

## 2022-03-21 RX ADMIN — SODIUM CHLORIDE, PRESERVATIVE FREE 10 ML: 5 INJECTION INTRAVENOUS at 06:02

## 2022-03-21 RX ADMIN — POTASSIUM CHLORIDE 20 MEQ: 1.5 POWDER, FOR SOLUTION ORAL at 16:55

## 2022-03-21 RX ADMIN — POTASSIUM CHLORIDE 40 MEQ: 750 TABLET, FILM COATED, EXTENDED RELEASE ORAL at 06:24

## 2022-03-21 RX ADMIN — POTASSIUM CHLORIDE 20 MEQ: 750 TABLET, FILM COATED, EXTENDED RELEASE ORAL at 14:31

## 2022-03-21 NOTE — PROGRESS NOTES
Problem: Falls - Risk of  Goal: *Absence of Falls  Description: Document Ingrid Davis Fall Risk and appropriate interventions in the flowsheet.   Outcome: Progressing Towards Goal  Note: Fall Risk Interventions:  Mobility Interventions: Bed/chair exit alarm         Medication Interventions: Teach patient to arise slowly,Patient to call before getting OOB,Bed/chair exit alarm    Elimination Interventions: Patient to call for help with toileting needs,Bed/chair exit alarm,Call light in reach    History of Falls Interventions: Bed/chair exit alarm

## 2022-03-21 NOTE — PROGRESS NOTES
Notifed Joe Cervantes NP of patient request for nicotine  patch, docusate. Patient complains of constipation. Last bowel movement yesterday was small and hard. Also requesting fioricet for chronic headaches. [address] [employer] NP Notified of K 3.1    1244-Gabriel Harper NP notified patient not tolerating IV potassium. Patient states yall have to find something else. Per Bridgett Zamora NP give an extra po potassium 20 meq        1511- Joe Cervantes NP notified that patient continues to complain of headache. Discussed with patient that her headaches are chronic. Patient states I know but I want something done about it today. Patient states \"morphine doesn't help, percocet makes me sick, what about some Demerol? \" Per Bridgett Zamora NP will not give patient IV pain medication for headaches. 1900- Bedside shift change report given to Agustin Hodge 94  (oncoming nurse) by Kun Chapa RN (offgoing nurse). Report included the following information SBAR, Kardex, Intake/Output, MAR, Recent Results and Cardiac Rhythm NSR.

## 2022-03-21 NOTE — PROGRESS NOTES
Reason for Admission:   Hypokalemia                    RUR Score:     16%             PCP: First and Last name:   None     Name of Practice: Patient is on hospice care   Are you a current patient: Yes/No:    Approximate date of last visit:    Can you participate in a virtual visit if needed:     Do you (patient/family) have any concerns for transition/discharge? None reported              Plan for utilizing home health:   Patient would like to continue hospice care with [facility]    Current Advanced Directive/Advance Care Plan:  Full Code      Healthcare Decision Maker: Suman Conner, [email or web address], [phone]  Click here to complete [address] including selection of the Healthcare Decision Maker Relationship (ie \"Primary\")              Transition of Care Plan: home with Hospice    CM met with patient to complete DCP assessment. Patient reports that she lives in a one story house with her boyfriend, Lluvia Gómez, address on chart confirmed. Patient reports that she sometimes uses a cane and walker but is otherwise independent in her ADLs, her boyfriend drives her places as needed. Patient is current with [facility], has been since last June. CM discussed hospice and if it was still appropriate for her at this time, patient requested to continue hospice services, referral sent via sonia. CM continues to ofow.

## 2022-03-21 NOTE — PROGRESS NOTES
Hospitalist Progress Note    Subjective:   Daily Progress Note: 3/21/2022 1:11 PM    Hospital Course:  Irma Ding is a 64 y.o. female with a PMHx significant for atherosclerosis with a right carotid endarterectomy, history of hypertension, renal artery stenosis, atrophic kidney, mitral regurgitation, and congestive heart failure with preserved ejection fraction who presents to the emergency room complaining of an episode of unresponsiveness. Patient's boyfriend noticed that patient became unresponsive while she was sitting on the sofa. But patient was able to talk and alert oriented when she arrived to the emergency room; however when tried to ambulate she almost fell on the floor. Did not have any seizure-like activity noticed by EMS are here. She is found with elevated BNP, hypokalemia. Due to complicated history admitted for further management. Subjective:    Patient seen and examined at bedside. She still reports unsteadiness on her feet. Needed nursing assistance to the bathroom.     Current Facility-Administered Medications   Medication Dose Route Frequency    labetaloL (NORMODYNE) tablet 200 mg  200 mg Oral BID    albuterol-ipratropium (DUO-NEB) 2.5 MG-0.5 MG/3 ML  3 mL Nebulization Q6H RT    potassium chloride 10 mEq in 100 ml IVPB  10 mEq IntraVENous Q1H    docusate sodium (COLACE) capsule 100 mg  100 mg Oral DAILY    butalbital-acetaminophen-caffeine (FIORICET, ESGIC) -40 mg per tablet 1 Tablet  1 Tablet Oral Q6H PRN    nicotine (NICODERM CQ) 14 mg/24 hr patch 1 Patch  1 Patch TransDERmal DAILY    aspirin delayed-release tablet 81 mg  81 mg Oral DAILY    ferrous sulfate tablet 325 mg  325 mg Oral ACB    folic acid (FOLVITE) tablet 1 mg  1 mg Oral DAILY    albuterol (PROVENTIL HFA, VENTOLIN HFA, PROAIR HFA) inhaler 2 Puff  2 Puff Inhalation Q4H PRN    clopidogreL (PLAVIX) tablet 75 mg  75 mg Oral DAILY    spironolactone (ALDACTONE) tablet 25 mg  25 mg Oral BID    fluticasone-umeclidin-vilanter (TRELEGY ELLIPTA) inhaler 1 Puff  1 Puff Inhalation DAILY    amLODIPine (NORVASC) tablet 5 mg  5 mg Oral DAILY    sodium chloride (NS) flush 5-40 mL  5-40 mL IntraVENous Q8H    sodium chloride (NS) flush 5-40 mL  5-40 mL IntraVENous PRN    acetaminophen (TYLENOL) tablet 650 mg  650 mg Oral Q6H PRN    Or    acetaminophen (TYLENOL) suppository 650 mg  650 mg Rectal Q6H PRN    ondansetron (ZOFRAN ODT) tablet 4 mg  4 mg Oral Q6H PRN    Or    ondansetron (ZOFRAN) injection 4 mg  4 mg IntraVENous Q6H PRN    isosorbide mononitrate ER (IMDUR) tablet 30 mg  30 mg Oral DAILY    cloNIDine HCL (CATAPRES) tablet 0.1 mg  0.1 mg Oral TID    LORazepam (ATIVAN) tablet 0.5 mg  0.5 mg Oral BID PRN    potassium chloride (KLOR-CON) packet for solution 20 mEq  20 mEq Oral BID WITH MEALS    traZODone (DESYREL) tablet 50 mg  50 mg Oral QHS    nortriptyline (PAMELOR) capsule 50 mg  50 mg Oral DAILY WITH DINNER        Review of Systems  Constitutional: + malaise/fatigue, + poor appetite, + weight loss, No fevers, No chills. Eyes: No redness  Ears, nose, mouth, throat, and face: No nasal congestion, No sore throat, No voice change  Respiratory: No Shortness of Breath, No cough, No wheezing  Cardiovascular: No chest pain, No palpitations, No extremity edema  Gastrointestinal: No nausea, No vomiting, No diarrhea, No abdominal pain  Genitourinary: No frequency, No dysuria, No hematuria  Integument/breast: No skin lesion(s)   Neurological: + gait disturbance, + dizziness, No Confusion, No headaches      Objective:     Visit Vitals  /77   Pulse 82   Temp 98.8 °F (37.1 °C)   Resp 16   Ht 5' 4\" (1.626 m)   Wt 50.8 kg (112 lb)   SpO2 98%   BMI 19.22 kg/m²      O2 Device: None (Room air)    Temp (24hrs), Av.6 °F (37 °C), Min:97.9 °F (36.6 °C), Max:99.7 °F (37.6 °C)      701 -  1900  In: 240 [P.O.:240]  Out: -   No intake/output data recorded.     PHYSICAL EXAM:  Constitutional: Frail, malnourished. No acute distress  Skin: Extremities and face reveal no rashes. HEENT: Sclerae anicteric. Extra-occular muscles are intact. No oral ulcers. The neck is supple and no masses. Cardiovascular: Regular rate and rhythm. Respiratory:  Clear breath sounds bilaterally with no wheezes, rales, or rhonchi. GI: Abdomen nondistended, soft, and nontender. Normal active bowel sounds. Rectal: Deferred   Musculoskeletal: No pitting edema of the lower legs. Able to move all ext  Neurological:  Patient is alert and oriented. Cranial nerves II-XII grossly intact  Psychiatric: Mood appears appropriate       Data Review    Recent Results (from the past 24 hour(s))   CBC WITH AUTOMATED DIFF    Collection Time: 03/20/22  5:41 PM   Result Value Ref Range    WBC 8.2 3.6 - 11.0 K/uL    RBC 3.41 (L) 3.80 - 5.20 M/uL    HGB 11.5 11.5 - 16.0 g/dL    HCT 32.1 (L) 35.0 - 47.0 %    MCV 94.1 80.0 - 99.0 FL    MCH 33.7 26.0 - 34.0 PG    MCHC 35.8 30.0 - 36.5 g/dL    RDW 12.5 11.5 - 14.5 %    PLATELET 260 353 - 246 K/uL    MPV 9.9 8.9 - 12.9 FL    NRBC 0.0 0.0  WBC    ABSOLUTE NRBC 0.00 0.00 - 0.01 K/uL    NEUTROPHILS 78 (H) 32 - 75 %    LYMPHOCYTES 12 12 - 49 %    MONOCYTES 8 5 - 13 %    EOSINOPHILS 2 0 - 7 %    BASOPHILS 0 0 - 1 %    IMMATURE GRANULOCYTES 0 0 - 0.5 %    ABS. NEUTROPHILS 6.4 1.8 - 8.0 K/UL    ABS. LYMPHOCYTES 1.0 0.8 - 3.5 K/UL    ABS. MONOCYTES 0.7 0.0 - 1.0 K/UL    ABS. EOSINOPHILS 0.1 0.0 - 0.4 K/UL    ABS. BASOPHILS 0.0 0.0 - 0.1 K/UL    ABS. IMM.  GRANS. 0.0 0.00 - 0.04 K/UL    DF AUTOMATED     METABOLIC PANEL, COMPREHENSIVE    Collection Time: 03/20/22  5:41 PM   Result Value Ref Range    Sodium 132 (L) 136 - 145 mmol/L    Potassium 2.7 (LL) 3.5 - 5.1 mmol/L    Chloride 95 (L) 97 - 108 mmol/L    CO2 27 21 - 32 mmol/L    Anion gap 10 5 - 15 mmol/L    Glucose 104 (H) 65 - 100 mg/dL    BUN 20 6 - 20 mg/dL    Creatinine 2.74 (H) 0.55 - 1.02 mg/dL    BUN/Creatinine ratio 7 (L) 12 - 20      GFR est AA 22 (L) >60 ml/min/1.73m2    GFR est non-AA 18 (L) >60 ml/min/1.73m2    Calcium 8.5 8.5 - 10.1 mg/dL    Bilirubin, total 0.2 0.2 - 1.0 mg/dL    AST (SGOT) 25 15 - 37 U/L    ALT (SGPT) 24 12 - 78 U/L    Alk.  phosphatase 82 45 - 117 U/L    Protein, total 6.1 (L) 6.4 - 8.2 g/dL    Albumin 2.9 (L) 3.5 - 5.0 g/dL    Globulin 3.2 2.0 - 4.0 g/dL    A-G Ratio 0.9 (L) 1.1 - 2.2     NT-PRO BNP    Collection Time: 03/20/22  5:41 PM   Result Value Ref Range    NT pro-BNP 19,501 (H) <125 pg/mL   TROPONIN-HIGH SENSITIVITY    Collection Time: 03/20/22  5:41 PM   Result Value Ref Range    Troponin-High Sensitivity 28 0 - 51 ng/L   EKG, 12 LEAD, INITIAL    Collection Time: 03/20/22  6:13 PM   Result Value Ref Range    Ventricular Rate 77 BPM    Atrial Rate 77 BPM    P-R Interval 130 ms    QRS Duration 84 ms    Q-T Interval 482 ms    QTC Calculation (Bezet) 545 ms    Calculated P Axis 72 degrees    Calculated R Axis 63 degrees    Calculated T Axis 110 degrees    Diagnosis       Normal sinus rhythm  Left ventricular hypertrophy with repolarization abnormality  Prolonged QT  Abnormal ECG  Baseline artifact  When compared with ECG of 20-MAR-2022 17:42, (Unconfirmed)    Confirmed by Haroldo Jurado ([id number]) on 3/21/2022 8:26:12 AM     RENAL FUNCTION PANEL    Collection Time: 03/21/22  8:16 AM   Result Value Ref Range    Sodium 134 (L) 136 - 145 mmol/L    Potassium 3.1 (L) 3.5 - 5.1 mmol/L    Chloride 100 97 - 108 mmol/L    CO2 26 21 - 32 mmol/L    Anion gap 8 5 - 15 mmol/L    Glucose 95 65 - 100 mg/dL    BUN 37 (H) 6 - 20 mg/dL    Creatinine 3.30 (H) 0.55 - 1.02 mg/dL    BUN/Creatinine ratio 11 (L) 12 - 20      GFR est AA 18 (L) >60 ml/min/1.73m2    GFR est non-AA 14 (L) >60 ml/min/1.73m2    Calcium 8.3 (L) 8.5 - 10.1 mg/dL    Phosphorus 4.5 2.6 - 4.7 mg/dL    Albumin 2.6 (L) 3.5 - 5.0 g/dL   URIC ACID    Collection Time: 03/21/22  8:16 AM   Result Value Ref Range    Uric acid 7.2 (H) 2.6 - 6.0 mg/dL       XR CHEST PORT   Final Result      CT HEAD WO CONT   Final Result      No acute abnormality. No change from recent prior imaging                Active Problems:    Hypokalemia (10/2/2020)        Assessment/Plan:       1. Hypokalemia  - She is supposed to be on 20 mEq twice a day, but did not have any refills  - Unable to tolerate IV repletion  - Continue oral potassium     2. History of seizures by history, but she is not taking any medications and no recent episodes of seizures.     3. Benign essential hypertension  - Again she is very noncompliant with medications. Taking labetalol only 1 tablet daily, clonidine only 1 tablet daily, Isordil also she is not regular, Norvasc recently filled, but not sure if she is taking. She is explained about importance of taking medications regularly so physician can adjust the medication without harming her. .  We had a long discussion about this. And she understood I feel, so I changed medications. Need to adjust as condition dictates while she is in the hospital, so we will have an idea of how much she really needs.     4. Hx of severe atherosclerosis with right carotid endarterectomy and renal vascular stenosis  - Supposed to take aspirin and Plavix. She is not taking either one of them. We will start continue the medication     5. COPD  - We will keep her on DuoNeb treatments 4 times a day. -States that she is using oxygen at home, we will continue.     6. Anxiety/depression   - Continue on home medications    7. Iron deficiency anemia  - continue ferrous sulfate  - Check iron panel    8. PEDRO PABLO  - Hx CKD, baseline Cr 2.2-2.5  - Nephrology consult    DVT Prophylaxis: SCDs  Code Status: Full  POA:    Discharge Barriers:    - Pending resolution of electrolyte abnormalities   - PT/OT    Care Plan discussed with: patient and nursing    Total time spent with patient: >35 minutes.

## 2022-03-21 NOTE — CONSULTS
Nephrology Consult    Patient: Lion Bush MRN: [id number]  SSN: [id number]    YOB: 1965  Age: 64 y.o. Sex: female      Subjective:   Reason for the consultation. Acute kidney injury on chronic kidney disease stage IV  History of present illness. The patient is 26-year-old woman with underlying history of atrophic left kidney, right carotid endarterectomy, congestive heart failure with preserved LVEF, COPD, chronic kidney disease stage IV was admitted with due to unresponsiveness. On arrival she was afebrile and normotensive. Her creatinine was 2.2 and has gone up to 2.7 and 3.3 today which prompted a nephrology consultation. She has underlying chronic kidney disease stage IV and her last creatinine was 2.4 in June 2021. No abdominal pain nausea vomiting or diarrhea. No urinary symptoms voiding issues or gross hematuria. No lower extremity swelling.     Past Medical History:   Diagnosis Date    Anxiety 10/2/2020    Carotid stenosis     Chronic anemia     Chronic respiratory failure (HCC)     COPD (chronic obstructive pulmonary disease) with emphysema (HCC) 10/2/2020    Depression     Diastolic CHF (HCC)     Dysphagia 10/2/2020    GERD (gastroesophageal reflux disease)     High cholesterol     Hypertension     Hypothyroid     Iron deficiency anemia 10/2/2020    Mitral valve regurgitation 10/2/2020    Renal artery stenosis (HCC)     Seizure disorder (HCC)      Past Surgical History:   Procedure Laterality Date    HX CAROTID ENDARTERECTOMY      HX CHOLECYSTECTOMY      HX RENAL ARTERY STENT      HX ROTATOR CUFF REPAIR Right     HX TUBAL LIGATION      IR THORACENTESIS CATH W IMAGE  11/24/2020    IR THORACENTESIS CATH W IMAGE  11/25/2020      Family History   Problem Relation Age of Onset    Hypertension Mother     Stroke Mother     Melanoma Mother     Stroke Father     Melanoma Brother     Melanoma Child      Social History     Tobacco Use    Smoking status: Former Smoker     Types: Cigarettes    Smokeless tobacco: Never Used    Tobacco comment: quit july 2020   Substance Use Topics    Alcohol use: Not Currently      Current Facility-Administered Medications   Medication Dose Route Frequency Provider Last Rate Last Admin    labetaloL (NORMODYNE) tablet 200 mg  200 mg Oral BID Rob Horvath MD   200 mg at 03/21/22 1112    albuterol-ipratropium (DUO-NEB) 2.5 MG-0.5 MG/3 ML  3 mL Nebulization Q6H RT Rob Horvath MD   3 mL at 03/21/22 1323    docusate sodium (COLACE) capsule 100 mg  100 mg Oral DAILY Tyrell Villeda PA-C   100 mg at 03/21/22 1112    butalbital-acetaminophen-caffeine (FIORICET, ESGIC) -40 mg per tablet 1 Tablet  1 Tablet Oral Q6H PRN Tyrell Villeda PA-C   1 Tablet at 03/21/22 1118    nicotine (NICODERM CQ) 14 mg/24 hr patch 1 Patch  1 Patch TransDERmal DAILY Tyrell Villeda PA-C   1 Patch at 03/21/22 1112    [START ON 3/22/2022] cholecalciferol (VITAMIN D3) (1000 Units /25 mcg) tablet 1,000 Units  1,000 Units Oral DAILY Tyrell Villeda PA-C        0.9% sodium chloride infusion  100 mL/hr IntraVENous CONTINUOUS Freddy Dorsey MD        aspirin delayed-release tablet 81 mg  81 mg Oral DAILY Rob Horvath MD   81 mg at 03/21/22 6794    ferrous sulfate tablet 325 mg  325 mg Oral ACB Rob Horvath MD   325 mg at 19/01/48 6610    folic acid (FOLVITE) tablet 1 mg  1 mg Oral DAILY Rob Horvath MD   1 mg at 03/21/22 0817    albuterol (PROVENTIL HFA, VENTOLIN HFA, PROAIR HFA) inhaler 2 Puff  2 Puff Inhalation Q4H PRN Rob Horvath MD        clopidogreL (PLAVIX) tablet 75 mg  75 mg Oral DAILY Rob Horvath MD   75 mg at 03/21/22 0817    spironolactone (ALDACTONE) tablet 25 mg  25 mg Oral BID Rob Horvath MD   25 mg at 03/21/22 0817    fluticasone-umeclidin-vilanter (TRELEGY ELLIPTA) inhaler 1 Puff  1 Puff Inhalation DAILY Rob Horvath MD       [facility] amLODIPine (NORVASC) tablet 5 mg  5 mg Oral DAILY Cleveland Trevino MD   5 mg at 03/21/22 0817    sodium chloride (NS) flush 5-40 mL  5-40 mL IntraVENous Q8H Cleveland Trevino MD   10 mL at 03/21/22 1414    sodium chloride (NS) flush 5-40 mL  5-40 mL IntraVENous PRN Cleveland Trevino MD        acetaminophen (TYLENOL) tablet 650 mg  650 mg Oral Q6H PRN Cleveland Trevino MD        Or   [facility] acetaminophen (TYLENOL) suppository 650 mg  650 mg Rectal Q6H PRN Cleveland Trevino MD        ondansetron (ZOFRAN ODT) tablet 4 mg  4 mg Oral Q6H PRN Cleveland Trevino MD        Or    ondansetron (ZOFRAN) injection 4 mg  4 mg IntraVENous Q6H PRN Cleveland Trevino MD        isosorbide mononitrate ER (IMDUR) tablet 30 mg  30 mg Oral DAILY Cleveland Trevino MD   30 mg at 03/21/22 7989    cloNIDine HCL (CATAPRES) tablet 0.1 mg  0.1 mg Oral TID Cleveland Trevino MD   0.1 mg at 03/21/22 9832    LORazepam (ATIVAN) tablet 0.5 mg  0.5 mg Oral BID PRN Cleveland Trevino MD   0.5 mg at 03/20/22 2241    potassium chloride (KLOR-CON) packet for solution 20 mEq  20 mEq Oral BID WITH MEALS Cleveland Trevino MD   20 mEq at 03/21/22 0817    traZODone (DESYREL) tablet 50 mg  50 mg Oral QHS Cleveland Trevino MD   50 mg at 03/20/22 2241    nortriptyline (PAMELOR) capsule 50 mg  50 mg Oral DAILY WITH Claudia Dalton MD            Allergies   Allergen Reactions    Sulfa (Sulfonamide Antibiotics) Anaphylaxis    Sulfamethoxazole-Trimethoprim Unknown (comments)       Review of Systems:  A comprehensive review of systems was negative except for that written in the History of Present Illness.     Objective:     Vitals:    03/21/22 1149 03/21/22 1424 03/21/22 1425 03/21/22 1426   BP:  127/73 119/69 113/70   Pulse: 82 83 83 89   Resp:  16     Temp:  98.7 °F (37.1 °C)     SpO2:  98% 98% 97%   Weight:       Height:            Physical Exam:  General: NAD  Eyes: sclera anicteric  Oral Cavity: No thrush or ulcers  Neck: no JVD  Chest: Fair bilateral air entry  Heart: normal sounds  Abdomen: soft and non tender   : no llamas  Lower Extremities: no edema  Skin: no rash  Neuro: intact  Psychiatric: non-depressed          Assessment:     Hospital Problems  Date Reviewed: 3/20/2022          Codes Class Noted POA    Hypokalemia ICD-10-CM: E87.6  ICD-9-CM: 276.8  10/2/2020 Yes              Plan:   1 acute kidney injury on chronic kidney disease stage IV.    -Etiology is prerenal azotemia.    -On admission creatinine was 2.2 and has gone up to 2.7 and today 3.3.    -She has underlying chronic kidney disease stage IV and her last creatinine was 2.4 in June 2021.    -Etiology of her chronic kidney disease hypertensive renal vascular disease and has history of left atrophic kidney.    -She is not on any potential nephrotoxins.    -I will order repeat renal ultrasound.    -I will order urine analysis and quantify for proteinuria. -I will put her on IV fluids. 2.  Hypertension.    -Blood pressure is okay. -She is on labetalol/amlodipine and clonidine which I will continue for now. 3.  Hypokalemia. -K is 3.1. She was put on p.o. KCl.    4.  Anemia.    -History of iron deficiency anemia.    -Hemoglobin is 11.5.    -I will send iron studies and ferritin level. Thank you for the consultation.     Signed By: Jazmin Menon MD     March 21, 2022

## 2022-03-21 NOTE — PROGRESS NOTES
Physician Progress Note      Kimberly Aldrich  CSN #:                  [id number]  :                       1965  ADMIT DATE:       3/15/2022 3:35 PM  Sai Giron DATE:        3/17/2022 2:14 PM  RESPONDING  PROVIDER #:        MELY Liriano MD          QUERY TEXT:    Pt admitted with COPD  and has PMH: Chronic Diastolic CHF documented. If possible, please document in progress notes and discharge summary further specificity regarding the acuity of CHF:    The medical record reflects the following:  Risk Factors: COPD, Diastolic CHF, Metabolic Encephalopathy, PEDRO PABLO, Chronic Resp Failure. Clinical Indicators: BNP: 21,813, Pulmo PN 3/17: \"Breath sounds: Wheezing and rhonchi present. \" H&P: PMH: Diastolic CHF. Treatment: CXR, Pulmo Consulted, I&O monitoring, Lab monitoring. Thank you,  CORAL BellN, RN, [place], [facility] Specialist  [phone] or [email or web address]  Options provided:  -- Acute on Chronic Diastolic CHF/HFpEF  -- Acute Diastolic CHF/HFpEF  -- Chronic Diastolic CHF/HFpEF  -- Other - I will add my own diagnosis  -- Disagree - Not applicable / Not valid  -- Disagree - Clinically unable to determine / Unknown  -- Refer to Clinical Documentation Reviewer    PROVIDER RESPONSE TEXT:    This patient is in acute on chronic diastolic CHF/HFpEF. Query created by: Lisa Pereira on 3/18/2022 11:03 AM      QUERY TEXT:    Patient admitted with AMS, COPD. If possible, please document in progress notes and discharge summary if you are evaluating and /or treating any of the following: The medical record reflects the following:  Risk Factors: COPD, PEDRO PABLO, CHF, Metabolic Encephaolpathy, HLD, GERD  Clinical Indicators: BMI: 19.83, Albumin 2.7, Calcium 8.2, No RD consult PN seen, No intake/output data recorded. Treatment: Regular diet, RD consulted for BMI <19.9, Lab monitoring. ASPEN Criteria:  https://aspenjournals. onlinelibrary. raines. com/doi/full/10.1177/2489186658580878    Thank you,  Donavan Browne, BSN, RN, [facility], [facility] Specialist  [phone] or [email or web address]  Options provided:  -- Protein calorie malnutrition severe  -- Protein calorie malnutrition mild  -- Protein calorie malnutrition moderate  -- Underweight with BMI 19.83  -- Other - I will add my own diagnosis  -- Disagree - Not applicable / Not valid  -- Disagree - Clinically unable to determine / Unknown  -- Refer to Clinical Documentation Reviewer    PROVIDER RESPONSE TEXT:    This patient has mild protein calorie malnutrition.     Query created by: Sue Ann on 3/18/2022 11:17 AM      Electronically signed by:  Tello Keith MD 3/21/2022 5:34 PM

## 2022-03-21 NOTE — ED NOTES
Christina Fuentes TRANSFER - OUT REPORT:    Verbal report given to Annette(name) on Vishal Armenta  being transferred to [facility](unit) for routine progression of care       Report consisted of patients Situation, Background, Assessment and   Recommendations(SBAR). Information from the following report(s) SBAR, ED Summary, [facility] and Recent Results was reviewed with the receiving nurse. Lines:   Peripheral IV 03/20/22 Anterior;Right Forearm (Active)        Opportunity for questions and clarification was provided.       Patient transported with:   [employer]

## 2022-03-21 NOTE — PROGRESS NOTES
Dual skin assessment performed with Denys Fraser RN. Pt has a bruise on the right knee, bruises on the back of the right and left side behind the armpits. Bilateral arms have scabs and bruises throughout. The left great toe toenail has a very long toenail. Otherwise skin is clean, dry and intact.

## 2022-03-22 VITALS
BODY MASS INDEX: 19.91 KG/M2 | DIASTOLIC BLOOD PRESSURE: 77 MMHG | SYSTOLIC BLOOD PRESSURE: 137 MMHG | TEMPERATURE: 97.8 F | OXYGEN SATURATION: 97 % | HEART RATE: 81 BPM | HEIGHT: 64 IN | WEIGHT: 116.62 LBS | RESPIRATION RATE: 16 BRPM

## 2022-03-22 LAB
25(OH)D3 SERPL-MCNC: 45.6 NG/ML (ref 30–100)
ALBUMIN SERPL-MCNC: 2.4 G/DL (ref 3.5–5)
ALBUMIN SERPL-MCNC: 2.4 G/DL (ref 3.5–5)
ALBUMIN/GLOB SERPL: 0.8 {RATIO} (ref 1.1–2.2)
ALP SERPL-CCNC: 118 U/L (ref 45–117)
ALT SERPL-CCNC: 18 U/L (ref 12–78)
ANION GAP SERPL CALC-SCNC: 5 MMOL/L (ref 5–15)
ANION GAP SERPL CALC-SCNC: 5 MMOL/L (ref 5–15)
AST SERPL W P-5'-P-CCNC: 13 U/L (ref 15–37)
BILIRUB SERPL-MCNC: 0.2 MG/DL (ref 0.2–1)
BUN SERPL-MCNC: 37 MG/DL (ref 6–20)
BUN SERPL-MCNC: 38 MG/DL (ref 6–20)
BUN/CREAT SERPL: 13 (ref 12–20)
BUN/CREAT SERPL: 13 (ref 12–20)
CA-I BLD-MCNC: 8.2 MG/DL (ref 8.5–10.1)
CA-I BLD-MCNC: 8.3 MG/DL (ref 8.5–10.1)
CHLORIDE SERPL-SCNC: 105 MMOL/L (ref 97–108)
CHLORIDE SERPL-SCNC: 106 MMOL/L (ref 97–108)
CO2 SERPL-SCNC: 26 MMOL/L (ref 21–32)
CO2 SERPL-SCNC: 26 MMOL/L (ref 21–32)
CREAT SERPL-MCNC: 2.92 MG/DL (ref 0.55–1.02)
CREAT SERPL-MCNC: 2.99 MG/DL (ref 0.55–1.02)
ERYTHROCYTE [DISTWIDTH] IN BLOOD BY AUTOMATED COUNT: 13.6 % (ref 11.5–14.5)
FERRITIN SERPL-MCNC: 172 NG/ML (ref 26–388)
GLOBULIN SER CALC-MCNC: 3 G/DL (ref 2–4)
GLUCOSE SERPL-MCNC: 110 MG/DL (ref 65–100)
GLUCOSE SERPL-MCNC: 111 MG/DL (ref 65–100)
HCT VFR BLD AUTO: 25.7 % (ref 35–47)
HGB BLD-MCNC: 8.5 G/DL (ref 11.5–16)
IRON SATN MFR SERPL: 27 % (ref 20–50)
IRON SERPL-MCNC: 40 UG/DL (ref 35–150)
MCH RBC QN AUTO: 33.6 PG (ref 26–34)
MCHC RBC AUTO-ENTMCNC: 33.1 G/DL (ref 30–36.5)
MCV RBC AUTO: 101.6 FL (ref 80–99)
NRBC # BLD: 0 K/UL (ref 0–0.01)
NRBC BLD-RTO: 0 PER 100 WBC
PHOSPHATE SERPL-MCNC: 2.3 MG/DL (ref 2.6–4.7)
PLATELET # BLD AUTO: 250 K/UL (ref 150–400)
PMV BLD AUTO: 10.5 FL (ref 8.9–12.9)
POTASSIUM SERPL-SCNC: 3.6 MMOL/L (ref 3.5–5.1)
POTASSIUM SERPL-SCNC: 3.6 MMOL/L (ref 3.5–5.1)
PROT SERPL-MCNC: 5.4 G/DL (ref 6.4–8.2)
RBC # BLD AUTO: 2.53 M/UL (ref 3.8–5.2)
SODIUM SERPL-SCNC: 136 MMOL/L (ref 136–145)
SODIUM SERPL-SCNC: 137 MMOL/L (ref 136–145)
TIBC SERPL-MCNC: 147 UG/DL (ref 250–450)
WBC # BLD AUTO: 7.7 K/UL (ref 3.6–11)

## 2022-03-22 PROCEDURE — 97530 THERAPEUTIC ACTIVITIES: CPT

## 2022-03-22 PROCEDURE — 74011250637 HC RX REV CODE- 250/637: Performed by: STUDENT IN AN ORGANIZED HEALTH CARE EDUCATION/TRAINING PROGRAM

## 2022-03-22 PROCEDURE — 80053 COMPREHEN METABOLIC PANEL: CPT

## 2022-03-22 PROCEDURE — 83540 ASSAY OF IRON: CPT

## 2022-03-22 PROCEDURE — 74011250637 HC RX REV CODE- 250/637: Performed by: HOSPITALIST

## 2022-03-22 PROCEDURE — 97161 PT EVAL LOW COMPLEX 20 MIN: CPT

## 2022-03-22 PROCEDURE — 94761 N-INVAS EAR/PLS OXIMETRY MLT: CPT

## 2022-03-22 PROCEDURE — 85027 COMPLETE CBC AUTOMATED: CPT

## 2022-03-22 PROCEDURE — 94640 AIRWAY INHALATION TREATMENT: CPT

## 2022-03-22 PROCEDURE — 82728 ASSAY OF FERRITIN: CPT

## 2022-03-22 PROCEDURE — 36415 COLL VENOUS BLD VENIPUNCTURE: CPT

## 2022-03-22 PROCEDURE — G0378 HOSPITAL OBSERVATION PER HR: HCPCS

## 2022-03-22 PROCEDURE — 74011000250 HC RX REV CODE- 250: Performed by: HOSPITALIST

## 2022-03-22 PROCEDURE — 80069 RENAL FUNCTION PANEL: CPT

## 2022-03-22 RX ORDER — POTASSIUM CHLORIDE 1.5 G/1.77G
20 POWDER, FOR SOLUTION ORAL 2 TIMES DAILY WITH MEALS
Qty: 60 PACKET | Refills: 1 | Status: SHIPPED | OUTPATIENT
Start: 2022-03-22

## 2022-03-22 RX ADMIN — POTASSIUM CHLORIDE 20 MEQ: 1.5 POWDER, FOR SOLUTION ORAL at 10:05

## 2022-03-22 RX ADMIN — LABETALOL HYDROCHLORIDE 200 MG: 200 TABLET, FILM COATED ORAL at 10:07

## 2022-03-22 RX ADMIN — IPRATROPIUM BROMIDE AND ALBUTEROL SULFATE 3 ML: .5; 2.5 SOLUTION RESPIRATORY (INHALATION) at 14:27

## 2022-03-22 RX ADMIN — CLOPIDOGREL BISULFATE 75 MG: 75 TABLET ORAL at 10:06

## 2022-03-22 RX ADMIN — FERROUS SULFATE TAB 325 MG (65 MG ELEMENTAL FE) 325 MG: 325 (65 FE) TAB at 10:07

## 2022-03-22 RX ADMIN — IPRATROPIUM BROMIDE AND ALBUTEROL SULFATE 3 ML: .5; 2.5 SOLUTION RESPIRATORY (INHALATION) at 01:44

## 2022-03-22 RX ADMIN — LORAZEPAM 0.5 MG: 0.5 TABLET ORAL at 13:38

## 2022-03-22 RX ADMIN — BUTALBITAL, ACETAMINOPHEN, AND CAFFEINE 1 TABLET: 50; 325; 40 TABLET ORAL at 10:06

## 2022-03-22 RX ADMIN — CLONIDINE HYDROCHLORIDE 0.1 MG: 0.1 TABLET ORAL at 10:06

## 2022-03-22 RX ADMIN — DOCUSATE SODIUM 100 MG: 100 CAPSULE, LIQUID FILLED ORAL at 10:06

## 2022-03-22 RX ADMIN — Medication 1000 UNITS: at 10:06

## 2022-03-22 RX ADMIN — IPRATROPIUM BROMIDE AND ALBUTEROL SULFATE 3 ML: .5; 2.5 SOLUTION RESPIRATORY (INHALATION) at 07:45

## 2022-03-22 RX ADMIN — ASPIRIN 81 MG: 81 TABLET, COATED ORAL at 10:05

## 2022-03-22 RX ADMIN — AMLODIPINE BESYLATE 5 MG: 5 TABLET ORAL at 10:06

## 2022-03-22 RX ADMIN — FOLIC ACID 1 MG: 1 TABLET ORAL at 10:06

## 2022-03-22 RX ADMIN — SPIRONOLACTONE 25 MG: 25 TABLET ORAL at 10:06

## 2022-03-22 RX ADMIN — ISOSORBIDE MONONITRATE 30 MG: 30 TABLET, EXTENDED RELEASE ORAL at 10:05

## 2022-03-22 RX ADMIN — SODIUM CHLORIDE, PRESERVATIVE FREE 10 ML: 5 INJECTION INTRAVENOUS at 05:41

## 2022-03-22 RX ADMIN — FLUTICASONE FUROATE, UMECLIDINIUM BROMIDE AND VILANTEROL TRIFENATATE 1 PUFF: 200; 62.5; 25 POWDER RESPIRATORY (INHALATION) at 07:45

## 2022-03-22 NOTE — DISCHARGE SUMMARY
Hospitalist Discharge Summary     Patient ID:    Chaka Bustos  [id number]  42 y.o.  1965    Admit date: 3/20/2022    Discharge date : 3/22/2022    Chronic Diagnoses:    Problem List as of 3/22/2022 Date Reviewed: 3/20/2022          Codes Class Noted - Resolved    Encephalopathy ICD-10-CM: G93.40  ICD-9-CM: 348.30  3/15/2022 - Present        Metabolic encephalopathy DMT-81-VK: G93.41  ICD-9-CM: 348.31  3/15/2022 - Present        COPD (chronic obstructive pulmonary disease) ([facility] ICD-10-CM: J44.9  ICD-9-CM: 736  3/15/2022 - Present        Syncope and collapse ICD-10-CM: R55  ICD-9-CM: 780.2  4/14/2021 - Present        Bilateral carotid artery stenosis ICD-10-CM: I65.23  ICD-9-CM: 433.10, 433.30  3/29/2021 - Present        Hyponatremia ICD-10-CM: E87.1  ICD-9-CM: 276.1  1/17/2021 - Present        Respiratory failure with hypoxia ([facility] ICD-10-CM: J96.91  ICD-9-CM: 518.81  11/18/2020 - Present        CHF exacerbation ([facility] ICD-10-CM: I50.9  ICD-9-CM: 428.0  11/2/2020 - Present        CHF (congestive heart failure) (HCC) ICD-10-CM: I50.9  ICD-9-CM: 428.0  10/2/2020 - Present    Overview Signed 10/2/2020  8:40 AM by Martha García MD     With preserved lvef, diastolic dysfunction, mitral valve regurgitation moderate.               Mitral valve regurgitation ICD-10-CM: I34.0  ICD-9-CM: 424.0  10/2/2020 - Present        PEDRO PABLO (acute kidney injury) ([facility] ICD-10-CM: N17.9  ICD-9-CM: 584.9  10/2/2020 - Present        Anxiety (Chronic) ICD-10-CM: F41.9  ICD-9-CM: 300.00  10/2/2020 - Present        Seizure disorder ([address] (Chronic) ICD-10-CM: G40.909  ICD-9-CM: 345.90  10/2/2020 - Present        COPD (chronic obstructive pulmonary disease) with emphysema (HCC) (Chronic) ICD-10-CM: J43.9  ICD-9-CM: 492.8  10/2/2020 - Present        Dysphagia ICD-10-CM: R13.10  ICD-9-CM: 787.20  10/2/2020 - Present        Iron deficiency anemia (Chronic) ICD-10-CM: D50.9  ICD-9-CM: 280.9  10/2/2020 - Present        * (Principal) Hypokalemia ICD-10-CM: E87.6  ICD-9-CM: 276.8  10/2/2020 - Present        Acquired hypothyroidism (Chronic) ICD-10-CM: E03.9  ICD-9-CM: 244.9  10/2/2020 - Present        Acute respiratory failure (HCC) ICD-10-CM: J96.00  ICD-9-CM: 518.81  9/21/2020 - Present        Resistant hypertension ICD-10-CM: I10  ICD-9-CM: 401.9  9/21/2020 - Present        Renal artery stenosis (HCC) ICD-10-CM: I70.1  ICD-9-CM: 440.1  9/21/2020 - Present        RESOLVED: Syncope ICD-10-CM: R55  ICD-9-CM: 780.2  1/3/2021 - 8/3/2021          22    Final Diagnoses:   Principal Problem:    Hypokalemia (10/2/2020)    Active Problems:    Renal artery stenosis ([facility] (9/21/2020)      PEDRO PABLO (acute kidney injury) ([facility] (10/2/2020)      Encephalopathy (3/15/2022)        Hospital Course:   Nabeel Lantigua is a 64 y.o. female with a PMHx significant for atherosclerosis with a right carotid endarterectomy, history of hypertension, renal artery stenosis, atrophic kidney, mitral regurgitation, and congestive heart failure with preserved ejection fraction who presents to the emergency room complaining of an episode of unresponsiveness.  Patient's boyfriend noticed that patient became unresponsive while she was sitting on the sofa.  But patient was able to talk and alert oriented when she arrived to the emergency room; however when tried to ambulate she almost fell on the floor. Did not have any seizure-like activity noticed by EMS are here.    In the ED, hemodynamically stable. She is found with elevated BNP, hypokalemia.  Due to complicated history admitted for further management. CT head negative for acute intracranial process. Started on IV and oral potassium repletion. Creatinine trending up. Nephrology consult. Patient started on IV fluids. Retroperitoneal US showing increased bilateral renal echotexture from renal disease; negative for hydronephrosis. Creatinine trending down. Potassium repleted.  Patient is current with [facility] and has requested to continue service at discharge. Discharge Medications:   Current Discharge Medication List      CONTINUE these medications which have CHANGED    Details   potassium chloride (KLOR-CON) 20 mEq pack Take 1 Packet by mouth two (2) times daily (with meals). Qty: 60 Packet, Refills: 1  Start date: 3/22/2022         CONTINUE these medications which have NOT CHANGED    Details   butalbital-aspirin-caffeine (FIORINAL) capsule Take 1 Capsule by mouth every six (6) hours as needed for Headache.      spironolactone (ALDACTONE) 25 mg tablet Take 50 mg by mouth daily. Trelegy Ellipta 200-62.5-25 mcg inhaler Take 1 Puff by inhalation two (2) times a day. labetaloL (NORMODYNE) 200 mg tablet Take 400 mg by mouth three (3) times daily. LORazepam (ATIVAN) 0.5 mg tablet Take 0.5 mg by mouth two (2) times daily as needed for Anxiety or Agitation. traZODone (DESYREL) 50 mg tablet Take 50 mg by mouth nightly. cloNIDine HCL (CATAPRES) 0.1 mg tablet Take 0.1 mg by mouth three (3) times daily. nortriptyline (PAMELOR) 50 mg capsule Take 50 mg by mouth daily (with dinner). isosorbide dinitrate (ISORDIL) 10 mg tablet Take 1 Tab by mouth two (2) times a day. Qty: 90 Tab, Refills: 1      albuterol (PROVENTIL HFA, VENTOLIN HFA, PROAIR HFA) 90 mcg/actuation inhaler Take 2 Puffs by inhalation every four (4) hours as needed for Wheezing or Shortness of Breath. albuterol-ipratropium (DUO-NEB) 2.5 mg-0.5 mg/3 ml nebu 3 mL by Nebulization route every six (6) hours. Qty: 120 Nebule, Refills: 0      amLODIPine (NORVASC) 5 mg tablet Take 5 mg by mouth daily. ferrous sulfate 325 mg (65 mg iron) tablet Take 325 mg by mouth Daily (before breakfast). aspirin delayed-release 81 mg tablet Take 81 mg by mouth daily. STOP taking these medications       clopidogreL (PLAVIX) 75 mg tab Comments:   Reason for Stopping: Follow up Care:    1. None in 1-2 weeks. Follow-up Information     Follow up With Specialties Details Why Contact Info    Duyen Friedman NP Nurse Practitioner Schedule an appointment as soon as possible for a visit in 1 week Establish PCP care. Persistent hypokalemia. [address]      Diane Zacarias MD Nephrology, Nephrology Schedule an appointment as soon as possible for a visit in 2 weeks Stage IV CKD. Hospital follow-up. [id number] [address]  [phone]              Patient Follow Up Instructions: Activity: Activity as tolerated  Diet:  Resume previous diet  Wound care: None    Condition at Discharge:  Stable  __________________________________________________________________    Disposition  Home or Self Care  ____________________________________________________________________    Code Status:  Full Code  ___________________________________________________________________    Discharge Exam:  Patient seen and examined by me on discharge day. Pertinent Findings:    Gen:    Frail, malnourished. Not in distress  Chest: Clear lungs without wheezing, rhonchi, or rales. Room air. CVS:   Regular rate and rhythm. +S1/S2. No murmur or gallop. No edema  Abd:  Soft, not distended, not tender. Active bowel sounds. Neuro:  Alert and oriented x3. CN II-XII grossly intact. Psych: Mood appropriate    CONSULTATIONS: Nephrology    Significant Diagnostic Studies:   Recent Results (from the past 24 hour(s))   PROTEIN/CREATININE RATIO, URINE    Collection Time: 03/21/22  3:40 PM   Result Value Ref Range    Protein, urine random 300 (H) 0.0 - 11.9 mg/dL    Creatinine, urine 126.00 mg/dL    Protein/Creat.  urine Ratio 2.4     URINALYSIS W/MICROSCOPIC    Collection Time: 03/21/22  3:40 PM   Result Value Ref Range    Color Yellow/Straw      Appearance Clear Clear      Specific gravity 1.017 1.003 - 1.030      pH (UA) 5.0 5.0 - 8.0      Protein >300 (A) Negative mg/dL    Glucose Negative Negative mg/dL    Ketone Negative Negative mg/dL    Bilirubin Negative Negative      Blood Negative Negative      Urobilinogen 0.1 0.1 - 1.0 EU/dL    Nitrites Negative Negative      Leukocyte Esterase Negative Negative      WBC 0-4 0 - 4 /hpf    RBC 0-5 0 - 5 /hpf    Bacteria Negative Negative /hpf   RENAL FUNCTION PANEL    Collection Time: 03/22/22  8:37 AM   Result Value Ref Range    Sodium 137 136 - 145 mmol/L    Potassium 3.6 3.5 - 5.1 mmol/L    Chloride 106 97 - 108 mmol/L    CO2 26 21 - 32 mmol/L    Anion gap 5 5 - 15 mmol/L    Glucose 111 (H) 65 - 100 mg/dL    BUN 37 (H) 6 - 20 mg/dL    Creatinine 2.92 (H) 0.55 - 1.02 mg/dL    BUN/Creatinine ratio 13 12 - 20      GFR est AA 20 (L) >60 ml/min/1.73m2    GFR est non-AA 17 (L) >60 ml/min/1.73m2    Calcium 8.3 (L) 8.5 - 10.1 mg/dL    Phosphorus 2.3 (L) 2.6 - 4.7 mg/dL    Albumin 2.4 (L) 3.5 - 5.0 g/dL     US RETROPERITONEUM COMP   Final Result   1. Increased bilateral renal echotexture from medical renal disease. 2. Chronic left renal cortical atrophy. 3. No hydronephrosis. XR CHEST PORT   Final Result      CT HEAD WO CONT   Final Result      No acute abnormality.  No change from recent prior imaging                    Signed:  Fabi Solis PA-C  3/22/2022  10:56 AM

## 2022-03-22 NOTE — PROGRESS NOTES
PHYSICAL THERAPY EVALUATION  Patient: Terrence Diallo (50 y.o. female)  Date: 3/22/2022  Primary Diagnosis: Hypokalemia [E87.6]        Precautions: fall  ASSESSMENT  As per attending DC summary(Debbie Baker is a 64 y.o. female with a PMHx significant for atherosclerosis with a right carotid endarterectomy, history of hypertension, renal artery stenosis, atrophic kidney, mitral regurgitation, and congestive heart failure with preserved ejection fraction who presents to the emergency room complaining of an episode of unresponsiveness. Patient's boyfriend noticed that patient became unresponsive while she was sitting on the sofa. But patient was able to talk and alert oriented when she arrived to the emergency room; however when tried to ambulate she almost fell on the floor. Did not have any seizure-like activity noticed by EMS are here. In the ED, hemodynamically stable. She is found with elevated BNP, hypokalemia. Due to complicated history admitted for further management. CT head negative for acute intracranial process. Started on IV and oral potassium repletion. Creatinine trending up. Nephrology consult. Patient started on IV fluids. Retroperitoneal US showing increased bilateral renal echotexture from renal disease; negative for hydronephrosis. Creatinine trending down. Potassium repleted. Patient is current with [facility] and has requested to continue service at discharge. )  Patient found in bed and agreeable to PT eval.Patient was seen by this therapist last weekend was recommended DC was home with family. Patient able to amb short distance by bed with sup and lcg   Based on the objective data described below, the patient presents with patient is at baseline mobility level. Other factors to consider for discharge:home with family  Patient will benefit from skilled therapy intervention to address the above noted impairments.        PLAN :  Recommendations and Planned Interventions:DC PT after eval  Frequency/Duration: Patient will be followed by physical therapy:  eval and treat only  Recommendation for discharge: (in order for the patient to meet his/her long term goals)  Home with Family Care    This discharge recommendation:  Has been made in collaboration with the attending provider and/or case management    IF patient discharges home will need the following DME: to be determined (TBD)         SUBJECTIVE:   Patient stated I am ok.     OBJECTIVE DATA SUMMARY:   HISTORY:    Past Medical History:   Diagnosis Date    Anxiety 10/2/2020    Carotid stenosis     Chronic anemia     Chronic respiratory failure (HCC)     COPD (chronic obstructive pulmonary disease) with emphysema (HCC) 10/2/2020    Depression     Diastolic CHF (HCC)     Dysphagia 10/2/2020    GERD (gastroesophageal reflux disease)     High cholesterol     Hypertension     Hypothyroid     Iron deficiency anemia 10/2/2020    Mitral valve regurgitation 10/2/2020    Renal artery stenosis (HCC)     Seizure disorder (HCC)      Past Surgical History:   Procedure Laterality Date    HX CAROTID ENDARTERECTOMY      HX CHOLECYSTECTOMY      HX RENAL ARTERY STENT      HX ROTATOR CUFF REPAIR Right     HX TUBAL LIGATION      IR THORACENTESIS CATH W IMAGE  11/24/2020    IR THORACENTESIS CATH W IMAGE  11/25/2020       Personal factors and/or comorbidities impacting plan of care:   Home Situation  Home Environment: Private residence  # Steps to Enter: 4  One/Two Story Residence: One story  Living Alone: No  Support Systems: Spouse/Significant Other  Patient Expects to be Discharged to[de-identified] Home with hospice  Current DME Used/Available at Home: Walker, rolling,Cane, straight    EXAMINATION/PRESENTATION/DECISION MAKING:   Critical Behavior:  Neurologic State: Alert  Orientation Level: Oriented X4  Cognition: Follows commands,Appropriate decision making     Hearing:   Auditory  Auditory Impairment: None  Range Of Motion:  AROM: Within functional limits                       Strength:    Strength: Within functional limits                    Tone & Sensation:   Tone: Normal                              Functional Mobility:  Bed Mobility:  Rolling: Independent  Supine to Sit: Independent  Sit to Supine: Independent  Scooting: Independent  Transfers:  Sit to Stand: Modified independent  Stand to Sit: Modified independent                       Balance:   Sitting: Intact  Standing: Intact; With support  Ambulation/Gait Training:  Distance (ft): 10 Feet (ft)  Assistive Device: Gait belt  Ambulation - Level of Assistance: Modified independent     Gait Description (WDL): Exceptions to [facility]                                              Functional Measure:  [facility] AM-PAC 6 Clicks         Basic Mobility Inpatient Short Form  How much difficulty does the patient currently have. .. Unable A Lot A Little None   1. Turning over in bed (including adjusting bedclothes, sheets and blankets)? [] 1   [] 2   [] 3   [x] 4   2. Sitting down on and standing up from a chair with arms ( e.g., wheelchair, bedside commode, etc.)   [] 1   [] 2   [] 3   [x] 4   3. Moving from lying on back to sitting on the side of the bed? [] 1   [] 2   [] 3   [x] 4          How much help from another person does the patient currently need. .. Total A Lot A Little None   4. Moving to and from a bed to a chair (including a wheelchair)? [] 1   [] 2   [] 3   [x] 4   5. Need to walk in hospital room? [] 1   [] 2   [] 3   [x] 4   6. Climbing 3-5 steps with a railing? [] 1   [] 2   [] 3   [x] 4   © 2007, Trustees of [facility], under license to [employer]. All rights reserved     Score:  Initial: 24/24 Most Recent: X (Date:03/22/2022 )   Interpretation of Tool:  Represents activities that are increasingly more difficult (i.e. Bed mobility, Transfers, Gait).   Score 24 23 22-20 19-15 14-10 9-7 6   Modifier CH CI CJ CK CL CM CN            Physical Therapy Evaluation Charge Determination   History Examination Presentation Decision-Making   LOW Complexity : Zero comorbidities / personal factors that will impact the outcome / POC LOW Complexity : 1-2 Standardized tests and measures addressing body structure, function, activity limitation and / or participation in recreation  LOW Complexity : Stable, uncomplicated  LOW Complexity : FOTO score of       Based on the above components, the patient evaluation is determined to be of the following complexity level: LOW     Pain Ratin/10    Activity Tolerance:   Good  Please refer to the flowsheet for vital signs taken during this treatment. After treatment patient left in no apparent distress:   Supine in bed and Call bell within reach    COMMUNICATION/EDUCATION:   The patients plan of care was discussed with: Occupational therapist, Registered nurse and Case management. Fall prevention education was provided and the patient/caregiver indicated understanding.     Thank you for this referral.  Katrin Edwards PT   Time Calculation: 20 mins

## 2022-03-22 NOTE — PROGRESS NOTES
Pt exit via ambulatory with nurse. Her son pick her up. Pt did not want to wait on the WC. Voice no c/o's.

## 2022-03-22 NOTE — PROGRESS NOTES
PROGRESS NOTE    Patient: Jairon Persaud MRN: [id number]  SSN: [id number]    YOB: 1965  Age: 64 y.o. Sex: female      Admit Date: 3/15/2022    LOS: 1 day       Subjective     Chief Complaint   Patient presents with    Extremity Weakness       HPI: Patient is a 64y.o. year old female past medical history of chronic kidney disease hyponatremia hypertension advanced COPD unilateral functioning of the right kidney history of carotid and renal artery stenosis chronic diastolic heart failure seizure disorder GERD hypothyroidism chronic anemia mitral insufficiency came to the ER with altered mental status patient was in hospice was revoked  Patient was on hospice services for last 8-month today patient family sent to the ER and revoked hospice and saw the patient patient lying in the bed confused initial stroke alert was called no TPA was given patient was admitted for further evaluation and treatment CT of the head was done    3/16:  Patient awake, A&O x 3, sitting on the edge of her bed talking with her boyfriend. Mental status is much clearer according to family. She states she has been having some balance issues lately but denies any injury at home. She initially presented after home hospice nurse found her altered, as she was attempting to make a phone call with a TV remote. Notes she was off of [facility] for a long time but recently restarted. CT was unremarkable. Labs remarkable for K 2.4, BNP 5842, and Creat 2.9, which is consistent with her baseline. Neurology consulted- notes weakness most likely secondary to chronic medical problems. Plans to order EEG, Keppra level, and seizure precautions.     Past Medical History:   Diagnosis Date    Anxiety 10/2/2020    Carotid stenosis      Chronic anemia      Chronic respiratory failure (HCC)      COPD (chronic obstructive pulmonary disease) with emphysema ([facility] [address] 10/2/2020    Depression      Diastolic CHF ([facility] [address]      Dysphagia 10/2/2020    GERD (gastroesophageal reflux disease)      High cholesterol      Hypertension      Hypothyroid      Iron deficiency anemia 10/2/2020    Mitral valve regurgitation 10/2/2020    Renal artery stenosis (HCC)      Seizure disorder (HCC)                 Past Surgical History:   Procedure Laterality Date    HX CAROTID ENDARTERECTOMY        HX CHOLECYSTECTOMY        HX RENAL ARTERY STENT        HX ROTATOR CUFF REPAIR Right      HX TUBAL LIGATION        IR THORACENTESIS CATH W IMAGE   11/24/2020    IR THORACENTESIS CATH W IMAGE   11/25/2020         Social History      Tobacco Use    Smoking status: Former Smoker       Types: Cigarettes    Smokeless tobacco: Never Used    Tobacco comment: quit july 2020   Substance Use Topics    Alcohol use: Not Currently               Family History   Problem Relation Age of Onset    Hypertension Mother      Stroke Mother      Melanoma Mother      Stroke Father      Melanoma Brother      Melanoma Child                Allergies   Allergen Reactions    Sulfa (Sulfonamide Antibiotics) Anaphylaxis    Sulfamethoxazole-Trimethoprim Unknown (comments)           Review of Systems   Constitutional: Negative for chills and fever. Respiratory: Negative for cough, shortness of breath and wheezing. Cardiovascular: Negative for chest pain and palpitations. Gastrointestinal: Negative for abdominal pain, constipation, diarrhea, nausea and vomiting. Neurological: Positive for headaches. Negative for dizziness, seizures and loss of consciousness. Objective     Visit Vitals  BP (!) 187/88 (BP 1 Location: Right upper arm, BP Patient Position: At rest;Semi fowlers) Comment: RN notified   Pulse 87   Temp 97.6 °F (36.4 °C)   Resp 18   Ht 5' 4\" (1.626 m)   Wt 115 lb 8.3 oz (52.4 kg)   SpO2 95%   BMI 19.83 kg/m²      O2 Device: None (Room air)    Physical Exam:   Constitutional: Awake head: Normocephalic and atraumatic. Eyes: Pupils are equal, round, and reactive to light. EOM are normal.   Cardiovascular: Normal rate, regular rhythm and normal heart sounds. Pulmonary/Chest: Breath sounds normal. No wheezes. No rales. Exhibits no tenderness. Abdominal: Soft. Bowel sounds are normal. There is no abdominal tenderness. There is no rebound and no guarding. Musculoskeletal: Normal range of motion. Neurological: Patient awake move all extremity    Intake & Output:  Current Shift: No intake/output data recorded. Last three shifts: 03/14 1901 - 03/16 0700  In: -   Out: 600 [Urine:600]    Lab/Data Review: All labs reviewed. 24 Hour Results:    Recent Results (from the past 24 hour(s))   CBC WITH AUTOMATED DIFF    Collection Time: 03/15/22  4:18 PM   Result Value Ref Range    WBC 6.5 3.6 - 11.0 K/uL    RBC 3.60 (L) 3.80 - 5.20 M/uL    HGB 12.0 11.5 - 16.0 g/dL    HCT 33.1 (L) 35.0 - 47.0 %    MCV 91.9 80.0 - 99.0 FL    MCH 33.3 26.0 - 34.0 PG    MCHC 36.3 30.0 - 36.5 g/dL    RDW 12.0 11.5 - 14.5 %    PLATELET 012 205 - 894 K/uL    MPV 10.3 8.9 - 12.9 FL    NRBC 0.0 0.0  WBC    ABSOLUTE NRBC 0.00 0.00 - 0.01 K/uL    NEUTROPHILS 68 32 - 75 %    LYMPHOCYTES 19 12 - 49 %    MONOCYTES 11 5 - 13 %    EOSINOPHILS 2 0 - 7 %    BASOPHILS 0 0 - 1 %    IMMATURE GRANULOCYTES 0 0 - 0.5 %    ABS. NEUTROPHILS 4.4 1.8 - 8.0 K/UL    ABS. LYMPHOCYTES 1.2 0.8 - 3.5 K/UL    ABS. MONOCYTES 0.7 0.0 - 1.0 K/UL    ABS. EOSINOPHILS 0.1 0.0 - 0.4 K/UL    ABS. BASOPHILS 0.0 0.0 - 0.1 K/UL    ABS. IMM.  GRANS. 0.0 0.00 - 0.04 K/UL    DF AUTOMATED     TYPE & SCREEN    Collection Time: 03/15/22  4:18 PM   Result Value Ref Range    Crossmatch Expiration 03/18/2022,2359     ABO/Rh(D) Geralene Peek Positive     Antibody screen Negative    PROTHROMBIN TIME + INR    Collection Time: 03/15/22  4:18 PM   Result Value Ref Range    Prothrombin time 11.2 (L) 11.9 - 14.6 sec    INR 0.8 (L) 0.9 - 1.1     METABOLIC PANEL, COMPREHENSIVE    Collection Time: 03/15/22  4:18 PM   Result Value Ref Range    Sodium 128 (L) 136 - 145 mmol/L    Potassium 2.4 (LL) 3.5 - 5.1 mmol/L    Chloride 95 (L) 97 - 108 mmol/L    CO2 21 21 - 32 mmol/L    Anion gap 12 5 - 15 mmol/L    Glucose 104 (H) 65 - 100 mg/dL    BUN 25 (H) 6 - 20 mg/dL    Creatinine 3.30 (H) 0.55 - 1.02 mg/dL    BUN/Creatinine ratio 8 (L) 12 - 20      GFR est AA 18 (L) >60 ml/min/1.73m2    GFR est non-AA 14 (L) >60 ml/min/1.73m2    Calcium 9.1 8.5 - 10.1 mg/dL    Bilirubin, total 0.4 0.2 - 1.0 mg/dL    AST (SGOT) 41 (H) 15 - 37 U/L    ALT (SGPT) 44 12 - 78 U/L    Alk.  phosphatase 89 45 - 117 U/L    Protein, total 6.9 6.4 - 8.2 g/dL    Albumin 3.3 (L) 3.5 - 5.0 g/dL    Globulin 3.6 2.0 - 4.0 g/dL    A-G Ratio 0.9 (L) 1.1 - 2.2     NT-PRO BNP    Collection Time: 03/15/22  4:18 PM   Result Value Ref Range    NT pro-BNP 10,308 (H) <125 pg/mL   TROPONIN-HIGH SENSITIVITY    Collection Time: 03/15/22  4:18 PM   Result Value Ref Range    Troponin-High Sensitivity 25 0 - 51 ng/L   LACTIC ACID    Collection Time: 03/15/22  4:18 PM   Result Value Ref Range    Lactic acid 1.5 0.4 - 2.0 mmol/L   MAGNESIUM    Collection Time: 03/15/22  4:18 PM   Result Value Ref Range    Magnesium 1.8 1.6 - 2.4 mg/dL   CK    Collection Time: 03/15/22  4:18 PM   Result Value Ref Range    CK 78 26 - 192 U/L   TSH 3RD GENERATION    Collection Time: 03/15/22  4:18 PM   Result Value Ref Range    TSH 7.99 (H) 0.36 - 3.74 uIU/mL   AMMONIA    Collection Time: 03/15/22  4:18 PM   Result Value Ref Range    Ammonia, plasma <10 <32 umol/L   COVID-19 RAPID TEST    Collection Time: 03/15/22  4:18 PM   Result Value Ref Range    COVID-19 rapid test Not Detected Not Detected     URINALYSIS W/ REFLEX CULTURE    Collection Time: 03/15/22  8:08 PM    Specimen: Urine   Result Value Ref Range    Color Yellow/Straw      Appearance Clear Clear      Specific gravity 1.010 1.003 - 1.030      pH (UA) 6.0      Protein >300 (A) Negative mg/dL    Glucose Negative Negative mg/dL    Ketone Negative Negative mg/dL    Bilirubin Negative Negative Blood Negative Negative      Urobilinogen 0.1 (L) 0.2 - 1.0 EU/dL    Nitrites Negative Negative      Leukocyte Esterase Negative Negative      UA:UC IF INDICATED Culture not indicated by UA result Culture not indicated by UA result      WBC 0-4 0 - 4 /hpf    RBC 0-5 0 - 5 /hpf    Bacteria Negative Negative /hpf   METABOLIC PANEL, BASIC    Collection Time: 03/15/22  8:08 PM   Result Value Ref Range    Sodium 132 (L) 136 - 145 mmol/L    Potassium 2.9 (L) 3.5 - 5.1 mmol/L    Chloride 99 97 - 108 mmol/L    CO2 25 21 - 32 mmol/L    Anion gap 8 5 - 15 mmol/L    Glucose 90 65 - 100 mg/dL    BUN 22 (H) 6 - 20 mg/dL    Creatinine 2.99 (H) 0.55 - 1.02 mg/dL    BUN/Creatinine ratio 7 (L) 12 - 20      GFR est AA 20 (L) >60 ml/min/1.73m2    GFR est non-AA 16 (L) >60 ml/min/1.73m2    Calcium 8.2 (L) 8.5 - 10.1 mg/dL   DRUG SCREEN, URINE    Collection Time: 03/15/22  8:08 PM   Result Value Ref Range    AMPHETAMINES Negative Negative      BARBITURATES Positive (A) Negative      BENZODIAZEPINES Negative Negative      COCAINE Negative Negative      METHADONE Negative Negative      OPIATES Positive (A) Negative      PCP(PHENCYCLIDINE) Negative Negative      THC (TH-CANNABINOL) Positive (A) Negative      Drug screen comment        This test is a screen for drugs of abuse in a medical setting only (i.e., they are unconfirmed results and as such must not be used for non-medical purposes, e.g.,employment testing, legal testing). Due to its inherent nature, false positive (FP) and false negative (FN) results may be obtained. Therefore, if necessary for medical care, recommend confirmation of positive findings by GC/MS.    METABOLIC PANEL, COMPREHENSIVE    Collection Time: 03/16/22  6:47 AM   Result Value Ref Range    Sodium 132 (L) 136 - 145 mmol/L    Potassium 2.4 (LL) 3.5 - 5.1 mmol/L    Chloride 100 97 - 108 mmol/L    CO2 25 21 - 32 mmol/L    Anion gap 7 5 - 15 mmol/L    Glucose 80 65 - 100 mg/dL    BUN 19 6 - 20 mg/dL    Creatinine 2.90 (H) 0.55 - 1.02 mg/dL    BUN/Creatinine ratio 7 (L) 12 - 20      GFR est AA 20 (L) >60 ml/min/1.73m2    GFR est non-AA 17 (L) >60 ml/min/1.73m2    Calcium 8.2 (L) 8.5 - 10.1 mg/dL    Bilirubin, total 0.3 0.2 - 1.0 mg/dL    AST (SGOT) 24 15 - 37 U/L    ALT (SGPT) 34 12 - 78 U/L    Alk. phosphatase 71 45 - 117 U/L    Protein, total 5.9 (L) 6.4 - 8.2 g/dL    Albumin 2.7 (L) 3.5 - 5.0 g/dL    Globulin 3.2 2.0 - 4.0 g/dL    A-G Ratio 0.8 (L) 1.1 - 2.2     CBC WITH AUTOMATED DIFF    Collection Time: 03/16/22  6:47 AM   Result Value Ref Range    WBC 6.5 3.6 - 11.0 K/uL    RBC 3.01 (L) 3.80 - 5.20 M/uL    HGB 10.0 (L) 11.5 - 16.0 g/dL    HCT 29.2 (L) 35.0 - 47.0 %    MCV 97.0 80.0 - 99.0 FL    MCH 33.2 26.0 - 34.0 PG    MCHC 34.2 30.0 - 36.5 g/dL    RDW 12.5 11.5 - 14.5 %    PLATELET 580 552 - 733 K/uL    MPV 10.6 8.9 - 12.9 FL    NRBC 0.0 0.0  WBC    ABSOLUTE NRBC 0.00 0.00 - 0.01 K/uL    NEUTROPHILS 60 32 - 75 %    LYMPHOCYTES 26 12 - 49 %    MONOCYTES 11 5 - 13 %    EOSINOPHILS 3 0 - 7 %    BASOPHILS 0 0 - 1 %    IMMATURE GRANULOCYTES 0 0 - 0.5 %    ABS. NEUTROPHILS 3.9 1.8 - 8.0 K/UL    ABS. LYMPHOCYTES 1.7 0.8 - 3.5 K/UL    ABS. MONOCYTES 0.7 0.0 - 1.0 K/UL    ABS. EOSINOPHILS 0.2 0.0 - 0.4 K/UL    ABS. BASOPHILS 0.0 0.0 - 0.1 K/UL    ABS. IMM. GRANS. 0.0 0.00 - 0.04 K/UL    DF AUTOMATED     NT-PRO BNP    Collection Time: 03/16/22  6:47 AM   Result Value Ref Range    NT pro-BNP 5,842 (H) <125 pg/mL   MAGNESIUM    Collection Time: 03/16/22  9:46 AM   Result Value Ref Range    Magnesium 2.1 1.6 - 2.4 mg/dL         Imaging:    XR CHEST PORT   Final Result   The cardiomediastinal silhouette is appropriate for age, technique,   and lung expansion. Pulmonary vasculature is not congested. The lungs are   essentially clear. No effusion or pneumothorax is seen. CT CODE NEURO HEAD WO CONTRAST   Final Result   Accelerated and progressive chronic small vessel disease.  No acute   findings           Assessment Acute metabolic encephalopathy  Hyponatremia  hypokalemia  Acute on chronic kidney disease  Advanced COPD  Unilateral functioning right kidney  History of carotid and renal artery stenosis  Chronic diastolic heart failure  Seizure disorder  GERD  Hypothyroidism  Hyperlipidemia        Plan     Admit to medical telemetry floor  Consult neurology  Consult nephrology  EEG  Keppra level  Seizure precautions  PT and OT consult    Current Facility-Administered Medications:     potassium chloride 10 mEq in 100 ml IVPB, 10 mEq, IntraVENous, Q1H, Lizbeth Winter MD, Last Rate: 100 mL/hr at 03/16/22 0910, 10 mEq at 03/16/22 0910    methylPREDNISolone (PF) (SOLU-MEDROL) injection 40 mg, 40 mg, IntraVENous, Q8H, Arnold Bravo MD    albuterol-ipratropium (DUO-NEB) 2.5 MG-0.5 MG/3 ML, 3 mL, Nebulization, Q6H, Lizbeth Winter MD, 3 mL at 03/16/22 0758    isosorbide dinitrate (ISORDIL) tablet 10 mg, 10 mg, Oral, BID, Lizbeth Winter MD, 10 mg at 03/16/22 0911    levothyroxine (SYNTHROID) tablet 50 mcg, 50 mcg, Oral, ACB, Lizbeth Winter MD, 50 mcg at 03/16/22 7740    potassium chloride (KLOR-CON) packet for solution 20 mEq, 20 mEq, Oral, BID WITH MEALS, Lizbeth Winter MD, 20 mEq at 03/16/22 5726    aspirin delayed-release tablet 81 mg, 81 mg, Oral, DAILY, Lizbeth Winter MD, 81 mg at 03/16/22 0911    carBAMazepine (TEGretol) tablet 200 mg, 200 mg, Oral, BID, Lizbeth Winter MD, 200 mg at 03/16/22 0911    clopidogreL (PLAVIX) tablet 75 mg, 75 mg, Oral, DAILY, Lizbeth Winter MD, 75 mg at 03/16/22 0911    ergocalciferol capsule 50,000 Units, 50,000 Units, Oral, Q7D, Lizbeth Winter MD, 50,000 Units at 03/16/22 0911    pantoprazole (PROTONIX) granules for oral suspension 40 mg, 40 mg, Per NG tube, ACB, Maggy, Lizbeth, MD, 40 mg at 03/16/22 1936    ferrous sulfate tablet 325 mg, 325 mg, Oral, Maggy ROSE Abdul, MD, 325 mg at 29/15/68 8468    folic acid (FOLVITE) tablet 1 mg, 1 mg, Oral, Marcos Avery MD, 1 mg at 03/16/22 0911    levETIRAcetam (KEPPRA) tablet 500 mg, 500 mg, Oral, BID, Nallely Winter MD, 500 mg at 03/16/22 0911    atorvastatin (LIPITOR) tablet 20 mg, 20 mg, Oral, DAILY, Lizbeth Winter MD, 20 mg at 03/16/22 0911    sertraline (ZOLOFT) tablet 50 mg, 50 mg, Oral, DAILY, Lizbeth Winter MD, 50 mg at 03/16/22 0911    fluticasone-umeclidinium-vilanterol (TRELEGY ELLIPTA) inhaler 1 Puff, 1 Puff, Inhalation, DAILY, Nallely Winter MD    acetaminophen (TYLENOL) tablet 650 mg, 650 mg, Oral, Q6H PRN, 650 mg at 03/15/22 2244 **OR** acetaminophen (TYLENOL) suppository 650 mg, 650 mg, Rectal, Q6H PRN, Nallely Winter MD    polyethylene glycol (MIRALAX) packet 17 g, 17 g, Oral, DAILY PRN, Nallely Winter MD    ondansetron (ZOFRAN ODT) tablet 4 mg, 4 mg, Oral, Q8H PRN **OR** ondansetron (ZOFRAN) injection 4 mg, 4 mg, IntraVENous, Q6H PRN, Lizbeth Winter MD    0.9% sodium chloride infusion, 50 mL/hr, IntraVENous, CONTINUOUS, Lizbeth Winter MD, Last Rate: 50 mL/hr at 03/15/22 2019, 50 mL/hr at 03/15/22 2019    heparin (porcine) injection 5,000 Units, 5,000 Units, SubCUTAneous, Q8H, Lizbeth Winter MD, 5,000 Units at 03/16/22 0507    Current Outpatient Medications   Medication Instructions    albuterol (PROVENTIL HFA, VENTOLIN HFA, PROAIR HFA) 90 mcg/actuation inhaler No dose, route, or frequency recorded.     albuterol-ipratropium (DUO-NEB) 2.5 mg-0.5 mg/3 ml nebu 3 mL, Nebulization, EVERY 6 HOURS    ALPRAZolam (XANAX) 0.25 mg, Oral, DAILY AS NEEDED    aspirin delayed-release 81 mg tablet Oral, DAILY    carBAMazepine (TEGRETOL) 200 mg, Oral, 2 TIMES DAILY    clopidogreL (PLAVIX) 75 mg, Oral, DAILY    ergocalciferol (VITAMIN D2) 50,000 Units, Oral, EVERY 7 DAYS, q7days wednesday    esomeprazole (NEXIUM) 40 mg, Oral    ferrous sulfate 325 mg, Oral, DAILY BEFORE BREAKFAST    folic acid (FOLVITE) 1 mg, Oral, DAILY    isosorbide dinitrate (ISORDIL) 10 mg, Oral, 2 TIMES DAILY    labetaloL (NORMODYNE) 300 mg, Oral, 2 TIMES DAILY    levETIRAcetam (KEPPRA) 500 mg, Oral, 2 TIMES DAILY    levothyroxine (SYNTHROID) 50 mcg, Oral, DAILY BEFORE BREAKFAST    NIFEdipine ER (ADALAT CC) 90 mg, Oral, DAILY    nortriptyline (PAMELOR) 50 mg, Oral, EVERY BEDTIME    Oxygen 5 Devices, AS NEEDED, Pt wears 5LPM as needed- states she uses it after an axiety attack     potassium chloride (KLOR-CON) 20 mEq pack 20 mEq, Oral, 2 TIMES DAILY WITH MEALS    rosuvastatin (CRESTOR) 10 mg tablet No dose, route, or frequency recorded.     sertraline (ZOLOFT) 50 mg, Oral, DAILY    Trelegy Ellipta 100-62.5-25 mcg inhaler 1 Puff, DAILY         Signed By: Karey Joshua     March 16, 2022 Griseofulvin Pregnancy And Lactation Text: This medication is Pregnancy Category X and is known to cause serious birth defects. It is unknown if this medication is excreted in breast milk but breast feeding should be avoided.

## 2022-03-22 NOTE — PROGRESS NOTES
Physician Progress Note      Janet Mclain  CSN #:                  [id number]  :                       1965  ADMIT DATE:       3/20/2022 4:35 PM  [address] [place] DATE:        3/22/2022 4:20 PM  RESPONDING  PROVIDER #:        Brittany LEUNG PA-C          QUERY TEXT:    Pt admitted with hypokalemia and has encephalopathy documented. If possible, please document in progress notes and discharge summary further specificity regarding the type of encephalopathy:    The medical record reflects the following:  Risk Factors: 64year old female, hypokalemia, episode of unresponsiveness  Clinical Indicators: 3/22 D/C summary - Encephalopathy (3/15/2022)  K+ 2.7-3.1-3.6  Treatment: IV Kcl, PO Potassium      Please email [email or web address] with any questions  Options provided:  -- Metabolic encephalopathy  -- Septic encephalopathy  -- Toxic encephalopathy  -- Encephalopathy ruled out  -- Other - I will add my own diagnosis  -- Disagree - Not applicable / Not valid  -- Disagree - Clinically unable to determine / Unknown  -- Refer to Clinical Documentation Reviewer    PROVIDER RESPONSE TEXT:    This patient has metabolic encephalopathy. Query created by:  Tatum Horvath on 3/22/2022 1:16 PM      Electronically signed by:  Loree Nunes PA-C 3/22/2022 5:51 PM

## 2022-03-22 NOTE — PROGRESS NOTES
Nephrology Consult    Patient: Janine Betts MRN: [id number]  SSN: [id number]    YOB: 1965  Age: 64 y.o.   Sex: female      Subjective:   Pt is seen in the room  Cr  3.3->2.92  On IVF       Past Medical History:   Diagnosis Date    Anxiety 10/2/2020    Carotid stenosis     Chronic anemia     Chronic respiratory failure (HCC)     COPD (chronic obstructive pulmonary disease) with emphysema ([facility] [address] 10/2/2020    Depression     Diastolic CHF ([facility] [address]     Dysphagia 10/2/2020    GERD (gastroesophageal reflux disease)     High cholesterol     Hypertension     Hypothyroid     Iron deficiency anemia 10/2/2020    Mitral valve regurgitation 10/2/2020    Renal artery stenosis (HCC)     Seizure disorder (HCC)      Past Surgical History:   Procedure Laterality Date    HX CAROTID ENDARTERECTOMY      HX CHOLECYSTECTOMY      HX RENAL ARTERY STENT      HX ROTATOR CUFF REPAIR Right     HX TUBAL LIGATION      IR THORACENTESIS CATH W IMAGE  11/24/2020    IR THORACENTESIS CATH W IMAGE  11/25/2020      Family History   Problem Relation Age of Onset    Hypertension Mother     Stroke Mother     Melanoma Mother     Stroke Father     Melanoma Brother     Melanoma Child      Social History     Tobacco Use    Smoking status: Former Smoker     Types: Cigarettes    Smokeless tobacco: Never Used    Tobacco comment: quit july 2020   Substance Use Topics    Alcohol use: Not Currently      Current Facility-Administered Medications   Medication Dose Route Frequency Provider Last Rate Last Admin    labetaloL (NORMODYNE) tablet 200 mg  200 mg Oral BID Curt Tovar MD   200 mg at 03/22/22 1007    albuterol-ipratropium (DUO-NEB) 2.5 MG-0.5 MG/3 ML  3 mL Nebulization Q6H RT Curt Tovar MD   3 mL at 03/22/22 0745    docusate sodium (COLACE) capsule 100 mg  100 mg Oral DAILY Gabriela Alba PA-C   100 mg at 03/22/22 1006    butalbital-acetaminophen-caffeine (FIORICET, Livia 62) -40 mg per tablet 1 Tablet  1 Tablet Oral Q6H PRN Marielena Romeo PA-C   1 Tablet at 03/22/22 1006    nicotine (NICODERM CQ) 14 mg/24 hr patch 1 Patch  1 Patch TransDERmal DAILY Marielena Romeo PA-C   1 Patch at 03/22/22 1008    cholecalciferol (VITAMIN D3) (1000 Units /25 mcg) tablet 1,000 Units  1,000 Units Oral DAILY Marielena Romeo PA-C   1,000 Units at 03/22/22 1006    0.9% sodium chloride infusion  100 mL/hr IntraVENous CONTINUOUS Yong Seaman  mL/hr at 03/21/22 1658 100 mL/hr at 03/21/22 1658    aspirin delayed-release tablet 81 mg  81 mg Oral DAILY Renetta Gutierrez MD   81 mg at 03/22/22 1005    ferrous sulfate tablet 325 mg  325 mg Oral ACB Renetta Gutierrez MD   325 mg at 97/47/24 8588    folic acid (FOLVITE) tablet 1 mg  1 mg Oral DAILY Renetta Gutierrez MD   1 mg at 03/22/22 1006    albuterol (PROVENTIL HFA, VENTOLIN HFA, PROAIR HFA) inhaler 2 Puff  2 Puff Inhalation Q4H PRN Renetta Gutierrez MD        clopidogreL (PLAVIX) tablet 75 mg  75 mg Oral DAILY Renetta Gutierrez MD   75 mg at 03/22/22 1006    spironolactone (ALDACTONE) tablet 25 mg  25 mg Oral BID Renetta Gutierrez MD   25 mg at 03/22/22 1006    fluticasone-umeclidin-vilanter (TRELEGY ELLIPTA) inhaler 1 Puff  1 Puff Inhalation DAILY Renetta Gutierrez MD   1 Puff at 03/22/22 0745    amLODIPine (NORVASC) tablet 5 mg  5 mg Oral DAILY Renetta Gutierrez MD   5 mg at 03/22/22 1006    sodium chloride (NS) flush 5-40 mL  5-40 mL IntraVENous Q8H Renetta Gutierrez MD   10 mL at 03/22/22 0541    sodium chloride (NS) flush 5-40 mL  5-40 mL IntraVENous PRN Renetta Gutierrez MD        acetaminophen (TYLENOL) tablet 650 mg  650 mg Oral Q6H PRN Renetta Gutierrez MD        Or   Yojana Meza acetaminophen (TYLENOL) suppository 650 mg  650 mg Rectal Q6H PRN Renetta Gutierrez MD        ondansetron (ZOFRAN ODT) tablet 4 mg  4 mg Oral Q6H PRN Renetta Gutierrez MD        Or  ondansetron (ZOFRAN) injection 4 mg  4 mg IntraVENous Q6H PRN Brianne Mallory MD        isosorbide mononitrate ER (IMDUR) tablet 30 mg  30 mg Oral DAILY Brianne Mallory MD   30 mg at 03/22/22 1005    cloNIDine HCL (CATAPRES) tablet 0.1 mg  0.1 mg Oral TID Brianne Mallory MD   0.1 mg at 03/22/22 1006    LORazepam (ATIVAN) tablet 0.5 mg  0.5 mg Oral BID PRN Brianne Mallory MD   0.5 mg at 03/21/22 1655    potassium chloride (KLOR-CON) packet for solution 20 mEq  20 mEq Oral BID WITH MEALS Brianne Mallory MD   20 mEq at 03/22/22 1005    traZODone (DESYREL) tablet 50 mg  50 mg Oral QHS Brianne Mallory MD   50 mg at 03/21/22 2127    nortriptyline (PAMELOR) capsule 50 mg  50 mg Oral DAILY WITH Timothy Crooks MD   50 mg at 03/21/22 1655        Allergies   Allergen Reactions    Sulfa (Sulfonamide Antibiotics) Anaphylaxis    Sulfamethoxazole-Trimethoprim Unknown (comments)       Review of Systems:  A comprehensive review of systems was negative except for that written in the History of Present Illness.     Objective:     Vitals:    03/22/22 0144 03/22/22 0312 03/22/22 0723 03/22/22 0747   BP:  (!) 150/80 (!) 152/82    Pulse:  82 86    Resp:  18 18    Temp:  98.3 °F (36.8 °C) 98.2 °F (36.8 °C)    SpO2: 98%   95%   Weight:       Height:            Physical Exam:  General: NAD  Eyes: sclera anicteric  Oral Cavity: No thrush or ulcers  Neck: no JVD  Chest: Fair bilateral air entry  Heart: normal sounds  Abdomen: soft and non tender   : no llamas  Lower Extremities: no edema  Skin: no rash  Neuro: intact  Psychiatric: non-depressed          Assessment:     Hospital Problems  Date Reviewed: 3/20/2022          Codes Class Noted POA    Hypokalemia ICD-10-CM: E87.6  ICD-9-CM: 276.8  10/2/2020 Yes              Plan:   1 acute kidney injury on chronic kidney disease stage IV.    -Etiology is prerenal azotemia.    -On admission creatinine was 2.2 and has gone up to 2.7-> 3.3.    -Cr has improved to 2.9 (with IVF). -She has underlying chronic kidney disease stage IV and her last creatinine was 2.4 in June 2021.    -Etiology of her chronic kidney disease hypertensive renal vascular disease and has history of left atrophic kidney.    -She is not on any potential nephrotoxins. -A repeat renal ultrasound reports left kidney 7.8 cm/medical renal disease/No hydronephrosis. -bland urine analysis and 2.4 g/g of  proteinuria. -on IV fluids. -f/u in office in 2 weeks    2. Hypertension.    -Blood pressure is okay. -She is on labetalol/amlodipine and clonidine which I will continue for now. 3.  Hypokalemia.     -K is 3.1->3.7.    -She was put on p.o. KCl.    4.  Anemia.    -History of iron deficiency anemia.    -Hemoglobin is 11.5.    -tsat 27, Ferritin  pending      Signed By: Stevie Aguilar MD     March 22, 2022

## 2022-03-24 PROBLEM — J44.9 COPD (CHRONIC OBSTRUCTIVE PULMONARY DISEASE) (HCC): Status: ACTIVE | Noted: 2022-03-15

## 2022-03-24 PROBLEM — G93.40 ENCEPHALOPATHY: Status: ACTIVE | Noted: 2022-03-15

## 2022-03-24 PROBLEM — G93.41 METABOLIC ENCEPHALOPATHY: Status: ACTIVE | Noted: 2022-03-15

## 2022-05-11 NOTE — ED PROVIDER NOTES
EMERGENCY DEPARTMENT HISTORY AND PHYSICAL EXAM      Date: 3/20/2022  Patient Name: Tiara Pollock    History of Presenting Illness     Chief Complaint   Patient presents with    Headache       History Provided By: Patient    HPI: Tiara Pollock, 64 y.o. female with a past medical history significant seizure presents to the ED with chief complaint of Headache  . 10year-old female history of headaches and seizures follows between [facility] and [facility].  She is also had frequent hypokalemic episodes needing admission. Patient had a headache today multiple seizures that she was aware of her self shaking. Upon arrival to the ER she does have a headache. No nausea vomiting. There are no other complaints, changes, or physical findings at this time. PCP: None    Current Facility-Administered Medications   Medication Dose Route Frequency Provider Last Rate Last Admin    0.9% sodium chloride with KCl 40 mEq/L infusion   IntraVENous ONCE Anette Smith MD         Current Outpatient Medications   Medication Sig Dispense Refill    hydrALAZINE (APRESOLINE) 25 mg tablet Take 1 Tablet by mouth three (3) times daily. 60 Tablet 0    predniSONE (DELTASONE) 20 mg tablet Take 20 mg by mouth daily (with breakfast). 10 Tablet 0    carBAMazepine (TEGretol) 200 mg tablet Take 200 mg by mouth two (2) times a day.  sertraline (ZOLOFT) 50 mg tablet Take 50 mg by mouth daily.  potassium chloride (KLOR-CON) 20 mEq pack Take 1 Packet by mouth two (2) times daily (with meals). 60 Packet 0    NIFEdipine ER (ADALAT CC) 90 mg ER tablet Take 90 mg by mouth daily.  ergocalciferol (Vitamin D2) 1,250 mcg (50,000 unit) capsule Take 50,000 Units by mouth every seven (7) days. q7days wednesday      isosorbide dinitrate (ISORDIL) 10 mg tablet Take 1 Tab by mouth two (2) times a day. 90 Tab 1    levothyroxine (SYNTHROID) 50 mcg tablet Take 1 Tab by mouth Daily (before breakfast).  30 Tab 0    esomeprazole Patient with known CAD s/p stent placement and CABG, which is controlled   Tele  Resume home meds   (NexIUM) 40 mg capsule Take 40 mg by mouth. Indications: Berry's esophagus      clopidogreL (PLAVIX) 75 mg tab Take 75 mg by mouth daily.  nortriptyline (PAMELOR) 50 mg capsule Take 50 mg by mouth nightly.  Trelegy Ellipta 100-62.5-25 mcg inhaler 1 Puff daily.  albuterol (PROVENTIL HFA, VENTOLIN HFA, PROAIR HFA) 90 mcg/actuation inhaler       rosuvastatin (CRESTOR) 10 mg tablet       Oxygen 5 Devices as needed. Pt wears 5LPM as needed- states she uses it after an axiety attack      albuterol-ipratropium (DUO-NEB) 2.5 mg-0.5 mg/3 ml nebu 3 mL by Nebulization route every six (6) hours. 120 Nebule 0    levETIRAcetam (Keppra) 500 mg tablet Take 500 mg by mouth two (2) times a day.  ferrous sulfate 325 mg (65 mg iron) tablet Take 325 mg by mouth Daily (before breakfast).  folic acid (FOLVITE) 1 mg tablet Take 1 mg by mouth daily.  aspirin delayed-release 81 mg tablet Take  by mouth daily.          Past History     Past Medical History:  Past Medical History:   Diagnosis Date    Anxiety 10/2/2020    Carotid stenosis     Chronic anemia     Chronic respiratory failure (HCC)     COPD (chronic obstructive pulmonary disease) with emphysema (HCC) 10/2/2020    Depression     Diastolic CHF (HCC)     Dysphagia 10/2/2020    GERD (gastroesophageal reflux disease)     High cholesterol     Hypertension     Hypothyroid     Iron deficiency anemia 10/2/2020    Mitral valve regurgitation 10/2/2020    Renal artery stenosis (HCC)     Seizure disorder (HCC)        Past Surgical History:  Past Surgical History:   Procedure Laterality Date    HX CAROTID ENDARTERECTOMY      HX CHOLECYSTECTOMY      HX RENAL ARTERY STENT      HX ROTATOR CUFF REPAIR Right     HX TUBAL LIGATION      IR THORACENTESIS CATH W IMAGE  11/24/2020    IR THORACENTESIS CATH W IMAGE  11/25/2020       Family History:  Family History   Problem Relation Age of Onset    Hypertension Mother     Stroke Mother    Richard Melanoma Mother     Stroke Father     Melanoma Brother     Melanoma Child        Social History:  Social History     Tobacco Use    Smoking status: Former Smoker     Types: Cigarettes    Smokeless tobacco: Never Used    Tobacco comment: quit july 2020   Substance Use Topics    Alcohol use: Not Currently    Drug use: Never       Allergies: Allergies   Allergen Reactions    Sulfa (Sulfonamide Antibiotics) Anaphylaxis    Sulfamethoxazole-Trimethoprim Unknown (comments)         Review of Systems   Review of Systems   Constitutional: Negative. Negative for chills, fatigue and fever. HENT: Negative. Negative for congestion, nosebleeds and sore throat. Eyes: Negative. Negative for pain, discharge and visual disturbance. Respiratory: Negative. Negative for cough, chest tightness and shortness of breath. Cardiovascular: Negative for chest pain, palpitations and leg swelling. Gastrointestinal: Negative for abdominal pain, blood in stool, constipation, diarrhea, nausea and vomiting. Endocrine: Negative. Genitourinary: Negative. Negative for difficulty urinating, dysuria, pelvic pain and vaginal bleeding. Musculoskeletal: Negative. Negative for arthralgias, back pain and myalgias. Skin: Negative. Negative for rash and wound. Allergic/Immunologic: Negative. Neurological: Positive for seizures and syncope. Negative for dizziness, weakness, numbness and headaches. Hematological: Negative. Psychiatric/Behavioral: Negative. Negative for agitation, confusion and suicidal ideas. All other systems reviewed and are negative. Physical Exam   Physical Exam  Vitals and nursing note reviewed. Exam conducted with a chaperone present. Constitutional:       Appearance: Normal appearance. She is normal weight. HENT:      Head: Normocephalic and atraumatic. Nose: Nose normal.      Mouth/Throat:      Mouth: Mucous membranes are moist.      Pharynx: Oropharynx is clear.    Eyes: Extraocular Movements: Extraocular movements intact. Conjunctiva/sclera: Conjunctivae normal.      Pupils: Pupils are equal, round, and reactive to light. Cardiovascular:      Rate and Rhythm: Normal rate and regular rhythm. Pulses: Normal pulses. Heart sounds: Normal heart sounds. Pulmonary:      Effort: Pulmonary effort is normal. No respiratory distress. Breath sounds: Normal breath sounds. Abdominal:      General: Abdomen is flat. Bowel sounds are normal. There is no distension. Palpations: Abdomen is soft. Tenderness: There is no abdominal tenderness. There is no guarding. Musculoskeletal:         General: No swelling, tenderness, deformity or signs of injury. Normal range of motion. Cervical back: Normal range of motion and neck supple. Right lower leg: No edema. Left lower leg: No edema. Skin:     General: Skin is warm and dry. Capillary Refill: Capillary refill takes less than 2 seconds. Findings: No lesion or rash. Neurological:      General: No focal deficit present. Mental Status: She is alert and oriented to person, place, and time. Mental status is at baseline. Cranial Nerves: No cranial nerve deficit. Psychiatric:         Mood and Affect: Mood normal.         Behavior: Behavior normal.         Thought Content:  Thought content normal.         Judgment: Judgment normal.         Diagnostic Study Results     Labs -     Recent Results (from the past 12 hour(s))   CBC WITH AUTOMATED DIFF    Collection Time: 03/20/22  5:41 PM   Result Value Ref Range    WBC 8.2 3.6 - 11.0 K/uL    RBC 3.41 (L) 3.80 - 5.20 M/uL    HGB 11.5 11.5 - 16.0 g/dL    HCT 32.1 (L) 35.0 - 47.0 %    MCV 94.1 80.0 - 99.0 FL    MCH 33.7 26.0 - 34.0 PG    MCHC 35.8 30.0 - 36.5 g/dL    RDW 12.5 11.5 - 14.5 %    PLATELET 701 888 - 924 K/uL    MPV 9.9 8.9 - 12.9 FL    NRBC 0.0 0.0  WBC    ABSOLUTE NRBC 0.00 0.00 - 0.01 K/uL    NEUTROPHILS 78 (H) 32 - 75 % LYMPHOCYTES 12 12 - 49 %    MONOCYTES 8 5 - 13 %    EOSINOPHILS 2 0 - 7 %    BASOPHILS 0 0 - 1 %    IMMATURE GRANULOCYTES 0 0 - 0.5 %    ABS. NEUTROPHILS 6.4 1.8 - 8.0 K/UL    ABS. LYMPHOCYTES 1.0 0.8 - 3.5 K/UL    ABS. MONOCYTES 0.7 0.0 - 1.0 K/UL    ABS. EOSINOPHILS 0.1 0.0 - 0.4 K/UL    ABS. BASOPHILS 0.0 0.0 - 0.1 K/UL    ABS. IMM. GRANS. 0.0 0.00 - 0.04 K/UL    DF AUTOMATED     METABOLIC PANEL, COMPREHENSIVE    Collection Time: 03/20/22  5:41 PM   Result Value Ref Range    Sodium 132 (L) 136 - 145 mmol/L    Potassium 2.7 (LL) 3.5 - 5.1 mmol/L    Chloride 95 (L) 97 - 108 mmol/L    CO2 27 21 - 32 mmol/L    Anion gap 10 5 - 15 mmol/L    Glucose 104 (H) 65 - 100 mg/dL    BUN 20 6 - 20 mg/dL    Creatinine 2.74 (H) 0.55 - 1.02 mg/dL    BUN/Creatinine ratio 7 (L) 12 - 20      GFR est AA 22 (L) >60 ml/min/1.73m2    GFR est non-AA 18 (L) >60 ml/min/1.73m2    Calcium 8.5 8.5 - 10.1 mg/dL    Bilirubin, total 0.2 0.2 - 1.0 mg/dL    AST (SGOT) 25 15 - 37 U/L    ALT (SGPT) 24 12 - 78 U/L    Alk. phosphatase 82 45 - 117 U/L    Protein, total 6.1 (L) 6.4 - 8.2 g/dL    Albumin 2.9 (L) 3.5 - 5.0 g/dL    Globulin 3.2 2.0 - 4.0 g/dL    A-G Ratio 0.9 (L) 1.1 - 2.2     NT-PRO BNP    Collection Time: 03/20/22  5:41 PM   Result Value Ref Range    NT pro-BNP 19,501 (H) <125 pg/mL   TROPONIN-HIGH SENSITIVITY    Collection Time: 03/20/22  5:41 PM   Result Value Ref Range    Troponin-High Sensitivity 28 0 - 51 ng/L         Radiologic Studies -   XR CHEST PORT   Final Result      CT HEAD WO CONT   Final Result      No acute abnormality. No change from recent prior imaging              CT Results  (Last 48 hours)               03/20/22 6379  CT HEAD WO CONT Final result    Impression:      No acute abnormality. No change from recent prior imaging               Narrative:      Technique: axial noncontrast images were obtained from the skull base through   the vertex.        All CT scans at this facility are performed using dose reduction optimization   techniques as appropriate to a performed exam including the following: Automated   exposure control, adjustments to the MA and/or KV according to patient size or   use of iterative reconstruction technique       Comparison: 3/15/2022. 4/13/2021       Findings:       Global cerebral volume loss, upper normal for age. There is mild compensatory   dilatation of the ventricles. No hemorrhage, midline shift, herniation or   hydrocephalus. There are foci of decreased attenuation in the supratentorial white matter. More   discrete hypodensity in the right frontal corona radiata is unchanged. Cortical   gray-white differentiation is maintained. Punctate cortical calcification at the   right posterior temporal lobe and right frontoparietal junction, stable and   likely vascular. There is coarse calcification within the renzo which is also   unchanged. Chronic lacunar infarct in the left thalamus       The paranasal sinuses and mastoid air cells are clear. The osseous structures   are intact. The orbits are unremarkable. CXR Results  (Last 48 hours)               03/20/22 1719  XR CHEST PORT Final result    Narrative:  Portable AP view at 1710 hours. Comparison 15 March. No acute process is evident. Clear lungs. No edema or infiltrate. Unchanged cardiomediastinal silhouette. Heart size normal.             Medical Decision Making and ED Course   I am the first provider for this patient. I reviewed the vital signs, available nursing notes, past medical history, past surgical history, family history and social history. Vital Signs-Reviewed the patient's vital signs. Patient Vitals for the past 12 hrs:   Temp Pulse Resp BP SpO2   03/20/22 1740     99 %   03/20/22 1646 99 °F (37.2 °C) 84 16 127/72 99 %       EKG interpretation:   EKG at 1813. Normal sinus rhythm. Prolonged QTC. T wave inversion present V2 V3. Reason rule out dysrhythmia.   Interpreted by ER physician. Records Reviewed: Previous Hospital chart. EMS run report      ED Course:   Initial assessment performed. The patients presenting problems have been discussed, and they are in agreement with the care plan formulated and outlined with them. I have encouraged them to ask questions as they arise throughout their visit. Orders Placed This Encounter    XR CHEST PORT     Standing Status:   Standing     Number of Occurrences:   1     Order Specific Question:   Reason for Exam     Answer:   dizzy    CT HEAD WO CONT     Standing Status:   Standing     Number of Occurrences:   1     Order Specific Question:   Transport     Answer:   Stretcher [5]     Order Specific Question:   Reason for Exam     Answer:   dizzy     Order Specific Question:   Decision Support Exception     Answer:   Emergency Medical Condition (MA) [1]    CBC WITH AUTOMATED DIFF     Standing Status:   Standing     Number of Occurrences:   1    COMPREHENSIVE METABOLIC PANEL     Standing Status:   Standing     Number of Occurrences:   1    PRO-BNP     Standing Status:   Standing     Number of Occurrences:   1    TROPONIN-HIGH SENSITIVITY     Standing Status:   Standing     Number of Occurrences:   1    POC GLUCOSE     Standing Status:   Standing     Number of Occurrences:   1    EKG, 12 LEAD, INITIAL     Standing Status:   Standing     Number of Occurrences:   1     Order Specific Question:   Reason for Exam:     Answer:   dizzy    DISCONTD: potassium chloride 10 mEq in 100 ml IVPB    DISCONTD: potassium chloride 10 mEq in 100 ml IVPB    potassium chloride SR (KLOR-CON 10) tablet 40 mEq    butalbital-acetaminophen-caffeine (FIORICET, ESGIC) -40 mg per tablet 2 Tablet    0.9% sodium chloride with KCl 40 mEq/L infusion                 Provider Notes (Medical Decision Making):   77-year-old female symptomatic from significant hypokalemia. Will admit for potassium replacement.   However patient is unwilling to take anything IV.  Will do oral supplementation and reassess. 96 Nano Tovar      Consults               Admitted    Procedures                       Disposition       Emergency Department Disposition:  Admitted      Diagnosis     Clinical Impression:   1. Hypokalemia        Attestations:    Calixto Cowart MD    Please note that this dictation was completed with SpinNote, the computer voice recognition software. Quite often unanticipated grammatical, syntax, homophones, and other interpretive errors are inadvertently transcribed by the computer software. Please disregard these errors. Please excuse any errors that have escaped final proofreading. Thank you.

## 2022-11-30 NOTE — PROGRESS NOTES
Hospitalist Progress Note    Subjective:   Daily Progress Note: 1/6/2021 7:53 AM    Hospital Course:     Admitted 1/3/2021. Patient is 54-year-old female with a PMH significant for anxiety/depression, COPD, chronic hypoxia on home oxygen, diastolic HF, dysphagia, GERD, HLD, HTN, chronic iron deficiency anemia, MVR and seizure disorder. She comes into the emergency room with complaints of syncopal episode. States she become dizzy when she stands up from sitting to walking. Associated symptoms of weakness throughout the day. On admission potassium 2.4. She was admitted for further management of syncope, hypokalemia, hypomagnesium, orthostatic hypotension. Cardiology and neurology consulted. Serial cardiac enzymes negative for ischemia hemoglobin stable at 10.1. Carotid duplex Dopplers right proximal ICA 59 to 70% stenosis, left ICA 50 to 79% stenosis worse on the left than right. CT angiogram pending. Vascular surgery consulted. Subjective: Follow-up examination of patient at the bedside. Requesting to restart her Nexium and sleeping medications. Otherwise no complaints.     Current Facility-Administered Medications   Medication Dose Route Frequency    clopidogreL (PLAVIX) tablet 75 mg  75 mg Oral DAILY    atorvastatin (LIPITOR) tablet 40 mg  40 mg Oral QHS    albuterol (PROVENTIL HFA, VENTOLIN HFA, PROAIR HFA) inhaler 2 Puff  2 Puff Inhalation Q4H PRN    aspirin delayed-release tablet 81 mg  81 mg Oral DAILY    carBAMazepine (TEGretol) tablet 200 mg  200 mg Oral Q12H    ferrous sulfate tablet 325 mg  325 mg Oral ACB    folic acid (FOLVITE) tablet 1 mg  1 mg Oral DAILY    isosorbide dinitrate (ISORDIL) tablet 40 mg  40 mg Oral TID    labetaloL (NORMODYNE) tablet 400 mg  400 mg Oral TID    levETIRAcetam (KEPPRA) tablet 500 mg  500 mg Oral BID    levothyroxine (SYNTHROID) tablet 25 mcg  25 mcg Oral ACB    NIFEdipine ER (PROCARDIA XL) tablet 60 mg  60 mg Oral DAILY    sertraline (ZOLOFT) tablet 25 mg  25 mg Oral DAILY    ALPRAZolam (XANAX) tablet 0.25 mg  0.25 mg Oral TID PRN    budesonide (PULMICORT) 500 mcg/2 ml nebulizer suspension  500 mcg Nebulization BID RT    hydrALAZINE (APRESOLINE) tablet 100 mg  100 mg Oral TID    sodium chloride (NS) flush 5-40 mL  5-40 mL IntraVENous Q8H    sodium chloride (NS) flush 5-40 mL  5-40 mL IntraVENous PRN    acetaminophen (TYLENOL) tablet 650 mg  650 mg Oral Q6H PRN    Or    acetaminophen (TYLENOL) suppository 650 mg  650 mg Rectal Q6H PRN    polyethylene glycol (MIRALAX) packet 17 g  17 g Oral DAILY PRN    promethazine (PHENERGAN) tablet 12.5 mg  12.5 mg Oral Q6H PRN    Or    ondansetron (ZOFRAN) injection 4 mg  4 mg IntraVENous Q6H PRN    enoxaparin (LOVENOX) injection 40 mg  40 mg SubCUTAneous DAILY        REVIEW OF SYSTEMS    Review of Systems   Constitutional: Positive for malaise/fatigue. HENT: Negative. Respiratory: Negative. Cardiovascular: Negative. Gastrointestinal: Negative. Genitourinary: Negative. Musculoskeletal: Negative. Neurological: Positive for dizziness, weakness and headaches. Objective:     Visit Vitals  /75 (BP 1 Location: Left arm, BP Patient Position: At rest)   Pulse 79   Temp 97.9 °F (36.6 °C)   Resp 17   Ht 5' 4.96\" (1.65 m)   Wt 45.4 kg (100 lb 1.4 oz)   SpO2 100%   BMI 16.68 kg/m²    O2 Flow Rate (L/min): 3 l/min O2 Device: Room air    Temp (24hrs), Av.3 °F (36.8 °C), Min:97.9 °F (36.6 °C), Max:98.6 °F (37 °C)      No intake/output data recorded. No intake/output data recorded. PHYSICAL EXAM:    Physical Exam  Constitutional:       Appearance: She is ill-appearing. HENT:      Mouth/Throat:      Mouth: Mucous membranes are moist.   Eyes:      Extraocular Movements: Extraocular movements intact. Neck:      Musculoskeletal: Normal range of motion. Cardiovascular:      Rate and Rhythm: Normal rate.    Pulmonary:      Effort: Pulmonary effort is normal.   Musculoskeletal: Normal range of motion. Skin:     General: Skin is warm and dry. Neurological:      Motor: Weakness present. Data Review    Recent Results (from the past 24 hour(s))   MAGNESIUM    Collection Time: 01/05/21  9:30 AM   Result Value Ref Range    Magnesium 1.9 1.6 - 2.4 mg/dL   METABOLIC PANEL, BASIC    Collection Time: 01/05/21  9:30 AM   Result Value Ref Range    Sodium 139 136 - 145 mmol/L    Potassium 4.0 3.5 - 5.1 mmol/L    Chloride 105 97 - 108 mmol/L    CO2 27 21 - 32 mmol/L    Anion gap 7 5 - 15 mmol/L    Glucose 121 (H) 65 - 100 mg/dL    BUN 15 6 - 20 mg/dL    Creatinine 1.03 (H) 0.55 - 1.02 mg/dL    BUN/Creatinine ratio 15 12 - 20      GFR est AA >60 >60 ml/min/1.73m2    GFR est non-AA 56 (L) >60 ml/min/1.73m2    Calcium 9.1 8.5 - 10.1 mg/dL   CBC W/O DIFF    Collection Time: 01/05/21 11:26 AM   Result Value Ref Range    WBC 6.2 3.6 - 11.0 K/uL    RBC 2.90 (L) 3.80 - 5.20 M/uL    HGB 9.1 (L) 11.5 - 16.0 g/dL    HCT 27.9 (L) 35.0 - 47.0 %    MCV 96.2 80.0 - 99.0 FL    MCH 31.4 26.0 - 34.0 PG    MCHC 32.6 30.0 - 36.5 g/dL    RDW 15.0 (H) 11.5 - 14.5 %    PLATELET 032 988 - 122 K/uL    MPV 9.7 8.9 - 60.9 FL   METABOLIC PANEL, BASIC    Collection Time: 01/06/21  6:20 AM   Result Value Ref Range    Sodium 136 136 - 145 mmol/L    Potassium 3.7 3.5 - 5.1 mmol/L    Chloride 103 97 - 108 mmol/L    CO2 28 21 - 32 mmol/L    Anion gap 5 5 - 15 mmol/L    Glucose 92 65 - 100 mg/dL    BUN 21 (H) 6 - 20 mg/dL    Creatinine 1.00 0.55 - 1.02 mg/dL    BUN/Creatinine ratio 21 (H) 12 - 20      GFR est AA >60 >60 ml/min/1.73m2    GFR est non-AA 58 (L) >60 ml/min/1.73m2    Calcium 8.9 8.5 - 10.1 mg/dL       CT HEAD WO CONT   Final Result   Impression:    1. No acute intracranial findings. XR CHEST PORT   Final Result   IMPRESSION: Considerations for baseline interstitial lung disease or mild   pulmonary edema.       CTA NECK    (Results Pending)       Active Problems:    Resistant hypertension (9/21/2020)      [facility] (9/21/2020)      CHF (congestive heart failure) ([facility] (10/2/2020)      Overview: With preserved lvef, diastolic dysfunction, mitral valve regurgitation       moderate. Mitral valve regurgitation (10/2/2020)      Anxiety (10/2/2020)      Seizure disorder ([facility] (10/2/2020)      COPD (chronic obstructive pulmonary disease) with emphysema ([facility] (10/2/2020)      Hypokalemia (10/2/2020)      Acquired hypothyroidism (10/2/2020)      Syncope (1/3/2021)        Assessment/Plan:     1. Syncope:  2. Orthostatic hypotension:  · CT of head negative for acute findings  · Echo LVEF 50 to 55% mild concentric hypertrophy, moderate grade 2 diastolic dysfunction, moderate MVR, moderate TVR, possible stenosis in the descending aorta  · Carotid Doppler studies right proximal ICA 50 to 79% blockage on higher end of stenosis, left proximal ICA 50 to 79% stenosis with heavier plaque than right  · Serial cardiac enzymes negative  · PT OT consulted recommending home with home health PT  · Cardiology consulted  · Neurology consulted  · Vascular surgery consulted moderate stenosis  · CT angiogram pending    3. Hypokalemia:  4. Hypomagnesium:  · Replete and monitor    5. Hypertension:  · Continue home hypertensive medications    6. Hypothyroid:  · Continue home medications Synthroid    7. CHF with preserved EF:  No signs or symptoms of exacerbation    8. HLD:  · Continue statin therapy    9. Seizure disorder:  Continue Tegretol and Keppra    10. Renal artery stenosis: Status post stent placement  · Continue statin and Plavix and aspirin    11. Mitral valve regurg:  · Cardiology consulting    12. COPD:  15.  Chronic hypoxia: On Home oxygen  · No signs or symptoms of exacerbation at this time. · Continue home neb Pulmicort    14.   Chronic iron deficiency anemia:  · Continue oral supplementation iron, folic acid    DVT Prophylaxis: Lovenox  Code Status: Full Code  POA/NOK: Stacia Sylvester at #[id number]  ______________________________________________________________________________  Time spent in direct care including coordination of service, review of data and examination: > 35 minutes  Care Plan discussed with: RN and patient  ______________________________________________________________________________    Quentin Jaime, NP    This is dictation was done by [employer], [employer] voice recognition software. Quite often unanticipated grammatical, syntax, homophones and other interpretive errors or inadvertently transcribed by the computer software. Please excuse errors that have escaped final proofreading. Thank you. 30-Nov-2022 16:37

## 2023-06-12 PROBLEM — I50.43 CHF (CONGESTIVE HEART FAILURE), NYHA CLASS I, ACUTE ON CHRONIC, COMBINED (HCC): Status: ACTIVE | Noted: 2023-06-12

## 2023-08-28 ENCOUNTER — OFFICE VISIT (OUTPATIENT)
Age: 58
End: 2023-08-28
Payer: MEDICARE

## 2023-08-28 VITALS
BODY MASS INDEX: 17.07 KG/M2 | WEIGHT: 100 LBS | SYSTOLIC BLOOD PRESSURE: 162 MMHG | OXYGEN SATURATION: 98 % | HEART RATE: 101 BPM | DIASTOLIC BLOOD PRESSURE: 90 MMHG | HEIGHT: 64 IN | RESPIRATION RATE: 16 BRPM

## 2023-08-28 DIAGNOSIS — R22.1 NECK MASS: Primary | ICD-10-CM

## 2023-08-28 DIAGNOSIS — Z72.0 TOBACCO USE: ICD-10-CM

## 2023-08-28 DIAGNOSIS — J02.9 SORE THROAT: ICD-10-CM

## 2023-08-28 PROCEDURE — 31575 DIAGNOSTIC LARYNGOSCOPY: CPT | Performed by: OTOLARYNGOLOGY

## 2023-08-28 PROCEDURE — 4004F PT TOBACCO SCREEN RCVD TLK: CPT | Performed by: OTOLARYNGOLOGY

## 2023-08-28 PROCEDURE — G8427 DOCREV CUR MEDS BY ELIG CLIN: HCPCS | Performed by: OTOLARYNGOLOGY

## 2023-08-28 PROCEDURE — G8419 CALC BMI OUT NRM PARAM NOF/U: HCPCS | Performed by: OTOLARYNGOLOGY

## 2023-08-28 PROCEDURE — 3017F COLORECTAL CA SCREEN DOC REV: CPT | Performed by: OTOLARYNGOLOGY

## 2023-08-28 PROCEDURE — 99204 OFFICE O/P NEW MOD 45 MIN: CPT | Performed by: OTOLARYNGOLOGY

## 2023-08-28 PROCEDURE — 3074F SYST BP LT 130 MM HG: CPT | Performed by: OTOLARYNGOLOGY

## 2023-08-28 PROCEDURE — 3078F DIAST BP <80 MM HG: CPT | Performed by: OTOLARYNGOLOGY

## 2023-08-28 RX ORDER — HALOPERIDOL 0.5 MG/1
0.5 TABLET ORAL 4 TIMES DAILY
COMMUNITY

## 2023-08-28 RX ORDER — HYDROMORPHONE HYDROCHLORIDE 2 MG/1
2 TABLET ORAL EVERY 6 HOURS PRN
COMMUNITY

## 2023-08-28 RX ORDER — SERTRALINE HYDROCHLORIDE 25 MG/1
25 TABLET, FILM COATED ORAL DAILY
COMMUNITY

## 2023-08-29 NOTE — PROGRESS NOTES
Otolaryngology-Head and Neck Surgery  New Patient Visit     Patient: Ita Aquino  YOB: 1965  MRN: [id number]  Date of Service:  8/28/2023    Chief Complaint:   Chief Complaint   Patient presents with    New Patient     Swollen Lymph nodes         History of Present Illness: Ita Aquino is a 62 y.o. female who presents today for discussion of neck adenopathy    Has a history of COPD on home O2 at night, 3 L, HTN, CKD  She is on hospice care and is DNR though states can revoke DNR if procedures necessary    She is ambulatory and without O2 in the office today    Hospice nurses have noticed right neck swelling perhaps a few months ago  Was treated with antibiotics without improvement    She has mild midline sore throat but otherwise denies dysphagia, voice change, hemoptysis, otalgia     + Smoking     Past Medical History:  Past Medical History:   Diagnosis Date    Anxiety 10/2/2020    Carotid stenosis     Chronic anemia     Chronic respiratory failure (HCC)     COPD (chronic obstructive pulmonary disease) with emphysema ([address]) 10/2/2020    Depression     Diastolic CHF ([address])     Dysphagia 10/2/2020    GERD (gastroesophageal reflux disease)     Headache 1997    High cholesterol     Hypertension     Hypothyroid     Iron deficiency anemia 10/2/2020    Mitral valve regurgitation 10/2/2020    Renal artery stenosis (HCC)     Seizure disorder ([address])        Past Surgical History:   Past Surgical History:   Procedure Laterality Date    CAROTID ENDARTERECTOMY      CHOLECYSTECTOMY      IR THORACENTESIS PLEURAL W IMAGING  11/24/2020    IR THORACENTESIS PLEURAL W IMAGING  11/25/2020    KIDNEY SURGERY      ROTATOR CUFF REPAIR Right     TUBAL LIGATION         Medications:   Current Outpatient Medications   Medication Instructions    albuterol sulfate HFA (PROVENTIL;VENTOLIN;PROAIR) 108 (90 Base) MCG/ACT inhaler 2 puffs, Inhalation, EVERY 4 HOURS PRN    amLODIPine (NORVASC) 5 mg, Oral, DAILY    aspirin 81 mg, Oral,

## 2023-09-27 ENCOUNTER — HOSPITAL ENCOUNTER (OUTPATIENT)
Facility: HOSPITAL | Age: 58
Discharge: HOME | End: 2023-09-30
Attending: OTOLARYNGOLOGY
Payer: MEDICARE

## 2023-09-27 DIAGNOSIS — Z72.0 TOBACCO USE: ICD-10-CM

## 2023-09-27 DIAGNOSIS — R22.1 NECK MASS: ICD-10-CM

## 2023-09-27 DIAGNOSIS — J02.9 SORE THROAT: ICD-10-CM

## 2023-09-27 PROCEDURE — 70490 CT SOFT TISSUE NECK W/O DYE: CPT

## 2023-10-16 ENCOUNTER — OFFICE VISIT (OUTPATIENT)
Age: 58
End: 2023-10-16
Payer: MEDICARE

## 2023-10-16 VITALS
HEIGHT: 64 IN | BODY MASS INDEX: 16.9 KG/M2 | HEART RATE: 95 BPM | SYSTOLIC BLOOD PRESSURE: 152 MMHG | OXYGEN SATURATION: 99 % | DIASTOLIC BLOOD PRESSURE: 90 MMHG | RESPIRATION RATE: 16 BRPM | WEIGHT: 99 LBS

## 2023-10-16 DIAGNOSIS — Z72.0 TOBACCO USE: ICD-10-CM

## 2023-10-16 DIAGNOSIS — R22.1 NECK MASS: Primary | ICD-10-CM

## 2023-10-16 PROCEDURE — G8427 DOCREV CUR MEDS BY ELIG CLIN: HCPCS | Performed by: OTOLARYNGOLOGY

## 2023-10-16 PROCEDURE — G8419 CALC BMI OUT NRM PARAM NOF/U: HCPCS | Performed by: OTOLARYNGOLOGY

## 2023-10-16 PROCEDURE — 3077F SYST BP >= 140 MM HG: CPT | Performed by: OTOLARYNGOLOGY

## 2023-10-16 PROCEDURE — 99213 OFFICE O/P EST LOW 20 MIN: CPT | Performed by: OTOLARYNGOLOGY

## 2023-10-16 PROCEDURE — G8484 FLU IMMUNIZE NO ADMIN: HCPCS | Performed by: OTOLARYNGOLOGY

## 2023-10-16 PROCEDURE — 3080F DIAST BP >= 90 MM HG: CPT | Performed by: OTOLARYNGOLOGY

## 2023-10-16 PROCEDURE — 4004F PT TOBACCO SCREEN RCVD TLK: CPT | Performed by: OTOLARYNGOLOGY

## 2023-10-16 PROCEDURE — 3017F COLORECTAL CA SCREEN DOC REV: CPT | Performed by: OTOLARYNGOLOGY

## 2023-10-16 NOTE — PROGRESS NOTES
submandibular gland   - We will plan follow up in 3-4 months  - Signs to watch out for reviewed         The patient was instructed to return to clinic if no improvement or progression of symptoms. Signs to watch out for reviewed.       Deena Sam MD   [address] [place] [address] ENT & Allergy  [address] [place] [address] [address] [place] [address]  Office Phone: [phone]

## 2024-04-18 ENCOUNTER — OFFICE VISIT (OUTPATIENT)
Age: 59
End: 2024-04-18
Payer: MEDICARE

## 2024-04-18 VITALS
SYSTOLIC BLOOD PRESSURE: 200 MMHG | OXYGEN SATURATION: 94 % | DIASTOLIC BLOOD PRESSURE: 115 MMHG | RESPIRATION RATE: 17 BRPM | BODY MASS INDEX: 17.75 KG/M2 | WEIGHT: 104 LBS | HEIGHT: 64 IN | TEMPERATURE: 98.7 F | HEART RATE: 111 BPM

## 2024-04-18 DIAGNOSIS — I65.23 BILATERAL CAROTID ARTERY STENOSIS: Primary | ICD-10-CM

## 2024-04-18 PROCEDURE — 4004F PT TOBACCO SCREEN RCVD TLK: CPT | Performed by: SURGERY

## 2024-04-18 PROCEDURE — 3077F SYST BP >= 140 MM HG: CPT | Performed by: SURGERY

## 2024-04-18 PROCEDURE — G8427 DOCREV CUR MEDS BY ELIG CLIN: HCPCS | Performed by: SURGERY

## 2024-04-18 PROCEDURE — 99212 OFFICE O/P EST SF 10 MIN: CPT | Performed by: SURGERY

## 2024-04-18 PROCEDURE — 3017F COLORECTAL CA SCREEN DOC REV: CPT | Performed by: SURGERY

## 2024-04-18 PROCEDURE — G8419 CALC BMI OUT NRM PARAM NOF/U: HCPCS | Performed by: SURGERY

## 2024-04-18 PROCEDURE — 3080F DIAST BP >= 90 MM HG: CPT | Performed by: SURGERY

## 2024-04-18 ASSESSMENT — PATIENT HEALTH QUESTIONNAIRE - PHQ9
1. LITTLE INTEREST OR PLEASURE IN DOING THINGS: NOT AT ALL
SUM OF ALL RESPONSES TO PHQ9 QUESTIONS 1 & 2: 0
SUM OF ALL RESPONSES TO PHQ QUESTIONS 1-9: 0
2. FEELING DOWN, DEPRESSED OR HOPELESS: NOT AT ALL
SUM OF ALL RESPONSES TO PHQ QUESTIONS 1-9: 0

## 2024-04-18 NOTE — PROGRESS NOTES
Identified pt with two pt identifiers (name and ). Reviewed chart in preparation for visit and have obtained necessary documentation.    Junie Neal is a 58 y.o. female  Chief Complaint   Patient presents with    Other     Poor circulation      BP (!) 200/115   Pulse (!) 111   Temp 98.7 °F (37.1 °C) (Oral)   Resp 17   Ht 1.626 m (5' 4\")   Wt 47.2 kg (104 lb)   SpO2 94%   BMI 17.85 kg/m²     1. Have you been to the ER, urgent care clinic since your last visit?  Hospitalized since your last visit?no    2. Have you seen or consulted any other health care providers outside of the [facility] since your last visit?  Include any pap smears or colon screening. No    Patient and provider made aware of elevated BP x2. Patient asymptomatic. Patient reminded to monitor BP, continue to take BP medications if prescribed, and follow up with PCP/Cardiologist.  Patient expressed understanding and agreement.

## 2024-04-26 NOTE — PROGRESS NOTES
Vascular History and Physical    Patient: Junie Neal  MRN: [id number]    YOB: 1965  Age: 58 y.o.  Sex: female     Chief Complaint:  Chief Complaint   Patient presents with    Other     Poor circulation        History of Present Illness: Junie Neal is a 58 y.o. very pleasant woman is here today to reassess vascular status.  Patient had a previous right carotid endarterectomy.  Patient has a history of heart failure and complaining of bilateral leg swelling getting worse.  Patient also developed worsening renal conditions.  Patient currently not getting dialysis.  Patient denies any chest pain shortness of breath.  Denies any new stroke symptoms.    Social History:  Social Connections: Not on file       Past Medical History:  Past Medical History:   Diagnosis Date    Anxiety 10/2/2020    Carotid stenosis     Chronic anemia     Chronic respiratory failure (HCC)     COPD (chronic obstructive pulmonary disease) with emphysema (HCC) 10/2/2020    Depression     Diastolic CHF (HCC)     Dysphagia 10/2/2020    GERD (gastroesophageal reflux disease)     Headache 1997    High cholesterol     Hypertension     Hypothyroid     Iron deficiency anemia 10/2/2020    Mitral valve regurgitation 10/2/2020    Renal artery stenosis (HCC)     Seizure disorder (HCC)        Surgical History:  Past Surgical History:   Procedure Laterality Date    CAROTID ENDARTERECTOMY      CHOLECYSTECTOMY      IR THORACENTESIS PLEURAL W IMAGING  11/24/2020    IR THORACENTESIS PLEURAL W IMAGING  11/25/2020    KIDNEY SURGERY      ROTATOR CUFF REPAIR Right     TUBAL LIGATION         Allergies:  Allergies   Allergen Reactions    Sulfa Antibiotics Anaphylaxis    Sulfamethoxazole-Trimethoprim      Other reaction(s): Unknown (comments)       Current Meds:  Current Outpatient Medications   Medication Sig Dispense Refill    HYDROmorphone (DILAUDID) 2 MG tablet Take 1 tablet by mouth every 6 hours as needed for Pain.      haloperidol (HALDOL)

## 2024-04-28 ENCOUNTER — HOSPITAL ENCOUNTER (INPATIENT)
Facility: HOSPITAL | Age: 59
LOS: 5 days | Discharge: HOME | End: 2024-05-04
Attending: EMERGENCY MEDICINE | Admitting: HOSPITALIST
Payer: MEDICARE

## 2024-04-28 ENCOUNTER — APPOINTMENT (OUTPATIENT)
Facility: HOSPITAL | Age: 59
End: 2024-04-28
Payer: MEDICARE

## 2024-04-28 DIAGNOSIS — I21.4 NSTEMI (NON-ST ELEVATED MYOCARDIAL INFARCTION) (HCC): ICD-10-CM

## 2024-04-28 DIAGNOSIS — F41.9 ANXIETY: ICD-10-CM

## 2024-04-28 DIAGNOSIS — J44.1 COPD EXACERBATION (HCC): Primary | ICD-10-CM

## 2024-04-28 DIAGNOSIS — J18.9 PNEUMONIA DUE TO INFECTIOUS ORGANISM, UNSPECIFIED LATERALITY, UNSPECIFIED PART OF LUNG: ICD-10-CM

## 2024-04-28 DIAGNOSIS — I50.9 CONGESTIVE HEART FAILURE, UNSPECIFIED HF CHRONICITY, UNSPECIFIED HEART FAILURE TYPE (HCC): ICD-10-CM

## 2024-04-28 DIAGNOSIS — R06.02 SHORTNESS OF BREATH: ICD-10-CM

## 2024-04-28 DIAGNOSIS — I50.43 CHF (CONGESTIVE HEART FAILURE), NYHA CLASS I, ACUTE ON CHRONIC, COMBINED (HCC): ICD-10-CM

## 2024-04-28 LAB
ALBUMIN SERPL-MCNC: 4 G/DL (ref 3.5–5)
ALBUMIN/GLOB SERPL: 1.3 (ref 1.1–2.2)
ALP SERPL-CCNC: 63 U/L (ref 45–117)
ALT SERPL-CCNC: 35 U/L (ref 12–78)
ANION GAP SERPL CALC-SCNC: 14 MMOL/L (ref 5–15)
AST SERPL W P-5'-P-CCNC: 17 U/L (ref 15–37)
BASOPHILS # BLD: 0 K/UL (ref 0–0.1)
BASOPHILS NFR BLD: 0 % (ref 0–1)
BILIRUB SERPL-MCNC: 0.6 MG/DL (ref 0.2–1)
BNP SERPL-MCNC: ABNORMAL PG/ML
BUN SERPL-MCNC: 76 MG/DL (ref 6–20)
BUN/CREAT SERPL: 12 (ref 12–20)
CA-I BLD-MCNC: 9 MG/DL (ref 8.5–10.1)
CHLORIDE SERPL-SCNC: 110 MMOL/L (ref 97–108)
CO2 SERPL-SCNC: 14 MMOL/L (ref 21–32)
CREAT SERPL-MCNC: 6.41 MG/DL (ref 0.55–1.02)
DIFFERENTIAL METHOD BLD: ABNORMAL
EOSINOPHIL # BLD: 0 K/UL (ref 0–0.4)
EOSINOPHIL NFR BLD: 0 % (ref 0–7)
ERYTHROCYTE [DISTWIDTH] IN BLOOD BY AUTOMATED COUNT: 15.7 % (ref 11.5–14.5)
GLOBULIN SER CALC-MCNC: 3 G/DL (ref 2–4)
GLUCOSE SERPL-MCNC: 121 MG/DL (ref 65–100)
HCT VFR BLD AUTO: 27.6 % (ref 35–47)
HGB BLD-MCNC: 9 G/DL (ref 11.5–16)
IMM GRANULOCYTES # BLD AUTO: 0.3 K/UL (ref 0–0.04)
IMM GRANULOCYTES NFR BLD AUTO: 2 % (ref 0–0.5)
LYMPHOCYTES # BLD: 0.2 K/UL (ref 0.8–3.5)
LYMPHOCYTES NFR BLD: 2 % (ref 12–49)
MAGNESIUM SERPL-MCNC: 2.7 MG/DL (ref 1.6–2.4)
MCH RBC QN AUTO: 33.8 PG (ref 26–34)
MCHC RBC AUTO-ENTMCNC: 32.6 G/DL (ref 30–36.5)
MCV RBC AUTO: 103.8 FL (ref 80–99)
MONOCYTES # BLD: 0.2 K/UL (ref 0–1)
MONOCYTES NFR BLD: 1 % (ref 5–13)
NEUTS SEG # BLD: 14.5 K/UL (ref 1.8–8)
NEUTS SEG NFR BLD: 95 % (ref 32–75)
NRBC # BLD: 0 K/UL (ref 0–0.01)
NRBC BLD-RTO: 0 PER 100 WBC
PLATELET # BLD AUTO: 217 K/UL (ref 150–400)
PMV BLD AUTO: 10.5 FL (ref 8.9–12.9)
POTASSIUM SERPL-SCNC: 3.8 MMOL/L (ref 3.5–5.1)
PROT SERPL-MCNC: 7 G/DL (ref 6.4–8.2)
RBC # BLD AUTO: 2.66 M/UL (ref 3.8–5.2)
SODIUM SERPL-SCNC: 138 MMOL/L (ref 136–145)
TROPONIN I SERPL HS-MCNC: 128 NG/L (ref 0–51)
WBC # BLD AUTO: 15.2 K/UL (ref 3.6–11)

## 2024-04-28 PROCEDURE — 80053 COMPREHEN METABOLIC PANEL: CPT

## 2024-04-28 PROCEDURE — 96375 TX/PRO/DX INJ NEW DRUG ADDON: CPT

## 2024-04-28 PROCEDURE — 71045 X-RAY EXAM CHEST 1 VIEW: CPT

## 2024-04-28 PROCEDURE — 96374 THER/PROPH/DIAG INJ IV PUSH: CPT

## 2024-04-28 PROCEDURE — 6360000002 HC RX W HCPCS: Performed by: EMERGENCY MEDICINE

## 2024-04-28 PROCEDURE — 83880 ASSAY OF NATRIURETIC PEPTIDE: CPT

## 2024-04-28 PROCEDURE — 85025 COMPLETE CBC W/AUTO DIFF WBC: CPT

## 2024-04-28 PROCEDURE — 36415 COLL VENOUS BLD VENIPUNCTURE: CPT

## 2024-04-28 PROCEDURE — 99285 EMERGENCY DEPT VISIT HI MDM: CPT

## 2024-04-28 PROCEDURE — 93005 ELECTROCARDIOGRAM TRACING: CPT | Performed by: EMERGENCY MEDICINE

## 2024-04-28 PROCEDURE — 83735 ASSAY OF MAGNESIUM: CPT

## 2024-04-28 PROCEDURE — 94761 N-INVAS EAR/PLS OXIMETRY MLT: CPT

## 2024-04-28 PROCEDURE — 94640 AIRWAY INHALATION TREATMENT: CPT

## 2024-04-28 PROCEDURE — 84484 ASSAY OF TROPONIN QUANT: CPT

## 2024-04-28 PROCEDURE — 2580000003 HC RX 258: Performed by: EMERGENCY MEDICINE

## 2024-04-28 RX ORDER — ALBUTEROL SULFATE 2.5 MG/.5ML
15 SOLUTION RESPIRATORY (INHALATION)
Status: DISCONTINUED | OUTPATIENT
Start: 2024-04-28 | End: 2024-04-28

## 2024-04-28 RX ORDER — FUROSEMIDE 10 MG/ML
60 INJECTION INTRAMUSCULAR; INTRAVENOUS
Status: COMPLETED | OUTPATIENT
Start: 2024-04-28 | End: 2024-04-28

## 2024-04-28 RX ORDER — METHYLPREDNISOLONE SODIUM SUCCINATE 40 MG/ML
40 INJECTION, POWDER, LYOPHILIZED, FOR SOLUTION INTRAMUSCULAR; INTRAVENOUS DAILY
Status: DISCONTINUED | OUTPATIENT
Start: 2024-04-28 | End: 2024-04-29

## 2024-04-28 RX ADMIN — FUROSEMIDE 60 MG: 10 INJECTION, SOLUTION INTRAMUSCULAR; INTRAVENOUS at 22:57

## 2024-04-28 RX ADMIN — ALBUTEROL SULFATE 15 MG: 2.5 SOLUTION RESPIRATORY (INHALATION) at 20:34

## 2024-04-28 RX ADMIN — METHYLPREDNISOLONE SODIUM SUCCINATE 40 MG: 40 INJECTION INTRAMUSCULAR; INTRAVENOUS at 21:50

## 2024-04-28 RX ADMIN — CEFTRIAXONE SODIUM 1000 MG: 1 INJECTION, POWDER, FOR SOLUTION INTRAMUSCULAR; INTRAVENOUS at 22:57

## 2024-04-28 ASSESSMENT — PAIN - FUNCTIONAL ASSESSMENT: PAIN_FUNCTIONAL_ASSESSMENT: NONE - DENIES PAIN

## 2024-04-28 NOTE — ED TRIAGE NOTES
Pt is end stage renal on hospice no diaylsis. Here for worsening sob. She did call her hospice care team before coming.

## 2024-04-29 ENCOUNTER — APPOINTMENT (OUTPATIENT)
Facility: HOSPITAL | Age: 59
End: 2024-04-29
Attending: INTERNAL MEDICINE
Payer: MEDICARE

## 2024-04-29 ENCOUNTER — APPOINTMENT (OUTPATIENT)
Facility: HOSPITAL | Age: 59
End: 2024-04-29
Attending: HOSPITALIST
Payer: MEDICARE

## 2024-04-29 PROBLEM — R06.02 SHORTNESS OF BREATH: Status: ACTIVE | Noted: 2024-04-29

## 2024-04-29 LAB
25(OH)D3 SERPL-MCNC: 14 NG/ML (ref 30–100)
ALBUMIN SERPL-MCNC: 3.6 G/DL (ref 3.5–5)
ALBUMIN/GLOB SERPL: 1.2 (ref 1.1–2.2)
ALP SERPL-CCNC: 56 U/L (ref 45–117)
ALT SERPL-CCNC: 27 U/L (ref 12–78)
ANION GAP SERPL CALC-SCNC: 10 MMOL/L (ref 5–15)
AST SERPL W P-5'-P-CCNC: 6 U/L (ref 15–37)
BASOPHILS # BLD: 0 K/UL (ref 0–0.1)
BASOPHILS NFR BLD: 0 % (ref 0–1)
BILIRUB SERPL-MCNC: 0.5 MG/DL (ref 0.2–1)
BUN SERPL-MCNC: 83 MG/DL (ref 6–20)
BUN/CREAT SERPL: 13 (ref 12–20)
CA-I BLD-MCNC: 9 MG/DL (ref 8.5–10.1)
CHLORIDE SERPL-SCNC: 112 MMOL/L (ref 97–108)
CK SERPL-CCNC: 36 U/L (ref 26–192)
CO2 SERPL-SCNC: 17 MMOL/L (ref 21–32)
CREAT SERPL-MCNC: 6.42 MG/DL (ref 0.55–1.02)
DIFFERENTIAL METHOD BLD: ABNORMAL
EOSINOPHIL # BLD: 0 K/UL (ref 0–0.4)
EOSINOPHIL NFR BLD: 0 % (ref 0–7)
ERYTHROCYTE [DISTWIDTH] IN BLOOD BY AUTOMATED COUNT: 15.1 % (ref 11.5–14.5)
FERRITIN SERPL-MCNC: 88 NG/ML (ref 8–252)
FOLATE SERPL-MCNC: 9 NG/ML (ref 5–21)
GLOBULIN SER CALC-MCNC: 3.1 G/DL (ref 2–4)
GLUCOSE SERPL-MCNC: 138 MG/DL (ref 65–100)
HCT VFR BLD AUTO: 28.4 % (ref 35–47)
HGB BLD-MCNC: 9.4 G/DL (ref 11.5–16)
IMM GRANULOCYTES # BLD AUTO: 0.2 K/UL (ref 0–0.04)
IMM GRANULOCYTES NFR BLD AUTO: 2 % (ref 0–0.5)
IRON SATN MFR SERPL: 24 % (ref 20–50)
IRON SERPL-MCNC: 63 UG/DL (ref 35–150)
LYMPHOCYTES # BLD: 0.3 K/UL (ref 0.8–3.5)
LYMPHOCYTES NFR BLD: 3 % (ref 12–49)
MCH RBC QN AUTO: 33.7 PG (ref 26–34)
MCHC RBC AUTO-ENTMCNC: 33.1 G/DL (ref 30–36.5)
MCV RBC AUTO: 101.8 FL (ref 80–99)
MONOCYTES # BLD: 0.1 K/UL (ref 0–1)
MONOCYTES NFR BLD: 1 % (ref 5–13)
NEUTS SEG # BLD: 8.9 K/UL (ref 1.8–8)
NEUTS SEG NFR BLD: 94 % (ref 32–75)
NRBC # BLD: 0 K/UL (ref 0–0.01)
NRBC BLD-RTO: 0 PER 100 WBC
PHOSPHATE SERPL-MCNC: 5.6 MG/DL (ref 2.6–4.7)
PLATELET # BLD AUTO: 206 K/UL (ref 150–400)
PMV BLD AUTO: 10.5 FL (ref 8.9–12.9)
POTASSIUM SERPL-SCNC: 3.3 MMOL/L (ref 3.5–5.1)
PROT SERPL-MCNC: 6.7 G/DL (ref 6.4–8.2)
RBC # BLD AUTO: 2.79 M/UL (ref 3.8–5.2)
SODIUM SERPL-SCNC: 139 MMOL/L (ref 136–145)
T4 FREE SERPL-MCNC: 0.5 NG/DL (ref 0.8–1.5)
TIBC SERPL-MCNC: 262 UG/DL (ref 250–450)
TROPONIN I SERPL HS-MCNC: 105 NG/L (ref 0–51)
TSH SERPL DL<=0.05 MIU/L-ACNC: 1.02 UIU/ML (ref 0.36–3.74)
URATE SERPL-MCNC: 9.4 MG/DL (ref 2.6–6)
VIT B12 SERPL-MCNC: 619 PG/ML (ref 193–986)
WBC # BLD AUTO: 9.5 K/UL (ref 3.6–11)

## 2024-04-29 PROCEDURE — 82306 VITAMIN D 25 HYDROXY: CPT

## 2024-04-29 PROCEDURE — 84100 ASSAY OF PHOSPHORUS: CPT

## 2024-04-29 PROCEDURE — 82746 ASSAY OF FOLIC ACID SERUM: CPT

## 2024-04-29 PROCEDURE — 87340 HEPATITIS B SURFACE AG IA: CPT

## 2024-04-29 PROCEDURE — 6360000002 HC RX W HCPCS: Performed by: INTERNAL MEDICINE

## 2024-04-29 PROCEDURE — 6370000000 HC RX 637 (ALT 250 FOR IP): Performed by: HOSPITALIST

## 2024-04-29 PROCEDURE — 36415 COLL VENOUS BLD VENIPUNCTURE: CPT

## 2024-04-29 PROCEDURE — 94640 AIRWAY INHALATION TREATMENT: CPT

## 2024-04-29 PROCEDURE — 6370000000 HC RX 637 (ALT 250 FOR IP): Performed by: INTERNAL MEDICINE

## 2024-04-29 PROCEDURE — 2580000003 HC RX 258: Performed by: HOSPITALIST

## 2024-04-29 PROCEDURE — 2060000000 HC ICU INTERMEDIATE R&B

## 2024-04-29 PROCEDURE — 6360000002 HC RX W HCPCS: Performed by: HOSPITALIST

## 2024-04-29 PROCEDURE — C1750 CATH, HEMODIALYSIS,LONG-TERM: HCPCS

## 2024-04-29 PROCEDURE — 6360000002 HC RX W HCPCS: Performed by: STUDENT IN AN ORGANIZED HEALTH CARE EDUCATION/TRAINING PROGRAM

## 2024-04-29 PROCEDURE — 76937 US GUIDE VASCULAR ACCESS: CPT

## 2024-04-29 PROCEDURE — 90935 HEMODIALYSIS ONE EVALUATION: CPT

## 2024-04-29 PROCEDURE — 84550 ASSAY OF BLOOD/URIC ACID: CPT

## 2024-04-29 PROCEDURE — 85025 COMPLETE CBC W/AUTO DIFF WBC: CPT

## 2024-04-29 PROCEDURE — 82728 ASSAY OF FERRITIN: CPT

## 2024-04-29 PROCEDURE — 84443 ASSAY THYROID STIM HORMONE: CPT

## 2024-04-29 PROCEDURE — 2709999900 HC NON-CHARGEABLE SUPPLY

## 2024-04-29 PROCEDURE — 86706 HEP B SURFACE ANTIBODY: CPT

## 2024-04-29 PROCEDURE — B5181ZA FLUOROSCOPY OF SUPERIOR VENA CAVA USING LOW OSMOLAR CONTRAST, GUIDANCE: ICD-10-PCS | Performed by: PHYSICIAN ASSISTANT

## 2024-04-29 PROCEDURE — 82607 VITAMIN B-12: CPT

## 2024-04-29 PROCEDURE — 82550 ASSAY OF CK (CPK): CPT

## 2024-04-29 PROCEDURE — 80053 COMPREHEN METABOLIC PANEL: CPT

## 2024-04-29 PROCEDURE — 02HV33Z INSERTION OF INFUSION DEVICE INTO SUPERIOR VENA CAVA, PERCUTANEOUS APPROACH: ICD-10-PCS | Performed by: PHYSICIAN ASSISTANT

## 2024-04-29 PROCEDURE — 36558 INSERT TUNNELED CV CATH: CPT

## 2024-04-29 PROCEDURE — 2700000000 HC OXYGEN THERAPY PER DAY

## 2024-04-29 PROCEDURE — 84484 ASSAY OF TROPONIN QUANT: CPT

## 2024-04-29 PROCEDURE — 94761 N-INVAS EAR/PLS OXIMETRY MLT: CPT

## 2024-04-29 PROCEDURE — 83540 ASSAY OF IRON: CPT

## 2024-04-29 PROCEDURE — 84439 ASSAY OF FREE THYROXINE: CPT

## 2024-04-29 PROCEDURE — 86704 HEP B CORE ANTIBODY TOTAL: CPT

## 2024-04-29 PROCEDURE — 5A1D70Z PERFORMANCE OF URINARY FILTRATION, INTERMITTENT, LESS THAN 6 HOURS PER DAY: ICD-10-PCS | Performed by: INTERNAL MEDICINE

## 2024-04-29 RX ORDER — AMLODIPINE BESYLATE 5 MG/1
5 TABLET ORAL 2 TIMES DAILY
Status: DISCONTINUED | OUTPATIENT
Start: 2024-04-29 | End: 2024-05-04 | Stop reason: HOSPADM

## 2024-04-29 RX ORDER — HYDRALAZINE HYDROCHLORIDE 20 MG/ML
20 INJECTION INTRAMUSCULAR; INTRAVENOUS ONCE
Status: COMPLETED | OUTPATIENT
Start: 2024-04-29 | End: 2024-04-29

## 2024-04-29 RX ORDER — CARVEDILOL 12.5 MG/1
12.5 TABLET ORAL 2 TIMES DAILY
Status: DISCONTINUED | OUTPATIENT
Start: 2024-04-29 | End: 2024-05-01

## 2024-04-29 RX ORDER — PSEUDOEPHED/ACETAMINOPH/DIPHEN 30MG-500MG
500 TABLET ORAL 3 TIMES DAILY PRN
COMMUNITY
Start: 2024-04-11

## 2024-04-29 RX ORDER — TRAZODONE HYDROCHLORIDE 50 MG/1
100 TABLET ORAL NIGHTLY
Status: DISCONTINUED | OUTPATIENT
Start: 2024-04-29 | End: 2024-05-04 | Stop reason: HOSPADM

## 2024-04-29 RX ORDER — AZITHROMYCIN 500 MG/1
500 TABLET, FILM COATED ORAL ONCE
Status: COMPLETED | OUTPATIENT
Start: 2024-04-29 | End: 2024-04-29

## 2024-04-29 RX ORDER — CARVEDILOL 3.12 MG/1
6.25 TABLET ORAL 2 TIMES DAILY
Status: DISCONTINUED | OUTPATIENT
Start: 2024-04-29 | End: 2024-04-29

## 2024-04-29 RX ORDER — LORAZEPAM 1 MG/1
1 TABLET ORAL EVERY 6 HOURS PRN
Status: ON HOLD | COMMUNITY
Start: 2024-04-19 | End: 2024-05-04 | Stop reason: HOSPADM

## 2024-04-29 RX ORDER — ASPIRIN 81 MG/1
81 TABLET ORAL DAILY
Status: DISCONTINUED | OUTPATIENT
Start: 2024-04-29 | End: 2024-05-04 | Stop reason: HOSPADM

## 2024-04-29 RX ORDER — CARVEDILOL 3.12 MG/1
6.25 TABLET ORAL ONCE
Status: COMPLETED | OUTPATIENT
Start: 2024-04-29 | End: 2024-04-29

## 2024-04-29 RX ORDER — TRAZODONE HYDROCHLORIDE 100 MG/1
100 TABLET ORAL NIGHTLY
COMMUNITY
Start: 2024-04-11

## 2024-04-29 RX ORDER — LORAZEPAM 1 MG/1
1 TABLET ORAL EVERY 6 HOURS PRN
Status: DISCONTINUED | OUTPATIENT
Start: 2024-04-29 | End: 2024-05-04 | Stop reason: HOSPADM

## 2024-04-29 RX ORDER — DEXAMETHASONE 4 MG/1
4 TABLET ORAL DAILY
Status: ON HOLD | COMMUNITY
Start: 2024-04-25 | End: 2024-05-04 | Stop reason: HOSPADM

## 2024-04-29 RX ORDER — HYDROMORPHONE HYDROCHLORIDE 2 MG/1
2 TABLET ORAL EVERY 6 HOURS PRN
Status: DISCONTINUED | OUTPATIENT
Start: 2024-04-29 | End: 2024-05-04 | Stop reason: HOSPADM

## 2024-04-29 RX ORDER — HEPARIN SODIUM 1000 [USP'U]/ML
3200 INJECTION, SOLUTION INTRAVENOUS; SUBCUTANEOUS PRN
Status: DISCONTINUED | OUTPATIENT
Start: 2024-04-29 | End: 2024-05-04 | Stop reason: HOSPADM

## 2024-04-29 RX ORDER — BUTALBITAL, ACETAMINOPHEN, CAFFEINE AND CODEINE PHOSPHATE 50; 325; 40; 30 MG/1; MG/1; MG/1; MG/1
1 CAPSULE ORAL EVERY 6 HOURS PRN
COMMUNITY
Start: 2024-03-01

## 2024-04-29 RX ORDER — FUROSEMIDE 10 MG/ML
40 INJECTION INTRAMUSCULAR; INTRAVENOUS 2 TIMES DAILY
Status: DISCONTINUED | OUTPATIENT
Start: 2024-04-29 | End: 2024-04-29

## 2024-04-29 RX ORDER — SODIUM CHLORIDE 0.9 % (FLUSH) 0.9 %
5-40 SYRINGE (ML) INJECTION EVERY 12 HOURS SCHEDULED
Status: DISCONTINUED | OUTPATIENT
Start: 2024-04-29 | End: 2024-05-04 | Stop reason: HOSPADM

## 2024-04-29 RX ORDER — IPRATROPIUM BROMIDE AND ALBUTEROL SULFATE 2.5; .5 MG/3ML; MG/3ML
1 SOLUTION RESPIRATORY (INHALATION) EVERY 6 HOURS PRN
Status: DISCONTINUED | OUTPATIENT
Start: 2024-04-29 | End: 2024-05-04 | Stop reason: HOSPADM

## 2024-04-29 RX ORDER — NITROGLYCERIN 80 MG/1
1 PATCH TRANSDERMAL DAILY
Status: DISCONTINUED | OUTPATIENT
Start: 2024-04-29 | End: 2024-05-04 | Stop reason: HOSPADM

## 2024-04-29 RX ORDER — AZITHROMYCIN 500 MG/1
250 TABLET, FILM COATED ORAL DAILY
Status: DISPENSED | OUTPATIENT
Start: 2024-04-30 | End: 2024-05-04

## 2024-04-29 RX ORDER — HYDRALAZINE HYDROCHLORIDE 50 MG/1
50 TABLET, FILM COATED ORAL 2 TIMES DAILY
Status: DISCONTINUED | OUTPATIENT
Start: 2024-04-29 | End: 2024-04-30

## 2024-04-29 RX ORDER — ACETAMINOPHEN 650 MG/1
650 SUPPOSITORY RECTAL EVERY 6 HOURS PRN
Status: DISCONTINUED | OUTPATIENT
Start: 2024-04-29 | End: 2024-05-04 | Stop reason: HOSPADM

## 2024-04-29 RX ORDER — ONDANSETRON 2 MG/ML
4 INJECTION INTRAMUSCULAR; INTRAVENOUS EVERY 6 HOURS PRN
Status: DISCONTINUED | OUTPATIENT
Start: 2024-04-29 | End: 2024-05-04 | Stop reason: HOSPADM

## 2024-04-29 RX ORDER — ONDANSETRON 4 MG/1
4 TABLET, ORALLY DISINTEGRATING ORAL EVERY 8 HOURS PRN
Status: DISCONTINUED | OUTPATIENT
Start: 2024-04-29 | End: 2024-05-04 | Stop reason: HOSPADM

## 2024-04-29 RX ORDER — SODIUM CHLORIDE 0.9 % (FLUSH) 0.9 %
5-40 SYRINGE (ML) INJECTION PRN
Status: DISCONTINUED | OUTPATIENT
Start: 2024-04-29 | End: 2024-05-04 | Stop reason: HOSPADM

## 2024-04-29 RX ORDER — ALBUTEROL SULFATE 90 UG/1
2 AEROSOL, METERED RESPIRATORY (INHALATION) EVERY 4 HOURS PRN
Status: DISCONTINUED | OUTPATIENT
Start: 2024-04-29 | End: 2024-05-04 | Stop reason: HOSPADM

## 2024-04-29 RX ORDER — SPIRONOLACTONE 50 MG/1
50 TABLET, FILM COATED ORAL DAILY
Status: ON HOLD | COMMUNITY
Start: 2024-04-20 | End: 2024-05-04 | Stop reason: HOSPADM

## 2024-04-29 RX ORDER — NICOTINE 21 MG/24HR
1 PATCH, TRANSDERMAL 24 HOURS TRANSDERMAL DAILY
Status: DISCONTINUED | OUTPATIENT
Start: 2024-04-29 | End: 2024-05-04 | Stop reason: HOSPADM

## 2024-04-29 RX ORDER — BUTALBITAL, ACETAMINOPHEN AND CAFFEINE 50; 325; 40 MG/1; MG/1; MG/1
1 TABLET ORAL EVERY 6 HOURS PRN
Status: DISCONTINUED | OUTPATIENT
Start: 2024-04-29 | End: 2024-05-04 | Stop reason: HOSPADM

## 2024-04-29 RX ORDER — FENTANYL CITRATE 50 UG/ML
INJECTION, SOLUTION INTRAMUSCULAR; INTRAVENOUS PRN
Status: COMPLETED | OUTPATIENT
Start: 2024-04-29 | End: 2024-04-29

## 2024-04-29 RX ORDER — HYDROXYZINE HYDROCHLORIDE 25 MG/1
25 TABLET, FILM COATED ORAL
Status: DISCONTINUED | OUTPATIENT
Start: 2024-04-29 | End: 2024-05-04 | Stop reason: HOSPADM

## 2024-04-29 RX ORDER — AMLODIPINE BESYLATE 5 MG/1
5 TABLET ORAL NIGHTLY
Status: DISCONTINUED | OUTPATIENT
Start: 2024-04-29 | End: 2024-04-29

## 2024-04-29 RX ORDER — HEPARIN SODIUM 5000 [USP'U]/ML
5000 INJECTION, SOLUTION INTRAVENOUS; SUBCUTANEOUS 2 TIMES DAILY
Status: DISCONTINUED | OUTPATIENT
Start: 2024-05-01 | End: 2024-05-04 | Stop reason: HOSPADM

## 2024-04-29 RX ORDER — SODIUM CHLORIDE 9 MG/ML
INJECTION, SOLUTION INTRAVENOUS PRN
Status: DISCONTINUED | OUTPATIENT
Start: 2024-04-29 | End: 2024-05-04 | Stop reason: HOSPADM

## 2024-04-29 RX ORDER — ACETAMINOPHEN 325 MG/1
650 TABLET ORAL EVERY 6 HOURS PRN
Status: DISCONTINUED | OUTPATIENT
Start: 2024-04-29 | End: 2024-05-02 | Stop reason: SDUPTHER

## 2024-04-29 RX ORDER — IPRATROPIUM BROMIDE AND ALBUTEROL SULFATE 2.5; .5 MG/3ML; MG/3ML
1 SOLUTION RESPIRATORY (INHALATION)
Status: DISCONTINUED | OUTPATIENT
Start: 2024-04-29 | End: 2024-04-29

## 2024-04-29 RX ADMIN — TRAZODONE HYDROCHLORIDE 100 MG: 50 TABLET ORAL at 20:51

## 2024-04-29 RX ADMIN — FUROSEMIDE 40 MG: 10 INJECTION, SOLUTION INTRAMUSCULAR; INTRAVENOUS at 09:20

## 2024-04-29 RX ADMIN — CARVEDILOL 6.25 MG: 3.12 TABLET, FILM COATED ORAL at 09:17

## 2024-04-29 RX ADMIN — HYDROXYZINE HYDROCHLORIDE 25 MG: 25 TABLET ORAL at 20:51

## 2024-04-29 RX ADMIN — TRAZODONE HYDROCHLORIDE 100 MG: 50 TABLET ORAL at 01:41

## 2024-04-29 RX ADMIN — ACETAMINOPHEN 650 MG: 325 TABLET ORAL at 20:51

## 2024-04-29 RX ADMIN — HYDRALAZINE HYDROCHLORIDE 20 MG: 20 INJECTION, SOLUTION INTRAMUSCULAR; INTRAVENOUS at 17:24

## 2024-04-29 RX ADMIN — HYDRALAZINE HYDROCHLORIDE 50 MG: 50 TABLET ORAL at 11:52

## 2024-04-29 RX ADMIN — HYDRALAZINE HYDROCHLORIDE 50 MG: 50 TABLET ORAL at 20:51

## 2024-04-29 RX ADMIN — AZITHROMYCIN DIHYDRATE 500 MG: 500 TABLET ORAL at 09:18

## 2024-04-29 RX ADMIN — AMLODIPINE BESYLATE 5 MG: 5 TABLET ORAL at 01:41

## 2024-04-29 RX ADMIN — CARVEDILOL 6.25 MG: 3.12 TABLET, FILM COATED ORAL at 11:52

## 2024-04-29 RX ADMIN — SODIUM CHLORIDE, PRESERVATIVE FREE 10 ML: 5 INJECTION INTRAVENOUS at 09:21

## 2024-04-29 RX ADMIN — CEFTRIAXONE SODIUM 1000 MG: 1 INJECTION, POWDER, FOR SOLUTION INTRAMUSCULAR; INTRAVENOUS at 23:28

## 2024-04-29 RX ADMIN — SODIUM CHLORIDE, PRESERVATIVE FREE 10 ML: 5 INJECTION INTRAVENOUS at 20:52

## 2024-04-29 RX ADMIN — HEPARIN SODIUM 3200 UNITS: 1000 INJECTION INTRAVENOUS; SUBCUTANEOUS at 19:41

## 2024-04-29 RX ADMIN — ASPIRIN 81 MG: 81 TABLET, COATED ORAL at 09:18

## 2024-04-29 RX ADMIN — CARVEDILOL 6.25 MG: 3.12 TABLET, FILM COATED ORAL at 01:41

## 2024-04-29 RX ADMIN — CARVEDILOL 12.5 MG: 12.5 TABLET, FILM COATED ORAL at 20:51

## 2024-04-29 RX ADMIN — AMLODIPINE BESYLATE 5 MG: 5 TABLET ORAL at 20:51

## 2024-04-29 RX ADMIN — AMLODIPINE BESYLATE 5 MG: 5 TABLET ORAL at 09:18

## 2024-04-29 RX ADMIN — FENTANYL CITRATE 50 MCG: 50 INJECTION, SOLUTION INTRAMUSCULAR; INTRAVENOUS at 14:35

## 2024-04-29 RX ADMIN — IPRATROPIUM BROMIDE AND ALBUTEROL SULFATE 1 DOSE: .5; 2.5 SOLUTION RESPIRATORY (INHALATION) at 08:14

## 2024-04-29 ASSESSMENT — PAIN DESCRIPTION - ORIENTATION: ORIENTATION: MID

## 2024-04-29 ASSESSMENT — PAIN SCALES - GENERAL
PAINLEVEL_OUTOF10: 5
PAINLEVEL_OUTOF10: 0

## 2024-04-29 ASSESSMENT — PAIN DESCRIPTION - LOCATION: LOCATION: HEAD

## 2024-04-29 NOTE — ED PROVIDER NOTES
[facility] EMERGENCY DEPT  EMERGENCY DEPARTMENT HISTORY AND PHYSICAL EXAM      Date: 4/28/2024  Patient Name: Junie Neal  MRN: [id number]  Birthdate 1965  Date of evaluation: 4/28/2024  Provider: Eren Sosa MD   Note Started: 8:02 PM EDT 4/28/24    HISTORY OF PRESENT ILLNESS     Chief Complaint   Patient presents with    Shortness of Breath       History Provided By: Patient    HPI: Junie Neal is a 58 y.o. female with complicated past medical history significant for COPD as well as diastolic congestive heart failure presents with increased work of breathing over the past several days.  She says that shortness of breath is made worse with eating, movement or activity and relieved only at rest.  She still has only maintained her normal 4 L oxygen requirement.  She denies any fever, chills, nausea, vomiting, chest pain, rash, diarrhea, headache, night sweats.  She has not treated with anything is failed to improve    PAST MEDICAL HISTORY   Past Medical History:  Past Medical History:   Diagnosis Date    Anxiety 10/2/2020    Carotid stenosis     Chronic anemia     Chronic respiratory failure (HCC)     COPD (chronic obstructive pulmonary disease) with emphysema (HCC) 10/2/2020    Depression     Diastolic CHF (HCC)     Dysphagia 10/2/2020    GERD (gastroesophageal reflux disease)     Headache 1997    High cholesterol     Hypertension     Hypothyroid     Iron deficiency anemia 10/2/2020    Mitral valve regurgitation 10/2/2020    Renal artery stenosis (HCC)     Seizure disorder (HCC)        Past Surgical History:  Past Surgical History:   Procedure Laterality Date    CAROTID ENDARTERECTOMY      CHOLECYSTECTOMY      IR THORACENTESIS PLEURAL W IMAGING  11/24/2020    IR THORACENTESIS PLEURAL W IMAGING  11/25/2020    KIDNEY SURGERY      ROTATOR CUFF REPAIR Right     TUBAL LIGATION         Family History:  Family History   Problem Relation Age of Onset    Melanoma Child     Melanoma Brother     Heart Failure Brother    Resp: 19 14 15 18   Temp: 99 °F (37.2 °C)      TempSrc: Oral      SpO2: 97% 96% 96%    Weight: 44.5 kg (98 lb)      Height: 1.626 m (5' 4\")           ED COURSE  ED Course as of 04/28/24 2311   Sun Apr 28, 2024 2235 Patient has evidence of troponin leak, likely secondary to strain [CS]      ED Course User Index  [CS] Eren Sosa MD   Patient possibly has pulmonary edema versus pneumonia.  Will treat for both.  Will start with Rocephin 1 g IV will give patient IV Lasix.  Additionally the Rocephin will help for the presumptive COPD exacerbation.  Patient has no evidence of sepsis.  She does have a low CO2 likely secondary to her renal insufficiency.    SEPSIS Reassessment: Sepsis reassessment not applicable    Clinical Management Tools:      Patient was given the following medications:  Medications   methylPREDNISolone sodium succ (SOLU-MEDROL) injection 40 mg (40 mg IntraVENous Given 4/28/24 2150)   albuterol (PROVENTIL) nebulizer solution 15 mg (15 mg Nebulization Given 4/28/24 2034)   cefTRIAXone (ROCEPHIN) 1,000 mg in sterile water 10 mL IV syringe (1,000 mg IntraVENous Given 4/28/24 2257)   furosemide (LASIX) injection 60 mg (60 mg IntraVENous Given 4/28/24 2257)       CONSULTS: See ED Course/MDM for further details.  None     Social Determinants affecting Diagnosis/Treatment: None    Smoking Cessation: Not Applicable    PROCEDURES   Unless otherwise noted above, none  Procedures      CRITICAL CARE TIME   CRITICAL CARE NOTE :    11:11 PM    IMPENDING DETERIORATION -Respiratory and Cardiovascular  ASSOCIATED RISK FACTORS - Hypoxia and Metabolic changes  MANAGEMENT- Bedside Assessment and Supervision of Care  INTERPRETATION -  Xrays, ECG, Blood Pressure, and Cardiac Output Measures   INTERVENTIONS - respiratory mgmt and Metabolic interventions  CASE REVIEW - Hospitalist/Intensivist  TREATMENT RESPONSE -Stable  PERFORMED BY - Self    NOTES:  I have spent 45 minutes of critical care time involved in lab

## 2024-04-29 NOTE — CARE COORDINATION
04/29/24 1112   Service Assessment   Patient Orientation Alert and Oriented   Cognition Alert   History Provided By Patient   Primary Caregiver Self   Support Systems Spouse/Significant Other   Patient's Healthcare Decision Maker is: Legal Next of Kin   PCP Verified by CM No  (Was on Hospice)   Prior Functional Level Assistance with the following:;Bathing;Dressing;Toileting;Feeding;Cooking;Housework;Shopping;Mobility   Current Functional Level Assistance with the following:;Bathing;Dressing;Toileting;Feeding;Housework;Cooking;Shopping;Mobility   Can patient return to prior living arrangement Yes   Ability to make needs known: Good   Family able to assist with home care needs: Yes   Would you like for me to discuss the discharge plan with any other family members/significant others, and if so, who? Yes  (Sig. Other, Michele)   Financial Resources Medicare;Medicaid   Social/Functional History   Lives With Significant other   Home Equipment Oxygen   ADL Assistance Needs assistance   Discharge Planning   Living Arrangements Spouse/Significant Other   Patient expects to be discharged to: House   Services At/After Discharge   Transition of Care Consult (CM Consult) Hospice;Other  (Dialysis)   Mode of Transport at Discharge Other (see comment)  (Sig. Other)     Patient lives at home with her significant other.  Patient was on Hospice with [facility].  Patient has oxygen at home through Hospice.  Patient wants to continue hospice, but also wants to start dialysis.  CM to set up dialysis at [facility].  CM is going to reach out to hospice to determine if [facility] can continue patient on hospice while on dialysis.  If not other hospice agencies will be contacted.  If patient does not go home on hospice, she will need home oxygen set up.  Patient has no other DME through hospice.  Patient uses [facility] on [place].  Declined Meds to Beds.  Current Dispo: Home on Hospice    [facility]

## 2024-04-29 NOTE — H&P
Hospitalist History & Physical Notes.           [facility].              Name : Junie Neal      MRN number : [id number]     YOB: 1965     Subjective :   Chief Complaint : Shortness of breath with difficulty breathing    Source of information : Patient, ED provider.  Reviewed previous records.    History of present illness:   Junie Neal is  58 y.o. female with below mentioned past medical history presents to the emergency room complaining of worsening of shortness of breath with generalized weakness.  At baseline she is not doing very well, activity is limited and feeling very weak and tired.  She chose to be on hospice, refused for dialysis, recently is not clear.    Today he has worsening of shortness of breath from baseline with difficulty breathing.  Cough but nonproductive.  Denies fever or chills.  Complains of feeling weak tired, decreased appetite and not eating.  Chest x-ray suspicious for pulmonary edema versus pneumonia with bilateral pleural effusions.    Blood pressure is uncontrolled, laboratory data with worsening of BUN/creatinine with non-anion gap metabolic acidosis.  Has severe anemia with hemoglobin of 9.0 with significant elevated MCV.    On questioning states that she chose not to come to dialysis as she is thinking it will cause pain.    Past Medical History:   Diagnosis Date    Anxiety 10/2/2020    Carotid stenosis     Chronic anemia     Chronic respiratory failure (HCC)     COPD (chronic obstructive pulmonary disease) with emphysema (HCC) 10/2/2020    Depression     Diastolic CHF (HCC)     Dysphagia 10/2/2020    GERD (gastroesophageal reflux disease)     Headache 1997    High cholesterol     Hypertension     Hypothyroid     Iron deficiency anemia 10/2/2020    Mitral valve regurgitation 10/2/2020    Renal artery stenosis (HCC)     Seizure disorder (HCC)      Past Surgical History:   Procedure Laterality Date    CAROTID ENDARTERECTOMY      CHOLECYSTECTOMY  asymmetric pulmonary edema versus pneumonia   Bilateral pleural effusions                Assessment and Plan :     Severe shortness of breath: Secondary to fluid retention due to worsening of renal functions with decreased urinary output.  Also suspected pneumonia but no respiratory symptoms are patient does not look septic.    Suspected pneumonia: Even though it is less likely due to patient comorbid conditions and immune status we will start an antibiotic.  Will follow-up closely    Pulmonary edema: Findings on chest x-ray I feel are more of heart failure with pulmonary edema rather than pneumonia.  She was given furosemide and will go up from the urine out.  Received IV furosemide in the emergency room with some improvement.    Chronic obstructive pulmonary disease: Stable, will continue maintenance medications    Essential hypertension: On amlodipine, carvedilol that will be continued we will adjust as condition dictates    Suspected heart failure: Last echocardiogram 2022 with preserved ejection fraction: Will request for an echocardiogram for follow-up.    Chronic kidney disease advanced: Seems to be having end-stage renal disease.  She decided to not to have dialysis.  We had an extensive length of discussion on this, explained her about the advantages and disadvantages of the dialysis.  Improvement in the quality of life if she go through the dialysis.  Also explained about new peritoneal dialysis that as an option of using it at night, she understands and would like to discuss about it with the nephrology.  I placed a consultation.    Anemia secondary to chronic kidney disease and malnutrition: Hemoglobin is low, will monitor closely.  Ordered supplementations.    Admitted to medical telemetry.    Medications Home :    Reviewed with external Rx history    Code status : Full code    VTE prophylaxis : Heparin.  Still risk of bleeding is high.    Advance Medical directive : Health care decision maker

## 2024-04-29 NOTE — RT PROTOCOL NOTE
RT Inhaler-Nebulizer Bronchodilator Protocol Note    There is a bronchodilator order in the chart from a provider indicating to follow the RT Bronchodilator Protocol and there is an “Initiate RT Inhaler-Nebulizer Bronchodilator Protocol” order as well (see protocol at bottom of note).    CXR Findings:  XR CHEST PORTABLE    Result Date: 4/28/2024  Right lower lobe asymmetric pulmonary edema versus pneumonia Bilateral pleural effusions       The findings from the last RT Protocol Assessment were as follows:   History Pulmonary Disease: Smoker 15 pack years or more  Respiratory Pattern: Regular pattern and RR 12-20 bpm  Breath Sounds: Slightly diminished and/or crackles  Cough: Strong, spontaneous, non-productive  Indication for Bronchodilator Therapy: On home bronchodilators  Bronchodilator Assessment Score: 3    Aerosolized bronchodilator medication orders have been revised according to the RT Inhaler-Nebulizer Bronchodilator Protocol below.    Respiratory Therapist to perform RT Therapy Protocol Assessment initially then follow the protocol.  Repeat RT Therapy Protocol Assessment PRN for score 0-3 or on second treatment, BID, and PRN for scores above 3.    No Indications - adjust the frequency to every 6 hours PRN wheezing or bronchospasm, if no treatments needed after 48 hours then discontinue using Per Protocol order mode.     If indication present, adjust the RT bronchodilator orders based on the Bronchodilator Assessment Score as indicated below.  Use Inhaler orders unless patient has one or more of the following: on home nebulizer, not able to hold breath for 10 seconds, is not alert and oriented, cannot activate and use MDI correctly, or respiratory rate 25 breaths per minute or more, then use the equivalent nebulizer order(s) with same Frequency and PRN reasons based on the score.  If a patient is on this medication at home then do not decrease Frequency below that used at home.    0-3 - enter or revise RT

## 2024-04-29 NOTE — CONSULTS
Nephrology Consultation          Patient: Junie Neal MRN: [id number]  SSN: [id number]    YOB: 1965  Age: 58 y.o.  Sex: female      Subjective:   Reason for the consultation.  Advanced chronic kidney disease/metabolic acidosis/fluid overload and need to start on dialysis.    History of present illness.  The patient is a 58-year-old woman with underlying history of COPD, on home oxygen 2 L, history of seizure disorder, advanced chronic kidney disease stage IV/V, chronic anemia, status post mitral valve replacement was hospitalized for increasing shortness of breath generalized weakness and fatigue.  On arrival she was profoundly hypertensive.  Her initial labs showed elevated BUN of 76 and creatinine 6.41 along with CO2 40 which prompted nephrology consultation.  Chest x-ray showed pulmonary edema.  She has underlying chronic kidney disease stage V with her creatinine 4.25 in June 2023 and declined dialysis treatment at that time.  She is noticed to have bilateral lower extremity swelling.  She is complaining of loss of appetite energy and weight loss along with muscle wasting.    Past Medical History:   Diagnosis Date    Anxiety 10/2/2020    Carotid stenosis     Chronic anemia     Chronic respiratory failure (HCC)     COPD (chronic obstructive pulmonary disease) with emphysema (HCC) 10/2/2020    Depression     Diastolic CHF (HCC)     Dysphagia 10/2/2020    GERD (gastroesophageal reflux disease)     Headache 1997    High cholesterol     Hypertension     Hypothyroid     Iron deficiency anemia 10/2/2020    Mitral valve regurgitation 10/2/2020    Renal artery stenosis (HCC)     Seizure disorder (HCC)      Past Surgical History:   Procedure Laterality Date    CAROTID ENDARTERECTOMY      CHOLECYSTECTOMY      IR THORACENTESIS PLEURAL W IMAGING  11/24/2020    IR THORACENTESIS PLEURAL W IMAGING  11/25/2020    KIDNEY SURGERY      ROTATOR CUFF REPAIR Right     TUBAL LIGATION        Family History  MD Papi        0.9 % sodium chloride infusion   IntraVENous PRN Papi Warner MD        [START ON 5/1/2024] heparin (porcine) injection 5,000 Units  5,000 Units SubCUTAneous BID Papi Warner MD        ondansetron (ZOFRAN-ODT) disintegrating tablet 4 mg  4 mg Oral Q8H PRN Papi Warner MD        Or    ondansetron (ZOFRAN) injection 4 mg  4 mg IntraVENous Q6H PRN Papi Warenr MD        acetaminophen (TYLENOL) tablet 650 mg  650 mg Oral Q6H PRN Papi Warnre MD        Or    acetaminophen (TYLENOL) suppository 650 mg  650 mg Rectal Q6H PRN Papi Warner MD        cefTRIAXone (ROCEPHIN) 1,000 mg in sterile water 10 mL IV syringe  1,000 mg IntraVENous Q24H Papi Warner MD        carvedilol (COREG) tablet 6.25 mg  6.25 mg Oral BID Papi Warner MD   6.25 mg at 04/29/24 0917    amLODIPine (NORVASC) tablet 5 mg  5 mg Oral BID Seipp, James, DO   5 mg at 04/29/24 0918    [START ON 4/30/2024] azithromycin (ZITHROMAX) tablet 250 mg  250 mg Oral Daily Seipp, James, DO            Allergies   Allergen Reactions    Sulfa Antibiotics Anaphylaxis    Sulfamethoxazole-Trimethoprim      Other reaction(s): Unknown (comments)       Review of Systems:  A comprehensive review of systems was negative except for that written in the History of Present Illness.    Objective:     Vitals:    04/29/24 0500 04/29/24 0600 04/29/24 0815 04/29/24 0908   BP: (!) 183/99 (!) 186/92  (!) 181/86   Pulse: 79 75 73 76   Resp: 11 12 17 18   Temp:  98 °F (36.7 °C)  97.9 °F (36.6 °C)   TempSrc:  Oral  Oral   SpO2:  99% 97% 98%   Weight:       Height:            Physical Exam:  General: NAD  Eyes: sclera anicteric  Oral Cavity: No thrush or ulcers  Neck: no JVD  Chest: Decreased bilateral air entry  Heart: normal sounds  Abdomen: soft and non tender   : no vieira  Lower Extremities: edema+  Skin: no rash  Neuro: intact  Psychiatric: non-depressed          Assessment/Plan:  4 = No assist / stand by assistance

## 2024-04-29 NOTE — ED NOTES
Pt cleaned, new sheets on bed. Pt refused gown. Wet pants removed but refused wet tshirt to be removed. Call bell in reach.

## 2024-04-29 NOTE — CARE COORDINATION
CM met with patient and spouse at bedside. Patient has been notified that they can no longer utilize hospice service while undergoing Hemodialysis.  Patient and spouse are agreeable to home health with [facility].  Patient will need to be tested for home oxygen as her previous oxygen was covered by hospice services.  HD chair trying to be arranged at [facility], referral sent, Hep B labs ordered.

## 2024-04-29 NOTE — ED NOTES
ED TO INPATIENT SBAR HANDOFF    Patient Name: Junie Neal   Preferred Name: Junie  : 1965  58 y.o.   Family/Caregiver Present: no   Code Status Order: Full Code  PO Status: NPO:No  Telemetry Order:   C-SSRS: Risk of Suicide: No Risk  Sitter no     Restraints:     Sepsis Risk Score Sepsis Risk Score: 4.64    Situation  Chief Complaint   Patient presents with    Shortness of Breath     Brief Description of Patient's Condition: Pt end stage renal disease. Not a dialysis pt. On hospice. Wears 4L NC at home.   Mental Status: oriented and alert  Arrived from:Home  Imaging:   XR CHEST PORTABLE   Final Result      Right lower lobe asymmetric pulmonary edema versus pneumonia   Bilateral pleural effusions           Abnormal labs:   Abnormal Labs Reviewed   CBC WITH AUTO DIFFERENTIAL - Abnormal; Notable for the following components:       Result Value    WBC 15.2 (*)     RBC 2.66 (*)     Hemoglobin 9.0 (*)     Hematocrit 27.6 (*)     .8 (*)     RDW 15.7 (*)     Neutrophils % 95 (*)     Lymphocytes % 2 (*)     Monocytes % 1 (*)     Immature Granulocytes % 2 (*)     Neutrophils Absolute 14.5 (*)     Lymphocytes Absolute 0.2 (*)     Immature Granulocytes Absolute 0.3 (*)     All other components within normal limits   COMPREHENSIVE METABOLIC PANEL - Abnormal; Notable for the following components:    Chloride 110 (*)     CO2 14 (*)     Glucose 121 (*)     BUN 76 (*)     Creatinine 6.41 (*)     Est, Glom Filt Rate 7 (*)     All other components within normal limits   MAGNESIUM - Abnormal; Notable for the following components:    Magnesium 2.7 (*)     All other components within normal limits   TROPONIN - Abnormal; Notable for the following components:    Troponin, High Sensitivity 128 (*)     All other components within normal limits   BRAIN NATRIURETIC PEPTIDE - Abnormal; Notable for the following components:    NT Pro-BNP >35,000 (*)     All other components within normal limits       Background  Allergies:  (ATARAX) tablet 25 mg (has no administration in time range)   ipratropium 0.5 mg-albuterol 2.5 mg (DUONEB) nebulizer solution 1 Dose (has no administration in time range)   LORazepam (ATIVAN) tablet 1 mg (has no administration in time range)   traZODone (DESYREL) tablet 100 mg (100 mg Oral Given 4/29/24 0141)   sodium chloride flush 0.9 % injection 5-40 mL (has no administration in time range)   sodium chloride flush 0.9 % injection 5-40 mL (has no administration in time range)   0.9 % sodium chloride infusion (has no administration in time range)   heparin (porcine) injection 5,000 Units (has no administration in time range)   ondansetron (ZOFRAN-ODT) disintegrating tablet 4 mg (has no administration in time range)     Or   ondansetron (ZOFRAN) injection 4 mg (has no administration in time range)   acetaminophen (TYLENOL) tablet 650 mg (has no administration in time range)     Or   acetaminophen (TYLENOL) suppository 650 mg (has no administration in time range)   cefTRIAXone (ROCEPHIN) 1,000 mg in sterile water 10 mL IV syringe (has no administration in time range)   carvedilol (COREG) tablet 6.25 mg (6.25 mg Oral Given 4/29/24 0141)   amLODIPine (NORVASC) tablet 5 mg (5 mg Oral Given 4/29/24 0141)   furosemide (LASIX) injection 40 mg (has no administration in time range)   albuterol (PROVENTIL) nebulizer solution 15 mg (15 mg Nebulization Given 4/28/24 2034)   cefTRIAXone (ROCEPHIN) 1,000 mg in sterile water 10 mL IV syringe (1,000 mg IntraVENous Given 4/28/24 2257)   furosemide (LASIX) injection 60 mg (60 mg IntraVENous Given 4/28/24 2257)     Last documented pain medication administration:   Pertinent or High Risk Medications/Drips: no   If Yes, please provide details:   Blood Product Administration: no  If Yes, please provide details:   Process Protocols/Bundles: N/A    Recommendation  Incomplete STAT orders:   Overdue Medications:   Patient Belongings:    Additional Comments:   If any further questions, please

## 2024-04-29 NOTE — DIALYSIS
Patient tolerated treatment well. No complain during treatment and post. CVC clean dry and intact. Removed 1L of fluid and 35L of blood processed. Patient transferred back to room via hospital Transport. Report given to primary nurse (Belem)

## 2024-04-30 ENCOUNTER — APPOINTMENT (OUTPATIENT)
Facility: HOSPITAL | Age: 59
End: 2024-04-30
Attending: HOSPITALIST
Payer: MEDICARE

## 2024-04-30 LAB
ALBUMIN SERPL-MCNC: 3 G/DL (ref 3.5–5)
ALBUMIN/GLOB SERPL: 1.1 (ref 1.1–2.2)
ALP SERPL-CCNC: 51 U/L (ref 45–117)
ALT SERPL-CCNC: 21 U/L (ref 12–78)
ANION GAP SERPL CALC-SCNC: 11 MMOL/L (ref 5–15)
AST SERPL W P-5'-P-CCNC: 7 U/L (ref 15–37)
BASOPHILS # BLD: 0 K/UL (ref 0–0.1)
BASOPHILS NFR BLD: 0 % (ref 0–1)
BILIRUB SERPL-MCNC: 0.4 MG/DL (ref 0.2–1)
BUN SERPL-MCNC: 48 MG/DL (ref 6–20)
BUN/CREAT SERPL: 11 (ref 12–20)
CA-I BLD-MCNC: 8.2 MG/DL (ref 8.5–10.1)
CA-I BLD-MCNC: 8.6 MG/DL (ref 8.5–10.1)
CHLORIDE SERPL-SCNC: 105 MMOL/L (ref 97–108)
CO2 SERPL-SCNC: 21 MMOL/L (ref 21–32)
CREAT SERPL-MCNC: 4.31 MG/DL (ref 0.55–1.02)
DIFFERENTIAL METHOD BLD: ABNORMAL
ECHO AO ROOT DIAM: 2.8 CM
ECHO AO ROOT INDEX: 1.94 CM/M2
ECHO AV AREA PEAK VELOCITY: 2.3 CM2
ECHO AV AREA VTI: 2.4 CM2
ECHO AV AREA/BSA PEAK VELOCITY: 1.6 CM2/M2
ECHO AV AREA/BSA VTI: 1.7 CM2/M2
ECHO AV MEAN GRADIENT: 2 MMHG
ECHO AV MEAN VELOCITY: 0.6 M/S
ECHO AV PEAK GRADIENT: 4 MMHG
ECHO AV PEAK VELOCITY: 1 M/S
ECHO AV VELOCITY RATIO: 0.8
ECHO AV VTI: 18 CM
ECHO BSA: 1.42 M2
ECHO EST RA PRESSURE: 3 MMHG
ECHO LA DIAMETER INDEX: 2.64 CM/M2
ECHO LA DIAMETER: 3.8 CM
ECHO LA TO AORTIC ROOT RATIO: 1.36
ECHO LV E' LATERAL VELOCITY: 7 CM/S
ECHO LV E' SEPTAL VELOCITY: 5 CM/S
ECHO LV FRACTIONAL SHORTENING: 15 % (ref 28–44)
ECHO LV GLOBAL LONGITUDINAL STRAIN (GLS): -10.7 %
ECHO LV GLOBAL LONGITUDINAL STRAIN (GLS): -12.7 %
ECHO LV GLOBAL LONGITUDINAL STRAIN (GLS): -15.4 %
ECHO LV GLOBAL LONGITUDINAL STRAIN (GLS): -4 %
ECHO LV INTERNAL DIMENSION DIASTOLE INDEX: 3.19 CM/M2
ECHO LV INTERNAL DIMENSION DIASTOLIC: 4.6 CM (ref 3.9–5.3)
ECHO LV INTERNAL DIMENSION SYSTOLIC INDEX: 2.71 CM/M2
ECHO LV INTERNAL DIMENSION SYSTOLIC: 3.9 CM
ECHO LV IVSD: 1.2 CM (ref 0.6–0.9)
ECHO LV MASS 2D: 181.2 G (ref 67–162)
ECHO LV MASS INDEX 2D: 125.8 G/M2 (ref 43–95)
ECHO LV POSTERIOR WALL DIASTOLIC: 1 CM (ref 0.6–0.9)
ECHO LV RELATIVE WALL THICKNESS RATIO: 0.43
ECHO LVOT AREA: 3.1 CM2
ECHO LVOT AV VTI INDEX: 0.77
ECHO LVOT DIAM: 2 CM
ECHO LVOT MEAN GRADIENT: 1 MMHG
ECHO LVOT PEAK GRADIENT: 2 MMHG
ECHO LVOT PEAK VELOCITY: 0.8 M/S
ECHO LVOT STROKE VOLUME INDEX: 30.1 ML/M2
ECHO LVOT SV: 43.3 ML
ECHO LVOT VTI: 13.8 CM
ECHO MV A VELOCITY: 0.63 M/S
ECHO MV E DECELERATION TIME (DT): 112 MS
ECHO MV E VELOCITY: 1.46 M/S
ECHO MV E/A RATIO: 2.32
ECHO MV E/E' LATERAL: 20.86
ECHO MV E/E' RATIO (AVERAGED): 25.03
ECHO MV REGURGITANT PEAK GRADIENT: 174 MMHG
ECHO MV REGURGITANT PEAK VELOCITY: 6.6 M/S
ECHO MV REGURGITANT VTIA: 229 CM
ECHO PV MAX VELOCITY: 0.7 M/S
ECHO PV MEAN GRADIENT: 1 MMHG
ECHO PV MEAN VELOCITY: 0.4 M/S
ECHO PV PEAK GRADIENT: 2 MMHG
ECHO PV VTI: 11 CM
ECHO RIGHT VENTRICULAR SYSTOLIC PRESSURE (RVSP): 32 MMHG
ECHO RV BASAL DIMENSION: 2 CM
ECHO RV LONGITUDINAL DIMENSION: 8 CM
ECHO RV MID DIMENSION: 2.7 CM
ECHO RV TAPSE: 2.1 CM (ref 1.7–?)
ECHO RVOT MEAN GRADIENT: 1 MMHG
ECHO RVOT PEAK GRADIENT: 2 MMHG
ECHO RVOT PEAK VELOCITY: 0.7 M/S
ECHO RVOT VTI: 10.3 CM
ECHO TV REGURGITANT MAX VELOCITY: 2.69 M/S
ECHO TV REGURGITANT PEAK GRADIENT: 29 MMHG
EKG ATRIAL RATE: 89 BPM
EKG DIAGNOSIS: NORMAL
EKG P AXIS: 48 DEGREES
EKG P-R INTERVAL: 180 MS
EKG Q-T INTERVAL: 386 MS
EKG QRS DURATION: 92 MS
EKG QTC CALCULATION (BAZETT): 469 MS
EKG R AXIS: 49 DEGREES
EKG T AXIS: 80 DEGREES
EKG VENTRICULAR RATE: 89 BPM
EOSINOPHIL # BLD: 0.1 K/UL (ref 0–0.4)
EOSINOPHIL NFR BLD: 0 % (ref 0–7)
ERYTHROCYTE [DISTWIDTH] IN BLOOD BY AUTOMATED COUNT: 15.1 % (ref 11.5–14.5)
GLOBULIN SER CALC-MCNC: 2.7 G/DL (ref 2–4)
GLUCOSE SERPL-MCNC: 101 MG/DL (ref 65–100)
HBV SURFACE AB SER QL: REACTIVE
HBV SURFACE AB SER-ACNC: 909.23 MIU/ML
HBV SURFACE AG SER QL: <0.1 INDEX
HBV SURFACE AG SER QL: NEGATIVE
HCT VFR BLD AUTO: 24.4 % (ref 35–47)
HGB BLD-MCNC: 8.4 G/DL (ref 11.5–16)
IMM GRANULOCYTES # BLD AUTO: 0.2 K/UL (ref 0–0.04)
IMM GRANULOCYTES NFR BLD AUTO: 1 % (ref 0–0.5)
IRON SATN MFR SERPL: 21 % (ref 20–50)
IRON SERPL-MCNC: 47 UG/DL (ref 35–150)
LYMPHOCYTES # BLD: 1.3 K/UL (ref 0.8–3.5)
LYMPHOCYTES NFR BLD: 10 % (ref 12–49)
MAGNESIUM SERPL-MCNC: 2.1 MG/DL (ref 1.6–2.4)
MCH RBC QN AUTO: 33.9 PG (ref 26–34)
MCHC RBC AUTO-ENTMCNC: 34.4 G/DL (ref 30–36.5)
MCV RBC AUTO: 98.4 FL (ref 80–99)
MONOCYTES # BLD: 0.8 K/UL (ref 0–1)
MONOCYTES NFR BLD: 6 % (ref 5–13)
NEUTS SEG # BLD: 10.3 K/UL (ref 1.8–8)
NEUTS SEG NFR BLD: 83 % (ref 32–75)
NRBC # BLD: 0.02 K/UL (ref 0–0.01)
NRBC BLD-RTO: 0.2 PER 100 WBC
PHOSPHATE SERPL-MCNC: 3.1 MG/DL (ref 2.6–4.7)
PLATELET # BLD AUTO: 177 K/UL (ref 150–400)
PMV BLD AUTO: 10.3 FL (ref 8.9–12.9)
POTASSIUM SERPL-SCNC: 2.6 MMOL/L (ref 3.5–5.1)
PROT SERPL-MCNC: 5.7 G/DL (ref 6.4–8.2)
PTH-INTACT SERPL-MCNC: 169.4 PG/ML (ref 18.4–88)
RBC # BLD AUTO: 2.48 M/UL (ref 3.8–5.2)
SODIUM SERPL-SCNC: 137 MMOL/L (ref 136–145)
TIBC SERPL-MCNC: 226 UG/DL (ref 250–450)
WBC # BLD AUTO: 12.5 K/UL (ref 3.6–11)

## 2024-04-30 PROCEDURE — 2709999900 HC NON-CHARGEABLE SUPPLY

## 2024-04-30 PROCEDURE — 2060000000 HC ICU INTERMEDIATE R&B

## 2024-04-30 PROCEDURE — 84100 ASSAY OF PHOSPHORUS: CPT

## 2024-04-30 PROCEDURE — 6360000002 HC RX W HCPCS: Performed by: INTERNAL MEDICINE

## 2024-04-30 PROCEDURE — 6370000000 HC RX 637 (ALT 250 FOR IP): Performed by: HOSPITALIST

## 2024-04-30 PROCEDURE — 83970 ASSAY OF PARATHORMONE: CPT

## 2024-04-30 PROCEDURE — 6360000002 HC RX W HCPCS: Performed by: HOSPITALIST

## 2024-04-30 PROCEDURE — 36415 COLL VENOUS BLD VENIPUNCTURE: CPT

## 2024-04-30 PROCEDURE — 87040 BLOOD CULTURE FOR BACTERIA: CPT

## 2024-04-30 PROCEDURE — 6370000000 HC RX 637 (ALT 250 FOR IP): Performed by: INTERNAL MEDICINE

## 2024-04-30 PROCEDURE — 93306 TTE W/DOPPLER COMPLETE: CPT

## 2024-04-30 PROCEDURE — 83735 ASSAY OF MAGNESIUM: CPT

## 2024-04-30 PROCEDURE — 90935 HEMODIALYSIS ONE EVALUATION: CPT

## 2024-04-30 PROCEDURE — 80053 COMPREHEN METABOLIC PANEL: CPT

## 2024-04-30 PROCEDURE — 85025 COMPLETE CBC W/AUTO DIFF WBC: CPT

## 2024-04-30 PROCEDURE — 2580000003 HC RX 258: Performed by: HOSPITALIST

## 2024-04-30 PROCEDURE — 83540 ASSAY OF IRON: CPT

## 2024-04-30 RX ORDER — POTASSIUM CHLORIDE 20 MEQ/1
40 TABLET, EXTENDED RELEASE ORAL ONCE
Status: COMPLETED | OUTPATIENT
Start: 2024-04-30 | End: 2024-04-30

## 2024-04-30 RX ORDER — HYDRALAZINE HYDROCHLORIDE 50 MG/1
100 TABLET, FILM COATED ORAL 3 TIMES DAILY
Status: DISCONTINUED | OUTPATIENT
Start: 2024-04-30 | End: 2024-04-30

## 2024-04-30 RX ORDER — HYDRALAZINE HYDROCHLORIDE 50 MG/1
100 TABLET, FILM COATED ORAL 2 TIMES DAILY
Status: DISCONTINUED | OUTPATIENT
Start: 2024-04-30 | End: 2024-05-04 | Stop reason: HOSPADM

## 2024-04-30 RX ADMIN — HYDROMORPHONE HYDROCHLORIDE 2 MG: 2 TABLET ORAL at 23:45

## 2024-04-30 RX ADMIN — TRAZODONE HYDROCHLORIDE 100 MG: 50 TABLET ORAL at 20:35

## 2024-04-30 RX ADMIN — HYDROMORPHONE HYDROCHLORIDE 2 MG: 2 TABLET ORAL at 12:04

## 2024-04-30 RX ADMIN — CARVEDILOL 12.5 MG: 12.5 TABLET, FILM COATED ORAL at 11:43

## 2024-04-30 RX ADMIN — HYDROMORPHONE HYDROCHLORIDE 2 MG: 2 TABLET ORAL at 03:22

## 2024-04-30 RX ADMIN — HYDROMORPHONE HYDROCHLORIDE 2 MG: 2 TABLET ORAL at 18:32

## 2024-04-30 RX ADMIN — ASPIRIN 81 MG: 81 TABLET, COATED ORAL at 11:43

## 2024-04-30 RX ADMIN — CEFTRIAXONE SODIUM 1000 MG: 1 INJECTION, POWDER, FOR SOLUTION INTRAMUSCULAR; INTRAVENOUS at 23:41

## 2024-04-30 RX ADMIN — HEPARIN SODIUM 3200 UNITS: 1000 INJECTION INTRAVENOUS; SUBCUTANEOUS at 10:32

## 2024-04-30 RX ADMIN — AZITHROMYCIN DIHYDRATE 250 MG: 500 TABLET ORAL at 11:42

## 2024-04-30 RX ADMIN — HYDROXYZINE HYDROCHLORIDE 25 MG: 25 TABLET ORAL at 20:35

## 2024-04-30 RX ADMIN — POTASSIUM CHLORIDE 40 MEQ: 1500 TABLET, EXTENDED RELEASE ORAL at 11:42

## 2024-04-30 RX ADMIN — POTASSIUM CHLORIDE 40 MEQ: 1500 TABLET, EXTENDED RELEASE ORAL at 04:47

## 2024-04-30 RX ADMIN — POTASSIUM BICARBONATE 40 MEQ: 782 TABLET, EFFERVESCENT ORAL at 16:38

## 2024-04-30 RX ADMIN — AMLODIPINE BESYLATE 5 MG: 5 TABLET ORAL at 11:43

## 2024-04-30 RX ADMIN — HYDRALAZINE HYDROCHLORIDE 100 MG: 50 TABLET ORAL at 20:35

## 2024-04-30 RX ADMIN — AMLODIPINE BESYLATE 5 MG: 5 TABLET ORAL at 20:35

## 2024-04-30 RX ADMIN — HYDROXYZINE HYDROCHLORIDE 25 MG: 25 TABLET ORAL at 11:42

## 2024-04-30 RX ADMIN — CARVEDILOL 12.5 MG: 12.5 TABLET, FILM COATED ORAL at 20:35

## 2024-04-30 RX ADMIN — ONDANSETRON 4 MG: 4 TABLET, ORALLY DISINTEGRATING ORAL at 21:37

## 2024-04-30 RX ADMIN — SODIUM CHLORIDE, PRESERVATIVE FREE 10 ML: 5 INJECTION INTRAVENOUS at 20:35

## 2024-04-30 RX ADMIN — SODIUM CHLORIDE, PRESERVATIVE FREE 10 ML: 5 INJECTION INTRAVENOUS at 12:09

## 2024-04-30 ASSESSMENT — PAIN SCALES - GENERAL
PAINLEVEL_OUTOF10: 7
PAINLEVEL_OUTOF10: 9
PAINLEVEL_OUTOF10: 0
PAINLEVEL_OUTOF10: 7
PAINLEVEL_OUTOF10: 10
PAINLEVEL_OUTOF10: 7
PAINLEVEL_OUTOF10: 7

## 2024-04-30 ASSESSMENT — PAIN DESCRIPTION - ORIENTATION
ORIENTATION: RIGHT;LEFT;ANTERIOR
ORIENTATION: RIGHT
ORIENTATION: POSTERIOR;ANTERIOR

## 2024-04-30 ASSESSMENT — PAIN DESCRIPTION - DESCRIPTORS
DESCRIPTORS: THROBBING;ACHING
DESCRIPTORS: THROBBING
DESCRIPTORS: ACHING

## 2024-04-30 ASSESSMENT — PAIN DESCRIPTION - LOCATION
LOCATION: SHOULDER
LOCATION: HEAD;BACK;ABDOMEN
LOCATION: HEAD

## 2024-04-30 NOTE — PROGRESS NOTES
Bleeding noted at HD catheter site. Pt had arrived back to floor from dialysis shortly before. Dressing removed, site cleaned and new dressing placed.      Addendum: Bleeding noted again at HD cath site. Pressure dressing applied x2. Notified Dr. Flores. Was recommended possible need to contact general surgery to place another stitch.     Pressure dressing removed and new dressing applied. Site no longer bleeding at this time.

## 2024-04-30 NOTE — PLAN OF CARE
Problem: Nutrition Deficit:  Goal: Optimize nutritional status  Outcome: Progressing  Flowsheets (Taken 4/30/2024 2320)  Nutrient intake appropriate for improving, restoring, or maintaining nutritional needs:   Assess nutritional status and recommend course of action   Monitor oral intake, labs, and treatment plans   Recommend appropriate diets, oral nutritional supplements, and vitamin/mineral supplements   Provide specific nutrition education to patient or family as appropriate

## 2024-04-30 NOTE — PROGRESS NOTES
Comprehensive Nutrition Assessment    Type and Reason for Visit:  Initial    Nutrition Recommendations/Plan:   Continue with diet per order  Will monitor po intake, weight and labs     Malnutrition Assessment:  Malnutrition Status:  Severe malnutrition (04/30/24 1525)    Context:  Chronic Illness     Findings of the 6 clinical characteristics of malnutrition:  Energy Intake:   75% or less estimated energy requirements for 1 month or longer   Weight Loss:   Greater than 10% over 6 months      Body Fat Loss:     Severe body fat loss  Triceps; Buccal region   Severe muscle mass loss  Temples (temporalis); Clavicles (pectoralis & deltoids)   Muscle Mass Loss:        Fluid Accumulation:     Severe ExtremitiesChronic Illness - Fluid Accumulation Location. Extremities. The comment is 3+pitting. Taken on 4/30/24 1525     Strength:       Nutrition Assessment:    Patient admitted with SOB, recieving dialysis 2/2 CKD 5, MD notes 3+edema, tolerates HD treatment, 1.5L removal.  Patient revieves Regular diet, with Renal ONSP. PO intake is poor at 0-25%. Current weight below acceptable BMI and is down 5.3kg ~12 months (10%) weight loss is significant.    Nutrition Related Findings:    NFPE deferred, no chewing or swallowing difficulties, No N/V/D Wound Type: None       Current Nutrition Intake & Therapies:    Average Meal Intake: 1-25%  Average Supplements Intake: 1-25%  ADULT DIET; Regular  ADULT ORAL NUTRITION SUPPLEMENT; Breakfast, Lunch, Dinner; Renal Oral Supplement    Anthropometric Measures:  Height: 162.6 cm (5' 4\")  Ideal Body Weight (IBW): 120 lbs (55 kg)    Admission Body Weight: 44.5 kg (98 lb 1.7 oz)  Current Body Weight: 44.5 kg (98 lb 1.7 oz),   IBW.    Current BMI (kg/m2): 16.8  Usual Body Weight: 49.8 kg (109 lb 12.6 oz)  % Weight Change (Calculated): -10.6  Weight Adjustment For: No Adjustment                 BMI Categories: Underweight (BMI less than 18.5)    Estimated Daily Nutrient Needs:  Energy

## 2024-04-30 NOTE — PLAN OF CARE
Problem: Discharge Planning  Goal: Discharge to home or other facility with appropriate resources  4/30/2024 1119 by Aniya Cook RN  Outcome: Progressing  4/29/2024 2243 by Belem Berumen RN  Outcome: Progressing  Flowsheets (Taken 4/29/2024 2030)  Discharge to home or other facility with appropriate resources: Identify barriers to discharge with patient and caregiver     Problem: Safety - Adult  Goal: Free from fall injury  4/30/2024 1119 by Aniya Cook RN  Outcome: Progressing  4/29/2024 2243 by Belem Berumen RN  Outcome: Progressing     Problem: Pain  Goal: Verbalizes/displays adequate comfort level or baseline comfort level  Outcome: Progressing     Problem: ABCDS Injury Assessment  Goal: Absence of physical injury  4/30/2024 1119 by Aniya Cook RN  Outcome: Progressing  4/29/2024 2243 by Belem Berumen RN  Outcome: Progressing     Problem: Skin/Tissue Integrity  Goal: Absence of new skin breakdown  Description: 1.  Monitor for areas of redness and/or skin breakdown  2.  Assess vascular access sites hourly  3.  Every 4-6 hours minimum:  Change oxygen saturation probe site  4.  Every 4-6 hours:  If on nasal continuous positive airway pressure, respiratory therapy assess nares and determine need for appliance change or resting period.  Outcome: Progressing

## 2024-04-30 NOTE — PROGRESS NOTES
Patient lives at home with her  and was on hospice with [facility]. Patient has been started on dialysis and will need an outpatient dialysis chair with [facility] and has been transitioned to [facility]. Prior to discharge patient will also need ambulatory pulse ox to determine need for home oxygen as it was previously supplied through hospice. CM has sent updated clinicals to [facility] and [facility].

## 2024-04-30 NOTE — PROGRESS NOTES
Spiritual Care Assessment/Progress Note  [facility]    Name: Junie Neal MRN: [id number]    Age: 58 y.o.     Sex: female   Language: English     Date: 4/30/2024            Total Time Calculated: 11 min              Spiritual Assessment begun in [facility] [place] CARDIAC TELEMETRY  Service Provided For: Patient and family together  Referral/Consult From: Rounding  Encounter Overview/Reason: Initial Encounter    Spiritual beliefs:      [] Involved in a ese tradition/spiritual practice:      [] Supported by a ese community:      [] Claims no spiritual orientation:      [] Seeking spiritual identity:           [] Adheres to an individual form of spirituality:      [x] Not able to assess:                Identified resources for coping and support system:   Support System: Spouse, Children, Family members       [] Prayer                  [] Devotional reading               [] Music                  [] Guided Imagery     [] Pet visits                                        [] Other: (COMMENT)     Specific area/focus of visit   Encounter:    Crisis:    Spiritual/Emotional needs: Type: Spiritual Support  Ritual, Rites and Sacraments:    Grief, Loss, and Adjustments: Type: Adjustment to illness, Life Adjustments  Ethics/Mediation:    Behavioral Health:    Palliative Care:    Advance Care Planning:      Plan/Referrals: Other (Comment) ( is available if needed)    Narrative:  visited pt on 4W for spiritual assessment. Pt is awake and lying in bed; family is present and in the room. Pt is sad and admits she has had better days. Pt/spouse share health narrative and new treatment procedure. She is emotional and tearful during the visit. Pt/family engages in life review/storytelling. They discuss life adjustments. She reports she will take one day at a time in order to cope with her situation. Pt's son admits situation is stressful.  offers pt a hug for comfort; pt readily accepts hug and holds

## 2024-04-30 NOTE — DIALYSIS
Completed 3 hour dialysis and removed 1.5L of fluids.    Seen during dialysis by Dr. Navarro. No complaints offered during rounds    JUAN Kwan received dialysis report.

## 2024-05-01 LAB
ALBUMIN SERPL-MCNC: 3.2 G/DL (ref 3.5–5)
ALBUMIN SERPL-MCNC: 3.2 G/DL (ref 3.5–5)
ALBUMIN/GLOB SERPL: 1.2 (ref 1.1–2.2)
ALP SERPL-CCNC: 54 U/L (ref 45–117)
ALT SERPL-CCNC: 21 U/L (ref 12–78)
ANION GAP SERPL CALC-SCNC: 6 MMOL/L (ref 5–15)
ANION GAP SERPL CALC-SCNC: 7 MMOL/L (ref 5–15)
AST SERPL W P-5'-P-CCNC: 4 U/L (ref 15–37)
BASOPHILS # BLD: 0 K/UL (ref 0–0.1)
BASOPHILS NFR BLD: 0 % (ref 0–1)
BILIRUB SERPL-MCNC: 0.4 MG/DL (ref 0.2–1)
BUN SERPL-MCNC: 27 MG/DL (ref 6–20)
BUN SERPL-MCNC: 28 MG/DL (ref 6–20)
BUN/CREAT SERPL: 10 (ref 12–20)
BUN/CREAT SERPL: 9 (ref 12–20)
CA-I BLD-MCNC: 8.4 MG/DL (ref 8.5–10.1)
CA-I BLD-MCNC: 8.4 MG/DL (ref 8.5–10.1)
CHLORIDE SERPL-SCNC: 102 MMOL/L (ref 97–108)
CHLORIDE SERPL-SCNC: 103 MMOL/L (ref 97–108)
CO2 SERPL-SCNC: 28 MMOL/L (ref 21–32)
CO2 SERPL-SCNC: 28 MMOL/L (ref 21–32)
CREAT SERPL-MCNC: 2.86 MG/DL (ref 0.55–1.02)
CREAT SERPL-MCNC: 2.88 MG/DL (ref 0.55–1.02)
DIFFERENTIAL METHOD BLD: ABNORMAL
EOSINOPHIL # BLD: 0.2 K/UL (ref 0–0.4)
EOSINOPHIL NFR BLD: 2 % (ref 0–7)
ERYTHROCYTE [DISTWIDTH] IN BLOOD BY AUTOMATED COUNT: 15 % (ref 11.5–14.5)
GLOBULIN SER CALC-MCNC: 2.6 G/DL (ref 2–4)
GLUCOSE SERPL-MCNC: 86 MG/DL (ref 65–100)
GLUCOSE SERPL-MCNC: 86 MG/DL (ref 65–100)
HBV CORE AB SERPL QL IA: POSITIVE
HCT VFR BLD AUTO: 24.3 % (ref 35–47)
HGB BLD-MCNC: 8.1 G/DL (ref 11.5–16)
IMM GRANULOCYTES # BLD AUTO: 0.1 K/UL (ref 0–0.04)
IMM GRANULOCYTES NFR BLD AUTO: 1 % (ref 0–0.5)
LYMPHOCYTES # BLD: 1.4 K/UL (ref 0.8–3.5)
LYMPHOCYTES NFR BLD: 10 % (ref 12–49)
MAGNESIUM SERPL-MCNC: 2 MG/DL (ref 1.6–2.4)
MCH RBC QN AUTO: 34.2 PG (ref 26–34)
MCHC RBC AUTO-ENTMCNC: 33.3 G/DL (ref 30–36.5)
MCV RBC AUTO: 102.5 FL (ref 80–99)
MONOCYTES # BLD: 0.9 K/UL (ref 0–1)
MONOCYTES NFR BLD: 6 % (ref 5–13)
NEUTS SEG # BLD: 11 K/UL (ref 1.8–8)
NEUTS SEG NFR BLD: 81 % (ref 32–75)
NRBC # BLD: 0 K/UL (ref 0–0.01)
NRBC BLD-RTO: 0 PER 100 WBC
PHOSPHATE SERPL-MCNC: 2.7 MG/DL (ref 2.6–4.7)
PHOSPHATE SERPL-MCNC: 2.8 MG/DL (ref 2.6–4.7)
PLATELET # BLD AUTO: 154 K/UL (ref 150–400)
PMV BLD AUTO: 10.6 FL (ref 8.9–12.9)
POTASSIUM SERPL-SCNC: 3.3 MMOL/L (ref 3.5–5.1)
POTASSIUM SERPL-SCNC: 3.3 MMOL/L (ref 3.5–5.1)
PROT SERPL-MCNC: 5.8 G/DL (ref 6.4–8.2)
RBC # BLD AUTO: 2.37 M/UL (ref 3.8–5.2)
SODIUM SERPL-SCNC: 137 MMOL/L (ref 136–145)
SODIUM SERPL-SCNC: 137 MMOL/L (ref 136–145)
WBC # BLD AUTO: 13.5 K/UL (ref 3.6–11)

## 2024-05-01 PROCEDURE — 80069 RENAL FUNCTION PANEL: CPT

## 2024-05-01 PROCEDURE — 6360000002 HC RX W HCPCS: Performed by: INTERNAL MEDICINE

## 2024-05-01 PROCEDURE — 84100 ASSAY OF PHOSPHORUS: CPT

## 2024-05-01 PROCEDURE — 6370000000 HC RX 637 (ALT 250 FOR IP): Performed by: INTERNAL MEDICINE

## 2024-05-01 PROCEDURE — 6370000000 HC RX 637 (ALT 250 FOR IP): Performed by: HOSPITALIST

## 2024-05-01 PROCEDURE — 2709999900 HC NON-CHARGEABLE SUPPLY

## 2024-05-01 PROCEDURE — 2580000003 HC RX 258: Performed by: HOSPITALIST

## 2024-05-01 PROCEDURE — 6360000002 HC RX W HCPCS: Performed by: HOSPITALIST

## 2024-05-01 PROCEDURE — 36415 COLL VENOUS BLD VENIPUNCTURE: CPT

## 2024-05-01 PROCEDURE — 83735 ASSAY OF MAGNESIUM: CPT

## 2024-05-01 PROCEDURE — 2060000000 HC ICU INTERMEDIATE R&B

## 2024-05-01 PROCEDURE — 80053 COMPREHEN METABOLIC PANEL: CPT

## 2024-05-01 PROCEDURE — 97165 OT EVAL LOW COMPLEX 30 MIN: CPT

## 2024-05-01 PROCEDURE — 90935 HEMODIALYSIS ONE EVALUATION: CPT

## 2024-05-01 PROCEDURE — 94761 N-INVAS EAR/PLS OXIMETRY MLT: CPT

## 2024-05-01 PROCEDURE — 85025 COMPLETE CBC W/AUTO DIFF WBC: CPT

## 2024-05-01 PROCEDURE — 97530 THERAPEUTIC ACTIVITIES: CPT

## 2024-05-01 RX ORDER — CARVEDILOL 12.5 MG/1
25 TABLET ORAL 2 TIMES DAILY
Status: DISCONTINUED | OUTPATIENT
Start: 2024-05-01 | End: 2024-05-04 | Stop reason: HOSPADM

## 2024-05-01 RX ORDER — POTASSIUM CHLORIDE 20 MEQ/1
40 TABLET, EXTENDED RELEASE ORAL ONCE
Status: DISCONTINUED | OUTPATIENT
Start: 2024-05-01 | End: 2024-05-01

## 2024-05-01 RX ADMIN — SODIUM CHLORIDE, PRESERVATIVE FREE 10 ML: 5 INJECTION INTRAVENOUS at 13:45

## 2024-05-01 RX ADMIN — ASPIRIN 81 MG: 81 TABLET, COATED ORAL at 13:17

## 2024-05-01 RX ADMIN — AMLODIPINE BESYLATE 5 MG: 5 TABLET ORAL at 20:46

## 2024-05-01 RX ADMIN — CEFTRIAXONE SODIUM 1000 MG: 1 INJECTION, POWDER, FOR SOLUTION INTRAMUSCULAR; INTRAVENOUS at 23:13

## 2024-05-01 RX ADMIN — HYDROXYZINE HYDROCHLORIDE 25 MG: 25 TABLET ORAL at 20:46

## 2024-05-01 RX ADMIN — AZITHROMYCIN DIHYDRATE 250 MG: 500 TABLET ORAL at 13:16

## 2024-05-01 RX ADMIN — CARVEDILOL 12.5 MG: 12.5 TABLET, FILM COATED ORAL at 13:17

## 2024-05-01 RX ADMIN — HYDROMORPHONE HYDROCHLORIDE 2 MG: 2 TABLET ORAL at 20:53

## 2024-05-01 RX ADMIN — POTASSIUM BICARBONATE 40 MEQ: 782 TABLET, EFFERVESCENT ORAL at 20:46

## 2024-05-01 RX ADMIN — HYDRALAZINE HYDROCHLORIDE 100 MG: 50 TABLET ORAL at 13:17

## 2024-05-01 RX ADMIN — CARVEDILOL 25 MG: 12.5 TABLET, FILM COATED ORAL at 20:46

## 2024-05-01 RX ADMIN — HYDROMORPHONE HYDROCHLORIDE 2 MG: 2 TABLET ORAL at 13:20

## 2024-05-01 RX ADMIN — SODIUM CHLORIDE, PRESERVATIVE FREE 10 ML: 5 INJECTION INTRAVENOUS at 20:50

## 2024-05-01 RX ADMIN — AMLODIPINE BESYLATE 5 MG: 5 TABLET ORAL at 13:18

## 2024-05-01 RX ADMIN — HEPARIN SODIUM 5000 UNITS: 5000 INJECTION INTRAVENOUS; SUBCUTANEOUS at 20:44

## 2024-05-01 RX ADMIN — IRON SUCROSE 100 MG: 20 INJECTION, SOLUTION INTRAVENOUS at 13:17

## 2024-05-01 RX ADMIN — EPOETIN ALFA-EPBX 10000 UNITS: 10000 INJECTION, SOLUTION INTRAVENOUS; SUBCUTANEOUS at 13:01

## 2024-05-01 RX ADMIN — TRAZODONE HYDROCHLORIDE 100 MG: 50 TABLET ORAL at 20:46

## 2024-05-01 RX ADMIN — HEPARIN SODIUM 3200 UNITS: 1000 INJECTION INTRAVENOUS; SUBCUTANEOUS at 13:01

## 2024-05-01 RX ADMIN — HYDRALAZINE HYDROCHLORIDE 100 MG: 50 TABLET ORAL at 20:46

## 2024-05-01 ASSESSMENT — PAIN SCALES - GENERAL
PAINLEVEL_OUTOF10: 4
PAINLEVEL_OUTOF10: 0
PAINLEVEL_OUTOF10: 10
PAINLEVEL_OUTOF10: 0
PAINLEVEL_OUTOF10: 0

## 2024-05-01 ASSESSMENT — PAIN DESCRIPTION - LOCATION
LOCATION: HEAD
LOCATION: HEAD;BACK

## 2024-05-01 ASSESSMENT — PAIN DESCRIPTION - ORIENTATION: ORIENTATION: ANTERIOR;POSTERIOR

## 2024-05-01 ASSESSMENT — PAIN DESCRIPTION - DESCRIPTORS: DESCRIPTORS: SHOOTING;STABBING

## 2024-05-01 NOTE — PROGRESS NOTES
PT consult received and eval attempted.  Pt currently off the floor at dialysis.  Will attempt PT eval again at a later time.  Thank you!

## 2024-05-01 NOTE — DIALYSIS
Tx completed after 3 hours and 1.5 liters removed. No issues noted. Epo given. Report given to JUAN Lofton

## 2024-05-01 NOTE — PROGRESS NOTES
Physical Therapy worked with patient today while on room air. Awaiting PT note. Patient currently on room air spo2=96%.

## 2024-05-01 NOTE — PROGRESS NOTES
OCCUPATIONAL THERAPY EVALUATION  Patient: Junie Neal (58 y.o. female)  Date: 5/1/2024  Primary Diagnosis: Shortness of breath [R06.02]  COPD exacerbation (HCC) [J44.1]  Pneumonia due to infectious organism, unspecified laterality, unspecified part of lung [J18.9]  Congestive heart failure, unspecified HF chronicity, unspecified heart failure type (HCC) [I50.9]       Precautions: Fall Risk, Bed Alarm                Recommendations for nursing mobility: AD and gt belt for bed to chair , Amb to bathroom with AD and gait belt, and Assist x1    In place during session:EKG/telemetry   ASSESSMENT  Pt is a 58 y.o. female presenting to [facility] with c/o SOB w/ difficulty breathing, admitted 4/28 and currently being treated for SOB and PNA. Pt received semi-supine in bed upon arrival, AXO x4, and agreeable to OT evaluation. Family at bedside (remained w/ permission)    Based on current observations, pt presents with decreased  functional mobility, independence in ADLs, ROM, strength, activity tolerance, endurance, balance, increased pain levels (see below for objective details and assist levels).     Overall, pt tolerates session fair w/out c/o SOB. Pt reports dizziness w/ change of position and 9/10 headache (front). Pt req'd CGA for sup>sit transfer and bed>chair transfer; unable to ambulate longer distances 2' dizziness and lightheadedness. Additional time req'd for all positional changes and ambulation. Returned to chair; BP was 155/81. SpO2 remained between 95-97% w/ brief ambulation on RA. She req'd min A for donning robe in sitting. Pt will benefit from continued skilled OT services to address current impairments and improve IND and safety with self cares and functional transfers/mobility. Potential barriers for safe discharge: pt is a high fall risk. Current OT d/c recommendation Home with Home Health Therapy and family assist once medically appropriate.    GOALS:    Problem: Occupational Therapy - Adult  Goal: By  cognitive, or psychosocial skills that result in activity limitations and/or participation restrictions MEDIUM Complexity: Patient may present with comorbidities that affect occupational performance. Minimal to moderate modifications of tasks or assist (eg. physical or verbal) with assist is necessary to enable pt to complete eval      Based on the above components, the patient evaluation is determined to be of the following complexity level: Low    Pain Ratin/10   Pain Intervention(s):   nursing notified and rest    Activity Tolerance:   Fair  and requires rest breaks    After treatment patient left in no apparent distress:    Patient left in no apparent distress sitting up in chair, Call bell within reach, and Caregiver / family present, bed locked and in lowest position    COMMUNICATION/EDUCATION:   The patient’s plan of care was discussed with: Registered nurse    Patient Education  Education Given To: Patient  Education Provided: Role of Therapy;Plan of Care  Education Method: Verbal;Demonstration  Education Outcome: Verbalized understanding;Demonstrated understanding    The supervising occupational therapist and treating occupational therapist assistant have met to review this patient’s progress and plan of care.    This patient’s plan of care is appropriate for delegation to BRIANNE.       Thank you for this referral,  Janeth Scales OT  Minutes: 33

## 2024-05-01 NOTE — PROGRESS NOTES
Hospitalist Progress Note               Daily Progress Note:    Chief complaint:   Chief Complaint   Patient presents with    Shortness of Breath        Subjective:   Pt seen after dialysis today  Tolerating HD well  No new complaints  Feels her breathing is improving    Medications reviewed  Current Facility-Administered Medications   Medication Dose Route Frequency    iron sucrose (VENOFER) injection 100 mg  100 mg IntraVENous Q MWF    hydrALAZINE (APRESOLINE) tablet 100 mg  100 mg Oral BID    epoetin breanna-epbx (RETACRIT) injection 10,000 Units  10,000 Units IntraVENous Once per day on Mon Wed Fri    nitroGLYCERIN (NITRODUR) 0.4 MG/HR 1 patch  1 patch TransDERmal Daily    albuterol sulfate HFA (PROVENTIL;VENTOLIN;PROAIR) 108 (90 Base) MCG/ACT inhaler 2 puff  2 puff Inhalation Q4H PRN    aspirin EC tablet 81 mg  81 mg Oral Daily    butalbital-acetaminophen-caffeine (FIORICET, ESGIC) per tablet 1 tablet  1 tablet Oral Q6H PRN    HYDROmorphone (DILAUDID) tablet 2 mg  2 mg Oral Q6H PRN    hydrOXYzine HCl (ATARAX) tablet 25 mg  25 mg Oral QHS    LORazepam (ATIVAN) tablet 1 mg  1 mg Oral Q6H PRN    traZODone (DESYREL) tablet 100 mg  100 mg Oral Nightly    sodium chloride flush 0.9 % injection 5-40 mL  5-40 mL IntraVENous 2 times per day    sodium chloride flush 0.9 % injection 5-40 mL  5-40 mL IntraVENous PRN    0.9 % sodium chloride infusion   IntraVENous PRN    heparin (porcine) injection 5,000 Units  5,000 Units SubCUTAneous BID    ondansetron (ZOFRAN-ODT) disintegrating tablet 4 mg  4 mg Oral Q8H PRN    Or    ondansetron (ZOFRAN) injection 4 mg  4 mg IntraVENous Q6H PRN    acetaminophen (TYLENOL) tablet 650 mg  650 mg Oral Q6H PRN    Or    acetaminophen (TYLENOL) suppository 650 mg  650 mg Rectal Q6H PRN    cefTRIAXone (ROCEPHIN) 1,000 mg in sterile water 10 mL IV syringe  1,000 mg IntraVENous Q24H    amLODIPine (NORVASC) tablet 5 mg  5 mg Oral BID    azithromycin (ZITHROMAX) tablet 250 mg  250 mg Oral Daily     Other - dialysis  [] Change in code status:    [] Decision to escalate care:    [] Major surgery/procedure with associated risk factors:    ----------------------------------------------------------------------  C. Data (any 2)  [] Discussed current management and discharge planning options with Case Management.  [] Discussed management of the case with:    [] Telemetry personally reviewed and interpreted as documented above    [] Imaging personally reviewed and interpreted, includes:    [] Data Review (any 3)  [] All available Consultant notes from yesterday/today were reviewed  [] All current labs were reviewed and interpreted for clinical significance   [] Appropriate follow-up labs were ordered  [] Collateral history obtained from:               Assessment:  Junie Neal is  58 y.o. female with below mentioned past medical history presents to the emergency room complaining of worsening of shortness of breath with generalized weakness.  At baseline she is not doing very well, activity is limited and feeling very weak and tired.  She chose to be on hospice, refused for dialysis, recently is not clear.     Today he has worsening of shortness of breath from baseline with difficulty breathing.  Cough but nonproductive.  Denies fever or chills.  Complains of feeling weak tired, decreased appetite and not eating.  Chest x-ray suspicious for pulmonary edema versus pneumonia with bilateral pleural effusions.     Blood pressure is uncontrolled, laboratory data with worsening of BUN/creatinine with non-anion gap metabolic acidosis.  Has severe anemia with hemoglobin of 9.0 with significant elevated MCV.     On questioning states that she chose not to come to dialysis as she is thinking it will cause pain.    Plan:  ACUTE HYPOXIC RESPIRATORY FAILURE  VOLUME OVERLOAD  CKD PROGRESSED TO ESRD NOW ON HD  04/30: Nephro following. Had first session HD yesterday and had another session this AM. Tolerating it well. Respiratory

## 2024-05-01 NOTE — PROGRESS NOTES
Nephrology follow-up          Patient: Junie Neal MRN: [id number]  SSN: [id number]    YOB: 1965  Age: 58 y.o.  Sex: female      Subjective:   The patient is seen in the room  She was dialyzed yesterday  She looks better  Her breathing is better  No new complaints  Blood pressures are okay  K 2.6-->3.3    Past Medical History:   Diagnosis Date    Anxiety 10/2/2020    Carotid stenosis     Chronic anemia     Chronic respiratory failure (HCC)     COPD (chronic obstructive pulmonary disease) with emphysema (HCC) 10/2/2020    Depression     Diastolic CHF (HCC)     Dysphagia 10/2/2020    GERD (gastroesophageal reflux disease)     Headache     High cholesterol     Hypertension     Hypothyroid     Iron deficiency anemia 10/2/2020    Mitral valve regurgitation 10/2/2020    Renal artery stenosis (HCC)     Seizure disorder (HCC)      Past Surgical History:   Procedure Laterality Date    CAROTID ENDARTERECTOMY      CHOLECYSTECTOMY      IR THORACENTESIS PLEURAL W IMAGING  2020    IR THORACENTESIS PLEURAL W IMAGING  2020    IR TUNNELED CATHETER PLACEMENT GREATER THAN 5 YEARS  2024    IR TUNNELED CATHETER PLACEMENT GREATER THAN 5 YEARS 2024 Janeen Lunsford PA SSR RAD ANGIO IR    KIDNEY SURGERY      ROTATOR CUFF REPAIR Right     TUBAL LIGATION        Family History   Problem Relation Age of Onset    Melanoma Child     Melanoma Brother     Heart Failure Brother             Asthma Brother     Stroke Father     Unknown Father     Stroke Mother             Melanoma Mother     Hypertension Mother     Cancer Mother     Diabetes Mother     Heart Failure Mother     Asthma Mother      Social History     Tobacco Use    Smoking status: Some Days     Current packs/day: 1.00     Average packs/day: 1 pack/day for 15.0 years (15.0 ttl pk-yrs)     Types: Cigarettes    Smokeless tobacco: Never    Tobacco comments:     Copd   Substance Use Topics    Alcohol use: Not Currently      Current  disease  Underlying CKD 5  Initiating on hemodialysis  Volume overload/uremia/metabolic acidosis  IJ tunneled HD catheter placed on 4/29  Status post HD on 4/29 and 4/30  Will be getting dialyzed today   working on outpatient hemodialysis chair set up  Okay to discharge from renal standpoint    Hypokalemia  Severe  K 2.6-->3.3  P.o. KCl 40 mEq x 1  Magnesium is 2.1  Plan to dialyze with 4K bath      Metabolic acidosis  Non anion gap  Due to advanced chronic kidney disease  Resolved    Hypertension  Elevated blood pressures  Probably volume related  Continue Norvasc /increasing carvedilol dose    Anemia  Anemia of chronic kidney disease  Hb 8.4  Iron sats 21 and ferritin level 88  IV Venofer 100 mg with the dialysis  IV EPO 10,000 with dialysis.    Renal osteodystrophy  Calcium and phosphorus levels are okay   intact .4      Chronic hypoxic respiratory failure  Underlying COPD/emphysema  History of CHF/echo in 2022 preserved LVEF  On home oxygen 2 L  Chest x-ray pulmonary edema  Plan for UF on hemodialysis      DVT prophylaxis  Agreed  with unfractionated subcu heparin        Plan:       Signed By: Jodi Navarro MD     May 1, 2024

## 2024-05-01 NOTE — CARE COORDINATION
CM reviewed chart.     [facility] is [facility]   Patient is set up for MWF shift 3 at 4:00pm starting 5/3/2024 at 2:00pm. This will not be the patient's permanent chair time - the facility will call the patient once they discharge from the hospital.     Patient has been accepted with [facility]. Patient is a bridge from [facility].     Per IDR patient has another 24 hours before they may be medically ready for dc.    CM will continue to follow.

## 2024-05-01 NOTE — PROGRESS NOTES
Medications   Medication Dose Route Frequency Provider Last Rate Last Admin    hydrALAZINE (APRESOLINE) tablet 100 mg  100 mg Oral BID Seipp, James, DO   100 mg at 04/30/24 2035    nitroGLYCERIN (NITRODUR) 0.4 MG/HR 1 patch  1 patch TransDERmal Daily Papi Warner MD        albuterol sulfate HFA (PROVENTIL;VENTOLIN;PROAIR) 108 (90 Base) MCG/ACT inhaler 2 puff  2 puff Inhalation Q4H PRN Papi Warner MD        aspirin EC tablet 81 mg  81 mg Oral Daily Papi Warner MD   81 mg at 04/30/24 1143    butalbital-acetaminophen-caffeine (FIORICET, ESGIC) per tablet 1 tablet  1 tablet Oral Q6H PRN Papi Warner MD        HYDROmorphone (DILAUDID) tablet 2 mg  2 mg Oral Q6H PRN Papi Warner MD   2 mg at 04/30/24 1832    hydrOXYzine HCl (ATARAX) tablet 25 mg  25 mg Oral QHS Papi Warner MD   25 mg at 04/30/24 2035    LORazepam (ATIVAN) tablet 1 mg  1 mg Oral Q6H PRN Papi Warner MD        traZODone (DESYREL) tablet 100 mg  100 mg Oral Nightly Papi Warner MD   100 mg at 04/30/24 2035    sodium chloride flush 0.9 % injection 5-40 mL  5-40 mL IntraVENous 2 times per day Papi Warner MD   10 mL at 04/30/24 2035    sodium chloride flush 0.9 % injection 5-40 mL  5-40 mL IntraVENous PRN Papi Warner MD        0.9 % sodium chloride infusion   IntraVENous PRN Papi Warner MD        [START ON 5/1/2024] heparin (porcine) injection 5,000 Units  5,000 Units SubCUTAneous BID Papi Warner MD        ondansetron (ZOFRAN-ODT) disintegrating tablet 4 mg  4 mg Oral Q8H PRN Papi Warner MD        Or    ondansetron (ZOFRAN) injection 4 mg  4 mg IntraVENous Q6H PRN Papi Warner MD        acetaminophen (TYLENOL) tablet 650 mg  650 mg Oral Q6H PRN Papi Warner MD   650 mg at 04/29/24 2051    Or    acetaminophen (TYLENOL) suppository 650 mg  650 mg Rectal Q6H PRN Papi Warner MD        cefTRIAXone  (ROCEPHIN) 1,000 mg in sterile water 10 mL IV syringe  1,000 mg IntraVENous Q24H Papi Warner MD   1,000 mg at 04/29/24 2328    amLODIPine (NORVASC) tablet 5 mg  5 mg Oral BID Seipp, James, DO   5 mg at 04/30/24 2035    azithromycin (ZITHROMAX) tablet 250 mg  250 mg Oral Daily Seipp, James, DO   250 mg at 04/30/24 1142    ipratropium 0.5 mg-albuterol 2.5 mg (DUONEB) nebulizer solution 1 Dose  1 Dose Inhalation Q6H PRN Seipp, James, DO        carvedilol (COREG) tablet 12.5 mg  12.5 mg Oral BID Seipp, James, DO   12.5 mg at 04/30/24 2035    nicotine (NICODERM CQ) 21 MG/24HR 1 patch  1 patch TransDERmal Daily Seipp, James, DO   1 patch at 04/30/24 1141    heparin (porcine) injection 3,200 Units  3,200 Units IntraCATHeter PRN Jodi Navarro MD   3,200 Units at 04/30/24 1032        Allergies   Allergen Reactions    Sulfa Antibiotics Anaphylaxis    Sulfamethoxazole-Trimethoprim      Other reaction(s): Unknown (comments)       Review of Systems:  A comprehensive review of systems was negative except for that written in the History of Present Illness.    Objective:     Vitals:    04/30/24 1204 04/30/24 1234 04/30/24 1541 04/30/24 1939   BP:   (!) 153/81 (!) 163/79   Pulse:   74 83   Resp: 16 18 18 16   Temp:   98.2 °F (36.8 °C) 98.6 °F (37 °C)   TempSrc:   Oral    SpO2:   100% 100%   Weight:       Height:            Physical Exam:  General: NAD  Eyes: sclera anicteric  Oral Cavity: No thrush or ulcers  Neck: no JVD  Chest: Decreased bilateral air entry  Heart: normal sounds  Abdomen: soft and non tender   : no vieira  Lower Extremities: edema+  Skin: no rash  Neuro: intact  Psychiatric: non-depressed          Assessment/Plan:     End-stage renal disease  Underlying CKD 5  Initiating on hemodialysis  Volume overload/uremia/metabolic acidosis  IJ tunneled HD catheter placed on 4/29  Status post HD on 4/29 and today.   for outpatient hemodialysis chair set up  Okay to discharge from renal

## 2024-05-01 NOTE — PROGRESS NOTES
4 Eyes Skin Assessment     NAME:  Junie Neal  YOB: 1965  MEDICAL RECORD NUMBER:  [id number]    The patient is being assessed for  Other weekly skin note    I agree that at least one RN has performed a thorough Head to Toe Skin Assessment on the patient. ALL assessment sites listed below have been assessed.      Areas assessed by both nurses:    Head, Face, Ears, Shoulders, Back, Chest, Arms, Elbows, Hands, Sacrum. Buttock, Coccyx, Ischium, and Legs. Feet and Heels        Does the Patient have a Wound? No noted wound(s)       Bayron Prevention initiated by RN: No  Wound Care Orders initiated by RN: No    Pressure Injury (Stage 3,4, Unstageable, DTI, NWPT, and Complex wounds) if present, place Wound referral order by RN under : No    New Ostomies, if present place, Ostomy referral order under : No     Nurse 1 eSignature: Electronically signed by Aniya Cook RN on 5/1/24 at 7:01 PM EDT    **SHARE this note so that the co-signing nurse can place an eSignature**    Nurse 2 eSignature: Electronically signed by Aniya Cook RN on 5/1/24 at 7:01 PM EDT     I agree with skin assessment

## 2024-05-02 ENCOUNTER — APPOINTMENT (OUTPATIENT)
Facility: HOSPITAL | Age: 59
End: 2024-05-02
Payer: MEDICARE

## 2024-05-02 LAB
ALBUMIN SERPL-MCNC: 3 G/DL (ref 3.5–5)
ALBUMIN/GLOB SERPL: 1 (ref 1.1–2.2)
ALP SERPL-CCNC: 47 U/L (ref 45–117)
ALT SERPL-CCNC: 17 U/L (ref 12–78)
ANION GAP SERPL CALC-SCNC: 8 MMOL/L (ref 5–15)
AST SERPL W P-5'-P-CCNC: 7 U/L (ref 15–37)
BASOPHILS # BLD: 0 K/UL (ref 0–0.1)
BASOPHILS NFR BLD: 0 % (ref 0–1)
BILIRUB SERPL-MCNC: 0.3 MG/DL (ref 0.2–1)
BUN SERPL-MCNC: 14 MG/DL (ref 6–20)
BUN/CREAT SERPL: 6 (ref 12–20)
CA-I BLD-MCNC: 8.3 MG/DL (ref 8.5–10.1)
CHLORIDE SERPL-SCNC: 98 MMOL/L (ref 97–108)
CO2 SERPL-SCNC: 28 MMOL/L (ref 21–32)
CREAT SERPL-MCNC: 2.37 MG/DL (ref 0.55–1.02)
DIFFERENTIAL METHOD BLD: ABNORMAL
EOSINOPHIL # BLD: 0.2 K/UL (ref 0–0.4)
EOSINOPHIL NFR BLD: 2 % (ref 0–7)
ERYTHROCYTE [DISTWIDTH] IN BLOOD BY AUTOMATED COUNT: 14.4 % (ref 11.5–14.5)
GLOBULIN SER CALC-MCNC: 3 G/DL (ref 2–4)
GLUCOSE SERPL-MCNC: 99 MG/DL (ref 65–100)
HCT VFR BLD AUTO: 24.7 % (ref 35–47)
HGB BLD-MCNC: 8.2 G/DL (ref 11.5–16)
IMM GRANULOCYTES # BLD AUTO: 0.1 K/UL (ref 0–0.04)
IMM GRANULOCYTES NFR BLD AUTO: 1 % (ref 0–0.5)
LYMPHOCYTES # BLD: 1.3 K/UL (ref 0.8–3.5)
LYMPHOCYTES NFR BLD: 12 % (ref 12–49)
MAGNESIUM SERPL-MCNC: 1.9 MG/DL (ref 1.6–2.4)
MCH RBC QN AUTO: 34 PG (ref 26–34)
MCHC RBC AUTO-ENTMCNC: 33.2 G/DL (ref 30–36.5)
MCV RBC AUTO: 102.5 FL (ref 80–99)
MONOCYTES # BLD: 0.8 K/UL (ref 0–1)
MONOCYTES NFR BLD: 7 % (ref 5–13)
NEUTS SEG # BLD: 8.6 K/UL (ref 1.8–8)
NEUTS SEG NFR BLD: 78 % (ref 32–75)
NRBC # BLD: 0 K/UL (ref 0–0.01)
NRBC BLD-RTO: 0 PER 100 WBC
PHOSPHATE SERPL-MCNC: 2.1 MG/DL (ref 2.6–4.7)
PLATELET # BLD AUTO: 146 K/UL (ref 150–400)
PMV BLD AUTO: 10.6 FL (ref 8.9–12.9)
POTASSIUM SERPL-SCNC: 3.3 MMOL/L (ref 3.5–5.1)
PROT SERPL-MCNC: 6 G/DL (ref 6.4–8.2)
RBC # BLD AUTO: 2.41 M/UL (ref 3.8–5.2)
SODIUM SERPL-SCNC: 134 MMOL/L (ref 136–145)
WBC # BLD AUTO: 10.9 K/UL (ref 3.6–11)

## 2024-05-02 PROCEDURE — 2580000003 HC RX 258: Performed by: INTERNAL MEDICINE

## 2024-05-02 PROCEDURE — 36415 COLL VENOUS BLD VENIPUNCTURE: CPT

## 2024-05-02 PROCEDURE — 6370000000 HC RX 637 (ALT 250 FOR IP): Performed by: INTERNAL MEDICINE

## 2024-05-02 PROCEDURE — C1894 INTRO/SHEATH, NON-LASER: HCPCS | Performed by: INTERNAL MEDICINE

## 2024-05-02 PROCEDURE — 2060000000 HC ICU INTERMEDIATE R&B

## 2024-05-02 PROCEDURE — 2500000003 HC RX 250 WO HCPCS: Performed by: INTERNAL MEDICINE

## 2024-05-02 PROCEDURE — 84100 ASSAY OF PHOSPHORUS: CPT

## 2024-05-02 PROCEDURE — 6360000002 HC RX W HCPCS: Performed by: HOSPITALIST

## 2024-05-02 PROCEDURE — 7100000001 HC PACU RECOVERY - ADDTL 15 MIN: Performed by: INTERNAL MEDICINE

## 2024-05-02 PROCEDURE — 2709999900 HC NON-CHARGEABLE SUPPLY: Performed by: INTERNAL MEDICINE

## 2024-05-02 PROCEDURE — C1769 GUIDE WIRE: HCPCS | Performed by: INTERNAL MEDICINE

## 2024-05-02 PROCEDURE — 97530 THERAPEUTIC ACTIVITIES: CPT

## 2024-05-02 PROCEDURE — 80053 COMPREHEN METABOLIC PANEL: CPT

## 2024-05-02 PROCEDURE — 97161 PT EVAL LOW COMPLEX 20 MIN: CPT

## 2024-05-02 PROCEDURE — 6370000000 HC RX 637 (ALT 250 FOR IP): Performed by: HOSPITALIST

## 2024-05-02 PROCEDURE — 93005 ELECTROCARDIOGRAM TRACING: CPT | Performed by: INTERNAL MEDICINE

## 2024-05-02 PROCEDURE — 6360000002 HC RX W HCPCS: Performed by: INTERNAL MEDICINE

## 2024-05-02 PROCEDURE — 83735 ASSAY OF MAGNESIUM: CPT

## 2024-05-02 PROCEDURE — 2580000003 HC RX 258: Performed by: HOSPITALIST

## 2024-05-02 PROCEDURE — 76937 US GUIDE VASCULAR ACCESS: CPT | Performed by: INTERNAL MEDICINE

## 2024-05-02 PROCEDURE — 7100000000 HC PACU RECOVERY - FIRST 15 MIN: Performed by: INTERNAL MEDICINE

## 2024-05-02 PROCEDURE — 93460 R&L HRT ART/VENTRICLE ANGIO: CPT | Performed by: INTERNAL MEDICINE

## 2024-05-02 PROCEDURE — 99152 MOD SED SAME PHYS/QHP 5/>YRS: CPT | Performed by: INTERNAL MEDICINE

## 2024-05-02 PROCEDURE — 99153 MOD SED SAME PHYS/QHP EA: CPT | Performed by: INTERNAL MEDICINE

## 2024-05-02 PROCEDURE — C1887 CATHETER, GUIDING: HCPCS | Performed by: INTERNAL MEDICINE

## 2024-05-02 PROCEDURE — 85025 COMPLETE CBC W/AUTO DIFF WBC: CPT

## 2024-05-02 PROCEDURE — 6360000004 HC RX CONTRAST MEDICATION: Performed by: INTERNAL MEDICINE

## 2024-05-02 RX ORDER — HEPARIN SODIUM 200 [USP'U]/100ML
INJECTION, SOLUTION INTRAVENOUS CONTINUOUS PRN
Status: COMPLETED | OUTPATIENT
Start: 2024-05-02 | End: 2024-05-02

## 2024-05-02 RX ORDER — SODIUM CHLORIDE 0.9 % (FLUSH) 0.9 %
5-40 SYRINGE (ML) INJECTION EVERY 12 HOURS SCHEDULED
Status: DISCONTINUED | OUTPATIENT
Start: 2024-05-02 | End: 2024-05-03

## 2024-05-02 RX ORDER — SODIUM CHLORIDE 0.9 % (FLUSH) 0.9 %
5-40 SYRINGE (ML) INJECTION PRN
Status: DISCONTINUED | OUTPATIENT
Start: 2024-05-02 | End: 2024-05-04 | Stop reason: HOSPADM

## 2024-05-02 RX ORDER — ATORVASTATIN CALCIUM 40 MG/1
40 TABLET, FILM COATED ORAL NIGHTLY
Status: DISCONTINUED | OUTPATIENT
Start: 2024-05-02 | End: 2024-05-04 | Stop reason: HOSPADM

## 2024-05-02 RX ORDER — LIDOCAINE HYDROCHLORIDE 10 MG/ML
INJECTION, SOLUTION INFILTRATION; PERINEURAL PRN
Status: DISCONTINUED | OUTPATIENT
Start: 2024-05-02 | End: 2024-05-02 | Stop reason: HOSPADM

## 2024-05-02 RX ORDER — MIDAZOLAM HYDROCHLORIDE 1 MG/ML
INJECTION INTRAMUSCULAR; INTRAVENOUS PRN
Status: DISCONTINUED | OUTPATIENT
Start: 2024-05-02 | End: 2024-05-02 | Stop reason: HOSPADM

## 2024-05-02 RX ORDER — ACETAMINOPHEN 325 MG/1
650 TABLET ORAL EVERY 4 HOURS PRN
Status: DISCONTINUED | OUTPATIENT
Start: 2024-05-02 | End: 2024-05-04 | Stop reason: HOSPADM

## 2024-05-02 RX ORDER — FENTANYL CITRATE 50 UG/ML
INJECTION, SOLUTION INTRAMUSCULAR; INTRAVENOUS PRN
Status: DISCONTINUED | OUTPATIENT
Start: 2024-05-02 | End: 2024-05-02 | Stop reason: HOSPADM

## 2024-05-02 RX ORDER — CLONAZEPAM 0.5 MG/1
0.5 TABLET ORAL 2 TIMES DAILY PRN
Status: DISCONTINUED | OUTPATIENT
Start: 2024-05-02 | End: 2024-05-04 | Stop reason: HOSPADM

## 2024-05-02 RX ORDER — HEPARIN SODIUM 1000 [USP'U]/ML
INJECTION, SOLUTION INTRAVENOUS; SUBCUTANEOUS PRN
Status: DISCONTINUED | OUTPATIENT
Start: 2024-05-02 | End: 2024-05-02 | Stop reason: HOSPADM

## 2024-05-02 RX ORDER — SODIUM CHLORIDE 9 MG/ML
INJECTION, SOLUTION INTRAVENOUS PRN
Status: DISCONTINUED | OUTPATIENT
Start: 2024-05-02 | End: 2024-05-04 | Stop reason: HOSPADM

## 2024-05-02 RX ADMIN — HEPARIN SODIUM 5000 UNITS: 5000 INJECTION INTRAVENOUS; SUBCUTANEOUS at 08:40

## 2024-05-02 RX ADMIN — SODIUM CHLORIDE, PRESERVATIVE FREE 10 ML: 5 INJECTION INTRAVENOUS at 20:27

## 2024-05-02 RX ADMIN — SODIUM CHLORIDE, PRESERVATIVE FREE 10 ML: 5 INJECTION INTRAVENOUS at 20:26

## 2024-05-02 RX ADMIN — HYDRALAZINE HYDROCHLORIDE 100 MG: 50 TABLET ORAL at 20:25

## 2024-05-02 RX ADMIN — AMLODIPINE BESYLATE 5 MG: 5 TABLET ORAL at 08:41

## 2024-05-02 RX ADMIN — BUTALBITAL, ACETAMINOPHEN, AND CAFFEINE 1 TABLET: 50; 325; 40 TABLET ORAL at 08:43

## 2024-05-02 RX ADMIN — HEPARIN SODIUM 5000 UNITS: 5000 INJECTION INTRAVENOUS; SUBCUTANEOUS at 20:24

## 2024-05-02 RX ADMIN — AZITHROMYCIN DIHYDRATE 250 MG: 500 TABLET ORAL at 08:41

## 2024-05-02 RX ADMIN — ATORVASTATIN CALCIUM 40 MG: 40 TABLET, FILM COATED ORAL at 20:25

## 2024-05-02 RX ADMIN — TRAZODONE HYDROCHLORIDE 100 MG: 50 TABLET ORAL at 20:26

## 2024-05-02 RX ADMIN — ASPIRIN 81 MG: 81 TABLET, COATED ORAL at 08:41

## 2024-05-02 RX ADMIN — SODIUM CHLORIDE, PRESERVATIVE FREE 10 ML: 5 INJECTION INTRAVENOUS at 08:43

## 2024-05-02 RX ADMIN — AMLODIPINE BESYLATE 5 MG: 5 TABLET ORAL at 20:25

## 2024-05-02 RX ADMIN — CARVEDILOL 25 MG: 12.5 TABLET, FILM COATED ORAL at 08:41

## 2024-05-02 RX ADMIN — HYDRALAZINE HYDROCHLORIDE 100 MG: 50 TABLET ORAL at 08:41

## 2024-05-02 RX ADMIN — POTASSIUM BICARBONATE 40 MEQ: 782 TABLET, EFFERVESCENT ORAL at 08:44

## 2024-05-02 RX ADMIN — HYDROMORPHONE HYDROCHLORIDE 2 MG: 2 TABLET ORAL at 20:25

## 2024-05-02 RX ADMIN — CARVEDILOL 25 MG: 12.5 TABLET, FILM COATED ORAL at 20:25

## 2024-05-02 RX ADMIN — HYDROXYZINE HYDROCHLORIDE 25 MG: 25 TABLET ORAL at 20:25

## 2024-05-02 ASSESSMENT — PAIN DESCRIPTION - LOCATION
LOCATION: HEAD
LOCATION: HEAD

## 2024-05-02 ASSESSMENT — PAIN SCALES - GENERAL
PAINLEVEL_OUTOF10: 9
PAINLEVEL_OUTOF10: 0
PAINLEVEL_OUTOF10: 0

## 2024-05-02 NOTE — PLAN OF CARE
Problem: Safety - Adult  Goal: Free from fall injury  Outcome: Progressing     Problem: Pain  Goal: Verbalizes/displays adequate comfort level or baseline comfort level  Outcome: Progressing     Problem: ABCDS Injury Assessment  Goal: Absence of physical injury  Outcome: Progressing     Problem: Skin/Tissue Integrity  Goal: Absence of new skin breakdown  Description: 1.  Monitor for areas of redness and/or skin breakdown  2.  Assess vascular access sites hourly  3.  Every 4-6 hours minimum:  Change oxygen saturation probe site  4.  Every 4-6 hours:  If on nasal continuous positive airway pressure, respiratory therapy assess nares and determine need for appliance change or resting period.  Outcome: Progressing     Problem: Nutrition Deficit:  Goal: Optimize nutritional status  Outcome: Progressing     Problem: Occupational Therapy - Adult  Goal: By Discharge: Performs self-care activities at highest level of function for planned discharge setting.  See evaluation for individualized goals.  Description: FUNCTIONAL STATUS PRIOR TO ADMISSION: Pt reports she utilized RW or rollator for ambulation and SO A w/ ADLs.    HOME SUPPORT: Pt lives with SO    Occupational Therapy Goals:  Initiated 5/1/2024  Patient/Family stated goal: I want to get better  Patient will perform grooming in standing with Modified Dorr within 7 day(s).  Patient will perform UB bathing with Dorr within 7 day(s).  Patient will perform LB bathing with Dorr within 7 day(s).  Patient will perform toilet transfers with Modified Dorr  within 7 day(s).  Patient will perform all aspects of toileting with Dorr within 7 day(s).  Patient will participate in upper extremity therapeutic exercise/activities with Dorr within 7 day(s).    Patient will be mod I in 2-3 step meal preparation tasks in prep for household management tasks within 7 day(s).   5/1/2024 1511 by Janeth Scales, OT  Outcome: Progressing

## 2024-05-02 NOTE — CONSULTS
Cardiology Consult    NAME: Junie Neal   :  1965   MRN:  [id number]     Date/Time:  2024 8:39 AM    Patient PCP: No primary care provider on file.  ________________________________________________________________________     Assessment:   Patient is a 58-year-old white female with:  1.  Chronic anemia  2.  Thrombocytopenia  3.  Severe MR  4.  Moderate tricuspid regurgitation  5.  Peripheral vascular disease  6.  Hyperlipidemia  7.  Nicotine dependence  8.  End-stage renal disease, hemodialysis       Plan:     Sodium 134, potassium is 3.3, creatinine 2.37, BUN is 14.  Calcium 8.3  Uric acid 9.4  BNP greater than 35,000  Albumin 3.0, total protein 6.0    Hemoglobin 8.2  Platelet 146,  Echocardiogram this admission showed EF of 40 to 45% with moderate global hypokinesis, severe MR with moderate TR.  There is pericardial calcification noticed.    Currently on aspirin, amlodipine, carvedilol, hydralazine, nicotine patches\    I have discussed with the patient indication, alternative, risk benefits and complications of left heart catheterization right heart catheterization and possible PCI and she verbalized understanding and agreed to proceed the procedure.      Addendum:  Patient underwent right and left heart catheterization.  1.  PA pressure 52/1 with mean of 31 mmHg, sat of 61%  2.  Pulmonary capillary wedge pressure 21/16 with mean of 15 mmHg  3.  RV pressure 45/1 with RV sat 60.2%  4.  RA sat 6/4 with mean of 3 mmHg RA sat of 64%  Aortic sat 92%  Cardiac output 4.22 L/min  Index 2.9  EDP 9 mmHg  Normal LV systolic function of EF of 55 to 60%.  No significant mitral regurgitation noticed.    She was noticed to have heavily calcified severe stenosis of mid RCA.  She will probably need arthrectomy.  We will plan to do this later.        []        High complexity decision making was performed        Subjective:   CHIEF COMPLAINT:   Shortness of breath    REASON FOR CONSULT:  Heart failure with  Automatic Reconciliation, Ar   ipratropium-albuterol (DUONEB) 0.5-2.5 (3) MG/3ML SOLN nebulizer solution Inhale 3 mLs into the lungs every 6 hours 10/2/20   Automatic Reconciliation, Ar       Recent Results (from the past 24 hour(s))   CBC with Auto Differential    Collection Time: 05/02/24  3:29 AM   Result Value Ref Range    WBC 10.9 3.6 - 11.0 K/uL    RBC 2.41 (L) 3.80 - 5.20 M/uL    Hemoglobin 8.2 (L) 11.5 - 16.0 g/dL    Hematocrit 24.7 (L) 35.0 - 47.0 %    .5 (H) 80.0 - 99.0 FL    MCH 34.0 26.0 - 34.0 PG    MCHC 33.2 30.0 - 36.5 g/dL    RDW 14.4 11.5 - 14.5 %    Platelets 146 (L) 150 - 400 K/uL    MPV 10.6 8.9 - 12.9 FL    Nucleated RBCs 0.0 0.0  WBC    nRBC 0.00 0.00 - 0.01 K/uL    Neutrophils % 78 (H) 32 - 75 %    Lymphocytes % 12 12 - 49 %    Monocytes % 7 5 - 13 %    Eosinophils % 2 0 - 7 %    Basophils % 0 0 - 1 %    Immature Granulocytes % 1 (H) 0 - 0.5 %    Neutrophils Absolute 8.6 (H) 1.8 - 8.0 K/UL    Lymphocytes Absolute 1.3 0.8 - 3.5 K/UL    Monocytes Absolute 0.8 0.0 - 1.0 K/UL    Eosinophils Absolute 0.2 0.0 - 0.4 K/UL    Basophils Absolute 0.0 0.0 - 0.1 K/UL    Immature Granulocytes Absolute 0.1 (H) 0.00 - 0.04 K/UL    Differential Type AUTOMATED     Comprehensive Metabolic Panel    Collection Time: 05/02/24  3:29 AM   Result Value Ref Range    Sodium 134 (L) 136 - 145 mmol/L    Potassium 3.3 (L) 3.5 - 5.1 mmol/L    Chloride 98 97 - 108 mmol/L    CO2 28 21 - 32 mmol/L    Anion Gap 8 5 - 15 mmol/L    Glucose 99 65 - 100 mg/dL    BUN 14 6 - 20 mg/dL    Creatinine 2.37 (H) 0.55 - 1.02 mg/dL    Bun/Cre Ratio 6 (L) 12 - 20      Est, Glom Filt Rate 23 (L) >60 ml/min/1.73m2    Calcium 8.3 (L) 8.5 - 10.1 mg/dL    Total Bilirubin 0.3 0.2 - 1.0 mg/dL    AST 7 (L) 15 - 37 U/L    ALT 17 12 - 78 U/L    Alk Phosphatase 47 45 - 117 U/L    Total Protein 6.0 (L) 6.4 - 8.2 g/dL    Albumin 3.0 (L) 3.5 - 5.0 g/dL    Globulin 3.0 2.0 - 4.0 g/dL    Albumin/Globulin Ratio 1.0 (L) 1.1 - 2.2     Magnesium

## 2024-05-02 NOTE — PROGRESS NOTES
derangements  [] Critical electrolyte abnormalities requiring IV replacement and close serial monitoring  [] SQ Insulin SS- monitoring serial FSBS for Hypoglycemic adverse drug reaction  [x] Other - dialysis  [] Change in code status:    [] Decision to escalate care:    [] Major surgery/procedure with associated risk factors:    ----------------------------------------------------------------------  C. Data (any 2)  [] Discussed current management and discharge planning options with Case Management.  [] Discussed management of the case with:    [] Telemetry personally reviewed and interpreted as documented above    [] Imaging personally reviewed and interpreted, includes:    [] Data Review (any 3)  [] All available Consultant notes from yesterday/today were reviewed  [] All current labs were reviewed and interpreted for clinical significance   [] Appropriate follow-up labs were ordered  [] Collateral history obtained from:               Assessment:  Junie Neal is  58 y.o. female with below mentioned past medical history presents to the emergency room complaining of worsening of shortness of breath with generalized weakness.  At baseline she is not doing very well, activity is limited and feeling very weak and tired.  She chose to be on hospice, refused for dialysis, recently is not clear.     Today he has worsening of shortness of breath from baseline with difficulty breathing.  Cough but nonproductive.  Denies fever or chills.  Complains of feeling weak tired, decreased appetite and not eating.  Chest x-ray suspicious for pulmonary edema versus pneumonia with bilateral pleural effusions.     Blood pressure is uncontrolled, laboratory data with worsening of BUN/creatinine with non-anion gap metabolic acidosis.  Has severe anemia with hemoglobin of 9.0 with significant elevated MCV.     On questioning states that she chose not to come to dialysis as she is thinking it will cause pain.    Plan:  ACUTE HYPOXIC  RESPIRATORY FAILURE  VOLUME OVERLOAD  CKD PROGRESSED TO ESRD NOW ON HD  04/30: Nephro following. Had first session HD yesterday and had another session this AM. Tolerating it well. Respiratory status improving.    05/01: clear for d/c from renal standpoint. CM working on getting HD chair    HFmrEF, NEW DX  04/30: EF worsened from prior ECHO in 2022. EF now 40-45% with moderate global hypokinesis and G3DD. Once pt's volume status improves will order stress test and escalate GDMT. Cont coreg 12.5 mg bid for now.   05/01: pt agreeable to stress test tmrw. NPO MN for adelaide myoview  05/02: cardiac cath pending today    DEPRESSION  ANXIETY  05/02: Pt tearful and depressed noting that she has \"been through a lot\" in her life.  states she has been doing worse emotionally given her recent health concerns and personal challenges. Pt described being on zoloft at one point and valium. Pt is willing to resume zoloft and trial klonopin     POSSIBLE PNEUMONIA  04/30: cont abx     COPD, STABLE  continue maintenance medications     HTN  Amlodipine hzn     ANEMIA OF CKD  Outpt management         Medications Home :    Reviewed with external Rx history     Code status : Full code     VTE prophylaxis : Heparin.      Advance Medical directive : Health care decision maker information is on file.          James Seipp, DO:

## 2024-05-02 NOTE — PROGRESS NOTES
CM in to see patient and her . CM notified them of dialysis chair at [facility] starting 5/3/24.     Patient DCP is home with  and [facility] services. Patient NPO for cardiac cath today.     1525 pm  CM received message from [facility]  to cancel referral for HH but when CM approached  he says they still want HH services. CM called to speak to Catr[place]ita and found that was a mistake. Patient will still receive HH services through [facility].

## 2024-05-02 NOTE — PROGRESS NOTES
Hospitalist Progress Note               Daily Progress Note:    Chief complaint:   Chief Complaint   Patient presents with    Shortness of Breath        Subjective:   No new complaints  Discussed need with ischemic eval and the pt is agreeable for stress test    Medications reviewed  Current Facility-Administered Medications   Medication Dose Route Frequency    iron sucrose (VENOFER) injection 100 mg  100 mg IntraVENous Q MWF    carvedilol (COREG) tablet 25 mg  25 mg Oral BID    potassium bicarb-citric acid (EFFER-K) effervescent tablet 40 mEq  40 mEq Oral Once    hydrALAZINE (APRESOLINE) tablet 100 mg  100 mg Oral BID    epoetin breanna-epbx (RETACRIT) injection 10,000 Units  10,000 Units IntraVENous Once per day on Mon Wed Fri    nitroGLYCERIN (NITRODUR) 0.4 MG/HR 1 patch  1 patch TransDERmal Daily    albuterol sulfate HFA (PROVENTIL;VENTOLIN;PROAIR) 108 (90 Base) MCG/ACT inhaler 2 puff  2 puff Inhalation Q4H PRN    aspirin EC tablet 81 mg  81 mg Oral Daily    butalbital-acetaminophen-caffeine (FIORICET, ESGIC) per tablet 1 tablet  1 tablet Oral Q6H PRN    HYDROmorphone (DILAUDID) tablet 2 mg  2 mg Oral Q6H PRN    hydrOXYzine HCl (ATARAX) tablet 25 mg  25 mg Oral QHS    LORazepam (ATIVAN) tablet 1 mg  1 mg Oral Q6H PRN    traZODone (DESYREL) tablet 100 mg  100 mg Oral Nightly    sodium chloride flush 0.9 % injection 5-40 mL  5-40 mL IntraVENous 2 times per day    sodium chloride flush 0.9 % injection 5-40 mL  5-40 mL IntraVENous PRN    0.9 % sodium chloride infusion   IntraVENous PRN    heparin (porcine) injection 5,000 Units  5,000 Units SubCUTAneous BID    ondansetron (ZOFRAN-ODT) disintegrating tablet 4 mg  4 mg Oral Q8H PRN    Or    ondansetron (ZOFRAN) injection 4 mg  4 mg IntraVENous Q6H PRN    acetaminophen (TYLENOL) tablet 650 mg  650 mg Oral Q6H PRN    Or    acetaminophen (TYLENOL) suppository 650 mg  650 mg Rectal Q6H PRN    cefTRIAXone (ROCEPHIN) 1,000 mg in sterile water 10 mL IV syringe  1,000 mg  pain.    Plan:  ACUTE HYPOXIC RESPIRATORY FAILURE  VOLUME OVERLOAD  CKD PROGRESSED TO ESRD NOW ON HD  04/30: Nephro following. Had first session HD yesterday and had another session this AM. Tolerating it well. Respiratory status improving.    05/01: clear for d/c from renal standpoint. CM working on getting HD chair    HFmrEF, NEW DX  04/30: EF worsened from prior ECHO in 2022. EF now 40-45% with moderate global hypokinesis and G3DD. Once pt's volume status improves will order stress test and escalate GDMT. Cont coreg 12.5 mg bid for now.   05/01: pt agreeable to stress test tmrw. NPO MN for adelaide myoview     POSSIBLE PNEUMONIA  04/30: cont abx     COPD, STABLE  continue maintenance medications     HTN  Amlodipine hzn     ANEMIA OF CKD  Outpt management         Medications Home :    Reviewed with external Rx history     Code status : Full code     VTE prophylaxis : Heparin.      Advance Medical directive : Health care decision maker information is on file.          James Seipp, DO:

## 2024-05-02 NOTE — PLAN OF CARE
PHYSICAL THERAPY EVALUATION  Patient: Junie Neal (58 y.o. female)  Date: 5/2/2024  Primary Diagnosis: Shortness of breath [R06.02]  COPD exacerbation (HCC) [J44.1]  Pneumonia due to infectious organism, unspecified laterality, unspecified part of lung [J18.9]  Congestive heart failure, unspecified HF chronicity, unspecified heart failure type (HCC) [I50.9]  Procedure(s) (LRB):  Left and right heart cath / coronary angiography (N/A)     Precautions: Fall Risk, Bed Alarm                      Recommendations for nursing mobility: Out of bed to chair for meals, Encourage HEP in prep for ADLs/mobility; see handout for details, Use of bed/chair alarm for safety, AD and gt belt for bed to chair , Amb to bathroom with AD and gait belt, and Assist x1    In place during session: Nasal Cannula 2L, External Catheter, and EKG/telemetry     ASSESSMENT  Pt is a 58 y.o. female admitted on 4/28/2024 for increased work of breathing over past several days; PMHx COPD, diastolic CHF, admitted from home on hospice; pt currently being treated for SOB . Pt semi supine upon PT arrival, agreeable to evaluation. Pt A&O x 4.  Pt's spouse present at bedside throughout session with pt permission.    Based on the objective data described below, the patient currently presents with impaired functional mobility, decreased independence in ADLs, impaired strength, decreased activity tolerance, and impaired balance. (See below for objective details and assist levels).     Overall pt tolerated session fair today with no c/o pain or SOB throughout session. Pt required SBA and add'l time for bed mobility and Brannon and add'l time for transfers for walker stabilization, anterior weight shift and TC for hand placement on walker . Pt amb 15 feet with RW, gt belt and CGA; demonstrates narrow CODY, decreased pace, decreased step clearance, no overt LOB noted. Pt requires 2x standing rest break d/t fatigue, denies SOB and spO2 maintained 99% on 2L NC

## 2024-05-02 NOTE — PROGRESS NOTES
Nephrology follow-up          Patient: Junie Neal MRN: [id number]  SSN: [id number]    YOB: 1965  Age: 58 y.o.  Sex: female      Subjective:   The patient is seen in the room  She was dialyzed yesterday  She looks better  Her breathing is better  No new complaints  Blood pressures are okay  K 2.6-->3.3    Past Medical History:   Diagnosis Date    Anxiety 10/2/2020    Carotid stenosis     Chronic anemia     Chronic respiratory failure (HCC)     COPD (chronic obstructive pulmonary disease) with emphysema (HCC) 10/2/2020    Depression     Diastolic CHF (HCC)     Dysphagia 10/2/2020    GERD (gastroesophageal reflux disease)     Headache     High cholesterol     Hypertension     Hypothyroid     Iron deficiency anemia 10/2/2020    Mitral valve regurgitation 10/2/2020    Renal artery stenosis (HCC)     Seizure disorder (HCC)      Past Surgical History:   Procedure Laterality Date    CAROTID ENDARTERECTOMY      CHOLECYSTECTOMY      IR THORACENTESIS PLEURAL W IMAGING  2020    IR THORACENTESIS PLEURAL W IMAGING  2020    IR TUNNELED CATHETER PLACEMENT GREATER THAN 5 YEARS  2024    IR TUNNELED CATHETER PLACEMENT GREATER THAN 5 YEARS 2024 [facility] IR    KIDNEY SURGERY      ROTATOR CUFF REPAIR Right     TUBAL LIGATION        Family History   Problem Relation Age of Onset    Melanoma Child     Melanoma Brother     Heart Failure Brother             Asthma Brother     Stroke Father     Unknown Father     Stroke Mother             Melanoma Mother     Hypertension Mother     Cancer Mother     Diabetes Mother     Heart Failure Mother     Asthma Mother      Social History     Tobacco Use    Smoking status: Some Days     Current packs/day: 1.00     Average packs/day: 1 pack/day for 15.0 years (15.0 ttl pk-yrs)     Types: Cigarettes    Smokeless tobacco: Never    Tobacco comments:     Copd   Substance Use Topics    Alcohol use: Not Currently      Current

## 2024-05-03 LAB
ALBUMIN SERPL-MCNC: 2.9 G/DL (ref 3.5–5)
ALBUMIN/GLOB SERPL: 1 (ref 1.1–2.2)
ALP SERPL-CCNC: 50 U/L (ref 45–117)
ALT SERPL-CCNC: 15 U/L (ref 12–78)
ANION GAP SERPL CALC-SCNC: 9 MMOL/L (ref 5–15)
AST SERPL W P-5'-P-CCNC: 7 U/L (ref 15–37)
BASOPHILS # BLD: 0 K/UL (ref 0–0.1)
BASOPHILS NFR BLD: 0 % (ref 0–1)
BILIRUB SERPL-MCNC: 0.3 MG/DL (ref 0.2–1)
BUN SERPL-MCNC: 21 MG/DL (ref 6–20)
BUN/CREAT SERPL: 6 (ref 12–20)
CA-I BLD-MCNC: 8.3 MG/DL (ref 8.5–10.1)
CHLORIDE SERPL-SCNC: 98 MMOL/L (ref 97–108)
CO2 SERPL-SCNC: 27 MMOL/L (ref 21–32)
CREAT SERPL-MCNC: 3.26 MG/DL (ref 0.55–1.02)
DIFFERENTIAL METHOD BLD: ABNORMAL
ECHO BSA: 1.42 M2
EOSINOPHIL # BLD: 0.3 K/UL (ref 0–0.4)
EOSINOPHIL NFR BLD: 2 % (ref 0–7)
ERYTHROCYTE [DISTWIDTH] IN BLOOD BY AUTOMATED COUNT: 14.5 % (ref 11.5–14.5)
GLOBULIN SER CALC-MCNC: 2.9 G/DL (ref 2–4)
GLUCOSE SERPL-MCNC: 95 MG/DL (ref 65–100)
HCT VFR BLD AUTO: 22.8 % (ref 35–47)
HGB BLD-MCNC: 7.3 G/DL (ref 11.5–16)
IMM GRANULOCYTES # BLD AUTO: 0.1 K/UL (ref 0–0.04)
IMM GRANULOCYTES NFR BLD AUTO: 1 % (ref 0–0.5)
LYMPHOCYTES # BLD: 1.2 K/UL (ref 0.8–3.5)
LYMPHOCYTES NFR BLD: 9 % (ref 12–49)
MAGNESIUM SERPL-MCNC: 1.9 MG/DL (ref 1.6–2.4)
MCH RBC QN AUTO: 33.5 PG (ref 26–34)
MCHC RBC AUTO-ENTMCNC: 32 G/DL (ref 30–36.5)
MCV RBC AUTO: 104.6 FL (ref 80–99)
MONOCYTES # BLD: 0.8 K/UL (ref 0–1)
MONOCYTES NFR BLD: 6 % (ref 5–13)
NEUTS SEG # BLD: 11.3 K/UL (ref 1.8–8)
NEUTS SEG NFR BLD: 82 % (ref 32–75)
NRBC # BLD: 0 K/UL (ref 0–0.01)
NRBC BLD-RTO: 0 PER 100 WBC
PHOSPHATE SERPL-MCNC: 2.7 MG/DL (ref 2.6–4.7)
PLATELET # BLD AUTO: 139 K/UL (ref 150–400)
PMV BLD AUTO: 10.6 FL (ref 8.9–12.9)
POTASSIUM SERPL-SCNC: 3.3 MMOL/L (ref 3.5–5.1)
PROT SERPL-MCNC: 5.8 G/DL (ref 6.4–8.2)
RBC # BLD AUTO: 2.18 M/UL (ref 3.8–5.2)
SODIUM SERPL-SCNC: 134 MMOL/L (ref 136–145)
WBC # BLD AUTO: 13.6 K/UL (ref 3.6–11)

## 2024-05-03 PROCEDURE — 6360000002 HC RX W HCPCS: Performed by: INTERNAL MEDICINE

## 2024-05-03 PROCEDURE — 2060000000 HC ICU INTERMEDIATE R&B

## 2024-05-03 PROCEDURE — 6370000000 HC RX 637 (ALT 250 FOR IP): Performed by: INTERNAL MEDICINE

## 2024-05-03 PROCEDURE — 80053 COMPREHEN METABOLIC PANEL: CPT

## 2024-05-03 PROCEDURE — B2151ZZ FLUOROSCOPY OF LEFT HEART USING LOW OSMOLAR CONTRAST: ICD-10-PCS | Performed by: INTERNAL MEDICINE

## 2024-05-03 PROCEDURE — 83735 ASSAY OF MAGNESIUM: CPT

## 2024-05-03 PROCEDURE — 4A023N7 MEASUREMENT OF CARDIAC SAMPLING AND PRESSURE, LEFT HEART, PERCUTANEOUS APPROACH: ICD-10-PCS | Performed by: INTERNAL MEDICINE

## 2024-05-03 PROCEDURE — 2580000003 HC RX 258: Performed by: HOSPITALIST

## 2024-05-03 PROCEDURE — B2111ZZ FLUOROSCOPY OF MULTIPLE CORONARY ARTERIES USING LOW OSMOLAR CONTRAST: ICD-10-PCS | Performed by: INTERNAL MEDICINE

## 2024-05-03 PROCEDURE — 90935 HEMODIALYSIS ONE EVALUATION: CPT

## 2024-05-03 PROCEDURE — 2709999900 HC NON-CHARGEABLE SUPPLY

## 2024-05-03 PROCEDURE — 36415 COLL VENOUS BLD VENIPUNCTURE: CPT

## 2024-05-03 PROCEDURE — 6370000000 HC RX 637 (ALT 250 FOR IP): Performed by: HOSPITALIST

## 2024-05-03 PROCEDURE — 6360000002 HC RX W HCPCS: Performed by: HOSPITALIST

## 2024-05-03 PROCEDURE — 85025 COMPLETE CBC W/AUTO DIFF WBC: CPT

## 2024-05-03 PROCEDURE — 84100 ASSAY OF PHOSPHORUS: CPT

## 2024-05-03 RX ORDER — POTASSIUM CHLORIDE 20 MEQ/1
40 TABLET, EXTENDED RELEASE ORAL ONCE
Status: COMPLETED | OUTPATIENT
Start: 2024-05-03 | End: 2024-05-03

## 2024-05-03 RX ADMIN — CARVEDILOL 25 MG: 12.5 TABLET, FILM COATED ORAL at 20:12

## 2024-05-03 RX ADMIN — ATORVASTATIN CALCIUM 40 MG: 40 TABLET, FILM COATED ORAL at 20:11

## 2024-05-03 RX ADMIN — HYDRALAZINE HYDROCHLORIDE 100 MG: 50 TABLET ORAL at 20:11

## 2024-05-03 RX ADMIN — TRAZODONE HYDROCHLORIDE 100 MG: 50 TABLET ORAL at 20:12

## 2024-05-03 RX ADMIN — SERTRALINE HYDROCHLORIDE 25 MG: 50 TABLET ORAL at 17:08

## 2024-05-03 RX ADMIN — SODIUM CHLORIDE, PRESERVATIVE FREE 5 ML: 5 INJECTION INTRAVENOUS at 20:16

## 2024-05-03 RX ADMIN — CLONAZEPAM 0.5 MG: 0.5 TABLET ORAL at 17:58

## 2024-05-03 RX ADMIN — HEPARIN SODIUM 3200 UNITS: 1000 INJECTION INTRAVENOUS; SUBCUTANEOUS at 10:29

## 2024-05-03 RX ADMIN — HYDROXYZINE HYDROCHLORIDE 25 MG: 25 TABLET ORAL at 20:14

## 2024-05-03 RX ADMIN — EPOETIN ALFA-EPBX 10000 UNITS: 10000 INJECTION, SOLUTION INTRAVENOUS; SUBCUTANEOUS at 09:26

## 2024-05-03 RX ADMIN — HEPARIN SODIUM 5000 UNITS: 5000 INJECTION INTRAVENOUS; SUBCUTANEOUS at 20:15

## 2024-05-03 RX ADMIN — HYDROMORPHONE HYDROCHLORIDE 2 MG: 2 TABLET ORAL at 11:23

## 2024-05-03 RX ADMIN — POTASSIUM CHLORIDE 40 MEQ: 1500 TABLET, EXTENDED RELEASE ORAL at 17:07

## 2024-05-03 RX ADMIN — SODIUM CHLORIDE, PRESERVATIVE FREE 10 ML: 5 INJECTION INTRAVENOUS at 08:00

## 2024-05-03 RX ADMIN — HYDROMORPHONE HYDROCHLORIDE 2 MG: 2 TABLET ORAL at 17:08

## 2024-05-03 RX ADMIN — AMLODIPINE BESYLATE 5 MG: 5 TABLET ORAL at 20:12

## 2024-05-03 RX ADMIN — ACETAMINOPHEN 650 MG: 325 TABLET ORAL at 20:26

## 2024-05-03 ASSESSMENT — PAIN SCALES - GENERAL
PAINLEVEL_OUTOF10: 0
PAINLEVEL_OUTOF10: 0
PAINLEVEL_OUTOF10: 7
PAINLEVEL_OUTOF10: 3
PAINLEVEL_OUTOF10: 7
PAINLEVEL_OUTOF10: 0

## 2024-05-03 ASSESSMENT — PAIN DESCRIPTION - LOCATION: LOCATION: BUTTOCKS

## 2024-05-03 ASSESSMENT — PAIN DESCRIPTION - DESCRIPTORS: DESCRIPTORS: ACHING;DISCOMFORT

## 2024-05-03 NOTE — PROGRESS NOTES
OT tx attempted at 1108 however pt off the floor. Will continue to follow and re-attempt OT at a later time. Thank you.

## 2024-05-03 NOTE — PROGRESS NOTES
Patient just returned to room from hemodialysis. Patient SELENAP is home with her  and [facility] services. Patient dialysis chair will be ready for first start on today May 3 at 1600.   Cath completed yesterday, but patient will need cardiac clearance for discharge. Patient did not qualify for home oxygen but will check again before discharge to be sure.

## 2024-05-03 NOTE — PLAN OF CARE
Problem: Discharge Planning  Goal: Discharge to home or other facility with appropriate resources  Outcome: Progressing     Problem: Safety - Adult  Goal: Free from fall injury  Outcome: Progressing     Problem: Pain  Goal: Verbalizes/displays adequate comfort level or baseline comfort level  Outcome: Progressing     Problem: ABCDS Injury Assessment  Goal: Absence of physical injury  Outcome: Progressing     Problem: Skin/Tissue Integrity  Goal: Absence of new skin breakdown  Description: 1.  Monitor for areas of redness and/or skin breakdown  2.  Assess vascular access sites hourly  3.  Every 4-6 hours minimum:  Change oxygen saturation probe site  4.  Every 4-6 hours:  If on nasal continuous positive airway pressure, respiratory therapy assess nares and determine need for appliance change or resting period.  Outcome: Progressing     Problem: Nutrition Deficit:  Goal: Optimize nutritional status  Outcome: Progressing     Problem: Physical Therapy - Adult  Goal: By Discharge: Performs mobility at highest level of function for planned discharge setting.  See evaluation for individualized goals.  Description: FUNCTIONAL STATUS PRIOR TO ADMISSION: The patient  required minimal assistance for basic and instrumental ADLs.    HOME SUPPORT PRIOR TO ADMISSION: The patient lived with spouse and required minimal assistance for ADLs and functional mobility.    Physical Therapy Goals  Initiated 5/2/2024  Pt stated goal: to go home  Pt will be I with LE HEP in 7 days.  Pt will perform bed mobility with Modified [place] in 7 days.  Pt will perform transfers with Stand by Assist in 7 days.   Pt will amb 25-50 feet with LRAD safely with Stand by Assist in 7 days.  Pt will ascend/descend 3 steps with bilateral handrail(s) and Minimal Assist in 7 days to safely enter/navigate home.   Pt will demonstrate improvement in standing balance from good with constant support to good with occasional support in 7 days.     5/2/2024 9127

## 2024-05-03 NOTE — PROGRESS NOTES
Progress Note      5/3/2024 7:23 AM  NAME: Junie Neal   MRN:  [id number]   Admit Diagnosis: Shortness of breath [R06.02]  COPD exacerbation (HCC) [J44.1]  Pneumonia due to infectious organism, unspecified laterality, unspecified part of lung [J18.9]  Congestive heart failure, unspecified HF chronicity, unspecified heart failure type (HCC) [I50.9]      Problem List:   Patient is a 58-year-old white female with:  1.  Chronic anemia  2.  Thrombocytopenia  3.  Severe MR  4.  Moderate tricuspid regurgitation  5.  Peripheral vascular disease  6.  Hyperlipidemia  7.  Nicotine dependence  8.  End-stage renal disease, hemodialysis          Assessment/Plan:     Patient underwent cardiac catheterization.  PA pressure was normal.  No significant mitral regurgitation on LV gram noticed.  Ejection fraction is normal.  She was noticed to have heavily calcified coronary arteries with significant disease of the RCA.  Severe disease of first obtuse marginal.  Medical management for the left circumflex artery is recommended.    Patient will need atherectomy and PCI of the RCA once her hemoglobin is stable.  Currently on amlodipine, aspirin, atorvastatin, carvedilol, hydralazine and nicotine patches    Fluid management per nephrology.    Her hemoglobin this morning is 7.3.    Check FOBT         []       High complexity decision making was performed in this patient at high risk for decompensation with multiple organ involvement.    Subjective:     Junie Neal denies chest pain, dyspnea.  Discussed with RN events overnight.     Review of Systems:   Negative except for as noted above.    Objective:      Physical Exam:    Last 24hrs VS reviewed since prior progress note. Most recent are:    BP (!) 140/75   Pulse 81   Temp 98.4 °F (36.9 °C) (Oral)   Resp 16   Ht 1.626 m (5' 4\")   Wt 44.5 kg (98 lb)   SpO2 94%   BMI 16.82 kg/m²     Intake/Output Summary (Last 24 hours) at 5/3/2024 0723  Last data filed at 5/2/2024  2027  Gross per 24 hour   Intake 140 ml   Output 10 ml   Net 130 ml        General Appearance: Alert; no acute distress.  Ears/Nose/Mouth/Throat: moist mucous membranes  Neck: Supple.  Chest: Lungs clear to auscultation bilaterally.  Cardiovascular: Regular rate and rhythm, S1S2 normal  Abdomen: Soft, non-tender, bowel sounds are active.  Extremities: No edema bilaterally.  Skin: Warm and dry.      PMH/ reviewed - no change compared to H&P    Telemetry:     EKG:     No valid procedures specified.    []  No new EKG for review    Lab Data Personally Reviewed:    Recent Labs     05/02/24  0329 05/03/24 0357   WBC 10.9 13.6*   HGB 8.2* 7.3*   HCT 24.7* 22.8*   * 139*     No results for input(s): \"INR\", \"APTT\" in the last 72 hours.    Invalid input(s): \"PTP\"   Recent Labs     05/01/24  0800 05/02/24 0329 05/03/24 0357     137 134* 134*   K 3.3*  3.3* 3.3* 3.3*     103 98 98   CO2 28  28 28 27   BUN 28*  27* 14 21*   MG 2.0 1.9 1.9     No results for input(s): \"CPK\" in the last 72 hours.    Invalid input(s): \"CPKMB\", \"CKNDX\", \"TROIQ\"  Lab Results   Component Value Date/Time    CHOL 163 06/13/2023 12:31 PM    HDL 81 06/13/2023 12:31 PM    LDL 67.8 06/13/2023 12:31 PM       Recent Labs     05/01/24  0800 05/02/24  0329 05/03/24 0357   GLOB 2.6 3.0 2.9     No results for input(s): \"PH\", \"PCO2\", \"PO2\" in the last 72 hours.    Medications Personally Reviewed:    Current Facility-Administered Medications   Medication Dose Route Frequency    sertraline (ZOLOFT) tablet 25 mg  25 mg Oral Daily    clonazePAM (KLONOPIN) tablet 0.5 mg  0.5 mg Oral BID PRN    sodium chloride flush 0.9 % injection 5-40 mL  5-40 mL IntraVENous 2 times per day    sodium chloride flush 0.9 % injection 5-40 mL  5-40 mL IntraVENous PRN    0.9 % sodium chloride infusion   IntraVENous PRN    acetaminophen (TYLENOL) tablet 650 mg  650 mg Oral Q4H PRN    atorvastatin (LIPITOR) tablet 40 mg  40 mg Oral Nightly    iron sucrose

## 2024-05-03 NOTE — DIALYSIS
Report given to Lorrie SimpsonW.  Pt dialyzed for 3 hours, removed 1.5L of blood, processed 71.8L of blood. Epogen given. Notified Shanti in tele that pt is en route to her room, box #12.

## 2024-05-03 NOTE — PROGRESS NOTES
Nephrology follow-up          Patient: Junie Neal MRN: [id number]  SSN: [id number]    YOB: 1965  Age: 58 y.o.  Sex: female      Subjective:   The patient is seen in the room  She looks better  Her breathing is better  No new complaints  Blood pressures are okay  K 2.6-->3.3    Past Medical History:   Diagnosis Date    Anxiety 10/2/2020    Carotid stenosis     Chronic anemia     Chronic respiratory failure (HCC)     COPD (chronic obstructive pulmonary disease) with emphysema (HCC) 10/2/2020    Depression     Diastolic CHF (HCC)     Dysphagia 10/2/2020    GERD (gastroesophageal reflux disease)     Headache     High cholesterol     Hypertension     Hypothyroid     Iron deficiency anemia 10/2/2020    Mitral valve regurgitation 10/2/2020    Renal artery stenosis (HCC)     Seizure disorder (HCC)      Past Surgical History:   Procedure Laterality Date    CARDIAC PROCEDURE N/A 2024    Left and right heart cath / coronary angiography performed by Darlene Reeves MD at [facility] CARDIAC CATH LAB    CAROTID ENDARTERECTOMY      CHOLECYSTECTOMY      IR THORACENTESIS PLEURAL W IMAGING  2020    IR THORACENTESIS PLEURAL W IMAGING  2020    IR TUNNELED CATHETER PLACEMENT GREATER THAN 5 YEARS  2024    IR TUNNELED CATHETER PLACEMENT GREATER THAN 5 YEARS 2024 Janeen Lunsford PA [facility] RAD ANGIO IR    KIDNEY SURGERY      ROTATOR CUFF REPAIR Right     TUBAL LIGATION        Family History   Problem Relation Age of Onset    Melanoma Child     Melanoma Brother     Heart Failure Brother             Asthma Brother     Stroke Father     Unknown Father     Stroke Mother             Melanoma Mother     Hypertension Mother     Cancer Mother     Diabetes Mother     Heart Failure Mother     Asthma Mother      Social History     Tobacco Use    Smoking status: Some Days     Current packs/day: 1.00     Average packs/day: 1 pack/day for 15.0 years (15.0 ttl pk-yrs)     Types: Cigarettes

## 2024-05-04 VITALS
BODY MASS INDEX: 16.73 KG/M2 | HEIGHT: 64 IN | DIASTOLIC BLOOD PRESSURE: 71 MMHG | RESPIRATION RATE: 20 BRPM | TEMPERATURE: 98.4 F | SYSTOLIC BLOOD PRESSURE: 144 MMHG | OXYGEN SATURATION: 99 % | HEART RATE: 88 BPM | WEIGHT: 98 LBS

## 2024-05-04 PROBLEM — R06.02 SHORTNESS OF BREATH: Status: RESOLVED | Noted: 2024-04-29 | Resolved: 2024-05-04

## 2024-05-04 LAB
ALBUMIN SERPL-MCNC: 3.1 G/DL (ref 3.5–5)
ALBUMIN/GLOB SERPL: 1 (ref 1.1–2.2)
ALP SERPL-CCNC: 58 U/L (ref 45–117)
ALT SERPL-CCNC: 17 U/L (ref 12–78)
ANION GAP SERPL CALC-SCNC: 7 MMOL/L (ref 5–15)
AST SERPL W P-5'-P-CCNC: 6 U/L (ref 15–37)
BASOPHILS # BLD: 0 K/UL (ref 0–0.1)
BASOPHILS NFR BLD: 0 % (ref 0–1)
BILIRUB SERPL-MCNC: 0.3 MG/DL (ref 0.2–1)
BUN SERPL-MCNC: 10 MG/DL (ref 6–20)
BUN/CREAT SERPL: 4 (ref 12–20)
CA-I BLD-MCNC: 8.4 MG/DL (ref 8.5–10.1)
CHLORIDE SERPL-SCNC: 99 MMOL/L (ref 97–108)
CO2 SERPL-SCNC: 28 MMOL/L (ref 21–32)
CREAT SERPL-MCNC: 2.61 MG/DL (ref 0.55–1.02)
DIFFERENTIAL METHOD BLD: ABNORMAL
EKG ATRIAL RATE: 86 BPM
EKG DIAGNOSIS: NORMAL
EKG P AXIS: 59 DEGREES
EKG P-R INTERVAL: 140 MS
EKG Q-T INTERVAL: 412 MS
EKG QRS DURATION: 88 MS
EKG QTC CALCULATION (BAZETT): 493 MS
EKG R AXIS: 38 DEGREES
EKG T AXIS: 65 DEGREES
EKG VENTRICULAR RATE: 86 BPM
EOSINOPHIL # BLD: 0.3 K/UL (ref 0–0.4)
EOSINOPHIL NFR BLD: 3 % (ref 0–7)
ERYTHROCYTE [DISTWIDTH] IN BLOOD BY AUTOMATED COUNT: 14.3 % (ref 11.5–14.5)
GLOBULIN SER CALC-MCNC: 3 G/DL (ref 2–4)
GLUCOSE SERPL-MCNC: 84 MG/DL (ref 65–100)
HCT VFR BLD AUTO: 23.7 % (ref 35–47)
HGB BLD-MCNC: 7.6 G/DL (ref 11.5–16)
IMM GRANULOCYTES # BLD AUTO: 0.2 K/UL (ref 0–0.04)
IMM GRANULOCYTES NFR BLD AUTO: 2 % (ref 0–0.5)
LYMPHOCYTES # BLD: 1.5 K/UL (ref 0.8–3.5)
LYMPHOCYTES NFR BLD: 15 % (ref 12–49)
MAGNESIUM SERPL-MCNC: 2 MG/DL (ref 1.6–2.4)
MCH RBC QN AUTO: 33.8 PG (ref 26–34)
MCHC RBC AUTO-ENTMCNC: 32.1 G/DL (ref 30–36.5)
MCV RBC AUTO: 105.3 FL (ref 80–99)
MONOCYTES # BLD: 0.9 K/UL (ref 0–1)
MONOCYTES NFR BLD: 8 % (ref 5–13)
NEUTS SEG # BLD: 7.3 K/UL (ref 1.8–8)
NEUTS SEG NFR BLD: 72 % (ref 32–75)
NRBC # BLD: 0 K/UL (ref 0–0.01)
NRBC BLD-RTO: 0 PER 100 WBC
PHOSPHATE SERPL-MCNC: 2.7 MG/DL (ref 2.6–4.7)
PLATELET # BLD AUTO: 151 K/UL (ref 150–400)
PMV BLD AUTO: 10.7 FL (ref 8.9–12.9)
POTASSIUM SERPL-SCNC: 3.2 MMOL/L (ref 3.5–5.1)
PROT SERPL-MCNC: 6.1 G/DL (ref 6.4–8.2)
RBC # BLD AUTO: 2.25 M/UL (ref 3.8–5.2)
SODIUM SERPL-SCNC: 134 MMOL/L (ref 136–145)
WBC # BLD AUTO: 10.1 K/UL (ref 3.6–11)

## 2024-05-04 PROCEDURE — 6370000000 HC RX 637 (ALT 250 FOR IP): Performed by: INTERNAL MEDICINE

## 2024-05-04 PROCEDURE — 85025 COMPLETE CBC W/AUTO DIFF WBC: CPT

## 2024-05-04 PROCEDURE — 84100 ASSAY OF PHOSPHORUS: CPT

## 2024-05-04 PROCEDURE — 36415 COLL VENOUS BLD VENIPUNCTURE: CPT

## 2024-05-04 PROCEDURE — 2580000003 HC RX 258: Performed by: HOSPITALIST

## 2024-05-04 PROCEDURE — 97530 THERAPEUTIC ACTIVITIES: CPT

## 2024-05-04 PROCEDURE — 6370000000 HC RX 637 (ALT 250 FOR IP): Performed by: HOSPITALIST

## 2024-05-04 PROCEDURE — 83735 ASSAY OF MAGNESIUM: CPT

## 2024-05-04 PROCEDURE — 80053 COMPREHEN METABOLIC PANEL: CPT

## 2024-05-04 PROCEDURE — 6360000002 HC RX W HCPCS: Performed by: HOSPITALIST

## 2024-05-04 RX ORDER — POTASSIUM CHLORIDE 20 MEQ/1
20 TABLET, EXTENDED RELEASE ORAL DAILY
Qty: 30 TABLET | Refills: 0 | Status: SHIPPED | OUTPATIENT
Start: 2024-05-05 | End: 2024-06-04

## 2024-05-04 RX ORDER — CLONAZEPAM 0.5 MG/1
0.5 TABLET ORAL 2 TIMES DAILY PRN
Qty: 14 TABLET | Refills: 3 | Status: SHIPPED | OUTPATIENT
Start: 2024-05-04 | End: 2024-05-18

## 2024-05-04 RX ORDER — AMLODIPINE BESYLATE 5 MG/1
5 TABLET ORAL 2 TIMES DAILY
Qty: 180 TABLET | Refills: 0 | Status: SHIPPED | OUTPATIENT
Start: 2024-05-04 | End: 2024-08-02

## 2024-05-04 RX ORDER — HYDRALAZINE HYDROCHLORIDE 100 MG/1
100 TABLET, FILM COATED ORAL 2 TIMES DAILY
Qty: 180 TABLET | Refills: 0 | Status: SHIPPED | OUTPATIENT
Start: 2024-05-04 | End: 2024-08-02

## 2024-05-04 RX ORDER — CARVEDILOL 25 MG/1
25 TABLET ORAL 2 TIMES DAILY
Qty: 180 TABLET | Refills: 0 | Status: SHIPPED | OUTPATIENT
Start: 2024-05-04 | End: 2024-08-02

## 2024-05-04 RX ORDER — POTASSIUM CHLORIDE 20 MEQ/1
40 TABLET, EXTENDED RELEASE ORAL ONCE
Status: DISCONTINUED | OUTPATIENT
Start: 2024-05-04 | End: 2024-05-04

## 2024-05-04 RX ORDER — NICOTINE 21 MG/24HR
1 PATCH, TRANSDERMAL 24 HOURS TRANSDERMAL DAILY
Qty: 30 PATCH | Refills: 0 | Status: SHIPPED | OUTPATIENT
Start: 2024-05-05 | End: 2024-06-04

## 2024-05-04 RX ORDER — ASPIRIN 81 MG/1
81 TABLET ORAL DAILY
Qty: 90 TABLET | Refills: 0 | Status: SHIPPED | OUTPATIENT
Start: 2024-05-05 | End: 2024-08-03

## 2024-05-04 RX ORDER — FLUTICASONE FUROATE, UMECLIDINIUM BROMIDE AND VILANTEROL TRIFENATATE 200; 62.5; 25 UG/1; UG/1; UG/1
1 POWDER RESPIRATORY (INHALATION) DAILY
Qty: 1 EACH | Refills: 5 | Status: SHIPPED | OUTPATIENT
Start: 2024-05-04 | End: 2024-10-31

## 2024-05-04 RX ORDER — ATORVASTATIN CALCIUM 40 MG/1
40 TABLET, FILM COATED ORAL NIGHTLY
Qty: 90 TABLET | Refills: 0 | Status: SHIPPED | OUTPATIENT
Start: 2024-05-04 | End: 2024-08-02

## 2024-05-04 RX ORDER — SERTRALINE HYDROCHLORIDE 25 MG/1
25 TABLET, FILM COATED ORAL DAILY
Qty: 90 TABLET | Refills: 0 | Status: SHIPPED | OUTPATIENT
Start: 2024-05-05 | End: 2024-08-03

## 2024-05-04 RX ORDER — POTASSIUM CHLORIDE 20 MEQ/1
20 TABLET, EXTENDED RELEASE ORAL DAILY
Status: DISCONTINUED | OUTPATIENT
Start: 2024-05-05 | End: 2024-05-04 | Stop reason: HOSPADM

## 2024-05-04 RX ADMIN — POTASSIUM BICARBONATE 40 MEQ: 782 TABLET, EFFERVESCENT ORAL at 12:53

## 2024-05-04 RX ADMIN — HYDROMORPHONE HYDROCHLORIDE 2 MG: 2 TABLET ORAL at 09:24

## 2024-05-04 RX ADMIN — CLONAZEPAM 0.5 MG: 0.5 TABLET ORAL at 12:53

## 2024-05-04 RX ADMIN — SERTRALINE HYDROCHLORIDE 25 MG: 50 TABLET ORAL at 09:24

## 2024-05-04 RX ADMIN — ASPIRIN 81 MG: 81 TABLET, COATED ORAL at 09:24

## 2024-05-04 RX ADMIN — CARVEDILOL 25 MG: 12.5 TABLET, FILM COATED ORAL at 09:24

## 2024-05-04 RX ADMIN — SODIUM CHLORIDE, PRESERVATIVE FREE 10 ML: 5 INJECTION INTRAVENOUS at 09:45

## 2024-05-04 RX ADMIN — HYDRALAZINE HYDROCHLORIDE 100 MG: 50 TABLET ORAL at 09:24

## 2024-05-04 RX ADMIN — AMLODIPINE BESYLATE 5 MG: 5 TABLET ORAL at 09:24

## 2024-05-04 ASSESSMENT — PAIN DESCRIPTION - ORIENTATION: ORIENTATION: ANTERIOR;POSTERIOR

## 2024-05-04 ASSESSMENT — PAIN DESCRIPTION - LOCATION: LOCATION: HEAD;SACRUM

## 2024-05-04 ASSESSMENT — PAIN DESCRIPTION - DESCRIPTORS: DESCRIPTORS: STABBING

## 2024-05-04 ASSESSMENT — PAIN SCALES - GENERAL: PAINLEVEL_OUTOF10: 8

## 2024-05-04 NOTE — PLAN OF CARE
Problem: Discharge Planning  Goal: Discharge to home or other facility with appropriate resources  Outcome: Progressing  Flowsheets (Taken 5/3/2024 2026)  Discharge to home or other facility with appropriate resources: Identify barriers to discharge with patient and caregiver     Problem: Safety - Adult  Goal: Free from fall injury  Outcome: Progressing     Problem: Pain  Goal: Verbalizes/displays adequate comfort level or baseline comfort level  Outcome: Progressing     Problem: ABCDS Injury Assessment  Goal: Absence of physical injury  Outcome: Progressing     Problem: Skin/Tissue Integrity  Goal: Absence of new skin breakdown  Description: 1.  Monitor for areas of redness and/or skin breakdown  2.  Assess vascular access sites hourly  3.  Every 4-6 hours minimum:  Change oxygen saturation probe site  4.  Every 4-6 hours:  If on nasal continuous positive airway pressure, respiratory therapy assess nares and determine need for appliance change or resting period.  Outcome: Progressing     Problem: Nutrition Deficit:  Goal: Optimize nutritional status  Outcome: Progressing     Problem: Chronic Conditions and Co-morbidities  Goal: Patient's chronic conditions and co-morbidity symptoms are monitored and maintained or improved  Outcome: Progressing  Flowsheets (Taken 5/3/2024 2026)  Care Plan - Patient's Chronic Conditions and Co-Morbidity Symptoms are Monitored and Maintained or Improved: Monitor and assess patient's chronic conditions and comorbid symptoms for stability, deterioration, or improvement

## 2024-05-04 NOTE — PROGRESS NOTES
Progress Note      5/4/2024 5:07 AM  NAME: Junie Neal   MRN:  [id number]   Admit Diagnosis: Shortness of breath [R06.02]  COPD exacerbation (HCC) [J44.1]  Pneumonia due to infectious organism, unspecified laterality, unspecified part of lung [J18.9]  Congestive heart failure, unspecified HF chronicity, unspecified heart failure type (HCC) [I50.9]      Problem List:   Patient is a 58-year-old white female with:  1.  Chronic anemia  2.  Thrombocytopenia  3.  Severe MR  4.  Moderate tricuspid regurgitation  5.  Peripheral vascular disease  6.  Hyperlipidemia  7.  Nicotine dependence  8.  End-stage renal disease, hemodialysis          Assessment/Plan:     No acute events overnight.  Blood pressure is mildly elevated  Potassium is 3.3 yesterday  Hemoglobin 7.3  Recheck labs  Follow-up on FOBT  Currently on aspirin, atorvastatin, carvedilol  Fluid management per nephrology  She is counseled regarding the importance of smoking cessation  Once hemoglobin is more stable then might consider high risk atherectomy and PCI of RCA unless if she is hemodynamically unstable or having symptoms.         []       High complexity decision making was performed in this patient at high risk for decompensation with multiple organ involvement.    Subjective:     Junie Neal denies chest pain, dyspnea.  Discussed with RN events overnight.     Review of Systems:   Negative except for as noted above.    Objective:      Physical Exam:    Last 24hrs VS reviewed since prior progress note. Most recent are:    BP (!) 149/81   Pulse 79   Temp 98.2 °F (36.8 °C) (Oral)   Resp 18   Ht 1.626 m (5' 4\")   Wt 44.5 kg (98 lb)   SpO2 97%   BMI 16.82 kg/m²     Intake/Output Summary (Last 24 hours) at 5/4/2024 0507  Last data filed at 5/3/2024 2016  Gross per 24 hour   Intake 608.2 ml   Output 2500 ml   Net -1891.8 ml          General Appearance: Alert; no acute distress.  Ears/Nose/Mouth/Throat: moist mucous membranes  Neck:  Supple.  Chest: Lungs clear to auscultation bilaterally.  Cardiovascular: Regular rate and rhythm, S1S2 normal  Abdomen: Soft, non-tender, bowel sounds are active.  Extremities: No edema bilaterally.  Skin: Warm and dry.      PMH/SH reviewed - no change compared to H&P    Telemetry:     EKG:     No valid procedures specified.    []  No new EKG for review    Lab Data Personally Reviewed:    Recent Labs     05/02/24 0329 05/03/24 0357   WBC 10.9 13.6*   HGB 8.2* 7.3*   HCT 24.7* 22.8*   * 139*       No results for input(s): \"INR\", \"APTT\" in the last 72 hours.    Invalid input(s): \"PTP\"   Recent Labs     05/01/24  0800 05/02/24 0329 05/03/24 0357     137 134* 134*   K 3.3*  3.3* 3.3* 3.3*     103 98 98   CO2 28  28 28 27   BUN 28*  27* 14 21*   MG 2.0 1.9 1.9       No results for input(s): \"CPK\" in the last 72 hours.    Invalid input(s): \"CPKMB\", \"CKNDX\", \"TROIQ\"  Lab Results   Component Value Date/Time    CHOL 163 06/13/2023 12:31 PM    HDL 81 06/13/2023 12:31 PM    LDL 67.8 06/13/2023 12:31 PM       Recent Labs     05/01/24  0800 05/02/24 0329 05/03/24 0357   GLOB 2.6 3.0 2.9       No results for input(s): \"PH\", \"PCO2\", \"PO2\" in the last 72 hours.    Medications Personally Reviewed:    Current Facility-Administered Medications   Medication Dose Route Frequency    sertraline (ZOLOFT) tablet 25 mg  25 mg Oral Daily    clonazePAM (KLONOPIN) tablet 0.5 mg  0.5 mg Oral BID PRN    sodium chloride flush 0.9 % injection 5-40 mL  5-40 mL IntraVENous PRN    0.9 % sodium chloride infusion   IntraVENous PRN    acetaminophen (TYLENOL) tablet 650 mg  650 mg Oral Q4H PRN    atorvastatin (LIPITOR) tablet 40 mg  40 mg Oral Nightly    iron sucrose (VENOFER) injection 100 mg  100 mg IntraVENous Q MWF    carvedilol (COREG) tablet 25 mg  25 mg Oral BID    hydrALAZINE (APRESOLINE) tablet 100 mg  100 mg Oral BID    epoetin breanna-epbx (RETACRIT) injection 10,000 Units  10,000 Units IntraVENous Once per day on

## 2024-05-04 NOTE — PLAN OF CARE
Problem: Occupational Therapy - Adult  Goal: By Discharge: Performs self-care activities at highest level of function for planned discharge setting.  See evaluation for individualized goals.  Description: FUNCTIONAL STATUS PRIOR TO ADMISSION: Pt reports she utilized RW or rollator for ambulation and SO A w/ ADLs.    HOME SUPPORT: Pt lives with SO    Occupational Therapy Goals:  Initiated 5/1/2024  Patient/Family stated goal: I want to get better  Patient will perform grooming in standing with Modified [place] within 7 day(s).  Patient will perform UB bathing with [place] within 7 day(s).  Patient will perform LB bathing with [place] within 7 day(s).  Patient will perform toilet transfers with Modified [place]  within 7 day(s).  Patient will perform all aspects of toileting with [place] within 7 day(s).  Patient will participate in upper extremity therapeutic exercise/activities with [place] within 7 day(s).    Patient will be mod I in 2-3 step meal preparation tasks in prep for household management tasks within 7 day(s).   5/4/2024 1341 by Cely Bates OTA  Outcome: Progressing     Problem: Occupational Therapy - Adult  Goal: By Discharge: Performs self-care activities at highest level of function for planned discharge setting.  See evaluation for individualized goals.  Description: FUNCTIONAL STATUS PRIOR TO ADMISSION: Pt reports she utilized RW or rollator for ambulation and SO A w/ ADLs.    HOME SUPPORT: Pt lives with SO    Occupational Therapy Goals:  Initiated 5/1/2024  Patient/Family stated goal: I want to get better  Patient will perform grooming in standing with Modified [place] within 7 day(s).  Patient will perform UB bathing with [place] within 7 day(s).  Patient will perform LB bathing with [place] within 7 day(s).  Patient will perform toilet transfers with Modified [place]  within 7 day(s).  Patient will perform all aspects of toileting with  [place] within 7 day(s).  Patient will participate in upper extremity therapeutic exercise/activities with [place] within 7 day(s).    Patient will be mod I in 2-3 step meal preparation tasks in prep for household management tasks within 7 day(s).   5/4/2024 1341 by Cely Bates OTA  Outcome: Progressing

## 2024-05-04 NOTE — PROGRESS NOTES
Nephrology follow-up          Patient: Junie Neal MRN: [id number]  SSN: [id number]    YOB: 1965  Age: 58 y.o.  Sex: female      Subjective:   The patient is seen in the room  She looks better  Her breathing is better  On RA now  No new complaints  Blood pressures are okay  K 3.2    Past Medical History:   Diagnosis Date    Anxiety 10/2/2020    Carotid stenosis     Chronic anemia     Chronic respiratory failure (HCC)     COPD (chronic obstructive pulmonary disease) with emphysema (HCC) 10/2/2020    Depression     Diastolic CHF (HCC)     Dysphagia 10/2/2020    GERD (gastroesophageal reflux disease)     Headache     High cholesterol     Hypertension     Hypothyroid     Iron deficiency anemia 10/2/2020    Mitral valve regurgitation 10/2/2020    Renal artery stenosis (HCC)     Seizure disorder (HCC)      Past Surgical History:   Procedure Laterality Date    CARDIAC PROCEDURE N/A 2024    Left and right heart cath / coronary angiography performed by Darlene Reeves MD at [facility] CARDIAC CATH LAB    CAROTID ENDARTERECTOMY      CHOLECYSTECTOMY      IR THORACENTESIS PLEURAL W IMAGING  2020    IR THORACENTESIS PLEURAL W IMAGING  2020    IR TUNNELED CATHETER PLACEMENT GREATER THAN 5 YEARS  2024    IR TUNNELED CATHETER PLACEMENT GREATER THAN 5 YEARS 2024 Janeen Lunsford PA [facility] RAD ANGIO IR    KIDNEY SURGERY      ROTATOR CUFF REPAIR Right     TUBAL LIGATION        Family History   Problem Relation Age of Onset    Melanoma Child     Melanoma Brother     Heart Failure Brother             Asthma Brother     Stroke Father     Unknown Father     Stroke Mother             Melanoma Mother     Hypertension Mother     Cancer Mother     Diabetes Mother     Heart Failure Mother     Asthma Mother      Social History     Tobacco Use    Smoking status: Some Days     Current packs/day: 1.00     Average packs/day: 1 pack/day for 15.0 years (15.0 ttl pk-yrs)     Types: Cigarettes     QHS Papi Warner MD   25 mg at 05/03/24 2014    LORazepam (ATIVAN) tablet 1 mg  1 mg Oral Q6H PRN Papi Warner MD        traZODone (DESYREL) tablet 100 mg  100 mg Oral Nightly Papi Warner MD   100 mg at 05/03/24 2012    sodium chloride flush 0.9 % injection 5-40 mL  5-40 mL IntraVENous 2 times per day Papi Warner MD   10 mL at 05/04/24 0945    sodium chloride flush 0.9 % injection 5-40 mL  5-40 mL IntraVENous PRN Papi Warner MD        0.9 % sodium chloride infusion   IntraVENous PRN Papi Warner MD        heparin (porcine) injection 5,000 Units  5,000 Units SubCUTAneous BID Papi Warner MD   5,000 Units at 05/03/24 2015    ondansetron (ZOFRAN-ODT) disintegrating tablet 4 mg  4 mg Oral Q8H PRN Papi Warner MD   4 mg at 04/30/24 2137    Or    ondansetron (ZOFRAN) injection 4 mg  4 mg IntraVENous Q6H PRN Papi Warner MD        acetaminophen (TYLENOL) suppository 650 mg  650 mg Rectal Q6H PRN Papi Warner MD        amLODIPine (NORVASC) tablet 5 mg  5 mg Oral BID Seipp, James, DO   5 mg at 05/04/24 0924    ipratropium 0.5 mg-albuterol 2.5 mg (DUONEB) nebulizer solution 1 Dose  1 Dose Inhalation Q6H PRN Seipp, James, DO        nicotine (NICODERM CQ) 21 MG/24HR 1 patch  1 patch TransDERmal Daily Seipp, James, DO   1 patch at 05/04/24 0923    heparin (porcine) injection 3,200 Units  3,200 Units IntraCATHeter PRN Jodi Navarro MD   3,200 Units at 05/03/24 1029        Allergies   Allergen Reactions    Sulfa Antibiotics Anaphylaxis    Sulfamethoxazole-Trimethoprim      Other reaction(s): Unknown (comments)       Review of Systems:  A comprehensive review of systems was negative except for that written in the History of Present Illness.    Objective:     Vitals:    05/03/24 2336 05/04/24 0000 05/04/24 0342 05/04/24 0900   BP: 126/68  (!) 149/81 (!) 147/76   Pulse: 71 68 79 85   Resp: 18  18 18   Temp: 98.2 °F (36.8 °C)  98.2

## 2024-05-04 NOTE — DISCHARGE SUMMARY
Discharge Summary    Name: Junie Neal  [id number]  YOB: 1965 (Age: 58 y.o.)   Date of Admission: 4/28/2024  Date of Discharge: 5/4/2024  Attending Physician: Seipp, James, DO    Discharge Diagnosis:     Consultations:  IP CONSULT TO NEPHROLOGY  IP CONSULT TO CASE MANAGEMENT  IP CONSULT TO CARDIOLOGY      Brief Admission History/Reason for Admission Per Papi Warner MD:     Junie Neal is  58 y.o. female with below mentioned past medical history presents to the emergency room complaining of worsening of shortness of breath with generalized weakness.  At baseline she is not doing very well, activity is limited and feeling very weak and tired.  She chose to be on hospice, refused for dialysis, recently is not clear.     Today he has worsening of shortness of breath from baseline with difficulty breathing.  Cough but nonproductive.  Denies fever or chills.  Complains of feeling weak tired, decreased appetite and not eating.  Chest x-ray suspicious for pulmonary edema versus pneumonia with bilateral pleural effusions.     Blood pressure is uncontrolled, laboratory data with worsening of BUN/creatinine with non-anion gap metabolic acidosis.  Has severe anemia with hemoglobin of 9.0 with significant elevated MCV.     On questioning states that she chose not to come to dialysis as she is thinking it will cause pain.     Plan:  ACUTE HYPOXIC RESPIRATORY FAILURE  VOLUME OVERLOAD  CKD PROGRESSED TO ESRD NOW ON HD  04/30: Nephro following. Had first session HD yesterday and had another session this AM. Tolerating it well. Respiratory status improving.    05/01: clear for d/c from renal standpoint. CM working on getting HD chair  05/04: hypoxia resolved. Pt will follow up for dialysis at [facility] on MWF. F/U Dr. Navarro outpt     HFmrEF, NEW DX  04/30: EF worsened from prior ECHO in 2022. EF now 40-45% with moderate global hypokinesis and G3DD. Once pt's volume

## 2024-05-04 NOTE — PLAN OF CARE
OCCUPATIONAL THERAPY TREATMENT  Patient: Junie Neal (58 y.o. female)  Date: 5/4/2024  Primary Diagnosis: Shortness of breath [R06.02]  COPD exacerbation (HCC) [J44.1]  Pneumonia due to infectious organism, unspecified laterality, unspecified part of lung [J18.9]  Congestive heart failure, unspecified HF chronicity, unspecified heart failure type (HCC) [I50.9]  Procedure(s) (LRB):  Left and right heart cath / coronary angiography (N/A) 2 Days Post-Op   Precautions: Fall Risk, Bed Alarm                Recommendations for nursing mobility: Out of bed to chair for meals, Encourage HEP in prep for ADLs/mobility; see handout for details, Frequent repositioning to prevent skin breakdown, AD and gt belt for bed to chair , Amb to bathroom with AD and gait belt, and Assist x1    In place during session: External Catheter and EKG/telemetry   Chart, occupational therapy assessment, plan of care, and goals were reviewed.    ASSESSMENT  Patient continues with skilled OT services and is progressing towards goals.  Pt received semi-supine in bed upon arrival, AXO x 4 and agreeable to FIGUEREDO tx at this time. Pt cooperative and demonstrated fair effort during activities.  Pt noted with improved  bed mobility <>EOB with decreased assistance with additional time. Pt completed STS and bed<>sink using AD with supervision and verbal/visual cues for correct hand placement and RW mgmt for safety. Pt presents with very slow gait. Pt completed grooming at sink with SBA and no LOB noted. EOB, Pt engaged in philly UE exercises with education and cueing provided regarding joint protection/proper technique/achieve full ROM needed to maintain/improve strength to increase performance in ADL'S and functional tf's, see grid below. Pt given HEP handout with good understanding. Pt's SpO2 remained stable at 96% and no SOB with activities.  (See below for objective details and assist levels)     Overall, pt limited by decreased activity tolerance and  generalized weakness that interferes with performance in ADL's and functional tf's safely.  Will continue to progress. Potential barriers for safe discharge pt is not safe to be alone and concern for pt safely navigating or managing the home environment. Current OT recommendations for discharge Home with Home Health Therapy and family assist.           Start of session End of session   SPO2 (%) 98 97   Heart Rate (BPM) 86 90         GOALS:    Problem: Occupational Therapy - Adult  Goal: By Discharge: Performs self-care activities at highest level of function for planned discharge setting.  See evaluation for individualized goals.  Description: FUNCTIONAL STATUS PRIOR TO ADMISSION: Pt reports she utilized RW or rollator for ambulation and SO A w/ ADLs.    HOME SUPPORT: Pt lives with SO    Occupational Therapy Goals:  Initiated 5/1/2024  Patient/Family stated goal: I want to get better  Patient will perform grooming in standing with Modified [place] within 7 day(s).  Patient will perform UB bathing with [place] within 7 day(s).  Patient will perform LB bathing with [place] within 7 day(s).  Patient will perform toilet transfers with Modified [place]  within 7 day(s).  Patient will perform all aspects of toileting with [place] within 7 day(s).  Patient will participate in upper extremity therapeutic exercise/activities with [place] within 7 day(s).    Patient will be mod I in 2-3 step meal preparation tasks in prep for household management tasks within 7 day(s).   Outcome: Progressing            PLAN :  Patient continues to benefit from skilled intervention to address functional impairments.  Continue treatment per established plan of care to address goals.    Recommend next OT session: Toileting and standing grooming    Recommendation for discharge: (in order for the patient to meet his/her long term goals): Home with Home Health Therapy and family assist    IF patient discharges home

## 2024-05-04 NOTE — PLAN OF CARE
Problem: Discharge Planning  Goal: Discharge to home or other facility with appropriate resources  5/4/2024 1024 by Aniya Cook RN  Outcome: Progressing  5/4/2024 0229 by Kelton Sterling RN  Outcome: Progressing  Flowsheets (Taken 5/3/2024 2026)  Discharge to home or other facility with appropriate resources: Identify barriers to discharge with patient and caregiver     Problem: Safety - Adult  Goal: Free from fall injury  5/4/2024 1024 by Aniya Cook RN  Outcome: Progressing  5/4/2024 0229 by Kelton Sterling RN  Outcome: Progressing     Problem: Pain  Goal: Verbalizes/displays adequate comfort level or baseline comfort level  5/4/2024 1024 by Aniya Cook RN  Outcome: Progressing  5/4/2024 0229 by Kelton Sterling RN  Outcome: Progressing     Problem: ABCDS Injury Assessment  Goal: Absence of physical injury  5/4/2024 1024 by Aniya Cook RN  Outcome: Progressing  5/4/2024 0229 by Kelton Sterling RN  Outcome: Progressing     Problem: Skin/Tissue Integrity  Goal: Absence of new skin breakdown  Description: 1.  Monitor for areas of redness and/or skin breakdown  2.  Assess vascular access sites hourly  3.  Every 4-6 hours minimum:  Change oxygen saturation probe site  4.  Every 4-6 hours:  If on nasal continuous positive airway pressure, respiratory therapy assess nares and determine need for appliance change or resting period.  5/4/2024 1024 by Aniya Cook RN  Outcome: Progressing  5/4/2024 0229 by Kelton Sterling RN  Outcome: Progressing     Problem: Nutrition Deficit:  Goal: Optimize nutritional status  5/4/2024 1024 by Aniya Cook RN  Outcome: Progressing  5/4/2024 0229 by Kelton Sterling RN  Outcome: Progressing     Problem: Chronic Conditions and Co-morbidities  Goal: Patient's chronic conditions and co-morbidity symptoms are monitored and maintained or improved  5/4/2024 1024 by Aniya Cook RN  Outcome: Progressing  5/4/2024 0229 by Kelton Sterling

## 2024-05-04 NOTE — PROGRESS NOTES
Patient was discharged home self care. IV was removed and tele was notified. Patient received discharge paperwork and was informed to make follow up appointments on Monday 5/6/24.

## 2024-05-04 NOTE — PROGRESS NOTES
Hospitalist Progress Note               Daily Progress Note:    Chief complaint:   Chief Complaint   Patient presents with    Shortness of Breath        Subjective:   No overnight events reported  Further discussed pt's depression and how this affects her medical compliance  Admittedly (per  and pt) she will go through spells of refusing medication  Pt was educated how this is extremely dangerous if she were to receive new stents and require DAPT  Pt and  acknowledged    Medications reviewed  Current Facility-Administered Medications   Medication Dose Route Frequency    sertraline (ZOLOFT) tablet 25 mg  25 mg Oral Daily    clonazePAM (KLONOPIN) tablet 0.5 mg  0.5 mg Oral BID PRN    sodium chloride flush 0.9 % injection 5-40 mL  5-40 mL IntraVENous PRN    0.9 % sodium chloride infusion   IntraVENous PRN    acetaminophen (TYLENOL) tablet 650 mg  650 mg Oral Q4H PRN    atorvastatin (LIPITOR) tablet 40 mg  40 mg Oral Nightly    iron sucrose (VENOFER) injection 100 mg  100 mg IntraVENous Q MWF    carvedilol (COREG) tablet 25 mg  25 mg Oral BID    hydrALAZINE (APRESOLINE) tablet 100 mg  100 mg Oral BID    epoetin breanna-epbx (RETACRIT) injection 10,000 Units  10,000 Units IntraVENous Once per day on Mon Wed Fri    nitroGLYCERIN (NITRODUR) 0.4 MG/HR 1 patch  1 patch TransDERmal Daily    albuterol sulfate HFA (PROVENTIL;VENTOLIN;PROAIR) 108 (90 Base) MCG/ACT inhaler 2 puff  2 puff Inhalation Q4H PRN    aspirin EC tablet 81 mg  81 mg Oral Daily    butalbital-acetaminophen-caffeine (FIORICET, ESGIC) per tablet 1 tablet  1 tablet Oral Q6H PRN    HYDROmorphone (DILAUDID) tablet 2 mg  2 mg Oral Q6H PRN    hydrOXYzine HCl (ATARAX) tablet 25 mg  25 mg Oral QHS    LORazepam (ATIVAN) tablet 1 mg  1 mg Oral Q6H PRN    traZODone (DESYREL) tablet 100 mg  100 mg Oral Nightly    sodium chloride flush 0.9 % injection 5-40 mL  5-40 mL IntraVENous 2 times per day    sodium chloride flush 0.9 % injection 5-40 mL  5-40 mL  NEW DX  04/30: EF worsened from prior ECHO in 2022. EF now 40-45% with moderate global hypokinesis and G3DD. Once pt's volume status improves will order stress test and escalate GDMT. Cont coreg 12.5 mg bid for now.   05/01: pt agreeable to stress test tmrw. NPO MN for adelaide myoview  05/02: cardiac cath pending today  05/03: Cardiologist notes: \"Patient underwent cardiac catheterization.  PA pressure was normal.  No significant mitral regurgitation on LV gram noticed.  Ejection fraction is normal.  She was noticed to have heavily calcified coronary arteries with significant disease of the RCA.  Severe disease of first obtuse marginal.  Medical management for the left circumflex artery is recommended. Patient will need atherectomy and PCI of the RCA once her hemoglobin is stable. Currently on amlodipine, aspirin, atorvastatin, carvedilol, hydralazine and nicotine patches\"    DEPRESSION  ANXIETY  05/02: Pt tearful and depressed noting that she has \"been through a lot\" in her life.  states she has been doing worse emotionally given her recent health concerns and personal challenges. Pt described being on zoloft at one point and valium. Pt is willing to resume zoloft and trial klonopin  05/03: Further discussed pt's depression and how this affects her medical compliance. Admittedly (per  and pt) she will go through spells of refusing medication. Pt was educated how this is extremely dangerous if she were to receive new stents and require DAPT. Pt and  acknowledged    POSSIBLE PNEUMONIA  04/30: cont abx     COPD, STABLE  continue maintenance medications     HTN  Amlodipine hzn     ANEMIA OF CKD  Outpt management         Medications Home :    Reviewed with external Rx history     Code status : Full code     VTE prophylaxis : Heparin.      Advance Medical directive : Health care decision maker information is on file.          James Seipp, DO:

## 2024-05-05 LAB
BACTERIA SPEC CULT: NORMAL
BACTERIA SPEC CULT: NORMAL
Lab: NORMAL
Lab: NORMAL

## 2024-05-06 LAB
BACTERIA SPEC CULT: NORMAL
BACTERIA SPEC CULT: NORMAL
Lab: NORMAL
Lab: NORMAL

## 2024-05-06 NOTE — PROGRESS NOTES
Physician Progress Note      PATIENT:               ROGE ELLSWORTH  [facility] #:                  [id number]  :                       1965  ADMIT DATE:       2024 7:15 PM  DISCH DATE:        2024 5:38 PM  RESPONDING  PROVIDER #:        James Seipp DO          QUERY TEXT:    Pt admitted with SOB and has CHF documented. If possible, please document in   progress notes and discharge summary further specificity regarding the type   and acuity of CHF:      The medical record reflects the following:  Risk Factors: CHF    Clinical Indicators:  ED   58 y.o. female with complicated past medical history significant for COPD as   well as diastolic congestive heart failure    renal   History of CHF/echo in  preserved LVEF    ECHO 3/22  Left?Ventricle: Left ventricle size is normal. Normal wall thickness. Normal   wall motion. Normal left ventricular systolic function with a visually   estimated EF of 65 - 70%. Diastolic dysfunction present with normal LV EF.    Pro BNP >97065    Treatment:  furosemide (LASIX) injection 60 mg now  furosemide (LASIX) injection 40 mg BID  carvedilol (COREG) tablet 6.25 mg    Thank you,  Kristen Lujan RN CDI  CRCR  Clinical Documentation  [phone] or via [employer]  [email or web address]  Options provided:  -- Acute on Chronic Diastolic CHF/HFpEF  -- Chronic Diastolic CHF/HFpEF  -- Other - I will add my own diagnosis  -- Disagree - Not applicable / Not valid  -- Disagree - Clinically unable to determine / Unknown  -- Refer to Clinical Documentation Reviewer    PROVIDER RESPONSE TEXT:    This patient is in acute on chronic diastolic CHF/HFpEF.    Query created by: Kristen Lujan on 2024 8:44 AM      QUERY TEXT:    Pt admitted with SOB and has respiratory failure documented. If possible,   please document in progress notes and discharge summary further specificity   regarding the type and acuity of respiratory failure:      The medical record reflects the

## 2024-06-24 ENCOUNTER — TELEPHONE (OUTPATIENT)
Age: 59
End: 2024-06-24

## 2024-06-24 NOTE — TELEPHONE ENCOUNTER
Patient calling in stating she's suppose to have lab work done at [facility] and she wants to get an order. Please contact patient to assist. Thanks DCR

## 2024-06-24 NOTE — TELEPHONE ENCOUNTER
I looked at  last office note, he ordered a Vascular Duplex she will need to call [phone]. I tried to call the patient and it went straight to voicemail. Left message for patient to call back.

## 2024-06-26 ENCOUNTER — TELEPHONE (OUTPATIENT)
Age: 59
End: 2024-06-26

## 2024-06-26 DIAGNOSIS — I65.23 BILATERAL CAROTID ARTERY STENOSIS: Primary | ICD-10-CM

## 2024-06-26 NOTE — TELEPHONE ENCOUNTER
Central scheduling calling to let us know we need to extend order. It expires today.   For vascular duplex

## 2024-07-10 ENCOUNTER — HOSPITAL ENCOUNTER (OUTPATIENT)
Facility: HOSPITAL | Age: 59
Discharge: HOME | End: 2024-07-12
Attending: SURGERY
Payer: MEDICARE

## 2024-07-10 DIAGNOSIS — I65.23 BILATERAL CAROTID ARTERY STENOSIS: ICD-10-CM

## 2024-07-10 PROCEDURE — 93880 EXTRACRANIAL BILAT STUDY: CPT

## 2024-07-12 LAB
VAS LEFT CCA DIST EDV: 19.9 CM/S
VAS LEFT CCA DIST PSV: 88 CM/S
VAS LEFT CCA PROX EDV: 17.2 CM/S
VAS LEFT CCA PROX PSV: 81.8 CM/S
VAS LEFT ECA EDV: 15.1 CM/S
VAS LEFT ECA PSV: 191 CM/S
VAS LEFT ICA DIST EDV: 19.9 CM/S
VAS LEFT ICA DIST PSV: 82.5 CM/S
VAS LEFT ICA PROX EDV: 30.1 CM/S
VAS LEFT ICA PROX PSV: 157 CM/S
VAS LEFT ICA/CCA PSV: 1.78
VAS LEFT SUBCLAVIAN PROX EDV: 0 CM/S
VAS LEFT SUBCLAVIAN PROX PSV: 116 CM/S
VAS LEFT VERTEBRAL EDV: 11 CM/S
VAS LEFT VERTEBRAL PSV: 65.3 CM/S
VAS RIGHT CCA DIST EDV: 16.8 CM/S
VAS RIGHT CCA DIST PSV: 88.6 CM/S
VAS RIGHT CCA PROX EDV: 13.7 CM/S
VAS RIGHT CCA PROX PSV: 87.1 CM/S
VAS RIGHT ECA EDV: 14.3 CM/S
VAS RIGHT ECA PSV: 144 CM/S
VAS RIGHT ICA DIST EDV: 26.1 CM/S
VAS RIGHT ICA DIST PSV: 113 CM/S
VAS RIGHT ICA PROX EDV: 45.4 CM/S
VAS RIGHT ICA PROX PSV: 208 CM/S
VAS RIGHT ICA/CCA PSV: 2.35
VAS RIGHT SUBCLAVIAN PROX EDV: 12 CM/S
VAS RIGHT SUBCLAVIAN PROX PSV: 131 CM/S
VAS RIGHT VERTEBRAL EDV: 13.7 CM/S
VAS RIGHT VERTEBRAL PSV: 64.6 CM/S

## 2024-07-31 ENCOUNTER — HOSPITAL ENCOUNTER (OUTPATIENT)
Facility: HOSPITAL | Age: 59
Discharge: HOME | End: 2024-08-03
Payer: MEDICARE

## 2024-07-31 VITALS
HEART RATE: 81 BPM | DIASTOLIC BLOOD PRESSURE: 63 MMHG | BODY MASS INDEX: 17.48 KG/M2 | WEIGHT: 102.4 LBS | TEMPERATURE: 98.4 F | RESPIRATION RATE: 16 BRPM | OXYGEN SATURATION: 99 % | SYSTOLIC BLOOD PRESSURE: 100 MMHG | HEIGHT: 64 IN

## 2024-07-31 LAB
ALBUMIN SERPL-MCNC: 3.8 G/DL (ref 3.5–5)
ALT SERPL-CCNC: 17 U/L (ref 12–78)
ANION GAP SERPL CALC-SCNC: 11 MMOL/L (ref 5–15)
APTT PPP: 28.9 SEC (ref 21.2–34.1)
AST SERPL W P-5'-P-CCNC: 12 U/L (ref 15–37)
BUN SERPL-MCNC: 29 MG/DL (ref 6–20)
CA-I BLD-MCNC: 9.1 MG/DL (ref 8.5–10.1)
CHLORIDE SERPL-SCNC: 97 MMOL/L (ref 97–108)
CO2 SERPL-SCNC: 23 MMOL/L (ref 21–32)
CREAT SERPL-MCNC: 4.65 MG/DL (ref 0.55–1.02)
GLOBULIN SER CALC-MCNC: 3.5 G/DL (ref 2–4)
GLUCOSE SERPL-MCNC: 89 MG/DL (ref 65–100)
HCT VFR BLD AUTO: 36.1 % (ref 35–47)
HGB BLD-MCNC: 12.5 G/DL (ref 11.5–16)
INR PPP: 0.9 (ref 0.9–1.1)
MCH RBC QN AUTO: 34.7 PG (ref 26–34)
MCHC RBC AUTO-ENTMCNC: 34.6 G/DL (ref 30–36.5)
MCV RBC AUTO: 100.3 FL (ref 80–99)
NRBC # BLD: 0 K/UL (ref 0–0.01)
NRBC BLD-RTO: 0 PER 100 WBC
PLATELET # BLD AUTO: 245 K/UL (ref 150–400)
PMV BLD AUTO: 9.3 FL (ref 8.9–12.9)
POTASSIUM SERPL-SCNC: 4.3 MMOL/L (ref 3.5–5.1)
PROT SERPL-MCNC: 7.3 G/DL (ref 6.4–8.2)
PROTHROMBIN TIME: 12.7 SEC (ref 11.9–14.6)
RBC # BLD AUTO: 3.6 M/UL (ref 3.8–5.2)
SODIUM SERPL-SCNC: 131 MMOL/L (ref 136–145)
THERAPEUTIC RANGE: NORMAL SEC (ref 82–109)
WBC # BLD AUTO: 9 K/UL (ref 3.6–11)

## 2024-07-31 PROCEDURE — 80053 COMPREHEN METABOLIC PANEL: CPT

## 2024-07-31 PROCEDURE — 85730 THROMBOPLASTIN TIME PARTIAL: CPT

## 2024-07-31 PROCEDURE — 85027 COMPLETE CBC AUTOMATED: CPT

## 2024-07-31 PROCEDURE — 36415 COLL VENOUS BLD VENIPUNCTURE: CPT

## 2024-07-31 PROCEDURE — 85610 PROTHROMBIN TIME: CPT

## 2024-07-31 RX ORDER — SEVELAMER CARBONATE 800 MG/1
1 TABLET, FILM COATED ORAL
COMMUNITY

## 2024-07-31 RX ORDER — CLOPIDOGREL BISULFATE 75 MG/1
75 TABLET ORAL DAILY
COMMUNITY

## 2024-07-31 RX ORDER — LOSARTAN POTASSIUM 50 MG/1
50 TABLET ORAL DAILY
COMMUNITY

## 2024-07-31 NOTE — PERIOP NOTE
Notified Dr. Yusuf of patients sodium level of 131. And that she is on dialysis with BUN 29, Crt 4.65, GFR 10. He stated he was okay to continue, but need to consult nephrology and she would possibly need dialysis after the procedure.  Called [facility] dialysis and patient sees Dr. Navarro. Order placed for consult.

## 2024-08-20 ENCOUNTER — TELEPHONE (OUTPATIENT)
Age: 59
End: 2024-08-20

## 2024-08-20 NOTE — TELEPHONE ENCOUNTER
Patient called this afternoon said he had a duplex done and he wants to know when he should follow up to see  please advise. Call back number is [phone]

## 2024-08-26 ENCOUNTER — HOSPITAL ENCOUNTER (OUTPATIENT)
Facility: HOSPITAL | Age: 59
Discharge: HOME | End: 2024-08-29
Payer: MEDICARE

## 2024-08-26 VITALS
OXYGEN SATURATION: 95 % | WEIGHT: 108.4 LBS | BODY MASS INDEX: 18.51 KG/M2 | TEMPERATURE: 98.2 F | HEART RATE: 90 BPM | DIASTOLIC BLOOD PRESSURE: 70 MMHG | HEIGHT: 64 IN | SYSTOLIC BLOOD PRESSURE: 142 MMHG | RESPIRATION RATE: 16 BRPM

## 2024-08-26 LAB
ALBUMIN SERPL-MCNC: 3.6 G/DL (ref 3.5–5)
ALBUMIN/GLOB SERPL: 1.2 (ref 1.1–2.2)
ALP SERPL-CCNC: 108 U/L (ref 45–117)
ALT SERPL-CCNC: 9 U/L (ref 12–78)
ANION GAP SERPL CALC-SCNC: 12 MMOL/L (ref 5–15)
APTT PPP: 29.3 SEC (ref 21.2–34.1)
AST SERPL W P-5'-P-CCNC: 10 U/L (ref 15–37)
BILIRUB SERPL-MCNC: 0.3 MG/DL (ref 0.2–1)
BUN SERPL-MCNC: 31 MG/DL (ref 6–20)
BUN/CREAT SERPL: 6 (ref 12–20)
CA-I BLD-MCNC: 9.2 MG/DL (ref 8.5–10.1)
CHLORIDE SERPL-SCNC: 106 MMOL/L (ref 97–108)
CO2 SERPL-SCNC: 22 MMOL/L (ref 21–32)
CREAT SERPL-MCNC: 5.08 MG/DL (ref 0.55–1.02)
ERYTHROCYTE [DISTWIDTH] IN BLOOD BY AUTOMATED COUNT: 15.4 % (ref 11.5–14.5)
GLOBULIN SER CALC-MCNC: 3.1 G/DL (ref 2–4)
GLUCOSE SERPL-MCNC: 89 MG/DL (ref 65–100)
HCT VFR BLD AUTO: 38.4 % (ref 35–47)
HGB BLD-MCNC: 12.5 G/DL (ref 11.5–16)
INR PPP: 0.9 (ref 0.9–1.1)
MCH RBC QN AUTO: 34.2 PG (ref 26–34)
MCHC RBC AUTO-ENTMCNC: 32.6 G/DL (ref 30–36.5)
MCV RBC AUTO: 104.9 FL (ref 80–99)
NRBC # BLD: 0 K/UL (ref 0–0.01)
NRBC BLD-RTO: 0 PER 100 WBC
PLATELET # BLD AUTO: 242 K/UL (ref 150–400)
PMV BLD AUTO: 9.3 FL (ref 8.9–12.9)
POTASSIUM SERPL-SCNC: 4.4 MMOL/L (ref 3.5–5.1)
PROT SERPL-MCNC: 6.7 G/DL (ref 6.4–8.2)
PROTHROMBIN TIME: 12.3 SEC (ref 11.9–14.6)
RBC # BLD AUTO: 3.66 M/UL (ref 3.8–5.2)
SODIUM SERPL-SCNC: 140 MMOL/L (ref 136–145)
THERAPEUTIC RANGE: NORMAL SEC (ref 82–109)
WBC # BLD AUTO: 6.9 K/UL (ref 3.6–11)

## 2024-08-26 PROCEDURE — 85730 THROMBOPLASTIN TIME PARTIAL: CPT

## 2024-08-26 PROCEDURE — 85610 PROTHROMBIN TIME: CPT

## 2024-08-26 PROCEDURE — 85027 COMPLETE CBC AUTOMATED: CPT

## 2024-08-26 PROCEDURE — 36415 COLL VENOUS BLD VENIPUNCTURE: CPT

## 2024-08-26 PROCEDURE — 80053 COMPREHEN METABOLIC PANEL: CPT

## 2024-08-26 ASSESSMENT — PAIN DESCRIPTION - LOCATION: LOCATION: HEAD

## 2024-08-26 ASSESSMENT — PAIN SCALES - GENERAL: PAINLEVEL_OUTOF10: 6

## 2024-08-26 ASSESSMENT — PAIN DESCRIPTION - DESCRIPTORS: DESCRIPTORS: ACHING

## 2024-08-29 ENCOUNTER — HOSPITAL ENCOUNTER (INPATIENT)
Facility: HOSPITAL | Age: 59
LOS: 1 days | Discharge: HOME | DRG: 323 | End: 2024-08-30
Attending: INTERNAL MEDICINE | Admitting: FAMILY MEDICINE
Payer: MEDICARE

## 2024-08-29 DIAGNOSIS — I25.10 CORONARY ARTERY DISEASE, UNSPECIFIED VESSEL OR LESION TYPE, UNSPECIFIED WHETHER ANGINA PRESENT, UNSPECIFIED WHETHER NATIVE OR TRANSPLANTED HEART: Primary | ICD-10-CM

## 2024-08-29 DIAGNOSIS — I25.5 ISCHEMIC CARDIOMYOPATHY: ICD-10-CM

## 2024-08-29 LAB
ANION GAP SERPL CALC-SCNC: 15 MMOL/L (ref 5–15)
BUN SERPL-MCNC: 29 MG/DL (ref 6–20)
BUN/CREAT SERPL: 5 (ref 12–20)
CA-I BLD-MCNC: 8.9 MG/DL (ref 8.5–10.1)
CHLORIDE SERPL-SCNC: 100 MMOL/L (ref 97–108)
CO2 SERPL-SCNC: 20 MMOL/L (ref 21–32)
CREAT SERPL-MCNC: 5.29 MG/DL (ref 0.55–1.02)
GLUCOSE SERPL-MCNC: 147 MG/DL (ref 65–100)
POTASSIUM SERPL-SCNC: 3.9 MMOL/L (ref 3.5–5.1)
SODIUM SERPL-SCNC: 135 MMOL/L (ref 136–145)

## 2024-08-29 PROCEDURE — 6370000000 HC RX 637 (ALT 250 FOR IP): Performed by: INTERNAL MEDICINE

## 2024-08-29 PROCEDURE — 99152 MOD SED SAME PHYS/QHP 5/>YRS: CPT | Performed by: STUDENT IN AN ORGANIZED HEALTH CARE EDUCATION/TRAINING PROGRAM

## 2024-08-29 PROCEDURE — C1894 INTRO/SHEATH, NON-LASER: HCPCS | Performed by: STUDENT IN AN ORGANIZED HEALTH CARE EDUCATION/TRAINING PROGRAM

## 2024-08-29 PROCEDURE — B2101ZZ FLUOROSCOPY OF SINGLE CORONARY ARTERY USING LOW OSMOLAR CONTRAST: ICD-10-PCS | Performed by: STUDENT IN AN ORGANIZED HEALTH CARE EDUCATION/TRAINING PROGRAM

## 2024-08-29 PROCEDURE — C1724 CATH, TRANS ATHEREC,ROTATION: HCPCS | Performed by: INTERNAL MEDICINE

## 2024-08-29 PROCEDURE — C1894 INTRO/SHEATH, NON-LASER: HCPCS | Performed by: INTERNAL MEDICINE

## 2024-08-29 PROCEDURE — 36415 COLL VENOUS BLD VENIPUNCTURE: CPT

## 2024-08-29 PROCEDURE — 6360000002 HC RX W HCPCS: Performed by: STUDENT IN AN ORGANIZED HEALTH CARE EDUCATION/TRAINING PROGRAM

## 2024-08-29 PROCEDURE — C1761 HC CATH TRANSLUM INTRAVASCULAR LITHOTRIPSY CORONARY: HCPCS | Performed by: INTERNAL MEDICINE

## 2024-08-29 PROCEDURE — 99152 MOD SED SAME PHYS/QHP 5/>YRS: CPT | Performed by: INTERNAL MEDICINE

## 2024-08-29 PROCEDURE — 6360000002 HC RX W HCPCS: Performed by: INTERNAL MEDICINE

## 2024-08-29 PROCEDURE — 2500000003 HC RX 250 WO HCPCS: Performed by: INTERNAL MEDICINE

## 2024-08-29 PROCEDURE — C1760 CLOSURE DEV, VASC: HCPCS | Performed by: STUDENT IN AN ORGANIZED HEALTH CARE EDUCATION/TRAINING PROGRAM

## 2024-08-29 PROCEDURE — C1887 CATHETER, GUIDING: HCPCS | Performed by: INTERNAL MEDICINE

## 2024-08-29 PROCEDURE — 7100000000 HC PACU RECOVERY - FIRST 15 MIN: Performed by: INTERNAL MEDICINE

## 2024-08-29 PROCEDURE — 6370000000 HC RX 637 (ALT 250 FOR IP): Performed by: FAMILY MEDICINE

## 2024-08-29 PROCEDURE — 93454 CORONARY ARTERY ANGIO S&I: CPT | Performed by: INTERNAL MEDICINE

## 2024-08-29 PROCEDURE — 2580000003 HC RX 258: Performed by: INTERNAL MEDICINE

## 2024-08-29 PROCEDURE — C9602 PERC D-E COR STENT ATHER S: HCPCS | Performed by: STUDENT IN AN ORGANIZED HEALTH CARE EDUCATION/TRAINING PROGRAM

## 2024-08-29 PROCEDURE — 027035Z DILATION OF CORONARY ARTERY, ONE ARTERY WITH TWO DRUG-ELUTING INTRALUMINAL DEVICES, PERCUTANEOUS APPROACH: ICD-10-PCS | Performed by: STUDENT IN AN ORGANIZED HEALTH CARE EDUCATION/TRAINING PROGRAM

## 2024-08-29 PROCEDURE — 2709999900 HC NON-CHARGEABLE SUPPLY: Performed by: INTERNAL MEDICINE

## 2024-08-29 PROCEDURE — 2060000000 HC ICU INTERMEDIATE R&B

## 2024-08-29 PROCEDURE — C1725 CATH, TRANSLUMIN NON-LASER: HCPCS | Performed by: INTERNAL MEDICINE

## 2024-08-29 PROCEDURE — C1753 CATH, INTRAVAS ULTRASOUND: HCPCS | Performed by: STUDENT IN AN ORGANIZED HEALTH CARE EDUCATION/TRAINING PROGRAM

## 2024-08-29 PROCEDURE — C1887 CATHETER, GUIDING: HCPCS | Performed by: STUDENT IN AN ORGANIZED HEALTH CARE EDUCATION/TRAINING PROGRAM

## 2024-08-29 PROCEDURE — C1761 HC CATH TRANSLUM INTRAVASCULAR LITHOTRIPSY CORONARY: HCPCS | Performed by: STUDENT IN AN ORGANIZED HEALTH CARE EDUCATION/TRAINING PROGRAM

## 2024-08-29 PROCEDURE — 99153 MOD SED SAME PHYS/QHP EA: CPT | Performed by: STUDENT IN AN ORGANIZED HEALTH CARE EDUCATION/TRAINING PROGRAM

## 2024-08-29 PROCEDURE — 6360000004 HC RX CONTRAST MEDICATION: Performed by: INTERNAL MEDICINE

## 2024-08-29 PROCEDURE — 92972 PERQ TRLUML CORONRY LITHOTRP: CPT | Performed by: STUDENT IN AN ORGANIZED HEALTH CARE EDUCATION/TRAINING PROGRAM

## 2024-08-29 PROCEDURE — 4A023N7 MEASUREMENT OF CARDIAC SAMPLING AND PRESSURE, LEFT HEART, PERCUTANEOUS APPROACH: ICD-10-PCS | Performed by: STUDENT IN AN ORGANIZED HEALTH CARE EDUCATION/TRAINING PROGRAM

## 2024-08-29 PROCEDURE — 7100000001 HC PACU RECOVERY - ADDTL 15 MIN: Performed by: INTERNAL MEDICINE

## 2024-08-29 PROCEDURE — C1760 CLOSURE DEV, VASC: HCPCS | Performed by: INTERNAL MEDICINE

## 2024-08-29 PROCEDURE — 92978 ENDOLUMINL IVUS OCT C 1ST: CPT | Performed by: STUDENT IN AN ORGANIZED HEALTH CARE EDUCATION/TRAINING PROGRAM

## 2024-08-29 PROCEDURE — B240ZZ3 ULTRASONOGRAPHY OF SINGLE CORONARY ARTERY, INTRAVASCULAR: ICD-10-PCS | Performed by: STUDENT IN AN ORGANIZED HEALTH CARE EDUCATION/TRAINING PROGRAM

## 2024-08-29 PROCEDURE — 02F03ZZ FRAGMENTATION IN CORONARY ARTERY, ONE ARTERY, PERCUTANEOUS APPROACH: ICD-10-PCS | Performed by: STUDENT IN AN ORGANIZED HEALTH CARE EDUCATION/TRAINING PROGRAM

## 2024-08-29 PROCEDURE — 80048 BASIC METABOLIC PNL TOTAL CA: CPT

## 2024-08-29 PROCEDURE — C1769 GUIDE WIRE: HCPCS | Performed by: INTERNAL MEDICINE

## 2024-08-29 PROCEDURE — 99153 MOD SED SAME PHYS/QHP EA: CPT | Performed by: INTERNAL MEDICINE

## 2024-08-29 PROCEDURE — 2709999900 HC NON-CHARGEABLE SUPPLY: Performed by: STUDENT IN AN ORGANIZED HEALTH CARE EDUCATION/TRAINING PROGRAM

## 2024-08-29 PROCEDURE — 2580000003 HC RX 258: Performed by: FAMILY MEDICINE

## 2024-08-29 PROCEDURE — 02C03ZZ EXTIRPATION OF MATTER FROM CORONARY ARTERY, ONE ARTERY, PERCUTANEOUS APPROACH: ICD-10-PCS | Performed by: STUDENT IN AN ORGANIZED HEALTH CARE EDUCATION/TRAINING PROGRAM

## 2024-08-29 PROCEDURE — C1874 STENT, COATED/COV W/DEL SYS: HCPCS | Performed by: INTERNAL MEDICINE

## 2024-08-29 PROCEDURE — C1769 GUIDE WIRE: HCPCS | Performed by: STUDENT IN AN ORGANIZED HEALTH CARE EDUCATION/TRAINING PROGRAM

## 2024-08-29 PROCEDURE — C1725 CATH, TRANSLUMIN NON-LASER: HCPCS | Performed by: STUDENT IN AN ORGANIZED HEALTH CARE EDUCATION/TRAINING PROGRAM

## 2024-08-29 PROCEDURE — C1724 CATH, TRANS ATHEREC,ROTATION: HCPCS | Performed by: STUDENT IN AN ORGANIZED HEALTH CARE EDUCATION/TRAINING PROGRAM

## 2024-08-29 PROCEDURE — 7100000001 HC PACU RECOVERY - ADDTL 15 MIN: Performed by: STUDENT IN AN ORGANIZED HEALTH CARE EDUCATION/TRAINING PROGRAM

## 2024-08-29 PROCEDURE — C1874 STENT, COATED/COV W/DEL SYS: HCPCS | Performed by: STUDENT IN AN ORGANIZED HEALTH CARE EDUCATION/TRAINING PROGRAM

## 2024-08-29 PROCEDURE — 7100000000 HC PACU RECOVERY - FIRST 15 MIN: Performed by: STUDENT IN AN ORGANIZED HEALTH CARE EDUCATION/TRAINING PROGRAM

## 2024-08-29 PROCEDURE — C1753 CATH, INTRAVAS ULTRASOUND: HCPCS | Performed by: INTERNAL MEDICINE

## 2024-08-29 DEVICE — STENT ONYXNG40038UX ONYX 4.00X38RX
Type: IMPLANTABLE DEVICE | Site: HEART | Status: FUNCTIONAL
Brand: ONYX FRONTIER™

## 2024-08-29 DEVICE — STENT ONYXNG30038UX ONYX 3.00X38RX
Type: IMPLANTABLE DEVICE | Site: HEART | Status: FUNCTIONAL
Brand: ONYX FRONTIER™

## 2024-08-29 RX ORDER — HYDRALAZINE HYDROCHLORIDE 50 MG/1
100 TABLET, FILM COATED ORAL 2 TIMES DAILY
Status: DISCONTINUED | OUTPATIENT
Start: 2024-08-29 | End: 2024-08-30 | Stop reason: HOSPADM

## 2024-08-29 RX ORDER — CLOPIDOGREL BISULFATE 75 MG/1
75 TABLET ORAL DAILY
Status: DISCONTINUED | OUTPATIENT
Start: 2024-08-29 | End: 2024-08-30 | Stop reason: HOSPADM

## 2024-08-29 RX ORDER — AMINOPHYLLINE 25 MG/ML
INJECTION, SOLUTION INTRAVENOUS PRN
Status: DISCONTINUED | OUTPATIENT
Start: 2024-08-29 | End: 2024-08-29 | Stop reason: HOSPADM

## 2024-08-29 RX ORDER — ASPIRIN 81 MG/1
81 TABLET ORAL DAILY
Status: DISCONTINUED | OUTPATIENT
Start: 2024-08-29 | End: 2024-08-30 | Stop reason: HOSPADM

## 2024-08-29 RX ORDER — POLYETHYLENE GLYCOL 3350 17 G/17G
17 POWDER, FOR SOLUTION ORAL DAILY PRN
Status: DISCONTINUED | OUTPATIENT
Start: 2024-08-29 | End: 2024-08-30 | Stop reason: HOSPADM

## 2024-08-29 RX ORDER — ACETAMINOPHEN 325 MG/1
650 TABLET ORAL EVERY 6 HOURS PRN
Status: DISCONTINUED | OUTPATIENT
Start: 2024-08-29 | End: 2024-08-30 | Stop reason: HOSPADM

## 2024-08-29 RX ORDER — SODIUM CHLORIDE 0.9 % (FLUSH) 0.9 %
5-40 SYRINGE (ML) INJECTION EVERY 12 HOURS SCHEDULED
Status: DISCONTINUED | OUTPATIENT
Start: 2024-08-29 | End: 2024-08-30 | Stop reason: HOSPADM

## 2024-08-29 RX ORDER — SODIUM CHLORIDE 9 MG/ML
INJECTION, SOLUTION INTRAVENOUS PRN
Status: DISCONTINUED | OUTPATIENT
Start: 2024-08-29 | End: 2024-08-30 | Stop reason: HOSPADM

## 2024-08-29 RX ORDER — BUDESONIDE AND FORMOTEROL FUMARATE DIHYDRATE 160; 4.5 UG/1; UG/1
2 AEROSOL RESPIRATORY (INHALATION)
Status: DISCONTINUED | OUTPATIENT
Start: 2024-08-29 | End: 2024-08-30 | Stop reason: HOSPADM

## 2024-08-29 RX ORDER — ONDANSETRON 2 MG/ML
4 INJECTION INTRAMUSCULAR; INTRAVENOUS EVERY 6 HOURS PRN
Status: DISCONTINUED | OUTPATIENT
Start: 2024-08-29 | End: 2024-08-30 | Stop reason: HOSPADM

## 2024-08-29 RX ORDER — NICARDIPINE HYDROCHLORIDE 2.5 MG/ML
INJECTION INTRAVENOUS PRN
Status: DISCONTINUED | OUTPATIENT
Start: 2024-08-29 | End: 2024-08-29 | Stop reason: HOSPADM

## 2024-08-29 RX ORDER — NICOTINE 21 MG/24HR
1 PATCH, TRANSDERMAL 24 HOURS TRANSDERMAL DAILY
Status: DISCONTINUED | OUTPATIENT
Start: 2024-08-29 | End: 2024-08-30 | Stop reason: HOSPADM

## 2024-08-29 RX ORDER — ONDANSETRON 4 MG/1
4 TABLET, ORALLY DISINTEGRATING ORAL EVERY 8 HOURS PRN
Status: DISCONTINUED | OUTPATIENT
Start: 2024-08-29 | End: 2024-08-30 | Stop reason: HOSPADM

## 2024-08-29 RX ORDER — LIDOCAINE HYDROCHLORIDE 10 MG/ML
INJECTION, SOLUTION INFILTRATION; PERINEURAL PRN
Status: DISCONTINUED | OUTPATIENT
Start: 2024-08-29 | End: 2024-08-29 | Stop reason: HOSPADM

## 2024-08-29 RX ORDER — TRAZODONE HYDROCHLORIDE 50 MG/1
100 TABLET, FILM COATED ORAL NIGHTLY
Status: DISCONTINUED | OUTPATIENT
Start: 2024-08-29 | End: 2024-08-30 | Stop reason: HOSPADM

## 2024-08-29 RX ORDER — BIVALIRUDIN 250 MG/5ML
INJECTION, POWDER, LYOPHILIZED, FOR SOLUTION INTRAVENOUS PRN
Status: DISCONTINUED | OUTPATIENT
Start: 2024-08-29 | End: 2024-08-29 | Stop reason: HOSPADM

## 2024-08-29 RX ORDER — HEPARIN SODIUM 200 [USP'U]/100ML
INJECTION, SOLUTION INTRAVENOUS CONTINUOUS PRN
Status: COMPLETED | OUTPATIENT
Start: 2024-08-29 | End: 2024-08-29

## 2024-08-29 RX ORDER — LOSARTAN POTASSIUM 50 MG/1
50 TABLET ORAL DAILY
Status: DISCONTINUED | OUTPATIENT
Start: 2024-08-29 | End: 2024-08-30 | Stop reason: HOSPADM

## 2024-08-29 RX ORDER — CLOPIDOGREL 300 MG/1
TABLET, FILM COATED ORAL PRN
Status: DISCONTINUED | OUTPATIENT
Start: 2024-08-29 | End: 2024-08-29 | Stop reason: HOSPADM

## 2024-08-29 RX ORDER — ACETAMINOPHEN 650 MG/1
650 SUPPOSITORY RECTAL EVERY 6 HOURS PRN
Status: DISCONTINUED | OUTPATIENT
Start: 2024-08-29 | End: 2024-08-30 | Stop reason: HOSPADM

## 2024-08-29 RX ORDER — ALBUTEROL SULFATE 90 UG/1
2 AEROSOL, METERED RESPIRATORY (INHALATION) EVERY 4 HOURS PRN
Status: DISCONTINUED | OUTPATIENT
Start: 2024-08-29 | End: 2024-08-30 | Stop reason: HOSPADM

## 2024-08-29 RX ORDER — ATORVASTATIN CALCIUM 40 MG/1
40 TABLET, FILM COATED ORAL NIGHTLY
Status: DISCONTINUED | OUTPATIENT
Start: 2024-08-29 | End: 2024-08-30 | Stop reason: HOSPADM

## 2024-08-29 RX ORDER — MIDAZOLAM HYDROCHLORIDE 1 MG/ML
INJECTION INTRAMUSCULAR; INTRAVENOUS PRN
Status: DISCONTINUED | OUTPATIENT
Start: 2024-08-29 | End: 2024-08-29 | Stop reason: HOSPADM

## 2024-08-29 RX ORDER — METOPROLOL TARTRATE 1 MG/ML
INJECTION, SOLUTION INTRAVENOUS PRN
Status: DISCONTINUED | OUTPATIENT
Start: 2024-08-29 | End: 2024-08-29 | Stop reason: HOSPADM

## 2024-08-29 RX ORDER — NITROGLYCERIN 20 MG/100ML
INJECTION INTRAVENOUS PRN
Status: DISCONTINUED | OUTPATIENT
Start: 2024-08-29 | End: 2024-08-29 | Stop reason: HOSPADM

## 2024-08-29 RX ORDER — TRAMADOL HYDROCHLORIDE 50 MG/1
50 TABLET ORAL EVERY 8 HOURS PRN
Status: DISCONTINUED | OUTPATIENT
Start: 2024-08-29 | End: 2024-08-30 | Stop reason: HOSPADM

## 2024-08-29 RX ORDER — HYDRALAZINE HYDROCHLORIDE 20 MG/ML
INJECTION INTRAMUSCULAR; INTRAVENOUS PRN
Status: DISCONTINUED | OUTPATIENT
Start: 2024-08-29 | End: 2024-08-29 | Stop reason: HOSPADM

## 2024-08-29 RX ORDER — SEVELAMER CARBONATE 800 MG/1
800 TABLET, FILM COATED ORAL
Status: DISCONTINUED | OUTPATIENT
Start: 2024-08-29 | End: 2024-08-30 | Stop reason: HOSPADM

## 2024-08-29 RX ORDER — TRAMADOL HYDROCHLORIDE 50 MG/1
50 TABLET ORAL
Status: COMPLETED | OUTPATIENT
Start: 2024-08-29 | End: 2024-08-29

## 2024-08-29 RX ORDER — IOPAMIDOL 755 MG/ML
INJECTION, SOLUTION INTRAVASCULAR PRN
Status: DISCONTINUED | OUTPATIENT
Start: 2024-08-29 | End: 2024-08-29 | Stop reason: HOSPADM

## 2024-08-29 RX ORDER — SODIUM CHLORIDE 9 MG/ML
INJECTION, SOLUTION INTRAVENOUS CONTINUOUS
Status: DISCONTINUED | OUTPATIENT
Start: 2024-08-29 | End: 2024-08-29

## 2024-08-29 RX ORDER — SODIUM CHLORIDE 0.9 % (FLUSH) 0.9 %
5-40 SYRINGE (ML) INJECTION PRN
Status: DISCONTINUED | OUTPATIENT
Start: 2024-08-29 | End: 2024-08-30 | Stop reason: HOSPADM

## 2024-08-29 RX ORDER — FENTANYL CITRATE 0.05 MG/ML
INJECTION, SOLUTION INTRAMUSCULAR; INTRAVENOUS PRN
Status: DISCONTINUED | OUTPATIENT
Start: 2024-08-29 | End: 2024-08-29 | Stop reason: HOSPADM

## 2024-08-29 RX ORDER — CARVEDILOL 12.5 MG/1
25 TABLET ORAL 2 TIMES DAILY
Status: DISCONTINUED | OUTPATIENT
Start: 2024-08-29 | End: 2024-08-30 | Stop reason: HOSPADM

## 2024-08-29 RX ORDER — AMLODIPINE BESYLATE 5 MG/1
5 TABLET ORAL 2 TIMES DAILY
Status: DISCONTINUED | OUTPATIENT
Start: 2024-08-29 | End: 2024-08-30 | Stop reason: HOSPADM

## 2024-08-29 RX ADMIN — SEVELAMER CARBONATE 800 MG: 800 TABLET, FILM COATED ORAL at 21:20

## 2024-08-29 RX ADMIN — AMLODIPINE BESYLATE 5 MG: 5 TABLET ORAL at 21:59

## 2024-08-29 RX ADMIN — ATORVASTATIN CALCIUM 40 MG: 40 TABLET, FILM COATED ORAL at 21:59

## 2024-08-29 RX ADMIN — HYDRALAZINE HYDROCHLORIDE 100 MG: 50 TABLET ORAL at 21:59

## 2024-08-29 RX ADMIN — TRAMADOL HYDROCHLORIDE 50 MG: 50 TABLET ORAL at 15:59

## 2024-08-29 RX ADMIN — CARVEDILOL 25 MG: 12.5 TABLET, FILM COATED ORAL at 21:59

## 2024-08-29 RX ADMIN — TRAZODONE HYDROCHLORIDE 100 MG: 50 TABLET ORAL at 21:59

## 2024-08-29 RX ADMIN — SODIUM CHLORIDE, PRESERVATIVE FREE 10 ML: 5 INJECTION INTRAVENOUS at 22:05

## 2024-08-29 RX ADMIN — SODIUM CHLORIDE: 9 INJECTION, SOLUTION INTRAVENOUS at 08:21

## 2024-08-29 ASSESSMENT — PAIN SCALES - GENERAL
PAINLEVEL_OUTOF10: 7
PAINLEVEL_OUTOF10: 2
PAINLEVEL_OUTOF10: 8
PAINLEVEL_OUTOF10: 7

## 2024-08-29 ASSESSMENT — PAIN DESCRIPTION - ORIENTATION: ORIENTATION: UPPER

## 2024-08-29 ASSESSMENT — PAIN - FUNCTIONAL ASSESSMENT: PAIN_FUNCTIONAL_ASSESSMENT: 0-10

## 2024-08-29 ASSESSMENT — PAIN DESCRIPTION - LOCATION
LOCATION: HEAD
LOCATION: BACK
LOCATION: CHEST
LOCATION: GROIN

## 2024-08-29 ASSESSMENT — PAIN DESCRIPTION - PAIN TYPE: TYPE: CHRONIC PAIN

## 2024-08-29 ASSESSMENT — PAIN DESCRIPTION - DESCRIPTORS: DESCRIPTORS: ACHING

## 2024-08-29 NOTE — PROGRESS NOTES
4 Eyes Skin Assessment     NAME:  Junie Neal  YOB: 1965  MEDICAL RECORD NUMBER:  [id number]    The patient is being assessed for  Admission    I agree that at least one RN has performed a thorough Head to Toe Skin Assessment on the patient. ALL assessment sites listed below have been assessed.      Areas assessed by both nurses:    Head, Face, Ears, Shoulders, Back, Chest, Arms, Elbows, Hands, Sacrum. Buttock, Coccyx, Ischium, Legs. Feet and Heels, and Under Medical Devices  noted bruising right upper arm. Post procedure site right femoral clean dry and intact.        Does the Patient have a Wound? No noted wound(s)       [place] Prevention initiated by RN: No  Wound Care Orders initiated by RN: No    Pressure Injury (Stage 3,4, Unstageable, DTI, NWPT, and Complex wounds) if present, place Wound referral order by RN under : No    New Ostomies, if present place, Ostomy referral order under : No     Nurse 1 eSignature: Electronically signed by Evelio Mcconnell RN on 8/29/24 at 5:55 PM EDT    **SHARE this note so that the co-signing nurse can place an eSignature**    Nurse 2 eSignature: Electronically signed by Tutu Roberto RN on 8/29/24 at 6:07 PM EDT

## 2024-08-29 NOTE — PROGRESS NOTES
Information about the importance of aspirin and antiplatelet compliance and outpatient cardiac rehab will be included with the patient's printed discharge instructions. Patient was also provided with a folder containing this information from the cath lab staff after the procedure.      An outpatient cardiac referral has been made to [facility]. Patient's information was forwarded to this facility. Patient will be contacted by this facility to schedule an initial appointment. This referral information has been included in the patient's discharge instructions. Contact information for cardiac rehab facility was provided as well.

## 2024-08-29 NOTE — DISCHARGE INSTRUCTIONS
IMPORTANT    People who undergo angioplasty and/or stent placement procedures are prescribed aspirin along with certain medications called anticlotting or antiplatelet medications. These medications include: Plavix (clopidogrel), Effient (prasugrel), and Brilinta (ticagrelor). It is important to take the aspirin and the anticlotting medication that you were prescribed every day in order to reduce the probability that the stent will become filled with blood clot. Angioplasty and/or stent placement procedures are done so that a blocked artery can be opened. If the stent becomes filled with blood clot, the artery becomes blocked off again. This can result in a heart attack, or even death.    It is extremely important to take all the medicines prescribed by your cardiologist exactly as they were prescribed. Failure to take certain medications - particularly aspirin along with Plavix (clopidogrel),  Effient (prasugrel), or Brilinta(ticagrelor) - can result in a heart attack, or even death. Do not miss any doses. It is vital that aspirin along with Plavix, Effient, or Brilinta be taken every single day. If you have any questions or concerns regarding your medications, please consult your cardiologist. He/she is the only person who can adjust or stop these medications.    You must fill the prescription for these medications immediately upon discharge so that you do not miss any doses. If you cannot afford the medication prescribed to you, please let your cardiologist know immediately.  ______________________________________________________________________________________________________________________________    Cardiac rehab is a very important way to help you  to feel better and stronger more quickly  and reduce the risks of heart problems in the future.     Cardiac rehabilitation services are provided at:  [address], [address]  [place], [place]

## 2024-08-29 NOTE — PROGRESS NOTES
Received report from the Cardiac Cath lab at 14:27. Patient arrived to [facility] at 14:52. VSS; Patient had a Cardiac Catherization completed today with Right Groin entry site. Dressing is C,D, and Intact. There is a spot of blood / drainage on the pad under patient (approximately 3 X 3 cm is size) yet dressing is clean dry and intact. Pt states site is tender. Pulses are present: Right femoral, right popliteal and Pedal pulses present +1 - + 2.  Patient spouse is frustrated as cath lab stated they would contact him once procedure is through yet his wife, the patient notified him as he was waiting in the cardiac cath lab waiting room. Apologized to family. Gave report to JUAN Fraser.

## 2024-08-29 NOTE — PROGRESS NOTES
Per patient okay to review discharge instructions with patient's spouse, Michele Huggins   Pt. Discharged by provider. Pt. Wheeled out to vehicle.

## 2024-08-29 NOTE — CONSULTS
Nephrology Consultation          Patient: Junie Neal MRN: [id number]  SSN: [id number]    YOB: 1965  Age: 58 y.o.  Sex: female      Subjective:   Reason for the consultation.  Medical management for end-stage renal disease  History of present illness.  The patient is a 58-year-old woman with underlying history of ESRD on hemodialysis Tuesday Thursday Saturday at [facility] in [facility] permacath has a dialysis access, hypertension, chronic anemia underwent cardiac catheterization today and got admitted for overnight observation.  Clinically no abdominal pain nausea vomiting or loose stools.  No shortness of breath.  No lower extremity swelling.  She is on room air.    Past Medical History:   Diagnosis Date    Anxiety 10/2/2020    Carotid stenosis     Cerebral artery occlusion with cerebral infarction (HCC)     Chronic anemia     Chronic respiratory failure (HCC)     CKD (chronic kidney disease)     COPD (chronic obstructive pulmonary disease) with emphysema (HCC) 10/2/2020    Depression     Dialysis patient (HCC)     T, TH, Sat with [facility]    Diastolic CHF (HCC)     Dysphagia 10/2/2020    GERD (gastroesophageal reflux disease)     Headache 1997    Hemodialysis patient ([facility])     High cholesterol     Hypertension     Hypothyroid     Iron deficiency anemia 10/2/2020    Mitral valve regurgitation 10/2/2020    Renal artery stenosis (HCC)     Seizure disorder (HCC)      Past Surgical History:   Procedure Laterality Date    CARDIAC PROCEDURE N/A 5/2/2024    Left and right heart cath / coronary angiography performed by Darlene Reeves MD at [facility] CARDIAC CATH LAB    CAROTID ENDARTERECTOMY      CHOLECYSTECTOMY      IR THORACENTESIS PLEURAL W IMAGING  11/24/2020    IR THORACENTESIS PLEURAL W IMAGING  11/25/2020    IR TUNNELED CATHETER PLACEMENT GREATER THAN 5 YEARS  4/29/2024    IR TUNNELED CATHETER PLACEMENT GREATER THAN 5 YEARS 4/29/2024 Janeen Lunsford PA [facility] RAD ANGIO IR    KIDNEY SURGERY

## 2024-08-29 NOTE — PLAN OF CARE
Problem: Chronic Conditions and Co-morbidities  Goal: Patient's chronic conditions and co-morbidity symptoms are monitored and maintained or improved  Outcome: Progressing  Flowsheets (Taken 8/29/2024 1601)  Care Plan - Patient's Chronic Conditions and Co-Morbidity Symptoms are Monitored and Maintained or Improved: Monitor and assess patient's chronic conditions and comorbid symptoms for stability, deterioration, or improvement     Problem: Pain  Goal: Verbalizes/displays adequate comfort level or baseline comfort level  Outcome: Progressing     Problem: Safety - Adult  Goal: Free from fall injury  Outcome: Progressing     Problem: Discharge Planning  Goal: Discharge to home or other facility with appropriate resources  Outcome: Progressing  Flowsheets (Taken 8/29/2024 1601)  Discharge to home or other facility with appropriate resources: Identify barriers to discharge with patient and caregiver     Problem: ABCDS Injury Assessment  Goal: Absence of physical injury  Outcome: Progressing

## 2024-08-30 ENCOUNTER — APPOINTMENT (OUTPATIENT)
Facility: HOSPITAL | Age: 59
DRG: 323 | End: 2024-08-30
Attending: INTERNAL MEDICINE
Payer: MEDICARE

## 2024-08-30 VITALS
OXYGEN SATURATION: 96 % | RESPIRATION RATE: 17 BRPM | DIASTOLIC BLOOD PRESSURE: 68 MMHG | HEART RATE: 92 BPM | SYSTOLIC BLOOD PRESSURE: 114 MMHG | HEIGHT: 64 IN | TEMPERATURE: 98.2 F | BODY MASS INDEX: 17.54 KG/M2 | WEIGHT: 102.73 LBS

## 2024-08-30 LAB
ALBUMIN SERPL-MCNC: 3 G/DL (ref 3.5–5)
ALBUMIN/GLOB SERPL: 1 (ref 1.1–2.2)
ALP SERPL-CCNC: 92 U/L (ref 45–117)
ALT SERPL-CCNC: 7 U/L (ref 12–78)
ANION GAP SERPL CALC-SCNC: 14 MMOL/L (ref 5–15)
AST SERPL W P-5'-P-CCNC: 16 U/L (ref 15–37)
BASOPHILS # BLD: 0 K/UL (ref 0–0.1)
BASOPHILS NFR BLD: 0 % (ref 0–1)
BILIRUB SERPL-MCNC: 0.2 MG/DL (ref 0.2–1)
BUN SERPL-MCNC: 28 MG/DL (ref 6–20)
BUN/CREAT SERPL: 5 (ref 12–20)
CA-I BLD-MCNC: 8.4 MG/DL (ref 8.5–10.1)
CHLORIDE SERPL-SCNC: 100 MMOL/L (ref 97–108)
CO2 SERPL-SCNC: 20 MMOL/L (ref 21–32)
CREAT SERPL-MCNC: 5.31 MG/DL (ref 0.55–1.02)
DIFFERENTIAL METHOD BLD: ABNORMAL
EOSINOPHIL # BLD: 0.2 K/UL (ref 0–0.4)
EOSINOPHIL NFR BLD: 2 % (ref 0–7)
ERYTHROCYTE [DISTWIDTH] IN BLOOD BY AUTOMATED COUNT: 14.5 % (ref 11.5–14.5)
GLOBULIN SER CALC-MCNC: 3 G/DL (ref 2–4)
GLUCOSE SERPL-MCNC: 75 MG/DL (ref 65–100)
HCT VFR BLD AUTO: 29.4 % (ref 35–47)
HGB BLD-MCNC: 9.8 G/DL (ref 11.5–16)
IMM GRANULOCYTES # BLD AUTO: 0 K/UL (ref 0–0.04)
IMM GRANULOCYTES NFR BLD AUTO: 0 % (ref 0–0.5)
LYMPHOCYTES # BLD: 1 K/UL (ref 0.8–3.5)
LYMPHOCYTES NFR BLD: 10 % (ref 12–49)
MCH RBC QN AUTO: 34.3 PG (ref 26–34)
MCHC RBC AUTO-ENTMCNC: 33.3 G/DL (ref 30–36.5)
MCV RBC AUTO: 102.8 FL (ref 80–99)
MONOCYTES # BLD: 1.1 K/UL (ref 0–1)
MONOCYTES NFR BLD: 12 % (ref 5–13)
NEUTS SEG # BLD: 7.4 K/UL (ref 1.8–8)
NEUTS SEG NFR BLD: 76 % (ref 32–75)
NRBC # BLD: 0 K/UL (ref 0–0.01)
NRBC BLD-RTO: 0 PER 100 WBC
PLATELET # BLD AUTO: 197 K/UL (ref 150–400)
PMV BLD AUTO: 9.8 FL (ref 8.9–12.9)
POTASSIUM SERPL-SCNC: 3.4 MMOL/L (ref 3.5–5.1)
PROT SERPL-MCNC: 6 G/DL (ref 6.4–8.2)
RBC # BLD AUTO: 2.86 M/UL (ref 3.8–5.2)
SODIUM SERPL-SCNC: 134 MMOL/L (ref 136–145)
WBC # BLD AUTO: 9.8 K/UL (ref 3.6–11)

## 2024-08-30 PROCEDURE — 85025 COMPLETE CBC W/AUTO DIFF WBC: CPT

## 2024-08-30 PROCEDURE — 36415 COLL VENOUS BLD VENIPUNCTURE: CPT

## 2024-08-30 PROCEDURE — 5A1D70Z PERFORMANCE OF URINARY FILTRATION, INTERMITTENT, LESS THAN 6 HOURS PER DAY: ICD-10-PCS | Performed by: INTERNAL MEDICINE

## 2024-08-30 PROCEDURE — 2580000003 HC RX 258: Performed by: FAMILY MEDICINE

## 2024-08-30 PROCEDURE — 6360000002 HC RX W HCPCS: Performed by: INTERNAL MEDICINE

## 2024-08-30 PROCEDURE — 6370000000 HC RX 637 (ALT 250 FOR IP): Performed by: INTERNAL MEDICINE

## 2024-08-30 PROCEDURE — 6370000000 HC RX 637 (ALT 250 FOR IP): Performed by: FAMILY MEDICINE

## 2024-08-30 PROCEDURE — 80053 COMPREHEN METABOLIC PANEL: CPT

## 2024-08-30 PROCEDURE — 2709999900 HC NON-CHARGEABLE SUPPLY

## 2024-08-30 PROCEDURE — 90935 HEMODIALYSIS ONE EVALUATION: CPT

## 2024-08-30 RX ORDER — POTASSIUM CHLORIDE 1500 MG/1
40 TABLET, EXTENDED RELEASE ORAL ONCE
Status: COMPLETED | OUTPATIENT
Start: 2024-08-30 | End: 2024-08-30

## 2024-08-30 RX ORDER — HEPARIN SODIUM 1000 [USP'U]/ML
3600 INJECTION, SOLUTION INTRAVENOUS; SUBCUTANEOUS PRN
Status: DISCONTINUED | OUTPATIENT
Start: 2024-08-30 | End: 2024-08-30 | Stop reason: HOSPADM

## 2024-08-30 RX ORDER — SEVELAMER CARBONATE 800 MG/1
800 TABLET, FILM COATED ORAL
Qty: 90 TABLET | Refills: 3 | Status: SHIPPED | OUTPATIENT
Start: 2024-08-30

## 2024-08-30 RX ADMIN — LOSARTAN POTASSIUM 50 MG: 50 TABLET, FILM COATED ORAL at 10:42

## 2024-08-30 RX ADMIN — HEPARIN SODIUM 3600 UNITS: 1000 INJECTION INTRAVENOUS; SUBCUTANEOUS at 09:44

## 2024-08-30 RX ADMIN — POTASSIUM CHLORIDE 40 MEQ: 1500 TABLET, EXTENDED RELEASE ORAL at 10:49

## 2024-08-30 RX ADMIN — CARVEDILOL 25 MG: 12.5 TABLET, FILM COATED ORAL at 10:42

## 2024-08-30 RX ADMIN — AMLODIPINE BESYLATE 5 MG: 5 TABLET ORAL at 10:42

## 2024-08-30 RX ADMIN — TRAMADOL HYDROCHLORIDE 50 MG: 50 TABLET ORAL at 05:17

## 2024-08-30 RX ADMIN — CLOPIDOGREL BISULFATE 75 MG: 75 TABLET ORAL at 10:42

## 2024-08-30 RX ADMIN — HYDRALAZINE HYDROCHLORIDE 100 MG: 50 TABLET ORAL at 10:42

## 2024-08-30 RX ADMIN — SODIUM CHLORIDE, PRESERVATIVE FREE 10 ML: 5 INJECTION INTRAVENOUS at 10:42

## 2024-08-30 RX ADMIN — ASPIRIN 81 MG: 81 TABLET, COATED ORAL at 10:42

## 2024-08-30 RX ADMIN — ACETAMINOPHEN 650 MG: 325 TABLET ORAL at 10:42

## 2024-08-30 RX ADMIN — SEVELAMER CARBONATE 800 MG: 800 TABLET, FILM COATED ORAL at 10:42

## 2024-08-30 ASSESSMENT — PAIN SCALES - GENERAL
PAINLEVEL_OUTOF10: 8
PAINLEVEL_OUTOF10: 6
PAINLEVEL_OUTOF10: 0
PAINLEVEL_OUTOF10: 7
PAINLEVEL_OUTOF10: 0
PAINLEVEL_OUTOF10: 0

## 2024-08-30 ASSESSMENT — PAIN DESCRIPTION - ORIENTATION: ORIENTATION: MID

## 2024-08-30 ASSESSMENT — PAIN DESCRIPTION - LOCATION: LOCATION: BACK

## 2024-08-30 ASSESSMENT — PAIN - FUNCTIONAL ASSESSMENT: PAIN_FUNCTIONAL_ASSESSMENT: ACTIVITIES ARE NOT PREVENTED

## 2024-08-30 ASSESSMENT — PAIN DESCRIPTION - DESCRIPTORS: DESCRIPTORS: ACHING

## 2024-08-30 NOTE — PROGRESS NOTES
Assumed care 0700.  Pt off unit prior at change of shift to HD.  Performed full assessment once received back on unit.  Notified of DC order from hospitalist as long as their was approval from nephrologist and cardiologist.    Dr. Navarro approved for DC. Awaiting call back from Dr. Yusuf.  Pt and family aware.  Hillary Moore RN 12:23 PM    Have received approval for DC from Dr. Yusuf.  To DC pt now.  Hillary Moore RN 12:47 PM

## 2024-08-30 NOTE — PLAN OF CARE
Problem: Chronic Conditions and Co-morbidities  Goal: Patient's chronic conditions and co-morbidity symptoms are monitored and maintained or improved  Outcome: Progressing  Flowsheets (Taken 8/30/2024 1042)  Care Plan - Patient's Chronic Conditions and Co-Morbidity Symptoms are Monitored and Maintained or Improved: Monitor and assess patient's chronic conditions and comorbid symptoms for stability, deterioration, or improvement     Problem: Pain  Goal: Verbalizes/displays adequate comfort level or baseline comfort level  Outcome: Progressing     Problem: Safety - Adult  Goal: Free from fall injury  Outcome: Progressing     Problem: Discharge Planning  Goal: Discharge to home or other facility with appropriate resources  Outcome: Progressing  Flowsheets (Taken 8/30/2024 1042)  Discharge to home or other facility with appropriate resources: Identify barriers to discharge with patient and caregiver     Problem: ABCDS Injury Assessment  Goal: Absence of physical injury  Outcome: Progressing

## 2024-08-30 NOTE — DISCHARGE SUMMARY
MEDICATIONS:     Medication List        START taking these medications      * sevelamer 800 MG tablet  Commonly known as: RENVELA     * sevelamer 800 MG tablet  Commonly known as: RENVELA  Take 1 tablet by mouth 3 times daily (with meals)           * This list has 2 medication(s) that are the same as other medications prescribed for you. Read the directions carefully, and ask your doctor or other care provider to review them with you.                CONTINUE taking these medications      albuterol sulfate  (90 Base) MCG/ACT inhaler  Commonly known as: PROVENTIL;VENTOLIN;PROAIR     amLODIPine 5 MG tablet  Commonly known as: NORVASC  Take 1 tablet by mouth in the morning and at bedtime     aspirin 81 MG EC tablet  Take 1 tablet by mouth daily     atorvastatin 40 MG tablet  Commonly known as: LIPITOR  Take 1 tablet by mouth nightly     carvedilol 25 MG tablet  Commonly known as: COREG  Take 1 tablet by mouth 2 times daily     clopidogrel 75 MG tablet  Commonly known as: PLAVIX     hydrALAZINE 100 MG tablet  Commonly known as: APRESOLINE  Take 1 tablet by mouth in the morning and at bedtime     losartan 50 MG tablet  Commonly known as: COZAAR     nicotine 21 MG/24HR  Commonly known as: NICODERM CQ  Place 1 patch onto the skin daily     traZODone 100 MG tablet  Commonly known as: DESYREL     Trelegy Ellipta 200-62.5-25 MCG/ACT Aepb inhaler  Generic drug: fluticasone-umeclidin-vilant  Inhale 1 puff into the lungs daily            STOP taking these medications      Acetaminophen Extra Strength 500 MG Tabs     ARTHRITIS STRENGTH BC POWDER PO     butalbital-acetaminophen-caffeine-codeine -32-30 MG per capsule  Commonly known as: FIORICET WITH CODEINE     clonazePAM 0.5 MG tablet  Commonly known as: KLONOPIN     HYDROmorphone 2 MG tablet  Commonly known as: DILAUDID     ipratropium 0.5 mg-albuterol 2.5 mg 0.5-2.5 (3) MG/3ML Soln nebulizer solution  Commonly known as: DUONEB     potassium chloride 20 MEQ extended  Granulocytes % 0 0 - 0.5 %    Neutrophils Absolute 7.4 1.8 - 8.0 K/UL    Lymphocytes Absolute 1.0 0.8 - 3.5 K/UL    Monocytes Absolute 1.1 (H) 0.0 - 1.0 K/UL    Eosinophils Absolute 0.2 0.0 - 0.4 K/UL    Basophils Absolute 0.0 0.0 - 0.1 K/UL    Immature Granulocytes Absolute 0.0 0.00 - 0.04 K/UL    Differential Type AUTOMATED        No results found.       HOSPITAL COURSE:    Patient is a 58 y.o. year old female past medical history of hypertension and stable disease on hemodialysis COPD history of CVA smokes cigarettes patient came with outpatient has a cardiac cath seen by the cardiology patient have a cardiac cath done and 2 stent was placed after cath cath cardiology decide the patient to be admitted for further evaluation and treatment patient denies any chest pain shortness with nausea vomiting diarrhea constipation patient status post sigmoid     Initially there was some blood at the catheter site         Patient today denies any chest pain shortness with nausea no vomiting patient hemodialysis this morning patient want to go home  If cleared by the cardiology and nephrology discharge home follow-up with the cardiology and PCP 1 to 2 weeks medical reconciliation done time discharge patient 35 minutes 50% time in counseling coordination of care      Signed:   Dustin Almeida MD  8/30/2024  11:32 AM

## 2024-08-30 NOTE — PROGRESS NOTES
Resp: 16   Temp: 98.2 °F (36.8 °C)   TempSrc: Oral   SpO2: 95%   Weight: 49.2 kg (108 lb 6.4 oz)   Height: 1.626 m (5' 4\")        Physical Exam:  General: NAD  Eyes: sclera anicteric  Oral Cavity: No thrush or ulcers  Neck: no JVD  Chest: Fair bilateral air entry  Heart: normal sounds  Abdomen: soft and non tender   : no vieira  Lower Extremities: no edema  Skin: no rash  Neuro: intact  Psychiatric: non-depressed          Assessment/Plan:   ESRD  On hemodialysis TTS at [facility]  Last dialyzed on 8/27  No evidence of uremia  No evidence of volume overload  S/p dialyzed today.  IJ permacath as a dialysis access    Hypertension  Elevated blood pressures  Resume home blood pressure medications    Anemia  Hemoglobin at goal    Renal osteodystrophy  Calcium is okay  Continue sevelamer 800 mg 3 times daily with meals    History of coronary artery disease  Underwent cardiac catheterization today        Plan:       Signed By: Jodi Navarro MD     August 30, 2024

## 2024-08-30 NOTE — CARE COORDINATION
08/30/24 1535   Service Assessment   Patient Orientation Alert and Oriented   Cognition Alert   History Provided By Patient   Primary Caregiver Self   Accompanied By/Relationship boyfriend   Support Systems Spouse/Significant Other   Patient's Healthcare Decision Maker is: Legal Next of Kin   PCP Verified by CM Yes  ([facility] emery appt on November first.)   Last Visit to PCP Within last 3 months   Prior Functional Level Independent in ADLs/IADLs   Current Functional Level Independent in ADLs/IADLs   Can patient return to prior living arrangement Yes   Family able to assist with home care needs: Yes   Financial Resources Medicare   Social/Functional History   Lives With Significant other   Type of Home House   Home Layout One level   Home Access Stairs to enter with rails   Entrance Stairs - Number of Steps 4 steps   Home Equipment Wheelchair - Manual;Walker - Rolling;Cane   Receives Help From Family   ADL Assistance Independent   Homemaking Assistance Independent   Ambulation Assistance Independent   Transfer Assistance Independent   Active  No   Discharge Planning   Type of Residence House   Living Arrangements Spouse/Significant Other   Current Services Prior To Admission None   Potential Assistance Needed N/A   Potential Assistance Purchasing Medications No   Type of Home Care Services None   Patient expects to be discharged to: House   History of falls? 0   Services At/After Discharge   Transition of Care Consult (CM Consult) N/A   Confirm Follow Up Transport Family     Pateint lives with her significant other in a one story home with 4 steps to entrance. Patient has a walker, cane wheelchair, shower seat at home. Patient uses [facility] for T-Th-Sat 1130-2 pm for dialysis. [facility] for scripts.     Advance Care Planning     General Advance Care Planning (ACP) Conversation    Date of Conversation: 8/30/2024  Conducted with: Patient with Decision Making

## 2024-08-30 NOTE — CONSULTS
Cardiology Consult    NAME: Junie Neal   :  1965   MRN:  [id number]     Date/Time:  2024 9:28 AM    Patient PCP: No primary care provider on file.  ________________________________________________________________________    Problem List  -Coronary artery disease  -Abnormal stress test in the past.  -Hypertension  -Dyslipidemia  -Chronic kidney disease-on dialysis  -Hypothyroidism  -History of seizure disorders  -History of stroke    Patient is status post RCA stenting following atherectomy with Rota and shockwave therapy as of 2024     Assessment and Plan:   Note dictated 2024  -58-year-old white female with known established coronary artery disease brought to the cardiac catheterization laboratory yesterday as an outpatient for coronary intervention of right coronary artery.  -The lesion was complex and following balloon angioplasty atherectomy was done by Rota and shockwave therapy following successful stenting of distal RCA with 2 stents.  -When I saw the patient this morning she was lying comfortably in the bed in dialysis unit and denies any symptoms and has no complaints.  -Groin looks unremarkable for hematoma infection or sign of bleeding.  -Monitor shows sinus rhythm.  -Will follow while patient is in the hospital along with the team.        []        High complexity decision making was performed        Subjective:   CHIEF COMPLAINT: Coronary artery disease      REASON FOR CONSULT: Coronary artery disease      HISTORY OF PRESENT ILLNESS:     Junie Neal is a 58 y.o. White (non-) female who has known established coronary artery disease with previously placed stent in the past brought to the hospital as of yesterday as an outpatient for PCI of RCA which was done successfully with atherectomy and stenting of distal RCA with 2 stents.  Patient is lying comfortably in the bed and denies any symptoms.  Once dialysis is done we will reevaluate her and then make  further cardiovascular management decision including but not limited to discharging her.  Will follow        Past Medical History:   Diagnosis Date    Anxiety 10/2/2020    Carotid stenosis     Cerebral artery occlusion with cerebral infarction (HCC)     Chronic anemia     Chronic respiratory failure (HCC)     CKD (chronic kidney disease)     COPD (chronic obstructive pulmonary disease) with emphysema (HCC) 10/2/2020    Depression     Dialysis patient ([facility])     T, TH, Sat with [facility]    Diastolic CHF (HCC)     Dysphagia 10/2/2020    GERD (gastroesophageal reflux disease)     Headache 1997    Hemodialysis patient ([facility])     High cholesterol     Hypertension     Hypothyroid     Iron deficiency anemia 10/2/2020    Mitral valve regurgitation 10/2/2020    Renal artery stenosis (HCC)     Seizure disorder (HCC)       Past Surgical History:   Procedure Laterality Date    CARDIAC PROCEDURE N/A 5/2/2024    Left and right heart cath / coronary angiography performed by Darlene Reeves MD at [facility] CARDIAC CATH LAB    CAROTID ENDARTERECTOMY      CHOLECYSTECTOMY      IR THORACENTESIS PLEURAL W IMAGING  11/24/2020    IR THORACENTESIS PLEURAL W IMAGING  11/25/2020    IR TUNNELED CATHETER PLACEMENT GREATER THAN 5 YEARS  4/29/2024    IR TUNNELED CATHETER PLACEMENT GREATER THAN 5 YEARS 4/29/2024 Janeen Lunsford PA [facility] RAD ANGIO IR    KIDNEY SURGERY      ROTATOR CUFF REPAIR Right     TUBAL LIGATION       Allergies   Allergen Reactions    Sulfa Antibiotics Anaphylaxis    Sulfamethoxazole-Trimethoprim      Other reaction(s): Unknown (comments)      Meds:  See below  Social History     Tobacco Use    Smoking status: Every Day     Current packs/day: 1.00     Average packs/day: 1 pack/day for 15.0 years (15.0 ttl pk-yrs)     Types: Cigarettes    Smokeless tobacco: Never    Tobacco comments:     Copd   Substance Use Topics    Alcohol use: Not Currently      Family History   Problem Relation Age of Onset    Melanoma Child     Melanoma Brother

## 2024-08-30 NOTE — H&P
(GLYCOLAX) packet 17 g, 17 g, Oral, Daily PRN, Dustin Almeida MD    acetaminophen (TYLENOL) tablet 650 mg, 650 mg, Oral, Q6H PRN **OR** acetaminophen (TYLENOL) suppository 650 mg, 650 mg, Rectal, Q6H PRN, Dustin Almeida MD    LAB DATA REVIEWED:    Recent Results (from the past 24 hour(s))   Cardiac procedure    Collection Time: 08/29/24  1:16 PM   Result Value Ref Range    Body Surface Area 1.47 m2           Xray Result (most recent):  No results found.     No orders to display        Review of Systems:  Constitutional: Negative for chills and fever.   HENT: Negative.    Eyes: Negative.    Respiratory: Negative.    Cardiovascular: Negative.    Gastrointestinal: Negative for abdominal pain and nausea.   Skin: Negative.    Neurological: Negative.      Objective:   VITALS:    Vitals:    08/29/24 1629   BP:    Pulse:    Resp: 15   Temp:    SpO2:        Physical Exam:   Constitutional: pt is oriented to person, place, and time.   HENT:   Head: Normocephalic and atraumatic.   Eyes: Pupils are equal, round, and reactive to light. EOM are normal.   Cardiovascular: Normal rate, regular rhythm and normal heart sounds.   Pulmonary/Chest: Breath sounds normal. No wheezes. No rales.   Exhibits no tenderness.   Abdominal: Soft. Bowel sounds are normal. There is no abdominal tenderness. There is no rebound and no guarding.   Musculoskeletal: Normal range of motion.   Neurological: pt is alert and oriented to person, place, and time. Alert. Normal strength. No cranial nerve deficit or sensory deficit. Displays a negative Romberg sign.        ASSESSMENT & PLAN:    CAD status post stent  COPD  End-stage renal disease on hemodialysis  Hypertension  Ongoing smoking  History of CVA in the past        Current Facility-Administered Medications:     traMADol (ULTRAM) tablet 50 mg, 50 mg, Oral, Q8H PRN, Kyle Yusuf MD    sevelamer (RENVELA) tablet 800 mg, 800 mg, Oral, TID WC, Jodi Navarro MD    albuterol sulfate HFA  (PROVENTIL;VENTOLIN;PROAIR) 108 (90 Base) MCG/ACT inhaler 2 puff, 2 puff, Inhalation, Q4H PRN, Dustin Almeida MD    amLODIPine (NORVASC) tablet 5 mg, 5 mg, Oral, BID, Dustin Almeida MD    aspirin EC tablet 81 mg, 81 mg, Oral, Daily, Dustin Almeida MD    atorvastatin (LIPITOR) tablet 40 mg, 40 mg, Oral, Nightly, Dustin Almeida MD    carvedilol (COREG) tablet 25 mg, 25 mg, Oral, BID, Dustin Almeida MD    clopidogrel (PLAVIX) tablet 75 mg, 75 mg, Oral, Daily, Dustin Almeida MD    budesonide-formoterol (SYMBICORT) 160-4.5 MCG/ACT inhaler 2 puff, 2 puff, Inhalation, BID RT **AND** [START ON 8/30/2024] tiotropium (SPIRIVA RESPIMAT) 2.5 MCG/ACT inhaler 2 puff, 2 puff, Inhalation, Daily RT, Dustin Almeida MD    hydrALAZINE (APRESOLINE) tablet 100 mg, 100 mg, Oral, BID, Dustin Almeida MD    losartan (COZAAR) tablet 50 mg, 50 mg, Oral, Daily, Dustin Almeida MD    nicotine (NICODERM CQ) 21 MG/24HR 1 patch, 1 patch, TransDERmal, Daily, Dustin Almeida MD    traZODone (DESYREL) tablet 100 mg, 100 mg, Oral, Nightly, Dustin Almeida MD    sodium chloride flush 0.9 % injection 5-40 mL, 5-40 mL, IntraVENous, 2 times per day, Dustin Almeida MD    sodium chloride flush 0.9 % injection 5-40 mL, 5-40 mL, IntraVENous, PRN, Dustin Almeida MD    0.9 % sodium chloride infusion, , IntraVENous, PRN, Dustin Almeida MD    ondansetron (ZOFRAN-ODT) disintegrating tablet 4 mg, 4 mg, Oral, Q8H PRN **OR** ondansetron (ZOFRAN) injection 4 mg, 4 mg, IntraVENous, Q6H PRN, Dustin Almeida MD    polyethylene glycol (GLYCOLAX) packet 17 g, 17 g, Oral, Daily PRN, Dustin Almeida MD    acetaminophen (TYLENOL) tablet 650 mg, 650 mg, Oral, Q6H PRN **OR** acetaminophen (TYLENOL) suppository 650 mg, 650 mg, Rectal, Q6H PRN, Dustin Almeida MD       ________________________________________________________________________    Signed: Dustin Almeida MD

## 2024-08-30 NOTE — PROGRESS NOTES
Comprehensive Nutrition Assessment    Type and Reason for Visit:  Initial, Positive Nutrition Screen    Nutrition Recommendations/Plan:   Continue current diet and supplement order, encourage PO intake  RD to monitor weight, po intake, labs, GI function, I/O     Malnutrition Assessment:  Malnutrition Status:  Severe malnutrition (08/30/24 1205)    Context:  Chronic Illness     Findings of the 6 clinical characteristics of malnutrition:  Energy Intake:  75% or less estimated energy requirements for 1 month or longer  Weight Loss:  Greater than 5% over 1 month     Body Fat Loss:  Unable to assess     Muscle Mass Loss:  Unable to assess    Fluid Accumulation:  Unable to assess     Strength:  Not Performed    Nutrition Assessment:    Pt screened for low BMI, underweight, MST 5 for weight loss and poor appetite. Pt admitted for CAD. Per chart review, pt has lost 6lb, 6% weight in past week. Of note, pt receiving HD treatments this admission and today had 1.5L fluid removal. Pt currently ordered a cardiac diet with renal ONS BID. Meds: lipitor, renvela. Labs: Na 134 L, K 3.4 L, BUN 28 H.    Nutrition Related Findings:    No PO intakes or BM documented yet this admission. Wound Type: None       Current Nutrition Intake & Therapies:    Average Meal Intake: Unable to assess  Average Supplements Intake: Unable to assess  ADULT ORAL NUTRITION SUPPLEMENT; Breakfast, Dinner; Renal Oral Supplement  ADULT DIET; Regular; Low Fat/Low Chol/High Fiber/2 gm Na; 1800 ml    Anthropometric Measures:  Height: 162.6 cm (5' 4\")  Ideal Body Weight (IBW): 120 lbs (55 kg)       Current Body Weight: 46.3 kg (102 lb), 85 % IBW. Weight Source: Bed Scale  Current BMI (kg/m2): 17.5        Weight Adjustment For: No Adjustment                 BMI Categories: Underweight (BMI less than 18.5)  Last Weight Metrics:      8/30/2024    11:48 AM 8/30/2024     4:47 AM 8/29/2024     7:52 AM 8/26/2024    10:41 AM 7/31/2024     9:15 AM 4/28/2024     7:21 PM  4/18/2024    11:09 AM   Weight Loss Metrics   Height 5' 4\"  5' 4\" 5' 4\" 5' 4.173\" 5' 4\" 5' 4\"   Weight - Scale  102 lbs 12 oz 105 lbs 108 lbs 6 oz 102 lbs 6 oz 98 lbs 104 lbs   BMI (Calculated)  17.7 kg/m2 18.1 kg/m2 18.6 kg/m2 17.5 kg/m2 16.9 kg/m2 17.9 kg/m2      Estimated Daily Nutrient Needs:  Energy Requirements Based On: Kcal/kg     Energy (kcal/day): 1380-1610kcals (30-35kcal/kg)  Weight Used for Protein Requirements: Current  Protein (g/day): 70-95g (1.5-2.0g/kg)  Method Used for Fluid Requirements: Standard Renal  Fluid (ml/day):      Nutrition Diagnosis:   Altered nutrition-related lab values related to renal dysfunction as evidenced by lab values    Nutrition Interventions:   Food and/or Nutrient Delivery: Continue Current Diet, Continue Oral Nutrition Supplement  Nutrition Education/Counseling: No recommendation at this time  Coordination of Nutrition Care: Continue to monitor while inpatient     Goals:     Goals: Meet at least 75% of estimated needs, by next RD assessment     Nutrition Monitoring and Evaluation:   Behavioral-Environmental Outcomes: None Identified  Food/Nutrient Intake Outcomes: Food and Nutrient Intake, Supplement Intake  Physical Signs/Symptoms Outcomes: Biochemical Data, GI Status, Weight, Fluid Status or Edema    Discharge Planning:    Continue Oral Nutrition Supplement, Continue current diet     Vale Springer RD  Contact: [facility]

## 2024-08-30 NOTE — CONSULTS
PULMONARY CONSULT  [facility]    Name: Junie Neal MRN: [id number]   : 1965 Hospital: [facility]   Date: 2024  Admission date: 2024 Hospital Day: 2       HPI:     Patient Active Problem List   Diagnosis    Hyponatremia    Mitral valve regurgitation    JOSE (acute kidney injury) (HCC)    CHF exacerbation (HCC)    Acquired hypothyroidism    Seizure disorder (HCC)    CHF (congestive heart failure) ([facility])    Syncope and collapse    Bilateral carotid artery stenosis    Renal artery stenosis (HCC)    Resistant hypertension    Acute respiratory failure (HCC)    Hypokalemia    COPD (chronic obstructive pulmonary disease) with emphysema (HCC)    Anxiety    Dysphagia    Respiratory failure with hypoxia (HCC)    Iron deficiency anemia    Encephalopathy    Metabolic encephalopathy    COPD (chronic obstructive pulmonary disease) ([facility])    CHF (congestive heart failure), NYHA class I, acute on chronic, combined (HCC)    CAD, multiple vessel    Coronary artery arteriosclerosis    CAD in native artery             [x] High complexity decision making was performed  [x] See my orders for details      Subjective/Initial History:     I was asked by Dustin Almeida MD to see Junie Neal  a 58 y.o.   female in consultation     Excerpts from admission 2024 or consult notes as follows:   58-year-old lady came in because of generalized weakness shortness of breath dyspnea she is on dialysis for the past 4 to 5 months history of COPD CVA she is a current everyday smoker, she is not on oxygen and inhalers at home according to her once she started on dialysis condition got better she had a history of coronary disease status post stent placement in the past.      Allergies   Allergen Reactions    Sulfa Antibiotics Anaphylaxis    Sulfamethoxazole-Trimethoprim      Other reaction(s): Unknown (comments)        MAR reviewed and pertinent medications noted or modified as needed     Current  08/30/24  0059   WBC 9.8   HGB 9.8*        Recent Labs     08/29/24  1923 08/30/24  0059   * 134*   K 3.9 3.4*    100   CO2 20* 20*   GLUCOSE 147* 75   BUN 29* 28*   CREATININE 5.29* 5.31*   CALCIUM 8.9 8.4*   BILITOT  --  0.2   AST  --  16   ALT  --  7*     No results for input(s): \"PH\", \"PCO2\", \"PO2\", \"HCO3\", \"FIO2\" in the last 72 hours.  No results for input(s): \"CKTOTAL\", \"CKMB\", \"CKMBINDEX\", \"TROPONINI\", \"BNP\", \"PROBNP\", \"NTPROBNP\" in the last 72 hours.  Lab Results   Component Value Date/Time    BNP 19,501 03/20/2022 05:41 PM    PROBNP >35,000 04/28/2024 07:40 PM      Lab Results   Component Value Date/Time    TSH 1.02 04/29/2024 10:04 AM       Results       ** No results found for the last 336 hours. **             Imaging:  No results found.     ARTERIAL BLOOD GAS  No results for input(s): \"PHART\", \"KZJ1BZS\", \"PO2ART\", \"BPP7CDW\", \"BEART\", \"SNK1YRWY\", \"HGBART\", \"BI9DREAUP\", \"FIO2A\", \"Z9AHPKBK\", \"OXYHEM\", \"CARBOXHGBART\", \"METHGBART\", \"X0JJQWVGL\", \"PHCORART\", \"TEMP\" in the last 72 hours.    Invalid input(s): \"WAR1UVTQO\"  No results found for this or any previous visit.    IMPRESSION:   Coronary artery disease status post stent placement  Chronic Obstructive Pulmonary Disease   End-stage renal disease on hemodialysis  Tobacco abuse  Pt is requiring Drug therapy requiring intensive monitoring for toxicity  Pt is unstable, unpredictable needing inpatient monitoring; is acutely ill and at high risk of sudden decline and decompensation with severe consequenses and continued end organ dysfunction and failure  Prognosis guarded       RECOMMENDATIONS/PLAN:     58-year-old lady came in because of generalized weakness patient is end-stage renal disease on hemodialysis history of COPD smoking she used to be on oxygen previously not using any inhalers anymore but she continues to smoke 1 she was started on dialysis her breathing improved  Will get chest x-ray  Continue with her dialysis  Supplemental O2

## 2024-08-30 NOTE — DIALYSIS
Pt willie 2.5 hour hemodialysis well.  1.5 liters net fluid removed.  Report given to Steph holman.  Abdulkadir in telemetry notified pt lesley to her room, box #27

## 2024-08-31 LAB — ECHO BSA: 1.47 M2

## 2024-08-31 NOTE — PROGRESS NOTES
Physician Progress Note      PATIENT:               ROGE ELLSWORTH  CSN #:                  [id number]  :                       1965  ADMIT DATE:       2024 6:59 AM  DISCH DATE:        2024 1:30 PM  RESPONDING  PROVIDER #:        Dustin Almeida MD          QUERY TEXT:    Good Morning    This patient admitted for CAD post x2 Stent .    On - RD was consulted.    If possible, please document in progress notes and discharge summary if you   are evaluating and /or treating any of the following:    The medical record reflects the following:  Risk Factors: ESRD on Dialysis, CAD,  Clinical Indicators: Per RD assessment on ---Pt screened for low BMI of   17.63---Weight loss and poor appetite. Pt has lost approx. 6bls or 6% weight   in past week. And documented 75% or greater of est. energy requirements for 1   month of longer.  Treatment: RD consulted, Weight, Supplements, encourage po , monitor weight,   PO intake labs, I&O    Thank you  Jess Khan, BSN,RN, CPHQ, CCDS, SMART  ___________________________________________  ASPEN Criteria:    https://aspenjournals.onlinelibrary.wh[place]field.com/doi/full/10.1177/856251680745796  5  Options provided:  -- Protein calorie malnutrition severe  -- Other - I will add my own diagnosis  -- Disagree - Not applicable / Not valid  -- Disagree - Clinically unable to determine / Unknown  -- Refer to Clinical Documentation Reviewer    PROVIDER RESPONSE TEXT:    This patient has severe protein calorie malnutrition.    Query created by: Jess Khan on 2024 12:23 PM      Electronically signed by:  Dustin Almeida MD 2024 10:19 PM

## 2024-09-06 ENCOUNTER — HOSPITAL ENCOUNTER (EMERGENCY)
Facility: HOSPITAL | Age: 59
Discharge: HOME | End: 2024-09-06
Attending: EMERGENCY MEDICINE
Payer: MEDICARE

## 2024-09-06 ENCOUNTER — APPOINTMENT (OUTPATIENT)
Facility: HOSPITAL | Age: 59
End: 2024-09-06
Payer: MEDICARE

## 2024-09-06 VITALS
SYSTOLIC BLOOD PRESSURE: 124 MMHG | OXYGEN SATURATION: 95 % | BODY MASS INDEX: 18.1 KG/M2 | TEMPERATURE: 98.4 F | HEART RATE: 75 BPM | HEIGHT: 64 IN | WEIGHT: 106 LBS | DIASTOLIC BLOOD PRESSURE: 47 MMHG | RESPIRATION RATE: 18 BRPM

## 2024-09-06 DIAGNOSIS — M54.6 CHRONIC THORACIC BACK PAIN, UNSPECIFIED BACK PAIN LATERALITY: ICD-10-CM

## 2024-09-06 DIAGNOSIS — G89.29 CHRONIC THORACIC BACK PAIN, UNSPECIFIED BACK PAIN LATERALITY: ICD-10-CM

## 2024-09-06 DIAGNOSIS — R79.89 ELEVATED TROPONIN: ICD-10-CM

## 2024-09-06 DIAGNOSIS — R06.02 SHORTNESS OF BREATH: Primary | ICD-10-CM

## 2024-09-06 DIAGNOSIS — N18.6 ESRD ON DIALYSIS (HCC): ICD-10-CM

## 2024-09-06 DIAGNOSIS — Z99.2 ESRD ON DIALYSIS (HCC): ICD-10-CM

## 2024-09-06 LAB
ALBUMIN SERPL-MCNC: 3.7 G/DL (ref 3.5–5)
ALBUMIN/GLOB SERPL: 1.1 (ref 1.1–2.2)
ALP SERPL-CCNC: 96 U/L (ref 45–117)
ALT SERPL-CCNC: 9 U/L (ref 12–78)
ANION GAP SERPL CALC-SCNC: 15 MMOL/L (ref 2–12)
AST SERPL W P-5'-P-CCNC: 12 U/L (ref 15–37)
BASOPHILS # BLD: 0 K/UL (ref 0–0.1)
BASOPHILS NFR BLD: 0 % (ref 0–1)
BILIRUB SERPL-MCNC: 0.5 MG/DL (ref 0.2–1)
BNP SERPL-MCNC: 5009 PG/ML
BUN SERPL-MCNC: 37 MG/DL (ref 6–20)
BUN/CREAT SERPL: 5 (ref 12–20)
CA-I BLD-MCNC: 8.8 MG/DL (ref 8.5–10.1)
CHLORIDE SERPL-SCNC: 97 MMOL/L (ref 97–108)
CO2 SERPL-SCNC: 20 MMOL/L (ref 21–32)
CREAT SERPL-MCNC: 7.25 MG/DL (ref 0.55–1.02)
DIFFERENTIAL METHOD BLD: ABNORMAL
EKG ATRIAL RATE: 97 BPM
EKG DIAGNOSIS: NORMAL
EKG P AXIS: 75 DEGREES
EKG P-R INTERVAL: 136 MS
EKG Q-T INTERVAL: 380 MS
EKG QRS DURATION: 86 MS
EKG QTC CALCULATION (BAZETT): 482 MS
EKG R AXIS: 54 DEGREES
EKG T AXIS: 76 DEGREES
EKG VENTRICULAR RATE: 97 BPM
EOSINOPHIL # BLD: 0.4 K/UL (ref 0–0.4)
EOSINOPHIL NFR BLD: 4 % (ref 0–7)
ERYTHROCYTE [DISTWIDTH] IN BLOOD BY AUTOMATED COUNT: 14.6 % (ref 11.5–14.5)
GLOBULIN SER CALC-MCNC: 3.4 G/DL (ref 2–4)
GLUCOSE SERPL-MCNC: 101 MG/DL (ref 65–100)
HBV SURFACE AG SER QL: <0.1 INDEX
HBV SURFACE AG SER QL: NEGATIVE
HCT VFR BLD AUTO: 32.3 % (ref 35–47)
HGB BLD-MCNC: 10.8 G/DL (ref 11.5–16)
IMM GRANULOCYTES # BLD AUTO: 0 K/UL (ref 0–0.04)
IMM GRANULOCYTES NFR BLD AUTO: 0 % (ref 0–0.5)
LYMPHOCYTES # BLD: 1.3 K/UL (ref 0.8–3.5)
LYMPHOCYTES NFR BLD: 14 % (ref 12–49)
MAGNESIUM SERPL-MCNC: 2.3 MG/DL (ref 1.6–2.4)
MCH RBC QN AUTO: 34 PG (ref 26–34)
MCHC RBC AUTO-ENTMCNC: 33.4 G/DL (ref 30–36.5)
MCV RBC AUTO: 101.6 FL (ref 80–99)
MONOCYTES # BLD: 0.9 K/UL (ref 0–1)
MONOCYTES NFR BLD: 9 % (ref 5–13)
NEUTS SEG # BLD: 7.1 K/UL (ref 1.8–8)
NEUTS SEG NFR BLD: 73 % (ref 32–75)
NRBC # BLD: 0 K/UL (ref 0–0.01)
NRBC BLD-RTO: 0 PER 100 WBC
PLATELET # BLD AUTO: 339 K/UL (ref 150–400)
PMV BLD AUTO: 9.9 FL (ref 8.9–12.9)
POTASSIUM SERPL-SCNC: 4.1 MMOL/L (ref 3.5–5.1)
PROT SERPL-MCNC: 7.1 G/DL (ref 6.4–8.2)
RBC # BLD AUTO: 3.18 M/UL (ref 3.8–5.2)
SARS-COV-2 RNA RESP QL NAA+PROBE: NOT DETECTED
SODIUM SERPL-SCNC: 132 MMOL/L (ref 136–145)
SPECIMEN SOURCE: NORMAL
TROPONIN I SERPL HS-MCNC: 287 NG/L (ref 0–51)
WBC # BLD AUTO: 9.7 K/UL (ref 3.6–11)

## 2024-09-06 PROCEDURE — 96374 THER/PROPH/DIAG INJ IV PUSH: CPT

## 2024-09-06 PROCEDURE — 6360000002 HC RX W HCPCS: Performed by: EMERGENCY MEDICINE

## 2024-09-06 PROCEDURE — 87340 HEPATITIS B SURFACE AG IA: CPT

## 2024-09-06 PROCEDURE — 99285 EMERGENCY DEPT VISIT HI MDM: CPT

## 2024-09-06 PROCEDURE — 96376 TX/PRO/DX INJ SAME DRUG ADON: CPT

## 2024-09-06 PROCEDURE — 36415 COLL VENOUS BLD VENIPUNCTURE: CPT

## 2024-09-06 PROCEDURE — 85025 COMPLETE CBC W/AUTO DIFF WBC: CPT

## 2024-09-06 PROCEDURE — 71045 X-RAY EXAM CHEST 1 VIEW: CPT

## 2024-09-06 PROCEDURE — 93005 ELECTROCARDIOGRAM TRACING: CPT | Performed by: EMERGENCY MEDICINE

## 2024-09-06 PROCEDURE — 83880 ASSAY OF NATRIURETIC PEPTIDE: CPT

## 2024-09-06 PROCEDURE — 87635 SARS-COV-2 COVID-19 AMP PRB: CPT

## 2024-09-06 PROCEDURE — 80053 COMPREHEN METABOLIC PANEL: CPT

## 2024-09-06 PROCEDURE — 83735 ASSAY OF MAGNESIUM: CPT

## 2024-09-06 PROCEDURE — 6370000000 HC RX 637 (ALT 250 FOR IP): Performed by: EMERGENCY MEDICINE

## 2024-09-06 PROCEDURE — G0257 UNSCHED DIALYSIS ESRD PT HOS: HCPCS

## 2024-09-06 PROCEDURE — 84484 ASSAY OF TROPONIN QUANT: CPT

## 2024-09-06 RX ORDER — OXYCODONE AND ACETAMINOPHEN 5; 325 MG/1; MG/1
1 TABLET ORAL
Status: COMPLETED | OUTPATIENT
Start: 2024-09-06 | End: 2024-09-06

## 2024-09-06 RX ORDER — OXYCODONE AND ACETAMINOPHEN 5; 325 MG/1; MG/1
1 TABLET ORAL EVERY 6 HOURS PRN
Qty: 20 TABLET | Refills: 0 | Status: SHIPPED | OUTPATIENT
Start: 2024-09-06 | End: 2024-09-11

## 2024-09-06 RX ADMIN — HYDROMORPHONE HYDROCHLORIDE 1 MG: 1 INJECTION, SOLUTION INTRAMUSCULAR; INTRAVENOUS; SUBCUTANEOUS at 12:06

## 2024-09-06 RX ADMIN — OXYCODONE HYDROCHLORIDE AND ACETAMINOPHEN 1 TABLET: 5; 325 TABLET ORAL at 15:50

## 2024-09-06 RX ADMIN — HYDROMORPHONE HYDROCHLORIDE 1 MG: 1 INJECTION, SOLUTION INTRAMUSCULAR; INTRAVENOUS; SUBCUTANEOUS at 15:50

## 2024-09-06 ASSESSMENT — PAIN SCALES - GENERAL
PAINLEVEL_OUTOF10: 0
PAINLEVEL_OUTOF10: 10
PAINLEVEL_OUTOF10: 10

## 2024-09-06 ASSESSMENT — PAIN - FUNCTIONAL ASSESSMENT
PAIN_FUNCTIONAL_ASSESSMENT: 0-10
PAIN_FUNCTIONAL_ASSESSMENT: 0-10

## 2024-09-06 ASSESSMENT — LIFESTYLE VARIABLES
HOW OFTEN DO YOU HAVE A DRINK CONTAINING ALCOHOL: NEVER
HOW MANY STANDARD DRINKS CONTAINING ALCOHOL DO YOU HAVE ON A TYPICAL DAY: PATIENT DOES NOT DRINK

## 2024-09-06 NOTE — ED PROVIDER NOTES
intervention performed by Nic Reyes MD at [facility] CARDIAC CATH LAB    CARDIAC PROCEDURE N/A 2024    Intravascular lithotripsy coronary performed by Nic Reyes MD at [facility] CARDIAC CATH LAB    CARDIAC PROCEDURE N/A 2024    Insert stent basia coronary performed by Nic Reyes MD at [facility] CARDIAC CATH LAB    CARDIAC PROCEDURE N/A 2024    Angioplasty coronary performed by Nic Reyes MD at [facility] CARDIAC CATH LAB    CARDIAC PROCEDURE N/A 2024    Atherectomy coronary performed by Nic Reyes MD at [facility] CARDIAC CATH LAB    CAROTID ENDARTERECTOMY      CHOLECYSTECTOMY      IR THORACENTESIS PLEURAL W IMAGING  2020    IR THORACENTESIS PLEURAL W IMAGING  2020    IR TUNNELED CATHETER PLACEMENT GREATER THAN 5 YEARS  2024    IR TUNNELED CATHETER PLACEMENT GREATER THAN 5 YEARS 2024 Janeen Lunsford PA [facility] RAD ANGIO IR    KIDNEY SURGERY      ROTATOR CUFF REPAIR Right     TUBAL LIGATION         Family History:  Family History   Problem Relation Age of Onset    Melanoma Child     Melanoma Brother     Heart Failure Brother             Asthma Brother     Stroke Father     Unknown Father     Stroke Mother             Melanoma Mother     Hypertension Mother     Cancer Mother     Diabetes Mother     Heart Failure Mother     Asthma Mother        Social History:  Social History     Tobacco Use    Smoking status: Every Day     Current packs/day: 1.00     Average packs/day: 1 pack/day for 15.0 years (15.0 ttl pk-yrs)     Types: Cigarettes    Smokeless tobacco: Never    Tobacco comments:     Copd   Vaping Use    Vaping status: Never Used   Substance Use Topics    Alcohol use: Not Currently    Drug use: Not Currently       Allergies:  Allergies   Allergen Reactions    Sulfa Antibiotics Anaphylaxis    Sulfamethoxazole-Trimethoprim      Other reaction(s): Unknown (comments)       PCP: Dustin Almeida MD    Current Meds:   Current Facility-Administered Medications   Medication  TIME   Patient does not meet Critical Care Time, 0 minutes    ED IMPRESSION     1. Shortness of breath    2. ESRD on dialysis (HCC)    3. Elevated troponin    4. Chronic thoracic back pain, unspecified back pain laterality          DISPOSITION/PLAN   DISPOSITION Decision To Discharge 09/06/2024 03:27:28 PM  Condition at Disposition: Good    Discharge Note: The patient is stable for discharge home. The signs, symptoms, diagnosis, and discharge instructions have been discussed, understanding conveyed, and agreed upon. The patient is to follow up as recommended or return to ER should their symptoms worsen.      PATIENT REFERRED TO:  Kandace Hughes MD  [address]  [address] [address] [phone]    Schedule an appointment as soon as possible for a visit       Dustin Almeida MD  [address] [facility] [address] [phone]    Schedule an appointment as soon as possible for a visit           DISCHARGE MEDICATIONS:     Medication List        START taking these medications      oxyCODONE-acetaminophen 5-325 MG per tablet  Commonly known as: Percocet  Take 1 tablet by mouth every 6 hours as needed for Pain for up to 5 days. Intended supply: 5 days. Take lowest dose possible to manage pain Max Daily Amount: 4 tablets            ASK your doctor about these medications      albuterol sulfate  (90 Base) MCG/ACT inhaler  Commonly known as: PROVENTIL;VENTOLIN;PROAIR     amLODIPine 5 MG tablet  Commonly known as: NORVASC  Take 1 tablet by mouth in the morning and at bedtime     aspirin 81 MG EC tablet  Take 1 tablet by mouth daily     atorvastatin 40 MG tablet  Commonly known as: LIPITOR  Take 1 tablet by mouth nightly     carvedilol 25 MG tablet  Commonly known as: COREG  Take 1 tablet by mouth 2 times daily     clopidogrel 75 MG tablet  Commonly known as: PLAVIX     hydrALAZINE 100 MG tablet  Commonly known as: APRESOLINE  Take 1 tablet by mouth in the morning and at

## 2024-09-06 NOTE — DISCHARGE INSTRUCTIONS
Thank you for choosing our Emergency Department for your care.  It is our privilege to care for you in your time of need.  In the next several days, you may receive a survey via email or mailed to your home about your experience with our team.  We would greatly appreciate you taking a few minutes to complete the survey, as we use this information to learn what we have done well and what we could be doing better. Thank you for trusting us with your care!    Below you will find a list of your tests from today's visit.   Labs  Recent Results (from the past 12 hour(s))   EKG 12 Lead    Collection Time: 09/06/24 10:58 AM   Result Value Ref Range    Ventricular Rate 97 BPM    Atrial Rate 97 BPM    P-R Interval 136 ms    QRS Duration 86 ms    Q-T Interval 380 ms    QTc Calculation (Bazett) 482 ms    P Axis 75 degrees    R Axis 54 degrees    T Axis 76 degrees    Diagnosis       Normal sinus rhythm  Possible Left atrial enlargement  Septal infarct , age undetermined  Abnormal ECG  When compared with ECG of 02-MAY-2024 18:13,  Septal infarct is now Present  Nonspecific T wave abnormality now evident in Anterior leads  Nonspecific T wave abnormality no longer evident in Lateral leads  Confirmed by JINA SPARROW [facility] ([id number]) on 9/6/2024 1:14:36 PM     CBC with Auto Differential    Collection Time: 09/06/24 11:15 AM   Result Value Ref Range    WBC 9.7 3.6 - 11.0 K/uL    RBC 3.18 (L) 3.80 - 5.20 M/uL    Hemoglobin 10.8 (L) 11.5 - 16.0 g/dL    Hematocrit 32.3 (L) 35.0 - 47.0 %    .6 (H) 80.0 - 99.0 FL    MCH 34.0 26.0 - 34.0 PG    MCHC 33.4 30.0 - 36.5 g/dL    RDW 14.6 (H) 11.5 - 14.5 %    Platelets 339 150 - 400 K/uL    MPV 9.9 8.9 - 12.9 FL    Nucleated RBCs 0.0 0.0  WBC    nRBC 0.00 0.00 - 0.01 K/uL    Neutrophils % 73 32 - 75 %    Lymphocytes % 14 12 - 49 %    Monocytes % 9 5 - 13 %    Eosinophils % 4 0 - 7 %    Basophils % 0 0 - 1 %    Immature Granulocytes % 0 0 - 0.5 %    Neutrophils Absolute 7.1 1.8 - 8.0

## 2024-09-06 NOTE — ED TRIAGE NOTES
Present pain to left lower back, headache, loss pf appetite. hx of dialysis. Last dialyzed on Tuesday. Denies initial chief c/o of low HR. Sob on exertion. Recent heart cath with two stents placed on 8/29.  Nephro- Ramon.  Goes to \"[facility]\"

## 2024-09-06 NOTE — DIALYSIS
Tx completed via a patent right upper chest catheter. Dressing changed per protocol. No s/s of infection or skin breakdown noted. Net fluid removal is 1kg and 2 hours. Report given to Manohar MARTINEZ

## 2024-09-20 ENCOUNTER — OFFICE VISIT (OUTPATIENT)
Age: 59
End: 2024-09-20
Payer: MEDICARE

## 2024-09-20 VITALS
HEIGHT: 64 IN | WEIGHT: 107.5 LBS | RESPIRATION RATE: 16 BRPM | BODY MASS INDEX: 18.35 KG/M2 | HEART RATE: 95 BPM | TEMPERATURE: 97.8 F | SYSTOLIC BLOOD PRESSURE: 137 MMHG | DIASTOLIC BLOOD PRESSURE: 75 MMHG

## 2024-09-20 DIAGNOSIS — M54.6 ACUTE THORACIC BACK PAIN, UNSPECIFIED BACK PAIN LATERALITY: Primary | ICD-10-CM

## 2024-09-20 DIAGNOSIS — I65.23 BILATERAL CAROTID ARTERY STENOSIS: ICD-10-CM

## 2024-09-20 PROCEDURE — 4004F PT TOBACCO SCREEN RCVD TLK: CPT | Performed by: SURGERY

## 2024-09-20 PROCEDURE — G8419 CALC BMI OUT NRM PARAM NOF/U: HCPCS | Performed by: SURGERY

## 2024-09-20 PROCEDURE — 3017F COLORECTAL CA SCREEN DOC REV: CPT | Performed by: SURGERY

## 2024-09-20 PROCEDURE — 1111F DSCHRG MED/CURRENT MED MERGE: CPT | Performed by: SURGERY

## 2024-09-20 PROCEDURE — 3078F DIAST BP <80 MM HG: CPT | Performed by: SURGERY

## 2024-09-20 PROCEDURE — 99212 OFFICE O/P EST SF 10 MIN: CPT | Performed by: SURGERY

## 2024-09-20 PROCEDURE — 3075F SYST BP GE 130 - 139MM HG: CPT | Performed by: SURGERY

## 2024-09-20 PROCEDURE — G8427 DOCREV CUR MEDS BY ELIG CLIN: HCPCS | Performed by: SURGERY

## 2024-09-20 RX ORDER — OXYCODONE AND ACETAMINOPHEN 5; 325 MG/1; MG/1
1 TABLET ORAL EVERY 6 HOURS PRN
Qty: 28 TABLET | Refills: 0 | Status: SHIPPED | OUTPATIENT
Start: 2024-09-20 | End: 2024-09-27

## 2024-09-20 ASSESSMENT — PATIENT HEALTH QUESTIONNAIRE - PHQ9
SUM OF ALL RESPONSES TO PHQ QUESTIONS 1-9: 0
SUM OF ALL RESPONSES TO PHQ9 QUESTIONS 1 & 2: 0
2. FEELING DOWN, DEPRESSED OR HOPELESS: NOT AT ALL
SUM OF ALL RESPONSES TO PHQ QUESTIONS 1-9: 0
SUM OF ALL RESPONSES TO PHQ QUESTIONS 1-9: 0
1. LITTLE INTEREST OR PLEASURE IN DOING THINGS: NOT AT ALL
SUM OF ALL RESPONSES TO PHQ QUESTIONS 1-9: 0

## 2024-09-26 PROBLEM — M54.6 ACUTE THORACIC BACK PAIN: Status: ACTIVE | Noted: 2024-09-26

## 2024-10-10 ENCOUNTER — HOSPITAL ENCOUNTER (OUTPATIENT)
Facility: HOSPITAL | Age: 59
Discharge: HOME | End: 2024-10-13
Attending: SURGERY
Payer: MEDICARE

## 2024-10-10 DIAGNOSIS — M54.6 ACUTE THORACIC BACK PAIN, UNSPECIFIED BACK PAIN LATERALITY: ICD-10-CM

## 2024-10-10 PROCEDURE — 72125 CT NECK SPINE W/O DYE: CPT

## 2024-10-10 PROCEDURE — 72131 CT LUMBAR SPINE W/O DYE: CPT

## 2024-10-10 PROCEDURE — 72128 CT CHEST SPINE W/O DYE: CPT

## 2024-10-11 ENCOUNTER — TELEPHONE (OUTPATIENT)
Age: 59
End: 2024-10-11

## 2024-10-11 NOTE — TELEPHONE ENCOUNTER
Patient called this afternoon requesting a refill on percocet or something stronger because she is in pain the pain is coming from her back please advise. Call back number is [phone]

## 2024-10-15 ENCOUNTER — TELEPHONE (OUTPATIENT)
Age: 59
End: 2024-10-15

## 2024-10-15 NOTE — TELEPHONE ENCOUNTER
I called and spoke with Junie Neal 2 patient identifiers used. I informed the patient that when she was seen on 09/20/2024  wrote in his note \" Patient was provided with pain medication for severe back pain. Patient was informed judicious use of this medication, other alternative pain medication discussed and the tapering this medication of for short-term use for severe back pain\".     I informed the patient she would need to wait until her follow up appointment with  on 11/01/2024 to discuss the pain medications. I also informed her that a message was sent to  last week and he did not refill the medication.

## 2024-10-30 DIAGNOSIS — M54.50 LOW BACK PAIN, UNSPECIFIED BACK PAIN LATERALITY, UNSPECIFIED CHRONICITY, UNSPECIFIED WHETHER SCIATICA PRESENT: Primary | ICD-10-CM

## 2024-11-01 ENCOUNTER — OFFICE VISIT (OUTPATIENT)
Age: 59
End: 2024-11-01
Payer: MEDICARE

## 2024-11-01 VITALS
OXYGEN SATURATION: 95 % | HEIGHT: 64 IN | WEIGHT: 105 LBS | HEART RATE: 85 BPM | TEMPERATURE: 98.6 F | BODY MASS INDEX: 17.93 KG/M2 | DIASTOLIC BLOOD PRESSURE: 74 MMHG | SYSTOLIC BLOOD PRESSURE: 111 MMHG

## 2024-11-01 DIAGNOSIS — M54.9 BACK PAIN, UNSPECIFIED BACK LOCATION, UNSPECIFIED BACK PAIN LATERALITY, UNSPECIFIED CHRONICITY: Primary | ICD-10-CM

## 2024-11-01 DIAGNOSIS — R91.1 LUNG NODULE: ICD-10-CM

## 2024-11-01 PROCEDURE — G8427 DOCREV CUR MEDS BY ELIG CLIN: HCPCS | Performed by: SURGERY

## 2024-11-01 PROCEDURE — G8419 CALC BMI OUT NRM PARAM NOF/U: HCPCS | Performed by: SURGERY

## 2024-11-01 PROCEDURE — 3017F COLORECTAL CA SCREEN DOC REV: CPT | Performed by: SURGERY

## 2024-11-01 PROCEDURE — G8484 FLU IMMUNIZE NO ADMIN: HCPCS | Performed by: SURGERY

## 2024-11-01 PROCEDURE — 3078F DIAST BP <80 MM HG: CPT | Performed by: SURGERY

## 2024-11-01 PROCEDURE — 4004F PT TOBACCO SCREEN RCVD TLK: CPT | Performed by: SURGERY

## 2024-11-01 PROCEDURE — 3074F SYST BP LT 130 MM HG: CPT | Performed by: SURGERY

## 2024-11-01 PROCEDURE — 99212 OFFICE O/P EST SF 10 MIN: CPT | Performed by: SURGERY

## 2024-11-01 RX ORDER — OXYCODONE AND ACETAMINOPHEN 5; 325 MG/1; MG/1
1 TABLET ORAL EVERY 6 HOURS PRN
Qty: 28 TABLET | Refills: 0 | Status: SHIPPED | OUTPATIENT
Start: 2024-11-01 | End: 2024-11-08

## 2024-11-01 RX ORDER — IBUPROFEN 800 MG/1
800 TABLET, FILM COATED ORAL 2 TIMES DAILY PRN
Qty: 60 TABLET | Refills: 5 | Status: SHIPPED | OUTPATIENT
Start: 2024-11-01

## 2024-11-01 ASSESSMENT — PATIENT HEALTH QUESTIONNAIRE - PHQ9
SUM OF ALL RESPONSES TO PHQ QUESTIONS 1-9: 0
2. FEELING DOWN, DEPRESSED OR HOPELESS: NOT AT ALL
SUM OF ALL RESPONSES TO PHQ9 QUESTIONS 1 & 2: 0
SUM OF ALL RESPONSES TO PHQ QUESTIONS 1-9: 0
1. LITTLE INTEREST OR PLEASURE IN DOING THINGS: NOT AT ALL
SUM OF ALL RESPONSES TO PHQ QUESTIONS 1-9: 0
SUM OF ALL RESPONSES TO PHQ QUESTIONS 1-9: 0

## 2024-11-04 ENCOUNTER — TELEPHONE (OUTPATIENT)
Age: 59
End: 2024-11-04

## 2024-11-04 NOTE — TELEPHONE ENCOUNTER
I spoke with  he would like for the patient to see a Spine Specialist. Referral was placed in the system. Will wait for  to sign off on his note.

## 2024-11-04 NOTE — PROGRESS NOTES
Identified pt with two pt identifiers (name and ). Reviewed chart in preparation for visit and have obtained necessary documentation.    Junie Neal is a 59 y.o. female  Chief Complaint   Patient presents with    Other     Scan Results     /74 (Site: Left Upper Arm, Position: Sitting, Cuff Size: Medium Adult)   Pulse 85   Temp 98.6 °F (37 °C) (Oral)   Ht 1.626 m (5' 4\")   Wt 47.6 kg (105 lb)   SpO2 95%   BMI 18.02 kg/m²     1. Have you been to the ER, urgent care clinic since your last visit?  Hospitalized since your last visit?no    2. Have you seen or consulted any other health care providers outside of the [facility] since your last visit?  Include any pap smears or colon screening. no   
   CHF exacerbation (HCC)    Acquired hypothyroidism    Seizure disorder (HCC)    CHF (congestive heart failure) (HCC)    Syncope and collapse    Bilateral carotid artery stenosis    Renal artery stenosis (HCC)    Resistant hypertension    Acute respiratory failure    Hypokalemia    COPD (chronic obstructive pulmonary disease) with emphysema (HCC)    Anxiety    Dysphagia    Respiratory failure with hypoxia    Iron deficiency anemia    Encephalopathy    Metabolic encephalopathy    COPD (chronic obstructive pulmonary disease) (HCC)    CHF (congestive heart failure), NYHA class I, acute on chronic, combined (HCC)    CAD, multiple vessel    Coronary artery arteriosclerosis    CAD in native artery    Acute thoracic back pain       Plans: CT scan shows a T1 compression fracture, also T4 T6, L2 L4 chronic compression fracture as well.  Patient will need a referral to a spine surgeon.  Also patient was found to have a left upper lobe 11 mm nodule noted.  Will make appropriate referral for pulmonary service for this.  Patient will be scheduled for carotid duplex ultrasound 6 months.      **Note: This brendan is pertinent to patient with PAD.    Vascular risk factor modification regimen:  5 regimens include: Optimal cholesterol control, exercise program, medication modifications including antiplatelet therapy and antihypertensives, optimal blood pressure control, and optimal hydration.  6 regimens include: 5 regimens plus tobacco cessation.  7 regimens include: 5 regimens plus strict diabetic control.  8 regimens include: 6 plus 7 regimens    Samm Loja MD

## 2024-11-04 NOTE — TELEPHONE ENCOUNTER
Patient called this morning said that  wants her to see other specialists but she does not remember that names of the doctors please advise. Call back number is [phone].

## 2024-12-14 ENCOUNTER — HOSPITAL ENCOUNTER (OUTPATIENT)
Facility: HOSPITAL | Age: 59
Discharge: HOME | End: 2024-12-17
Attending: INTERNAL MEDICINE
Payer: COMMERCIAL

## 2024-12-14 DIAGNOSIS — J44.9 CHRONIC OBSTRUCTIVE PULMONARY DISEASE, UNSPECIFIED COPD TYPE (HCC): ICD-10-CM

## 2024-12-14 DIAGNOSIS — R91.1 LUNG NODULE: ICD-10-CM

## 2024-12-14 PROCEDURE — A9609 HC RX DIAGNOSTIC RADIOPHARMACEUTICAL: HCPCS | Performed by: INTERNAL MEDICINE

## 2024-12-14 PROCEDURE — 78815 PET IMAGE W/CT SKULL-THIGH: CPT

## 2024-12-14 PROCEDURE — 3430000000 HC RX DIAGNOSTIC RADIOPHARMACEUTICAL: Performed by: INTERNAL MEDICINE

## 2024-12-14 RX ORDER — FLUDEOXYGLUCOSE F-18 500 MCI/ML
10 INJECTION INTRAVENOUS
Status: COMPLETED | OUTPATIENT
Start: 2024-12-14 | End: 2024-12-14

## 2024-12-14 RX ADMIN — FLUDEOXYGLUCOSE F-18 11.7 MILLICURIE: 500 INJECTION INTRAVENOUS at 15:38

## 2025-04-25 ENCOUNTER — HOSPITAL ENCOUNTER (OUTPATIENT)
Facility: HOSPITAL | Age: 60
Discharge: HOME | End: 2025-04-27
Attending: SURGERY
Payer: MEDICARE

## 2025-04-25 DIAGNOSIS — M54.9 BACK PAIN, UNSPECIFIED BACK LOCATION, UNSPECIFIED BACK PAIN LATERALITY, UNSPECIFIED CHRONICITY: ICD-10-CM

## 2025-04-25 PROCEDURE — 93880 EXTRACRANIAL BILAT STUDY: CPT

## 2025-04-27 LAB
VAS LEFT CCA DIST EDV: 24.6 CM/S
VAS LEFT CCA DIST PSV: 81.5 CM/S
VAS LEFT CCA PROX EDV: 19 CM/S
VAS LEFT CCA PROX PSV: 90 CM/S
VAS LEFT ECA EDV: 3 CM/S
VAS LEFT ECA PSV: 149 CM/S
VAS LEFT ICA DIST EDV: 30.5 CM/S
VAS LEFT ICA DIST PSV: 94.9 CM/S
VAS LEFT ICA PROX EDV: 30.5 CM/S
VAS LEFT ICA PROX PSV: 94.9 CM/S
VAS LEFT ICA/CCA PSV: 1.16
VAS LEFT SUBCLAVIAN PROX EDV: 7.7 CM/S
VAS LEFT SUBCLAVIAN PROX PSV: 107 CM/S
VAS LEFT VERTEBRAL EDV: 12 CM/S
VAS LEFT VERTEBRAL PSV: 55.8 CM/S
VAS RIGHT CCA DIST EDV: 10.9 CM/S
VAS RIGHT CCA DIST PSV: 62.5 CM/S
VAS RIGHT CCA PROX EDV: 16.3 CM/S
VAS RIGHT CCA PROX PSV: 100 CM/S
VAS RIGHT ECA EDV: 5.11 CM/S
VAS RIGHT ECA PSV: 110 CM/S
VAS RIGHT ICA DIST EDV: 22.4 CM/S
VAS RIGHT ICA DIST PSV: 70.2 CM/S
VAS RIGHT ICA PROX EDV: 29.6 CM/S
VAS RIGHT ICA PROX PSV: 136 CM/S
VAS RIGHT ICA/CCA PSV: 2.18
VAS RIGHT SUBCLAVIAN PROX EDV: 0 CM/S
VAS RIGHT SUBCLAVIAN PROX PSV: 101 CM/S
VAS RIGHT VERTEBRAL EDV: 10.7 CM/S
VAS RIGHT VERTEBRAL PSV: 45.3 CM/S

## 2025-04-27 PROCEDURE — 93880 EXTRACRANIAL BILAT STUDY: CPT | Performed by: SURGERY

## 2025-05-11 PROBLEM — M54.9 BACK PAIN: Status: ACTIVE | Noted: 2025-05-11

## 2025-05-11 PROBLEM — I77.9 CAROTID ARTERY DISEASE: Status: ACTIVE | Noted: 2025-05-11

## 2025-06-10 RX ORDER — IBUPROFEN 800 MG/1
800 TABLET, FILM COATED ORAL 2 TIMES DAILY PRN
Qty: 60 TABLET | Refills: 5 | OUTPATIENT
Start: 2025-06-10

## 2025-06-16 ENCOUNTER — OFFICE VISIT (OUTPATIENT)
Age: 60
End: 2025-06-16
Payer: MEDICARE

## 2025-06-16 VITALS
HEIGHT: 64 IN | OXYGEN SATURATION: 95 % | BODY MASS INDEX: 18.78 KG/M2 | WEIGHT: 110 LBS | RESPIRATION RATE: 16 BRPM | SYSTOLIC BLOOD PRESSURE: 137 MMHG | HEART RATE: 76 BPM | DIASTOLIC BLOOD PRESSURE: 78 MMHG | TEMPERATURE: 98.1 F

## 2025-06-16 DIAGNOSIS — I65.23 BILATERAL CAROTID ARTERY STENOSIS: ICD-10-CM

## 2025-06-16 DIAGNOSIS — E78.2 MODERATE MIXED HYPERLIPIDEMIA NOT REQUIRING STATIN THERAPY: Primary | ICD-10-CM

## 2025-06-16 DIAGNOSIS — E78.2 MODERATE MIXED HYPERLIPIDEMIA NOT REQUIRING STATIN THERAPY: ICD-10-CM

## 2025-06-16 PROCEDURE — 3075F SYST BP GE 130 - 139MM HG: CPT | Performed by: SURGERY

## 2025-06-16 PROCEDURE — 99212 OFFICE O/P EST SF 10 MIN: CPT | Performed by: SURGERY

## 2025-06-16 PROCEDURE — 3078F DIAST BP <80 MM HG: CPT | Performed by: SURGERY

## 2025-06-16 RX ORDER — LORAZEPAM 0.5 MG/1
0.5 TABLET ORAL PRN
COMMUNITY
Start: 2025-05-28

## 2025-06-16 RX ORDER — FEXOFENADINE HYDROCHLORIDE 180 MG/1
180 TABLET, FILM COATED ORAL DAILY
COMMUNITY
Start: 2025-04-28

## 2025-06-16 ASSESSMENT — PATIENT HEALTH QUESTIONNAIRE - PHQ9
1. LITTLE INTEREST OR PLEASURE IN DOING THINGS: NOT AT ALL
SUM OF ALL RESPONSES TO PHQ QUESTIONS 1-9: 0
2. FEELING DOWN, DEPRESSED OR HOPELESS: NOT AT ALL

## 2025-06-16 NOTE — PROGRESS NOTES
Identified pt with two pt identifiers (name and ). Reviewed chart in preparation for visit and have obtained necessary documentation.    Junie Neal is a 59 y.o. female  Chief Complaint   Patient presents with    Follow-up     Vascular Duplex results      /78 (BP Site: Left Upper Arm, Patient Position: Sitting, BP Cuff Size: Small Adult)   Pulse 76   Temp 98.1 °F (36.7 °C) (Oral)   Resp 16   Ht 1.626 m (5' 4\")   Wt 49.9 kg (110 lb)   SpO2 95%   BMI 18.88 kg/m²     1. Have you been to the ER, urgent care clinic since your last visit?  Hospitalized since your last visit?NO    2. Have you seen or consulted any other health care providers outside of the [facility] since your last visit?  Include any pap smears or colon screening. NO

## 2025-06-23 NOTE — PROGRESS NOTES
Vascular History and Physical    Patient: Junie Neal  MRN: [id number]    YOB: 1965  Age: 59 y.o.  Sex: female     Chief Complaint:  Chief Complaint   Patient presents with    Follow-up     Vascular Duplex results        History of Present Illness: Junie Neal is a 59 y.o. very pleasant woman is here today for follow-up carotid issue.  Patient had a previous right carotid endarterectomy.  Patient currently asymptomatic.  She is currently doing well.  No shortness of breath.  Patient was not aware of recent lipid panel.    Social History:  Social Connections: Not on file       Past Medical History:  Past Medical History:   Diagnosis Date    Anxiety 10/2/2020    Carotid stenosis     Cerebral artery occlusion with cerebral infarction (HCC)     Chronic anemia     Chronic respiratory failure (HCC)     CKD (chronic kidney disease)     Complication of anesthesia     COPD (chronic obstructive pulmonary disease) with emphysema (HCC) 10/2/2020    Depression     Dialysis patient     T, TH, Sat with [facility]    Diastolic CHF (HCC)     Dysphagia 10/2/2020    Emphysema of lung (HCC)     Ganglion cyst 83    GERD (gastroesophageal reflux disease)     Headache 1997    Hemodialysis patient     High cholesterol     Hypertension     Hypothyroid     Iron deficiency anemia 10/2/2020    Mitral valve regurgitation 10/2/2020    Renal artery stenosis     Seizure disorder (HCC)        Surgical History:  Past Surgical History:   Procedure Laterality Date    CARDIAC PROCEDURE N/A 05/02/2024    Left and right heart cath / coronary angiography performed by Darlene Reeves MD at [facility] CARDIAC CATH LAB    CARDIAC PROCEDURE N/A 08/29/2024    Left heart cath / coronary angiography performed by Nic Reyes MD at [facility] CARDIAC CATH LAB    CARDIAC PROCEDURE N/A 08/29/2024    Percutaneous coronary intervention performed by Nic Reyes MD at [facility] CARDIAC CATH LAB    CARDIAC PROCEDURE N/A 08/29/2024    Intravascular

## 2025-07-08 NOTE — TELEPHONE ENCOUNTER
Patient called stated has burning sensation in right wrist to middle of her arm. Patient had right carotid artery done on 01/08/2021. Patient would like to know if she needs any scans on right arm before her next appointment on 03/26/2021.  Please call to advise patient
Afsaneh Meadows

## (undated) DEVICE — BAND COMPR L24CM REG CLR PLAS HEMSTAT EXT HK AND LOOP RETEN

## (undated) DEVICE — SYR 20ML LL STRL LF --

## (undated) DEVICE — TRAY URIN CATH PED 16FR BLLN 5CC 2 CONN SIL STR TIP W/ URIN

## (undated) DEVICE — 3M™ STERI-DRAPE™ SMALL DRAPE WITH ADHESIVE APERTURE 1092 25/BX,4/CS&#X20;: Brand: STERI-DRAPE™

## (undated) DEVICE — TOOL INSRT 0022IN S STL GWIRE

## (undated) DEVICE — BASIXCOMPAK INDEFLATOR

## (undated) DEVICE — 3M™ TEGADERM™ TRANSPARENT FILM DRESSING FRAME STYLE, 1626W, 4 IN X 4-3/4 IN (10 CM X 12 CM), 50/CT 4CT/CASE: Brand: 3M™ TEGADERM™

## (undated) DEVICE — CATHETER IVL C2PLUS SHOCKWAVE 3.5MM X 12MM

## (undated) DEVICE — Device

## (undated) DEVICE — APPLIER CLP L9.375IN APER 2.1MM CLS L3.8MM 20 SM TI CLP

## (undated) DEVICE — RUNTHROUGH NS EXTRA FLOPPY PTCA GUIDEWIRE: Brand: RUNTHROUGH

## (undated) DEVICE — INTENDED FOR TISSUE SEPARATION, AND OTHER PROCEDURES THAT REQUIRE A SHARP SURGICAL BLADE TO PUNCTURE OR CUT.: Brand: BARD-PARKER SAFETY BLADES SIZE 15, STERILE

## (undated) DEVICE — PAD, DEFIB, ADULT, RADIOLUCENT, PHYSIO: Brand: MEDLINE

## (undated) DEVICE — GEL US 20GM NONIRRITATING OVERWRAPPED FILE PCH TRNSMIT

## (undated) DEVICE — MAGNETIC INSTRUMENT PAD 16" X 20"; LARGE; DISPOSABLE: Brand: CARDINAL HEALTH

## (undated) DEVICE — SURGICAL PROCEDURE TRAY CRD CATH 3 PRT

## (undated) DEVICE — CATHETER BLLN SAPPHIRE II PRO 1.0 X15MM

## (undated) DEVICE — WASTEBAG DRIP/ADAPTER: Brand: MEDLINE INDUSTRIES, INC.

## (undated) DEVICE — PERCLOSE PROGLIDE™ SUTURE-MEDIATED CLOSURE SYSTEM: Brand: PERCLOSE PROGLIDE™

## (undated) DEVICE — 48" PROBE COVER W/GEL, ULTRASOUND, STERILE: Brand: SITE-RITE

## (undated) DEVICE — CATH BLLN ANGIO 2.50X12MM SC EUPHORA RX

## (undated) DEVICE — CATH BLLN ANGIO 2X8MM NC EUPHORIA RX

## (undated) DEVICE — 260 CM J TIP WIRE .035

## (undated) DEVICE — PREP SKN DURAPREP 26ML APPL --

## (undated) DEVICE — LOTION PREP REMV 5OZ IODO CLR TINC OF BENZ DURAPREP

## (undated) DEVICE — MASTISOL ADHESIVE LIQ 2/3ML

## (undated) DEVICE — CATH BLLN ANGIO 1.50X10MM SC EUPHORA RX

## (undated) DEVICE — CATHETER GUID 6FR L100CM DIA0.071IN NYL SHFT 3DRC W/O SIDE

## (undated) DEVICE — GOWN,PREVENTION PLUS,XLN/XL,ST,24/CS: Brand: MEDLINE

## (undated) DEVICE — LAMINECTOMY ARM CRADLE FOAM POSITIONER: Brand: CARDINAL HEALTH

## (undated) DEVICE — 3 ML SYRINGE WITH HYPODERMIC SAFETY NEEDLE: Brand: MONOJECT

## (undated) DEVICE — COPILOT BLEEDBACK CONTROL VALVE: Brand: COPILOT

## (undated) DEVICE — SUTURE PERMAHAND SZ 3-0 L30IN NONABSORBABLE BLK SILK BRAID A304H

## (undated) DEVICE — DRAIN SURG 7FR END PERF 1/8IN SIL RND SMOOTH HEAT POLISHED

## (undated) DEVICE — 3M™ STERI-STRIP™ REINFORCED ADHESIVE SKIN CLOSURES, R1547, 1/2 IN X 4 IN (12 MM X 100 MM), 6 STRIPS/ENVELOPE: Brand: 3M™ STERI-STRIP™

## (undated) DEVICE — VALVE HEMSTAS W/ GWIRE INSRTN TOOL GRDIAN II NC

## (undated) DEVICE — SYRINGE MED 10ML RED POLYCARB BRL FIX M LUER CONN FLAT GRP

## (undated) DEVICE — SPONGE GZ W4XL4IN 16 PLY DATA MSTR TAGGED DBL RADPQ

## (undated) DEVICE — GUIDE EXTENSION CATHETER: Brand: GUIDEZILLA™ II

## (undated) DEVICE — Device: Brand: ASAHI SION BLUE

## (undated) DEVICE — SYRINGE ANGIO 10 CC POLYCARB DK GRN MEDALLION DISP

## (undated) DEVICE — SOL INJ SOD CL 0.9% 500ML BG --

## (undated) DEVICE — SC 3W MP RA OFF PB - PG: Brand: NAMIC

## (undated) DEVICE — 3M™ COBAN™ NL STERILE NON-LATEX SELF-ADHERENT WRAP, 2086S, 6 IN X 5 YD (15 CM X 4,5 M), 12 ROLLS/CASE: Brand: 3M™ COBAN™

## (undated) DEVICE — GLIDESHEATH SLENDER NITINOL HYDROPHILIC COATED INTRODUCER SHEATH: Brand: GLIDESHEATH SLENDER

## (undated) DEVICE — SYRINGE MED 10ML PUR GAM COMPATIBLE POLYCARB FIX M LUER CONN

## (undated) DEVICE — VASC-BAND REG

## (undated) DEVICE — CATH BLLN ANGIO 2.50X15MM SC EUPHORA RX

## (undated) DEVICE — CATHETER PULM ART 5FR INFL 0.75CC L110CM BAL DIA8MM SGL WDG

## (undated) DEVICE — SUT PROL 6-0 18IN BV1 DA BLU --

## (undated) DEVICE — GUIDEWIRE VASC L150CM DIA0.035IN TIP L3MM PTFE J CRV FIX

## (undated) DEVICE — SUTURE PROL SZ 7-0 L24IN NONABSORBABLE BLU BV-1 L9.3MM 3/8 8304H

## (undated) DEVICE — SUT ETHLN 3-0 18IN PS2 BLK --

## (undated) DEVICE — 3M™ TEGADERM™ TRANSPARENT FILM DRESSING FIRST AID STYLE, 1621, 4 IN X 4-3/4 IN, 50 BAGS/CARTON, 4 CARTONS/CASE: Brand: 3M™ TEGADERM™

## (undated) DEVICE — TUBING ANGIO L30IN ID18MM 1200PSI POLYUR FLX BRAID AIRLESS

## (undated) DEVICE — PRESSURE TUBING: Brand: TRUWAVE

## (undated) DEVICE — BASIC SINGLE BASIN-LF: Brand: MEDLINE INDUSTRIES, INC.

## (undated) DEVICE — INTENDED FOR TISSUE SEPARATION, AND OTHER PROCEDURES THAT REQUIRE A SHARP SURGICAL BLADE TO PUNCTURE OR CUT.: Brand: BARD-PARKER SAFETY BLADES SIZE 11, STERILE

## (undated) DEVICE — GLIDESHEATH SLENDER STAINLESS STEEL KIT: Brand: GLIDESHEATH SLENDER

## (undated) DEVICE — Device: Brand: EAGLE EYE PLATINUM ST RX DIGITAL IVUS CATHETER

## (undated) DEVICE — HEAD DONUT FOAM POSITIONER: Brand: CARDINAL HEALTH

## (undated) DEVICE — Device: Brand: CORSAIR PRO

## (undated) DEVICE — TURNOVER KIT OR CUST CMB FLD GEN LF

## (undated) DEVICE — HI-TORQUE WIGGLE GUIDE WIRE .014 STRAIGHT TIP 2.0 CM X 190 CM: Brand: HI-TORQUE WIGGLE

## (undated) DEVICE — CATHETER DIAG AD 4FR L100CM STD NYL JUDKINS R 4 TRULUMEN

## (undated) DEVICE — PINNACLE INTRODUCER SHEATH: Brand: PINNACLE

## (undated) DEVICE — Z INACTIVE UOM QTY CHANGE USE 2863475 DEVICE INFL 20ML BRL POLYCARB ANALG

## (undated) DEVICE — SHIELD RAD ANGIO 16X14 IN RADIAL FEN STRL RADPAD

## (undated) DEVICE — INTENDED TO BE USED TO OCCLUDE, RETRACT AND IDENTIFY ARTERIES, VEINS, TENDONS AND NERVES IN SURGICAL PROCEDURES: Brand: STERION®  VESSEL LOOP

## (undated) DEVICE — WIRE .035 3MM J TIP 260CM

## (undated) DEVICE — SUT MONOCRYL PLUS UD 4-0 --

## (undated) DEVICE — DRESSING HEMOSTATIC INTVENT W/O SLT QUIKCLOT

## (undated) DEVICE — SYRINGE ANGIO 20ML WHT POLYCARB VAC PRSS CAP PLUNG FIX M

## (undated) DEVICE — CATHETER 5FR PIG 145 MEDTRONIC 110CM

## (undated) DEVICE — RADIFOCUS GLIDEWIRE: Brand: GLIDEWIRE

## (undated) DEVICE — CATH BLLN ANGIO 3X27MM NC EUPHORIA RX

## (undated) DEVICE — CATHETER 4FR JL3.5 CORDIS 100CM

## (undated) DEVICE — BOWL MED M 16OZ PLAS CAP GRAD

## (undated) DEVICE — 3-0 COATED VICRYL PLUS UNDYED 1X27" SH --

## (undated) DEVICE — MAJOR VASCULAR PROCEDURE PACK: Brand: MEDLINE INDUSTRIES, INC.

## (undated) DEVICE — 9F PRUITT F3 OUTLYING SHUNT WITH T-PORT, EIFU: Brand: PRUITT F3 CAROTID SHUNT

## (undated) DEVICE — HI-TORQUE WHISPER MS GUIDE WIRE .014 J TIP 3.0 CM X 190 CM: Brand: HI-TORQUE WHISPER

## (undated) DEVICE — SYRINGE ANGIO 10ML RED POLYCARB HI PRSS FIX M LUER

## (undated) DEVICE — CATHETER GUID 5.5FR L150CM RAP EXCHG L25CM TIP 4.8FR PUSH

## (undated) DEVICE — PRE-CONNECTED EXCHANGEABLE BURR CATHETER AND BURR ADVANCING DEVICE: Brand: ROTAPRO™

## (undated) DEVICE — Device: Brand: JELCO